# Patient Record
Sex: FEMALE | Race: WHITE | NOT HISPANIC OR LATINO | Employment: OTHER | URBAN - METROPOLITAN AREA
[De-identification: names, ages, dates, MRNs, and addresses within clinical notes are randomized per-mention and may not be internally consistent; named-entity substitution may affect disease eponyms.]

---

## 2017-01-06 ENCOUNTER — THERAPY VISIT (OUTPATIENT)
Dept: PHYSICAL THERAPY | Facility: CLINIC | Age: 72
End: 2017-01-06
Payer: MEDICARE

## 2017-01-06 DIAGNOSIS — M54.50 CHRONIC MIDLINE LOW BACK PAIN WITHOUT SCIATICA: ICD-10-CM

## 2017-01-06 DIAGNOSIS — G89.29 CHRONIC MIDLINE LOW BACK PAIN WITHOUT SCIATICA: ICD-10-CM

## 2017-01-06 PROCEDURE — 97110 THERAPEUTIC EXERCISES: CPT | Mod: GP | Performed by: PHYSICAL THERAPIST

## 2017-01-06 PROCEDURE — 97112 NEUROMUSCULAR REEDUCATION: CPT | Mod: GP | Performed by: PHYSICAL THERAPIST

## 2017-01-12 ENCOUNTER — OFFICE VISIT (OUTPATIENT)
Dept: OPHTHALMOLOGY | Facility: CLINIC | Age: 72
End: 2017-01-12
Attending: OPHTHALMOLOGY
Payer: MEDICARE

## 2017-01-12 DIAGNOSIS — H40.1131 PRIMARY OPEN ANGLE GLAUCOMA OF BOTH EYES, MILD STAGE: ICD-10-CM

## 2017-01-12 DIAGNOSIS — H35.3190 NONEXUDATIVE SENILE MACULAR DEGENERATION OF RETINA: Primary | ICD-10-CM

## 2017-01-12 DIAGNOSIS — Z96.1 PSEUDOPHAKIA OF BOTH EYES: ICD-10-CM

## 2017-01-12 PROCEDURE — 99212 OFFICE O/P EST SF 10 MIN: CPT | Mod: ZF

## 2017-01-12 PROCEDURE — 92134 CPTRZ OPH DX IMG PST SGM RTA: CPT | Mod: ZF | Performed by: OPHTHALMOLOGY

## 2017-01-12 ASSESSMENT — CUP TO DISC RATIO
OD_RATIO: 0.7
OS_RATIO: 0.5

## 2017-01-12 ASSESSMENT — TONOMETRY
IOP_METHOD: TONOPEN
OD_IOP_MMHG: 19
OS_IOP_MMHG: 13

## 2017-01-12 ASSESSMENT — REFRACTION_WEARINGRX
OS_VPRISM: 3.0
OD_HPRISM: 7.0
OS_SPHERE: -2.50
OD_SPHERE: -0.75
OS_AXIS: 047
OS_HPRISM: 7.5
OS_CYLINDER: +1.00
OD_VPRISM: 3.0
SPECS_TYPE: SVL
OD_CYLINDER: SPHERE
OD_VBASE: DOWN
OS_VBASE: DOWN

## 2017-01-12 ASSESSMENT — VISUAL ACUITY
OD_CC: 20/40
METHOD: SNELLEN - LINEAR
OS_CC: 20/40
CORRECTION_TYPE: GLASSES

## 2017-01-12 ASSESSMENT — CONF VISUAL FIELD
OD_SUPERIOR_NASAL_RESTRICTION: 3
OS_NORMAL: 1

## 2017-01-12 ASSESSMENT — SLIT LAMP EXAM - LIDS
COMMENTS: NORMAL
COMMENTS: NORMAL

## 2017-01-12 NOTE — MR AVS SNAPSHOT
After Visit Summary   1/12/2017    Manasa French    MRN: [id number]           Patient Information     Date Of Birth          1945        Visit Information        Provider Department      1/12/2017 7:30 AM Katy Page MD Eye Clinic        Today's Diagnoses     Nonexudative senile macular degeneration of retina    -  1     Pseudophakia of both eyes         Primary open angle glaucoma of both eyes, mild stage            Follow-ups after your visit        Follow-up notes from your care team     Return in about 1 year (around 1/12/2018) for Dry AMD for DFE/OCT OU, FAF OU.      Your next 10 appointments already scheduled     Jan 20, 2017  8:40 AM   [facility] Spine with Willy Marks Pt, PT   [facility] ([facility]  )    [address]  [facility] [address]   [phone]            Jun 19, 2017  8:00 AM   VISUAL FIELD with [facility] EYE VISUAL FIELD   Eye Clinic ([facility])    [address]  [address] [place] [address] [phone]            Jun 19, 2017  8:30 AM   RETURN GLAUCOMA with Radha Gallardo MD   Eye Clinic ([facility])    [address]  [address] [address]   [phone]              Future tests that were ordered for you today     Open Future Orders        Priority Expected Expires Ordered    Fundus Autofluorescence Image (FAF) OU (both eyes) Routine  7/16/2018 1/12/2017    OCT Retina Spectralis OU (both eyes) Routine  7/16/2018 1/12/2017            Who to contact     Please call your clinic at [phone] to:    Ask questions about your health    Make or cancel appointments    Discuss your medicines    Learn about your test results    Speak to your doctor   If you have compliments or concerns about an experience at your clinic, or if you wish to file a complaint, please contact [facility]  Physicians Patient Relations at [phone] or email us at [email or web address]         Additional Information About Your Visit        MyChart Information     [email or web address]¡ is an electronic gateway that provides easy, online access to your medical records. With [email or web address]¡, you can request a clinic appointment, read your test results, renew a prescription or communicate with your care team.     To sign up for [email or web address]¡ visit the website at [email or web address]   You will be asked to enter the access code listed below, as well as some personal information. Please follow the directions to create your username and password.     Your access code is: [email or web address]  Expires: 3/29/2017  8:30 AM     Your access code will  in 90 days. If you need help or a new code, please contact your [facility] or call [phone] for assistance.        Care EveryWhere ID     This is your Care EveryWhere ID. This could be used by other organizations to access your [place] medical records  [id number]         Blood Pressure from Last 3 Encounters:   16 138/62   16 144/74   16 132/66    Weight from Last 3 Encounters:   16 71.442 kg (157 lb 8 oz)   16 71.215 kg (157 lb)   16 71.668 kg (158 lb)              We Performed the Following     OCT Retina Spectralis OU (both eyes)        Primary Care Provider Office Phone # Fax #    Nicole Joy Siegler, PA-C [phone]       [facility] [address] [address]        Thank you!     Thank you for choosing EYE CLINIC  for your care. Our goal is always to provide you with excellent care. Hearing back from our patients is one way we can continue to improve our services. Please take a few minutes to complete the written survey that you may receive in the mail after your visit with us. Thank you!             Your Updated Medication List - Protect others around you: Learn how to  safely use, store and throw away your medicines at www.disposemymeds.org.          This list is accurate as of: 1/12/17  8:48 AM.  Always use your most recent med list.                   Brand Name Dispense Instructions for use    albuterol 108 (90 BASE) MCG/ACT Inhaler   Generic drug:  albuterol     1 Inhaler    Inhale 2 puffs into the lungs every 6 hours as needed for shortness of breath / dyspnea       CALCIUM-VITAMIN D PO      Take 1 tablet by mouth daily       carboxymethylcellulose 1 % ophthalmic solution    CELLUVISC/REFRESH LIQUIGEL     Place 1 drop into both eyes 3 times daily       fluticasone-salmeterol 250-50 MCG/DOSE diskus inhaler    ADVAIR     Inhale 1 puff into the lungs 2 times daily       latanoprost 0.005 % ophthalmic solution    XALATAN    3 Bottle    Place 1 drop into both eyes At Bedtime       levothyroxine 112 MCG tablet    SYNTHROID/LEVOTHROID    90 tablet    TAKE 1 TABLET EVERY DAY       losartan 100 MG tablet    COZAAR    90 tablet    Take 1 tablet (100 mg) by mouth daily       timolol 0.5 % ophthalmic solution    TIMOPTIC    3 Bottle    Place 1 drop into both eyes every morning       vitamin D 1000 UNITS capsule      Take  by mouth.

## 2017-01-12 NOTE — PROGRESS NOTES
I have confirmed the patient's and reviewed Past Medical History, Past Surgical History, Social History, Family History, Problem List, Medication List and agree with Tech note.    CC: f/u AMD    HPI: 70 yo female with h/o Dry AMD here for annual f/u.  Patient reports relatively stable vision with persistent diplopia that is more manageable on some days than others (has h/o WIL f/b orthoptics for diplopia).    POHx:  Thyroid Eye Disease  POAG  Pseudophakia    Eye Meds:  Timolol QAM OU  Xalatan QPM OU    OCT (1/12/17)  RE: (multiple single scans d/t pt eye movements) Drusen, no IRF/SRF  LE: (multiple single scans d/t pt eye movements) Drusen, no IRF/SRF    Assessment & Plan:  1.  Dry Macular Degeneration, both eyes   - continue AREDS II vitamins   - AG precautions   - annual exam    2.  PVD, both eyes   - RT/RD precautions    3. POAG, mild, both eyes   - continue management/follow-up per Dr. Gallardo    4.  Thyroid Eye Disease   - continue with orthoptics for diplopia management    5.  Pseudophakai, both eyes   - mild PCO OS   - observe    RTC: 1 year for exam, OCT and fundus autofluorescence     Venkata Harper MD  Retina Fellow    Attestation:  I have seen and examined the patient with Dr. Harper and agree with the findings in this note.     Katy Peter MD PhD.  Professor & Chair

## 2017-01-12 NOTE — NURSING NOTE
Chief Complaints and History of Present Illnesses   Patient presents with     Follow Up For     Age-related macular degeneration, dry, both eyes      HPI    Affected eye(s):  Both   Symptoms:     No blurred vision   No decreased vision   Double vision   No floaters   No flashes   Halos (Comment: at night)   No starbursts   No tearing   Dryness   Photophobia      Duration:  12 months   Frequency:  Constant       Do you have eye pain now?:  No      Comments:  States va is the same since last visit. Refresh Plus 2-3 times daily both eyes  Artur Schmidt  7:33 AM January 12, 2017

## 2017-01-20 ENCOUNTER — THERAPY VISIT (OUTPATIENT)
Dept: PHYSICAL THERAPY | Facility: CLINIC | Age: 72
End: 2017-01-20
Payer: MEDICARE

## 2017-01-20 DIAGNOSIS — M54.50 CHRONIC MIDLINE LOW BACK PAIN WITHOUT SCIATICA: ICD-10-CM

## 2017-01-20 DIAGNOSIS — G89.29 CHRONIC MIDLINE LOW BACK PAIN WITHOUT SCIATICA: ICD-10-CM

## 2017-01-20 PROCEDURE — G8979 MOBILITY GOAL STATUS: HCPCS | Mod: GP | Performed by: PHYSICAL THERAPIST

## 2017-01-20 PROCEDURE — G8980 MOBILITY D/C STATUS: HCPCS | Mod: GP | Performed by: PHYSICAL THERAPIST

## 2017-01-20 PROCEDURE — 97110 THERAPEUTIC EXERCISES: CPT | Mod: GP | Performed by: PHYSICAL THERAPIST

## 2017-01-20 PROCEDURE — 97112 NEUROMUSCULAR REEDUCATION: CPT | Mod: GP | Performed by: PHYSICAL THERAPIST

## 2017-01-20 NOTE — PROGRESS NOTES
Subjective:    HPI  Oswestry Score: 33.33 %                 Objective:    System    Physical Exam    General     ROS    Assessment/Plan:      DISCHARGE REPORT    Progress reporting period is from 11/18/16 to 1/20/17.       SUBJECTIVE   Subjective: Was able to walk around store 60' and is now able to do transfers w/o help. Goes to [facility] almost every day. She swims and walks 3-4x per week. Also does HEP daily and lifts weights at Y 3x per week with machines    Current pain level is NA  .     Previous pain level was  NA  .   Changes in function:  Yes (See Goal flowsheet attached for changes in current functional level)  Adverse reaction to treatment or activity: None    OBJECTIVE  Changes noted in objective findings:  Yes, AROM WFL  Independent HEP that has been progressed several times during our meetings. Exercises are getting easier as she strengthens  Walks with SEC, but often forgets to use it because she is comfortable without.          ASSESSMENT/PLAN  Updated problem list and treatment plan: Diagnosis 1:  LBP   Pain -  self management, education and home program  Decreased strength - therapeutic exercise and therapeutic activities  Impaired muscle performance - neuro re-education  Decreased function - therapeutic activities  STG/LTGs have been met or progress has been made towards goals:  Yes (See Goal flow sheet completed today.)  Assessment of Progress: The patient's condition is improving.  The patient's condition has potential to improve.  Self Management Plans:  Patient is independent in a home treatment program.  Patient is independent in self management of symptoms.    Manasa continues to require the following intervention to meet STG and LTG's:  PT intervention is no longer required to meet STG/LTG.    Recommendations:  This patient is ready to be discharged from therapy and continue their home treatment program.    Please refer to the daily flowsheet for treatment today, total treatment time and time  spent performing 1:1 timed codes.

## 2017-01-20 NOTE — Clinical Note
[facility] ATHLETIC [facility]  [address]  [facility] [id number] [phone]    2017  Re: Manasa French   :   1945  MRN:  [id number]   REFERRING PHYSICIAN:  Kevin Hodges    [facility]    Date of Initial Evaluation:  2016  Visits:  Rxs Used: 7  Reason for Referral:     Compression fracture  Chronic midline low back pain without sciatica    DISCHARGE REPORT    Progress reporting period is from 16 to 17.       SUBJECTIVE   Subjective: Was able to walk around store 60' and is now able to do transfers w/o help. Goes to [facility] almost every day. She swims and walks 3-4x per week. Also does HEP daily and lifts weights at Y 3x per week with machines    Current pain level is NA  .     Previous pain level was  NA  .   Changes in function:  Yes (See Goal flowsheet attached for changes in current functional level)  Adverse reaction to treatment or activity: None    OBJECTIVE  Changes noted in objective findings:  Yes, AROM WFL  Independent HEP that has been progressed several times during our meetings. Exercises are getting easier as she strengthens  Walks with SEC, but often forgets to use it because she is comfortable without.    ASSESSMENT/PLAN  Updated problem list and treatment plan: Diagnosis 1:  LBP   Pain -  self management, education and home program  Decreased strength - therapeutic exercise and therapeutic activities  Impaired muscle performance - neuro re-education  Decreased function - therapeutic activities  STG/LTGs have been met or progress has been made towards goals:  Yes (See Goal flow sheet completed today.)  Assessment of Progress: The patient's condition is improving.  The patient's condition has potential to improve.  Self Management Plans:  Patient is independent in a home treatment program.  Patient is independent in self management of symptoms.    Manasa continues to  require the following intervention to meet STG and LTG's:  PT intervention is no longer required to meet STG/LTG.    Recommendations:  This patient is ready to be discharged from therapy and continue their home treatment program.              Thank you for your referral.    INQUIRIES  Therapist: Willy Marks, PT  [facility]  [address]  [facility] [address]  Phone: [phone]  Fax: [phone]

## 2017-03-20 DIAGNOSIS — H40.9 MILD STAGE GLAUCOMA(365.71): ICD-10-CM

## 2017-03-21 RX ORDER — LATANOPROST 50 UG/ML
1 SOLUTION/ DROPS OPHTHALMIC AT BEDTIME
Qty: 3 BOTTLE | Refills: 3 | Status: SHIPPED | OUTPATIENT
Start: 2017-03-21 | End: 2018-05-04

## 2017-03-21 NOTE — TELEPHONE ENCOUNTER
latanoprost (XALATAN) 0.005 % ophthalmic solution      Last Written Prescription Date:  12-*11-15  Last Fill Quantity: 3 btl,   # refills: 4  Last Office Visit: 1-12-17  Future Office visit:   6-19-17  Last Note:    Progress Notes      I have confirmed the patient's and reviewed Past Medical History, Past Surgical History, Social History, Family History, Problem List, Medication List and agree with Tech note.     CC: f/u AMD     HPI: 72 yo female with h/o Dry AMD here for annual f/u. Patient reports relatively stable vision with persistent diplopia that is more manageable on some days than others (has h/o WIL f/b orthoptics for diplopia).     POHx:  Thyroid Eye Disease  POAG  Pseudophakia     Eye Meds:  Timolol QAM OU  Xalatan QPM OU     OCT (1/12/17)  RE: (multiple single scans d/t pt eye movements) Drusen, no IRF/SRF  LE: (multiple single scans d/t pt eye movements) Drusen, no IRF/SRF     Assessment & Plan:  1. Dry Macular Degeneration, both eyes  - continue AREDS II vitamins  - AG precautions  - annual exam     2. PVD, both eyes  - RT/RD precautions     3. POAG, mild, both eyes  - continue management/follow-up per Dr. Gallardo     4. Thyroid Eye Disease  - continue with orthoptics for diplopia management     5. Pseudophakai, both eyes  - mild PCO OS  - observe     RTC: 1 year for exam, OCT and fundus autofluorescence             Kathleen M Doege RN

## 2017-06-19 ENCOUNTER — OFFICE VISIT (OUTPATIENT)
Dept: OPHTHALMOLOGY | Facility: CLINIC | Age: 72
End: 2017-06-19
Attending: OPHTHALMOLOGY
Payer: MEDICARE

## 2017-06-19 DIAGNOSIS — H40.9 GLAUCOMA: Primary | ICD-10-CM

## 2017-06-19 DIAGNOSIS — H40.9 MILD STAGE GLAUCOMA(365.71): ICD-10-CM

## 2017-06-19 PROCEDURE — 99213 OFFICE O/P EST LOW 20 MIN: CPT | Mod: ZF

## 2017-06-19 PROCEDURE — 92083 EXTENDED VISUAL FIELD XM: CPT | Mod: ZF | Performed by: OPHTHALMOLOGY

## 2017-06-19 RX ORDER — TIMOLOL MALEATE 5 MG/ML
1 SOLUTION/ DROPS OPHTHALMIC EVERY MORNING
Qty: 3 BOTTLE | Refills: 4 | Status: SHIPPED | OUTPATIENT
Start: 2017-06-19 | End: 2018-08-10

## 2017-06-19 ASSESSMENT — REFRACTION_WEARINGRX
OS_VBASE: DOWN
OD_SPHERE: -0.75
OS_VPRISM: 3.0
OS_AXIS: 047
SPECS_TYPE: SVL
OS_SPHERE: -2.50
OD_HPRISM: 7.0
OD_CYLINDER: SPHERE
OS_CYLINDER: +1.00
OS_HPRISM: 7.5
OD_VBASE: DOWN
OD_VPRISM: 3.0

## 2017-06-19 ASSESSMENT — VISUAL ACUITY
OS_CC: 20/40
OD_CC: 20/25
METHOD: SNELLEN - LINEAR
OS_PH_CC: 20/30
OD_CC+: -1
OS_CC+: -2
CORRECTION_TYPE: GLASSES

## 2017-06-19 ASSESSMENT — TONOMETRY
OS_IOP_MMHG: 16
OD_IOP_MMHG: 20
IOP_METHOD: APPLANATION

## 2017-06-19 ASSESSMENT — SLIT LAMP EXAM - LIDS
COMMENTS: NORMAL
COMMENTS: NORMAL

## 2017-06-19 ASSESSMENT — CONF VISUAL FIELD
METHOD: COUNTING FINGERS
OD_NORMAL: 1
OS_NORMAL: 1

## 2017-06-19 ASSESSMENT — CUP TO DISC RATIO
OD_RATIO: 0.7
OS_RATIO: 0.5

## 2017-06-19 NOTE — NURSING NOTE
Chief Complaints and History of Present Illnesses   Patient presents with     Follow Up For     Primary open angle glaucoma      HPI    Affected eye(s):  Both   Symptoms:        Frequency:  Constant       Do you have eye pain now?:  No      Comments:  States va is the same since last visit  No red watery or dry  Lillie Dobbins COT 8:19 AM June 19, 2017

## 2017-06-19 NOTE — PROGRESS NOTES
History of thyroid eye disease and primary open angle glaucoma presenting for 6 month follow up.     Current Medications: did not take this am  Timolol every morning both eyes   Latanoprost HS both eyes     [place] Visual Field  Same as previous     1. Mild stage glaucoma  IOP stable on current medication regimen and visual fields stable with non-specific changes left eye (has had fluctuations in past)     2. Pseudophakia of both eyes  IOL stable in good position   Observe    3. Thyroid eye disease  Currently following with orthoptics for diplopia    4. Age related macular degeneration   Stage II, followed annually by retina     Plan:   Continue latanoprost HS both eyes   Continue timolol daily both eyes     RV 6 months with OCT retinal nerve fiber layer  both eyes      Attending Physician Attestation:  Complete documentation of historical and exam elements from today's encounter can be found in the full encounter summary report (not reduplicated in this progress note). I personally obtained the chief complaint(s) and history of present illness. I confirmed and edited asnecessary the review of systems, past medical/surgical history, family history, social history, and examination findings as documented by others; and I examined the patient myself. I personally reviewed the relevant tests, images, and reports as documented above. I formulated and edited as necessary the assessment and plan and discussed the findings and management plan with the patient and family.  - Radha Gallardo MD 9:10 AM 6/19/2017

## 2017-06-19 NOTE — MR AVS SNAPSHOT
After Visit Summary   6/19/2017    Manasa French    MRN: [id number]           Patient Information     Date Of Birth          1945        Visit Information        Provider Department      6/19/2017 8:30 AM Radha Gallardo MD Eye Clinic        Today's Diagnoses     Glaucoma    -  1    Mild stage glaucoma - Both Eyes           Follow-ups after your visit        Follow-up notes from your care team     Return in about 6 months (around 12/19/2017) for OCT rnfl.      Your next 10 appointments already scheduled     Dec 15, 2017  8:00 AM CST   RETURN GLAUCOMA with Radha Gallardo MD   Eye Clinic ([facility])    [address]  [address]  [facility] [address]   [phone]            Jan 17, 2018  7:30 AM CST   RETURN RETINA with Katy Page MD   Eye Clinic ([facility])    [address]  [address] [facility]  [address] [facility] [address]  [facility] [address]   [phone]              Who to contact     Please call your clinic at [phone] to:    Ask questions about your health    Make or cancel appointments    Discuss your medicines    Learn about your test results    Speak to your doctor   If you have compliments or concerns about an experience at your clinic, or if you wish to file a complaint, please contact [facility] Physicians Patient Relations at [phone] or email us at [email or web address]         Additional Information About Your Visit        MyChart Information     [employer] is an electronic gateway that provides easy, online access to your medical records. With qcue, you can request a clinic appointment, read your test results, renew a prescription or communicate with your care team.     To sign up for [employer] visit the website at [email or web address]   You will be asked to enter the access code listed below, as well as some personal information. Please follow the directions to  create your username and password.     Your access code is: [email or web address]  Expires: 9/3/2017  6:31 AM     Your access code will  in 90 days. If you need help or a new code, please contact your [facility] or call [phone] for assistance.        Care EveryWhere ID     This is your Care EveryWhere ID. This could be used by other organizations to access your [place] medical records  [id number]         Blood Pressure from Last 3 Encounters:   16 138/62   16 144/74   16 132/66    Weight from Last 3 Encounters:   16 71.4 kg (157 lb 8 oz)   16 71.2 kg (157 lb)   16 71.7 kg (158 lb)              We Performed the Following     OVF 24-2 Dynamic OU          Where to get your medicines      Some of these will need a paper prescription and others can be bought over the counter.  Ask your nurse if you have questions.     Bring a paper prescription for each of these medications     timolol 0.5 % ophthalmic solution          Primary Care Provider Office Phone # Fax #    Nicole Joy Siegler, PA-C [phone]       [facility] [address] [place] [address] [address]  [facility] [address]        Thank you!     Thank you for choosing EYE CLINIC  for your care. Our goal is always to provide you with excellent care. Hearing back from our patients is one way we can continue to improve our services. Please take a few minutes to complete the written survey that you may receive in the mail after your visit with us. Thank you!             Your Updated Medication List - Protect others around you: Learn how to safely use, store and throw away your medicines at www.disposemymeds.org.          This list is accurate as of: 17  9:24 AM.  Always use your most recent med list.                   Brand Name Dispense Instructions for use    albuterol 108 (90 BASE) MCG/ACT Inhaler   Generic drug:  albuterol     1 Inhaler    Inhale 2 puffs into the lungs every 6 hours as  needed for shortness of breath / dyspnea       CALCIUM-VITAMIN D PO      Take 1 tablet by mouth daily       carboxymethylcellulose 1 % ophthalmic solution    CELLUVISC/REFRESH LIQUIGEL     Place 1 drop into both eyes 3 times daily       fluticasone-salmeterol 250-50 MCG/DOSE diskus inhaler    ADVAIR     Inhale 1 puff into the lungs 2 times daily       latanoprost 0.005 % ophthalmic solution    XALATAN    3 Bottle    Place 1 drop into both eyes At Bedtime       levothyroxine 112 MCG tablet    SYNTHROID/LEVOTHROID    90 tablet    TAKE 1 TABLET EVERY DAY       losartan 100 MG tablet    COZAAR    90 tablet    Take 1 tablet (100 mg) by mouth daily       timolol 0.5 % ophthalmic solution    TIMOPTIC    3 Bottle    Place 1 drop into both eyes every morning       vitamin D 1000 UNITS capsule      Take  by mouth.

## 2017-10-09 ENCOUNTER — TELEPHONE (OUTPATIENT)
Dept: FAMILY MEDICINE | Facility: CLINIC | Age: 72
End: 2017-10-09

## 2017-10-09 ENCOUNTER — TRANSFERRED RECORDS (OUTPATIENT)
Dept: HEALTH INFORMATION MANAGEMENT | Facility: CLINIC | Age: 72
End: 2017-10-09

## 2017-10-09 DIAGNOSIS — M26.639 ARTICULAR DISC DISORDER OF TEMPOROMANDIBULAR JOINT: ICD-10-CM

## 2017-10-09 DIAGNOSIS — M26.629 ARTHRALGIA OF TEMPOROMANDIBULAR JOINT, UNSPECIFIED LATERALITY: Primary | ICD-10-CM

## 2017-10-09 NOTE — TELEPHONE ENCOUNTER
Reason for Call: Request for an order or referral:    Order or referral being requested: PT Referral     Date needed: as soon as possible    Has the patient been seen by the PCP for this problem? NO    Additional comments: Pt saw dentist about jaw pain, was then sent to head and neck pain clinic, needs a referral for PT due to lock jaw. Please call pt and let her know next course of action     Phone number Patient can be reached at:  Home number on file [phone] (home)    Best Time:  Anytime     Can we leave a detailed message on this number?  YES    Call taken on 10/9/2017 at 3:36 PM by Irina Perry

## 2017-10-10 NOTE — TELEPHONE ENCOUNTER
Can we have notes from her visits with the Head/Neck specialist? I'd like to have the diagnosis confirmed and that documentation in her chart. Does she have a PT already set up? Or is she looking for further information to find one to do that type of work? Please ask her these questions. We should be able to complete the referral but I need to know that info. Thanks! Nicole Joy Siegler, PA-C

## 2017-10-10 NOTE — TELEPHONE ENCOUNTER
Called pt and she was seen at [facility] Dr. Shane [phone].  They will fax visit notes once the provider is done.  Pt doesn't have PT set up yet, she is waiting for a referral.  I advised pt that once notes have been received the referral should be done and we will call her once it has been done.

## 2017-10-12 NOTE — TELEPHONE ENCOUNTER
Please recheck for these documents/ notes from her head/neck specialist. I known she is waiting for a referral to be written. Thanks! Nicole Joy Siegler, PA-C

## 2017-10-12 NOTE — TELEPHONE ENCOUNTER
OV notes should be coming to [facility] office as Kaila is a team 2 . This will have to be looked into at [facility].

## 2017-10-13 PROBLEM — M26.629 ARTHRALGIA OF TEMPOROMANDIBULAR JOINT, UNSPECIFIED LATERALITY: Status: ACTIVE | Noted: 2017-10-13

## 2017-10-13 NOTE — TELEPHONE ENCOUNTER
Called pt and let her know referral has been done.  Pt would like referral faxed to [facility].  Referral faxed to [facility] [phone].

## 2017-10-13 NOTE — TELEPHONE ENCOUNTER
Notes received and physical therapy order was written. I do not have a suggestion of where to go, however, it seems that [facility] has an affiliated PT group and they would likely provide appropriate PT for her. Please provide the Order for her as she needs- fax to PT of her choice vs print for her etc. Thanks! Nicole Joy Siegler, PA-C

## 2017-10-27 ENCOUNTER — THERAPY VISIT (OUTPATIENT)
Dept: PHYSICAL THERAPY | Facility: CLINIC | Age: 72
End: 2017-10-27
Payer: MEDICARE

## 2017-10-27 DIAGNOSIS — M26.629 ARTHRALGIA OF TEMPOROMANDIBULAR JOINT, UNSPECIFIED LATERALITY: Primary | ICD-10-CM

## 2017-10-27 PROCEDURE — 97140 MANUAL THERAPY 1/> REGIONS: CPT | Mod: GP | Performed by: PHYSICAL THERAPIST

## 2017-10-27 PROCEDURE — 97161 PT EVAL LOW COMPLEX 20 MIN: CPT | Mod: GP | Performed by: PHYSICAL THERAPIST

## 2017-10-27 PROCEDURE — G8991 OTHER PT/OT GOAL STATUS: HCPCS | Mod: GP | Performed by: PHYSICAL THERAPIST

## 2017-10-27 PROCEDURE — G8990 OTHER PT/OT CURRENT STATUS: HCPCS | Mod: GP | Performed by: PHYSICAL THERAPIST

## 2017-10-27 NOTE — PROGRESS NOTES
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)   - headaches   - Hx of back surgery   - Hx of left shoulder arthroscopy   - Hx of eye problems (glaucoma, macular degeneration, cataracts)  General health as reported by patient: Good    Medications: Thyroid, HTN, glaucoma,   Occupation: None  Previous Treatment (Effect): massage and hot pack (not helpful)  Imaging: None  AM/PM: no difference  Quality of Pain: ache  Pain: 1/10 at present, 0/10 at best, 2/10 at worst  Better: not found  Worse: eating, brushing teeth,   Progression of Symptoms since onset: same   Sleeping: no disturbance   Other current functional challenges: eating, brushing teeth  Current Functional Status: unable to open to eat, brush teeth   Previous Functional Status: no restrictions  Red Flags:   - Patient denies the following: Numbness/Tingling ; Vision Change     Pain with: opening, oral hygiene,       Objective:     Posture: moderate forward head    Headaches: Posterior vertex    Cervical Screen: Limitations with Cervical ROM in all directions.     AROM:   Movement  measurement (mm) Pain   Opening 26 -->31 mm    Left Lat Excursion 5 mm    Right Lat Excursion 10 mm    Opening improved within the session from 26 to 29 with mobilization, and from 29 to 31 with soft tissue work.     Opening Pattern: no deviation    Translation during opening: Minimal bilaterally    Joint Noise: None    Palpation: (R - right, L -  left, B - bilateral)  Extra-Orally Location of Pressure Referred Pain?   TMJ     Masseter     Temporalis     Cervical     Sternocleidomastoid     Suboccipitals     Upper Trapezius     Levator Scapulae       Joint Play: mild hypomobility of the R TMJ      Assessment/Plan:      Patient is a 72 year old female with right side TMJ complaints.    Patient has the following significant findings with corresponding treatment plan.                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    Comorbidity factors that impact the plan of care are:      High blood pressure and Eye Problems.     Medications impacting care: High blood pressure and Glaucoma.  2) Examination of Body Systems comprised of:   Body structures and functions that impact the plan of care:      Head.   Activity limitations that impact the plan of care are:      Eating and Karla Hygiene.  3) Clinical presentation characteristics are:   Stable/Uncomplicated.  4) Decision-Making    Low complexity using standardized patient assessment instrument and/or measureable assessment of functional outcome.  Cumulative Therapy Evaluation is: Low complexity.    Previous and current functional limitations:  (See Goal Flow Sheet for this information)    Short term and Long term goals: (See Goal Flow Sheet for this information)     Communication ability:  Patient appears to be  able to clearly communicate and understand verbal and written communication and follow directions correctly.  Treatment Explanation - The following has been discussed with the patient:   RX ordered/plan of care  Anticipated outcomes  Possible risks and side effects  This patient would benefit from PT intervention to resume normal activities.   Rehab potential is good.    Frequency:  1 X week, once daily  Duration:  for 6 weeks  Discharge Plan:  Achieve all LTG.  Independent in home treatment program.  Reach maximal therapeutic benefit.    Please refer to the daily flowsheet for treatment today, total treatment time and time spent performing 1:1 timed codes.

## 2017-10-27 NOTE — LETTER
DEPARTMENT OF HEALTH AND HUMAN SERVICES  CENTERS FOR MEDICARE & MEDICAID SERVICES    PLAN/UPDATED PLAN OF PROGRESS FOR OUTPATIENT REHABILITATION    PATIENTS NAME:  Manasa French   : 1945  PROVIDER NUMBER:    [id number]  [id number]  PROVIDER NAME: [facility]  MEDICAL RECORD NUMBER: [id number]   START OF CARE DATE:  SOC Date: 10/27/17   TYPE:  PT  PRIMARY/TREATMENT DIAGNOSIS: (Pertinent Medical Diagnosis)  Arthralgia of temporomandibular joint, unspecified laterality    VISITS FROM START OF CARE:  Rxs Used: 1   Physical Therapy Initial Evaluation:   Oct 27, 2017  Subjective:   Chief Complaint:    Pain: Right side of the face along the jawline   Numbness/Tingling: None   Other: Can't open her mouth  New/Recurrent/Chronic: New - had been clicking all summer  DOI/onset: 3 weeks ago   Referral Date: 10/13/2017  Mechanism of onset: started as clicking, then just couldn't open  PMH/surgical history/trauma:     - HTN   - Thyroid Problems   - Asthma   - Hydrocephaly (with shunt on right side of cranium)   - headaches   - Hx of back surgery   - Hx of left shoulder arthroscopy   - Hx of eye problems (glaucoma, macular degeneration, cataracts)  General health as reported by patient: Good    Medications: Thyroid, HTN, glaucoma,     PATIENTS NAME:  Manasa French   : 1945    Occupation: None  Previous Treatment (Effect): massage and hot pack (not helpful)  Imaging: None  AM/PM: no difference  Quality of Pain: ache  Pain: 1/10 at present, 0/10 at best, 2/10 at worst  Better: not found  Worse: eating, brushing teeth,   Progression of Symptoms since onset: same   Sleeping: no disturbance   Other current functional challenges: eating, brushing teeth  Current Functional Status: unable to open to eat, brush teeth   Previous Functional Status: no restrictions  Red Flags:   - Patient denies the following: Numbness/Tingling ; Vision Change     Pain with: opening, oral hygiene,     Objective:     Posture:  moderate forward head    Headaches: Posterior vertex    Cervical Screen: Limitations with Cervical ROM in all directions.     AROM:   Movement  measurement (mm) Pain   Opening 26 -->31 mm    Left Lat Excursion 5 mm    Right Lat Excursion 10 mm    Opening improved within the session from 26 to 29 with mobilization, and from 29 to 31 with soft tissue work.     Opening Pattern: no deviation    Translation during opening: Minimal bilaterally    Joint Noise: None        PATIENTS NAME:  Manasa French   : 1945    Palpation: (R - right, L - left, B - bilateral)  Extra-Orally Location of Pressure Referred Pain?   TMJ     Masseter     Temporalis     Cervical     Sternocleidomastoid     Suboccipitals     Upper Trapezius     Levator Scapulae       Joint Play: mild hypomobility of the R TMJ    Assessment/Plan:      Patient is a 72 year old female with right side TMJ complaints.    Patient has the following significant findings with corresponding treatment plan.                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    Comorbidity factors that impact the plan of care are:      High blood pressure and Eye Problems.     Medications impacting care: High blood pressure and Glaucoma.  2) Examination of Body Systems comprised of:   Body structures and functions that impact the plan of care:      Head.   Activity limitations  "that impact the plan of care are:      Eating and Karla Hygiene.  3) Clinical presentation characteristics are:   Stable/Uncomplicated.  4) Decision-Making    Low complexity using standardized patient assessment instrument and/or measureable assessment of functional outcome.  Cumulative Therapy Evaluation is: Low complexity.    Previous and current functional limitations:  (See Goal Flow Sheet for this information)    Short term and Long term goals: (See Goal Flow Sheet for this information)             PATIENTS NAME:  Manasa French   : 1945    Communication ability:  Patient appears to be able to clearly communicate and understand verbal and written communication and follow directions correctly.  Treatment Explanation - The following has been discussed with the patient:   RX ordered/plan of care  Anticipated outcomes  Possible risks and side effects  This patient would benefit from PT intervention to resume normal activities.   Rehab potential is good.    Frequency:  1 X week, once daily  Duration:  for 6 weeks  Discharge Plan:  Achieve all LTG.  Independent in home treatment program.  Reach maximal therapeutic benefit.              Caregiver Signature/Credentials _____________________________ Date ________      Treating Provider: Lele Vanessa, PT, OCS   I have reviewed and certified the need for these services and plan of treatment while under my care.        PHYSICIAN'S SIGNATURE:   ___________________________________ Date___________     Nicole Siegler PA-C    Certification period:  Beginning of Cert date period: 10/27/17 to  End of Cert period date: 18     Functional Level Progress Report: Please see attached \"Goal Flow sheet for Functional level.\"    ____X____ Continue Services or       ________ DC Services                Service dates: From  SOC Date: 10/27/17 date to present                         "

## 2017-11-03 ENCOUNTER — THERAPY VISIT (OUTPATIENT)
Dept: PHYSICAL THERAPY | Facility: CLINIC | Age: 72
End: 2017-11-03
Payer: MEDICARE

## 2017-11-03 DIAGNOSIS — M26.629 ARTHRALGIA OF TEMPOROMANDIBULAR JOINT, UNSPECIFIED LATERALITY: ICD-10-CM

## 2017-11-03 PROCEDURE — 97530 THERAPEUTIC ACTIVITIES: CPT | Mod: GP | Performed by: PHYSICAL THERAPIST

## 2017-11-03 PROCEDURE — 97140 MANUAL THERAPY 1/> REGIONS: CPT | Mod: GP | Performed by: PHYSICAL THERAPIST

## 2017-11-03 PROCEDURE — 97035 APP MDLTY 1+ULTRASOUND EA 15: CPT | Mod: GP | Performed by: PHYSICAL THERAPIST

## 2017-11-06 ENCOUNTER — TELEPHONE (OUTPATIENT)
Dept: FAMILY MEDICINE | Facility: CLINIC | Age: 72
End: 2017-11-06

## 2017-11-06 ENCOUNTER — ALLIED HEALTH/NURSE VISIT (OUTPATIENT)
Dept: NURSING | Facility: CLINIC | Age: 72
End: 2017-11-06
Payer: COMMERCIAL

## 2017-11-06 DIAGNOSIS — Z23 NEED FOR PROPHYLACTIC VACCINATION AND INOCULATION AGAINST INFLUENZA: Primary | ICD-10-CM

## 2017-11-06 PROCEDURE — 99207 ZZC NO CHARGE NURSE ONLY: CPT

## 2017-11-06 PROCEDURE — 90662 IIV NO PRSV INCREASED AG IM: CPT

## 2017-11-06 PROCEDURE — G0008 ADMIN INFLUENZA VIRUS VAC: HCPCS

## 2017-11-06 NOTE — PROGRESS NOTES

## 2017-11-06 NOTE — MR AVS SNAPSHOT
After Visit Summary   11/6/2017    Manasa French    MRN: [id number]           Patient Information     Date Of Birth          1945        Visit Information        Provider Department      11/6/2017 8:30 AM BM NURSE [facility]        Today's Diagnoses     Need for prophylactic vaccination and inoculation against influenza    -  1       Follow-ups after your visit        Your next 10 appointments already scheduled     Nov 10, 2017 10:10 AM CST   NADIA Extremity with Lele TORRES Ho   [facility] ([facility] [place]  )    [address]  [address]  [facility] [address]   [phone]            Nov 17, 2017  8:50 AM CST   NADIA Extremity with Lang M Ho   [facility] ([facility] [place]  )    [address]  [address]  [facility] [address]   [phone]            Dec 15, 2017  8:00 AM CST   RETURN GLAUCOMA with Radha Gallardo MD   Eye Clinic ([facility])    [address]  [address] [place] [id number] [phone]            Jan 17, 2018  7:30 AM CST   RETURN RETINA with Katy Page MD   Eye Clinic ([facility])    [address] [address]   [phone]              Who to contact     If you have questions or need follow up information about today's clinic visit or your schedule please contact [facility] directly at [phone].  Normal or non-critical lab and imaging results will be communicated to you by MyChart, letter or phone within 4 business days after the clinic has received the results. If you do not hear from us within 7 days, please contact the clinic through MyChart or phone. If you have a critical or abnormal lab result, we will notify you by phone as soon as possible.  Submit refill requests through [employer] or call your pharmacy and they will forward the refill request  "to us. Please allow 3 business days for your refill to be completed.          Additional Information About Your Visit        [employer] Information     [employer] lets you send messages to your doctor, view your test results, renew your prescriptions, schedule appointments and more. To sign up, go to [email or web address] . Click on \"Log in\" on the left side of the screen, which will take you to the Welcome page. Then click on \"Sign up Now\" on the right side of the page.     You will be asked to enter the access code listed below, as well as some personal information. Please follow the directions to create your username and password.     Your access code is: [email or web address]  Expires: 2018  1:26 PM     Your access code will  in 90 days. If you need help or a new code, please call your [facility] clinic or [phone].        Care EveryWhere ID     This is your Care EveryWhere ID. This could be used by other organizations to access your [facility] medical records  [id number]         Blood Pressure from Last 3 Encounters:   16 138/62   16 144/74   16 132/66    Weight from Last 3 Encounters:   16 157 lb 8 oz (71.4 kg)   16 157 lb (71.2 kg)   16 158 lb (71.7 kg)              We Performed the Following     ADMIN INFLUENZA (For MEDICARE Patients ONLY) []     FLU VACCINE, INCREASED ANTIGEN, PRESV FREE, AGE 65+ [[address]]        Primary Care Provider Office Phone # Fax #    Nicole Joy Siegler, PA-C [phone]       [facility] [address]  [facility] [address]        Equal Access to Services     CHELSY MONTERROSO : Babar Almeida, tu wiley, edwardo kaalmada jael, benji rodríguez. So [facility] [phone].    ATENCIÓN: Si habla español, tiene a martinez disposición servicios gratuitos de asistencia lingüística. Llame al [phone].    We comply with applicable federal civil rights laws and [place] laws. We do not " discriminate on the basis of race, color, national origin, age, disability, sex, sexual orientation, or gender identity.            Thank you!     Thank you for choosing [facility]  for your care. Our goal is always to provide you with excellent care. Hearing back from our patients is one way we can continue to improve our services. Please take a few minutes to complete the written survey that you may receive in the mail after your visit with us. Thank you!             Your Updated Medication List - Protect others around you: Learn how to safely use, store and throw away your medicines at www.disposemymeds.org.          This list is accurate as of: 11/6/17  1:26 PM.  Always use your most recent med list.                   Brand Name Dispense Instructions for use Diagnosis    CALCIUM-VITAMIN D PO      Take 1 tablet by mouth daily        carboxymethylcellulose 1 % ophthalmic solution    CELLUVISC/REFRESH LIQUIGEL     Place 1 drop into both eyes 3 times daily        fluticasone-salmeterol 250-50 MCG/DOSE diskus inhaler    ADVAIR     Inhale 1 puff into the lungs 2 times daily        latanoprost 0.005 % ophthalmic solution    XALATAN    3 Bottle    Place 1 drop into both eyes At Bedtime    Mild stage glaucoma(365.71)       levothyroxine 112 MCG tablet    SYNTHROID/LEVOTHROID    90 tablet    TAKE 1 TABLET EVERY DAY    Hypothyroidism, postablative       losartan 100 MG tablet    COZAAR    90 tablet    Take 1 tablet (100 mg) by mouth daily    Benign hypertension       PROAIR  (90 BASE) MCG/ACT Inhaler   Generic drug:  albuterol     1 Inhaler    Inhale 2 puffs into the lungs every 6 hours as needed for shortness of breath / dyspnea    Asthma, mild persistent, uncomplicated       timolol 0.5 % ophthalmic solution    TIMOPTIC    3 Bottle    Place 1 drop into both eyes every morning    Mild stage glaucoma(365.71)       vitamin D 1000 UNITS capsule      Take  by mouth.

## 2017-11-06 NOTE — TELEPHONE ENCOUNTER
Reason for Call:  Form, our goal is to have forms completed with 72 hours, however, some forms may require a visit or additional information.    Type of letter, form or note:  medical    Who is the form from?: Home care    Where did the form come from: form was faxed in    What clinic location was the form placed at?: [facility]    Where the form was placed: 's Box: Nicole Siegler, PA    What number is listed as a contact on the form?: [phone]   Phone [phone]       Additional comments: [facility]  plan/updated plan of progress for outpt rehab     Call taken on 11/6/2017 at 4:05 PM by Krista Bajwa

## 2017-11-10 ENCOUNTER — THERAPY VISIT (OUTPATIENT)
Dept: PHYSICAL THERAPY | Facility: CLINIC | Age: 72
End: 2017-11-10
Payer: MEDICARE

## 2017-11-10 DIAGNOSIS — M26.629 ARTHRALGIA OF TEMPOROMANDIBULAR JOINT, UNSPECIFIED LATERALITY: ICD-10-CM

## 2017-11-10 PROCEDURE — 97530 THERAPEUTIC ACTIVITIES: CPT | Mod: GP | Performed by: PHYSICAL THERAPIST

## 2017-11-10 PROCEDURE — 97140 MANUAL THERAPY 1/> REGIONS: CPT | Mod: GP | Performed by: PHYSICAL THERAPIST

## 2017-11-10 PROCEDURE — 97035 APP MDLTY 1+ULTRASOUND EA 15: CPT | Mod: GP | Performed by: PHYSICAL THERAPIST

## 2017-11-17 ENCOUNTER — THERAPY VISIT (OUTPATIENT)
Dept: PHYSICAL THERAPY | Facility: CLINIC | Age: 72
End: 2017-11-17
Payer: MEDICARE

## 2017-11-17 DIAGNOSIS — M26.629 ARTHRALGIA OF TEMPOROMANDIBULAR JOINT, UNSPECIFIED LATERALITY: ICD-10-CM

## 2017-11-17 PROCEDURE — 97140 MANUAL THERAPY 1/> REGIONS: CPT | Mod: GP | Performed by: PHYSICAL THERAPIST

## 2017-11-17 PROCEDURE — 97110 THERAPEUTIC EXERCISES: CPT | Mod: GP | Performed by: PHYSICAL THERAPIST

## 2017-11-17 PROCEDURE — 97035 APP MDLTY 1+ULTRASOUND EA 15: CPT | Mod: GP | Performed by: PHYSICAL THERAPIST

## 2017-12-01 ENCOUNTER — THERAPY VISIT (OUTPATIENT)
Dept: PHYSICAL THERAPY | Facility: CLINIC | Age: 72
End: 2017-12-01
Payer: MEDICARE

## 2017-12-01 DIAGNOSIS — I10 BENIGN HYPERTENSION: ICD-10-CM

## 2017-12-01 DIAGNOSIS — M26.629 ARTHRALGIA OF TEMPOROMANDIBULAR JOINT, UNSPECIFIED LATERALITY: ICD-10-CM

## 2017-12-01 PROCEDURE — 97140 MANUAL THERAPY 1/> REGIONS: CPT | Mod: GP | Performed by: PHYSICAL THERAPIST

## 2017-12-01 PROCEDURE — 97110 THERAPEUTIC EXERCISES: CPT | Mod: GP | Performed by: PHYSICAL THERAPIST

## 2017-12-01 RX ORDER — LOSARTAN POTASSIUM 100 MG/1
100 TABLET ORAL DAILY
Qty: 90 TABLET | Refills: 2 | Status: SHIPPED | OUTPATIENT
Start: 2017-12-01 | End: 2018-08-31

## 2017-12-01 RX ORDER — LOSARTAN POTASSIUM 100 MG/1
100 TABLET ORAL DAILY
Qty: 30 TABLET | Refills: 0 | Status: SHIPPED | OUTPATIENT
Start: 2017-12-01 | End: 2017-12-01

## 2017-12-01 NOTE — TELEPHONE ENCOUNTER
Franny from [facility] and the patient called the clinic on conference call.     They are calling because the patient is out of losartan. A new prescription needs to be sent to [facility] and to a local pharmacy.     A one time one month refill has already been sent to [facility]. The prescription for humana is pended in orders. Routing to provider for approval/denial. Patient has appointment 12/7/17

## 2017-12-05 NOTE — PROGRESS NOTES
Subjective:    HPI                    Objective:    System    Physical Exam    General     ROS    Assessment/Plan:      PROGRESS  REPORT    Progress reporting period is from 10/27/2017 to 12/1/2017.       SUBJECTIVE  Subjective changes noted by patient: feels like she is opening  wider. Was able to eat a sandwich and is having less trouble with a banana. Still gets the headaches in the morning and thinks it occurs when she is in one position too long.     Current Pain level: 0/10.     Initial Pain level: 2/10.   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  Pain -  hot/cold therapy, US, manual therapy, splint/taping/bracing/orthotics, self management, education and home program  Decreased ROM/flexibility - manual therapy, therapeutic exercise, therapeutic activity and home program  Decreased joint mobility - manual therapy, therapeutic exercise, therapeutic activity and home program  Decreased function - therapeutic activities and home program  Impaired posture - neuro re-education, therapeutic activities and home program  STG/LTGs have been met or progress has been made towards goals:  Yes (See Goal flow sheet completed today.)  Assessment of Progress: The patient's condition is improving.  Self Management Plans:  Patient has been instructed in a home treatment program.  Patient  has been instructed in self management of symptoms.  I have re-evaluated this patient and find that the nature, scope, duration and intensity of the therapy is appropriate for the medical condition of the patient.  Manasa continues to require the  following intervention to meet STG and LTG's:  PT    Recommendations:  This patient would benefit from continued therapy.     Frequency:  2 X a month, once daily  Duration:  for 2 months          Please refer to the daily flowsheet for treatment today, total treatment time and time spent performing 1:1 timed codes.

## 2017-12-07 ENCOUNTER — OFFICE VISIT (OUTPATIENT)
Dept: FAMILY MEDICINE | Facility: CLINIC | Age: 72
End: 2017-12-07
Payer: COMMERCIAL

## 2017-12-07 VITALS
TEMPERATURE: 98 F | SYSTOLIC BLOOD PRESSURE: 130 MMHG | DIASTOLIC BLOOD PRESSURE: 60 MMHG | HEART RATE: 91 BPM | RESPIRATION RATE: 16 BRPM | WEIGHT: 157 LBS | BODY MASS INDEX: 25.23 KG/M2 | OXYGEN SATURATION: 96 % | HEIGHT: 66 IN

## 2017-12-07 DIAGNOSIS — I10 BENIGN HYPERTENSION: ICD-10-CM

## 2017-12-07 DIAGNOSIS — Z12.31 ENCOUNTER FOR SCREENING MAMMOGRAM FOR BREAST CANCER: ICD-10-CM

## 2017-12-07 DIAGNOSIS — Z00.00 ROUTINE GENERAL MEDICAL EXAMINATION AT A HEALTH CARE FACILITY: Primary | ICD-10-CM

## 2017-12-07 DIAGNOSIS — E89.0 HYPOTHYROIDISM, POSTABLATIVE: ICD-10-CM

## 2017-12-07 DIAGNOSIS — Z91.81 AT RISK FOR FALLING: ICD-10-CM

## 2017-12-07 PROCEDURE — 82565 ASSAY OF CREATININE: CPT | Performed by: PHYSICIAN ASSISTANT

## 2017-12-07 PROCEDURE — 84132 ASSAY OF SERUM POTASSIUM: CPT | Performed by: PHYSICIAN ASSISTANT

## 2017-12-07 PROCEDURE — 84443 ASSAY THYROID STIM HORMONE: CPT | Performed by: PHYSICIAN ASSISTANT

## 2017-12-07 PROCEDURE — 84439 ASSAY OF FREE THYROXINE: CPT | Performed by: PHYSICIAN ASSISTANT

## 2017-12-07 PROCEDURE — G0439 PPPS, SUBSEQ VISIT: HCPCS | Performed by: PHYSICIAN ASSISTANT

## 2017-12-07 PROCEDURE — 36415 COLL VENOUS BLD VENIPUNCTURE: CPT | Performed by: PHYSICIAN ASSISTANT

## 2017-12-07 RX ORDER — LEVOTHYROXINE SODIUM 112 UG/1
TABLET ORAL
Qty: 90 TABLET | Refills: 3 | Status: SHIPPED | OUTPATIENT
Start: 2017-12-07 | End: 2018-08-31

## 2017-12-07 NOTE — PROGRESS NOTES
SUBJECTIVE:   Manasa French is a 72 year old female who presents for Preventive Visit.  Are you in the first 12 months of your Medicare Part B coverage?  No    Healthy Habits:    Do you get at least three servings of calcium containing foods daily (dairy, green leafy vegetables, etc.)? Yes and also takes calcium supplements    Amount of exercise or daily activities, outside of work: 1 hour(s) per day and lifts weights and PT exercises for back and walks    Problems taking medications regularly No    Medication side effects: No    Have you had an eye exam in the past two years? Yes every 6 months    Do you see a dentist twice per year? yes    Do you have sleep apnea, excessive snoring or daytime drowsiness?no    COGNITIVE SCREEN  1) Repeat 3 items (Banana, Sunrise, Chair)    2) Clock draw: NORMAL  3) 3 item recall: Recalls 3 objects  Results: 3 items recalled: COGNITIVE IMPAIRMENT LESS LIKELY    Mini-CogTM Copyright S Mitch. Licensed by the author for use in [facility]; reprinted with permission (lyn@[facility]). All rights reserved.      Requires refills of chronic medical condition medications; hypertension and hypothyroidism. She has been stable with those medications and denies negative side effects with their use.     Reviewed and updated as needed this visit by clinical staffTobacco  Allergies  Meds  Med Hx  Surg Hx  Fam Hx  Soc Hx        Reviewed and updated as needed this visit by Provider        Social History   Substance Use Topics     Smoking status: Never Smoker     Smokeless tobacco: Never Used     Alcohol use 0.0 oz/week     0 Standard drinks or equivalent per week      Comment: wine with supper       The patient does not drink >3 drinks per day nor >7 drinks per week.     Today's PHQ-2 Score:   PHQ-2 ( 1999 Pf[employer]) 12/7/2017 9/21/2016   Q1: Little interest or pleasure in doing things 0 0   Q2: Feeling down, depressed or hopeless 0 0   PHQ-2 Score 0 0         Do you feel safe in  your environment - Yes    Do you have a Health Care Directive?: Yes: Advance Directive has been received and scanned.    Current providers sharing in care for this patient include: Patient Care Team:  Siegler, Nicole Joy, PA-C as PCP - General (Physician Assistant)      Hearing impairment: No    Ability to successfully perform activities of daily living: Yes, no assistance needed     Fall risk:  Fallen 2 or more times in the past year?: No  Any fall with injury in the past year?: No      Home safety:  none identified      The following health maintenance items are reviewed in Epic and correct as of today:  Health Maintenance   Topic Date Due     ERI QUESTIONNAIRE 1 YEAR  08/05/1963     PHQ-9 Q1YR  08/05/1963     ASTHMA CONTROL TEST Q6 MOS  02/24/2017     FALL RISK ASSESSMENT  09/21/2017     LIPID SCREEN Q5 YR FEMALE (SYSTEM ASSIGNED)  07/22/2018     TSH Q1 YEAR  12/07/2018     ASTHMA ACTION PLAN Q1 YR  12/07/2018     MAMMO SCREEN Q2 YR (SYSTEM ASSIGNED)  12/30/2018     COLON CANCER SCREEN (SYSTEM ASSIGNED)  06/22/2020     ADVANCE DIRECTIVE PLANNING Q5 YRS  10/02/2020     TETANUS IMMUNIZATION (SYSTEM ASSIGNED)  12/19/2021     INFLUENZA VACCINE (SYSTEM ASSIGNED)  Completed     DEXA SCAN SCREENING (SYSTEM ASSIGNED)  Completed     PNEUMOCOCCAL  Completed       Pneumonia Vaccine:completed  Mammogram Screening: Patient over age 50, mutual decision to screen reflected in health maintenance.  History of abnormal Pap smear: NO - age 65 - see link Cervical Cytology Screening Guidelines    ROS:  C: NEGATIVE for fever, chills, change in weight  I: NEGATIVE for worrisome rashes, moles or lesions  E: NEGATIVE for vision changes or irritation  E/M: NEGATIVE for ear, mouth and throat problems  R: NEGATIVE for significant cough or SOB  B: NEGATIVE for masses, tenderness or discharge  CV: NEGATIVE for chest pain, palpitations or peripheral edema  GI: NEGATIVE for nausea, abdominal pain, heartburn, or change in bowel habits  :  "NEGATIVE for frequency, dysuria, or hematuria  M: NEGATIVE for significant arthralgias or myalgia  N: NEGATIVE for weakness, dizziness or paresthesias  E: NEGATIVE for temperature intolerance, skin/hair changes  H: NEGATIVE for bleeding problems  P: NEGATIVE for changes in mood or affect    OBJECTIVE:   /60  Pulse 91  Temp 98  F (36.7  C)  Resp 16  Ht 5' 6\" (1.676 m)  Wt 157 lb (71.2 kg)  SpO2 96%  BMI 25.34 kg/m2 Estimated body mass index is 25.34 kg/(m^2) as calculated from the following:    Height as of this encounter: 5' 6\" (1.676 m).    Weight as of this encounter: 157 lb (71.2 kg).  EXAM:   GENERAL: healthy, alert and no distress  EYES: Eyes grossly normal to inspection, PERRL and conjunctivae and sclerae normal  HENT: ear canals and TM's normal, nose and mouth without ulcers or lesions  NECK: no adenopathy, no asymmetry, masses, or scars and thyroid normal to palpation  RESP: lungs clear to auscultation - no rales, rhonchi or wheezes  BREAST: normal without masses, tenderness or nipple discharge and no palpable axillary masses or adenopathy  CV: regular rate and rhythm, normal S1 S2, no S3 or S4, no murmur, click or rub, no peripheral edema and peripheral pulses strong  ABDOMEN: soft, nontender, no hepatosplenomegaly, no masses and bowel sounds normal  MS: no gross musculoskeletal defects noted, no edema  SKIN: no suspicious lesions or rashes  NEURO: Normal strength and tone, mentation intact and speech normal  PSYCH: mentation appears normal, affect normal/bright    ASSESSMENT / PLAN:       ICD-10-CM    1. Routine general medical examination at a health care facility Z00.00    2. At risk for falling Z91.81    3. Hypothyroidism, postablative E89.0 TSH WITH FREE T4 REFLEX     levothyroxine (SYNTHROID/LEVOTHROID) 112 MCG tablet   4. Benign hypertension I10 Creatinine     Potassium   5. Encounter for screening mammogram for breast cancer Z12.31 *MA Screening Digital Bilateral       End of Life " "Planning:  Patient currently has an advanced directive: Yes.  Practitioner is supportive of decision.    COUNSELING:  Reviewed preventive health counseling, as reflected in patient instructions        Estimated body mass index is 25.34 kg/(m^2) as calculated from the following:    Height as of this encounter: 5' 6\" (1.676 m).    Weight as of this encounter: 157 lb (71.2 kg).     reports that she has never smoked. She has never used smokeless tobacco.        Appropriate preventive services were discussed with this patient, including applicable screening as appropriate for cardiovascular disease, diabetes, osteopenia/osteoporosis, and glaucoma.  As appropriate for age/gender, discussed screening for colorectal cancer, prostate cancer, breast cancer, and cervical cancer. Checklist reviewing preventive services available has been given to the patient.    Reviewed patients plan of care and provided an AVS. The Basic Care Plan (routine screening as documented in Health Maintenance) for Manasa meets the Care Plan requirement. This Care Plan has been established and reviewed with the Patient.      Nicole Joy Siegler, PA-C  [facility]  "

## 2017-12-07 NOTE — MR AVS SNAPSHOT
After Visit Summary   12/7/2017    Manasa French    MRN: [id number]           Patient Information     Date Of Birth          1945        Visit Information        Provider Department      12/7/2017 1:30 PM Siegler, Nicole Joy, PA-C [facility]        Today's Diagnoses     Routine general medical examination at a health care facility    -  1    At risk for falling        Hypothyroidism, postablative        Benign hypertension        Encounter for screening mammogram for breast cancer          Care Instructions      Preventive Health Recommendations    Female Ages 65 +    Yearly exam:     See your health care provider every year in order to  o Review health changes.   o Discuss preventive care.    o Review your medicines if your doctor has prescribed any.      You no longer need a yearly Pap test unless you've had an abnormal Pap test in the past 10 years. If you have vaginal symptoms, such as bleeding or discharge, be sure to talk with your provider about a Pap test.      Every 1 to 2 years, have a mammogram.  If you are over 69, talk with your health care provider about whether or not you want to continue having screening mammograms.      Every 10 years, have a colonoscopy. Or, have a yearly FIT test (stool test). These exams will check for colon cancer.       Have a cholesterol test every 5 years, or more often if your doctor advises it.       Have a diabetes test (fasting glucose) every three years. If you are at risk for diabetes, you should have this test more often.       At age 65, have a bone density scan (DEXA) to check for osteoporosis (brittle bone disease).    Shots:    Get a flu shot each year.    Get a tetanus shot every 10 years.    Talk to your doctor about your pneumonia vaccines. There are now two you should receive - Pneumovax (PPSV 23) and Prevnar (PCV 13).    Talk to your doctor about the shingles vaccine.    Talk to your doctor about the  hepatitis B vaccine.    Nutrition:     Eat at least 5 servings of fruits and vegetables each day.      Eat whole-grain bread, whole-wheat pasta and brown rice instead of white grains and rice.      Talk to your provider about Calcium and Vitamin D.     Lifestyle    Exercise at least 150 minutes a week (30 minutes a day, 5 days a week). This will help you control your weight and prevent disease.      Limit alcohol to one drink per day.      No smoking.       Wear sunscreen to prevent skin cancer.       See your dentist twice a year for an exam and cleaning.      See your eye doctor every 1 to 2 years to screen for conditions such as glaucoma, macular degeneration and cataracts.          Follow-ups after your visit        Your next 10 appointments already scheduled     Dec 08, 2017  2:40 PM CST   NADIA Extremity with Lele POSADA PT ([facility]  )    [address] [facility]  [address] [place] [address]   [phone]            Dec 15, 2017  8:00 AM CST   RETURN GLAUCOMA with Radha Gallardo MD   Eye Clinic ([facility])    [place]  [address] [address]   [phone]            Dec 15, 2017 12:10 PM CST   NADIA Extremity with Lele POSADA PT ([facility]  )    [address] [facility]  [address]  [place] [address]   [phone]            Jan 17, 2018  7:30 AM CST   RETURN RETINA with Katy Page MD   Eye Clinic ([facility])    [place]  [address] [address]   [phone]              Future tests that were ordered for you today     Open Future Orders        Priority Expected Expires Ordered    *MA Screening Digital Bilateral Routine  12/7/2018 12/7/2017            Who to contact     If you have questions or need follow up information about today's clinic visit or your schedule please contact [facility]  "directly at [phone].  Normal or non-critical lab and imaging results will be communicated to you by Share Some Stylehart, letter or phone within 4 business days after the clinic has received the results. If you do not hear from us within 7 days, please contact the clinic through Share Some Stylehart or phone. If you have a critical or abnormal lab result, we will notify you by phone as soon as possible.  Submit refill requests through AccessData or call your pharmacy and they will forward the refill request to us. Please allow 3 business days for your refill to be completed.          Additional Information About Your Visit        Share Some StyleharTigerstripe Information     AccessData lets you send messages to your doctor, view your test results, renew your prescriptions, schedule appointments and more. To sign up, go to [email or web address] . Click on \"Log in\" on the left side of the screen, which will take you to the Welcome page. Then click on \"Sign up Now\" on the right side of the page.     You will be asked to enter the access code listed below, as well as some personal information. Please follow the directions to create your username and password.     Your access code is: [email or web address]  Expires: 2018  1:26 PM     Your access code will  in 90 days. If you need help or a new code, please call your [place] clinic or [phone].        Care EveryWhere ID     This is your Care EveryWhere ID. This could be used by other organizations to access your [place] medical records  [id number]        Your Vitals Were     Pulse Temperature Respirations Height Pulse Oximetry BMI (Body Mass Index)    91 98  F (36.7  C) 16 5' 6\" (1.676 m) 96% 25.34 kg/m2       Blood Pressure from Last 3 Encounters:   17 130/60   16 138/62   16 144/74    Weight from Last 3 Encounters:   17 157 lb (71.2 kg)   16 157 lb 8 oz (71.4 kg)   16 157 lb (71.2 kg)              We Performed the Following     Asthma Action Plan (AAP)     Creatinine     " Potassium     TSH WITH FREE T4 REFLEX          Where to get your medicines      These medications were sent to [facility] - [place], [place] - [address]  [address], [facility] [address]     Phone:  [phone]     levothyroxine 112 MCG tablet          Primary Care Provider Office Phone # Fax #    Nicole Joy Siegler, PA-C [phone]       [facility] [address]  [facility] [address]        Equal Access to Services     CHELSY MONTERROSO : Hadii aad ku hadasho Soomaali, waaxda luqadaha, qaybta kaalmada adeegyada, waxay idiin hayaan adeeg kharash larosana . So [facility] [phone].    ATENCIÓN: Si habla español, tiene a martinez disposición servicios gratuitos de asistencia lingüística. [facility] [phone].    We comply with applicable federal civil rights laws and [place] laws. We do not discriminate on the basis of race, color, national origin, age, disability, sex, sexual orientation, or gender identity.            Thank you!     Thank you for choosing [facility] [place] [facility]  for your care. Our goal is always to provide you with excellent care. Hearing back from our patients is one way we can continue to improve our services. Please take a few minutes to complete the written survey that you may receive in the mail after your visit with us. Thank you!             Your Updated Medication List - Protect others around you: Learn how to safely use, store and throw away your medicines at www.disposemymeds.org.          This list is accurate as of: 12/7/17  2:06 PM.  Always use your most recent med list.                   Brand Name Dispense Instructions for use Diagnosis    CALCIUM-VITAMIN D PO      Take 1 tablet by mouth daily        carboxymethylcellulose 1 % ophthalmic solution    CELLUVISC/REFRESH LIQUIGEL     Place 1 drop into both eyes 3 times daily        fluticasone-salmeterol 250-50 MCG/DOSE diskus inhaler    ADVAIR     Inhale 1 puff  into the lungs 2 times daily        latanoprost 0.005 % ophthalmic solution    XALATAN    3 Bottle    Place 1 drop into both eyes At Bedtime    Mild stage glaucoma(365.71)       levothyroxine 112 MCG tablet    SYNTHROID/LEVOTHROID    90 tablet    TAKE 1 TABLET EVERY DAY    Hypothyroidism, postablative       losartan 100 MG tablet    COZAAR    90 tablet    Take 1 tablet (100 mg) by mouth daily    Benign hypertension       PROAIR  (90 BASE) MCG/ACT Inhaler   Generic drug:  albuterol     1 Inhaler    Inhale 2 puffs into the lungs every 6 hours as needed for shortness of breath / dyspnea    Asthma, mild persistent, uncomplicated       timolol 0.5 % ophthalmic solution    TIMOPTIC    3 Bottle    Place 1 drop into both eyes every morning    Mild stage glaucoma(365.71)       vitamin D 1000 UNITS capsule      Take  by mouth.

## 2017-12-07 NOTE — LETTER
December 8, 2017      Manasa French  [address] [facility] [address]        Dear ,    We are writing to inform you of your test results.    Your recent thyroid test was slightly high, suggesting a slightly low thyroid. This is almost identical to last years result. If you are feeling well at your current dose of medication, we can keep it the same. If you are having symptoms of hypothyroidism such as unexplained weight gain, fatigue, sluggishness, depression symptoms, I would recommend discussion for increasing your dose.     Your kidney function test was slightly reduced. This is similar to last years values as well. Please avoid use of ibuprofen, be sure you are hydrating with 6-8 glasses of water per day. this should be rechecked in 3-6 months to be sure we do not need to adjust your blood pressure medications.      Resulted Orders   TSH WITH FREE T4 REFLEX   Result Value Ref Range    TSH 5.27 (H) 0.40 - 4.00 mU/L   Creatinine   Result Value Ref Range    Creatinine 0.97 0.52 - 1.04 mg/dL    GFR Estimate 57 (L) >60 mL/min/1.7m2      Comment:      Non  GFR Calc    GFR Estimate If Black 68 >60 mL/min/1.7m2      Comment:      African American GFR Calc   Potassium   Result Value Ref Range    Potassium 4.2 3.4 - 5.3 mmol/L   T4 free   Result Value Ref Range    T4 Free 1.23 0.76 - 1.46 ng/dL       If you have any questions or concerns, please call the clinic at the number listed above.       Sincerely,        Nicole Joy Siegler, PA-C

## 2017-12-08 ENCOUNTER — THERAPY VISIT (OUTPATIENT)
Dept: PHYSICAL THERAPY | Facility: CLINIC | Age: 72
End: 2017-12-08
Payer: MEDICARE

## 2017-12-08 DIAGNOSIS — M26.629 ARTHRALGIA OF TEMPOROMANDIBULAR JOINT, UNSPECIFIED LATERALITY: ICD-10-CM

## 2017-12-08 LAB
CREAT SERPL-MCNC: 0.97 MG/DL (ref 0.52–1.04)
GFR SERPL CREATININE-BSD FRML MDRD: 57 ML/MIN/1.7M2
POTASSIUM SERPL-SCNC: 4.2 MMOL/L (ref 3.4–5.3)
T4 FREE SERPL-MCNC: 1.23 NG/DL (ref 0.76–1.46)
TSH SERPL DL<=0.005 MIU/L-ACNC: 5.27 MU/L (ref 0.4–4)

## 2017-12-08 PROCEDURE — 97110 THERAPEUTIC EXERCISES: CPT | Mod: GP | Performed by: PHYSICAL THERAPIST

## 2017-12-08 PROCEDURE — 97140 MANUAL THERAPY 1/> REGIONS: CPT | Mod: GP | Performed by: PHYSICAL THERAPIST

## 2017-12-08 NOTE — PROGRESS NOTES
Subjective:    HPI                    Objective:    System    Physical Exam    General     ROS    Assessment/Plan:      PROGRESS  REPORT    Progress reporting period is from 12/1/2017 to 12/8/2017.       SUBJECTIVE  Subjective changes noted by patient: Doesn't bother her with eating or talking any more. just gets an ache after the exercises. The headaches also feel better and she is sleeping better.  Current Pain level: 0/10.     Initial Pain level: 2/10.   Changes in function:  Yes (See Goal flowsheet attached for changes in current functional level)  Adverse reaction to treatment or activity: None    OBJECTIVE  Changes noted in objective findings:  Yes, Improved ROM    Cervical Screen: Continues to have decreased cervical ROM that is non-provocative    Headaches: None presently    AROM:   Movement  measurement (mm) Pain   Opening 35 mm    Left Lat Excursion 10 mm    Right Lat Excursion 10 mm      Opening Pattern: No deviation    Translation during opening: reduced bilaterally, right more than left.     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  Self Management Plans:  Patient is independent in a home treatment program.  Patient is independent in self management of symptoms.  Manasa continues to  require the following intervention to meet STG and LTG's:  PT intervention not presently required.     Recommendations:  Patient will follow-up by phone in three weeks. If she is still doing well and has no limitations at that time, we will discharge from therapy.     Please refer to the daily flowsheet for treatment today, total treatment time and time spent performing 1:1 timed codes.

## 2017-12-14 DIAGNOSIS — H40.9 GLAUCOMA: Primary | ICD-10-CM

## 2017-12-15 ENCOUNTER — OFFICE VISIT (OUTPATIENT)
Dept: OPHTHALMOLOGY | Facility: CLINIC | Age: 72
End: 2017-12-15
Attending: OPHTHALMOLOGY
Payer: MEDICARE

## 2017-12-15 DIAGNOSIS — H40.1131 PRIMARY OPEN ANGLE GLAUCOMA OF BOTH EYES, MILD STAGE: Primary | ICD-10-CM

## 2017-12-15 DIAGNOSIS — H40.9 GLAUCOMA: ICD-10-CM

## 2017-12-15 PROCEDURE — 92133 CPTRZD OPH DX IMG PST SGM ON: CPT | Mod: ZF | Performed by: OPHTHALMOLOGY

## 2017-12-15 PROCEDURE — 99213 OFFICE O/P EST LOW 20 MIN: CPT | Mod: ZF

## 2017-12-15 ASSESSMENT — TONOMETRY
IOP_METHOD: APPLANATION
OD_IOP_MMHG: 22
OS_IOP_MMHG: 16

## 2017-12-15 ASSESSMENT — VISUAL ACUITY
CORRECTION_TYPE: GLASSES
OS_PH_CC: 20/30
OS_CC: 20/40
OD_CC: 20/40
OD_PH_CC: 20/30
OS_CC+: -2
METHOD: SNELLEN - LINEAR

## 2017-12-15 ASSESSMENT — CONF VISUAL FIELD
METHOD: COUNTING FINGERS
OD_NORMAL: 1
OS_NORMAL: 1

## 2017-12-15 ASSESSMENT — CUP TO DISC RATIO
OD_RATIO: 0.8
OS_RATIO: 0.5

## 2017-12-15 ASSESSMENT — SLIT LAMP EXAM - LIDS
COMMENTS: NORMAL
COMMENTS: NORMAL

## 2017-12-15 NOTE — PROGRESS NOTES
History of thyroid eye disease and primary open angle glaucoma presenting for 6 month follow up. C/o increased diplopia. To see orthoptist.     Current Medications:   Timolol every morning both eyes   Latanoprost HS both eyes     Intolerant to cosopt and alphagan    Octopus Visual Field  Same as previous     OCT retinal nerve fiber layer   OD increased thinning throughout  OS stable    1. Mild stage glaucoma  OCT showing slight downward slope right eye in conjunction with elevated IOP    2. Pseudophakia of both eyes  IOL stable in good position   Observe    3. Thyroid eye disease  To see orthoptist  Will check new MRx    4. Age related macular degeneration   Stage II, followed annually by retina     Plan:   Continue latanoprost HS both eyes   Continue timolol daily both eyes   Given increase IOP and OCT changes, Intolerant to cosopt and alphagan  Consider Selected laser trabeculoplasty (SLT) right eye (mild pigment at best). Would like to wait until January  Patient requests Dr Gallardo to do laser, risks discussed    Angel Roman MD  PGY3, Dept of Ophthalmology  Pager [phone]    Attending Physician Attestation:  Complete documentation of historical and exam elements from today's encounter can be found in the full encounter summary report (not reduplicated in this progress note). I personally obtained the chief complaint(s) and history of present illness. I confirmed and edited asnecessary the review of systems, past medical/surgical history, family history, social history, and examination findings as documented by others; and I examined the patient myself. I personally reviewed the relevant tests, images, and reports as documented above. I formulated and edited as necessary the assessment and plan and discussed the findings and management plan with the patient and family.  - Radha Gallardo MD 9:48 AM 12/15/2017

## 2017-12-15 NOTE — NURSING NOTE
Chief Complaints and History of Present Illnesses   Patient presents with     Follow Up For     Glaucoma      HPI    Affected eye(s):  Both   Symptoms:        Frequency:  Constant       Do you have eye pain now?:  No      Comments:  States that the diplopia has increased.    No red watery or dry  Lillie Dobbins COT 8:18 AM December 15, 2017

## 2017-12-15 NOTE — MR AVS SNAPSHOT
After Visit Summary   12/15/2017    Manasa French    MRN: [id number]           Patient Information     Date Of Birth          1945        Visit Information        Provider Department      12/15/2017 8:00 AM Radha Gallardo MD [facility] Clinic        Today's Diagnoses     Primary open angle glaucoma of both eyes, mild stage    -  1    Glaucoma           Follow-ups after your visit        Your next 10 appointments already scheduled     2018  7:30 AM CST   RETURN RETINA with Katy Page MD   Eye Clinic ([facility])    [address]  [address] [facility]  9th [address] Clin 9a  [facility] [address]   [phone]              Who to contact     Please call your clinic at [phone] to:    Ask questions about your health    Make or cancel appointments    Discuss your medicines    Learn about your test results    Speak to your doctor   If you have compliments or concerns about an experience at your clinic, or if you wish to file a complaint, please contact [facility] Patient Relations at [phone] or email us at [email or web address]         Additional Information About Your Visit        MyChart Information     ID.me is an electronic gateway that provides easy, online access to your medical records. With ID.me, you can request a clinic appointment, read your test results, renew a prescription or communicate with your care team.     To sign up for [email or web address] visit the website at [email or web address]   You will be asked to enter the access code listed below, as well as some personal information. Please follow the directions to create your username and password.     Your access code is: [email or web address]  Expires: 2018  1:26 PM     Your access code will  in 90 days. If you need help or a new code, please contact your [facility] or call [phone] for assistance.        Care  EveryWhere ID     This is your Care EveryWhere ID. This could be used by other organizations to access your [place] medical records  [id number]         Blood Pressure from Last 3 Encounters:   12/07/17 130/60   09/21/16 138/62   09/01/16 144/74    Weight from Last 3 Encounters:   12/07/17 71.2 kg (157 lb)   09/21/16 71.4 kg (157 lb 8 oz)   09/01/16 71.2 kg (157 lb)              We Performed the Following     OCT Optic Nerve RNFL Spectralis OU (both eyes)        Primary Care Provider Office Phone # Fax #    Nicole Joy Siegler, PA-C [phone]       [facility] [address]  [facility] [address]        Equal Access to Services     CHELSY MONTERROSO : Hadii gil chávez hadasho Soomaali, waaxda luqadaha, qaybta kaalmada adeegyada, benji titus . So [facility] [phone].    ATENCIÓN: Si habla español, tiene a martinez disposición servicios gratuitos de asistencia lingüística. LlKindred Hospital Lima [phone].    We comply with applicable federal civil rights laws and [place] laws. We do not discriminate on the basis of race, color, national origin, age, disability, sex, sexual orientation, or gender identity.            Thank you!     Thank you for choosing EYE CLINIC  for your care. Our goal is always to provide you with excellent care. Hearing back from our patients is one way we can continue to improve our services. Please take a few minutes to complete the written survey that you may receive in the mail after your visit with us. Thank you!             Your Updated Medication List - Protect others around you: Learn how to safely use, store and throw away your medicines at www.disposemymeds.org.          This list is accurate as of: 12/15/17  9:49 AM.  Always use your most recent med list.                   Brand Name Dispense Instructions for use Diagnosis    CALCIUM-VITAMIN D PO      Take 1 tablet by mouth daily        carboxymethylcellulose 1 % ophthalmic solution     CELLUVISC/REFRESH LIQUIGEL     Place 1 drop into both eyes 3 times daily        fluticasone-salmeterol 250-50 MCG/DOSE diskus inhaler    ADVAIR     Inhale 1 puff into the lungs 2 times daily        ICAPS AREDS 2 PO           latanoprost 0.005 % ophthalmic solution    XALATAN    3 Bottle    Place 1 drop into both eyes At Bedtime    Mild stage glaucoma(365.71)       levothyroxine 112 MCG tablet    SYNTHROID/LEVOTHROID    90 tablet    TAKE 1 TABLET EVERY DAY    Hypothyroidism, postablative       losartan 100 MG tablet    COZAAR    90 tablet    Take 1 tablet (100 mg) by mouth daily    Benign hypertension       timolol 0.5 % ophthalmic solution    TIMOPTIC    3 Bottle    Place 1 drop into both eyes every morning    Mild stage glaucoma(365.71)       vitamin D 1000 UNITS capsule      Take  by mouth.

## 2017-12-29 PROBLEM — M26.629 ARTHRALGIA OF TEMPOROMANDIBULAR JOINT, UNSPECIFIED LATERALITY: Status: RESOLVED | Noted: 2017-10-13 | Resolved: 2017-12-29

## 2017-12-29 NOTE — PROGRESS NOTES
Update as of December 29, 2017: Patient called to report that she is doing well and no further therapy is needed. This progress note shall serve as a discharge note.

## 2018-01-03 ENCOUNTER — TRANSFERRED RECORDS (OUTPATIENT)
Dept: HEALTH INFORMATION MANAGEMENT | Facility: CLINIC | Age: 73
End: 2018-01-03

## 2018-01-17 ENCOUNTER — OFFICE VISIT (OUTPATIENT)
Dept: OPHTHALMOLOGY | Facility: CLINIC | Age: 73
End: 2018-01-17
Attending: OPHTHALMOLOGY
Payer: MEDICARE

## 2018-01-17 DIAGNOSIS — H35.3190 NONEXUDATIVE SENILE MACULAR DEGENERATION OF RETINA: ICD-10-CM

## 2018-01-17 PROCEDURE — G0463 HOSPITAL OUTPT CLINIC VISIT: HCPCS | Mod: ZF

## 2018-01-17 PROCEDURE — 92250 FUNDUS PHOTOGRAPHY W/I&R: CPT | Mod: ZF | Performed by: OPHTHALMOLOGY

## 2018-01-17 PROCEDURE — 92134 CPTRZ OPH DX IMG PST SGM RTA: CPT | Mod: ZF | Performed by: OPHTHALMOLOGY

## 2018-01-17 ASSESSMENT — VISUAL ACUITY
OD_PH_CC: 20/30
OS_PH_CC: 20/30
OD_CC+: -1
OD_CC: 20/40
OS_CC: 20/40
METHOD: SNELLEN - LINEAR
CORRECTION_TYPE: GLASSES

## 2018-01-17 ASSESSMENT — CONF VISUAL FIELD
OD_NORMAL: 1
OS_NORMAL: 1
METHOD: COUNTING FINGERS

## 2018-01-17 ASSESSMENT — REFRACTION_WEARINGRX
OS_HPRISM: 7.5
OD_VPRISM: 3.0
OD_CYLINDER: SPHERE
OD_SPHERE: -0.75
OS_SPHERE: -2.50
OD_HPRISM: 7.0
OS_VPRISM: 3.0
OS_VBASE: DOWN
SPECS_TYPE: SVL
OS_AXIS: 047
OS_CYLINDER: +1.00
OD_VBASE: DOWN

## 2018-01-17 ASSESSMENT — SLIT LAMP EXAM - LIDS
COMMENTS: NORMAL
COMMENTS: NORMAL

## 2018-01-17 ASSESSMENT — TONOMETRY
OS_IOP_MMHG: 19
IOP_METHOD: TONOPEN
OD_IOP_MMHG: 26X2

## 2018-01-17 ASSESSMENT — CUP TO DISC RATIO
OS_RATIO: 0.5
OD_RATIO: 0.7

## 2018-01-17 NOTE — NURSING NOTE
Chief Complaints and History of Present Illnesses   Patient presents with     Yearly Exam     AMD     HPI    Affected eye(s):  Both   Symptoms:        Frequency:  Constant       Do you have eye pain now?:  No      Comments:  States va is the same since last visit  No F&F  Lillie KHAN 7:23 AM January 17, 2018

## 2018-01-17 NOTE — PROGRESS NOTES
I have confirmed the patient's and reviewed Past Medical History, Past Surgical History, Social History, Family History, Problem List, Medication List and agree with Tech note.    CC: f/u AMD    HPI: 73 yo female with h/o Dry AMD here for annual f/u.  Patient reports relatively stable vision with persistent diplopia that is more manageable on some days than others (has h/o WIL f/b orthoptics for diplopia).    POHx:  Thyroid Eye Disease  POAG  Pseudophakia    Eye Meds:  Timolol QAM OU  Xalatan QPM OU    OCT (1/12/17)  RE: (multiple single scans d/t pt eye movements) Drusen, no IRF/SRF  LE: (multiple single scans d/t pt eye movements) Drusen, no IRF/SRF    Assessment & Plan:  1.  Dry Macular Degeneration, both eyes   - continue AREDS II vitamins   - AG precautions   - annual exam    2.  PVD, both eyes   - RT/RD precautions    3. POAG, mild, both eyes   - continue management/follow-up per Dr. Gallardo    4.  Thyroid Eye Disease   - continue with orthoptics for diplopia management    5.  Pseudophakai, both eyes   - mild PCO OS   - observe    RTC: 1 year for exam, OCT.  (NO FAF needed)      Katy Peter MD PhD.  Professor & Chair

## 2018-01-17 NOTE — MR AVS SNAPSHOT
After Visit Summary   2018    Manasa French    MRN: [id number]           Patient Information     Date Of Birth          1945        Visit Information        Provider Department      2018 7:30 AM Katy Page MD Eye Clinic        Today's Diagnoses     Nonexudative senile macular degeneration of retina           Follow-ups after your visit        Follow-up notes from your care team     Return in about 1 year (around 2019) for AMD, Exam & OCT OU.      Future tests that were ordered for you today     Open Future Orders        Priority Expected Expires Ordered    OCT Retina Spectralis OU (both eyes) Routine  2019            Who to contact     Please call your clinic at [phone] to:    Ask questions about your health    Make or cancel appointments    Discuss your medicines    Learn about your test results    Speak to your doctor   If you have compliments or concerns about an experience at your clinic, or if you wish to file a complaint, please contact [facility] Patient Relations at [phone] or email us at [email or web address]         Additional Information About Your Visit        MyChart Information     Anzhi.com is an electronic gateway that provides easy, online access to your medical records. With [email or web address].com, you can request a clinic appointment, read your test results, renew a prescription or communicate with your care team.     To sign up for [email or web address] visit the website at [email or web address]   You will be asked to enter the access code listed below, as well as some personal information. Please follow the directions to create your username and password.     Your access code is: [email or web address]  Expires: 2018  1:26 PM     Your access code will  in 90 days. If you need help or a new code, please contact your [facility] or call [phone] for assistance.         Care EveryWhere ID     This is your Care EveryWhere ID. This could be used by other organizations to access your [place] medical records  [id number]         Blood Pressure from Last 3 Encounters:   12/07/17 130/60   09/21/16 138/62   09/01/16 144/74    Weight from Last 3 Encounters:   12/07/17 71.2 kg (157 lb)   09/21/16 71.4 kg (157 lb 8 oz)   09/01/16 71.2 kg (157 lb)              We Performed the Following     Fundus Autofluorescence Image (FAF) OU (both eyes)     OCT Retina Spectralis OU (both eyes)        Primary Care Provider Office Phone # Fax #    Nicole Joy Siegler, PA-C [phone]       [facility] [address]  [facility] [address]        Equal Access to Services     CHELSY MONTERROSO : Hadii aad ku hadasho Soomaali, waaxda luqadaha, qaybta kaalmada adeegyada, benji krishnamurthy haypedro pablo titus . So [facility] [phone].    ATENCIÓN: Si habla español, tiene a martinez disposición servicios gratuitos de asistencia lingüística. Juan C al [phone].    We comply with applicable federal civil rights laws and [place] laws. We do not discriminate on the basis of race, color, national origin, age, disability, sex, sexual orientation, or gender identity.            Thank you!     Thank you for choosing EYE CLINIC  for your care. Our goal is always to provide you with excellent care. Hearing back from our patients is one way we can continue to improve our services. Please take a few minutes to complete the written survey that you may receive in the mail after your visit with us. Thank you!             Your Updated Medication List - Protect others around you: Learn how to safely use, store and throw away your medicines at www.disposemymeds.org.          This list is accurate as of: 1/17/18  8:53 AM.  Always use your most recent med list.                   Brand Name Dispense Instructions for use Diagnosis    CALCIUM-VITAMIN D PO      Take 1 tablet by mouth daily         carboxymethylcellulose 1 % ophthalmic solution    CELLUVISC/REFRESH LIQUIGEL     Place 1 drop into both eyes 3 times daily        fluticasone-salmeterol 250-50 MCG/DOSE diskus inhaler    ADVAIR     Inhale 1 puff into the lungs 2 times daily        ICAPS AREDS 2 PO           latanoprost 0.005 % ophthalmic solution    XALATAN    3 Bottle    Place 1 drop into both eyes At Bedtime    Mild stage glaucoma(365.71)       levothyroxine 112 MCG tablet    SYNTHROID/LEVOTHROID    90 tablet    TAKE 1 TABLET EVERY DAY    Hypothyroidism, postablative       losartan 100 MG tablet    COZAAR    90 tablet    Take 1 tablet (100 mg) by mouth daily    Benign hypertension       timolol 0.5 % ophthalmic solution    TIMOPTIC    3 Bottle    Place 1 drop into both eyes every morning    Mild stage glaucoma(365.71)       vitamin D 1000 UNITS capsule      Take  by mouth.

## 2018-02-05 ENCOUNTER — OFFICE VISIT (OUTPATIENT)
Dept: OPHTHALMOLOGY | Facility: CLINIC | Age: 73
End: 2018-02-05
Attending: OPHTHALMOLOGY
Payer: MEDICARE

## 2018-02-05 DIAGNOSIS — H40.1134 PRIMARY OPEN ANGLE GLAUCOMA OF BOTH EYES, INDETERMINATE STAGE: Primary | ICD-10-CM

## 2018-02-05 PROCEDURE — G0463 HOSPITAL OUTPT CLINIC VISIT: HCPCS | Mod: ZF

## 2018-02-05 PROCEDURE — 40000809 ZZH STATISTIC NO DOCUMENTATION TO SUPPORT CHARGE

## 2018-02-05 PROCEDURE — 65855 TRABECULOPLASTY LASER SURG: CPT | Mod: ZF | Performed by: OPHTHALMOLOGY

## 2018-02-05 RX ORDER — FLUOROMETHOLONE 0.1 %
1 SUSPENSION, DROPS(FINAL DOSAGE FORM)(ML) OPHTHALMIC (EYE) 4 TIMES DAILY
Qty: 1 BOTTLE | Refills: 0 | Status: SHIPPED | OUTPATIENT
Start: 2018-02-05 | End: 2018-05-23

## 2018-02-05 ASSESSMENT — REFRACTION_WEARINGRX
OS_CYLINDER: +1.00
OD_SPHERE: -0.75
OS_SPHERE: -2.50
OD_VBASE: DOWN
OS_HPRISM: 7.5
OS_VBASE: DOWN
OD_VPRISM: 3.0
OS_AXIS: 047
OD_HPRISM: 7.0
OS_VPRISM: 3.0
SPECS_TYPE: SVL
OD_CYLINDER: SPHERE

## 2018-02-05 ASSESSMENT — VISUAL ACUITY
OD_PH_CC: 20/30-2
CORRECTION_TYPE: GLASSES
OD_CC: 20/50
OS_CC+: -2
OS_CC: 20/40
METHOD: SNELLEN - LINEAR

## 2018-02-05 ASSESSMENT — TONOMETRY
IOP_METHOD: APPLANATION
OS_IOP_MMHG: 20
IOP_METHOD: APPLANATION
OD_IOP_MMHG: 17
OS_IOP_MMHG: 16
OD_IOP_MMHG: 24

## 2018-02-05 NOTE — PROGRESS NOTES
Here for Selective laser trabeculoplasty (SLT) right eye     Use FML right eye four times a day for 4 days    Return to clinic 6-8 weeks    Attending Physician Attestation:  Complete documentation of historical and exam elements from today's encounter can be found in the full encounter summary report (not reduplicated in this progress note). I personally obtained the chief complaint(s) and history of present illness. I confirmed and edited asnecessary the review of systems, past medical/surgical history, family history, social history, and examination findings as documented by others; and I examined the patient myself. I personally reviewed the relevant tests, images, and reports as documented above. I formulated and edited as necessary the assessment and plan and discussed the findings and management plan with the patient and family.  - Radha Gallardo MD 4:50 PM 2/5/2018

## 2018-02-05 NOTE — MR AVS SNAPSHOT
After Visit Summary   2/5/2018    Manasa French    MRN: [id number]           Patient Information     Date Of Birth          1945        Visit Information        Provider Department      2/5/2018 2:00 PM Radha Gallardo MD [facility] Clinic        Today's Diagnoses     Primary open angle glaucoma of both eyes, indeterminate stage    -  1       Follow-ups after your visit        Your next 10 appointments already scheduled     Apr 11, 2018 12:30 PM CDT   RETURN GLAUCOMA with Radha Gallardo MD   Eye Clinic ([facility])    [address]  [address] [place] [address]   [phone]            Jan 23, 2019  7:30 AM CST   RETURN RETINA with Katy Page MD   Eye Clinic ([facility])    [address]  [address] [facility]  [address] Clin [address] [place] [address]   [phone]              Who to contact     Please call your clinic at [phone] to:    Ask questions about your health    Make or cancel appointments    Discuss your medicines    Learn about your test results    Speak to your doctor   If you have compliments or concerns about an experience at your clinic, or if you wish to file a complaint, please contact [facility] Patient Relations at [phone] or email us at [email or web address]         Additional Information About Your Visit        MyChart Information     [employer] is an electronic gateway that provides easy, online access to your medical records. With Datalink, you can request a clinic appointment, read your test results, renew a prescription or communicate with your care team.     To sign up for [employer] visit the website at [email or web address]   You will be asked to enter the access code listed below, as well as some personal information. Please follow the directions to create your username and password.     Your access code is: [email or web address]  Expires: 5/6/2018   4:41 PM     Your access code will  in 90 days. If you need help or a new code, please contact your [facility] or call [phone] for assistance.        Care EveryWhere ID     This is your Care EveryWhere ID. This could be used by other organizations to access your [place] medical records  [id number]         Blood Pressure from Last 3 Encounters:   17 130/60   16 138/62   16 144/74    Weight from Last 3 Encounters:   17 71.2 kg (157 lb)   16 71.4 kg (157 lb 8 oz)   16 71.2 kg (157 lb)              We Performed the Following     Selective Laser Trabeculoplasty (SLT) OD (right eye)          Today's Medication Changes          These changes are accurate as of 18  4:41 PM.  If you have any questions, ask your nurse or doctor.               Start taking these medicines.        Dose/Directions    fluorometholone 0.1 % ophthalmic susp   Commonly known as:  FML LIQUIFILM   Used for:  Primary open angle glaucoma of both eyes, indeterminate stage   Started by:  Radha Gallardo MD        Dose:  1 drop   Place 1 drop into the right eye 4 times daily   Quantity:  1 Bottle   Refills:  0            Where to get your medicines      These medications were sent to [id number] [address], [place] [address]     Phone:  [phone]     fluorometholone 0.1 % ophthalmic susp                Primary Care Provider Office Phone # Fax #    Nicole Joy Siegler, PA-C [phone]       [facility] [address] [facility] [address]  [facility] [address]        Equal Access to Services     CHELSY MONTERROSO : Hadii gil Almeida, waaxda luqjason, qaybta kaalmabenji jj. So [facility] [phone].    ATENCIÓN: Si habla español, tiene a martinez disposición servicios gratuitos de asistencia lingüística. Llame al [phone].    We comply with  applicable federal civil rights laws and [place] laws. We do not discriminate on the basis of race, color, national origin, age, disability, sex, sexual orientation, or gender identity.            Thank you!     Thank you for choosing EYE CLINIC  for your care. Our goal is always to provide you with excellent care. Hearing back from our patients is one way we can continue to improve our services. Please take a few minutes to complete the written survey that you may receive in the mail after your visit with us. Thank you!             Your Updated Medication List - Protect others around you: Learn how to safely use, store and throw away your medicines at www.disposemymeds.org.          This list is accurate as of 2/5/18  4:41 PM.  Always use your most recent med list.                   Brand Name Dispense Instructions for use Diagnosis    CALCIUM-VITAMIN D PO      Take 1 tablet by mouth daily        carboxymethylcellulose 1 % ophthalmic solution    CELLUVISC/REFRESH LIQUIGEL     Place 1 drop into both eyes 3 times daily        fluorometholone 0.1 % ophthalmic susp    FML LIQUIFILM    1 Bottle    Place 1 drop into the right eye 4 times daily    Primary open angle glaucoma of both eyes, indeterminate stage       fluticasone-salmeterol 250-50 MCG/DOSE diskus inhaler    ADVAIR     Inhale 1 puff into the lungs 2 times daily        ICAPS AREDS 2 PO           latanoprost 0.005 % ophthalmic solution    XALATAN    3 Bottle    Place 1 drop into both eyes At Bedtime    Mild stage glaucoma(365.71)       levothyroxine 112 MCG tablet    SYNTHROID/LEVOTHROID    90 tablet    TAKE 1 TABLET EVERY DAY    Hypothyroidism, postablative       losartan 100 MG tablet    COZAAR    90 tablet    Take 1 tablet (100 mg) by mouth daily    Benign hypertension       timolol 0.5 % ophthalmic solution    TIMOPTIC    3 Bottle    Place 1 drop into both eyes every morning    Mild stage glaucoma(365.71)       vitamin D 1000 UNITS capsule      Take  by  mouth.

## 2018-02-05 NOTE — NURSING NOTE
Chief Complaints and History of Present Illnesses   Patient presents with     Glaucoma Follow Up     SLT, right eye     HPI    Affected eye(s):  Right   Symptoms:     No decreased vision   Double vision (Comment: pt aware she needs to see a double vision specialist - has prism in glasses that isn't adequate enough)   No floaters   No flashes      Duration:  2 months   Frequency:  Constant       Do you have eye pain now?:  No      Comments:  Pt here for glaucoma f/u - SLT for the right eye today  Pt states vision stable since last visit    Ocular meds:  Timolol QAM, both eyes  Latanoprost QHS, both eyes    Juliana Corona COA 2:57 PM February 5, 2018

## 2018-02-07 NOTE — NURSING NOTE
"Chief Complaint   Patient presents with     Wellness Visit       Initial /60  Pulse 91  Temp 98  F (36.7  C)  Resp 16  Ht 5' 6\" (1.676 m)  Wt 157 lb (71.2 kg)  SpO2 96%  BMI 25.34 kg/m2 Estimated body mass index is 25.34 kg/(m^2) as calculated from the following:    Height as of this encounter: 5' 6\" (1.676 m).    Weight as of this encounter: 157 lb (71.2 kg).  Medication Reconciliation: david Quiroz CMA      " Refill requested by: Patient  Primary Provider:  Dr. Meredith Jesus  Covering Provider: Rajiv Reagan name:  Sanjayvaughn Mckeon / Avi Bruce at Section of [address] phone:  [phone]       [address] [place] [address] with approval.

## 2018-02-19 ENCOUNTER — TELEPHONE (OUTPATIENT)
Dept: FAMILY MEDICINE | Facility: CLINIC | Age: 73
End: 2018-02-19

## 2018-02-19 NOTE — TELEPHONE ENCOUNTER
Reason for Call:  Other call back    Detailed comments: pt states say NAC in December and she told her maybe can increase dose of levothyroxine (SYNTHROID/LEVOTHROID) 112 MCG tablet if symptoms says if sluggish and has fatique    Phone Number Patient can be reached at: Home number on file [phone] (home)    Best Time:     Can we leave a detailed message on this number? YES    Call taken on 2/19/2018 at 8:51 AM by RUFINA MARTINEZ

## 2018-02-19 NOTE — TELEPHONE ENCOUNTER
"Talked with Manasa and she states that she is \" tired of being tired\".. Per Lab Letter Sophia stated that she would increase her levothyroxine if she was feeling tired. She would like to try the increase and have it sent for a 3 month supply to [facility]  "

## 2018-02-21 ENCOUNTER — TELEPHONE (OUTPATIENT)
Dept: OPHTHALMOLOGY | Facility: CLINIC | Age: 73
End: 2018-02-21

## 2018-02-28 ENCOUNTER — TRANSFERRED RECORDS (OUTPATIENT)
Dept: HEALTH INFORMATION MANAGEMENT | Facility: CLINIC | Age: 73
End: 2018-02-28

## 2018-03-02 ENCOUNTER — ALLIED HEALTH/NURSE VISIT (OUTPATIENT)
Dept: OPHTHALMOLOGY | Facility: CLINIC | Age: 73
End: 2018-03-02
Attending: OPHTHALMOLOGY
Payer: MEDICARE

## 2018-03-02 DIAGNOSIS — H50.05 ALTERNATING ESOTROPIA: Primary | ICD-10-CM

## 2018-03-02 DIAGNOSIS — H50.22 HYPERTROPIA OF LEFT EYE: ICD-10-CM

## 2018-03-02 DIAGNOSIS — H57.89 THYROID EYE DISEASE: ICD-10-CM

## 2018-03-02 DIAGNOSIS — E07.9 THYROID EYE DISEASE: ICD-10-CM

## 2018-03-02 PROCEDURE — 92015 DETERMINE REFRACTIVE STATE: CPT | Mod: ZF | Performed by: TECHNICIAN/TECHNOLOGIST

## 2018-03-02 PROCEDURE — 92060 SENSORIMOTOR EXAMINATION: CPT | Mod: ZF

## 2018-03-02 PROCEDURE — 40000269 ZZH STATISTIC NO CHARGE FACILITY FEE: Mod: ZF | Performed by: TECHNICIAN/TECHNOLOGIST

## 2018-03-02 RX ORDER — AZITHROMYCIN 250 MG/1
TABLET, FILM COATED ORAL
COMMUNITY
Start: 2018-01-03 | End: 2018-08-10

## 2018-03-02 ASSESSMENT — REFRACTION_WEARINGRX
OS_VPRISM: 3 BD
OD_CYLINDER: SPHERE
OD_VPRISM: 3 BU
OD_HPRISM: 7 BO
OD_SPHERE: -0.75
OS_SPHERE: -2.50
OS_CYLINDER: +1.00
SPECS_TYPE: SVL
OS_AXIS: 050
OS_HPRISM: 7 BO

## 2018-03-02 ASSESSMENT — CONF VISUAL FIELD
METHOD: COUNTING FINGERS
OS_NORMAL: 1
OD_NORMAL: 1

## 2018-03-02 ASSESSMENT — REFRACTION_MANIFEST
OD_CYLINDER: +1.25
OS_SPHERE: -1.50
OD_SPHERE: -1.00
OS_AXIS: 005
OS_CYLINDER: +1.00
OD_AXIS: 015

## 2018-03-02 ASSESSMENT — VISUAL ACUITY
OD_CC: 20/40
OS_CC: 20/40
CORRECTION_TYPE: GLASSES
OD_PH_CC: 20/25-2
OS_PH_CC: 20/30
METHOD: SNELLEN - LINEAR

## 2018-03-02 ASSESSMENT — REFRACTION_FINALRX
OS_VPRISM: 2 BD
OD_HPRISM: 9 BO
OD_VPRISM: 2 BU
OS_HPRISM: 9 BO

## 2018-03-02 ASSESSMENT — TONOMETRY
OS_IOP_MMHG: 19
OD_IOP_MMHG: 19
IOP_METHOD: ICARE

## 2018-03-02 NOTE — NURSING NOTE
Chief Complaints and History of Present Illnesses   Patient presents with     Follow Up For     double vision, WIL and prism glasses     HPI    Symptoms:              Comments:  Manasa French (Katie) is a 72 year old F, following up for double vision (WIL) and prism glasses.    Current prism glasses are from 2014.   Rhonda can still bring double images back to one if she tries, worse when she is tired. Intermittent double vision per patient.   Rhonda states it is getting harder to focus and bring images back to single as she ages.  Vertical and horizontal double worse and more prominent at near than distance.  No previous eye muscle surgery. Patient states she has not been told about strabismus surgery.   Previously seen by Dr. Carrizales and Dr. Godoy per patient.     H/o cataracts s/p cataract extraction with IOL bilaterally in 2014.   H/o WIL, Graves disease and nystagmus.   ROSETTE Story 3/2/2018 10:29 AM

## 2018-03-02 NOTE — MR AVS SNAPSHOT
After Visit Summary   3/2/2018    Manasa French    MRN: [id number]           Patient Information     Date Of Birth          1945        Visit Information        Provider Department      3/2/2018 10:30 AM [facility] EYE TECH Eye Clinic        Today's Diagnoses     Alternating esotropia    -  1    Hypertropia of left eye        Thyroid eye disease           Follow-ups after your visit        Follow-up notes from your care team     Return for Follow Up with Dr. Gallardo and Dr. Page.      Your next 10 appointments already scheduled     2018 12:30 PM CDT   RETURN GLAUCOMA with Radha Gallardo MD   Eye Clinic ([facility])    [address]  [facility] [address]   [phone]            2019  7:30 AM CST   RETURN RETINA with Katy Page MD   Eye Clinic ([facility])    [address]  [facility] [address]   [phone]              Who to contact     Please call your clinic at [phone] to:    Ask questions about your health    Make or cancel appointments    Discuss your medicines    Learn about your test results    Speak to your doctor            Additional Information About Your Visit        MyChart Information     Rossolinit is an electronic gateway that provides easy, online access to your medical records. With Clinithink, you can request a clinic appointment, read your test results, renew a prescription or communicate with your care team.     To sign up for Rossolinit visit the website at [email or web address]   You will be asked to enter the access code listed below, as well as some personal information. Please follow the directions to create your username and password.     Your access code is: [email or web address]  Expires: 2018  4:41 PM     Your access code will  in 90 days. If you need help or a new code, please contact your [facility] or call [phone] for assistance.        Care EveryWhere ID     This is your Care EveryWhere ID. This could be used by other organizations to access your [place] medical records  [id number]         Blood Pressure from Last 3 Encounters:   12/07/17 130/60   09/21/16 138/62   09/01/16 144/74    Weight from Last 3 Encounters:   12/07/17 71.2 kg (157 lb)   09/21/16 71.4 kg (157 lb 8 oz)   09/01/16 71.2 kg (157 lb)              We Performed the Following     Sensorimotor        Primary Care Provider Office Phone # Fax #    [place]y [facility] [phone]       [address], [address]  [place] [id number]        Equal Access to Services     CHELSY MONTERROSO : Babar tenorioo Soduyen, waaxda luqadaha, qaybta kaalmada adeegyada, benji rodríguez. So [facility] [phone].    ATENCIÓN: Si habla español, tiene a martinez disposición servicios gratuitos de asistencia lingüística. Llame al [phone].    We comply with applicable federal civil rights laws and [place] laws. We do not discriminate on the basis of race, color, national origin, age, disability, sex, sexual orientation, or gender identity.            Thank you!     Thank you for choosing EYE CLINIC  for your care. Our goal is always to provide you with excellent care. Hearing back from our patients is one way we can continue to improve our services. Please take a few minutes to complete the written survey that you may receive in the mail after your visit with us. Thank you!             Your Updated Medication List - Protect others around you: Learn how to safely use, store and throw away your medicines at www.disposemymeds.org.          This list is accurate as of 3/2/18 11:29 AM.  Always use your most recent med list.                   Brand Name Dispense Instructions for use Diagnosis    azithromycin 250 MG tablet    ZITHROMAX          CALCIUM-VITAMIN D PO       Take 1 tablet by mouth daily        carboxymethylcellulose 1 % ophthalmic solution    CELLUVISC/REFRESH LIQUIGEL     Place 1 drop into both eyes 3 times daily        fluorometholone 0.1 % ophthalmic susp    FML LIQUIFILM    1 Bottle    Place 1 drop into the right eye 4 times daily    Primary open angle glaucoma of both eyes, indeterminate stage       fluticasone-salmeterol 250-50 MCG/DOSE diskus inhaler    ADVAIR     Inhale 1 puff into the lungs 2 times daily        ICAPS AREDS 2 PO           latanoprost 0.005 % ophthalmic solution    XALATAN    3 Bottle    Place 1 drop into both eyes At Bedtime    Mild stage glaucoma(365.71)       levothyroxine 112 MCG tablet    SYNTHROID/LEVOTHROID    90 tablet    TAKE 1 TABLET EVERY DAY    Hypothyroidism, postablative       losartan 100 MG tablet    COZAAR    90 tablet    Take 1 tablet (100 mg) by mouth daily    Benign hypertension       timolol 0.5 % ophthalmic solution    TIMOPTIC    3 Bottle    Place 1 drop into both eyes every morning    Mild stage glaucoma(365.71)       vitamin D 1000 UNITS capsule      Take  by mouth.

## 2018-03-02 NOTE — PROGRESS NOTES
New distance prism glasses prescription dispensed today.   Patient declined prism glasses for near; patient prefers to use OTC readers and close an eye to alleviate double vision.     Discussed briefly about strabismus surgery with patient. Patient to consider strabismus surgery in the future.   Follow up as needed in orthoptic clinic if double vision persists or symptoms fail to improve.

## 2018-04-11 ENCOUNTER — OFFICE VISIT (OUTPATIENT)
Dept: OPHTHALMOLOGY | Facility: CLINIC | Age: 73
End: 2018-04-11
Attending: OPHTHALMOLOGY
Payer: MEDICARE

## 2018-04-11 DIAGNOSIS — H50.05 ALTERNATING ESOTROPIA: ICD-10-CM

## 2018-04-11 DIAGNOSIS — H40.1134 PRIMARY OPEN ANGLE GLAUCOMA OF BOTH EYES, INDETERMINATE STAGE: ICD-10-CM

## 2018-04-11 DIAGNOSIS — Z48.810 SURGICAL AFTERCARE, SENSE ORGANS: Primary | ICD-10-CM

## 2018-04-11 DIAGNOSIS — H35.3190 NONEXUDATIVE SENILE MACULAR DEGENERATION OF RETINA: ICD-10-CM

## 2018-04-11 PROCEDURE — G0463 HOSPITAL OUTPT CLINIC VISIT: HCPCS | Mod: ZF

## 2018-04-11 ASSESSMENT — TONOMETRY
OD_IOP_MMHG: 18
OS_IOP_MMHG: 16
OS_IOP_MMHG: 14
OD_IOP_MMHG: 20
IOP_METHOD: APPLANATION
IOP_METHOD: APPLANATION

## 2018-04-11 ASSESSMENT — CONF VISUAL FIELD
OD_NORMAL: 1
OS_NORMAL: 1

## 2018-04-11 ASSESSMENT — SLIT LAMP EXAM - LIDS
COMMENTS: NORMAL
COMMENTS: NORMAL

## 2018-04-11 ASSESSMENT — VISUAL ACUITY
CORRECTION_TYPE: GLASSES
OD_CC+: -2
OS_CC+: +2
METHOD: SNELLEN - LINEAR
OD_CC: 20/25
OS_CC: 20/30

## 2018-04-11 NOTE — PROGRESS NOTES
History of thyroid eye disease and primary open angle glaucoma.  Post-operative month 2 Selected laser trabeculoplasty (SLT) OD   Has some glare, photophobia, floaters.    Current Medications:   Timolol every morning both eyes   Latanoprost HS both eyes     Ocular history:  Selected laser trabeculoplasty (SLT) 2/2018 ( intraocular pressure 26 before selective laser trabeculoplasty)       Intolerant to cosopt and alphagan    Testing: ~    1. Mild stage glaucoma  OCT showing slight downward slope right eye in conjunction with elevated IOP    2. Pseudophakia of both eyes  IOL stable in good position     3. Thyroid eye disease  Diplopia much better with new glasses.    4. Age related macular degeneration   Stage II, followed annually by retina     Plan:   Continue latanoprost HS both eyes   Continue timolol daily both eyes   Mild improvement with Selected laser trabeculoplasty (SLT) to high teens.  Intolerant to cosopt and alphagan    Return to clinic 3-4 months visual field 24-2    David Keene MD  PGY3     Attending Physician Attestation:  Complete documentation of historical and exam elements from today's encounter can be found in the full encounter summary report (not reduplicated in this progress note). I personally obtained the chief complaint(s) and history of present illness. I confirmed and edited asnecessary the review of systems, past medical/surgical history, family history, social history, and examination findings as documented by others; and I examined the patient myself. I personally reviewed the relevant tests, images, and reports as documented above. I formulated and edited as necessary the assessment and plan and discussed the findings and management plan with the patient and family.  - Radha Gallardo MD 2:03 PM 4/11/2018

## 2018-04-11 NOTE — NURSING NOTE
Chief Complaints and History of Present Illnesses   Patient presents with     Follow Up For     2 month follow up Primary open angle glaucoma of both eyes     HPI    Affected eye(s):  Both   Symptoms:     Floaters (Comment: at times)   No flashes   Glare   Photophobia      Frequency:  Constant       Do you have eye pain now?:  No      Comments:  2 month follow up POAG BE  Things are good w new glasses  Latanoprost qd BE  Timolol qd BE  Refresh tid BE  Refresh oint at pm BE  Jessie HARRIS 12:26 PM April 11, 2018

## 2018-04-11 NOTE — MR AVS SNAPSHOT
After Visit Summary   4/11/2018    Manasa French    MRN: [id number]           Patient Information     Date Of Birth          1945        Visit Information        Provider Department      4/11/2018 12:30 PM Radha Gallardo MD Eye Clinic        Today's Diagnoses     Surgical aftercare, sense organs    -  1    Primary open angle glaucoma of both eyes, indeterminate stage        Nonexudative senile macular degeneration of retina        Alternating esotropia           Follow-ups after your visit        Follow-up notes from your care team     Return in about 4 months (around 8/11/2018) for Dynamic VF 24-2, visual field 24-2.      Your next 10 appointments already scheduled     Aug 10, 2018  8:45 AM CDT   RETURN GLAUCOMA with Radha Gallardo MD   Eye Clinic ([facility])    [address] [address]   [phone]            Jan 23, 2019  7:30 AM CST   RETURN RETINA with Katy Page MD   Eye Clinic ([facility])    [address] [address]   [phone]              Who to contact     Please call your clinic at [phone] to:    Ask questions about your health    Make or cancel appointments    Discuss your medicines    Learn about your test results    Speak to your doctor            Additional Information About Your Visit        MyChart Information     mygola is an electronic gateway that provides easy, online access to your medical records. With mygola, you can request a clinic appointment, read your test results, renew a prescription or communicate with your care team.     To sign up for Haul [email or web address].t visit the website at [email or web address]   You will be asked to enter the access code listed below, as well as some personal information. Please follow the directions to create your username and password.     Your access code is:  [email or web address]  Expires: 2018  5:41 PM     Your access code will  in 90 days. If you need help or a new code, please contact your [facility] or call [phone] for assistance.        Care EveryWhere ID     This is your Care EveryWhere ID. This could be used by other organizations to access your [place] medical records  [id number]         Blood Pressure from Last 3 Encounters:   17 130/60   16 138/62   16 144/74    Weight from Last 3 Encounters:   17 71.2 kg (157 lb)   16 71.4 kg (157 lb 8 oz)   16 71.2 kg (157 lb)              Today, you had the following     No orders found for display       Primary Care Provider Office Phone # Fax #    Sissy [facility] [phone]       [facility] [facility], [address]  [facility] [address]        Equal Access to Services     CHELSY MONTERROSO : Hadii aad ku hadasho Soomaali, waaxda luqadaha, qaybta kaalmada adeegyada, waxay idiin hayaan vanessa titus . So [facility] [phone].    ATENCIÓN: Si habla español, tiene a martinez disposición servicios gratuitos de asistencia lingüística. Llame al [phone].    We comply with applicable federal civil rights laws and [place] laws. We do not discriminate on the basis of race, color, national origin, age, disability, sex, sexual orientation, or gender identity.            Thank you!     Thank you for choosing EYE CLINIC  for your care. Our goal is always to provide you with excellent care. Hearing back from our patients is one way we can continue to improve our services. Please take a few minutes to complete the written survey that you may receive in the mail after your visit with us. Thank you!             Your Updated Medication List - Protect others around you: Learn how to safely use, store and throw away your medicines at www.disposemymeds.org.          This list is accurate as of 18  2:08 PM.  Always use your  most recent med list.                   Brand Name Dispense Instructions for use Diagnosis    azithromycin 250 MG tablet    ZITHROMAX          CALCIUM-VITAMIN D PO      Take 1 tablet by mouth daily        carboxymethylcellulose 1 % ophthalmic solution    CELLUVISC/REFRESH LIQUIGEL     Place 1 drop into both eyes 3 times daily        fluorometholone 0.1 % ophthalmic susp    FML LIQUIFILM    1 Bottle    Place 1 drop into the right eye 4 times daily    Primary open angle glaucoma of both eyes, indeterminate stage       fluticasone-salmeterol 250-50 MCG/DOSE diskus inhaler    ADVAIR     Inhale 1 puff into the lungs 2 times daily        ICAPS AREDS 2 PO           latanoprost 0.005 % ophthalmic solution    XALATAN    3 Bottle    Place 1 drop into both eyes At Bedtime    Mild stage glaucoma(365.71)       levothyroxine 112 MCG tablet    SYNTHROID/LEVOTHROID    90 tablet    TAKE 1 TABLET EVERY DAY    Hypothyroidism, postablative       losartan 100 MG tablet    COZAAR    90 tablet    Take 1 tablet (100 mg) by mouth daily    Benign hypertension       timolol 0.5 % ophthalmic solution    TIMOPTIC    3 Bottle    Place 1 drop into both eyes every morning    Mild stage glaucoma(365.71)       vitamin D 1000 units capsule      Take  by mouth.

## 2018-05-04 DIAGNOSIS — H40.9 MILD STAGE GLAUCOMA(365.71): ICD-10-CM

## 2018-05-06 RX ORDER — LATANOPROST 50 UG/ML
1 SOLUTION/ DROPS OPHTHALMIC AT BEDTIME
Qty: 7.5 ML | Refills: 1 | Status: SHIPPED | OUTPATIENT
Start: 2018-05-06 | End: 2018-08-10

## 2018-05-06 NOTE — TELEPHONE ENCOUNTER
Medication:  latanoprost (XALATAN) 0.005 % ophthalmic solution  Last Written Prescription Date:  3/21/17  Last Fill Quantity: 3 bottles,   # refills: 3    Last Office Visit: 4/11/18  Future Office visit: 8/10/18    Attending Provider:Sami    Continue latanoprost HS both eyes

## 2018-05-16 ENCOUNTER — TRANSFERRED RECORDS (OUTPATIENT)
Dept: HEALTH INFORMATION MANAGEMENT | Facility: CLINIC | Age: 73
End: 2018-05-16

## 2018-05-23 ENCOUNTER — OFFICE VISIT (OUTPATIENT)
Dept: FAMILY MEDICINE | Facility: CLINIC | Age: 73
End: 2018-05-23
Payer: COMMERCIAL

## 2018-05-23 VITALS
DIASTOLIC BLOOD PRESSURE: 72 MMHG | TEMPERATURE: 97.6 F | HEART RATE: 81 BPM | SYSTOLIC BLOOD PRESSURE: 130 MMHG | WEIGHT: 154.5 LBS | BODY MASS INDEX: 24.94 KG/M2 | OXYGEN SATURATION: 98 %

## 2018-05-23 DIAGNOSIS — H40.053 BORDERLINE GLAUCOMA WITH OCULAR HYPERTENSION, BILATERAL: ICD-10-CM

## 2018-05-23 DIAGNOSIS — H35.3190 NONEXUDATIVE SENILE MACULAR DEGENERATION OF RETINA: ICD-10-CM

## 2018-05-23 DIAGNOSIS — R29.6 FALLS FREQUENTLY: ICD-10-CM

## 2018-05-23 DIAGNOSIS — J45.30 MILD PERSISTENT ASTHMA WITHOUT COMPLICATION: ICD-10-CM

## 2018-05-23 DIAGNOSIS — E05.00 GRAVES DISEASE: ICD-10-CM

## 2018-05-23 DIAGNOSIS — Z98.2 S/P VENTRICULAR SHUNT PLACEMENT: ICD-10-CM

## 2018-05-23 DIAGNOSIS — M80.80XS OTHER OSTEOPOROSIS WITH CURRENT PATHOLOGICAL FRACTURE, SEQUELA: Primary | ICD-10-CM

## 2018-05-23 DIAGNOSIS — Z86.69 HISTORY OF HYDROCEPHALUS: ICD-10-CM

## 2018-05-23 DIAGNOSIS — H54.3 LOW VISION, BOTH EYES: ICD-10-CM

## 2018-05-23 DIAGNOSIS — E89.0 HYPOTHYROIDISM, POSTABLATIVE: ICD-10-CM

## 2018-05-23 DIAGNOSIS — S32.000A CLOSED COMPRESSION FRACTURE OF LUMBOSACRAL SPINE, INITIAL ENCOUNTER (H): ICD-10-CM

## 2018-05-23 DIAGNOSIS — S32.020A CLOSED COMPRESSION FRACTURE OF SECOND LUMBAR VERTEBRA, INITIAL ENCOUNTER: ICD-10-CM

## 2018-05-23 DIAGNOSIS — M48.061 SPINAL STENOSIS, LUMBAR REGION, WITHOUT NEUROGENIC CLAUDICATION: ICD-10-CM

## 2018-05-23 DIAGNOSIS — I10 HTN, GOAL BELOW 140/90: ICD-10-CM

## 2018-05-23 PROCEDURE — 99215 OFFICE O/P EST HI 40 MIN: CPT | Performed by: FAMILY MEDICINE

## 2018-05-23 RX ORDER — METHYLPREDNISOLONE 4 MG
TABLET, DOSE PACK ORAL
COMMUNITY
Start: 2018-02-28 | End: 2018-07-02

## 2018-05-23 NOTE — PATIENT INSTRUCTIONS
You need a mammogram!  Please call to schedule this.  [facility] Mammogram every Friday morning at our clinic [phone]  or  [facility] [place]   Appointment line [phone]  or  [facility] Appointment line [phone]

## 2018-05-23 NOTE — PROGRESS NOTES
"  SUBJECTIVE:   Manasa French is a 72 year old female who presents to clinic today for the following health issues:    Pt here for a referral for endocronologist.    72 year old new to me in clinic today with past medical history of hydrocephalus and s/p  shunt (1987) as well as history of Grave's disease and thyroid ablation, on postablative thyroid replacement for years, as well as quite low vision from multiple factors including shunt and hydrocephalus as well as macular degeneration, cataracts, double vision and glaucoma, hypertension well controlled on losartan, and persistent asthma well controlled on advair who is here today for several reasons:    1. Establish care.  Last several docs have left the clinic; needs a new primary.    2. [facility] and sustained 2 compression fractures of Lumbar and sacral vertebrae.  Has been in TLCO brace; just got out of it.  Doing okay but pain persists.  Spine doctor is having her do physical therapy starting next week AND would like her to see endocrinology to start treatment for osteoporosis as it is visible on MRI.  She has had 2 other fractures since 2014 - fell and broke leg and fell and had another verterbral compression fracture last year.    3. Needs mammo - overdue.  Doesn't want to climb stairs here to do it - can we refer?    4. Asthma.  Had been seen by asthma doc but he told her she could have her medications filled here.  Can we do that?  On advair daily and azithro occasionally for \"sinus infections\".        Problem list and histories reviewed & adjusted, as indicated.  Additional history: as documented    BP Readings from Last 3 Encounters:   05/23/18 130/72   12/07/17 130/60   09/21/16 138/62    Wt Readings from Last 3 Encounters:   05/23/18 154 lb 8 oz (70.1 kg)   12/07/17 157 lb (71.2 kg)   09/21/16 157 lb 8 oz (71.4 kg)            Reviewed and updated as needed this visit by clinical staff  Tobacco  " Allergies  Meds  Problems  Med Hx  Surg Hx  Fam Hx  Soc Hx        Reviewed and updated as needed this visit by Provider  Problems         ROS:  Constitutional, HEENT, cardiovascular, pulmonary, gi and gu systems are negative, except as otherwise noted.    OBJECTIVE:     /72 (Cuff Size: Adult Regular)  Pulse 81  Temp 97.6  F (36.4  C) (Tympanic)  Wt 154 lb 8 oz (70.1 kg)  SpO2 98%  BMI 24.94 kg/m2  Body mass index is 24.94 kg/(m^2).  GENERAL: healthy, alert and no distress  EYES: thick glasses, unable to see computer screen or print out AVS  NECK: no adenopathy, no asymmetry, masses, or scars and thyroid normal to palpation  RESP: lungs clear to auscultation - no rales, rhonchi or wheezes  ABDOMEN: soft, nontender, no hepatosplenomegaly, no masses and bowel sounds normal  MS: leans to left, some curving of spine, some imbalance in exam room as getting in and out of chair  SKIN: no suspicious lesions or rashes  NEURO: Normal strength and tone, sensory exam grossly normal and mentation intact  PSYCH: mentation appears normal, affect normal/bright    ASSESSMENT/PLAN:       Manasa was seen today for referral.    Diagnoses and all orders for this visit:    Other osteoporosis with current pathological fracture, sequela  Comments:  Dexa Osteopenia; but per [facility] has osteoporosis on MRI AND has had 4 fractures in last 4 years.  Referred to endo - pt has reservations about txment.  Discussed that I WOULD recommend this for her; referred to endo.    Closed compression fracture of second lumbar vertebra  (H)  Closed compression fracture of lumbosacral spine (H)  Comments:  Follows with [facility].  Consider topical lidocaine/capsaicin for help with pain management.  Consider scheduled tylenol and naproxen.    Low vision, both eyes  Nonexudative senile macular degeneration of retina  Borderline glaucoma with ocular hypertension, bilateral  Comments:  double vision, macular degeneration,  cataracts and glaucoma.  sees optho regularly.  I am sure this is contributing to balance issues; referred to balance center.      Mild persistent asthma without complication  Comments:  Followed by asthma doc; now wants to have meds transferred here (advair).  Am happy to refill advair.  I don't give prescriptions for ongoing antibiotics however; will discuss with patient at next visit.    Hypothyroidism, postablative  Graves disease  -     ENDOCRINOLOGY ADULT REFERRAL    Falls frequently  Comments:  Could use balance PT - low vision and now fear of falling with frequent falls.  Referred.  Orders:  -     PHYSICAL THERAPY REFERRAL    History of hydrocephalus  S/P ventricular shunt placement 1987      Spinal stenosis, lumbar region, without neurogenic claudication  Comments:  Followed by [facility]        Patient Instructions   You need a mammogram!  Please call to schedule this.  [facility] Mammogram every Friday morning at our clinic [phone]  or  [facility]   Appointment line [phone]  or  [facility] Appointment line [phone]      Greater than 40 minutes total were spent face to face with the patient including history, exam, counseling and coordination of care.      Evelia Castro MD  [facility]

## 2018-05-23 NOTE — MR AVS SNAPSHOT
After Visit Summary   5/23/2018    Manasa French    MRN: [id number]           Patient Information     Date Of Birth          1945        Visit Information        Provider Department      5/23/2018 11:40 AM Evelia Castro MD [facility]        Today's Diagnoses     Nonexudative senile macular degeneration of retina        Borderline glaucoma with ocular hypertension, bilateral        Mild persistent asthma without complication        Hypothyroidism, postablative        Diverticulitis of colon           Follow-ups after your visit        Additional Services     ENDOCRINOLOGY ADULT REFERRAL       Your provider has referred you to:   FMG: [facility] [phone]   [email or web address]  FMG:  [facility]  [phone]  [email or web address]  [facility]: Endocrinology and Diabetes Clinic [facility] [phone]   [email or web address]  [facility]: Endocrinology Clinic [facility] [phone]   [email or web address]      Please be aware that coverage of these services is subject to the terms and limitations of your health insurance plan.  Call member services at your health plan with any benefit or coverage questions.      Please bring the following to your appointment:    >>   Any x-rays, CTs or MRIs which have been performed.  Contact the facility where they were done to arrange for  prior to your scheduled appointment.    >>   List of current medications   >>   This referral request   >>   Any documents/labs given to you for this referral                  Your next 10 appointments already scheduled     Aug 10, 2018  8:45 AM CDT   RETURN GLAUCOMA with Radha Gallardo MD   Eye Clinic ([facility])    [address]  [facility] [address] [phone]              Who to contact     If you have questions or need follow up information about today's clinic visit or your schedule please contact [facility] directly at [phone].  Normal or non-critical lab and imaging results will be communicated to you by MyChart, letter or phone within 4 business days after the clinic has received the results. If you do not hear from us within 7 days, please contact the clinic through MyChart or phone. If you have a critical or abnormal lab result, we will notify you by phone as soon as possible.  Submit refill requests through [employer] or call your pharmacy and they will forward the refill request to us. Please allow 3 business days for your refill to be completed.          Additional Information About Your Visit        Care EveryWhere ID     This is your Care EveryWhere ID. This could be used by other organizations to access your [facility] medical records  [id number]        Your Vitals Were     Pulse Temperature Pulse Oximetry BMI (Body Mass Index)          81 97.6  F (36.4  C) (Tympanic) 98% 24.94 kg/m2         Blood Pressure from Last 3 Encounters:   05/23/18 130/72   12/07/17 130/60   09/21/16 138/62    Weight from Last 3 Encounters:   05/23/18 154 lb 8 oz (70.1 kg)   12/07/17 157 lb (71.2 kg)   09/21/16 157 lb 8 oz (71.4 kg)              We Performed the Following     ENDOCRINOLOGY ADULT REFERRAL          Today's Medication Changes          These changes are accurate as of 5/23/18 12:18 PM.  If you have any questions, ask your nurse or doctor.               Stop taking these medicines if you haven't already. Please contact your care team if you have questions.     fluorometholone 0.1 % ophthalmic susp   Commonly known as:  FML LIQUIFILM   Stopped by:  Evelia Castro MD                    Primary Care Provider Office Phone # Fax #    [facility] [phone]  [phone]       [facility], [address]  [facility] [address]        Equal Access to Services     CHELSY MONTERROSO : Hadii aad ku hadcorietracey Almeida, wasallieda inez, qaybta kaalmada jael, waxay idiin haymavisnora de diosdarlinjames rodríguez. So [facility] [phone].    ATENCIÓN: Si habla español, tiene a martinez disposición servicios gratuitos de asistencia lingüística. Llame al [phone].    We comply with applicable federal civil rights laws and [place] laws. We do not discriminate on the basis of race, color, national origin, age, disability, sex, sexual orientation, or gender identity.            Thank you!     Thank you for choosing [facility]  for your care. Our goal is always to provide you with excellent care. Hearing back from our patients is one way we can continue to improve our services. Please take a few minutes to complete the written survey that you may receive in the mail after your visit with us. Thank you!             Your Updated Medication List - Protect others around you: Learn how to safely use, store and throw away your medicines at www.disposemymeds.org.          This list is accurate as of 5/23/18 12:18 PM.  Always use your most recent med list.                   Brand Name Dispense Instructions for use Diagnosis    azithromycin 250 MG tablet    ZITHROMAX          CALCIUM-VITAMIN D PO      Take 1 tablet by mouth daily        carboxymethylcellulose 1 % ophthalmic solution    CELLUVISC/REFRESH LIQUIGEL     Place 1 drop into both eyes 3 times daily        fluticasone-salmeterol 250-50 MCG/DOSE diskus inhaler    ADVAIR     Inhale 1 puff into the lungs 2 times daily        ICAPS AREDS 2 PO           latanoprost 0.005 % ophthalmic solution    XALATAN    7.5 mL    Place 1 drop into both eyes At Bedtime    Mild stage glaucoma(365.71)       levothyroxine 112 MCG tablet    SYNTHROID/LEVOTHROID    90 tablet    TAKE 1 TABLET EVERY DAY    Hypothyroidism, postablative        losartan 100 MG tablet    COZAAR    90 tablet    Take 1 tablet (100 mg) by mouth daily    Benign hypertension       methylPREDNISolone 4 MG tablet    MEDROL DOSEPAK          timolol 0.5 % ophthalmic solution    TIMOPTIC    3 Bottle    Place 1 drop into both eyes every morning    Mild stage glaucoma(365.71)       vitamin D 1000 units capsule      Take  by mouth.

## 2018-05-24 ASSESSMENT — ASTHMA QUESTIONNAIRES: ACT_TOTALSCORE: 24

## 2018-05-25 ENCOUNTER — HOSPITAL ENCOUNTER (OUTPATIENT)
Dept: MAMMOGRAPHY | Facility: CLINIC | Age: 73
Discharge: HOME | End: 2018-05-25
Attending: FAMILY MEDICINE | Admitting: FAMILY MEDICINE
Payer: MEDICARE

## 2018-05-25 DIAGNOSIS — Z12.31 VISIT FOR SCREENING MAMMOGRAM: ICD-10-CM

## 2018-05-25 PROCEDURE — 77067 SCR MAMMO BI INCL CAD: CPT

## 2018-06-06 ENCOUNTER — HOSPITAL ENCOUNTER (OUTPATIENT)
Dept: PHYSICAL THERAPY | Facility: CLINIC | Age: 73
Setting detail: THERAPIES SERIES
End: 2018-06-06
Attending: FAMILY MEDICINE
Payer: MEDICARE

## 2018-06-06 PROCEDURE — 97162 PT EVAL MOD COMPLEX 30 MIN: CPT | Mod: GP | Performed by: PHYSICAL THERAPIST

## 2018-06-06 PROCEDURE — 97116 GAIT TRAINING THERAPY: CPT | Mod: GP | Performed by: PHYSICAL THERAPIST

## 2018-06-06 PROCEDURE — 97112 NEUROMUSCULAR REEDUCATION: CPT | Mod: GP | Performed by: PHYSICAL THERAPIST

## 2018-06-06 PROCEDURE — G8978 MOBILITY CURRENT STATUS: HCPCS | Mod: GP,CK | Performed by: PHYSICAL THERAPIST

## 2018-06-06 PROCEDURE — 40000719 ZZHC STATISTIC PT DEPARTMENT NEURO VISIT: Performed by: PHYSICAL THERAPIST

## 2018-06-06 PROCEDURE — G8979 MOBILITY GOAL STATUS: HCPCS | Mod: GP,CJ | Performed by: PHYSICAL THERAPIST

## 2018-06-06 NOTE — PROGRESS NOTES
06/06/18 1600   Quick Adds   Quick Adds Certification   Type of Visit Initial OP PT Evaluation   General Information   Start of Care Date 06/06/18   Referring Physician Evelia Castro MD   Orders Evaluate and Treat as Indicated   Order Date 05/23/18   Medical Diagnosis Falls frequently   Onset of illness/injury or Date of Surgery 05/23/18   Surgical/Medical history reviewed Yes   Pertinent history of current problem (include personal factors and/or comorbidities that impact the POC) The pt presents with frequent falls and instability throughout most of her life. PMH significant for hydrocephalus s/p  shunt which has been a major source of instability throughout her life, graves disease, macular degeneration, double vision, glaucoma. The pt was also recently diagnosed with osteoporosis. The pt has a history of falls with several fractures, the most recent being in February when she fell off of the TM at the [facility] resulting in lumbar and sacral compression fractures. Recently stopped wearing TLSO. The pt reports that she has never had therapy for balance. The pt currently is exercising at least 6 days/week, including walking x 10-15 min (then pain occurs), using stationary bike, doing PT exercises for back, and swimming.   Pertinent Visual History  poor vision, see above   Prior level of function comment used to cross country ski   Patient role/Employment history Retired   [facility]   Home/Community Accessibility Comments 4 stairs to enter with rail, 28 stairs within with rail   Current Assistive Devices Walking Poles;Standard Cane   Patient/Family Goals Statement stop falling   Fall Risk Screen   Fall screen completed by PT   Have you fallen 2 or more times in the past year? Yes   Have you fallen and had an injury in the past year? Yes   Timed Up and Go score (seconds) 12.12   Is patient a fall risk? Yes   Fall screen comments per DGI   Pain   Pain comments reports ache in low back, pt is  being seen by ortho PT   Cognitive Status Examination   Orientation orientation to person, place and time   Integumentary   Integumentary No deficits were identified   Posture   Posture Forward head position   Posture Comments anterior pelvic tilt   Range of Motion (ROM)   ROM Comment grossly WFLs, appears to have increased tightness in hip flexors, pt reports this is being addressed by ortho PT   Strength   Strength Comments LEs 4+/5 in hip flexion in seated position, otherwise 5/5 in knee extensors and dorsiflexion   Transfer Skills   Transfer Comments Able without use of UEs however has difficulty committing to forward weight shift   Gait   Gait Comments Ambulates with wide base of support, decreased hip extension and stride length, forward flexed   Gait Special Tests   Gait Special Tests DYNAMIC GAIT INDEX   Gait Special Tests Dynamic Gait Index   Score out of 24 16   Comments without AD   Balance Special Tests   Balance Special Tests Sit to stand reps;Modified CTSIB Conditions   Balance Special Tests Modified CTSIB Conditions   Condition 1, seconds 3 Seconds   Condition 2, seconds 30 Seconds   Condition 4, seconds 30 Seconds   Condition 5, seconds 2 Seconds   Modified CTSIB Comments Great difficulty with NBOS   Balance Special Tests Sit to Stand Reps in 30 Seconds   Reps in 30 seconds 8   Height 16   Sensory Examination   Sensory Perception Comments reports no N/T in feet   Coordination   Coordination Comments slightly impaired, clumsy with heel to shin   Planned Therapy Interventions   Planned Therapy Interventions balance training;gait training;neuromuscular re-education;transfer training   Clinical Impression   Criteria for Skilled Therapeutic Interventions Met yes, treatment indicated   PT Diagnosis Impaired balance and gait   Influenced by the following impairments instability, mild weakness, incoordination, fear of falling, pain   Functional limitations due to impairments difficulty with household and  community mobility, frequent falls, difficulty with ADLs   Clinical Presentation Evolving/Changing   Clinical Presentation Rationale fluctuating pain, instability, frequency of falls   Clinical Decision Making (Complexity) Moderate complexity   Therapy Frequency 1 time/week   Predicted Duration of Therapy Intervention (days/wks) 60 days   Risk & Benefits of therapy have been explained Yes   Patient, Family & other staff in agreement with plan of care Yes   Clinical Impression Comments PT will focus on balance and gait training as pt is receiving therapy at another facility for LBP   Education Assessment   Preferred Learning Style Demonstration;Listening   Barriers to Learning Reading   GOALS   PT Eval Goals 1;2;3   Goal 1   Goal Identifier mCTSIB   Goal Description Due to improvements in static balance the pt will be able to maintain stability in condition 1 for 15 seconds and condition 5 for 5 seconds, thereby improving safety with household and other balance and reaching tasks   Target Date 08/05/18   Goal 2   Goal Identifier DGI   Goal Description The pt will improve score on DGI to >19/24, indicating a reduction in fall risk   Target Date 08/05/18   Goal 3   Goal Identifier HEP   Goal Description The pt will be independent with HEP focusing on balance drills in order to safely progress balance outside of therapy   Target Date 08/05/18   Total Evaluation Time   Total Evaluation Time (Minutes) 30   Therapy Certification   Certification date from 06/06/18   Certification date to 08/05/18   Medical Diagnosis falls frequently   Certification I certify the need for these services furnished under this plan of treatment and while under my care.  (Physician co-signature of this document indicates review and certification of the therapy plan).

## 2018-06-06 NOTE — PROGRESS NOTES
[facility]        OUTPATIENT PHYSICAL THERAPY FUNCTIONAL EVALUATION  PLAN OF TREATMENT FOR OUTPATIENT REHABILITATION  (COMPLETE FOR INITIAL CLAIMS ONLY)  Patient's Last Name, First Name, M.I.  YOB: 1945  FrenchManasa     Provider's Name   [facility]   Medical Record No.  [id number]     Start of Care Date:  06/06/18   Onset Date:  05/23/18   Type:     _X__PT   ____OT  ____SLP Medical Diagnosis:  falls frequently     PT Diagnosis:  Impaired balance and gait Visits from SOC:  1                              __________________________________________________________________________________  Plan of Treatment/Functional Goals:  balance training, gait training, neuromuscular re-education, transfer training           GOALS  mCTSIB  Due to improvements in static balance the pt will be able to maintain stability in condition 1 for 15 seconds and condition 5 for 5 seconds, thereby improving safety with household and other balance and reaching tasks  08/05/18    DGI  The pt will improve score on DGI to >19/24, indicating a reduction in fall risk  08/05/18    HEP  The pt will be independent with HEP focusing on balance drills in order to safely progress balance outside of therapy  08/05/18             Therapy Frequency:  1 time/week   Predicted Duration of Therapy Intervention:  60 days    Michelle Hernandez, PT                                    I CERTIFY THE NEED FOR THESE SERVICES FURNISHED UNDER        THIS PLAN OF TREATMENT AND WHILE UNDER MY CARE     (Physician co-signature of this document indicates review and certification of the therapy plan).                Certification Date From:  06/06/18   Certification Date To:  08/05/18    Referring Provider:  Evelia Castro MD    Initial Assessment  See Epic Evaluation- Start of Care Date: 06/06/18

## 2018-06-19 ENCOUNTER — HOSPITAL ENCOUNTER (OUTPATIENT)
Dept: PHYSICAL THERAPY | Facility: CLINIC | Age: 73
Setting detail: THERAPIES SERIES
End: 2018-06-19
Attending: FAMILY MEDICINE
Payer: MEDICARE

## 2018-06-19 PROCEDURE — 40000719 ZZHC STATISTIC PT DEPARTMENT NEURO VISIT: Performed by: PHYSICAL THERAPIST

## 2018-06-19 PROCEDURE — 97112 NEUROMUSCULAR REEDUCATION: CPT | Mod: GP | Performed by: PHYSICAL THERAPIST

## 2018-06-26 ENCOUNTER — HOSPITAL ENCOUNTER (OUTPATIENT)
Dept: PHYSICAL THERAPY | Facility: CLINIC | Age: 73
Setting detail: THERAPIES SERIES
End: 2018-06-26
Attending: FAMILY MEDICINE
Payer: MEDICARE

## 2018-06-26 PROCEDURE — 40000719 ZZHC STATISTIC PT DEPARTMENT NEURO VISIT: Performed by: PHYSICAL THERAPIST

## 2018-06-26 PROCEDURE — 97112 NEUROMUSCULAR REEDUCATION: CPT | Mod: GP | Performed by: PHYSICAL THERAPIST

## 2018-07-02 ENCOUNTER — OFFICE VISIT (OUTPATIENT)
Dept: ENDOCRINOLOGY | Facility: CLINIC | Age: 73
End: 2018-07-02
Payer: COMMERCIAL

## 2018-07-02 VITALS
RESPIRATION RATE: 17 BRPM | HEART RATE: 87 BPM | OXYGEN SATURATION: 98 % | SYSTOLIC BLOOD PRESSURE: 130 MMHG | DIASTOLIC BLOOD PRESSURE: 60 MMHG | HEIGHT: 66 IN | BODY MASS INDEX: 25.22 KG/M2 | WEIGHT: 156.9 LBS | TEMPERATURE: 98.2 F

## 2018-07-02 DIAGNOSIS — M80.80XS OTHER OSTEOPOROSIS WITH CURRENT PATHOLOGICAL FRACTURE, SEQUELA: Primary | ICD-10-CM

## 2018-07-02 DIAGNOSIS — E89.0 HYPOTHYROIDISM, POSTABLATIVE: ICD-10-CM

## 2018-07-02 PROCEDURE — 99000 SPECIMEN HANDLING OFFICE-LAB: CPT | Performed by: INTERNAL MEDICINE

## 2018-07-02 PROCEDURE — 84439 ASSAY OF FREE THYROXINE: CPT | Performed by: INTERNAL MEDICINE

## 2018-07-02 PROCEDURE — 82523 COLLAGEN CROSSLINKS: CPT | Mod: 90 | Performed by: INTERNAL MEDICINE

## 2018-07-02 PROCEDURE — 83937 ASSAY OF OSTEOCALCIN: CPT | Mod: 90 | Performed by: INTERNAL MEDICINE

## 2018-07-02 PROCEDURE — 84443 ASSAY THYROID STIM HORMONE: CPT | Performed by: INTERNAL MEDICINE

## 2018-07-02 PROCEDURE — 82565 ASSAY OF CREATININE: CPT | Performed by: INTERNAL MEDICINE

## 2018-07-02 PROCEDURE — 99204 OFFICE O/P NEW MOD 45 MIN: CPT | Performed by: INTERNAL MEDICINE

## 2018-07-02 PROCEDURE — 82306 VITAMIN D 25 HYDROXY: CPT | Performed by: INTERNAL MEDICINE

## 2018-07-02 PROCEDURE — 83970 ASSAY OF PARATHORMONE: CPT | Performed by: INTERNAL MEDICINE

## 2018-07-02 PROCEDURE — 82310 ASSAY OF CALCIUM: CPT | Performed by: INTERNAL MEDICINE

## 2018-07-02 PROCEDURE — 36415 COLL VENOUS BLD VENIPUNCTURE: CPT | Performed by: INTERNAL MEDICINE

## 2018-07-02 NOTE — MR AVS SNAPSHOT
After Visit Summary   2018    Manasa French    MRN: [id number]           Patient Information     Date Of Birth          1945        Visit Information        Provider Department      2018 3:30 PM Yani Box MD [facility]        Today's Diagnoses     Other osteoporosis with current pathological fracture, sequela    -  1    Hypothyroidism, postablative          Care Instructions    [facility] [place] [facility]   Dr Box, Endocrinology Department      [facility]   [address]  [place] [address]  Appointment [phone]  Fax: [phone]  [place]: Monday and Tuesday         [place] [address]  [place] [address]  Appointment [phone]  Fax: [phone]  [place]: Wednesday and Thursday            Labs today  DEXA scan- at [facility]  Follow up after above  Can see Shana Sharp at [place] ([phone]) if she has earlier openings.  Check with insurance cost of - FORTEO/ TYMLOS    The pt was advised to    Maintain an adequate calcium and vitamin D intake and to supplement vitamin D if needed to maintain serum levels of 25 hydroxy D (25 OH D) between 30-60 ng/ml.    Limit alcohol intake to no more than 2 servings per day.    Limit caffeine intake.    Maintain an active lifestyle including weight-bearing exercises for at least 30 mins daily.    Take measures to reduce the risk of falling.            Follow-ups after your visit        Your next 10 appointments already scheduled     2018  2:30 PM CDT   Neuro Treatment with Micehlle Hernandez, PT   [facility] ([facility])    [address] [place] [address]   [phone]            2018  2:15 PM CDT   Neuro Treatment with Michelle Hernandez, PT   [facility]  ([facility])    [address]  [address]  [place] [address]   [phone]            Jul 18, 2018  1:45 PM CDT   Neuro Treatment with Michelle Swansonkarson Hernandez, PT   [facility] ([facility])    [address]  [address]  [place] [address]   [phone]            Jul 24, 2018  2:30 PM CDT   Neuro Treatment with Michelle Swansonkarson Hernandez, PT   [facility] ([facility])    [address]  [address]  [place] [address]   [phone]            Jul 31, 2018  2:30 PM CDT   Neuro Treatment with Michelle Danyelledelgadokarson Hernandez, PT   [facility] ([facility])    [address]  [address]  [place] [address]   [phone]            Aug 10, 2018  8:45 AM CDT   RETURN GLAUCOMA with Radha Gallardo MD   Eye Clinic ([facility])    [address]  [facility] [address]   [phone]            Jan 28, 2019  2:00 PM CST   RETURN RETINA with Katy Page MD   Eye Clinic ([facility])    [address]  [facility] [address]   [phone]              Future tests that were ordered for you today     Open Future Orders        Priority Expected Expires Ordered    DX Hip/Pelvis/Spine w Lat Fraction Jena Routine  7/2/2019 7/2/2018            Who to contact     If you have questions or need follow up information about today's clinic visit or your schedule please contact [facility] [place] directly at [phone].  Normal or non-critical lab and imaging results will be communicated to you by MyChart, letter or phone within 4 business days after the clinic has received the results. If you do not hear from us within 7 days, please contact the clinic through MyChart or phone. If you have a critical or abnormal lab result, we will notify you by phone as  "soon as possible.  Submit refill requests through [employer] or call your pharmacy and they will forward the refill request to us. Please allow 3 business days for your refill to be completed.          Additional Information About Your Visit        Care EveryWhere ID     This is your Care EveryWhere ID. This could be used by other organizations to access your [facility] medical records  [id number]        Your Vitals Were     Pulse Temperature Respirations Height Pulse Oximetry BMI (Body Mass Index)    87 98.2  F (36.8  C) (Oral) 17 1.676 m (5' 6\") 98% 25.32 kg/m2       Blood Pressure from Last 3 Encounters:   07/02/18 130/60   05/23/18 130/72   12/07/17 130/60    Weight from Last 3 Encounters:   07/02/18 71.2 kg (156 lb 14.4 oz)   05/23/18 70.1 kg (154 lb 8 oz)   12/07/17 71.2 kg (157 lb)              We Performed the Following     Calcium     Creatinine     Parathyroid Hormone Intact     TSH with free T4 reflex     Vitamin D Deficiency        Primary Care Provider Office Phone # Fax #    [facility] [phone]       [address], [address]  [facility] [address]        Equal Access to Services     CHELSY MONTERROSO : Babar tenorioo Soduyen, waaxda luqadaha, qaybta kaalmada ademarciyada, benji rodríguez. So [facility] [phone].    ATENCIÓN: Si habla español, tiene a martinez disposición servicios gratuitos de asistencia lingüística. Juan C al [phone].    We comply with applicable federal civil rights laws and [place] laws. We do not discriminate on the basis of race, color, national origin, age, disability, sex, sexual orientation, or gender identity.            Thank you!     Thank you for choosing [facility] [place]  for your care. Our goal is always to provide you with excellent care. Hearing back from our patients is one way we can continue to improve our services. Please take a few minutes to complete the written survey that you may " receive in the mail after your visit with us. Thank you!             Your Updated Medication List - Protect others around you: Learn how to safely use, store and throw away your medicines at www.disposemymeds.org.          This list is accurate as of 7/2/18  4:05 PM.  Always use your most recent med list.                   Brand Name Dispense Instructions for use Diagnosis    azithromycin 250 MG tablet    ZITHROMAX          carboxymethylcellulose 1 % ophthalmic solution    CELLUVISC/REFRESH LIQUIGEL     Place 1 drop into both eyes 3 times daily        fluticasone-salmeterol 250-50 MCG/DOSE diskus inhaler    ADVAIR     Inhale 1 puff into the lungs 2 times daily        ICAPS AREDS 2 PO           latanoprost 0.005 % ophthalmic solution    XALATAN    7.5 mL    Place 1 drop into both eyes At Bedtime    Mild stage glaucoma(365.71)       levothyroxine 112 MCG tablet    SYNTHROID/LEVOTHROID    90 tablet    TAKE 1 TABLET EVERY DAY    Hypothyroidism, postablative       losartan 100 MG tablet    COZAAR    90 tablet    Take 1 tablet (100 mg) by mouth daily    Benign hypertension       timolol 0.5 % ophthalmic solution    TIMOPTIC    3 Bottle    Place 1 drop into both eyes every morning    Mild stage glaucoma(365.71)       vitamin D 1000 units capsule      Take  by mouth.

## 2018-07-02 NOTE — PATIENT INSTRUCTIONS
[facility]   Dr Box, Endocrinology Department      [facility]   [address]  [place] [address]  Appointment [phone]  Fax: [phone]  [place]: Monday and Tuesday         [facility]   [address]  [place] [address]  Appointment [phone]  Fax: [phone]  [place]: Wednesday and Thursday            Labs today  DEXA scan- at [facility] clinic  Follow up after above  Can see Shana Aron at [place] ([phone]) if she has earlier openings.  Check with insurance cost of - FORTEO/ TYMLOS    The pt was advised to    Maintain an adequate calcium and vitamin D intake and to supplement vitamin D if needed to maintain serum levels of 25 hydroxy D (25 OH D) between 30-60 ng/ml.    Limit alcohol intake to no more than 2 servings per day.    Limit caffeine intake.    Maintain an active lifestyle including weight-bearing exercises for at least 30 mins daily.    Take measures to reduce the risk of falling.

## 2018-07-02 NOTE — PROGRESS NOTES
Name: Manasa French  Date: 7/2/2018  Seen in consultation with , [facility] for osteoporosis.     HPI:  Manasa rFench is a 72 year old female who presents for the evaluation of osteoperosis .  H/o vertebral fracture in 2016 (while pulling a hose tripped over on cement sidewalk), 2018 ( walking on treadmill and tripped)  Last DEXA 2014- osteopenia. Was not started on medication at that time.    H/o graves diease s/p BARAHONA in 2003- hypothyroidism following that. Now taking levothyroxine 112 mcg/day.    Smoke:No  Family History:No  Menstrual history/Birthing: s/p menopause in 50s. Was on HRT for many years. About 10 years.  Fractures:as above. H/o leg fracture in 2012.  Kidney stones: No  GI Surgery:No  Duration of therapy:  Never been on medication  Exercise:Yes: walking and biking.  Diet:   Milk: 1 serving/day   Cheese: everyday   Yogurt/Cottage Cheese: few times a week  Ca/Vitamin D: takes calcium 1200 mg/day and vit D 2000 ID/day  Alcohol:  No  Eating Disorder: No  Steroid Use:  Yes: was on prednisone X 10 days 2018  PMH/PSH:  Past Medical History:   Diagnosis Date     6th nerve palsy     OU     Cataract      Graves disease      Hypertension      Nystagmus      Primary open angle glaucoma      Pseudophakia, right eye 4/29/2014     Thyroid eye disease      Unspecified hypothyroidism      Past Surgical History:   Procedure Laterality Date     BACK SURGERY  2005    Dr Welsh     CATARACT IOL, RT/LT Right 04/15/2014     CATARACT IOL, RT/LT Left 04/29/2014     EYE SURGERY  1987    Shunt- A/C malformation     HERNIA REPAIR  1994    umbilical     LUMPECTOMY BREAST  1974    Left benign     SHOULDER SURGERY  1996    Left      WRIST SURGERY  1976    Ganglion Right Wrist     Family Hx:  Family History   Problem Relation Age of Onset     Colon Cancer Mother      Glaucoma Mother      Hypertension Mother      Thyroid Disease Mother      Breast Cancer Mother      Cancer Father       "leukemia     Diabetes Father      Glaucoma Father      Glasses (<7 y/o) Brother      Hypertension Sister      Thyroid Disease Sister      Glasses (<7 y/o) Sister      Hypertension Sister      Thyroid Disease Sister      Glasses (<7 y/o) Sister      Glasses (<7 y/o) Sister      Glasses (<7 y/o) Sister      Glasses (<7 y/o) Brother      Diabetes Son      virus attacked pancreas       Social Hx:  Social History     Social History     Marital status:      Spouse name: N/A     Number of children: N/A     Years of education: N/A     Occupational History     Not on file.     Social History Main Topics     Smoking status: Never Smoker     Smokeless tobacco: Never Used     Alcohol use 0.0 oz/week     0 Standard drinks or equivalent per week      Comment: wine with supper     Drug use: No     Sexual activity: Yes     Partners: Male     Other Topics Concern     Parent/Sibling W/ Cabg, Mi Or Angioplasty Before 65f 55m? No     Social History Narrative          MEDICATIONS:  has a current medication list which includes the following prescription(s): azithromycin, carboxymethylcellulose, vitamin d, fluticasone-salmeterol, latanoprost, levothyroxine, losartan, multiple vitamins-minerals, and timolol, and the following Facility-Administered Medications: apraclonidine.    ROS     ROS: 10 point ROS neg other than the symptoms noted above in the HPI.    Physical Exam   VS: /60 (BP Location: Right arm, Patient Position: Chair, Cuff Size: Adult Large)  Pulse 87  Temp 98.2  F (36.8  C) (Oral)  Resp 17  Ht 1.676 m (5' 6\")  Wt 71.2 kg (156 lb 14.4 oz)  SpO2 98%  BMI 25.32 kg/m2  GENERAL: AXOX3, NAD, well dressed, answering questions appropriately, appears stated age.  HEENT: OP clear, no LAD, no TM, non-tender, no exopthalmous, no proptosis, EOMI, no lig lag, no retraction  NECK: Thyroid normal in size, non tender, no nodules were palpated.  CV: RRR, no rubs, gallops, no murmurs  LUNGS: CTAB, no wheezes, rales, or " rk  ABDOMEN: +BS  EXTREMITIES: no edema, +pulses, no rashes, no lesions  NEUROLOGY: CN grossly intact, + DTR upper and lower extremity, no tremors  MSK: grossly intact, Spine midline, no TTP.  SKIN: no rashes, no lesions    LABS:  BMP:  Last Basic Metabolic Panel:  Lab Results   Component Value Date     2013      Lab Results   Component Value Date    POTASSIUM 4.2 2017     Lab Results   Component Value Date    CHLORIDE 106 2013     Lab Results   Component Value Date    TETE 8.6 2013     Lab Results   Component Value Date    CO2 29 2013     Lab Results   Component Value Date    BUN 19 2013     Lab Results   Component Value Date    CR 0.97 2017     Lab Results   Component Value Date     2013       TFTs:  Lab Results   Component Value Date    TSH 5.27 2017       LFTs:  Liver Function Studies - No results for input(s): PROTTOTAL, ALBUMIN, BILITOTAL, ALKPHOS, AST, ALT, BILIDIRECT in the last 50678 hours.    PTH: NA    Vitamin D:  Vitamin D Deficiency Screening Results:  Lab Results   Component Value Date    VITDT 57 2014       BoneTurnOverMarkers: NA    DEXA 2014:  BONE DENSITOMETRY  [facility]  [address] [place] [address]  2014     PATIENT: Manasa French  CHART: [id number]   : 1945  AGE: 69 year old  SEX: female   REFERRING PHYSICIAN: Dr Kyleigh Amato      PROCEDURE: Bone density scanning was performed using DXA technology of the lumbar spine and hip. Scanning was performed on a [employer] scanner. Reporting is completed in the form of a T-score. The T-score represents the standard deviation from peak bone mass based on a young healthy adult.    REFERENCE T-SCORES:   Normal -1.0 and greater   Osteopenia Between -1.0 and -2.5   Osteoporosis -2.5 and less     RISK FACTORS: Post-menopausal  CURRENT TREATMENT: Calcium, Vitamin D    FINDINGS:  Lumbar Spine (L1-L3) T-score: 1.4  Left  Femoral Neck T-score: -1.1  Right Femoral Neck T-score: -1.5    Lumbar (L1-L4) BMD: 1.591 Previous: none   Total Hip Lt/ Rt BMD: Lt 0.893 Rt 0.730 Previous: Lt 0.843 Rt .0870    Comparison is made to another DXA performed on the same Hologic machine on 05/17/2012.   IMPRESSION  Osteopenia (low bone mass)  Recommendations include ensuring adequate daily Calcium and Vitamin D intake     Compared to previous bone densitometry performed on this patient, there is the suggestion of no significant change.      Current NOF guidelines recommend treatment for patients with the following:  - Prior hip or vertebral fracture  - T-score -2.5 or below  - A 10 year risk of any major osteoporotic fracture >20% or 10 year risk of hip fracture >3%, as calculated using the FRAX calculator (www.shef.ac.uk/FRAX).       This patient's risks with the use of FRAX (based on available information) are 11 % for major osteoporotic fracture and 2.3 % for hip fracture.   Based on these guidelines, treatment (in addition to calcium and vitamin D) is not recommended for this patient.  While this is meant as an aid to clinical decision-making, clinical judgment must still be used.     MRI 2018:  Mild compression fracture insults along the superior endplates of T12 and L2, new from 8/2016  The previous compression fracture along the superior endplate of L3 has mature  High-grade central canal stenosis persists at both L2-3 and L3-4  Previous combined interbody and instrumented bilateral posterolateral fusion from L4/S1.  Solid bony bridging across the operated levels, appeared unchanged.    All pertinent notes, labs, and images personally reviewed by me.   Records from outside clinic reviewed.    A/P  Ms.Katheryn DIEGO French is a 72 year old here for the evaluation of:    #1 Osteoporosis:    Risk factors for low bone density include personal history of fracture or family history, , advanced age, female, Estrogen deficiency.  MRI showing recent  vertebral fracture.  A prior history of vertebral fracture greatly increases the risk of subsequent fractures. A history of other medical diseases impacting on bone may also affect bone health.    Last bone density scan was in 2014--consistent with osteopenia  She has never been on medication for osteoporosis/osteopenia  Plan to screen for secondary causes as noted below  Repeat DEXA scan  Given history of vertebral fracture--osteoporosis consistent with severe osteoporosis and can be a candidate for Forteo or tymlos.  She will check cost with insurance.  Further workup and follow-up after above.    Plan: DX Hip/Pelvis/Spine w Lat Fraction Jena,         Parathyroid Hormone Intact, TSH with free T4         reflex, Vitamin D Deficiency, Creatinine,         Calcium, N-Telopeptide Cross-Linked Serum,         Osteocalcin         (E89.0) Hypothyroidism, postablative  Comment: Currently taking levothyroxine 112 mcg per day  Last labs in December 2017 showing slightly high TSH and normal free T4  Endocrine looks euthyroid  Plan: Repeat thyroid function test      More than 50% of the time spent with Ms. French on counseling / coordinating her care.     Follow-up:  After above    Yani Box MD  Endocrinology  [facility]  CC: Clinic, [facility]    Addendum to above note and clinic visit:    Labs reviewed.    See result note/telephone encounter.

## 2018-07-02 NOTE — LETTER
July 12, 2018    Manasa French  [address] [address]    Dear Manasa,  Labs/ Imaging studies done with endocrinology showed Labs in acceptable range including calcium, kidney function, vitamin D, parathyroid hormone.   One of the labs for thyroid (TSH) is slightly high.  Currently you are taking levothyroxine 112 mcg per day.  Please let your provider who is managing thyroid function test know about it.  Dose change might be indicated.  We did not talk about this last time.   TSH is 6.88 with Free T4 in normal range.   Please follow-up with DEXA scan as discussed   Follow-up in clinic as scheduled.     TSH and FT4 are the labs to check thyroid function.     Please call endocrinology clinic (nurse line: [phone]) if questions.     Thank you.     Yani Box                                         Results for orders placed or performed in visit on 07/02/18   Parathyroid Hormone Intact   Result Value Ref Range    Parathyroid Hormone Intact 45 18 - 80 pg/mL   TSH with free T4 reflex   Result Value Ref Range    TSH 6.88 (H) 0.40 - 4.00 mU/L   Vitamin D Deficiency   Result Value Ref Range    Vitamin D Deficiency screening 43 20 - 75 ug/L   Creatinine   Result Value Ref Range    Creatinine 0.86 0.52 - 1.04 mg/dL    GFR Estimate 65 >60 mL/min/1.7m2    GFR Estimate If Black 78 >60 mL/min/1.7m2   Calcium   Result Value Ref Range    Calcium 9.5 8.5 - 10.1 mg/dL   N-Telopeptide Cross-Linked Serum   Result Value Ref Range    N-Telopeptide X-Link 16.1 nM BCE   Osteocalcin   Result Value Ref Range    Osteocalcin 19 11 - 50 ng/mL   T4 free   Result Value Ref Range    T4 Free 1.16 0.76 - 1.46 ng/dL

## 2018-07-02 NOTE — LETTER
7/2/2018         RE: Manasa French  [address] [address]        Dear Colleague,    Thank you for referring your patient, Manasa French, to the [facility]. Please see a copy of my visit note below.    Name: Manasa French  Date: 7/2/2018  Seen in consultation with , [facility] for osteoporosis.     HPI:  Manasa French is a 72 year old female who presents for the evaluation of osteoperosis .  H/o vertebral fracture in 2016 (while pulling a hose tripped over on cement sidewalk), 2018 ( walking on treadmill and tripped)  Last DEXA 2014- osteopenia. Was not started on medication at that time.    H/o graves diease s/p BARAHONA in 2003- hypothyroidism following that. Now taking levothyroxine 112 mcg/day.    Smoke:No  Family History:No  Menstrual history/Birthing: s/p menopause in 50s. Was on HRT for many years. About 10 years.  Fractures:as above. H/o leg fracture in 2012.  Kidney stones: No  GI Surgery:No  Duration of therapy:  Never been on medication  Exercise:Yes: walking and biking.  Diet:   Milk: 1 serving/day   Cheese: everyday   Yogurt/Cottage Cheese: few times a week  Ca/Vitamin D: takes calcium 1200 mg/day and vit D 2000 ID/day  Alcohol:  No  Eating Disorder: No  Steroid Use:  Yes: was on prednisone X 10 days 2018  PMH/PSH:  Past Medical History:   Diagnosis Date     6th nerve palsy     OU     Cataract      Graves disease      Hypertension      Nystagmus      Primary open angle glaucoma      Pseudophakia, right eye 4/29/2014     Thyroid eye disease      Unspecified hypothyroidism      Past Surgical History:   Procedure Laterality Date     BACK SURGERY  2005    Dr Welsh     CATARACT IOL, RT/LT Right 04/15/2014     CATARACT IOL, RT/LT Left 04/29/2014     EYE SURGERY  1987    Shunt- A/C malformation     HERNIA REPAIR  1994    umbilical     LUMPECTOMY BREAST  1974    Left benign     SHOULDER SURGERY  1996    Left      WRIST  "SURGERY  1976    Ganglion Right Wrist     Family Hx:  Family History   Problem Relation Age of Onset     Colon Cancer Mother      Glaucoma Mother      Hypertension Mother      Thyroid Disease Mother      Breast Cancer Mother      Cancer Father      leukemia     Diabetes Father      Glaucoma Father      Glasses (<9 y/o) Brother      Hypertension Sister      Thyroid Disease Sister      Glasses (<9 y/o) Sister      Hypertension Sister      Thyroid Disease Sister      Glasses (<9 y/o) Sister      Glasses (<9 y/o) Sister      Glasses (<9 y/o) Sister      Glasses (<9 y/o) Brother      Diabetes Son      virus attacked pancreas       Social Hx:  Social History     Social History     Marital status:      Spouse name: N/A     Number of children: N/A     Years of education: N/A     Occupational History     Not on file.     Social History Main Topics     Smoking status: Never Smoker     Smokeless tobacco: Never Used     Alcohol use 0.0 oz/week     0 Standard drinks or equivalent per week      Comment: wine with supper     Drug use: No     Sexual activity: Yes     Partners: Male     Other Topics Concern     Parent/Sibling W/ Cabg, Mi Or Angioplasty Before 65f 55m? No     Social History Narrative          MEDICATIONS:  has a current medication list which includes the following prescription(s): azithromycin, carboxymethylcellulose, vitamin d, fluticasone-salmeterol, latanoprost, levothyroxine, losartan, multiple vitamins-minerals, and timolol, and the following Facility-Administered Medications: apraclonidine.    ROS     ROS: 10 point ROS neg other than the symptoms noted above in the HPI.    Physical Exam   VS: /60 (BP Location: Right arm, Patient Position: Chair, Cuff Size: Adult Large)  Pulse 87  Temp 98.2  F (36.8  C) (Oral)  Resp 17  Ht 1.676 m (5' 6\")  Wt 71.2 kg (156 lb 14.4 oz)  SpO2 98%  BMI 25.32 kg/m2  GENERAL: AXOX3, NAD, well dressed, answering questions appropriately, appears stated age.  HEENT: " OP clear, no LAD, no TM, non-tender, no exopthalmous, no proptosis, EOMI, no lig lag, no retraction  NECK: Thyroid normal in size, non tender, no nodules were palpated.  CV: RRR, no rubs, gallops, no murmurs  LUNGS: CTAB, no wheezes, rales, or ronchi  ABDOMEN: +BS  EXTREMITIES: no edema, +pulses, no rashes, no lesions  NEUROLOGY: CN grossly intact, + DTR upper and lower extremity, no tremors  MSK: grossly intact, Spine midline, no TTP.  SKIN: no rashes, no lesions    LABS:  BMP:  Last Basic Metabolic Panel:  Lab Results   Component Value Date     2013      Lab Results   Component Value Date    POTASSIUM 4.2 2017     Lab Results   Component Value Date    CHLORIDE 106 2013     Lab Results   Component Value Date    TETE 8.6 2013     Lab Results   Component Value Date    CO2 29 2013     Lab Results   Component Value Date    BUN 19 2013     Lab Results   Component Value Date    CR 0.97 2017     Lab Results   Component Value Date     2013       TFTs:  Lab Results   Component Value Date    TSH 5.27 2017       LFTs:  Liver Function Studies - No results for input(s): PROTTOTAL, ALBUMIN, BILITOTAL, ALKPHOS, AST, ALT, BILIDIRECT in the last 04057 hours.    PTH: NA    Vitamin D:  Vitamin D Deficiency Screening Results:  Lab Results   Component Value Date    VITDT 57 2014       BoneTurnOverMarkers: NA    DEXA 2014:  BONE DENSITOMETRY  [facility]  [address] [place] [address]  2014     PATIENT: Manasa French  CHART: [id number]   : 1945  AGE: 69 year old  SEX: female   REFERRING PHYSICIAN: Dr Kyleigh Amato      PROCEDURE: Bone density scanning was performed using DXA technology of the lumbar spine and hip. Scanning was performed on a [employer] scanner. Reporting is completed in the form of a T-score. The T-score represents the standard deviation from peak bone mass based on a young healthy  adult.    REFERENCE T-SCORES:   Normal -1.0 and greater   Osteopenia Between -1.0 and -2.5   Osteoporosis -2.5 and less     RISK FACTORS: Post-menopausal  CURRENT TREATMENT: Calcium, Vitamin D    FINDINGS:  Lumbar Spine (L1-L3) T-score: 1.4  Left Femoral Neck T-score: -1.1  Right Femoral Neck T-score: -1.5    Lumbar (L1-L4) BMD: 1.591 Previous: none   Total Hip Lt/ Rt BMD: Lt 0.893 Rt 0.730 Previous: Lt 0.843 Rt .0870    Comparison is made to another DXA performed on the same Hologic machine on 05/17/2012.   IMPRESSION  Osteopenia (low bone mass)  Recommendations include ensuring adequate daily Calcium and Vitamin D intake     Compared to previous bone densitometry performed on this patient, there is the suggestion of no significant change.      Current NOF guidelines recommend treatment for patients with the following:  - Prior hip or vertebral fracture  - T-score -2.5 or below  - A 10 year risk of any major osteoporotic fracture >20% or 10 year risk of hip fracture >3%, as calculated using the FRAX calculator (www.shef.ac.uk/FRAX).       This patient's risks with the use of FRAX (based on available information) are 11 % for major osteoporotic fracture and 2.3 % for hip fracture.   Based on these guidelines, treatment (in addition to calcium and vitamin D) is not recommended for this patient.  While this is meant as an aid to clinical decision-making, clinical judgment must still be used.     MRI 2018:  Mild compression fracture insults along the superior endplates of T12 and L2, new from 8/2016  The previous compression fracture along the superior endplate of L3 has mature  High-grade central canal stenosis persists at both L2-3 and L3-4  Previous combined interbody and instrumented bilateral posterolateral fusion from L4/S1.  Solid bony bridging across the operated levels, appeared unchanged.    All pertinent notes, labs, and images personally reviewed by me.   Records from outside clinic  reviewed.    A/P  Ms.Katheryn DIEGO French is a 72 year old here for the evaluation of:    #1 Osteoporosis:    Risk factors for low bone density include personal history of fracture or family history, , advanced age, female, Estrogen deficiency.  MRI showing recent vertebral fracture.  A prior history of vertebral fracture greatly increases the risk of subsequent fractures. A history of other medical diseases impacting on bone may also affect bone health.    Last bone density scan was in 2014--consistent with osteopenia  She has never been on medication for osteoporosis/osteopenia  Plan to screen for secondary causes as noted below  Repeat DEXA scan  Given history of vertebral fracture--osteoporosis consistent with severe osteoporosis and can be a candidate for Forteo or tymlos.  She will check cost with insurance.  Further workup and follow-up after above.    Plan: DX Hip/Pelvis/Spine w Lat Fraction Jena,         Parathyroid Hormone Intact, TSH with free T4         reflex, Vitamin D Deficiency, Creatinine,         Calcium, N-Telopeptide Cross-Linked Serum,         Osteocalcin         (E89.0) Hypothyroidism, postablative  Comment: Currently taking levothyroxine 112 mcg per day  Last labs in December 2017 showing slightly high TSH and normal free T4  Endocrine looks euthyroid  Plan: Repeat thyroid function test      More than 50% of the time spent with Ms. French on counseling / coordinating her care.     Follow-up:  After above    Yani Box MD  Endocrinology  [facility]  CC: Clinic, [facility]    Addendum to above note and clinic visit:    Labs reviewed.    See result note/telephone encounter.          Again, thank you for allowing me to participate in the care of your patient.        Sincerely,        Yani Box MD

## 2018-07-03 LAB
CALCIUM SERPL-MCNC: 9.5 MG/DL (ref 8.5–10.1)
CREAT SERPL-MCNC: 0.86 MG/DL (ref 0.52–1.04)
DEPRECATED CALCIDIOL+CALCIFEROL SERPL-MC: 43 UG/L (ref 20–75)
GFR SERPL CREATININE-BSD FRML MDRD: 65 ML/MIN/1.7M2
PTH-INTACT SERPL-MCNC: 45 PG/ML (ref 18–80)
T4 FREE SERPL-MCNC: 1.16 NG/DL (ref 0.76–1.46)
TSH SERPL DL<=0.005 MIU/L-ACNC: 6.88 MU/L (ref 0.4–4)

## 2018-07-05 ENCOUNTER — HOSPITAL ENCOUNTER (OUTPATIENT)
Dept: PHYSICAL THERAPY | Facility: CLINIC | Age: 73
Setting detail: THERAPIES SERIES
End: 2018-07-05
Attending: FAMILY MEDICINE
Payer: MEDICARE

## 2018-07-05 LAB — OSTEOCALCIN SERPL-MCNC: 19 NG/ML (ref 11–50)

## 2018-07-05 PROCEDURE — 40000719 ZZHC STATISTIC PT DEPARTMENT NEURO VISIT: Performed by: PHYSICAL THERAPIST

## 2018-07-05 PROCEDURE — 97112 NEUROMUSCULAR REEDUCATION: CPT | Mod: GP | Performed by: PHYSICAL THERAPIST

## 2018-07-05 NOTE — PROGRESS NOTES
Please call  Dr. Isauro Ellis is out of the office this week.  I am covering for her while she is away.  Your parathyroid hormone level and vitamin D level are normal.  One of your thyroid tests was a little abnormal - your TSH was a little elevated.  Dr. Box will review your test results when she returns and let you know if she wants to change your levothyroxine dose.  Shana Sharp NP  Endocrinology

## 2018-07-06 LAB — COLLAGEN NTX SER-SCNC: 16.1 NM BCE

## 2018-07-09 ENCOUNTER — RADIANT APPOINTMENT (OUTPATIENT)
Dept: BONE DENSITY | Facility: CLINIC | Age: 73
End: 2018-07-09
Attending: INTERNAL MEDICINE
Payer: COMMERCIAL

## 2018-07-09 DIAGNOSIS — M80.80XS OTHER OSTEOPOROSIS WITH CURRENT PATHOLOGICAL FRACTURE, SEQUELA: ICD-10-CM

## 2018-07-09 PROCEDURE — 77080 DXA BONE DENSITY AXIAL: CPT | Performed by: INTERNAL MEDICINE

## 2018-07-11 ENCOUNTER — HOSPITAL ENCOUNTER (OUTPATIENT)
Dept: PHYSICAL THERAPY | Facility: CLINIC | Age: 73
Setting detail: THERAPIES SERIES
End: 2018-07-11
Attending: FAMILY MEDICINE
Payer: MEDICARE

## 2018-07-11 PROCEDURE — 40000719 ZZHC STATISTIC PT DEPARTMENT NEURO VISIT: Performed by: PHYSICAL THERAPIST

## 2018-07-11 PROCEDURE — 97112 NEUROMUSCULAR REEDUCATION: CPT | Mod: GP | Performed by: PHYSICAL THERAPIST

## 2018-07-16 ENCOUNTER — TRANSFERRED RECORDS (OUTPATIENT)
Dept: HEALTH INFORMATION MANAGEMENT | Facility: CLINIC | Age: 73
End: 2018-07-16

## 2018-07-18 ENCOUNTER — HOSPITAL ENCOUNTER (OUTPATIENT)
Dept: PHYSICAL THERAPY | Facility: CLINIC | Age: 73
Setting detail: THERAPIES SERIES
End: 2018-07-18
Attending: FAMILY MEDICINE
Payer: MEDICARE

## 2018-07-18 PROCEDURE — 97112 NEUROMUSCULAR REEDUCATION: CPT | Mod: GP | Performed by: PHYSICAL THERAPIST

## 2018-07-18 PROCEDURE — 40000719 ZZHC STATISTIC PT DEPARTMENT NEURO VISIT: Performed by: PHYSICAL THERAPIST

## 2018-07-24 ENCOUNTER — HOSPITAL ENCOUNTER (OUTPATIENT)
Dept: PHYSICAL THERAPY | Facility: CLINIC | Age: 73
Setting detail: THERAPIES SERIES
End: 2018-07-24
Attending: FAMILY MEDICINE
Payer: MEDICARE

## 2018-07-24 PROCEDURE — 97112 NEUROMUSCULAR REEDUCATION: CPT | Mod: GP | Performed by: PHYSICAL THERAPIST

## 2018-07-24 PROCEDURE — 40000719 ZZHC STATISTIC PT DEPARTMENT NEURO VISIT: Performed by: PHYSICAL THERAPIST

## 2018-07-31 ENCOUNTER — HOSPITAL ENCOUNTER (OUTPATIENT)
Dept: PHYSICAL THERAPY | Facility: CLINIC | Age: 73
Setting detail: THERAPIES SERIES
End: 2018-07-31
Attending: FAMILY MEDICINE
Payer: MEDICARE

## 2018-07-31 PROCEDURE — 97112 NEUROMUSCULAR REEDUCATION: CPT | Mod: GP | Performed by: PHYSICAL THERAPIST

## 2018-07-31 PROCEDURE — G8979 MOBILITY GOAL STATUS: HCPCS | Mod: GP,CJ | Performed by: PHYSICAL THERAPIST

## 2018-07-31 PROCEDURE — 40000719 ZZHC STATISTIC PT DEPARTMENT NEURO VISIT: Performed by: PHYSICAL THERAPIST

## 2018-07-31 PROCEDURE — G8980 MOBILITY D/C STATUS: HCPCS | Mod: GP,CJ | Performed by: PHYSICAL THERAPIST

## 2018-07-31 NOTE — PROGRESS NOTES
Outpatient Physical Therapy Discharge Note     Patient: Manasa French  : 1945    Beginning/End Dates of Reporting Period:  2018 to 2018    Referring Provider: Evelia Castro MD    Therapy Diagnosis: Impaired balance and gait     Client Self Report: The pt reports that she was busy helping sister today, was walking up and down a lot of stairs. Is going to have back surgery in September. Feels ready to have her last session toda.y    Objective Measurements (from previous session):  Objective Measure: mCTSIB  Details: 30;30;30;15    Objective Measure: DGI  Details:             Goals:  Goal Identifier mCTSIB   Goal Description Due to improvements in static balance the pt will be able to maintain stability in condition 1 for 15 seconds and condition 5 for 5 seconds, thereby improving safety with household and other balance and reaching tasks   Target Date 18   Date Met   18   Progress:     Goal Identifier DGI   Goal Description The pt will improve score on DGI to >19/24, indicating a reduction in fall risk   Target Date 18   Date Met   not met- improved to 19   Progress:     Goal Identifier HEP   Goal Description The pt will be independent with HEP focusing on balance drills in order to safely progress balance outside of therapy   Target Date 18   Date Met   18   Progress:       Progress Toward Goals:   Progress this reporting period: The pt has made excellent progress with balance and gait overall. Each objective measure tested significantly improved (see above). The pt feels comfortable continuing with independent HEP at this time.    Plan:  Discharge from therapy.    Discharge:    Reason for Discharge: Independent with HEP      Discharge Plan: Patient to continue home program.

## 2018-08-10 ENCOUNTER — OFFICE VISIT (OUTPATIENT)
Dept: OPHTHALMOLOGY | Facility: CLINIC | Age: 73
End: 2018-08-10
Attending: OPHTHALMOLOGY
Payer: MEDICARE

## 2018-08-10 DIAGNOSIS — H40.9 MILD STAGE GLAUCOMA(365.71): ICD-10-CM

## 2018-08-10 DIAGNOSIS — H40.059 BORDERLINE GLAUCOMA WITH OCULAR HYPERTENSION: Primary | ICD-10-CM

## 2018-08-10 PROCEDURE — G0463 HOSPITAL OUTPT CLINIC VISIT: HCPCS | Mod: ZF

## 2018-08-10 PROCEDURE — 92083 EXTENDED VISUAL FIELD XM: CPT | Mod: ZF | Performed by: OPHTHALMOLOGY

## 2018-08-10 RX ORDER — TIMOLOL MALEATE 5 MG/ML
1 SOLUTION/ DROPS OPHTHALMIC EVERY MORNING
Qty: 3 BOTTLE | Refills: 4 | Status: SHIPPED | OUTPATIENT
Start: 2018-08-10 | End: 2019-08-02

## 2018-08-10 RX ORDER — LATANOPROST 50 UG/ML
1 SOLUTION/ DROPS OPHTHALMIC AT BEDTIME
Qty: 7.5 ML | Refills: 3 | Status: SHIPPED | OUTPATIENT
Start: 2018-08-10 | End: 2019-08-02

## 2018-08-10 ASSESSMENT — REFRACTION_WEARINGRX
OS_CYLINDER: +1.00
OD_HPRISM: 7 BO
OS_SPHERE: -2.50
SPECS_TYPE: SVL
OD_VPRISM: 3 BU
OS_VPRISM: 3 BD
OS_AXIS: 050
OD_CYLINDER: SPHERE
OS_HPRISM: 7 BO
OD_SPHERE: -0.75

## 2018-08-10 ASSESSMENT — CUP TO DISC RATIO
OD_RATIO: 0.8
OS_RATIO: 0.5

## 2018-08-10 ASSESSMENT — VISUAL ACUITY
CORRECTION_TYPE: GLASSES
OS_PH_CC: 20/30-2
OD_CC: 20/30
METHOD: SNELLEN - LINEAR
OD_CC+: +3
OS_CC: 20/40

## 2018-08-10 ASSESSMENT — TONOMETRY
OD_IOP_MMHG: 21
IOP_METHOD: APPLANATION
OS_IOP_MMHG: 16

## 2018-08-10 ASSESSMENT — CONF VISUAL FIELD
METHOD: COUNTING FINGERS
OD_NORMAL: 1
OS_NORMAL: 1

## 2018-08-10 ASSESSMENT — SLIT LAMP EXAM - LIDS
COMMENTS: NORMAL
COMMENTS: NORMAL

## 2018-08-10 NOTE — MR AVS SNAPSHOT
After Visit Summary   8/10/2018    Manasa French    MRN: [id number]           Patient Information     Date Of Birth          1945        Visit Information        Provider Department      8/10/2018 8:45 AM Radha Gallardo MD [facility]        Today's Diagnoses     Borderline glaucoma with ocular hypertension    -  1    Mild stage glaucoma - Both Eyes        Mild stage glaucoma(365.71)           Follow-ups after your visit        Follow-up notes from your care team     Return for OCT rnfl.      Your next 10 appointments already scheduled     Sep 10, 2018  9:00 AM CDT   [facility]    [address]  [address]  [facility] [address]   [phone]            Jan 28, 2019  2:00 PM CST   RETURN RETINA with Katy Page MD   [facility] ([facility])    [address]  [facility] [address]   [phone]            Feb 11, 2019  1:00 PM CST   RETURN GLAUCOMA with Radha Gallardo MD   [facility] ([facility])    [address]  [facility] [address]   [phone]              Who to contact     Please call your clinic at [phone] to:    Ask questions about your health    Make or cancel appointments    Discuss your medicines    Learn about your test results    Speak to your doctor            Additional Information About Your Visit        Care EveryWhere ID     This is your Care EveryWhere ID. This could be used by other organizations to access your [place] medical records  [id number]         Blood Pressure from Last 3 Encounters:   07/02/18 130/60   05/23/18 130/72   12/07/17 130/60    Weight from Last 3 Encounters:   07/02/18 71.2 kg (156 lb 14.4 oz)   05/23/18 70.1 kg (154 lb 8 oz)   12/07/17 71.2 kg (157 lb)              We Performed the Following     OVF 24-2  Dynamic OU          Where to get your medicines      These medications were sent to [facility] - [place], [place] - [address]  [address], [facility] [address]     Phone:  [phone]     latanoprost 0.005 % ophthalmic solution    timolol 0.5 % ophthalmic solution          Primary Care Provider Office Phone # Fax # [facility] [phone]       [address], [address]  [facility] [address]        Equal Access to Services     CHELSY MONTERROSO : Hadii aad ku hadasho Soomaali, waaxda luqadaha, qaybta kaalmada adeegyada, waxay idiin hayaan adeeg alivia rodríguez. So [facility] [phone].    ATENCIÓN: Si habla español, tiene a martinez disposición servicios gratuitos de asistencia lingüística. [facility] [phone].    We comply with applicable federal civil rights laws and [place] laws. We do not discriminate on the basis of race, color, national origin, age, disability, sex, sexual orientation, or gender identity.            Thank you!     Thank you for choosing [facility] CL[facility]  for your care. Our goal is always to provide you with excellent care. Hearing back from our patients is one way we can continue to improve our services. Please take a few minutes to complete the written survey that you may receive in the mail after your visit with us. Thank you!             Your Updated Medication List - Protect others around you: Learn how to safely use, store and throw away your medicines at www.disposemymeds.org.          This list is accurate as of 8/10/18  9:54 AM.  Always use your most recent med list.                   Brand Name Dispense Instructions for use Diagnosis    CALCIUM CARBONATE-VITAMIN D3 PO           carboxymethylcellulose 1 % ophthalmic solution    CELLUVISC/REFRESH LIQUIGEL     Place 1 drop into both eyes 3 times daily        fluticasone-salmeterol 250-50 MCG/DOSE diskus inhaler    ADVAIR     Inhale 1 puff into the lungs 2 times  daily        ICAPS AREDS 2 PO           latanoprost 0.005 % ophthalmic solution    XALATAN    7.5 mL    Place 1 drop into both eyes At Bedtime    Mild stage glaucoma(365.71)       levothyroxine 112 MCG tablet    SYNTHROID/LEVOTHROID    90 tablet    TAKE 1 TABLET EVERY DAY    Hypothyroidism, postablative       losartan 100 MG tablet    COZAAR    90 tablet    Take 1 tablet (100 mg) by mouth daily    Benign hypertension       timolol 0.5 % ophthalmic solution    TIMOPTIC    3 Bottle    Place 1 drop into both eyes every morning    Mild stage glaucoma(365.71)       vitamin D 1000 units capsule      Take  by mouth.

## 2018-08-10 NOTE — PROGRESS NOTES
History of thyroid eye disease and primary open angle glaucoma.  Post-operative month 2 Selected laser trabeculoplasty (SLT) OD   Has some glare, photophobia, floaters.    Current Medications:   Timolol every morning both eyes   Latanoprost HS both eyes     Ocular history:  Selected laser trabeculoplasty (SLT) 2/2018 ( intraocular pressure 26 before selective laser trabeculoplasty)       Intolerant to cosopt and alphagan    Testing: Octopus visual field with scatter both eyes, stable    1. Mild stage glaucoma  OCT showing slight downward slope right eye in conjunction with elevated intraocular pressure  Intraocular pressure improved since Selective laser trabeculoplasty (SLT)     2. Pseudophakia of both eyes  IOL stable in good position     3. Thyroid eye disease  Diplopia much better with new glasses.    4. Age related macular degeneration   Stage II, followed annually by retina     Plan:   Continue latanoprost HS both eyes   Continue timolol daily both eyes   Mild improvement with Selected laser trabeculoplasty (SLT) .  Intolerant to cosopt and alphagan    Return to clinic 6 months with OCT retinal nerve fiber layer     Attending Physician Attestation:  Complete documentation of historical and exam elements from today's encounter can be found in the full encounter summary report (not reduplicated in this progress note). I personally obtained the chief complaint(s) and history of present illness. I confirmed and edited asnecessary the review of systems, past medical/surgical history, family history, social history, and examination findings as documented by others; and I examined the patient myself. I personally reviewed the relevant tests, images, and reports as documented above. I formulated and edited as necessary the assessment and plan and discussed the findings and management plan with the patient and family.  - Radha Gallardo MD 9:35 AM 8/10/2018

## 2018-08-31 ENCOUNTER — OFFICE VISIT (OUTPATIENT)
Dept: FAMILY MEDICINE | Facility: CLINIC | Age: 73
End: 2018-08-31
Payer: COMMERCIAL

## 2018-08-31 ENCOUNTER — TRANSFERRED RECORDS (OUTPATIENT)
Dept: HEALTH INFORMATION MANAGEMENT | Facility: CLINIC | Age: 73
End: 2018-08-31

## 2018-08-31 ENCOUNTER — TELEPHONE (OUTPATIENT)
Dept: FAMILY MEDICINE | Facility: CLINIC | Age: 73
End: 2018-08-31

## 2018-08-31 VITALS
DIASTOLIC BLOOD PRESSURE: 78 MMHG | OXYGEN SATURATION: 99 % | HEART RATE: 81 BPM | WEIGHT: 158.5 LBS | TEMPERATURE: 97.2 F | SYSTOLIC BLOOD PRESSURE: 138 MMHG | BODY MASS INDEX: 25.58 KG/M2

## 2018-08-31 DIAGNOSIS — R73.09 ELEVATED GLUCOSE: ICD-10-CM

## 2018-08-31 DIAGNOSIS — M48.062 SPINAL STENOSIS OF LUMBAR REGION WITH NEUROGENIC CLAUDICATION: ICD-10-CM

## 2018-08-31 DIAGNOSIS — Z01.818 PREOP GENERAL PHYSICAL EXAM: Primary | ICD-10-CM

## 2018-08-31 DIAGNOSIS — E89.0 HYPOTHYROIDISM, POSTABLATIVE: ICD-10-CM

## 2018-08-31 DIAGNOSIS — M48.062 SPINAL STENOSIS, LUMBAR REGION WITH NEUROGENIC CLAUDICATION: ICD-10-CM

## 2018-08-31 DIAGNOSIS — Z86.69 HISTORY OF HYDROCEPHALUS: ICD-10-CM

## 2018-08-31 DIAGNOSIS — M43.16 SPONDYLOLISTHESIS OF LUMBAR REGION: ICD-10-CM

## 2018-08-31 DIAGNOSIS — R94.31 ABNORMAL ELECTROCARDIOGRAM: ICD-10-CM

## 2018-08-31 DIAGNOSIS — M80.80XS OTHER OSTEOPOROSIS WITH CURRENT PATHOLOGICAL FRACTURE, SEQUELA: ICD-10-CM

## 2018-08-31 DIAGNOSIS — I10 BENIGN HYPERTENSION: ICD-10-CM

## 2018-08-31 DIAGNOSIS — R94.31 ABNORMAL ELECTROCARDIOGRAM: Primary | ICD-10-CM

## 2018-08-31 DIAGNOSIS — J45.30 MILD PERSISTENT ASTHMA WITHOUT COMPLICATION: ICD-10-CM

## 2018-08-31 DIAGNOSIS — K57.32 DIVERTICULITIS OF COLON: ICD-10-CM

## 2018-08-31 DIAGNOSIS — H54.3 LOW VISION, BOTH EYES: ICD-10-CM

## 2018-08-31 LAB
APTT PPP: 30 SEC (ref 22–37)
HGB BLD-MCNC: 11.7 G/DL (ref 11.7–15.7)
INR PPP: 0.98 (ref 0.86–1.14)

## 2018-08-31 PROCEDURE — 93000 ELECTROCARDIOGRAM COMPLETE: CPT | Performed by: FAMILY MEDICINE

## 2018-08-31 PROCEDURE — 99215 OFFICE O/P EST HI 40 MIN: CPT | Mod: 25 | Performed by: FAMILY MEDICINE

## 2018-08-31 PROCEDURE — 36415 COLL VENOUS BLD VENIPUNCTURE: CPT | Performed by: FAMILY MEDICINE

## 2018-08-31 PROCEDURE — 85730 THROMBOPLASTIN TIME PARTIAL: CPT | Performed by: FAMILY MEDICINE

## 2018-08-31 PROCEDURE — 85018 HEMOGLOBIN: CPT | Performed by: FAMILY MEDICINE

## 2018-08-31 PROCEDURE — 86800 THYROGLOBULIN ANTIBODY: CPT | Performed by: FAMILY MEDICINE

## 2018-08-31 PROCEDURE — 80061 LIPID PANEL: CPT | Performed by: FAMILY MEDICINE

## 2018-08-31 PROCEDURE — 80048 BASIC METABOLIC PNL TOTAL CA: CPT | Performed by: FAMILY MEDICINE

## 2018-08-31 PROCEDURE — 86376 MICROSOMAL ANTIBODY EACH: CPT | Performed by: FAMILY MEDICINE

## 2018-08-31 PROCEDURE — 85610 PROTHROMBIN TIME: CPT | Performed by: FAMILY MEDICINE

## 2018-08-31 RX ORDER — LOSARTAN POTASSIUM 100 MG/1
100 TABLET ORAL DAILY
Qty: 90 TABLET | Refills: 2 | Status: SHIPPED | OUTPATIENT
Start: 2018-08-31 | End: 2019-05-16

## 2018-08-31 RX ORDER — LEVOTHYROXINE SODIUM 125 UG/1
TABLET ORAL
Qty: 90 TABLET | Refills: 1 | Status: SHIPPED | OUTPATIENT
Start: 2018-08-31 | End: 2019-02-08

## 2018-08-31 NOTE — TELEPHONE ENCOUNTER
Let's just have her see cardiology for visit for cards clearance for surgery and have them order appropriate stress test - thanks,  Dr. Evelia Castro MD/[facility]

## 2018-08-31 NOTE — MR AVS SNAPSHOT
After Visit Summary   8/31/2018    Manasa French    MRN: [id number]           Patient Information     Date Of Birth          1945        Visit Information        Provider Department      8/31/2018 9:00 AM Evelia Castro MD [facility]        Today's Diagnoses     Preop general physical exam    -  1    Spinal stenosis of lumbar region with neurogenic claudication        Spondylolisthesis of lumbar region        Hypothyroidism, postablative        Benign hypertension        Mild persistent asthma without complication        Diverticulitis of colon        Other osteoporosis with current pathological fracture, sequela        Spinal stenosis, lumbar region with neurogenic claudication        History of hydrocephalus        Low vision, both eyes          Care Instructions    For your thyroid:   At end of this prescription come into clinic for a lab only TSH check.  Before Your Surgery      Call your surgeon if there is any change in your health. This includes signs of a cold or flu (such as a sore throat, runny nose, cough, rash or fever).    Do not smoke, drink alcohol or take over the counter medicine (unless your surgeon or primary care doctor tells you to) for the 24 hours before and after surgery.    If you take prescribed drugs: Follow your doctor s orders about which medicines to take and which to stop until after surgery.    Eating and drinking prior to surgery: follow the instructions from your surgeon    Take a shower or bath the night before surgery. Use the soap your surgeon gave you to gently clean your skin. If you do not have soap from your surgeon, use your regular soap. Do not shave or scrub the surgery site.  Wear clean pajamas and have clean sheets on your bed.           Follow-ups after your visit        Follow-up notes from your care team     Return in about 2 months (around 10/31/2018) for Lab Work, TSH check.      Your next 10 appointments  already scheduled     Sep 10, 2018  9:00 AM CDT   Return Visit with Yani Box MD   [facility] [place] ([facility])    [address]  [address]  [place] [address] [phone]            Oct 31, 2018  8:00 AM CDT   LAB with BM LAB   [facility] ([facility])    [address]  [address]  [facility] [address] [phone]           Please do not eat 10-12 hours before your appointment if you are coming in fasting for labs on lipids, cholesterol, or glucose (sugar). This does not apply to pregnant women. Water, hot tea and black coffee (with nothing added) are okay. Do not drink other fluids, diet soda or chew gum.            Jan 28, 2019  2:00 PM CST   RETURN RETINA with Katy Page MD   [facility] ([facility])    [address]  [facility] [address]   [phone]            Feb 11, 2019  1:00 PM CST   RETURN GLAUCOMA with Radha Gallardo MD   [facility] ([facility])    [address]  [facility] [address]   [phone]              Future tests that were ordered for you today     Open Future Orders        Priority Expected Expires Ordered    **TSH with free T4 reflex FUTURE 2mo Routine 10/30/2018 12/29/2018 8/31/2018            Who to contact     If you have questions or need follow up information about today's clinic visit or your schedule please contact [facility] directly at [phone].  Normal or non-critical lab and imaging results will be communicated to you by MyChart, letter or phone within 4 business days after the clinic has received the results. If you do not hear from us within 7 days, please contact the clinic through MyChart or phone. If you have a critical or abnormal lab result, we will notify you  "by phone as soon as possible.  Submit refill requests through [employer] or call your pharmacy and they will forward the refill request to us. Please allow 3 business days for your refill to be completed.          Additional Information About Your Visit        [employer] Information     [employer] lets you send messages to your doctor, view your test results, renew your prescriptions, schedule appointments and more. To sign up, go to [email or web address] . Click on \"Log in\" on the left side of the screen, which will take you to the Welcome page. Then click on \"Sign up Now\" on the right side of the page.     You will be asked to enter the access code listed below, as well as some personal information. Please follow the directions to create your username and password.     Your access code is: [email or web address]  Expires: 2018  9:48 AM     Your access code will  in 90 days. If you need help or a new code, please call your [place] clinic or [phone].        Care EveryWhere ID     This is your Care EveryWhere ID. This could be used by other organizations to access your [place] medical records  [id number]        Your Vitals Were     Pulse Temperature Pulse Oximetry BMI (Body Mass Index)          81 97.2  F (36.2  C) (Tympanic) 99% 25.58 kg/m2         Blood Pressure from Last 3 Encounters:   18 138/78   18 130/60   18 130/72    Weight from Last 3 Encounters:   18 158 lb 8 oz (71.9 kg)   18 156 lb 14.4 oz (71.2 kg)   18 154 lb 8 oz (70.1 kg)              We Performed the Following     ANTI THYROGLOBULIN ANTIBODY     BASIC METABOLIC PANEL     EKG 12-lead complete w/read - Clinics     Hemoglobin     INR     Lipid panel reflex to direct LDL Fasting     Partial thromboplastin time     THYROID PEROXIDASE ANTIBODY          Today's Medication Changes          These changes are accurate as of 18  9:48 AM.  If you have any questions, ask your nurse or doctor.               These " medicines have changed or have updated prescriptions.        Dose/Directions    levothyroxine 125 MCG tablet   Commonly known as:  SYNTHROID/LEVOTHROID   This may have changed:    - medication strength  - additional instructions   Used for:  Hypothyroidism, postablative   Changed by:  Evelia Casrto MD        TAKE 1 TABLET EVERY DAY .  At end of this prescription come into clinic for a lab only TSH check.   Quantity:  90 tablet   Refills:  1            Where to get your medicines      These medications were sent to [facility] - [facility] [address]  [address] [place] [address]     Phone:  [phone]     fluticasone-salmeterol 250-50 MCG/DOSE diskus inhaler    levothyroxine 125 MCG tablet    losartan 100 MG tablet                Primary Care Provider Office Phone # Fax #    [facility] [phone]       [address]  [place] [address]        Equal Access to Services     CHELSY MONTERROSO : Hadii aad ku hadasho Soduyen, waaxda luqadaha, qaybta kaalmada adeegyada, waxay brendonin roberto titus . So [facility] [phone].    ATENCIÓN: Si habla español, tiene a martinez disposición servicios gratuitos de asistencia lingüística. [facility] [phone].    We comply with applicable federal civil rights laws and [place] laws. We do not discriminate on the basis of race, color, national origin, age, disability, sex, sexual orientation, or gender identity.            Thank you!     Thank you for choosing [facility]  for your care. Our goal is always to provide you with excellent care. Hearing back from our patients is one way we can continue to improve our services. Please take a few minutes to complete the written survey that you may receive in the mail after your visit with us. Thank you!             Your Updated Medication List - Protect others around you: Learn how to  safely use, store and throw away your medicines at www.disposemymeds.org.          This list is accurate as of 8/31/18  9:48 AM.  Always use your most recent med list.                   Brand Name Dispense Instructions for use Diagnosis    CALCIUM CARBONATE-VITAMIN D3 PO           carboxymethylcellulose 1 % ophthalmic solution    CELLUVISC/REFRESH LIQUIGEL     Place 1 drop into both eyes 3 times daily        fluticasone-salmeterol 250-50 MCG/DOSE diskus inhaler    ADVAIR    60 Inhaler    Inhale 1 puff into the lungs 2 times daily    Mild persistent asthma without complication       ICAPS AREDS 2 PO           latanoprost 0.005 % ophthalmic solution    XALATAN    7.5 mL    Place 1 drop into both eyes At Bedtime    Mild stage glaucoma(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  At end of this prescription come into clinic for a lab only TSH check.    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

## 2018-08-31 NOTE — TELEPHONE ENCOUNTER
Pt called requesting we contact cardiology and clarify stress test order for her to schedule an appointment. Cardiology was called. They need order of specific type of stress test. Routing to provider for review. Pt is expecting a call back when order is placed.

## 2018-08-31 NOTE — TELEPHONE ENCOUNTER
----- Message from Sharon Ferrell sent at 8/31/2018 12:53 PM CDT -----  Please place correct order for this patient. Not sure if you want stress echocardiogram, treadmill stress and need to know if pt needs to stop bb if applicable. Also, please place order for cardiology consult if needed after. Thank you. If you have questions please call [phone].    Shanti-

## 2018-08-31 NOTE — TELEPHONE ENCOUNTER
Spoke with  at cardiology who said that they are extremely busy and it would be better if provider just put in order for stress test with specific one she would like ordered and will need to know if patient is on beta blocker as will need to be off. Said it would be much better to have test done first and follow up afterwards.

## 2018-08-31 NOTE — PATIENT INSTRUCTIONS
For your thyroid:   At end of this prescription come into clinic for a lab only TSH check.  Before Your Surgery      Call your surgeon if there is any change in your health. This includes signs of a cold or flu (such as a sore throat, runny nose, cough, rash or fever).    Do not smoke, drink alcohol or take over the counter medicine (unless your surgeon or primary care doctor tells you to) for the 24 hours before and after surgery.    If you take prescribed drugs: Follow your doctor s orders about which medicines to take and which to stop until after surgery.    Eating and drinking prior to surgery: follow the instructions from your surgeon    Take a shower or bath the night before surgery. Use the soap your surgeon gave you to gently clean your skin. If you do not have soap from your surgeon, use your regular soap. Do not shave or scrub the surgery site.  Wear clean pajamas and have clean sheets on your bed.

## 2018-08-31 NOTE — TELEPHONE ENCOUNTER
Reason for Call:  Other call back    Detailed comments: states referral for stress test needs more info-call pt after the info has been sent so she can make appointmrnt    Phone Number Patient can be reached at: Home number on file [phone] (home)    Best Time:     Can we leave a detailed message on this number? YES    Call taken on 8/31/2018 at 11:00 AM by RUFINA MARTINEZ

## 2018-08-31 NOTE — PROGRESS NOTES
[facility]  [address]  [address]  [facility] [address]  [phone]  Dept: [phone]    PRE-OP EVALUATION:  Today's date: 2018    Manasa French (: 1945) presents for pre-operative evaluation assessment as requested by Dr. torrie onofre.  She requires evaluation and anesthesia risk assessment prior to undergoing surgery/procedure for treatment of back .    Proposed Surgery/ Procedure: back fusion  Date of Surgery/ Procedure: 18  Time of Surgery/ Procedure: [address] [place] [address]/Surgical Facility: [facility]  Fax number for surgical facility: [phone]  Primary Physician: [facility]  Type of Anesthesia Anticipated: to be determined    Patient has a Health Care Directive or Living Will:  YES     1. NO - Do you have a history of heart attack, stroke, stent, bypass or surgery on an artery in the head, neck, heart or legs?  2. NO - Do you ever have any pain or discomfort in your chest?  3. NO - Do you have a history of  Heart Failure?  4. NO - Are you troubled by shortness of breath when: walking on the level, up a slight hill or at night?  5. NO - Do you currently have a cold, bronchitis or other respiratory infection?  6. NO - Do you have a cough, shortness of breath or wheezing?  7. NO - Do you sometimes get pains in the calves of your legs when you walk?  8. NO - Do you or anyone in your family have previous history of blood clots?  9. NO - Do you or does anyone in your family have a serious bleeding problem such as prolonged bleeding following surgeries or cuts?  10. NO - Have you ever had problems with anemia or been told to take iron pills?  11. NO - Have you had any abnormal blood loss such as black, tarry or bloody stools, or abnormal vaginal bleeding?  12. NO - Have you ever had a blood transfusion?  13. NO - Have you or any of your relatives ever had problems with anesthesia?  14. yes - Do you have sleep  apnea, excessive snoring or daytime drowsiness?  Fading in afternoons.  But wakes at 5 am.  Was tested for sleep apnea; borderline positive.  No CPAP.  15. NO - Do you have any prosthetic heart valves?  16. NO - Do you have prosthetic joints?  17. NO - Is there any chance that you may be pregnant?      HPI:     HPI related to upcoming procedure:  73 year old with history of lumbar stenosis and back surgery about 10 years ago, now with recurrence of low back pain and has not responded to conservative measures.  MRI confirms lumbar stenosis with neurogenic claudication and spondylolisthesis in addition has had compression fractions.  Dr. Christensen plans a decompression procedure from L1-L4 as well as posterior sine fusion and transforaminal lumbar interbody fusion levels L2-L4 and osteotomy.    Patient is looking forward to surgery; she had significant relief from similar surgery 10 years ago.        See problem list for active medical problems.  Problems all longstanding and stable, except as noted/documented.  See ROS for pertinent symptoms related to these conditions.                                                                                                                                                          .  ASTHMA - Patient has a longstanding history of moderate-severe Asthma . Patient has been doing well overall noting NO SYMPTOMS and continues on medication regimen consisting of advair without adverse reactions or side effects.                                                                                                                                               .  HYPERTENSION - Patient has longstanding history of HTN , currently denies any symptoms referable to elevated blood pressure. Specifically denies chest pain, palpitations, dyspnea, orthopnea, PND or peripheral edema. Blood pressure readings have been in normal range. Current medication regimen is as listed below. Patient denies any side  effects of medication.                                                                                                                                                                                          .  HYPOTHYROIDISM - Patient has a longstanding history of chronic Hypothyroidism. Patient has been doing well, noting no tremor, insomnia, hair loss or changes in skin texture. Continues to take medications as directed, without adverse reactions or side effects. Last TSH - too high, we'll adjust dose of levothyroxine from 112 to 125 today.  Lab Results   Component Value Date    TSH 6.88 (H) 07/02/2018   .                                                                                                                                                                                                                        .  SLEEP PROBLEM - Patient has a longstanding history of excessive daytime somnolence. Had testing for sleep apnea - was borderline.  Not on CPAP.        MEDICAL HISTORY:     Patient Active Problem List    Diagnosis Date Noted     S/P ventricular shunt placement 1987 05/23/2018     Priority: Medium     Graves disease 05/23/2018     Priority: Medium     Treated with ablation 2000s       Low vision, both eyes 05/23/2018     Priority: Medium     double vision, macular degeneration, cataracts and glaucoma.  sees optho regularly.       Mild persistent asthma without complication 05/23/2018     Priority: Medium     Followed by asthma doc; now wants to have meds transferred here (advair).       Spinal stenosis, lumbar region with neurogenic claudication 05/23/2018     Priority: Medium     Followed by [facility] [facility].  Had decompression in ~2010.  Repeat 2018.       Closed compression fracture of second lumbar vertebra  (H) 05/23/2018     Priority: Medium     Follows with [facility]       Closed compression fracture of lumbosacral spine (H) 05/23/2018     Priority: Medium     Falls frequently  05/23/2018     Priority: Medium     Could use balance PT - low vision and now fear of falling with frequent falls.  Referred.       Injury of lower back, initial encounter 09/02/2016     Priority: Medium     x-ray neg; see [facility] spine report; MRI showed L4 compression fx       ACP (advance care planning) 10/02/2015     Priority: Medium     Advance Care Planning 10/2/2015: ACP Review and Resources Provided:  Reviewed chart for advance care plan.  Manasa French has no plan or code status on file however states presence of ACP document. Copy requested. Confirmed code status reflects current choices pending receipt of document/advance care plan review. . Added by hSey Ang             Nonexudative senile macular degeneration of retina 01/12/2015     Priority: Medium     Taking AREDS.  Optho at the .       Other osteoporosis with current pathological fracture 09/19/2014     Priority: Medium     Followed by endocrinology: at [facility].  Dr. Box       History of hydrocephalus 07/22/2013     Priority: Medium     S/p shunt placement as a child, ongoing visual difficulties including frequent double vision       Diverticulitis of colon 10/22/2012     Priority: Medium     Years ago, one episode.       Hypothyroidism, postablative 10/22/2012     Priority: Medium     Had Graves and ablation; now on thyroid supplementation         HTN, goal below 140/90 10/22/2012     Priority: Medium     controlled       Glaucoma with ocular hypertension, bilateral 10/22/2012     Priority: Medium     Follows with Dr. Radha Gallardo        Past Medical History:   Diagnosis Date     6th nerve palsy     OU     Cataract      Graves disease      Hypertension      Nystagmus      Primary open angle glaucoma      Pseudophakia, right eye 4/29/2014     Thyroid eye disease      Unspecified hypothyroidism      Past Surgical History:   Procedure Laterality Date     BACK SURGERY  2005    Dr Welsh     CATARACT IOL, RT/LT Right 04/15/2014     CATARACT  IOL, RT/LT Left 04/29/2014     EYE SURGERY  1987    Shunt- A/C malformation     HERNIA REPAIR  1994    umbilical     LUMPECTOMY BREAST  1974    Left benign     SHOULDER SURGERY  1996    Left      WRIST SURGERY  1976    Ganglion Right Wrist     Current Outpatient Prescriptions   Medication Sig Dispense Refill     Calcium Carb-Cholecalciferol (CALCIUM CARBONATE-VITAMIN D3 PO)        carboxymethylcellulose (CELLUVISC/REFRESH LIQUIGEL) 1 % ophthalmic solution Place 1 drop into both eyes 3 times daily       Cholecalciferol (VITAMIN D) 1000 UNITS capsule Take  by mouth.       fluticasone-salmeterol (ADVAIR) 250-50 MCG/DOSE diskus inhaler Inhale 1 puff into the lungs 2 times daily 60 Inhaler 3     latanoprost (XALATAN) 0.005 % ophthalmic solution Place 1 drop into both eyes At Bedtime 7.5 mL 3     levothyroxine (SYNTHROID/LEVOTHROID) 125 MCG tablet TAKE 1 TABLET EVERY DAY .  At end of this prescription come into clinic for a lab only TSH check. 90 tablet 1     losartan (COZAAR) 100 MG tablet Take 1 tablet (100 mg) by mouth daily 90 tablet 2     Multiple Vitamins-Minerals (ICAPS AREDS 2 PO)        timolol (TIMOPTIC) 0.5 % ophthalmic solution Place 1 drop into both eyes every morning 3 Bottle 4     [DISCONTINUED] fluticasone-salmeterol (ADVAIR) 250-50 MCG/DOSE diskus inhaler Inhale 1 puff into the lungs 2 times daily       [DISCONTINUED] levothyroxine (SYNTHROID/LEVOTHROID) 112 MCG tablet TAKE 1 TABLET EVERY DAY 90 tablet 3     [DISCONTINUED] losartan (COZAAR) 100 MG tablet Take 1 tablet (100 mg) by mouth daily 90 tablet 2     OTC products: None, except as noted above, no recent use of OTC ASA, NSAIDS or Steroids and no use of herbal medications or other supplements    Allergies   Allergen Reactions     Alphagan [Brimonidine] Unknown     Animal Dander      cats     Cosopt [Dorzolamide-Timolol] Unknown     No Clinical Screening - See Comments Other (See Comments)     Animal dander (cats)     Seasonal Allergies       Latex  Allergy: NO    Social History   Substance Use Topics     Smoking status: Never Smoker     Smokeless tobacco: Never Used     Alcohol use 0.0 oz/week     0 Standard drinks or equivalent per week      Comment: wine with supper     History   Drug Use No       REVIEW OF SYSTEMS:   CONSTITUTIONAL: NEGATIVE for fever, chills, change in weight  INTEGUMENTARY/SKIN: NEGATIVE for worrisome rashes, moles or lesions  EYES: NEGATIVE for vision changes or irritation  ENT/MOUTH: NEGATIVE for ear, mouth and throat problems  RESP: NEGATIVE for significant cough or SOB  BREAST: NEGATIVE for masses, tenderness or discharge  CV: NEGATIVE for chest pain, palpitations or peripheral edema  GI: NEGATIVE for nausea, abdominal pain, heartburn, or change in bowel habits  : NEGATIVE for frequency, dysuria, or hematuria  MUSCULOSKELETAL: NEGATIVE for significant arthralgias or myalgia  NEURO: NEGATIVE for weakness, dizziness or paresthesias  ENDOCRINE: NEGATIVE for temperature intolerance, skin/hair changes  HEME: NEGATIVE for bleeding problems  PSYCHIATRIC: NEGATIVE for changes in mood or affect    EXAM:   /78  Pulse 81  Temp 97.2  F (36.2  C) (Tympanic)  Wt 158 lb 8 oz (71.9 kg)  SpO2 99%  BMI 25.58 kg/m2    GENERAL APPEARANCE: healthy, alert and no distress     EYES: EOMI, PERRL     HENT: ear canals and TM's normal and nose and mouth without ulcers or lesions     NECK: no adenopathy, no asymmetry, masses, or scars and thyroid normal to palpation     RESP: lungs clear to auscultation - no rales, rhonchi or wheezes     CV: regular rates and rhythm, normal S1 S2, no S3 or S4 and no murmur, click or rub     ABDOMEN:  soft, nontender, no HSM or masses and bowel sounds normal     MS: extremities normal- no gross deformities noted, no evidence of inflammation in joints, FROM in all extremities.     SKIN: no suspicious lesions or rashes     NEURO: Normal strength and tone, sensory exam grossly normal, mentation intact and speech normal      PSYCH: mentation appears normal. and affect normal/bright     LYMPHATICS: No cervical adenopathy    DIAGNOSTICS:     EKG: Normal Sinus Rhythm, negative precordial t-waves, possible left atrial enlargement, possible old anterior infarct? There are no prior tracings available  Hemoglobin (indicated for history of anemia or procedure with significant blood loss such as tonsillectomy, major intraperitoneal surgery, vascular surgery, major spine surgery, total joint replacement)  Serum Potassium  Serum Creatinine  Coagulation Studies (e.g. INR, PTT, platelet count)  Labs Drawn and in Process:   Unresulted Labs Ordered in the Past 30 Days of this Admission     Date and Time Order Name Status Description    8/31/2018 0925 THYROID PEROXIDASE ANTIBODY In process     8/31/2018 0925 ANTI THYROGLOBULIN ANTIBODY In process     8/31/2018 0925 HEMOGLOBIN In process     8/31/2018 0925 PARTIAL THROMBOPLASTIN TIME In process     8/31/2018 0925 INR In process     8/31/2018 0925 LIPID REFLEX TO DIRECT LDL PANEL In process     8/31/2018 0925 BASIC METABOLIC PANEL In process           Recent Labs   Lab Test  07/02/18   1610  12/07/17   1406  09/21/16   0937   07/22/13   0757  12/19/11   1512   HGB   --    --    --    --    --   11.9*   NA   --    --    --    --   141   --    POTASSIUM   --   4.2  4.5   < >  4.8   --    CR  0.86  0.97  0.91   < >  1.00   --     < > = values in this interval not displayed.      ACT Total Scores 5/23/2018   ACT TOTAL SCORE -   ASTHMA ER VISITS -   ASTHMA HOSPITALIZATIONS -   ACT TOTAL SCORE (Goal Greater than or Equal to 20) 24   In the past 12 months, how many times did you visit the emergency room for your asthma without being admitted to the hospital? 0   In the past 12 months, how many times were you hospitalized overnight because of your asthma? 0       IMPRESSION:   Reason for surgery/procedure: Back pain; spinal stenosis with claudication and spondylolisthesis  Diagnosis/reason for consult: Pre-op  for lumbar decompression and fusion procedure above    The proposed surgical procedure is considered HIGH risk.    REVISED CARDIAC RISK INDEX  The patient has abnormal EKG today; I'd like her to have further cardiac testing before surgery.  Referred to cardiology.    The patient has the following additional risks for perioperative complications:    1.Hypothyroidism; recently with sub-par control (TSH 6).  Just adjusted dose as of 8/31/18.  2. Borderline Sleep Apnea, mild.  Not on CPAP, monitor closely for desaturations.  3. Low vision.  4. Hypertension.        ICD-10-CM    1. Preop general physical exam Z01.818 BASIC METABOLIC PANEL     Lipid panel reflex to direct LDL Fasting     EKG 12-lead complete w/read - Clinics     INR     Partial thromboplastin time     Hemoglobin     ANTI THYROGLOBULIN ANTIBODY     THYROID PEROXIDASE ANTIBODY   2. Spinal stenosis of lumbar region with neurogenic claudication M48.062 BASIC METABOLIC PANEL     Lipid panel reflex to direct LDL Fasting     EKG 12-lead complete w/read - Clinics     INR     Partial thromboplastin time     Hemoglobin     ANTI THYROGLOBULIN ANTIBODY     THYROID PEROXIDASE ANTIBODY   3. Spondylolisthesis of lumbar region M43.16 BASIC METABOLIC PANEL     Lipid panel reflex to direct LDL Fasting     EKG 12-lead complete w/read - Clinics     INR     Partial thromboplastin time     Hemoglobin     ANTI THYROGLOBULIN ANTIBODY     THYROID PEROXIDASE ANTIBODY   4. Hypothyroidism, postablative E89.0 **TSH with free T4 reflex FUTURE 2mo     levothyroxine (SYNTHROID/LEVOTHROID) 125 MCG tablet   5. Benign hypertension I10 losartan (COZAAR) 100 MG tablet   6. Mild persistent asthma without complication J45.30 fluticasone-salmeterol (ADVAIR) 250-50 MCG/DOSE diskus inhaler   7. Diverticulitis of colon K57.32    8. Other osteoporosis with current pathological fracture, sequela M80.80XS    9. Spinal stenosis, lumbar region with neurogenic claudication M48.062    10. History of  hydrocephalus Z86.69    11. Low vision, both eyes H54.3        RECOMMENDATIONS:     --Consult hospital rounder / IM to assist post-op medical management    Cardiovascular Risk  Performs 4 METs exercise without symptoms (Climb a flight of stairs) .   Abnormal EKG = referred for cards/stress testing prior to surgery.      Pulmonary Risk  Incentive spirometry post op  Respiratory Therapy (Respiratory Care IP Consult)  post op  NG tube decompression if abdominal distension or significant vomiting       Obstructive Sleep Apnea (or suspected sleep apnea)  Hospital staff are advised to monitor for sleep related oxygen desaturations due to suspicion of YANG    1.Hypothyroidism; recently with sub-par control (TSH 6).  Just adjusted dose as of 8/31/18.  2. Borderline Sleep Apnea, mild.  Not on CPAP, monitor closely for desaturations.  3. Low vision.  4. Hypertension.      --Patient is to take all scheduled medications on the day of surgery EXCEPT for modifications listed below.    Awaiting further cardiology testing before approval given to proceed with proposed procedure.  If patient's stress test/cardiology evaluation is normal then approval given.     Signed Electronically by: Evelia Castro MD    Copy of this evaluation report is provided to requesting physician.    [facility] Preop Guidelines    Revised Cardiac Risk Index

## 2018-09-01 LAB
ANION GAP SERPL CALCULATED.3IONS-SCNC: 10 MMOL/L (ref 3–14)
BUN SERPL-MCNC: 14 MG/DL (ref 7–30)
CALCIUM SERPL-MCNC: 8.7 MG/DL (ref 8.5–10.1)
CHLORIDE SERPL-SCNC: 103 MMOL/L (ref 94–109)
CHOLEST SERPL-MCNC: 203 MG/DL
CO2 SERPL-SCNC: 26 MMOL/L (ref 20–32)
CREAT SERPL-MCNC: 0.88 MG/DL (ref 0.52–1.04)
GFR SERPL CREATININE-BSD FRML MDRD: 63 ML/MIN/1.7M2
GLUCOSE SERPL-MCNC: 144 MG/DL (ref 70–99)
HDLC SERPL-MCNC: 83 MG/DL
LDLC SERPL CALC-MCNC: 93 MG/DL
NONHDLC SERPL-MCNC: 120 MG/DL
POTASSIUM SERPL-SCNC: 3.6 MMOL/L (ref 3.4–5.3)
SODIUM SERPL-SCNC: 139 MMOL/L (ref 133–144)
TRIGL SERPL-MCNC: 134 MG/DL

## 2018-09-04 LAB
THYROGLOB AB SERPL IA-ACNC: <20 IU/ML (ref 0–40)
THYROPEROXIDASE AB SERPL-ACNC: 20 IU/ML

## 2018-09-05 NOTE — PROGRESS NOTES
Please ADD ON to labs drawn - if unable to add on call patient and ask her to come in.  Thanks!  Dr. Evelia Castro MD/[facility]    Last Lab Result: No results found for: A1C

## 2018-09-06 ENCOUNTER — TELEPHONE (OUTPATIENT)
Dept: FAMILY MEDICINE | Facility: CLINIC | Age: 73
End: 2018-09-06

## 2018-09-06 NOTE — TELEPHONE ENCOUNTER
Reason for Call:  Other call back    Detailed comments: Patient is having back surgery next week and wants a disability parking sticker. Please call to discuss.     Phone Number Patient can be reached at: Home number on file [phone] (home)    Best Time: any    Can we leave a detailed message on this number? YES    Call taken on 9/6/2018 at 7:42 AM by WILTON FERRO

## 2018-09-06 NOTE — TELEPHONE ENCOUNTER
Please print out [employer] disability parking form, fill out patient's name on top, and leave it on my desk and I'll fill out rest on Friday.  Can you let patient know that we'll do this, and then she'll need to  form to fill out her part and to bring to [facility]?  Thanks,  Dr. Evelia Castro MD/[facility]

## 2018-09-06 NOTE — TELEPHONE ENCOUNTER
Patient was called and infomed message will be sent to PCP for review tomorrow. She verbalized agreement with plan. Please advice if a phone visit is needed.

## 2018-09-07 ENCOUNTER — PRE VISIT (OUTPATIENT)
Dept: CARDIOLOGY | Facility: CLINIC | Age: 73
End: 2018-09-07

## 2018-09-07 ENCOUNTER — HOSPITAL ENCOUNTER (OUTPATIENT)
Dept: CARDIOLOGY | Facility: CLINIC | Age: 73
Discharge: HOME | End: 2018-09-07
Attending: FAMILY MEDICINE | Admitting: FAMILY MEDICINE
Payer: MEDICARE

## 2018-09-07 DIAGNOSIS — R94.31 ABNORMAL ELECTROCARDIOGRAM: ICD-10-CM

## 2018-09-07 PROCEDURE — 93350 STRESS TTE ONLY: CPT | Mod: 26 | Performed by: INTERNAL MEDICINE

## 2018-09-07 PROCEDURE — 93325 DOPPLER ECHO COLOR FLOW MAPG: CPT | Mod: 26 | Performed by: INTERNAL MEDICINE

## 2018-09-07 PROCEDURE — 93016 CV STRESS TEST SUPVJ ONLY: CPT | Performed by: INTERNAL MEDICINE

## 2018-09-07 PROCEDURE — 93321 DOPPLER ECHO F-UP/LMTD STD: CPT | Mod: 26 | Performed by: INTERNAL MEDICINE

## 2018-09-07 PROCEDURE — 93018 CV STRESS TEST I&R ONLY: CPT | Performed by: INTERNAL MEDICINE

## 2018-09-07 PROCEDURE — 93017 CV STRESS TEST TRACING ONLY: CPT

## 2018-09-08 NOTE — PROGRESS NOTES
Seeing cardiology Monday for cardiac clearance for surgery - will defer result review to them.  Dr. Evelia Castro MD/[facility]

## 2018-09-10 ENCOUNTER — OFFICE VISIT (OUTPATIENT)
Dept: ENDOCRINOLOGY | Facility: CLINIC | Age: 73
End: 2018-09-10
Payer: COMMERCIAL

## 2018-09-10 ENCOUNTER — OFFICE VISIT (OUTPATIENT)
Dept: CARDIOLOGY | Facility: CLINIC | Age: 73
End: 2018-09-10
Attending: FAMILY MEDICINE
Payer: COMMERCIAL

## 2018-09-10 VITALS
HEIGHT: 66 IN | BODY MASS INDEX: 24.98 KG/M2 | DIASTOLIC BLOOD PRESSURE: 84 MMHG | HEART RATE: 80 BPM | SYSTOLIC BLOOD PRESSURE: 140 MMHG | OXYGEN SATURATION: 98 % | WEIGHT: 155.4 LBS

## 2018-09-10 VITALS
WEIGHT: 156 LBS | TEMPERATURE: 97.6 F | OXYGEN SATURATION: 99 % | DIASTOLIC BLOOD PRESSURE: 74 MMHG | HEIGHT: 66 IN | HEART RATE: 84 BPM | SYSTOLIC BLOOD PRESSURE: 138 MMHG | BODY MASS INDEX: 25.07 KG/M2

## 2018-09-10 DIAGNOSIS — M80.80XS OTHER OSTEOPOROSIS WITH CURRENT PATHOLOGICAL FRACTURE, SEQUELA: Primary | ICD-10-CM

## 2018-09-10 DIAGNOSIS — R94.31 ABNORMAL ELECTROCARDIOGRAM: ICD-10-CM

## 2018-09-10 DIAGNOSIS — I10 HTN, GOAL BELOW 140/90: Primary | ICD-10-CM

## 2018-09-10 DIAGNOSIS — E89.0 HYPOTHYROIDISM, POSTABLATIVE: ICD-10-CM

## 2018-09-10 PROCEDURE — 99203 OFFICE O/P NEW LOW 30 MIN: CPT | Performed by: INTERNAL MEDICINE

## 2018-09-10 PROCEDURE — 99214 OFFICE O/P EST MOD 30 MIN: CPT | Performed by: INTERNAL MEDICINE

## 2018-09-10 RX ORDER — ALENDRONATE SODIUM 70 MG/1
70 TABLET ORAL
Qty: 12 TABLET | Refills: 3 | Status: SHIPPED | OUTPATIENT
Start: 2018-09-10 | End: 2019-09-18

## 2018-09-10 RX ORDER — ALENDRONATE SODIUM 70 MG/1
70 TABLET ORAL
Qty: 4 TABLET | Refills: 11 | Status: SHIPPED | OUTPATIENT
Start: 2018-09-10 | End: 2018-09-10

## 2018-09-10 NOTE — PROGRESS NOTES
Name: Manasa French  Date: 7/2/2018  Seen in consultation with , [facility] for osteoporosis.     HPI:  Manasa French is a 72 year old female who presents for the evaluation of osteoperosis .  H/o vertebral fracture in 2016 (while pulling a hose tripped over on cement sidewalk), 2018 ( walking on treadmill and tripped)  Last DEXA 2014- osteopenia. Was not started on medication at that time.    H/o graves diease s/p BARAHONA in 2003- hypothyroidism following that. Now taking levothyroxine 112 mcg/day.    Smoke:No  Family History:No  Menstrual history/Birthing: s/p menopause in 50s. Was on HRT for many years. About 10 years.  Fractures:as above. H/o leg fracture in 2012.  Kidney stones: No  GI Surgery:No  Duration of therapy:  Never been on medication  Exercise:Yes: walking and biking.  Diet:   Milk: 1 serving/day   Cheese: everyday   Yogurt/Cottage Cheese: few times a week  Ca/Vitamin D: takes calcium 1200 mg/day and vit D 2000 ID/day  Alcohol:  No  Eating Disorder: No  Steroid Use:  Yes: was on prednisone X 10 days 2018  PMH/PSH:  Past Medical History:   Diagnosis Date     6th nerve palsy     OU     Cataract      Graves disease      Hypertension      Nystagmus      Primary open angle glaucoma      Pseudophakia, right eye 4/29/2014     Thyroid eye disease      Unspecified hypothyroidism      Past Surgical History:   Procedure Laterality Date     BACK SURGERY  2005    Dr Welsh     CATARACT IOL, RT/LT Right 04/15/2014     CATARACT IOL, RT/LT Left 04/29/2014     EYE SURGERY  1987    Shunt- A/C malformation     HERNIA REPAIR  1994    umbilical     LUMPECTOMY BREAST  1974    Left benign     SHOULDER SURGERY  1996    Left      WRIST SURGERY  1976    Ganglion Right Wrist     Family Hx:  Family History   Problem Relation Age of Onset     Colon Cancer Mother      Glaucoma Mother      Hypertension Mother      Thyroid Disease Mother      Breast Cancer Mother      Cancer Father       "leukemia     Diabetes Father      Glaucoma Father      Glasses (<7 y/o) Brother      Hypertension Sister      Thyroid Disease Sister      Glasses (<7 y/o) Sister      Hypertension Sister      Thyroid Disease Sister      Glasses (<7 y/o) Sister      Glasses (<7 y/o) Sister      Glasses (<7 y/o) Sister      Glasses (<7 y/o) Brother      Diabetes Son      virus attacked pancreas       Social Hx:  Social History     Social History     Marital status:      Spouse name: N/A     Number of children: N/A     Years of education: N/A     Occupational History     Not on file.     Social History Main Topics     Smoking status: Never Smoker     Smokeless tobacco: Never Used     Alcohol use 0.0 oz/week     0 Standard drinks or equivalent per week      Comment: wine with supper     Drug use: No     Sexual activity: Yes     Partners: Male     Other Topics Concern     Parent/Sibling W/ Cabg, Mi Or Angioplasty Before 65f 55m? No     Social History Narrative          MEDICATIONS:  has a current medication list which includes the following prescription(s): alendronate, calcium carb-cholecalciferol, carboxymethylcellulose, vitamin d, fluticasone-salmeterol, latanoprost, levothyroxine, losartan, multiple vitamins-minerals, and timolol, and the following Facility-Administered Medications: apraclonidine.    ROS     ROS: 10 point ROS neg other than the symptoms noted above in the HPI.    Physical Exam   VS: /74 (BP Location: Right arm, Patient Position: Chair, Cuff Size: Adult Regular)  Pulse 84  Temp 97.6  F (36.4  C) (Oral)  Ht 1.676 m (5' 6\")  Wt 70.8 kg (156 lb)  SpO2 99%  Breastfeeding? No  BMI 25.18 kg/m2  GENERAL: AXOX3, NAD, well dressed, answering questions appropriately, appears stated age.  HEENT: OP clear, no LAD, no TM, non-tender, no exopthalmous, no proptosis, EOMI, no lig lag, no retraction  NECK: Thyroid normal in size, non tender, no nodules were palpated.  CV: RRR, no rubs, gallops, no murmurs  LUNGS: " CTAB, no wheezes, rales, or ronchi  ABDOMEN: +BS  EXTREMITIES: no edema, +pulses, no rashes, no lesions  NEUROLOGY: CN grossly intact, + DTR upper and lower extremity, no tremors  MSK: grossly intact, Spine midline, no TTP.  SKIN: no rashes, no lesions    LABS:  BMP:  Last Basic Metabolic Panel:  Lab Results   Component Value Date     2013      Lab Results   Component Value Date    POTASSIUM 4.2 2017     Lab Results   Component Value Date    CHLORIDE 106 2013     Lab Results   Component Value Date    TETE 8.6 2013     Lab Results   Component Value Date    CO2 29 2013     Lab Results   Component Value Date    BUN 19 2013     Lab Results   Component Value Date    CR 0.97 2017     Lab Results   Component Value Date     2013       TFTs:  Lab Results   Component Value Date    TSH 5.27 2017       LFTs:  Liver Function Studies - No results for input(s): PROTTOTAL, ALBUMIN, BILITOTAL, ALKPHOS, AST, ALT, BILIDIRECT in the last 81294 hours.    PTH:  ENDO CALCIUM LABS-UMP Latest Ref Rng & Units 2018   PARATHYROID HORMONE INTACT 18 - 80 pg/mL 45       Vitamin D:  Vitamin D Deficiency Screening Results:  Lab Results   Component Value Date    VITDT 43 2018    VITDT 57 2014       BoneTurnOverMarkers:    Component      Latest Ref Rng & Units 2018   N-Telopeptide X-Link      nM BCE 16.1   Osteocalcin      11 - 50 ng/mL 19       DEXA 2014:  BONE DENSITOMETRY  [facility]  [address] [place] [address]  2014     PATIENT: Manasa French  CHART: [id number]   : 1945  AGE: 69 year old  SEX: female   REFERRING PHYSICIAN: Dr Kyleigh Amato      PROCEDURE: Bone density scanning was performed using DXA technology of the lumbar spine and hip. Scanning was performed on a [employer] scanner. Reporting is completed in the form of a T-score. The T-score represents the standard deviation from peak bone mass  based on a young healthy adult.    REFERENCE T-SCORES:   Normal -1.0 and greater   Osteopenia Between -1.0 and -2.5   Osteoporosis -2.5 and less     RISK FACTORS: Post-menopausal  CURRENT TREATMENT: Calcium, Vitamin D    FINDINGS:  Lumbar Spine (L1-L3) T-score: 1.4  Left Femoral Neck T-score: -1.1  Right Femoral Neck T-score: -1.5    Lumbar (L1-L4) BMD: 1.591 Previous: none   Total Hip Lt/ Rt BMD: Lt 0.893 Rt 0.730 Previous: Lt 0.843 Rt .0870    Comparison is made to another DXA performed on the same Hologic machine on 05/17/2012.   IMPRESSION  Osteopenia (low bone mass)  Recommendations include ensuring adequate daily Calcium and Vitamin D intake     Compared to previous bone densitometry performed on this patient, there is the suggestion of no significant change.      Current NOF guidelines recommend treatment for patients with the following:  - Prior hip or vertebral fracture  - T-score -2.5 or below  - A 10 year risk of any major osteoporotic fracture >20% or 10 year risk of hip fracture >3%, as calculated using the FRAX calculator (www.shef.ac.uk/FRAX).       This patient's risks with the use of FRAX (based on available information) are 11 % for major osteoporotic fracture and 2.3 % for hip fracture.   Based on these guidelines, treatment (in addition to calcium and vitamin D) is not recommended for this patient.  While this is meant as an aid to clinical decision-making, clinical judgment must still be used.     MRI 2018:  Mild compression fracture insults along the superior endplates of T12 and L2, new from 8/2016  The previous compression fracture along the superior endplate of L3 has mature  High-grade central canal stenosis persists at both L2-3 and L3-4  Previous combined interbody and instrumented bilateral posterolateral fusion from L4/S1.  Solid bony bridging across the operated levels, appeared unchanged.    DEXA 2018:  FINDINGS:                   Left Femoral Neck            T-score:   -1.8               Right Femoral Neck          T-score:  -1.6               Forearm (radius 33%)       T-score:  -0.4                             Total Hip Mean BMD: 0.874            Previous: 0.876      Comparison cannot be made to another DXA performed on a Hologic machine on 9/14.       IMPRESSION  Osteopenia (low bone mass)  See FRAX comments below regarding treatment  Recommendations include ensuring adequate daily Calcium and Vitamin D intake     Current NOF guidelines recommend treatment for patients with the following:  - Prior hip or vertebral fracture  - T-score -2.5 or below  - A 10 year risk of any major osteoporotic fracture >20% or 10 year risk of hip fracture >3%, as calculated using the FRAX calculator (www.shef.ac.uk/FRAX).       This patient's risks with the use of FRAX (based on available information) are 15 % for major osteoporotic fracture and 4.1 % for hip fracture.   Based on these guidelines, treatment (in addition to calcium and vitamin D) is recommended for this patient.  While this is meant as an aid to clinical decision-making, clinical judgment must still be used.      All pertinent notes, labs, and images personally reviewed by me.   Records from outside clinic reviewed.    A/P  Ms.Katheryn DIEGO French is a 73 year old here for the evaluation of:    #1 Osteoporosis:  Risk factors for low bone density include personal history of fracture or family history, , advanced age, female, Estrogen deficiency.  MRI showing recent vertebral fracture.  A prior history of vertebral fracture greatly increases the risk of subsequent fractures. A history of other medical diseases impacting on bone may also affect bone health.    DEXA 2018--consistent with osteopenia ( + h/o vertebral fractures) with a 10 year risk of any major osteoporotic fracture >20% or 10 year risk of hip fracture >3%, as calculated using the FRAX calculator  She has never been on medication for  osteoporosis/osteopenia  Normal PTH, vit D and creatinine.  Has upcoming spinal fusion surgery.  Plan:  Discussed diagnosis, treatment options with pt.  Recommend to start Fosamax 70 mg once a week.  Discussed Prolia, Forteo in general. Given h/o vertebral fracture can be a candidate for forteo in future.  Discussed indications, risks and benefits of all medications prescribed, and answered questions to patient's satisfaction.  The patient indicates understanding of these issues and agrees with the plan.  The pt was advised to    Maintain an adequate calcium and vitamin D intake and to supplement vitamin D if needed to maintain serum levels of 25 hydroxy D (25 OH D) between 30-60 ng/ml.    Limit alcohol intake to no more than 2 servings per day.    Limit caffeine intake.    Maintain an active lifestyle including weight-bearing exercises for at least 30 mins daily.    Take measures to reduce the risk of falling.      #2. Hypothyroidism, postablative:  Comment: Currently taking levothyroxine 125 mcg per day. Dose was increase recently based on labs by PCP.  Managed by PCP.    Instructions on Fosamax use and side effects - particularly esophageal adverse events - are carefully reviewed with her. This drug must be taken upon arising for the day on an empty stomach, with a large 6-8 ounce glass of water; she must remain NPO in the upright position for at least 30 minutes afterwards and until after the first food of the day. If esophageal irritation is noted, she will stop the drug and call my office.  Treatment with bisphosphonate therapy will decrease fracture risk 50-70%.   There is risk of osteonecrosis of the jaw in patients using bisphosphonates is approximately 1/1700-1/100,000, with development most likely related to invasive dental procedures. If an invasive dental procedure was necessary, preferably stop the bisphosphonate approximately 3 months prior to reduce the risk. Let your dentist know that you are on  this medication.  The data available at this time suggests that there is probably a small increase risk of atypical (nontraumatic) subtrochanteric fractures of the femur in patients on bisphosphonate therapy compared to those not on it. One large study suggested that for every 100 fractures prevented with bisphosphonate therapy, less than one femur fracture will occur. Other studies suggest one episode per 2,500 patient years. Patient should call with leg pain.        More than 50% of the time spent with Ms. French on counseling / coordinating her care.     Follow-up:  1 year    Yani Box MD  Endocrinology  [facility]  CC: Clinic, [facility]    Addendum to above note and clinic visit:    Labs reviewed.    See result note/telephone encounter.

## 2018-09-10 NOTE — LETTER
9/10/2018    [facility]  [address], [address]  [place] [address]    RE: Manasa French       Dear Colleague,    I had the pleasure of seeing Manasa French in the [facility].    HPI and Plan:   See dictation    No orders of the defined types were placed in this encounter.      No orders of the defined types were placed in this encounter.      There are no discontinued medications.      Encounter Diagnoses   Name Primary?     Abnormal electrocardiogram      HTN, goal below 140/90 Yes       CURRENT MEDICATIONS:  Current Outpatient Prescriptions   Medication Sig Dispense Refill     Calcium Carb-Cholecalciferol (CALCIUM CARBONATE-VITAMIN D3 PO)        carboxymethylcellulose (CELLUVISC/REFRESH LIQUIGEL) 1 % ophthalmic solution Place 1 drop into both eyes 3 times daily       Cholecalciferol (VITAMIN D) 1000 UNITS capsule Take  by mouth.       fluticasone-salmeterol (ADVAIR) 250-50 MCG/DOSE diskus inhaler Inhale 1 puff into the lungs 2 times daily 60 Inhaler 3     latanoprost (XALATAN) 0.005 % ophthalmic solution Place 1 drop into both eyes At Bedtime 7.5 mL 3     levothyroxine (SYNTHROID/LEVOTHROID) 125 MCG tablet TAKE 1 TABLET EVERY DAY .  At end of this prescription come into clinic for a lab only TSH check. 90 tablet 1     losartan (COZAAR) 100 MG tablet Take 1 tablet (100 mg) by mouth daily 90 tablet 2     Multiple Vitamins-Minerals (ICAPS AREDS 2 PO)        timolol (TIMOPTIC) 0.5 % ophthalmic solution Place 1 drop into both eyes every morning 3 Bottle 4     alendronate (FOSAMAX) 70 MG tablet Take 1 tablet (70 mg) by mouth every 7 days (Patient not taking: Reported on 9/10/2018) 12 tablet 3     [DISCONTINUED] alendronate (FOSAMAX) 70 MG tablet Take 1 tablet (70 mg) by mouth every 7 days 4 tablet 11       ALLERGIES     Allergies   Allergen Reactions     Alphagan [Brimonidine] Unknown     Animal Dander      cats     Cosopt  [Dorzolamide-Timolol] Unknown     No Clinical Screening - See Comments Other (See Comments)     Animal dander (cats)     Seasonal Allergies        PAST MEDICAL HISTORY:  Past Medical History:   Diagnosis Date     6th nerve palsy     OU     Cataract      Graves disease      Hypertension      Nystagmus      Primary open angle glaucoma      Pseudophakia, right eye 4/29/2014     Thyroid eye disease      Unspecified hypothyroidism        PAST SURGICAL HISTORY:  Past Surgical History:   Procedure Laterality Date     BACK SURGERY  2005    Dr Welsh     CATARACT IOL, RT/LT Right 04/15/2014     CATARACT IOL, RT/LT Left 04/29/2014     EYE SURGERY  1987    Shunt- A/C malformation     HERNIA REPAIR  1994    umbilical     LUMPECTOMY BREAST  1974    Left benign     SHOULDER SURGERY  1996    Left      WRIST SURGERY  1976    Ganglion Right Wrist       FAMILY HISTORY:  Family History   Problem Relation Age of Onset     Colon Cancer Mother      Glaucoma Mother      Hypertension Mother      Thyroid Disease Mother      Breast Cancer Mother      Cancer Father      leukemia     Diabetes Father      Glaucoma Father      Glasses (<9 y/o) Brother      Hypertension Sister      Thyroid Disease Sister      Glasses (<9 y/o) Sister      Hypertension Sister      Thyroid Disease Sister      Glasses (<9 y/o) Sister      Glasses (<9 y/o) Sister      Glasses (<9 y/o) Sister      Glasses (<9 y/o) Brother      Diabetes Son      virus attacked pancreas       SOCIAL HISTORY:  Social History     Social History     Marital status:      Spouse name: N/A     Number of children: N/A     Years of education: N/A     Social History Main Topics     Smoking status: Never Smoker     Smokeless tobacco: Never Used     Alcohol use 0.0 oz/week     0 Standard drinks or equivalent per week      Comment: wine with supper     Drug use: No     Sexual activity: Yes     Partners: Male     Other Topics Concern     Parent/Sibling W/ Cabg, Mi Or Angioplasty Before 65f 55m?  "No     Social History Narrative       Review of Systems:  Skin:  Positive for   dry spots on arms    Eyes:  Positive for glasses;glaucoma macular degeneration   ENT:  Positive for hearing loss loosing hearing in left ear   Respiratory:  Positive for dyspnea on exertion     Cardiovascular:    lightheadedness;Positive for missed heart beat only recorded via at home BP machine Pt cannot feel it.   Gastroenterology: Negative      Genitourinary:  Positive for nocturia 2 times per night   Musculoskeletal:  Positive for back pain    Neurologic:  Positive for headaches;numbness or tingling of hands numbness in right hand when homding walking pole.   Psychiatric:  Negative      Heme/Lymph/Imm:  Positive for easy bruising    Endocrine:  Positive for thyroid disorder      Physical Exam:  Vitals: /84 (BP Location: Right arm, Patient Position: Sitting, Cuff Size: Adult Regular)  Pulse 80  Ht 1.676 m (5' 6\")  Wt 70.5 kg (155 lb 6.4 oz)  SpO2 98%  BMI 25.08 kg/m2    Constitutional:  cooperative, alert and oriented, well developed, well nourished, in no acute distress        Skin:  warm and dry to the touch, no apparent skin lesions or masses noted          Head:  normocephalic, no masses or lesions        Eyes:  pupils equal and round, conjunctivae and lids unremarkable, sclera white, no xanthalasma, EOMS intact, no nystagmus        Lymph:      ENT:  no pallor or cyanosis, dentition good        Neck:           Respiratory:  normal breath sounds, clear to auscultation, normal A-P diameter, normal symmetry, normal respiratory excursion, no use of accessory muscles         Cardiac: regular rhythm;normal S1 and S2;no murmurs, gallops or rubs detected                pulses full and equal                                        GI:           Extremities and Muscular Skeletal:  no edema;no spinal abnormalities noted;normal muscle strength and tone              Neurological:  no gross motor deficits        Psych:  affect " appropriate, oriented to time, person and place        CC  Evelia Castro MD  [address] [place] [id number]                Thank you for allowing me to participate in the care of your patient.      Sincerely,     Willy Aguillon MD     [facility]    cc:   Evelia Castro MD  [address] [place] [id number]

## 2018-09-10 NOTE — MR AVS SNAPSHOT
After Visit Summary   9/10/2018    Manasa French    MRN: [id number]           Patient Information     Date Of Birth          1945        Visit Information        Provider Department      9/10/2018 9:00 AM Yani Box MD [facility]        Today's Diagnoses     Other osteoporosis with current pathological fracture, sequela    -  1    Hypothyroidism, postablative          Care Instructions    [facility]   Dr Box, Endocrinology Department      [facility]   [address]  [place] [address]  Appointment [phone]  Fax: [phone]  [place]: Monday and Tuesday         [place] [address]  [place] [address]  Appointment [phone]  Fax: [phone]  [place]: Wednesday and Thursday            Start fosamax 70 mg ONCE a week.    You should get 1995-6649 mg/day calcium in divided doses of no more than 500 mg/dose.  This INCLUDES what is in your food as well as what is in any supplements you may be taking.    Vit D about 1000 IU/day ( unless you have vit D deficiency- in that case higher dose)  Dietary sources of calcium:: These also contain vitamin D  Milk                            8 oz            300 mg calcium  Yogurt                          1 cup           400 mg calcium   Hard cheese                     1.5 oz          300 mg  Cottage cheese                  2 cup           300 mg  Orange juice with Calcium       8 oz            300 mg  Low fat dairy sources are recommended      You should get 30 minutes of moderate weight bearing exercise on most days of the week .  Weight bearing exercise includes such things as walking, jogging, hiking, dancing.  You should also get Strength training 2 or more times/week in addition to other weight -being exercise. Strength training uses weight or resistance beyond that seen in  everyday activities -(pilates, weight training with free weights, weight machines or resistance bands)    The pt was advised to    Maintain an adequate calcium and vitamin D intake and to supplement vitamin D if needed to maintain serum levels of 25 hydroxy D (25 OH D) between 30-60 ng/ml.    Limit alcohol intake to no more than 2 servings per day.    Limit caffeine intake.    Maintain an active lifestyle including weight-bearing exercises for at least 30 mins daily.    Take measures to reduce the risk of falling.    Instructions on Fosamax use and side effects - particularly esophageal adverse events - are carefully reviewed with her. This drug must be taken upon arising for the day on an empty stomach, with a large 6-8 ounce glass of water; she must remain NPO in the upright position for at least 30 minutes afterwards and until after the first food of the day. If esophageal irritation is noted, she will stop the drug and call my office.              Follow-ups after your visit        Your next 10 appointments already scheduled     Sep 10, 2018 10:30 AM CDT   New Visit with Willy Aguillon MD   [facility] ([facility])    [address]  [facility] [id number] [phone]            Oct 31, 2018  8:00 AM CDT   LAB with BM LAB   [facility] ([facility])    [address]  [place] [address] [phone]           Please do not eat 10-12 hours before your appointment if you are coming in fasting for labs on lipids, cholesterol, or glucose (sugar). This does not apply to pregnant women. Water, hot tea and black coffee (with nothing added) are okay. Do not drink other fluids, diet soda or chew gum.            Jan 28, 2019  2:00 PM CST   RETURN RETINA with Katy Page MD   [facility] ([facility])    [address]  [address]  [facility] [place] [facility] [address]   [phone]            2019  1:00 PM CST   RETURN GLAUCOMA with Radha Gallardo MD   [facility] ([facility])    [facility]  [address]  [address]  [facility] [place] [facility] [address]   [phone]              Who to contact     If you have questions or need follow up information about today's clinic visit or your schedule please contact [facility] directly at [phone].  Normal or non-critical lab and imaging results will be communicated to you by [employer], letter or phone within 4 business days after the clinic has received the results. If you do not hear from us within 7 days, please contact the clinic through [employer] or phone. If you have a critical or abnormal lab result, we will notify you by phone as soon as possible.  Submit refill requests through [employer] or call your pharmacy and they will forward the refill request to us. Please allow 3 business days for your refill to be completed.          Additional Information About Your Visit        [employer] Information     [employer] lets you send messages to your doctor, view your test results, renew your prescriptions, schedule appointments and more. To sign up, go to [email or web address] . Click on \"Log in\" on the left side of the screen, which will take you to the Welcome page. Then click on \"Sign up Now\" on the right side of the page.     You will be asked to enter the access code listed below, as well as some personal information. Please follow the directions to create your username and password.     Your access code is: [email or web address]  Expires: 2018  9:48 AM     Your access code will  in 90 days. If you need help or a new code, please call your [facility] or [phone].        Care EveryWhere ID     This is your Care EveryWhere ID. This could be used by other organizations to access your [place] medical records  [id number]      " "  Your Vitals Were     Pulse Temperature Height Pulse Oximetry Breastfeeding? BMI (Body Mass Index)    84 97.6  F (36.4  C) (Oral) 1.676 m (5' 6\") 99% No 25.18 kg/m2       Blood Pressure from Last 3 Encounters:   09/10/18 138/74   08/31/18 138/78   07/02/18 130/60    Weight from Last 3 Encounters:   09/10/18 70.8 kg (156 lb)   08/31/18 71.9 kg (158 lb 8 oz)   07/02/18 71.2 kg (156 lb 14.4 oz)              Today, you had the following     No orders found for display         Today's Medication Changes          These changes are accurate as of 9/10/18  9:35 AM.  If you have any questions, ask your nurse or doctor.               Start taking these medicines.        Dose/Directions    alendronate 70 MG tablet   Commonly known as:  FOSAMAX   Used for:  Hypothyroidism, postablative   Started by:  Yani Box MD        Dose:  70 mg   Take 1 tablet (70 mg) by mouth every 7 days   Quantity:  12 tablet   Refills:  3            Where to get your medicines      These medications were sent to [facility] - [facility] [address]  [address] [facility] [address]     Phone:  [phone]     alendronate 70 MG tablet                Primary Care Provider Office Phone # Fax #    [facility] [phone]       [address]  [place] [id number]        Equal Access to Services     CHELSY MONTERROSO AH: Babar tenorioo Soduyen, waaxda luqadaha, qaybta kaalmada adeegyadann, waxay yessy rodríguez. So [facility] [phone].    ATENCIÓN: Si habla español, tiene a martinez disposición servicios gratuitos de asistencia lingüística. Juan C al [phone].    We comply with applicable federal civil rights laws and [place] laws. We do not discriminate on the basis of race, color, national origin, age, disability, sex, sexual orientation, or gender identity.            Thank you!     Thank you for choosing [facility] " [facility]  for your care. Our goal is always to provide you with excellent care. Hearing back from our patients is one way we can continue to improve our services. Please take a few minutes to complete the written survey that you may receive in the mail after your visit with us. Thank you!             Your Updated Medication List - Protect others around you: Learn how to safely use, store and throw away your medicines at www.disposemymeds.org.          This list is accurate as of 9/10/18  9:35 AM.  Always use your most recent med list.                   Brand Name Dispense Instructions for use Diagnosis    alendronate 70 MG tablet    FOSAMAX    12 tablet    Take 1 tablet (70 mg) by mouth every 7 days    Hypothyroidism, postablative       CALCIUM CARBONATE-VITAMIN D3 PO           carboxymethylcellulose 1 % ophthalmic solution    CELLUVISC/REFRESH LIQUIGEL     Place 1 drop into both eyes 3 times daily        fluticasone-salmeterol 250-50 MCG/DOSE diskus inhaler    ADVAIR    60 Inhaler    Inhale 1 puff into the lungs 2 times daily    Mild persistent asthma without complication       ICAPS AREDS 2 PO           latanoprost 0.005 % ophthalmic solution    XALATAN    7.5 mL    Place 1 drop into both eyes At Bedtime    Mild stage glaucoma(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  At end of this prescription come into clinic for a lab only TSH check.    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

## 2018-09-10 NOTE — NURSING NOTE
"ENDOCRINOLOGY INTAKE FORM    Patient Name:  Manasa French  :  1945    Is patient Diabetic?   No  Does patient have non-diabetic or other endocrine issues?  Yes: graves, osteoporosis, hypothyroidism    Vitals: /74 (BP Location: Right arm, Patient Position: Chair, Cuff Size: Adult Regular)  Pulse 84  Temp 97.6  F (36.4  C) (Oral)  Ht 1.676 m (5' 6\")  Wt 70.8 kg (156 lb)  SpO2 99%  Breastfeeding? No  BMI 25.18 kg/m2  BMI= Body mass index is 25.18 kg/(m^2).    Flu vaccine:  No  Pneumonia vaccine:  Yes: prevnar    Smoking and Alcohol use:  Social History   Substance Use Topics     Smoking status: Never Smoker     Smokeless tobacco: Never Used     Alcohol use 0.0 oz/week     0 Standard drinks or equivalent per week      Comment: wine with marcellus Mixon CMA    "

## 2018-09-10 NOTE — PROGRESS NOTES
Service Date: 09/10/2018      HISTORY OF PRESENT ILLNESS:  Ms. French is a very nice 73-year-old woman referred for preoperative clearance because of an abnormal EKG.      Manasa states she has no family history of coronary artery disease.  She has no exertional chest, arm, neck, jaw or shoulder discomfort.  No dyspnea on exertion, orthopnea, PND.  No palpitations, lightheadedness, dizziness, syncope, near-syncope.      She does not have diabetes mellitus.  She states she smoked for about 6 months when she was 17 and has not smoked since then.  She does have hypertension on medications.  She denies having hypercholesterolemia and review of the chart finds a cholesterol from 08/2018 of 203 with an HDL of 83 and LDL of 93 and triglycerides of 134.        A 12-lead EKG demonstrates a normal sinus rhythm with poor R-wave progression across the anterior precordium, raising the question of old anterior wall myocardial infarction.      The patient underwent a stress echocardiogram earlier this month for which she was able to exercise 6-1/2 minutes.  She had no symptoms.  She did develop a rate-related left bundle branch block.  Baseline echocardiogram appeared to be normal with normal left ventricular function post-exercise.  The patient was thought to have a normal stress echocardiogram.      ASSESSMENT AND PLAN:  Manasa appears to be doing quite well from a cardiac standpoint without clinical evidence of ischemia, heart failure or significant arrhythmia.      I think her EKG does not represent an old myocardial infarction.  Stress echo is quite reassuring.  I think she is low risk and can proceed with her back surgery as planned.      We talked about her rate-related left bundle branch block.  At this point, this is not an issue.  Down the road, it may predict that she may need a pacemaker, but at this time she needs no further evaluation or treatment as she is completely asymptomatic.      Blood pressure control is  fair today at 140/84 with a pulse of 80.  I am not going to make any changes.  I suspect some of this is due to white coat hypertension, coming to see the cardiologist.      Review of chart also shows her TSH to be elevated at 6.88.  I will leave this to her primary care physician.      At this time, I will not plan any further followup, but we would be glad to see her back at any time.         HEBERT LOVE MD, [facility]             D: 09/10/2018   T: 09/10/2018   MT: DOMITILA      Name:     NIC SALOMON   MRN:      [id number]        Account:      [id number]   :      1945           Service Date: 09/10/2018      Document: [id number]

## 2018-09-10 NOTE — PATIENT INSTRUCTIONS
[facility]   Dr Box, Endocrinology Department      [facility]   [address]  [place] [address]  Appointment [phone]  Fax: [phone]  [place]: Monday and Tuesday         [facility]   [address]  [place] [address]  Appointment [phone]  Fax: [phone]  [place]: Wednesday and Thursday            Start fosamax 70 mg ONCE a week.    You should get 5799-5342 mg/day calcium in divided doses of no more than 500 mg/dose.  This INCLUDES what is in your food as well as what is in any supplements you may be taking.    Vit D about 1000 IU/day ( unless you have vit D deficiency- in that case higher dose)  Dietary sources of calcium:: These also contain vitamin D  Milk                            8 oz            300 mg calcium  Yogurt                          1 cup           400 mg calcium   Hard cheese                     1.5 oz          300 mg  Cottage cheese                  2 cup           300 mg  Orange juice with Calcium       8 oz            300 mg  Low fat dairy sources are recommended      You should get 30 minutes of moderate weight bearing exercise on most days of the week .  Weight bearing exercise includes such things as walking, jogging, hiking, dancing.  You should also get Strength training 2 or more times/week in addition to other weight -being exercise. Strength training uses weight or resistance beyond that seen in everyday activities -(pilates, weight training with free weights, weight machines or resistance bands)    The pt was advised to    Maintain an adequate calcium and vitamin D intake and to supplement vitamin D if needed to maintain serum levels of 25 hydroxy D (25 OH D) between 30-60 ng/ml.    Limit alcohol intake to no more than 2 servings per day.    Limit caffeine intake.    Maintain an active lifestyle including weight-bearing exercises  for at least 30 mins daily.    Take measures to reduce the risk of falling.    Instructions on Fosamax use and side effects - particularly esophageal adverse events - are carefully reviewed with her. This drug must be taken upon arising for the day on an empty stomach, with a large 6-8 ounce glass of water; she must remain NPO in the upright position for at least 30 minutes afterwards and until after the first food of the day. If esophageal irritation is noted, she will stop the drug and call my office.

## 2018-09-10 NOTE — LETTER
9/10/2018      [facility]  [address]  [place] [address]      RE: Manasa CORTEZ Manolo       Dear Colleague,    I had the pleasure of seeing Manasa French in the [facility].    Service Date: 09/10/2018      HISTORY OF PRESENT ILLNESS:  Ms. French is a very nice 73-year-old woman referred for preoperative clearance because of an abnormal EKG.      Manasa states she has no family history of coronary artery disease.  She has no exertional chest, arm, neck, jaw or shoulder discomfort.  No dyspnea on exertion, orthopnea, PND.  No palpitations, lightheadedness, dizziness, syncope, near-syncope.      She does not have diabetes mellitus.  She states she smoked for about 6 months when she was 17 and has not smoked since then.  She does have hypertension on medications.  She denies having hypercholesterolemia and review of the chart finds a cholesterol from 08/2018 of 203 with an HDL of 83 and LDL of 93 and triglycerides of 134.        A 12-lead EKG demonstrates a normal sinus rhythm with poor R-wave progression across the anterior precordium, raising the question of old anterior wall myocardial infarction.      The patient underwent a stress echocardiogram earlier this month for which she was able to exercise 6-1/2 minutes.  She had no symptoms.  She did develop a rate-related left bundle branch block.  Baseline echocardiogram appeared to be normal with normal left ventricular function post-exercise.  The patient was thought to have a normal stress echocardiogram.      ASSESSMENT AND PLAN:  Manasa appears to be doing quite well from a cardiac standpoint without clinical evidence of ischemia, heart failure or significant arrhythmia.      I think her EKG does not represent an old myocardial infarction.  Stress echo is quite reassuring.  I think she is low risk and can proceed with her back surgery as planned.      We talked about her  rate-related left bundle branch block.  At this point, this is not an issue.  Down the road, it may predict that she may need a pacemaker, but at this time she needs no further evaluation or treatment as she is completely asymptomatic.      Blood pressure control is fair today at 140/84 with a pulse of 80.  I am not going to make any changes.  I suspect some of this is due to white coat hypertension, coming to see the cardiologist.      Review of chart also shows her TSH to be elevated at 6.88.  I will leave this to her primary care physician.      At this time, I will not plan any further followup, but we would be glad to see her back at any time.         HEBERT LOVE MD, [facility]             D: 09/10/2018   T: 09/10/2018   MT: DOMITILA      Name:     NIC SALOMON   MRN:      [id number]        Account:      [id number]   :      1945           Service Date: 09/10/2018      Document: [id number]         Outpatient Encounter Prescriptions as of 9/10/2018   Medication Sig Dispense Refill     Calcium Carb-Cholecalciferol (CALCIUM CARBONATE-VITAMIN D3 PO)        carboxymethylcellulose (CELLUVISC/REFRESH LIQUIGEL) 1 % ophthalmic solution Place 1 drop into both eyes 3 times daily       Cholecalciferol (VITAMIN D) 1000 UNITS capsule Take  by mouth.       fluticasone-salmeterol (ADVAIR) 250-50 MCG/DOSE diskus inhaler Inhale 1 puff into the lungs 2 times daily 60 Inhaler 3     latanoprost (XALATAN) 0.005 % ophthalmic solution Place 1 drop into both eyes At Bedtime 7.5 mL 3     levothyroxine (SYNTHROID/LEVOTHROID) 125 MCG tablet TAKE 1 TABLET EVERY DAY .  At end of this prescription come into clinic for a lab only TSH check. 90 tablet 1     losartan (COZAAR) 100 MG tablet Take 1 tablet (100 mg) by mouth daily 90 tablet 2     Multiple Vitamins-Minerals (ICAPS AREDS 2 PO)        timolol (TIMOPTIC) 0.5 % ophthalmic solution Place 1 drop into both eyes every morning 3 Bottle 4     alendronate (FOSAMAX) 70 MG tablet Take  1 tablet (70 mg) by mouth every 7 days (Patient not taking: Reported on 9/10/2018) 12 tablet 3     [DISCONTINUED] alendronate (FOSAMAX) 70 MG tablet Take 1 tablet (70 mg) by mouth every 7 days 4 tablet 11     Facility-Administered Encounter Medications as of 9/10/2018   Medication Dose Route Frequency Provider Last Rate Last Dose     apraclonidine (IOPIDINE) 1 % ophthalmic solution 1 drop  1 drop Right Eye Q12H Radha Colin MD           Again, thank you for allowing me to participate in the care of your patient.      Sincerely,    Willy Aguillon MD     [facility]    cc:   Evelia Castro MD  [address] [place] [id number]

## 2018-09-10 NOTE — MR AVS SNAPSHOT
After Visit Summary   9/10/2018    Manasa French    MRN: [id number]           Patient Information     Date Of Birth          1945        Visit Information        Provider Department      9/10/2018 10:30 AM Willy Aguillon MD [facility]         Follow-ups after your visit        Your next 10 appointments already scheduled     Oct 31, 2018  8:00 AM CDT   LAB with BM LAB   [facility] ([facility])    [address] [place] [address]  [address]  [facility] [address] [phone]           Please do not eat 10-12 hours before your appointment if you are coming in fasting for labs on lipids, cholesterol, or glucose (sugar). This does not apply to pregnant women. Water, hot tea and black coffee (with nothing added) are okay. Do not drink other fluids, diet soda or chew gum.            Jan 28, 2019  2:00 PM CST   RETURN RETINA with Katy Page MD   [facility] ([facility])    [address] [address]   [phone]            Feb 11, 2019  1:00 PM CST   RETURN GLAUCOMA with Radha Gallardo MD   [facility] ([facility])    [address] [address]   [phone]              Who to contact     If you have questions or need follow up information about today's clinic visit or your schedule please contact [facility] directly at [phone].  Normal or non-critical lab and imaging results will be communicated to you by MyChart, letter or phone within 4 business days after the clinic has received the results. If you do not hear from us within 7 days, please contact the clinic through MyChart or phone. If you have a critical or abnormal lab result, we will notify you by phone as soon as  "possible.  Submit refill requests through [employer] or call your pharmacy and they will forward the refill request to us. Please allow 3 business days for your refill to be completed.          Additional Information About Your Visit        [employer] Information     TicketBox lets you send messages to your doctor, view your test results, renew your prescriptions, schedule appointments and more. To sign up, go to [email or web address] . Click on \"Log in\" on the left side of the screen, which will take you to the Welcome page. Then click on \"Sign up Now\" on the right side of the page.     You will be asked to enter the access code listed below, as well as some personal information. Please follow the directions to create your username and password.     Your access code is: [email or web address]  Expires: 2018  9:48 AM     Your access code will  in 90 days. If you need help or a new code, please call your [facility] clinic or [phone].        Care EveryWhere ID     This is your Care EveryWhere ID. This could be used by other organizations to access your [facility] medical records  [id number]        Your Vitals Were     Pulse Height Pulse Oximetry BMI (Body Mass Index)          80 1.676 m (5' 6\") 98% 25.08 kg/m2         Blood Pressure from Last 3 Encounters:   09/10/18 140/84   09/10/18 138/74   18 138/78    Weight from Last 3 Encounters:   09/10/18 70.5 kg (155 lb 6.4 oz)   09/10/18 70.8 kg (156 lb)   18 71.9 kg (158 lb 8 oz)              Today, you had the following     No orders found for display         Today's Medication Changes          These changes are accurate as of 9/10/18 11:18 AM.  If you have any questions, ask your nurse or doctor.               Start taking these medicines.        Dose/Directions    alendronate 70 MG tablet   Commonly known as:  FOSAMAX   Used for:  Hypothyroidism, postablative   Started by:  Yani Box MD        Dose:  70 mg   Take 1 tablet (70 mg) by mouth every 7 " days   Quantity:  12 tablet   Refills:  3            Where to get your medicines      These medications were sent to [facility] - [place], [place] - [address]  [address], [facility] [address]     Phone:  [phone]     alendronate 70 MG tablet                Primary Care Provider Office Phone # Fax #    [facility] [phone]       [address], [address]  [place] [id number]        Equal Access to Services     CHELSY MONTERROSO : Hadii aad ku hadasho Soomaali, waaxda luqadaha, qaybta kaalmada adeegyada, waxay idiin hayaan adeeg kharash la'pedro pablo rodríguez. So [facility] [phone].    ATENCIÓN: Si habla español, tiene a martinez disposición servicios gratuitos de asistencia lingüística. [facility] [phone].    We comply with applicable federal civil rights laws and [place] laws. We do not discriminate on the basis of race, color, national origin, age, disability, sex, sexual orientation, or gender identity.            Thank you!     Thank you for choosing [facility]  for your care. Our goal is always to provide you with excellent care. Hearing back from our patients is one way we can continue to improve our services. Please take a few minutes to complete the written survey that you may receive in the mail after your visit with us. Thank you!             Your Updated Medication List - Protect others around you: Learn how to safely use, store and throw away your medicines at www.disposemymeds.org.          This list is accurate as of 9/10/18 11:18 AM.  Always use your most recent med list.                   Brand Name Dispense Instructions for use Diagnosis    alendronate 70 MG tablet    FOSAMAX    12 tablet    Take 1 tablet (70 mg) by mouth every 7 days    Hypothyroidism, postablative       CALCIUM CARBONATE-VITAMIN D3 PO           carboxymethylcellulose 1 % ophthalmic solution    CELLUVISC/REFRESH  LIQUIGEL     Place 1 drop into both eyes 3 times daily        fluticasone-salmeterol 250-50 MCG/DOSE diskus inhaler    ADVAIR    60 Inhaler    Inhale 1 puff into the lungs 2 times daily    Mild persistent asthma without complication       ICAPS AREDS 2 PO           latanoprost 0.005 % ophthalmic solution    XALATAN    7.5 mL    Place 1 drop into both eyes At Bedtime    Mild stage glaucoma(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  At end of this prescription come into clinic for a lab only TSH check.    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

## 2018-09-10 NOTE — LETTER
9/10/2018         RE: Manasa French  [address] [address]        Dear Colleague,    Thank you for referring your patient, Manasa French, to the [facility]. Please see a copy of my visit note below.    Name: Manasa French  Date: 7/2/2018  Seen in consultation with , [facility] for osteoporosis.     HPI:  Manasa French is a 72 year old female who presents for the evaluation of osteoperosis .  H/o vertebral fracture in 2016 (while pulling a hose tripped over on cement sidewalk), 2018 ( walking on treadmill and tripped)  Last DEXA 2014- osteopenia. Was not started on medication at that time.    H/o graves diease s/p BARAHONA in 2003- hypothyroidism following that. Now taking levothyroxine 112 mcg/day.    Smoke:No  Family History:No  Menstrual history/Birthing: s/p menopause in 50s. Was on HRT for many years. About 10 years.  Fractures:as above. H/o leg fracture in 2012.  Kidney stones: No  GI Surgery:No  Duration of therapy:  Never been on medication  Exercise:Yes: walking and biking.  Diet:   Milk: 1 serving/day   Cheese: everyday   Yogurt/Cottage Cheese: few times a week  Ca/Vitamin D: takes calcium 1200 mg/day and vit D 2000 ID/day  Alcohol:  No  Eating Disorder: No  Steroid Use:  Yes: was on prednisone X 10 days 2018  PMH/PSH:  Past Medical History:   Diagnosis Date     6th nerve palsy     OU     Cataract      Graves disease      Hypertension      Nystagmus      Primary open angle glaucoma      Pseudophakia, right eye 4/29/2014     Thyroid eye disease      Unspecified hypothyroidism      Past Surgical History:   Procedure Laterality Date     BACK SURGERY  2005    Dr Welsh     CATARACT IOL, RT/LT Right 04/15/2014     CATARACT IOL, RT/LT Left 04/29/2014     EYE SURGERY  1987    Shunt- A/C malformation     HERNIA REPAIR  1994    umbilical     LUMPECTOMY BREAST  1974    Left benign     SHOULDER SURGERY  1996    Left      WRIST  "SURGERY  1976    Ganglion Right Wrist     Family Hx:  Family History   Problem Relation Age of Onset     Colon Cancer Mother      Glaucoma Mother      Hypertension Mother      Thyroid Disease Mother      Breast Cancer Mother      Cancer Father      leukemia     Diabetes Father      Glaucoma Father      Glasses (<7 y/o) Brother      Hypertension Sister      Thyroid Disease Sister      Glasses (<7 y/o) Sister      Hypertension Sister      Thyroid Disease Sister      Glasses (<7 y/o) Sister      Glasses (<7 y/o) Sister      Glasses (<7 y/o) Sister      Glasses (<7 y/o) Brother      Diabetes Son      virus attacked pancreas       Social Hx:  Social History     Social History     Marital status:      Spouse name: N/A     Number of children: N/A     Years of education: N/A     Occupational History     Not on file.     Social History Main Topics     Smoking status: Never Smoker     Smokeless tobacco: Never Used     Alcohol use 0.0 oz/week     0 Standard drinks or equivalent per week      Comment: wine with supper     Drug use: No     Sexual activity: Yes     Partners: Male     Other Topics Concern     Parent/Sibling W/ Cabg, Mi Or Angioplasty Before 65f 55m? No     Social History Narrative          MEDICATIONS:  has a current medication list which includes the following prescription(s): alendronate, calcium carb-cholecalciferol, carboxymethylcellulose, vitamin d, fluticasone-salmeterol, latanoprost, levothyroxine, losartan, multiple vitamins-minerals, and timolol, and the following Facility-Administered Medications: apraclonidine.    ROS     ROS: 10 point ROS neg other than the symptoms noted above in the HPI.    Physical Exam   VS: /74 (BP Location: Right arm, Patient Position: Chair, Cuff Size: Adult Regular)  Pulse 84  Temp 97.6  F (36.4  C) (Oral)  Ht 1.676 m (5' 6\")  Wt 70.8 kg (156 lb)  SpO2 99%  Breastfeeding? No  BMI 25.18 kg/m2  GENERAL: AXOX3, NAD, well dressed, answering questions " appropriately, appears stated age.  HEENT: OP clear, no LAD, no TM, non-tender, no exopthalmous, no proptosis, EOMI, no lig lag, no retraction  NECK: Thyroid normal in size, non tender, no nodules were palpated.  CV: RRR, no rubs, gallops, no murmurs  LUNGS: CTAB, no wheezes, rales, or ronchi  ABDOMEN: +BS  EXTREMITIES: no edema, +pulses, no rashes, no lesions  NEUROLOGY: CN grossly intact, + DTR upper and lower extremity, no tremors  MSK: grossly intact, Spine midline, no TTP.  SKIN: no rashes, no lesions    LABS:  BMP:  Last Basic Metabolic Panel:  Lab Results   Component Value Date     2013      Lab Results   Component Value Date    POTASSIUM 4.2 2017     Lab Results   Component Value Date    CHLORIDE 106 2013     Lab Results   Component Value Date    TETE 8.6 2013     Lab Results   Component Value Date    CO2 29 2013     Lab Results   Component Value Date    BUN 19 2013     Lab Results   Component Value Date    CR 0.97 2017     Lab Results   Component Value Date     2013       TFTs:  Lab Results   Component Value Date    TSH 5.27 2017       LFTs:  Liver Function Studies - No results for input(s): PROTTOTAL, ALBUMIN, BILITOTAL, ALKPHOS, AST, ALT, BILIDIRECT in the last 39318 hours.    PTH:  ENDO CALCIUM LABS-UMP Latest Ref Rng & Units 2018   PARATHYROID HORMONE INTACT 18 - 80 pg/mL 45       Vitamin D:  Vitamin D Deficiency Screening Results:  Lab Results   Component Value Date    VITDT 43 2018    VITDT 57 2014       BoneTurnOverMarkers:    Component      Latest Ref Rng & Units 2018   N-Telopeptide X-Link      nM BCE 16.1   Osteocalcin      11 - 50 ng/mL 19       DEXA :  BONE DENSITOMETRY  [facility]  [address] [place] [address]  2014     PATIENT: Manasa French  CHART: [id number]   : 1945  AGE: 69 year old  SEX: female   REFERRING PHYSICIAN: Dr Kyleigh TORRES  [place]      PROCEDURE: Bone density scanning was performed using DXA technology of the lumbar spine and hip. Scanning was performed on a HoloEcochlor scanner. Reporting is completed in the form of a T-score. The T-score represents the standard deviation from peak bone mass based on a young healthy adult.    REFERENCE T-SCORES:   Normal -1.0 and greater   Osteopenia Between -1.0 and -2.5   Osteoporosis -2.5 and less     RISK FACTORS: Post-menopausal  CURRENT TREATMENT: Calcium, Vitamin D    FINDINGS:  Lumbar Spine (L1-L3) T-score: 1.4  Left Femoral Neck T-score: -1.1  Right Femoral Neck T-score: -1.5    Lumbar (L1-L4) BMD: 1.591 Previous: none   Total Hip Lt/ Rt BMD: Lt 0.893 Rt 0.730 Previous: Lt 0.843 Rt .0870    Comparison is made to another DXA performed on the same Hologic machine on 05/17/2012.   IMPRESSION  Osteopenia (low bone mass)  Recommendations include ensuring adequate daily Calcium and Vitamin D intake     Compared to previous bone densitometry performed on this patient, there is the suggestion of no significant change.      Current NOF guidelines recommend treatment for patients with the following:  - Prior hip or vertebral fracture  - T-score -2.5 or below  - A 10 year risk of any major osteoporotic fracture >20% or 10 year risk of hip fracture >3%, as calculated using the FRAX calculator (www.shef.ac.uk/FRAX).       This patient's risks with the use of FRAX (based on available information) are 11 % for major osteoporotic fracture and 2.3 % for hip fracture.   Based on these guidelines, treatment (in addition to calcium and vitamin D) is not recommended for this patient.  While this is meant as an aid to clinical decision-making, clinical judgment must still be used.     MRI 2018:  Mild compression fracture insults along the superior endplates of T12 and L2, new from 8/2016  The previous compression fracture along the superior endplate of L3 has mature  High-grade central canal stenosis persists at both  L2-3 and L3-4  Previous combined interbody and instrumented bilateral posterolateral fusion from L4/S1.  Solid bony bridging across the operated levels, appeared unchanged.    DEXA 2018:  FINDINGS:                   Left Femoral Neck            T-score:  -1.8               Right Femoral Neck          T-score:  -1.6               Forearm (radius 33%)       T-score:  -0.4                             Total Hip Mean BMD: 0.874            Previous: 0.876      Comparison cannot be made to another DXA performed on a Hologic machine on 9/14.       IMPRESSION  Osteopenia (low bone mass)  See FRAX comments below regarding treatment  Recommendations include ensuring adequate daily Calcium and Vitamin D intake     Current NOF guidelines recommend treatment for patients with the following:  - Prior hip or vertebral fracture  - T-score -2.5 or below  - A 10 year risk of any major osteoporotic fracture >20% or 10 year risk of hip fracture >3%, as calculated using the FRAX calculator (www.shef.ac.uk/FRAX).       This patient's risks with the use of FRAX (based on available information) are 15 % for major osteoporotic fracture and 4.1 % for hip fracture.   Based on these guidelines, treatment (in addition to calcium and vitamin D) is recommended for this patient.  While this is meant as an aid to clinical decision-making, clinical judgment must still be used.      All pertinent notes, labs, and images personally reviewed by me.   Records from outside clinic reviewed.    A/P  Ms.Katheryn DIEGO French is a 73 year old here for the evaluation of:    #1 Osteoporosis:  Risk factors for low bone density include personal history of fracture or family history, , advanced age, female, Estrogen deficiency.  MRI showing recent vertebral fracture.  A prior history of vertebral fracture greatly increases the risk of subsequent fractures. A history of other medical diseases impacting on bone may also affect bone health.    DEXA  2018--consistent with osteopenia ( + h/o vertebral fractures) with a 10 year risk of any major osteoporotic fracture >20% or 10 year risk of hip fracture >3%, as calculated using the FRAX calculator  She has never been on medication for osteoporosis/osteopenia  Normal PTH, vit D and creatinine.  Has upcoming spinal fusion surgery.  Plan:  Discussed diagnosis, treatment options with pt.  Recommend to start Fosamax 70 mg once a week.  Discussed Prolia, Forteo in general. Given h/o vertebral fracture can be a candidate for forteo in future.  Discussed indications, risks and benefits of all medications prescribed, and answered questions to patient's satisfaction.  The patient indicates understanding of these issues and agrees with the plan.  The pt was advised to    Maintain an adequate calcium and vitamin D intake and to supplement vitamin D if needed to maintain serum levels of 25 hydroxy D (25 OH D) between 30-60 ng/ml.    Limit alcohol intake to no more than 2 servings per day.    Limit caffeine intake.    Maintain an active lifestyle including weight-bearing exercises for at least 30 mins daily.    Take measures to reduce the risk of falling.      #2. Hypothyroidism, postablative:  Comment: Currently taking levothyroxine 125 mcg per day. Dose was increase recently based on labs by PCP.  Managed by PCP.    Instructions on Fosamax use and side effects - particularly esophageal adverse events - are carefully reviewed with her. This drug must be taken upon arising for the day on an empty stomach, with a large 6-8 ounce glass of water; she must remain NPO in the upright position for at least 30 minutes afterwards and until after the first food of the day. If esophageal irritation is noted, she will stop the drug and call my office.  Treatment with bisphosphonate therapy will decrease fracture risk 50-70%.   There is risk of osteonecrosis of the jaw in patients using bisphosphonates is approximately 1/1700-1/100,000,  with development most likely related to invasive dental procedures. If an invasive dental procedure was necessary, preferably stop the bisphosphonate approximately 3 months prior to reduce the risk. Let your dentist know that you are on this medication.  The data available at this time suggests that there is probably a small increase risk of atypical (nontraumatic) subtrochanteric fractures of the femur in patients on bisphosphonate therapy compared to those not on it. One large study suggested that for every 100 fractures prevented with bisphosphonate therapy, less than one femur fracture will occur. Other studies suggest one episode per 2,500 patient years. Patient should call with leg pain.        More than 50% of the time spent with Ms. French on counseling / coordinating her care.     Follow-up:  1 year    Yani Box MD  Endocrinology  [facility]  CC: Clinic, [facility]    Addendum to above note and clinic visit:    Labs reviewed.    See result note/telephone encounter.          Again, thank you for allowing me to participate in the care of your patient.        Sincerely,        Yani Box MD

## 2018-09-10 NOTE — PROGRESS NOTES
HPI and Plan:   See dictation    No orders of the defined types were placed in this encounter.      No orders of the defined types were placed in this encounter.      There are no discontinued medications.      Encounter Diagnoses   Name Primary?     Abnormal electrocardiogram      HTN, goal below 140/90 Yes       CURRENT MEDICATIONS:  Current Outpatient Prescriptions   Medication Sig Dispense Refill     Calcium Carb-Cholecalciferol (CALCIUM CARBONATE-VITAMIN D3 PO)        carboxymethylcellulose (CELLUVISC/REFRESH LIQUIGEL) 1 % ophthalmic solution Place 1 drop into both eyes 3 times daily       Cholecalciferol (VITAMIN D) 1000 UNITS capsule Take  by mouth.       fluticasone-salmeterol (ADVAIR) 250-50 MCG/DOSE diskus inhaler Inhale 1 puff into the lungs 2 times daily 60 Inhaler 3     latanoprost (XALATAN) 0.005 % ophthalmic solution Place 1 drop into both eyes At Bedtime 7.5 mL 3     levothyroxine (SYNTHROID/LEVOTHROID) 125 MCG tablet TAKE 1 TABLET EVERY DAY .  At end of this prescription come into clinic for a lab only TSH check. 90 tablet 1     losartan (COZAAR) 100 MG tablet Take 1 tablet (100 mg) by mouth daily 90 tablet 2     Multiple Vitamins-Minerals (ICAPS AREDS 2 PO)        timolol (TIMOPTIC) 0.5 % ophthalmic solution Place 1 drop into both eyes every morning 3 Bottle 4     alendronate (FOSAMAX) 70 MG tablet Take 1 tablet (70 mg) by mouth every 7 days (Patient not taking: Reported on 9/10/2018) 12 tablet 3     [DISCONTINUED] alendronate (FOSAMAX) 70 MG tablet Take 1 tablet (70 mg) by mouth every 7 days 4 tablet 11       ALLERGIES     Allergies   Allergen Reactions     Alphagan [Brimonidine] Unknown     Animal Dander      cats     Cosopt [Dorzolamide-Timolol] Unknown     No Clinical Screening - See Comments Other (See Comments)     Animal dander (cats)     Seasonal Allergies        PAST MEDICAL HISTORY:  Past Medical History:   Diagnosis Date     6th nerve palsy     OU     Cataract      Graves disease       Hypertension      Nystagmus      Primary open angle glaucoma      Pseudophakia, right eye 4/29/2014     Thyroid eye disease      Unspecified hypothyroidism        PAST SURGICAL HISTORY:  Past Surgical History:   Procedure Laterality Date     BACK SURGERY  2005    Dr Welsh     CATARACT IOL, RT/LT Right 04/15/2014     CATARACT IOL, RT/LT Left 04/29/2014     EYE SURGERY  1987    Shunt- A/C malformation     HERNIA REPAIR  1994    umbilical     LUMPECTOMY BREAST  1974    Left benign     SHOULDER SURGERY  1996    Left      WRIST SURGERY  1976    Ganglion Right Wrist       FAMILY HISTORY:  Family History   Problem Relation Age of Onset     Colon Cancer Mother      Glaucoma Mother      Hypertension Mother      Thyroid Disease Mother      Breast Cancer Mother      Cancer Father      leukemia     Diabetes Father      Glaucoma Father      Glasses (<9 y/o) Brother      Hypertension Sister      Thyroid Disease Sister      Glasses (<9 y/o) Sister      Hypertension Sister      Thyroid Disease Sister      Glasses (<9 y/o) Sister      Glasses (<9 y/o) Sister      Glasses (<9 y/o) Sister      Glasses (<9 y/o) Brother      Diabetes Son      virus attacked pancreas       SOCIAL HISTORY:  Social History     Social History     Marital status:      Spouse name: N/A     Number of children: N/A     Years of education: N/A     Social History Main Topics     Smoking status: Never Smoker     Smokeless tobacco: Never Used     Alcohol use 0.0 oz/week     0 Standard drinks or equivalent per week      Comment: wine with supper     Drug use: No     Sexual activity: Yes     Partners: Male     Other Topics Concern     Parent/Sibling W/ Cabg, Mi Or Angioplasty Before 65f 55m? No     Social History Narrative       Review of Systems:  Skin:  Positive for   dry spots on arms    Eyes:  Positive for glasses;glaucoma macular degeneration   ENT:  Positive for hearing loss loosing hearing in left ear   Respiratory:  Positive for dyspnea on exertion    "  Cardiovascular:    lightheadedness;Positive for missed heart beat only recorded via at home BP machine Pt cannot feel it.   Gastroenterology: Negative      Genitourinary:  Positive for nocturia 2 times per night   Musculoskeletal:  Positive for back pain    Neurologic:  Positive for headaches;numbness or tingling of hands numbness in right hand when homding walking pole.   Psychiatric:  Negative      Heme/Lymph/Imm:  Positive for easy bruising    Endocrine:  Positive for thyroid disorder      Physical Exam:  Vitals: /84 (BP Location: Right arm, Patient Position: Sitting, Cuff Size: Adult Regular)  Pulse 80  Ht 1.676 m (5' 6\")  Wt 70.5 kg (155 lb 6.4 oz)  SpO2 98%  BMI 25.08 kg/m2    Constitutional:  cooperative, alert and oriented, well developed, well nourished, in no acute distress        Skin:  warm and dry to the touch, no apparent skin lesions or masses noted          Head:  normocephalic, no masses or lesions        Eyes:  pupils equal and round, conjunctivae and lids unremarkable, sclera white, no xanthalasma, EOMS intact, no nystagmus        Lymph:      ENT:  no pallor or cyanosis, dentition good        Neck:           Respiratory:  normal breath sounds, clear to auscultation, normal A-P diameter, normal symmetry, normal respiratory excursion, no use of accessory muscles         Cardiac: regular rhythm;normal S1 and S2;no murmurs, gallops or rubs detected                pulses full and equal                                        GI:           Extremities and Muscular Skeletal:  no edema;no spinal abnormalities noted;normal muscle strength and tone              Neurological:  no gross motor deficits        Psych:  affect appropriate, oriented to time, person and place        CC  Evelia Castro MD  [facility] [place] [address]              "

## 2018-09-11 ENCOUNTER — TELEPHONE (OUTPATIENT)
Dept: CARDIOLOGY | Facility: CLINIC | Age: 73
End: 2018-09-11

## 2018-09-11 NOTE — TELEPHONE ENCOUNTER
Copy of Dr. Ordonez last OV note to be faxed.  Called Patient, Nate Fax to [facility] for surgery scheduled 9-12-18.  Note faxed.

## 2018-09-11 NOTE — TELEPHONE ENCOUNTER
----- Message from Willy Aguillon MD sent at 9/10/2018 11:26 AM CDT -----  Please fax a copy of my consult to . I will proof read it this afternoon and sign it. Sent it tomorrow. thanks

## 2018-09-19 ENCOUNTER — OFFICE VISIT (OUTPATIENT)
Dept: FAMILY MEDICINE | Facility: CLINIC | Age: 73
End: 2018-09-19
Payer: COMMERCIAL

## 2018-09-19 VITALS
DIASTOLIC BLOOD PRESSURE: 80 MMHG | TEMPERATURE: 98.2 F | OXYGEN SATURATION: 96 % | SYSTOLIC BLOOD PRESSURE: 140 MMHG | HEART RATE: 85 BPM

## 2018-09-19 DIAGNOSIS — R29.898 DECREASED GRIP STRENGTH OF RIGHT HAND: ICD-10-CM

## 2018-09-19 DIAGNOSIS — R35.0 URINARY FREQUENCY: ICD-10-CM

## 2018-09-19 DIAGNOSIS — R26.2 DISABILITY OF WALKING: ICD-10-CM

## 2018-09-19 DIAGNOSIS — Z98.1 S/P LUMBAR FUSION: Primary | ICD-10-CM

## 2018-09-19 LAB
ALBUMIN UR-MCNC: NEGATIVE MG/DL
APPEARANCE UR: CLEAR
BILIRUB UR QL STRIP: NEGATIVE
COLOR UR AUTO: YELLOW
GLUCOSE UR STRIP-MCNC: NEGATIVE MG/DL
HGB UR QL STRIP: NEGATIVE
KETONES UR STRIP-MCNC: NEGATIVE MG/DL
LEUKOCYTE ESTERASE UR QL STRIP: NEGATIVE
NITRATE UR QL: NEGATIVE
PH UR STRIP: 5.5 PH (ref 5–7)
SOURCE: NORMAL
SP GR UR STRIP: 1.02 (ref 1–1.03)
UROBILINOGEN UR STRIP-ACNC: 0.2 EU/DL (ref 0.2–1)

## 2018-09-19 PROCEDURE — 81003 URINALYSIS AUTO W/O SCOPE: CPT | Performed by: FAMILY MEDICINE

## 2018-09-19 PROCEDURE — 99214 OFFICE O/P EST MOD 30 MIN: CPT | Performed by: FAMILY MEDICINE

## 2018-09-19 NOTE — LETTER
September 21, 2018      Manasa R Manolo  [address] [address]        Dear ,    We are writing to inform you of your test results.    Your test results fall within the expected range(s) or remain unchanged from previous results.  Please continue with current treatment plan.    Resulted Orders   UA reflex to Microscopic   Result Value Ref Range    Color Urine Yellow     Appearance Urine Clear     Glucose Urine Negative NEG^Negative mg/dL    Bilirubin Urine Negative NEG^Negative    Ketones Urine Negative NEG^Negative mg/dL    Specific Gravity Urine 1.020 1.003 - 1.035    Blood Urine Negative NEG^Negative    pH Urine 5.5 5.0 - 7.0 pH    Protein Albumin Urine Negative NEG^Negative mg/dL    Urobilinogen Urine 0.2 0.2 - 1.0 EU/dL    Nitrite Urine Negative NEG^Negative    Leukocyte Esterase Urine Negative NEG^Negative    Source Midstream Urine        If you have any questions or concerns, please call the clinic at the number listed above.       Sincerely,        Evelia Castro MD

## 2018-09-19 NOTE — PROGRESS NOTES
SUBJECTIVE:   Manasa French is a 73 year old female who presents to clinic today for the following health issues:      Pt here for a post op from surgery 9-12-18  Right hand feeling numb since 9-18-18 7:30pm  Sleep is off only about 1 hour at a time  Night time urine frequency seems to be high.      73-year-old female who is 7 days out from lumbar fusion at [facility].  She is here for several concerns today:    1.  She has noted urinary frequency increased since her surgery.  She did have a Cole placed for 2 days after surgery.  She actually was given antibiotics at the hospital and finished those yesterday, however symptoms persist.    2.  Patient is still having pain but it is improving.  She has decreased her oxycodone to every 5 hours and wonders if she could decrease more.  She is wondering if she needs to continue taking a stool softener every day as her stools are quite loose.    3.  Patient is wondering if we sent her 's disability parking form.  We did send this last week.  She is wondering how long it will take to get to her.    4.  First name and her  took a long 30 minute walk last night.  When she came home she noted some numbness and difficulty gripping on her right hand.  She is wondering if she should call her surgeon's office about this.  No numbness or other weakness.        Problem list and histories reviewed & adjusted, as indicated.  Additional history: as documented    Reviewed and updated as needed this visit by clinical staff  Meds       Reviewed and updated as needed this visit by Provider         ROS:  Constitutional, HEENT, cardiovascular, pulmonary, gi and gu systems are negative, except as otherwise noted.    OBJECTIVE:     /80 (Cuff Size: Adult Regular)  Pulse 85  Temp 98.2  F (36.8  C) (Tympanic)  SpO2 96%  There is no height or weight on file to calculate BMI.  GENERAL: alert, no distress, fatigued and in back brace, uncomfortable appearing  NECK:  no adenopathy, no asymmetry, masses, or scars and thyroid normal to palpation  RESP: lungs clear to auscultation - no rales, rhonchi or wheezes  CV: regular rate and rhythm, normal S1 S2, no S3 or S4, no murmur, click or rub, no peripheral edema and peripheral pulses strong  ABDOMEN: soft, nontender, no hepatosplenomegaly, no masses and bowel sounds normal  MS: no gross musculoskeletal defects noted, no edema  CRANIAL NERVES: Discs flat. Pupils equal, round and reactive to light. Extraocular movements full. Visual fields full. Face moves symmetrically. Tongue midline.   NEUROLOGIC: upper extremity  strenght 5/5, finger extension 3/5 on right hand.  Reflexes less on right side then left but still present.  Sensation intact.  Toe signs are down-going. Good finger-nose-finger, fine finger movement, and heel-shin maneuvers. Gait normal-based.    ASSESSMENT/PLAN:           ICD-10-CM    1. S/P lumbar fusion Z98.1    2. Urinary frequency R35.0 UA reflex to Microscopic   3. Decreased  strength of right hand R29.898    4. Disability of walking R26.2      1.  I advised her to call her surgeon's office about decreased  strength of right hand.  She and her  will do this ASAP upon leaving.  Has no other right-sided neurologic symptoms.    2.  Will check UA for potential UTI given recent hospitalization with Cole catheter placement.  I will call with results of positive.    3.  For disability parking paperwork I completed another form for her today.  Gave to patient so that her  can drop it off at [facility] in person.    4.  Discussed stretching out pain medication and slow taper off.  Discussed that as long as she is on narcotic pain medication she should continue her stool softener.    Discussed with patient, all questions answered, in agreement with this plan, will return or seek further care if not improving or worsening.       Evelia Castro MD  [facility]

## 2018-09-19 NOTE — MR AVS SNAPSHOT
After Visit Summary   9/19/2018    Manasa French    MRN: [id number]           Patient Information     Date Of Birth          1945        Visit Information        Provider Department      9/19/2018 11:00 AM Evelia Castro MD [facility]        Today's Diagnoses     S/P lumbar fusion    -  1    Urinary frequency        Decreased  strength of right hand        Disability of walking           Follow-ups after your visit        Your next 10 appointments already scheduled     Oct 31, 2018  8:00 AM CDT   LAB with BM LAB   [facility] ([facility])    [address] [place] [address]   [phone]           Please do not eat 10-12 hours before your appointment if you are coming in fasting for labs on lipids, cholesterol, or glucose (sugar). This does not apply to pregnant women. Water, hot tea and black coffee (with nothing added) are okay. Do not drink other fluids, diet soda or chew gum.            Jan 28, 2019  2:00 PM CST   RETURN RETINA with Katy Page MD   [facility] Clinic ([facility])    [address] [address]   [phone]            Feb 11, 2019  1:00 PM CST   RETURN GLAUCOMA with Radha Gallardo MD   Eye Clinic ([facility])    [address] [address]   [phone]              Who to contact     If you have questions or need follow up information about today's clinic visit or your schedule please contact [facility] directly at [phone].  Normal or non-critical lab and imaging results will be communicated to you by MyChart, letter or phone within 4 business days after the clinic has received the results. If you do not hear from us within 7 days, please  "contact the clinic through [employer] or phone. If you have a critical or abnormal lab result, we will notify you by phone as soon as possible.  Submit refill requests through [employer] or call your pharmacy and they will forward the refill request to us. Please allow 3 business days for your refill to be completed.          Additional Information About Your Visit        [facility] Information     [employer] lets you send messages to your doctor, view your test results, renew your prescriptions, schedule appointments and more. To sign up, go to [email or web address] . Click on \"Log in\" on the left side of the screen, which will take you to the Welcome page. Then click on \"Sign up Now\" on the right side of the page.     You will be asked to enter the access code listed below, as well as some personal information. Please follow the directions to create your username and password.     Your access code is: [email or web address]  Expires: 2018  9:48 AM     Your access code will  in 90 days. If you need help or a new code, please call your [facility] clinic or [phone].        Care EveryWhere ID     This is your Care EveryWhere ID. This could be used by other organizations to access your [facility] medical records  [id number]        Your Vitals Were     Pulse Temperature Pulse Oximetry             85 98.2  F (36.8  C) (Tympanic) 96%          Blood Pressure from Last 3 Encounters:   18 140/80   09/10/18 140/84   09/10/18 138/74    Weight from Last 3 Encounters:   09/10/18 155 lb 6.4 oz (70.5 kg)   09/10/18 156 lb (70.8 kg)   18 158 lb 8 oz (71.9 kg)              We Performed the Following     UA reflex to Microscopic        Primary Care Provider Office Phone # Fax #    [facility] [phone] [address], [address]  [facility] [place] [facility] [address]        Equal Access to Services     CHELSY MONTERROSO AH: Babar Almeida, tu wiley, edwardo osullivan " benji eldermarci steinbergaan ah. So [facility] [phone].    ATENCIÓN: Si suleman jacobsen, tiene a martinez disposición servicios gratuitos de asistencia lingüística. Juan C al [phone].    We comply with applicable federal civil rights laws and [place] laws. We do not discriminate on the basis of race, color, national origin, age, disability, sex, sexual orientation, or gender identity.            Thank you!     Thank you for choosing [facility]  for your care. Our goal is always to provide you with excellent care. Hearing back from our patients is one way we can continue to improve our services. Please take a few minutes to complete the written survey that you may receive in the mail after your visit with us. Thank you!             Your Updated Medication List - Protect others around you: Learn how to safely use, store and throw away your medicines at www.disposemymeds.org.          This list is accurate as of 9/19/18  2:17 PM.  Always use your most recent med list.                   Brand Name Dispense Instructions for use Diagnosis    alendronate 70 MG tablet    FOSAMAX    12 tablet    Take 1 tablet (70 mg) by mouth every 7 days    Hypothyroidism, postablative       CALCIUM CARBONATE-VITAMIN D3 PO           carboxymethylcellulose 1 % ophthalmic solution    CELLUVISC/REFRESH LIQUIGEL     Place 1 drop into both eyes 3 times daily        fluticasone-salmeterol 250-50 MCG/DOSE diskus inhaler    ADVAIR    60 Inhaler    Inhale 1 puff into the lungs 2 times daily    Mild persistent asthma without complication       ICAPS AREDS 2 PO           latanoprost 0.005 % ophthalmic solution    XALATAN    7.5 mL    Place 1 drop into both eyes At Bedtime    Mild stage glaucoma(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  At end of this prescription come into clinic for a lab only TSH check.    Hypothyroidism,  postablative       losartan 100 MG tablet    COZAAR    90 tablet    Take 1 tablet (100 mg) by mouth daily    Benign hypertension       timolol 0.5 % ophthalmic solution    TIMOPTIC    3 Bottle    Place 1 drop into both eyes every morning    Mild stage glaucoma(365.71)       vitamin D 1000 units capsule      Take  by mouth.

## 2018-09-21 NOTE — PROGRESS NOTES
Dear Rhonda,  These results are in an acceptable range.  I recommend no change to your plan of care.  Please contact the clinic with any questions.  Sincerely,  Dr. Evelia Castro MD  [facility]  [phone]

## 2018-10-16 ENCOUNTER — TELEPHONE (OUTPATIENT)
Dept: NURSING | Facility: CLINIC | Age: 73
End: 2018-10-16

## 2018-10-16 DIAGNOSIS — R35.1 FREQUENT URINATION AT NIGHT: Primary | ICD-10-CM

## 2018-10-16 DIAGNOSIS — N30.00 ACUTE CYSTITIS WITHOUT HEMATURIA: Primary | ICD-10-CM

## 2018-10-16 DIAGNOSIS — R35.1 FREQUENT URINATION AT NIGHT: ICD-10-CM

## 2018-10-16 LAB
ALBUMIN UR-MCNC: NEGATIVE MG/DL
APPEARANCE UR: CLEAR
BILIRUB UR QL STRIP: NEGATIVE
COLOR UR AUTO: YELLOW
GLUCOSE UR STRIP-MCNC: NEGATIVE MG/DL
HGB UR QL STRIP: ABNORMAL
KETONES UR STRIP-MCNC: NEGATIVE MG/DL
LEUKOCYTE ESTERASE UR QL STRIP: ABNORMAL
NITRATE UR QL: NEGATIVE
NON-SQ EPI CELLS #/AREA URNS LPF: ABNORMAL /LPF
PH UR STRIP: 6 PH (ref 5–7)
RBC #/AREA URNS AUTO: ABNORMAL /HPF
SOURCE: ABNORMAL
SP GR UR STRIP: 1.02 (ref 1–1.03)
UROBILINOGEN UR STRIP-ACNC: 0.2 EU/DL (ref 0.2–1)
WBC #/AREA URNS AUTO: ABNORMAL /HPF

## 2018-10-16 PROCEDURE — 87086 URINE CULTURE/COLONY COUNT: CPT | Performed by: FAMILY MEDICINE

## 2018-10-16 PROCEDURE — 81001 URINALYSIS AUTO W/SCOPE: CPT | Performed by: FAMILY MEDICINE

## 2018-10-16 RX ORDER — CIPROFLOXACIN 250 MG/1
250 TABLET, FILM COATED ORAL 2 TIMES DAILY
Qty: 6 TABLET | Refills: 0 | Status: SHIPPED | OUTPATIENT
Start: 2018-10-16 | End: 2018-10-26

## 2018-10-16 NOTE — TELEPHONE ENCOUNTER
Patient calling to say that she had back surgery on 9/12/18 and since not taking pain medication anymore she is not able to sleep at night as she is up during the night to urinate at least 6 times. During the day she is going every 2-3 hours she states that her urine is clear and no odor.C/O urgency and frequency.Has stopped fluid intake around 6:30 pm. Denies pain just pressure.Sleeps with her knees elevated. Will forward concerns to her provider.

## 2018-10-16 NOTE — TELEPHONE ENCOUNTER
Yes, patient can leave urine sample now and we'll schedule telephone visit if positive.  If negative, I'd like her to let her surgeon's office now about her symptoms if they are new since surgery.  If they are not helpful at her surgeon's office, she may need to schedule OV for further discussion.  I'll order.  Dr. Evelia Castro MD/[facility]

## 2018-10-17 LAB
BACTERIA SPEC CULT: NO GROWTH
SPECIMEN SOURCE: NORMAL

## 2018-10-25 ENCOUNTER — TRANSFERRED RECORDS (OUTPATIENT)
Dept: HEALTH INFORMATION MANAGEMENT | Facility: CLINIC | Age: 73
End: 2018-10-25

## 2018-10-26 ENCOUNTER — OFFICE VISIT (OUTPATIENT)
Dept: FAMILY MEDICINE | Facility: CLINIC | Age: 73
End: 2018-10-26
Payer: COMMERCIAL

## 2018-10-26 VITALS
OXYGEN SATURATION: 98 % | SYSTOLIC BLOOD PRESSURE: 136 MMHG | TEMPERATURE: 98.2 F | DIASTOLIC BLOOD PRESSURE: 82 MMHG | HEART RATE: 81 BPM

## 2018-10-26 DIAGNOSIS — R82.90 NONSPECIFIC FINDING ON EXAMINATION OF URINE: ICD-10-CM

## 2018-10-26 DIAGNOSIS — S60.459A FOREIGN BODY IN SKIN OF FINGER, INITIAL ENCOUNTER: ICD-10-CM

## 2018-10-26 DIAGNOSIS — R35.0 URINARY FREQUENCY: Primary | ICD-10-CM

## 2018-10-26 LAB
ALBUMIN UR-MCNC: NEGATIVE MG/DL
APPEARANCE UR: CLEAR
BILIRUB UR QL STRIP: NEGATIVE
COLOR UR AUTO: YELLOW
GLUCOSE UR STRIP-MCNC: NEGATIVE MG/DL
HGB UR QL STRIP: NEGATIVE
KETONES UR STRIP-MCNC: NEGATIVE MG/DL
LEUKOCYTE ESTERASE UR QL STRIP: ABNORMAL
NITRATE UR QL: NEGATIVE
NON-SQ EPI CELLS #/AREA URNS LPF: ABNORMAL /LPF
PH UR STRIP: 7 PH (ref 5–7)
RBC #/AREA URNS AUTO: ABNORMAL /HPF
SOURCE: ABNORMAL
SP GR UR STRIP: 1.01 (ref 1–1.03)
UROBILINOGEN UR STRIP-ACNC: 0.2 EU/DL (ref 0.2–1)
WBC #/AREA URNS AUTO: ABNORMAL /HPF

## 2018-10-26 PROCEDURE — 81001 URINALYSIS AUTO W/SCOPE: CPT | Performed by: FAMILY MEDICINE

## 2018-10-26 PROCEDURE — 99214 OFFICE O/P EST MOD 30 MIN: CPT | Performed by: FAMILY MEDICINE

## 2018-10-26 PROCEDURE — 87086 URINE CULTURE/COLONY COUNT: CPT | Performed by: FAMILY MEDICINE

## 2018-10-26 NOTE — PROGRESS NOTES
Labs were reviewed with patient in office; see my office visit note for details.   Evelia Castro MD

## 2018-10-26 NOTE — PROGRESS NOTES
SUBJECTIVE:   Manasa French is a 73 year old female who presents to clinic today for the following health issues:      URINARY TRACT SYMPTOMS      Duration: 5 weeks    Description  frequency and pressure    Intensity:  moderate, severe    Accompanying signs and symptoms:  Fever/chills: YES- chills  Flank pain no   Nausea and vomiting: no   Vaginal symptoms: none  Abdominal/Pelvic Pain: no     History  History of frequent UTI's: no   History of kidney stones: no   Sexually Active: no   Possibility of pregnancy: No    Precipitating or alleviating factors: None    Therapies tried and outcome: course of antibiotics - cipro   Outcome: not helpful    73 year old still recovering from major spinal surgery here today for recurrent urinary frequency since surgery.  Has been treated for Urinary Tract Infection 3 times since surgery; no improvement.  Worst at night, wakes her every 1-3 hours.  Has to go a fair amount every time.    Also, pt was cleaning up a broken glass and is worried there is a piece in her left pointer finger.  Felt sharp and painful earlier this week, now better.    Problem list and histories reviewed & adjusted, as indicated.  Additional history: as documented    Reviewed and updated as needed this visit by clinical staff  Tobacco  Allergies  Meds  Problems  Med Hx  Surg Hx  Fam Hx  Soc Hx        Reviewed and updated as needed this visit by Provider  Tobacco  Allergies  Meds  Problems         ROS:  Constitutional, HEENT, cardiovascular, pulmonary, gi and gu systems are negative, except as otherwise noted.    OBJECTIVE:     /82  Pulse 81  Temp 98.2  F (36.8  C) (Tympanic)  SpO2 98%  There is no height or weight on file to calculate BMI.  GENERAL: healthy, alert, no distress and in spinal brace  MS: no gross musculoskeletal defects noted, no edema  SKIN: no suspicious lesions or rashes, small papule left index finger, nontender.  NEURO: Normal strength and tone, mentation intact  and speech normal    ASSESSMENT/PLAN:     1. Urinary frequency    Hasn't responded to Urinary Tract Infection treatment.    Spinal says surgeon says it's unrelated to surgery; she just discussed with him yesterday.    Plan to check/screen for DM.    If no answers there, I think she should see urology as I'm out of ideas.  Referred.  - UA reflex to Microscopic and Culture  - Urine Microscopic  - UROLOGY ADULT REFERRAL  - **Glucose FUTURE anytime; Future  - **A1C FUTURE anytime; Future    2. Foreign body in skin of finger, initial encounter    Improving.  Offered xray but as symptoms improving will plan on watchful waiting, warm soaks.  Return to clinic if worsens or doesn't improve.      Evelia Castro MD  [facility]

## 2018-10-26 NOTE — MR AVS SNAPSHOT
After Visit Summary   10/26/2018    Manasa French    MRN: [id number]           Patient Information     Date Of Birth          1945        Visit Information        Provider Department      10/26/2018 10:40 AM Evelia Castro MD [facility]        Today's Diagnoses     Urinary frequency    -  1       Follow-ups after your visit        Additional Services     UROLOGY ADULT REFERRAL       Your provider has referred you to: [facility]: [facility] [place] [phone]   [email or web address]    Please be aware that coverage of these services is subject to the terms and limitations of your health insurance plan.  Call member services at your health plan with any benefit or coverage questions.      Please bring the following with you to your appointment:    (1) Any X-Rays, CTs or MRIs which have been performed.  Contact the facility where they were done to arrange for  prior to your scheduled appointment.    (2) List of current medications  (3) This referral request   (4) Any documents/labs given to you for this referral                  Your next 10 appointments already scheduled     Oct 31, 2018  8:00 AM CDT   LAB with BM LAB   [facility] ([facility])    [facility] [address] [address] [phone]           Please do not eat 10-12 hours before your appointment if you are coming in fasting for labs on lipids, cholesterol, or glucose (sugar). This does not apply to pregnant women. Water, hot tea and black coffee (with nothing added) are okay. Do not drink other fluids, diet soda or chew gum.            Jan 28, 2019  2:00 PM CST   RETURN RETINA with Katy Page MD   Eye Clinic ([facility])    [address]  [facility] [id number] [phone]             Feb 11, 2019  1:00 PM CST   RETURN GLAUCOMA with Radha Gallardo MD   Eye Clinic ([facility])    [address]  [address] Clin [address]  [facility] [id number] [phone]              Who to contact     If you have questions or need follow up information about today's clinic visit or your schedule please contact [facility] directly at [phone].  Normal or non-critical lab and imaging results will be communicated to you by MyChart, letter or phone within 4 business days after the clinic has received the results. If you do not hear from us within 7 days, please contact the clinic through MyChart or phone. If you have a critical or abnormal lab result, we will notify you by phone as soon as possible.  Submit refill requests through [employer] or call your pharmacy and they will forward the refill request to us. Please allow 3 business days for your refill to be completed.          Additional Information About Your Visit        Care EveryWhere ID     This is your Care EveryWhere ID. This could be used by other organizations to access your [place] medical records  [id number]        Your Vitals Were     Pulse Temperature Pulse Oximetry             81 98.2  F (36.8  C) (Tympanic) 98%          Blood Pressure from Last 3 Encounters:   10/26/18 142/80   09/19/18 140/80   09/10/18 140/84    Weight from Last 3 Encounters:   09/10/18 155 lb 6.4 oz (70.5 kg)   09/10/18 156 lb (70.8 kg)   08/31/18 158 lb 8 oz (71.9 kg)              We Performed the Following     UA reflex to Microscopic and Culture     Urine Microscopic     UROLOGY ADULT REFERRAL          Today's Medication Changes          These changes are accurate as of 10/26/18 11:16 AM.  If you have any questions, ask your nurse or doctor.               Stop taking these medicines if you haven't already. Please contact your care team if you have questions.     ciprofloxacin 250 MG tablet    Commonly known as:  CIPRO   Stopped by:  Evelia Castro MD                    Primary Care Provider Office Phone # Fax #    [facility] [phone]       [address] [facility] [address]  [facility] [address]        Equal Access to Services     CHELSY MONTERROSO AH: Hadii gil chávez hadcorieo Sochiloali, waaxda luqadaha, qaybta kaalmada adeegyada, waxay brendonin haymavisn vanessa natividadjames rodríguez. So [facility] [phone].    ATENCIÓN: Si habla español, tiene a martinez disposición servicios gratuitos de asistencia lingüística. Llame al [phone].    We comply with applicable federal civil rights laws and [place] laws. We do not discriminate on the basis of race, color, national origin, age, disability, sex, sexual orientation, or gender identity.            Thank you!     Thank you for choosing [facility]  for your care. Our goal is always to provide you with excellent care. Hearing back from our patients is one way we can continue to improve our services. Please take a few minutes to complete the written survey that you may receive in the mail after your visit with us. Thank you!             Your Updated Medication List - Protect others around you: Learn how to safely use, store and throw away your medicines at www.disposemymeds.org.          This list is accurate as of 10/26/18 11:16 AM.  Always use your most recent med list.                   Brand Name Dispense Instructions for use Diagnosis    alendronate 70 MG tablet    FOSAMAX    12 tablet    Take 1 tablet (70 mg) by mouth every 7 days    Hypothyroidism, postablative       CALCIUM CARBONATE-VITAMIN D3 PO           carboxymethylcellulose 1 % ophthalmic solution    CELLUVISC/REFRESH LIQUIGEL     Place 1 drop into both eyes 3 times daily        fluticasone-salmeterol 250-50 MCG/DOSE diskus inhaler    ADVAIR    60 Inhaler    Inhale 1 puff into the lungs 2 times daily    Mild persistent asthma without  complication       ICAPS AREDS 2 PO           latanoprost 0.005 % ophthalmic solution    XALATAN    7.5 mL    Place 1 drop into both eyes At Bedtime    Mild stage glaucoma(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  At end of this prescription come into clinic for a lab only TSH check.    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

## 2018-10-29 LAB
BACTERIA SPEC CULT: NO GROWTH
SPECIMEN SOURCE: NORMAL

## 2018-10-31 DIAGNOSIS — R35.0 URINARY FREQUENCY: ICD-10-CM

## 2018-10-31 DIAGNOSIS — E89.0 HYPOTHYROIDISM, POSTABLATIVE: ICD-10-CM

## 2018-10-31 LAB
GLUCOSE SERPL-MCNC: 105 MG/DL (ref 70–99)
HBA1C MFR BLD: 6 % (ref 0–5.6)
T4 FREE SERPL-MCNC: 1.57 NG/DL (ref 0.76–1.46)
TSH SERPL DL<=0.005 MIU/L-ACNC: 6.74 MU/L (ref 0.4–4)

## 2018-10-31 PROCEDURE — 36415 COLL VENOUS BLD VENIPUNCTURE: CPT | Performed by: FAMILY MEDICINE

## 2018-10-31 PROCEDURE — 84443 ASSAY THYROID STIM HORMONE: CPT | Performed by: FAMILY MEDICINE

## 2018-10-31 PROCEDURE — 83036 HEMOGLOBIN GLYCOSYLATED A1C: CPT | Performed by: FAMILY MEDICINE

## 2018-10-31 PROCEDURE — 84439 ASSAY OF FREE THYROXINE: CPT | Performed by: FAMILY MEDICINE

## 2018-10-31 PROCEDURE — 82947 ASSAY GLUCOSE BLOOD QUANT: CPT | Performed by: FAMILY MEDICINE

## 2018-11-01 ENCOUNTER — TELEPHONE (OUTPATIENT)
Dept: FAMILY MEDICINE | Facility: CLINIC | Age: 73
End: 2018-11-01

## 2018-11-01 NOTE — TELEPHONE ENCOUNTER
Reason for Call:  Request for results:    Name of test or procedure: labs results    Date of test of procedure: 10/31/18    Location of the test or procedure[place]    OK to leave the result message on voice mail or with a family member? YES    Phone number Patient can be reached at:  Home number on file [phone] (home)    Additional comments: Patient had some labs done yesterday and would like to know the results. Please call.    Call taken on 11/1/2018 at 1:51 PM by WILTON FERRO

## 2018-11-02 NOTE — TELEPHONE ENCOUNTER
Lab results show pre-diabetes but no overt diabetes.  I don't think this is the cause of her frequent urination.  I'd still like her to see urology if no better.  Can you let her know?  Thanks,  Dr. Evelia Castro MD/[facility]

## 2018-11-06 DIAGNOSIS — R35.0 URINARY FREQUENCY: Primary | ICD-10-CM

## 2018-11-07 NOTE — PROGRESS NOTES
[place]:  Your results here are confusing - your TSH is high, but so is your T4 (usually when your TSH is high your T4 is low).  Let's recheck in 3 months.  The rest of your tests are fine - your glucose and A1C put you in the pre-diabetic range but not fully diabetic.  I don't think this explains your frequent urination.  Let me know if you have any questions about this.  Dr. Evelia Castro MD  [facility]  [phone]

## 2018-11-12 ENCOUNTER — OFFICE VISIT (OUTPATIENT)
Dept: UROLOGY | Facility: CLINIC | Age: 73
End: 2018-11-12
Payer: COMMERCIAL

## 2018-11-12 VITALS
HEIGHT: 65 IN | OXYGEN SATURATION: 98 % | WEIGHT: 152 LBS | HEART RATE: 83 BPM | SYSTOLIC BLOOD PRESSURE: 138 MMHG | DIASTOLIC BLOOD PRESSURE: 70 MMHG | BODY MASS INDEX: 25.33 KG/M2

## 2018-11-12 DIAGNOSIS — R35.0 URINARY FREQUENCY: ICD-10-CM

## 2018-11-12 LAB
ALBUMIN UR-MCNC: NEGATIVE MG/DL
APPEARANCE UR: CLEAR
BILIRUB UR QL STRIP: NEGATIVE
COLOR UR AUTO: YELLOW
GLUCOSE UR STRIP-MCNC: NEGATIVE MG/DL
HGB UR QL STRIP: NEGATIVE
KETONES UR STRIP-MCNC: NEGATIVE MG/DL
LEUKOCYTE ESTERASE UR QL STRIP: ABNORMAL
NITRATE UR QL: NEGATIVE
PH UR STRIP: 6 PH (ref 5–7)
RESIDUAL VOLUME (RV) (EXTERNAL): 1
SOURCE: ABNORMAL
SP GR UR STRIP: 1.02 (ref 1–1.03)
UROBILINOGEN UR STRIP-ACNC: 0.2 EU/DL (ref 0.2–1)

## 2018-11-12 PROCEDURE — 99203 OFFICE O/P NEW LOW 30 MIN: CPT | Mod: 25 | Performed by: PHYSICIAN ASSISTANT

## 2018-11-12 PROCEDURE — 51798 US URINE CAPACITY MEASURE: CPT | Performed by: PHYSICIAN ASSISTANT

## 2018-11-12 PROCEDURE — 81003 URINALYSIS AUTO W/O SCOPE: CPT | Performed by: PHYSICIAN ASSISTANT

## 2018-11-12 RX ORDER — TOLTERODINE 2 MG/1
2 CAPSULE, EXTENDED RELEASE ORAL DAILY
Qty: 90 CAPSULE | Refills: 3 | Status: SHIPPED | OUTPATIENT
Start: 2018-11-12 | End: 2019-02-11

## 2018-11-12 RX ORDER — ESTRADIOL 0.1 MG/G
CREAM VAGINAL
Qty: 42.5 G | Refills: 3 | Status: SHIPPED | OUTPATIENT
Start: 2018-11-12 | End: 2019-02-11

## 2018-11-12 ASSESSMENT — PAIN SCALES - GENERAL: PAINLEVEL: MILD PAIN (2)

## 2018-11-12 NOTE — NURSING NOTE
Chief Complaint   Patient presents with     Clinic Care Coordination - Follow-up     Pt here for frequency and urgency   PVR is 1mL  Kassie Bojorquez CMA

## 2018-11-12 NOTE — MR AVS SNAPSHOT
After Visit Summary   11/12/2018    Manasa French    MRN: [id number]           Patient Information     Date Of Birth          1945        Visit Information        Provider Department      11/12/2018 2:30 PM Terri Siegel PA-C [facility] [place]        Today's Diagnoses     Urinary frequency          Care Instructions    Below is a list of things that can irritate the bladder and should be avoided:      Caffeinated soft drinks.    Coffee.    Tea.    Chocolate.    Tomato-based foods.    Acidic juices and fruits. (includes cranberry juice)    Alcohol.    Carbonated drinks.    Aspartame/Nutrasweet.    Estrace cream--apply a pea-sized amount to the opening of the vagina and urethra 3x a week at bedtime.     Detrol 2mg LA daily. Can increase to two caps daily if room for improvement at 4 weeks.           Follow-ups after your visit        Follow-up notes from your care team     Return in about 3 months (around 2/12/2019).      Your next 10 appointments already scheduled     Jan 28, 2019  2:00 PM CST   RETURN RETINA with Katy Page MD   Eye Clinic ([facility])    [address] [address]   [phone]            Feb 11, 2019  1:00 PM CST   RETURN GLAUCOMA with Radha Gallardo MD   Eye Clinic ([facility])    [address] [address]   [phone]              Who to contact     If you have questions or need follow up information about today's clinic visit or your schedule please contact [facility] [place] directly at [phone].  Normal or non-critical lab and imaging results will be communicated to you by MyChart, letter or phone within 4 business days after the clinic has received the results. If you do not hear from us within 7 days, please contact the clinic  "through [employer] or phone. If you have a critical or abnormal lab result, we will notify you by phone as soon as possible.  Submit refill requests through [employer] or call your pharmacy and they will forward the refill request to us. Please allow 3 business days for your refill to be completed.          Additional Information About Your Visit        Care EveryWhere ID     This is your Care EveryWhere ID. This could be used by other organizations to access your [facility] medical records  [id number]        Your Vitals Were     Pulse Height Pulse Oximetry BMI (Body Mass Index)          83 1.638 m (5' 4.5\") 98% 25.69 kg/m2         Blood Pressure from Last 3 Encounters:   11/12/18 138/70   10/26/18 136/82   09/19/18 140/80    Weight from Last 3 Encounters:   11/12/18 68.9 kg (152 lb)   09/10/18 70.5 kg (155 lb 6.4 oz)   09/10/18 70.8 kg (156 lb)              We Performed the Following     MEASURE POST-VOID RESIDUAL URINE/BLADDER CAPACITY, US NON-IMAGING ([address]19)     UA without Microscopic          Today's Medication Changes          These changes are accurate as of 11/12/18 11:59 PM.  If you have any questions, ask your nurse or doctor.               Start taking these medicines.        Dose/Directions    estradiol 0.1 MG/GM cream   Commonly known as:  ESTRACE VAGINAL   Used for:  Urinary frequency   Started by:  Terri Siegel PA-C        Apply a pea-sized amount to the vaginal opening and urethra M, W, F q. h.s.   Quantity:  42.5 g   Refills:  3       tolterodine 2 MG 24 hr capsule   Commonly known as:  DETROL LA   Used for:  Urinary frequency   Started by:  Terri Siegel PA-C        Dose:  2 mg   Take 1 capsule (2 mg) by mouth daily   Quantity:  90 capsule   Refills:  3            Where to get your medicines      These medications [id number] [place] [place] [facility] [address] [facility] [facility]  [address] [address]     Phone:  [phone]     estradiol 0.1 MG/GM cream    " tolterodine 2 MG 24 hr capsule                Primary Care Provider Office Phone # Fax #    [facility] [phone]       [address] [facility]  [facility] [address]        Equal Access to Services     CHELSY MONTERROSO : Hadii aad ku hadlulu Soduyen, waaxda luqadaha, qaybta kaalmada adeegerikda, bneji kimballnora haynesmarci bunch tacho rodríguez. So [facility] [phone].    ATENCIÓN: Si habla español, tiene a martinez disposición servicios gratuitos de asistencia lingüística. Llame al [phone].    We comply with applicable federal civil rights laws and [place] laws. We do not discriminate on the basis of race, color, national origin, age, disability, sex, sexual orientation, or gender identity.            Thank you!     Thank you for choosing [facility] [place]  for your care. Our goal is always to provide you with excellent care. Hearing back from our patients is one way we can continue to improve our services. Please take a few minutes to complete the written survey that you may receive in the mail after your visit with us. Thank you!             Your Updated Medication List - Protect others around you: Learn how to safely use, store and throw away your medicines at www.disposemymeds.org.          This list is accurate as of 11/12/18 11:59 PM.  Always use your most recent med list.                   Brand Name Dispense Instructions for use Diagnosis    alendronate 70 MG tablet    FOSAMAX    12 tablet    Take 1 tablet (70 mg) by mouth every 7 days    Hypothyroidism, postablative       CALCIUM CARBONATE-VITAMIN D3 PO           carboxymethylcellulose 1 % ophthalmic solution    CELLUVISC/REFRESH LIQUIGEL     Place 1 drop into both eyes 3 times daily        estradiol 0.1 MG/GM cream    ESTRACE VAGINAL    42.5 g    Apply a pea-sized amount to the vaginal opening and urethra M, W, F q. h.s.    Urinary frequency       * fluticasone-salmeterol 250-50  MCG/DOSE diskus inhaler    ADVAIR    60 Inhaler    Inhale 1 puff into the lungs 2 times daily    Mild persistent asthma without complication       * fluticasone-salmeterol 100-50 MCG/DOSE diskus inhaler    ADVAIR     Inhale 1 puff into the lungs        ICAPS AREDS 2 PO           latanoprost 0.005 % ophthalmic solution    XALATAN    7.5 mL    Place 1 drop into both eyes At Bedtime    Mild stage glaucoma(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  At end of this prescription come into clinic for a lab only TSH check.    Hypothyroidism, postablative       losartan 100 MG tablet    COZAAR    90 tablet    Take 1 tablet (100 mg) by mouth daily    Benign hypertension       timolol 0.5 % ophthalmic solution    TIMOPTIC    3 Bottle    Place 1 drop into both eyes every morning    Mild stage glaucoma(365.71)       tolterodine 2 MG 24 hr capsule    DETROL LA    90 capsule    Take 1 capsule (2 mg) by mouth daily    Urinary frequency       vitamin D 1000 units capsule      Take  by mouth.        * Notice:  This list has 2 medication(s) that are the same as other medications prescribed for you. Read the directions carefully, and ask your doctor or other care provider to review them with you.

## 2018-11-12 NOTE — LETTER
Date:November 13, 2018      Patient was self referred, no letter generated. Do not send.        [facility] Information

## 2018-11-12 NOTE — PATIENT INSTRUCTIONS
Below is a list of things that can irritate the bladder and should be avoided:      Caffeinated soft drinks.    Coffee.    Tea.    Chocolate.    Tomato-based foods.    Acidic juices and fruits. (includes cranberry juice)    Alcohol.    Carbonated drinks.    Aspartame/Nutrasweet.    Estrace cream--apply a pea-sized amount to the opening of the vagina and urethra 3x a week at bedtime.     Detrol 2mg LA daily. Can increase to two caps daily if room for improvement at 4 weeks.

## 2018-11-12 NOTE — LETTER
11/12/2018       RE: Manasa French  [address] [facility] [address]     Dear Colleague,    Thank you for referring your patient, Manasa French, to the [facility] [place] at [facility]. Please see a copy of my visit note below.    CC: Urinary urgency and frequency; microhematuria.    HPI: It is a pleasure to see Manasa French, a 73 year old female asked to be seen in consultation by Dr. Castro for the above. This problem has been going on for 5-6 weeks. Had spinal surgery past fall and not thought to be related to this.  Micro hematuria noted 10/16/18 with negative culture, hematuria has resolved.     The patient voids q2-3 hours during the day, nocturia x 4-5. There is urgency associated with symptoms and not much for urge incontinence. The patient does not wear protective pads. No leakage with coughing, sneezing, bending at the waist.      Denies any dysuria, gross hematuria, pyuria, sensation of incomplete bladder emptying, needing to strain or Crede to void, history of recent urinary tract infections or kidney stones. The patient does not  have any neurologic issues. Denies constipation. Fluid consumption includes mainly water daily (likes spicy foods, tomato based foods, cran and orange juice).    Past Medical History:   Diagnosis Date     6th nerve palsy     OU     Cataract      Graves disease      Hypertension      Nystagmus      Primary open angle glaucoma      Pseudophakia, right eye 4/29/2014     Thyroid eye disease      Unspecified hypothyroidism      Past Surgical History:   Procedure Laterality Date     BACK SURGERY  2005    Dr Welsh     CATARACT IOL, RT/LT Right 04/15/2014     CATARACT IOL, RT/LT Left 04/29/2014     EYE SURGERY  1987    Shunt- A/C malformation     HERNIA REPAIR  1994    umbilical     LUMPECTOMY BREAST  1974    Left benign     SHOULDER SURGERY  1996    Left      WRIST SURGERY  1976     Ganglion Right Wrist     Current Outpatient Prescriptions   Medication Sig Dispense Refill     alendronate (FOSAMAX) 70 MG tablet Take 1 tablet (70 mg) by mouth every 7 days 12 tablet 3     Calcium Carb-Cholecalciferol (CALCIUM CARBONATE-VITAMIN D3 PO)        carboxymethylcellulose (CELLUVISC/REFRESH LIQUIGEL) 1 % ophthalmic solution Place 1 drop into both eyes 3 times daily       Cholecalciferol (VITAMIN D) 1000 UNITS capsule Take  by mouth.       fluticasone-salmeterol (ADVAIR) 250-50 MCG/DOSE diskus inhaler Inhale 1 puff into the lungs 2 times daily 60 Inhaler 3     latanoprost (XALATAN) 0.005 % ophthalmic solution Place 1 drop into both eyes At Bedtime 7.5 mL 3     levothyroxine (SYNTHROID/LEVOTHROID) 125 MCG tablet TAKE 1 TABLET EVERY DAY .  At end of this prescription come into clinic for a lab only TSH check. 90 tablet 1     losartan (COZAAR) 100 MG tablet Take 1 tablet (100 mg) by mouth daily 90 tablet 2     Multiple Vitamins-Minerals (ICAPS AREDS 2 PO)        timolol (TIMOPTIC) 0.5 % ophthalmic solution Place 1 drop into both eyes every morning 3 Bottle 4     Allergies   Allergen Reactions     Alphagan [Brimonidine] Unknown     Animal Dander      cats     Cosopt [Dorzolamide-Timolol] Unknown     No Clinical Screening - See Comments Other (See Comments)     Animal dander (cats)     Seasonal Allergies      Family History: There is no h/o  malignancy.  There is no h/o urolithiasis.    Social History     Social History     Marital status:      Spouse name: N/A     Number of children: N/A     Years of education: N/A     Occupational History     Not on file.     Social History Main Topics     Smoking status: Never Smoker     Smokeless tobacco: Never Used     Alcohol use 0.0 oz/week     0 Standard drinks or equivalent per week      Comment: wine with supper     Drug use: No     Sexual activity: Yes     Partners: Male     Other Topics Concern     Parent/Sibling W/ Cabg, Mi Or Angioplasty Before 65f 55m? No  "    Social History Narrative          ROS: A comprehensive 14 point ROS was obtained and otherwise negative except for that outlined above in the HPI.    PHYSICAL EXAM:   /70 (BP Location: Left arm, Patient Position: Sitting, Cuff Size: Adult Regular)  Pulse 83  Ht 1.638 m (5' 4.5\")  Wt 68.9 kg (152 lb)  SpO2 98%  BMI 25.69 kg/m2  GEN: NAD  EYES: EOMI  MOUTH: MMM  NECK: Supple  RESP: Unlabored breathing  CARDIAC: No LE edema  SKIN: Warm  ABD: soft  NEURO: AAO    PVR: 0 mL.  U/A: neg    IMAGING: None    ASSESSMENT/PLAN:  Ms. Manasa French is a 73 year old female with urinary frequency and micro hematuria.     - Detrol LA 2mg, increase to 4 mg at 1 month if room for improvement. Rx sent to patient's pharmacy. Side effects discussed.  - Reviewed behavioral therapies, such as timed voiding, pelvic floor relaxation while voiding and not voiding in a hurry.  Consider possible pelvic floor physical therapy and biofeedback in the future.  -Estrace, this will treat atrophic vaginitis and urethral changes which may impact her pelvic discomfort and improve her urinary urgency  -Eliminate bladder irritants    Follow up in 3 months for reassessment.      Consider urodynamics and intravesical examination with cystoscopy in the future if the patient fails medication therapy.      I have enjoyed participating in the medical care of this patient.  Please do not hesitate to contact me with any questions or concerns.     Terri Siegel PA-C  [facility]    30 min spent face to face with the patient, >50% of that time spent on counseling and coordination of care of urinary frequency/urgency.       Again, thank you for allowing me to participate in the care of your patient.      Sincerely,    Terri Siegel PA-C, [facility]IR[facility]      "

## 2018-11-12 NOTE — PROGRESS NOTES
CC: Urinary urgency and frequency; microhematuria.    HPI: It is a pleasure to see Manasa French, a 73 year old female asked to be seen in consultation by Dr. Castro for the above. This problem has been going on for 5-6 weeks. Had spinal surgery past fall and not thought to be related to this.  Micro hematuria noted 10/16/18 with negative culture, hematuria has resolved.     The patient voids q2-3 hours during the day, nocturia x 4-5. There is urgency associated with symptoms and not much for urge incontinence. The patient does not wear protective pads. No leakage with coughing, sneezing, bending at the waist.      Denies any dysuria, gross hematuria, pyuria, sensation of incomplete bladder emptying, needing to strain or Crede to void, history of recent urinary tract infections or kidney stones. The patient does not  have any neurologic issues. Denies constipation. Fluid consumption includes mainly water daily (likes spicy foods, tomato based foods, cran and orange juice).    Past Medical History:   Diagnosis Date     6th nerve palsy     OU     Cataract      Graves disease      Hypertension      Nystagmus      Primary open angle glaucoma      Pseudophakia, right eye 4/29/2014     Thyroid eye disease      Unspecified hypothyroidism      Past Surgical History:   Procedure Laterality Date     BACK SURGERY  2005    Dr Welsh     CATARACT IOL, RT/LT Right 04/15/2014     CATARACT IOL, RT/LT Left 04/29/2014     EYE SURGERY  1987    Shunt- A/C malformation     HERNIA REPAIR  1994    umbilical     LUMPECTOMY BREAST  1974    Left benign     SHOULDER SURGERY  1996    Left      WRIST SURGERY  1976    Ganglion Right Wrist     Current Outpatient Prescriptions   Medication Sig Dispense Refill     alendronate (FOSAMAX) 70 MG tablet Take 1 tablet (70 mg) by mouth every 7 days 12 tablet 3     Calcium Carb-Cholecalciferol (CALCIUM CARBONATE-VITAMIN D3 PO)        carboxymethylcellulose (CELLUVISC/REFRESH LIQUIGEL) 1 % ophthalmic  "solution Place 1 drop into both eyes 3 times daily       Cholecalciferol (VITAMIN D) 1000 UNITS capsule Take  by mouth.       fluticasone-salmeterol (ADVAIR) 250-50 MCG/DOSE diskus inhaler Inhale 1 puff into the lungs 2 times daily 60 Inhaler 3     latanoprost (XALATAN) 0.005 % ophthalmic solution Place 1 drop into both eyes At Bedtime 7.5 mL 3     levothyroxine (SYNTHROID/LEVOTHROID) 125 MCG tablet TAKE 1 TABLET EVERY DAY .  At end of this prescription come into clinic for a lab only TSH check. 90 tablet 1     losartan (COZAAR) 100 MG tablet Take 1 tablet (100 mg) by mouth daily 90 tablet 2     Multiple Vitamins-Minerals (ICAPS AREDS 2 PO)        timolol (TIMOPTIC) 0.5 % ophthalmic solution Place 1 drop into both eyes every morning 3 Bottle 4     Allergies   Allergen Reactions     Alphagan [Brimonidine] Unknown     Animal Dander      cats     Cosopt [Dorzolamide-Timolol] Unknown     No Clinical Screening - See Comments Other (See Comments)     Animal dander (cats)     Seasonal Allergies      Family History: There is no h/o  malignancy.  There is no h/o urolithiasis.    Social History     Social History     Marital status:      Spouse name: N/A     Number of children: N/A     Years of education: N/A     Occupational History     Not on file.     Social History Main Topics     Smoking status: Never Smoker     Smokeless tobacco: Never Used     Alcohol use 0.0 oz/week     0 Standard drinks or equivalent per week      Comment: wine with supper     Drug use: No     Sexual activity: Yes     Partners: Male     Other Topics Concern     Parent/Sibling W/ Cabg, Mi Or Angioplasty Before 65f 55m? No     Social History Narrative          ROS: A comprehensive 14 point ROS was obtained and otherwise negative except for that outlined above in the HPI.    PHYSICAL EXAM:   /70 (BP Location: Left arm, Patient Position: Sitting, Cuff Size: Adult Regular)  Pulse 83  Ht 1.638 m (5' 4.5\")  Wt 68.9 kg (152 lb)  SpO2 98%  " BMI 25.69 kg/m2  GEN: NAD  EYES: EOMI  MOUTH: MMM  NECK: Supple  RESP: Unlabored breathing  CARDIAC: No LE edema  SKIN: Warm  ABD: soft  NEURO: AAO    PVR: 0 mL.  U/A: neg    IMAGING: None    ASSESSMENT/PLAN:  Ms. Manasa French is a 73 year old female with urinary frequency and micro hematuria.     - Detrol LA 2mg, increase to 4 mg at 1 month if room for improvement. Rx sent to patient's pharmacy. Side effects discussed.  - Reviewed behavioral therapies, such as timed voiding, pelvic floor relaxation while voiding and not voiding in a hurry.  Consider possible pelvic floor physical therapy and biofeedback in the future.  -Estrace, this will treat atrophic vaginitis and urethral changes which may impact her pelvic discomfort and improve her urinary urgency  -Eliminate bladder irritants    Follow up in 3 months for reassessment.      Consider urodynamics and intravesical examination with cystoscopy in the future if the patient fails medication therapy.      I have enjoyed participating in the medical care of this patient.  Please do not hesitate to contact me with any questions or concerns.     Terri Siegel PA-C  [facility]    30 min spent face to face with the patient, >50% of that time spent on counseling and coordination of care of urinary frequency/urgency.

## 2018-11-13 ENCOUNTER — TELEPHONE (OUTPATIENT)
Dept: UROLOGY | Facility: CLINIC | Age: 73
End: 2018-11-13

## 2018-11-13 NOTE — TELEPHONE ENCOUNTER
Patient called nurse line and LM. Returned phone call and LM. Will wait for a return phone call. Triny Castro LPN

## 2018-12-10 ENCOUNTER — TRANSFERRED RECORDS (OUTPATIENT)
Dept: HEALTH INFORMATION MANAGEMENT | Facility: CLINIC | Age: 73
End: 2018-12-10

## 2019-01-28 ENCOUNTER — OFFICE VISIT (OUTPATIENT)
Dept: OPHTHALMOLOGY | Facility: CLINIC | Age: 74
End: 2019-01-28
Attending: OPHTHALMOLOGY
Payer: MEDICARE

## 2019-01-28 DIAGNOSIS — H35.3132 INTERMEDIATE STAGE NONEXUDATIVE AGE-RELATED MACULAR DEGENERATION OF BOTH EYES: ICD-10-CM

## 2019-01-28 PROCEDURE — G0463 HOSPITAL OUTPT CLINIC VISIT: HCPCS | Mod: ZF

## 2019-01-28 PROCEDURE — 92134 CPTRZ OPH DX IMG PST SGM RTA: CPT | Mod: ZF | Performed by: OPHTHALMOLOGY

## 2019-01-28 ASSESSMENT — REFRACTION_WEARINGRX
OS_SPHERE: -2.50
OD_VPRISM: 3 BU
OS_AXIS: 050
OD_HPRISM: 7 BO
OS_VPRISM: 3 BD
OS_CYLINDER: +1.00
OS_HPRISM: 7 BO
OD_SPHERE: -0.75
OD_CYLINDER: SPHERE
SPECS_TYPE: SVL

## 2019-01-28 ASSESSMENT — TONOMETRY
IOP_METHOD: TONOPEN
OD_IOP_MMHG: 23
OS_IOP_MMHG: 17

## 2019-01-28 ASSESSMENT — CONF VISUAL FIELD
OS_NORMAL: 1
OD_NORMAL: 1

## 2019-01-28 ASSESSMENT — SLIT LAMP EXAM - LIDS
COMMENTS: NORMAL
COMMENTS: NORMAL

## 2019-01-28 ASSESSMENT — CUP TO DISC RATIO
OD_RATIO: 0.7
OS_RATIO: 0.5

## 2019-01-28 ASSESSMENT — VISUAL ACUITY
OD_CC+: -2
CORRECTION_TYPE: GLASSES
METHOD: SNELLEN - LINEAR
OS_CC: 20/30
OD_CC: 20/25

## 2019-01-28 NOTE — PROGRESS NOTES
I have confirmed the patient's and reviewed Past Medical History, Past Surgical History, Social History, Family History, Problem List, Medication List and agree with Tech note.    CC: f/u AMD    HPI: 74 yo female with h/o Dry AMD here for annual f/u.  Patient reports relatively stable vision with persistent diplopia that is more manageable on some days than others (has h/o WIL f/b orthoptics for diplopia).    POHx:  Thyroid Eye Disease  POAG  Pseudophakia    Eye Meds:  Timolol QAM OU  Xalatan QPM OU    OCT (1/12/17)  RE: (multiple single scans d/t pt eye movements) Drusen, no IRF/SRF  LE: (multiple single scans d/t pt eye movements) Drusen, no IRF/SRF    Assessment & Plan:  1.  Dry Macular Degeneration, both eyes   - continue AREDS II vitamins   - AG precautions   - annual exam    2.  PVD, both eyes   - RT/RD precautions    3. POAG, mild, both eyes   - continue management/follow-up per Dr. Gallardo    4.  Thyroid Eye Disease   - continue with orthoptics for diplopia management    5.  Pseudophakia, both eyes   - mild PCO OS   - observe    RTC: 1 year for exam, OCT.  (NO FAF needed)      Katy Peter MD PhD.  Professor & Chair

## 2019-01-28 NOTE — NURSING NOTE
Chief Complaints and History of Present Illnesses   Patient presents with     Follow Up     Chief Complaint(s) and History of Present Illness(es)     Follow Up     Laterality: both eyes    Onset: 1 year ago              Comments     Patient here for 1-year follow up visit due to Age related macular degeneration of both eyes. Patient states her vision has been stable. Denies new floaters, no light flashes each eye. Denies eye pain each eye. She has dryness each eye but using artificial tears helps with eye comfort.  Lamar Castro COA 2:11 PM January 28, 2019

## 2019-02-08 DIAGNOSIS — E89.0 HYPOTHYROIDISM, POSTABLATIVE: ICD-10-CM

## 2019-02-08 NOTE — TELEPHONE ENCOUNTER
Needs repeat TSH.  Can you have her come in for lab only?  Dr. Evelia Castro MD/[facility]

## 2019-02-08 NOTE — TELEPHONE ENCOUNTER
Routing refill request to provider for review/approval because:  Labs out of range:  TSH 6.74

## 2019-02-08 NOTE — TELEPHONE ENCOUNTER
"Requested Prescriptions   Pending Prescriptions Disp Refills     levothyroxine (SYNTHROID/LEVOTHROID) 125 MCG tablet [Pharmacy Med Name: LEVOTHYROXINE SODIUM 125 MCG Tablet]  Last Written Prescription Date:  8/31/2018  Last Fill Quantity: 90 tablet,  # refills: 1   Last office visit: 10/26/2018 with prescribing provider:  KELLIE Castro   Future Office Visit:     90 tablet 1     Sig: TAKE 1 TABLET EVERY DAY (AT END OF THIS PRESCRIPTION COME INTO CLINIC FOR A LAB ONLY TSH CHECK)    Thyroid Protocol Failed - 2/8/2019  9:01 AM       Failed - Normal TSH on file in past 12 months    Recent Labs   Lab Test 10/31/18  0755   TSH 6.74*             Passed - Patient is 12 years or older       Passed - Recent (12 mo) or future (30 days) visit within the authorizing provider's specialty    Patient had office visit in the last 12 months or has a visit in the next 30 days with authorizing provider or within the authorizing provider's specialty.  See \"Patient Info\" tab in inbasket, or \"Choose Columns\" in Meds & Orders section of the refill encounter.             Passed - Medication is active on med list       Passed - No active pregnancy on record    If patient is pregnant or has had a positive pregnancy test, please check TSH.         Passed - No positive pregnancy test in past 12 months    If patient is pregnant or has had a positive pregnancy test, please check TSH.             "

## 2019-02-11 ENCOUNTER — OFFICE VISIT (OUTPATIENT)
Dept: OPHTHALMOLOGY | Facility: CLINIC | Age: 74
End: 2019-02-11
Attending: OPHTHALMOLOGY
Payer: MEDICARE

## 2019-02-11 DIAGNOSIS — H40.053 BORDERLINE GLAUCOMA WITH OCULAR HYPERTENSION, BILATERAL: Primary | ICD-10-CM

## 2019-02-11 DIAGNOSIS — H40.053 BORDERLINE GLAUCOMA WITH OCULAR HYPERTENSION, BILATERAL: ICD-10-CM

## 2019-02-11 PROCEDURE — G0463 HOSPITAL OUTPT CLINIC VISIT: HCPCS | Mod: ZF

## 2019-02-11 PROCEDURE — 92133 CPTRZD OPH DX IMG PST SGM ON: CPT | Mod: ZF | Performed by: OPHTHALMOLOGY

## 2019-02-11 ASSESSMENT — REFRACTION_WEARINGRX
OS_CYLINDER: +1.00
OS_HPRISM: 7 BO
OD_VPRISM: 3 BU
OD_HPRISM: 7 BO
OD_SPHERE: -0.75
OS_AXIS: 050
OS_VPRISM: 3 BD
OD_CYLINDER: SPHERE
SPECS_TYPE: SVL
OS_SPHERE: -2.50

## 2019-02-11 ASSESSMENT — CONF VISUAL FIELD
OD_INFERIOR_NASAL_RESTRICTION: 3
OS_SUPERIOR_NASAL_RESTRICTION: 3
METHOD: COUNTING FINGERS

## 2019-02-11 ASSESSMENT — SLIT LAMP EXAM - LIDS
COMMENTS: NORMAL
COMMENTS: NORMAL

## 2019-02-11 ASSESSMENT — TONOMETRY
OD_IOP_MMHG: 17
IOP_METHOD: APPLANATION
OS_IOP_MMHG: 16

## 2019-02-11 ASSESSMENT — CUP TO DISC RATIO
OD_RATIO: 0.7
OS_RATIO: 0.5

## 2019-02-11 ASSESSMENT — VISUAL ACUITY
OD_CC+: -3
OS_CC: 20/30
METHOD: SNELLEN - LINEAR
OD_CC: 20/25
OS_CC+: -2
CORRECTION_TYPE: GLASSES

## 2019-02-11 NOTE — NURSING NOTE
Chief Complaint(s) and History of Present Illness(es)     Glaucoma Follow-Up     In both eyes.  Associated symptoms include Negative for dryness, eye pain, redness and tearing.              Comments     Pt here for a 6 month f/u for Borderline glaucoma with ocular hypertension, BE. Pt states vision depends on how she is feeling, as of today vision seems good. Pt notes she doing all drops as directed, and artificial tears prn, BE.    Becki Berry, COMT 12:59 PM February 11, 2019

## 2019-02-11 NOTE — PROGRESS NOTES
HPI  History of thyroid eye disease and primary open angle glaucoma.  Post-operative month 2 Selected laser trabeculoplasty (SLT) OD   Has some glare, photophobia, floaters.    Interval Update:  No vision changes, no eye pain, continues on drops, visit with Dr. Page two weeks ago    Current Medications:   Timolol every morning both eyes   Latanoprost HS both eyes     Intolerant to cosopt and alphagan    Ocular history:  Selected laser trabeculoplasty (SLT) right eye 2/2018 ( intraocular pressure 26 before selective laser trabeculoplasty)       Testing:   Octopus visual field 8/10/18: with scatter both eyes, stable    OCT retinal nerve fiber layer 2/11/19:    temporal thinning and interval worsening of superior thinning right eye but had (SLT) right eye 2/2018 after previous OCT   supranasal thinning left eye which is stable     Assessment   1. Moderate stage Primary open angle glaucoma (POAG) both eyes   OCT showed slight downward slope right eye in conjunction with elevated intraocular pressure  Intraocular pressure with improvement since Selective laser trabeculoplasty (SLT) right eye   Continued stability of left eye     Today, there has been interval thinning of right nerve despite IOP high teens   She has been intolerant to timolol and latonoprost in the past     2. Pseudophakia of both eyes  IOL stable in good position     3. Thyroid eye disease  Diplopia much better with new glasses.    4. Age related macular degeneration   Stage II, followed annually by retina     Plan:   Continue latanoprost HS both eyes   Continue timolol daily both eyes     Return to clinic 7 months with 24-2 Octopus visual field     Ernie Stephens MD  Ophthalmology Resident, PGY-3  [facility]      Attending Physician Attestation:  Complete documentation of historical and exam elements from today's encounter can be found in the full encounter summary report (not reduplicated in this progress note). I personally  obtained the chief complaint(s) and history of present illness. I confirmed and edited asnecessary the review of systems, past medical/surgical history, family history, social history, and examination findings as documented by others; and I examined the patient myself. I personally reviewed the relevant tests, images, and reports as documented above. I formulated and edited as necessary the assessment and plan and discussed the findings and management plan with the patient and family.  - Radha Gallardo MD 1:45 PM 2/11/2019

## 2019-02-12 NOTE — TELEPHONE ENCOUNTER
ED / Discharge Outreach Protocol    Patient Contact    Attempt # 2    Was call answered?  No.  Unable to leave message. Busy teresita.

## 2019-02-13 DIAGNOSIS — E89.0 HYPOTHYROIDISM, POSTABLATIVE: ICD-10-CM

## 2019-02-13 LAB
T4 FREE SERPL-MCNC: 1.43 NG/DL (ref 0.76–1.46)
TSH SERPL DL<=0.005 MIU/L-ACNC: 8.29 MU/L (ref 0.4–4)

## 2019-02-13 PROCEDURE — 36415 COLL VENOUS BLD VENIPUNCTURE: CPT | Performed by: FAMILY MEDICINE

## 2019-02-13 PROCEDURE — 84439 ASSAY OF FREE THYROXINE: CPT | Performed by: FAMILY MEDICINE

## 2019-02-13 PROCEDURE — 84443 ASSAY THYROID STIM HORMONE: CPT | Performed by: FAMILY MEDICINE

## 2019-02-14 RX ORDER — LEVOTHYROXINE SODIUM 150 UG/1
150 TABLET ORAL DAILY
Qty: 60 TABLET | Refills: 0 | Status: SHIPPED | OUTPATIENT
Start: 2019-02-14 | End: 2019-04-17

## 2019-02-14 NOTE — TELEPHONE ENCOUNTER
TSH   Date Value Ref Range Status   02/13/2019 8.29 (H) 0.40 - 4.00 mU/L Final   10/31/2018 6.74 (H) 0.40 - 4.00 mU/L Final   07/02/2018 6.88 (H) 0.40 - 4.00 mU/L Final   12/07/2017 5.27 (H) 0.40 - 4.00 mU/L Final   09/21/2016 5.67 (H) 0.40 - 5.00 mU/L Final     Adjusted dose, should come back for TSH recheck 6 weeks.  Can you let her know?  Dr. Evelia Castro MD/[facility]

## 2019-02-15 NOTE — TELEPHONE ENCOUNTER
Call made back to the patient, let her know and she agrees to come back in 6 weeks to have it rechecked.

## 2019-02-16 NOTE — RESULT ENCOUNTER NOTE
See telephone encounter - increased levothyroxine, and recheck TSH in 6 weeks.  Dr. Evelia Castro MD/[facility]

## 2019-02-26 ENCOUNTER — TRANSFERRED RECORDS (OUTPATIENT)
Dept: HEALTH INFORMATION MANAGEMENT | Facility: CLINIC | Age: 74
End: 2019-02-26

## 2019-02-26 ENCOUNTER — TELEPHONE (OUTPATIENT)
Dept: FAMILY MEDICINE | Facility: CLINIC | Age: 74
End: 2019-02-26

## 2019-02-26 ENCOUNTER — OFFICE VISIT (OUTPATIENT)
Dept: FAMILY MEDICINE | Facility: CLINIC | Age: 74
End: 2019-02-26
Payer: MEDICARE

## 2019-02-26 ENCOUNTER — ANCILLARY PROCEDURE (OUTPATIENT)
Dept: GENERAL RADIOLOGY | Facility: CLINIC | Age: 74
End: 2019-02-26
Attending: FAMILY MEDICINE
Payer: MEDICARE

## 2019-02-26 VITALS
WEIGHT: 155 LBS | HEIGHT: 65 IN | DIASTOLIC BLOOD PRESSURE: 68 MMHG | RESPIRATION RATE: 16 BRPM | BODY MASS INDEX: 25.83 KG/M2 | OXYGEN SATURATION: 97 % | HEART RATE: 90 BPM | SYSTOLIC BLOOD PRESSURE: 128 MMHG | TEMPERATURE: 97.8 F

## 2019-02-26 DIAGNOSIS — M79.601 PAIN AND SWELLING OF UPPER EXTREMITY, RIGHT: Primary | ICD-10-CM

## 2019-02-26 DIAGNOSIS — I82.A11 ACUTE DEEP VEIN THROMBOSIS (DVT) OF AXILLARY VEIN OF RIGHT UPPER EXTREMITY (H): ICD-10-CM

## 2019-02-26 DIAGNOSIS — M79.601 PAIN AND SWELLING OF UPPER EXTREMITY, RIGHT: ICD-10-CM

## 2019-02-26 DIAGNOSIS — M79.89 PAIN AND SWELLING OF UPPER EXTREMITY, RIGHT: ICD-10-CM

## 2019-02-26 DIAGNOSIS — M79.89 PAIN AND SWELLING OF UPPER EXTREMITY, RIGHT: Primary | ICD-10-CM

## 2019-02-26 LAB
D DIMER PPP FEU-MCNC: 3.1 UG/ML FEU (ref 0–0.5)
ERYTHROCYTE [DISTWIDTH] IN BLOOD BY AUTOMATED COUNT: 15.3 % (ref 10–15)
ERYTHROCYTE [SEDIMENTATION RATE] IN BLOOD BY WESTERGREN METHOD: 30 MM/H (ref 0–30)
HCT VFR BLD AUTO: 36.5 % (ref 35–47)
HGB BLD-MCNC: 11.5 G/DL (ref 11.7–15.7)
MCH RBC QN AUTO: 30.2 PG (ref 26.5–33)
MCHC RBC AUTO-ENTMCNC: 31.5 G/DL (ref 31.5–36.5)
MCV RBC AUTO: 96 FL (ref 78–100)
PLATELET # BLD AUTO: 243 10E9/L (ref 150–450)
RBC # BLD AUTO: 3.81 10E12/L (ref 3.8–5.2)
WBC # BLD AUTO: 5.8 10E9/L (ref 4–11)

## 2019-02-26 PROCEDURE — 36415 COLL VENOUS BLD VENIPUNCTURE: CPT | Performed by: FAMILY MEDICINE

## 2019-02-26 PROCEDURE — 85027 COMPLETE CBC AUTOMATED: CPT | Performed by: FAMILY MEDICINE

## 2019-02-26 PROCEDURE — 85652 RBC SED RATE AUTOMATED: CPT | Performed by: FAMILY MEDICINE

## 2019-02-26 PROCEDURE — 71046 X-RAY EXAM CHEST 2 VIEWS: CPT | Mod: FY

## 2019-02-26 PROCEDURE — 85379 FIBRIN DEGRADATION QUANT: CPT | Performed by: FAMILY MEDICINE

## 2019-02-26 PROCEDURE — 99214 OFFICE O/P EST MOD 30 MIN: CPT | Performed by: FAMILY MEDICINE

## 2019-02-26 ASSESSMENT — MIFFLIN-ST. JEOR: SCORE: 1203.96

## 2019-02-26 NOTE — LETTER
February 27, 2019      Manasa CORTEZ French  [address] [address]        Dear ,    We are writing to inform you of your test results.    Sedimentation rate     Low hemogobin   Normal   Sedimentation rate     D dimer positive 3.1   Hemogobin 11.5     Resulted Orders   D dimer, quantitative   Result Value Ref Range    D Dimer 3.1 (H) 0.0 - 0.50 ug/ml FEU      Comment:      This D-dimer assay is intended for use in conjunction with a clinical pretest   probability assessment model to exclude pulmonary embolism (PE) and deep   venous thrombosis (DVT) in outpatients suspected of PE or DVT. The cut-off   value is 0.5 ug/mL FEU.     CBC with platelets   Result Value Ref Range    WBC 5.8 4.0 - 11.0 10e9/L    RBC Count 3.81 3.8 - 5.2 10e12/L    Hemoglobin 11.5 (L) 11.7 - 15.7 g/dL    Hematocrit 36.5 35.0 - 47.0 %    MCV 96 78 - 100 fl    MCH 30.2 26.5 - 33.0 pg    MCHC 31.5 31.5 - 36.5 g/dL    RDW 15.3 (H) 10.0 - 15.0 %    Platelet Count 243 150 - 450 10e9/L   Erythrocyte sedimentation rate auto   Result Value Ref Range    Sed Rate 30 0 - 30 mm/h       If you have any questions or concerns, please call the clinic at the number listed above.       Sincerely,        FADI ARRIOLA MD

## 2019-02-26 NOTE — TELEPHONE ENCOUNTER
Manasa French is a 73 year old female who calls with arm swelling.    NURSING ASSESSMENT:  Description:  Swelling from right shoulder to hand  Onset/duration:  Sunday between shoulder and elbow felt tight. Yesterday noticed hand was puffy. Right arm more pink than left. Puffy all the way up to shoulder. Capillary refill <3 seconds.  Precip. factors: Patient has been knitting and did comment that sometimes her shoulder gets stiff. She thought that's what this was on Sunday when this initially started.   Improves/worsens symptoms:  Has not tried anything  Pain scale (0-10)   0/10, but range of motion has decreased due to swelling  Last exam/Treatment:  10/26/2018  Allergies:   Allergies   Allergen Reactions     Alphagan [Brimonidine] Unknown     Animal Dander      cats     Cosopt [Dorzolamide-Timolol] Unknown     No Clinical Screening - See Comments Other (See Comments)     Animal dander (cats)     Seasonal Allergies      NURSING PLAN: Routed to provider Yes   FYI patient has appointment today.     RECOMMENDED DISPOSITION:   If further questions/concerns or if symptoms do not improve, worsen or new symptoms develop, call your PCP or [facility] as soon as possible.      Guideline used:  Telephone Triage Protocols for Nurses, Fifth Edition, Aarti Tinajero RN

## 2019-02-26 NOTE — LETTER
February 27, 2019      Manasa R Manolo  [address] [facility] [address] [address]        Dear ,    We are writing to inform you of your test results.    Normal chest xray     Resulted Orders   XR Chest 2 Views    Narrative    CHEST TWO VIEWS   2/26/2019 2:15 PM     HISTORY: Pain and swelling of upper extremity, right.    COMPARISON: None.      Impression    IMPRESSION:  shunt tubing partially visualized at the right neck  base and upper chest. No acute airspace disease. Areas of linear  atelectasis or scar at the left lung base. Normal cardiac silhouette.    CAITLIN SAUCEDA MD       If you have any questions or concerns, please call the clinic at the number listed above.       Sincerely,        King Lieberman MD

## 2019-02-26 NOTE — PROGRESS NOTES
SUBJECTIVE:   Manasa French is a 73 year old female who presents to clinic today for the following health issues:      Swollen hand, arm  Right arm entire right arm  Right hand      Duration: 3 days    Description (location/character/radiation): right arm, hand. Swelling, discoloration    Intensity:  moderate    Accompanying signs and symptoms: swelling, range of motion is worse,     History (similar episodes/previous evaluation): None    Precipitating or alleviating factors: None    Therapies tried and outcome: None       Problem #1    3-day history of swelling in the right arm some noticeable coloration pinkness noted    Some new veins in the right upper arm    No history of blood clots    Complicated past history    Objective findings    The patient has small veins in the right upper arm which she says were not there before spiderlike    The arm is visibly swollen more than the other and also slightly more reverse    Assessment swelling right upper arm and hand    With color changes    Differential diagnosis includes potential blood clot in the venous drainage system    Also Pancoast tumor NORMAL CHEST XRAY     But x-ray is negative so not likely    Plan ultrasound POSITIVE     Right upper arm    Ensure that this just inflammatory not a blood clot    [facility]    With the report    Normal CHEST XRAY         THIS PATIENT IS POSITIVE     FOR THROMBOSIS AXILLARY AND JUGULAR VEIN    ASSESSMENT RIGHT ARM THROMBOSIS    PLAN  APIXABAN 10MG PO TWICE DAILY ONE WEEK     THEN 5MG PO TWICE DAILY     SIDE EFFECTS BENEFITS AND RISKS DISCUSSED      MEDICATION RISKS SIDE EFFECTS BENEFITS AND RISKS DISCUSSED     SIDE EFFECTS BENEFITS AND RISKS DISCUSSED     WATCH FOR SHORTNESS OF BREATH AND COUGH AND HEMOPTYSIS          Problem #2 history of hypothyroidism    Post ablative    Is on thyroid replacement    Had to have that adjusted not too long ago        Problem #3 chronic low back pain spinal  stenosis    Has had a fusion operation    Personal history of compression fracture at L2 \        Problem #4    Personal history of brain shunt for low pressure however hydrocephalus it sounds like    According patient she had a ventricular shunt    Since the late 80s    Assessment ventricular shunt    Plan stable problem    Treatment for low-grade blood pressure hydrocephalus    Improved memory after this was done        Problem #5 history of abnormal electric cardiogram    Problem #6 history of poor vision and glaucoma        From #7 asthma control test was done today and it was 24    Patient with mild persistent asthma but under good control    Problem list and histories reviewed & adjusted, as indicated.  Additional history: as documented      Patient Active Problem List   Diagnosis     Diverticulitis of colon     Hypothyroidism, postablative     HTN, goal below 140/90     Glaucoma with ocular hypertension, bilateral     History of hydrocephalus     Other osteoporosis with current pathological fracture     Nonexudative senile macular degeneration of retina     ACP (advance care planning)     Injury of lower back, initial encounter     S/P ventricular shunt placement 1987     Graves disease     Low vision, both eyes     Mild persistent asthma without complication     Spinal stenosis, lumbar region with neurogenic claudication     Closed compression fracture of second lumbar vertebra  (H)     Closed compression fracture of lumbosacral spine (H)     Falls frequently     Abnormal electrocardiogram     Past Surgical History:   Procedure Laterality Date     BACK SURGERY  2005    Dr Welsh     BACK SURGERY  09/11/2018     CATARACT IOL, RT/LT Right 04/15/2014     CATARACT IOL, RT/LT Left 04/29/2014     EYE SURGERY  1987    Shunt- A/C malformation     HERNIA REPAIR  1994    umbilical     LUMPECTOMY BREAST  1974    Left benign     SHOULDER SURGERY  1996    Left      WRIST SURGERY  1976    Ganglion Right Wrist       Social  History     Tobacco Use     Smoking status: Never Smoker     Smokeless tobacco: Never Used   Substance Use Topics     Alcohol use: Yes     Alcohol/week: 0.0 oz     Comment: wine with supper     Family History   Problem Relation Age of Onset     Colon Cancer Mother      Glaucoma Mother      Hypertension Mother      Thyroid Disease Mother      Breast Cancer Mother      Cancer Father         leukemia     Diabetes Father      Glaucoma Father      Glasses (<9 y/o) Brother      Hypertension Sister      Thyroid Disease Sister      Glasses (<9 y/o) Sister      Hypertension Sister      Thyroid Disease Sister      Glasses (<9 y/o) Sister      Glasses (<9 y/o) Sister      Glasses (<9 y/o) Sister      Glasses (<9 y/o) Brother      Diabetes Son         virus attacked pancreas         Current Outpatient Medications   Medication Sig Dispense Refill     alendronate (FOSAMAX) 70 MG tablet Take 1 tablet (70 mg) by mouth every 7 days 12 tablet 3     apixaban ANTICOAGULANT (ELIQUIS) 5 MG tablet Take 1 tablet (5 mg) by mouth 2 times daily 70 tablet 11     Calcium Carb-Cholecalciferol (CALCIUM CARBONATE-VITAMIN D3 PO)        carboxymethylcellulose (CELLUVISC/REFRESH LIQUIGEL) 1 % ophthalmic solution Place 1 drop into both eyes 3 times daily       Cholecalciferol (VITAMIN D) 1000 UNITS capsule Take  by mouth.       fluticasone-salmeterol (ADVAIR) 250-50 MCG/DOSE diskus inhaler Inhale 1 puff into the lungs 2 times daily 60 Inhaler 3     latanoprost (XALATAN) 0.005 % ophthalmic solution Place 1 drop into both eyes At Bedtime 7.5 mL 3     levothyroxine (SYNTHROID/LEVOTHROID) 150 MCG tablet Take 1 tablet (150 mcg) by mouth daily Come in for lab only TSH recheck ~4/1/2018. 60 tablet 0     losartan (COZAAR) 100 MG tablet Take 1 tablet (100 mg) by mouth daily 90 tablet 2     Multiple Vitamins-Minerals (ICAPS AREDS 2 PO)        timolol (TIMOPTIC) 0.5 % ophthalmic solution Place 1 drop into both eyes every morning 3 Bottle 4     Allergies    Allergen Reactions     Alphagan [Brimonidine] Unknown     Animal Dander      cats     Cosopt [Dorzolamide-Timolol] Unknown     No Clinical Screening - See Comments Other (See Comments)     Animal dander (cats)     Seasonal Allergies      Recent Labs   Lab Test 02/13/19  0752 10/31/18  0755 08/31/18  0950 07/02/18  1610 12/07/17  1406  07/22/13  0757   A1C  --  6.0*  --   --   --   --   --    LDL  --   --  93  --   --   --  79   HDL  --   --  83  --   --   --  72   TRIG  --   --  134  --   --   --  105   CR  --   --  0.88 0.86 0.97   < > 1.00   GFRESTIMATED  --   --  63 65 57*   < > 55*   GFRESTBLACK  --   --  76 78 68   < > 67   POTASSIUM  --   --  3.6  --  4.2   < > 4.8   TSH 8.29* 6.74*  --  6.88* 5.27*   < > 3.13    < > = values in this interval not displayed.      BP Readings from Last 3 Encounters:   02/26/19 128/68   11/12/18 138/70   10/26/18 136/82    Wt Readings from Last 3 Encounters:   02/26/19 70.3 kg (155 lb)   11/12/18 68.9 kg (152 lb)   09/10/18 70.5 kg (155 lb 6.4 oz)                  Labs reviewed in EPIC    Reviewed and updated as needed this visit by clinical staff  Tobacco  Allergies  Meds  Med Hx  Surg Hx  Fam Hx  Soc Hx      Reviewed and updated as needed this visit by Provider  Allergies         ROS: has Diverticulitis of colon; Hypothyroidism, postablative; HTN, goal below 140/90; Glaucoma with ocular hypertension, bilateral; History of hydrocephalus; Other osteoporosis with current pathological fracture; Nonexudative senile macular degeneration of retina; ACP (advance care planning); Injury of lower back, initial encounter; S/P ventricular shunt placement 1987; Graves disease; Low vision, both eyes; Mild persistent asthma without complication; Spinal stenosis, lumbar region with neurogenic claudication; Closed compression fracture of second lumbar vertebra  (H); Closed compression fracture of lumbosacral spine (H); Falls frequently; and Abnormal electrocardiogram on their problem  "list.    Constitutional, HEENT, cardiovascular, pulmonary, GI, , musculoskeletal, neuro, skin, endocrine and psych systems are negative, except as otherwise noted.    OBJECTIVE:     /68   Pulse 90   Temp 97.8  F (36.6  C) (Tympanic)   Resp 16   Ht 1.651 m (5' 5\")   Wt 70.3 kg (155 lb)   SpO2 97%   BMI 25.79 kg/m    Body mass index is 25.79 kg/m .  GENERAL: healthy, alert and no distress  EYES: Eyes grossly normal to inspection, PERRL and conjunctivae and sclerae normal  HENT: ear canals and TM's normal, nose and mouth without ulcers or lesions  NECK: no adenopathy, no asymmetry, masses, or scars and thyroid normal to palpation  RESP: lungs clear to auscultation - no rales, rhonchi or wheezes  BREAST: normal without masses, tenderness or nipple discharge and no palpable axillary masses or adenopathy  CV: regular rate and rhythm, normal S1 S2, no S3 or S4, no murmur, click or rub, no peripheral edema and peripheral pulses strong  ABDOMEN: soft, nontender, no hepatosplenomegaly, no masses and bowel sounds normal  MS: Swollen right upper arm not very tender  Some venous changes in the upper arm though  Some minimal pain with internal and external rotation  No tendency to sublux  Full range of motion right elbow  SKIN: no suspicious lesions or rashes  NEURO: Normal strength and tone, mentation intact and speech normal  PSYCH: mentation appears normal, affect normal/bright    Diagnostic Test Results:  Results for orders placed or performed in visit on 02/26/19   D dimer, quantitative   Result Value Ref Range    D Dimer 3.1 (H) 0.0 - 0.50 ug/ml FEU   CBC with platelets   Result Value Ref Range    WBC 5.8 4.0 - 11.0 10e9/L    RBC Count 3.81 3.8 - 5.2 10e12/L    Hemoglobin 11.5 (L) 11.7 - 15.7 g/dL    Hematocrit 36.5 35.0 - 47.0 %    MCV 96 78 - 100 fl    MCH 30.2 26.5 - 33.0 pg    MCHC 31.5 31.5 - 36.5 g/dL    RDW 15.3 (H) 10.0 - 15.0 %    Platelet Count 243 150 - 450 10e9/L   Erythrocyte sedimentation rate " auto   Result Value Ref Range    Sed Rate 30 0 - 30 mm/h       ASSESSMENT/PLAN:           ICD-10-CM    1. Pain and swelling of upper extremity, right M79.601 D dimer, quantitative    M79.89 RADIOLOGY REFERRAL     XR Chest 2 Views     CBC with platelets     Erythrocyte sedimentation rate auto     DISCONTINUED: Rivaroxaban (XARELTO STARTER PACK) 15 & 20 MG TBPK     DISCONTINUED: Rivaroxaban (XARELTO STARTER PACK) 15 & 20 MG TBPK     DISCONTINUED: rivaroxaban ANTICOAGULANT (XARELTO) 20 MG TABS tablet   2. Acute deep vein thrombosis (DVT) of axillary vein of right upper extremity (H) I82.A11 apixaban ANTICOAGULANT (ELIQUIS) 5 MG tablet     DISCONTINUED: Rivaroxaban (XARELTO STARTER PACK) 15 & 20 MG TBPK     DISCONTINUED: Rivaroxaban (XARELTO STARTER PACK) 15 & 20 MG TBPK     DISCONTINUED: rivaroxaban ANTICOAGULANT (XARELTO) 20 MG TABS tablet       Patient Instructions     (Kika)    [facility], [address]  [address] [place] [address]    (M79.601,  M79.89) Pain and swelling of upper extremity, right  (primary encounter diagnosis)    Comment: Patient with swelling and pain in his right upper extremity    Has some spider veins at the proximal portion of the upper arm    She had noticed these before    Plan: D dimer, quantitative, RADIOLOGY REFERRAL, XR           Chest 2 Views, CBC with platelets, Erythrocyte           sedimentation rate auto               FADI ARRIOLA JR., MD     If the ultrasound is positive for blood clot consider Xarelto 50 mg p.o. twice daily for 3 weeks and then 20 mg daily    If negative possible referral to [facility] for further evaluation and treatment    THIS PATIENT IS POSITIVE     FOR THROMBOSIS AXILLARY AND JUGULAR VEIN    ASSESSMENT RIGHT ARM THROMBOSIS    THIS PATIENT IS POSITIVE     FOR THROMBOSIS AXILLARY AND JUGULAR VEIN    ASSESSMENT RIGHT ARM THROMBOSIS    PLAN  APIXABAN 10MG PO TWICE DAILY ONE WEEK     THEN 5MG PO TWICE DAILY     SIDE EFFECTS  BENEFITS AND RISKS DISCUSSED      MEDICATION RISKS SIDE EFFECTS BENEFITS AND RISKS DISCUSSED     SIDE EFFECTS BENEFITS AND RISKS DISCUSSED    PATIENT $ DEDUCTIBLE         WILL  A CUB LATER TODAY IF POSSIBLE          .    FADI ARRIOLA MD  [facility] 02/28/2019  This patient has not had a great deal of change yet  The arm is still swollen on the right she is taking  To go to the pool but did not do any swimming  Assessment thrombosis right axillary brachial and internal jugular veins  Plan continue apixaban 10 mg p.o. twice daily and taper to 5 mg p.o. twice daily after 1 week from onset of treatment

## 2019-02-26 NOTE — PATIENT INSTRUCTIONS
(Kika)    [facility], [address]  [address] [place] [address]    (M45.601,  M67.80) Pain and swelling of upper extremity, right  (primary encounter diagnosis)    Comment: Patient with swelling and pain in his right upper extremity    Has some spider veins at the proximal portion of the upper arm    She had noticed these before    Plan: D dimer, quantitative, RADIOLOGY REFERRAL, XR           Chest 2 Views, CBC with platelets, Erythrocyte           sedimentation rate auto               FADI ARRIOLA JR., MD     If the ultrasound is positive for blood clot consider Xarelto 50 mg p.o. twice daily for 3 weeks and then 20 mg daily    If negative possible referral to [facility] for further evaluation and treatment    THIS PATIENT IS POSITIVE     FOR THROMBOSIS AXILLARY AND JUGULAR VEIN    ASSESSMENT RIGHT ARM THROMBOSIS    THIS PATIENT IS POSITIVE     FOR THROMBOSIS AXILLARY AND JUGULAR VEIN    ASSESSMENT RIGHT ARM THROMBOSIS    PLAN  APIXABAN 10MG PO TWICE DAILY ONE WEEK     THEN 5MG PO TWICE DAILY     SIDE EFFECTS BENEFITS AND RISKS DISCUSSED      MEDICATION RISKS SIDE EFFECTS BENEFITS AND RISKS DISCUSSED     SIDE EFFECTS BENEFITS AND RISKS DISCUSSED    PATIENT $ DEDUCTIBLE         WILL  A CUB LATER TODAY IF POSSIBLE

## 2019-02-28 ASSESSMENT — ASTHMA QUESTIONNAIRES: ACT_TOTALSCORE: 24

## 2019-03-08 ENCOUNTER — OFFICE VISIT (OUTPATIENT)
Dept: FAMILY MEDICINE | Facility: CLINIC | Age: 74
End: 2019-03-08
Payer: MEDICARE

## 2019-03-08 VITALS
SYSTOLIC BLOOD PRESSURE: 130 MMHG | TEMPERATURE: 97.8 F | HEART RATE: 89 BPM | OXYGEN SATURATION: 100 % | DIASTOLIC BLOOD PRESSURE: 70 MMHG

## 2019-03-08 DIAGNOSIS — I82.A11 ACUTE DEEP VEIN THROMBOSIS (DVT) OF AXILLARY VEIN OF RIGHT UPPER EXTREMITY (H): Primary | ICD-10-CM

## 2019-03-08 DIAGNOSIS — E89.0 HYPOTHYROIDISM, POSTABLATIVE: ICD-10-CM

## 2019-03-08 DIAGNOSIS — Z23 NEED FOR SHINGLES VACCINE: ICD-10-CM

## 2019-03-08 PROBLEM — I82.409 DVT (DEEP VENOUS THROMBOSIS) (H): Status: ACTIVE | Noted: 2019-03-08

## 2019-03-08 PROBLEM — I82.A19: Status: ACTIVE | Noted: 2019-03-08

## 2019-03-08 PROCEDURE — 99214 OFFICE O/P EST MOD 30 MIN: CPT | Performed by: FAMILY MEDICINE

## 2019-03-08 NOTE — ASSESSMENT & PLAN NOTE
Continue Eliquis 5 mg BID x 3 months at least, potentially longer.    I'd like her to see [facility] Rodriguez [facility] as unprovoked DVT in unusual location in active swimmer.  Referred.    Discussed with patient who agrees with plan.

## 2019-03-08 NOTE — LETTER
My Asthma Action Plan  Name: Manasa French   YOB: 1945  Date: 3/8/2019   My doctor: Evelia Castro MD   My clinic: [facility]        My Control Medicine: { :699284}  My Rescue Medicine: { :738434}  {AAP include Oral Steroid:975763} My Asthma Severity: { :546685}  Avoid your asthma triggers: { :677060}        {Is patient a child or adult?:932330}       GREEN ZONE   Good Control    I feel good    No cough or wheeze    Can work, sleep and play without asthma symptoms       Take your asthma control medicine every day.     1. If exercise triggers your asthma, take your rescue medication    15 minutes before exercise or sports, and    During exercise if you have asthma symptoms  2. Spacer to use with inhaler: If you have a spacer, make sure to use it with your inhaler             YELLOW ZONE Getting Worse  I have ANY of these:    I do not feel good    Cough or wheeze    Chest feels tight    Wake up at night   1. Keep taking your Green Zone medications  2. Start taking your rescue medicine:    every 20 minutes for up to 1 hour. Then every 4 hours for 24-48 hours.  3. If you stay in the Yellow Zone for more than 12-24 hours, contact your doctor.  4. If you do not return to the Green Zone in 12-24 hours or you get worse, start taking your oral steroid medicine if prescribed by your provider.           RED ZONE Medical Alert - Get Help  I have ANY of these:    I feel awful    Medicine is not helping    Breathing getting harder    Trouble walking or talking    Nose opens wide to breathe       1. Take your rescue medicine NOW  2. If your provider has prescribed an oral steroid medicine, start taking it NOW  3. Call your doctor NOW  4. If you are still in the Red Zone after 20 minutes and you have not reached your doctor:    Take your rescue medicine again and    Call 911 or go to the emergency room right away    See your regular doctor within 2 weeks of an Emergency Room  or Urgent Care visit for follow-up treatment.          Annual Reminders:  Meet with Asthma Educator,  Flu Shot in the Fall, consider Pneumonia Vaccination for patients with asthma (aged 19 and older).    Pharmacy:    [id number] [place], [place] - [address] [facility] - [place], [place] - [address]                      Asthma Triggers  How To Control Things That Make Your Asthma Worse    Triggers are things that make your asthma worse.  Look at the list below to help you find your triggers and what you can do about them.  You can help prevent asthma flare-ups by staying away from your triggers.      Trigger                                                          What you can do   Cigarette Smoke  Tobacco smoke can make asthma worse. Do not allow smoking in your home, car or around you.  Be sure no one smokes at a child s day care or school.  If you smoke, ask your health care provider for ways to help you quit.  Ask family members to quit too.  Ask your health care provider for a referral to Quit Plan to help you quit smoking, or call 1-588-424-PLAN.     Colds, Flu, Bronchitis  These are common triggers of asthma. Wash your hands often.  Don t touch your eyes, nose or mouth.  Get a flu shot every year.     Dust Mites  These are tiny bugs that live in cloth or carpet. They are too small to see. Wash sheets and blankets in hot water every week.   Encase pillows and mattress in dust mite proof covers.  Avoid having carpet if you can. If you have carpet, vacuum weekly.   Use a dust mask and HEPA vacuum.   Pollen and Outdoor Mold  Some people are allergic to trees, grass, or weed pollen, or molds. Try to keep your windows closed.  Limit time out doors when pollen count is high.   Ask you health care provider about taking medicine during allergy season.     Animal Dander  Some people are allergic to skin flakes, urine or saliva from pets with fur or feathers. Keep pets with  fur or feathers out of your home.    If you can t keep the pet outdoors, then keep the pet out of your bedroom.  Keep the bedroom door closed.  Keep pets off cloth furniture and away from stuffed toys.     Mice, Rats, and Cockroaches  Some people are allergic to the waste from these pests.   Cover food and garbage.  Clean up spills and food crumbs.  Store grease in the refrigerator.   Keep food out of the bedroom.   Indoor Mold  This can be a trigger if your home has high moisture. Fix leaking faucets, pipes, or other sources of water.   Clean moldy surfaces.  Dehumidify basement if it is damp and smelly.   Smoke, Strong Odors, and Sprays  These can reduce air quality. Stay away from strong odors and sprays, such as perfume, powder, hair spray, paints, smoke incense, paint, cleaning products, candles and new carpet.   Exercise or Sports  Some people with asthma have this trigger. Be active!  Ask your doctor about taking medicine before sports or exercise to prevent symptoms.    Warm up for 5-10 minutes before and after sports or exercise.     Other Triggers of Asthma  Cold air:  Cover your nose and mouth with a scarf.  Sometimes laughing or crying can be a trigger.  Some medicines and food can trigger asthma.

## 2019-03-08 NOTE — ASSESSMENT & PLAN NOTE
Adjusted dose started ~3/1/2019.    Continue x 6 weeks.    Recheck TSH then and adjust levothyroxine as necessary.

## 2019-03-08 NOTE — PROGRESS NOTES
SUBJECTIVE:   Manasa French is a 73 year old female who presents to clinic today for the following health issues:    Pt here for follow up from 2-26-19 visit, hand swelling pt had blood clots.    Pleasant 73-year-old here today in follow-up of acute DVT diagnosed February 26.  This was unprovoked as patient is quite active.  She swims 3 times a week.  Walks daily.  She did have a spinal fusion 6 months ago but has recovered well from this.  She has never had a blood clot previously.  She was seen by and diagnosed by 1 of my partners who started her on Eliquis 10 mg twice daily for 1 week and is now down to 5 mg twice daily daily.  She is having no side effects or issues with this.  She notes decreased swelling in her right upper extremity but still has some.  She still has some discoloration.  She still notes a spider veins and wonders if these will go away.    Additionally we have been adjusting her thyroid medication.  She is taking the new dose of 150 mcg daily.  She is due for recheck of her thyroid labs on April 1.    [place]  They will be gone for about 3 weeks.    Problem list and histories reviewed & adjusted, as indicated.  Additional history: as documented    Reviewed and updated as needed this visit by clinical staff  Tobacco  Allergies  Meds  Problems  Med Hx  Surg Hx  Fam Hx  Soc Hx        Reviewed and updated as needed this visit by Provider  Problems         ROS:  Constitutional, HEENT, cardiovascular, pulmonary, gi and gu systems are negative, except as otherwise noted.    OBJECTIVE:     /70 (Cuff Size: Adult Regular)   Pulse 89   Temp 97.8  F (36.6  C) (Tympanic)   SpO2 100%   There is no height or weight on file to calculate BMI.  GENERAL: healthy, alert and no distress  MS: right upper extremity with continued mild swelling right hand.  Spider veins still present.    LUNGS:  CTA B/L, no wheezing or crackles.    ASSESSMENT/PLAN:      Acute deep vein thrombosis (DVT) of axillary vein and internal jugular (H)    Continue Eliquis 5 mg BID x 3 months at least, potentially longer.    I'd like her to see Dr. [facility] as unprovoked DVT in unusual location in active swimmer.  Referred.    Discussed with patient who agrees with plan.    Hypothyroidism, postablative    Adjusted dose started ~3/1/2019.    Continue x 6 weeks.    Recheck TSH then and adjust levothyroxine as necessary.    (Z23) Need for shingles vaccine  Comment: Will do Zoster Vaccine - will check with insurance and likely do at pharmacy.      Discussed with patient, all questions answered, in agreement with this plan, will return or seek further care if not improving or worsening.    Return in about 6 weeks (around 4/19/2019) for Follow up DVT.      Evelia Castro MD  [facility]

## 2019-04-12 ENCOUNTER — TELEPHONE (OUTPATIENT)
Dept: OPHTHALMOLOGY | Facility: CLINIC | Age: 74
End: 2019-04-12

## 2019-04-12 NOTE — TELEPHONE ENCOUNTER
Spoke to pt at 1259  Right eye scratching and itching   H/o previous reactions to glaucoma drops  Itching and scratching on and off since weds    No redness around eyes/on lids and no swelling around eyes/on lids    Pt was traveling in car for 4 days on trip when started having symptoms    More scratchiness than itching noticed and pt blinking a lot-- feels like grit in eye  No vision changes    Using frequent preservative free tears and lubricating eye ointment    Reviewed may continue with PF tears frequently-- pt may use the ointment during day if would like, but should use after medicated eye drop  Reviewed may use warm compresses    Reviewed to call Monday if not improving and to call over weekend for any new eye pain/vision changes  Pt verbally demonstrated understanding and seemed comfortable with plan    Note to on call eye provider for review  Jaiden Galarza RN 1:08 PM 04/12/19          [facility]    Phone Message    May a detailed message be left on voicemail: yes    Reason for Call: Other: Pt is wanting to let DR. Radha Gallardo know that she is experincing itching, scathcy feeling in the right eye, has this once before, Pt is wanting to know what she can do for the itching, please call the Pt back ASAP, thank you     Action Taken: Message routed to:  [facility] ([facility] Eye

## 2019-04-15 DIAGNOSIS — E89.0 HYPOTHYROIDISM, POSTABLATIVE: ICD-10-CM

## 2019-04-15 LAB — TSH SERPL DL<=0.005 MIU/L-ACNC: 3.31 MU/L (ref 0.4–4)

## 2019-04-15 PROCEDURE — 84443 ASSAY THYROID STIM HORMONE: CPT | Performed by: FAMILY MEDICINE

## 2019-04-15 PROCEDURE — 36415 COLL VENOUS BLD VENIPUNCTURE: CPT | Performed by: FAMILY MEDICINE

## 2019-04-15 NOTE — LETTER
April 16, 2019      Manasa French  [address] [address]        Dear MsJames,    We are writing to inform you of your test results.    Good news - your TSH thyroid test is now in the normal range.    Resulted Orders   **TSH with free T4 reflex FUTURE 2mo   Result Value Ref Range    TSH 3.31 0.40 - 4.00 mU/L       If you have any questions or concerns, please call the clinic at the number listed above.       Sincerely,        Dr. Evelia Castro MD/[facility]

## 2019-04-16 NOTE — RESULT ENCOUNTER NOTE
Letter sent with the following information:  Good news - your TSH thyroid test is now in the normal range.

## 2019-04-17 ENCOUNTER — OFFICE VISIT (OUTPATIENT)
Dept: FAMILY MEDICINE | Facility: CLINIC | Age: 74
End: 2019-04-17
Payer: MEDICARE

## 2019-04-17 VITALS
DIASTOLIC BLOOD PRESSURE: 70 MMHG | SYSTOLIC BLOOD PRESSURE: 130 MMHG | OXYGEN SATURATION: 99 % | WEIGHT: 155 LBS | HEART RATE: 86 BPM | BODY MASS INDEX: 25.79 KG/M2 | TEMPERATURE: 97.4 F

## 2019-04-17 DIAGNOSIS — E89.0 HYPOTHYROIDISM, POSTABLATIVE: ICD-10-CM

## 2019-04-17 PROCEDURE — 99213 OFFICE O/P EST LOW 20 MIN: CPT | Performed by: FAMILY MEDICINE

## 2019-04-17 RX ORDER — LEVOTHYROXINE SODIUM 150 UG/1
150 TABLET ORAL DAILY
Qty: 90 TABLET | Refills: 1 | Status: SHIPPED | OUTPATIENT
Start: 2019-04-17 | End: 2019-10-08

## 2019-04-17 NOTE — PROGRESS NOTES
SUBJECTIVE:   Manasa French is a 73 year old female who presents to clinic today for the following   health issues:        Medication Followup of levothyroxine    Taking Medication as prescribed: yes    Side Effects:  None    Medication Helping Symptoms:  Yes, pt had labs done for med dosage     Feeling better.  This winter (when TSH was up) was having cold spells, gaining weight.  Now weight has evened out and not as cold.  No anxiety or heart palpitations.  Taking the 150 mcg daily.    Additional history: as documented    Reviewed  and updated as needed this visit by clinical staff  Tobacco  Allergies  Meds  Problems  Med Hx  Surg Hx  Fam Hx  Soc Hx          Reviewed and updated as needed this visit by Provider  Tobacco  Allergies  Meds  Problems  Med Hx  Surg Hx  Fam Hx       ROS:  Constitutional, HEENT, cardiovascular, pulmonary, gi and gu systems are negative, except as otherwise noted.    OBJECTIVE:     /70 (Cuff Size: Adult Regular)   Pulse 86   Temp 97.4  F (36.3  C) (Tympanic)   Wt 70.3 kg (155 lb)   SpO2 99%   BMI 25.79 kg/m    Body mass index is 25.79 kg/m .  GENERAL: healthy, alert and no distress  SKIN: no suspicious lesions or rashes  NEURO: Normal strength and tone, mentation intact and speech normal  PSYCH: mentation appears normal, affect normal/bright    Diagnostic Test Results:  Results for orders placed or performed in visit on 04/15/19   **TSH with free T4 reflex FUTURE 2mo   Result Value Ref Range    TSH 3.31 0.40 - 4.00 mU/L       ASSESSMENT/PLAN:       ICD-10-CM    1. Hypothyroidism, postablative E89.0 levothyroxine (SYNTHROID/LEVOTHROID) 150 MCG tablet     **TSH with free T4 reflex FUTURE anytime     Continue levothyroxoine at new 150 mcg dose.  Recheck TSH in 3 months, lab only.  Follow up with us for Medicare Wellness.      Evelia Castro MD  [facility]

## 2019-05-09 ENCOUNTER — OFFICE VISIT (OUTPATIENT)
Dept: HEMATOLOGY | Facility: CLINIC | Age: 74
End: 2019-05-09
Attending: INTERNAL MEDICINE
Payer: MEDICARE

## 2019-05-09 VITALS
OXYGEN SATURATION: 98 % | SYSTOLIC BLOOD PRESSURE: 175 MMHG | WEIGHT: 154 LBS | TEMPERATURE: 97.6 F | DIASTOLIC BLOOD PRESSURE: 75 MMHG | HEART RATE: 86 BPM | BODY MASS INDEX: 25.66 KG/M2 | HEIGHT: 65 IN | RESPIRATION RATE: 12 BRPM

## 2019-05-09 DIAGNOSIS — I82.A11 ACUTE DEEP VEIN THROMBOSIS (DVT) OF AXILLARY VEIN OF RIGHT UPPER EXTREMITY (H): Primary | ICD-10-CM

## 2019-05-09 PROCEDURE — G0463 HOSPITAL OUTPT CLINIC VISIT: HCPCS

## 2019-05-09 PROCEDURE — 99204 OFFICE O/P NEW MOD 45 MIN: CPT | Performed by: INTERNAL MEDICINE

## 2019-05-09 ASSESSMENT — MIFFLIN-ST. JEOR: SCORE: 1199.42

## 2019-05-09 ASSESSMENT — PAIN SCALES - GENERAL: PAINLEVEL: NO PAIN (0)

## 2019-05-09 NOTE — PROGRESS NOTES
[facility]  [address] [place] [id number]  Phone: [phone], Fax: [phone]    Outpatient Visit Note:    Patient: Manasa French  MRN: [id number]  : 1945  JENNIFER: May 9, 2019    Reason for Consultation:  Manasa French is a referred by Dr. Castro for evaluation and treatment of right upper extremity DVT.    History of Present Illness:  Manasa French is a 73 year old female with a history of HTN, Graves' disease w/ postablative hypothyroidism, lumbar stenosis s/p L1-4 decompression and fusion on 18 who developed significant swelling and redness of her right arm in late 2019. Subsequent ultrasound of the extremity demonstrated DVT involving the right internal jugular vein, subclavian vein, axillary vein, and the central most portion of the brachial vein as well as superficial thrombus of the basilic vein. Chest x-ray performed at the time was negative for mass. She was subsequently started on Apixaban 10 mg PO BID for one week with transition to 5 mg PO BID. The patient denies a personal or family history of blood clot prior to this. No trauma preceded her arm swelling. She does not use hormone replacement therapy. She had recovered well from her spine surgery in September, and she is quite active, noting that she swims and goes on long walks several times per week.     The patient states her right arm swelling and discoloration has resolved since starting Apixaban, and she has not missed any doses of the medication. She states she has full strength and full range of motion throughout the extremity. Her only complaint is that she has felt somewhat fatigued since starting Apixaban. Her current copay for the medication is more than $100 per month, which is a financial burden for her, and she wonders if there are other options for anticoagulation if she is to be on these medications long term. She otherwise denies headache, chest pain,  shortness of breath, weakness/numbness of the right arm. She denies epistaxis, hematuria, bloody or dark stools, bruising.     Past Medical History:  Past Medical History:   Diagnosis Date     6th nerve palsy     OU     Cataract      Graves disease      Hypertension      Nystagmus      Primary open angle glaucoma      Pseudophakia, right eye 4/29/2014     Spider veins      Thyroid eye disease      Unspecified hypothyroidism        Past Surgical History:  Past Surgical History:   Procedure Laterality Date     BACK SURGERY  2005    Dr Welsh     BACK SURGERY  09/11/2018     CATARACT IOL, RT/LT Right 04/15/2014     CATARACT IOL, RT/LT Left 04/29/2014     EYE SURGERY  1987    Shunt- A/C malformation     HERNIA REPAIR  1994    umbilical     LUMPECTOMY BREAST  1974    Left benign     SHOULDER SURGERY  1996    Left      WRIST SURGERY  1976    Ganglion Right Wrist       Medications:  Current Outpatient Medications   Medication Sig     alendronate (FOSAMAX) 70 MG tablet Take 1 tablet (70 mg) by mouth every 7 days     apixaban ANTICOAGULANT (ELIQUIS) 5 MG tablet Take 1 tablet (5 mg) by mouth 2 times daily     Calcium Carb-Cholecalciferol (CALCIUM CARBONATE-VITAMIN D3 PO)      carboxymethylcellulose (CELLUVISC/REFRESH LIQUIGEL) 1 % ophthalmic solution Place 1 drop into both eyes 3 times daily     Cholecalciferol (VITAMIN D) 1000 UNITS capsule Take  by mouth.     fluticasone-salmeterol (ADVAIR) 250-50 MCG/DOSE diskus inhaler Inhale 1 puff into the lungs 2 times daily     latanoprost (XALATAN) 0.005 % ophthalmic solution Place 1 drop into both eyes At Bedtime     levothyroxine (SYNTHROID/LEVOTHROID) 150 MCG tablet Take 1 tablet (150 mcg) by mouth daily Come in for lab only TSH recheck ~ 7/1/2019.     losartan (COZAAR) 100 MG tablet Take 1 tablet (100 mg) by mouth daily     Multiple Vitamins-Minerals (ICAPS AREDS 2 PO)      timolol (TIMOPTIC) 0.5 % ophthalmic solution Place 1 drop into both eyes every morning     No current  facility-administered medications for this visit.        Allergies:  Allergies   Allergen Reactions     Alphagan [Brimonidine] Unknown     Animal Dander      cats     Cosopt [Dorzolamide-Timolol] Unknown     No Clinical Screening - See Comments Other (See Comments)     Animal dander (cats)     Seasonal Allergies        ROS:  Denies any bleeding issues. No gum bleeding, No nose bleed. Denies any hematuria or blood in stools. Denies any ecchymosis. Denies any lower extremities swelling or pain. Denies fevers, weight loss. No chest pain. Denies any shortness of breath.     A 10 point ROS was performed and was negative other than above.     Social History:  Denies any tobacco use. The patient typically has one glass of red wine with dinner. Denies any illicit drug use. She is currently sexually active with her . Patient is retired.    Family History:  No family history of blood clots.    Mother: Colon cancer, breast cancer, glaucoma  Father: Leukemia,      Objectives:  Pleasant 73 year old female in no acute distress.  Vitals: B/P: 175/75, T: 97.6, P: 86, R: 12, Wt: 154 lbs 0 oz Body mass index is 25.63 kg/m .   Exam:   Gen: Appears well, no distress  HEENT: No scleral icterus or hemorrahge, no wet purpura, no lymphadenopathy  CV: regular, no murmurs  Pulm: clear  Abd: soft, nontender, no splenomegaly  Ext: no edema of the upper or lower extremities  MSK: Limited ROM of neck with leftward rotation compared to right. No tenderness to palpation of posterior neck.   Neuro: 5/5 strength throughout upper extremities bilaterally. No sensory deficit of upper extremities.     Labs:    From 2/26/19  CBC: WBC 5.8, HGB 11.5 (MCV 96),   ESR: 30    Imaging:    US Right Upper Extremity, Venous (2/26/19)  Findings: There is nonocclusive echogenic material in the right jugular vein superiorly that becomes occlusive inferiorly. There is also abnormal filling defect in the right subclavian vein without color bahman. The right  axillary vein is partially compressible with peripheral color flow. The right brachial vein has some filling defects in it superiorly but becomes fully patent closer to the elbow. There is echogenic material in the right basilic vein that is partially compressible. The right cephalic vein is patent. The forearm veins are patent.     Impression: DVT involving the right internal jugular vein along with the subclavian vein, axillary vein, and central most portion of the brachial vein. There is also superficial thrombus in the basilic vein of the right upper arm.     Assessment:  In summary, Manasa French is a 73 year old woman with unprovoked right upper extremity DVT. This is the patient's first blood clot, and the location of her clot in an upper extremity is unusual. She has no family history of DVT to suggest an underlying thrombophilia, though regardless she is at increased risk of DVT by virtue of her advanced age. She did undergo lumbar fusion in September 2018, but she developed this clot more than 5 months after that operation, so it is unlikely that surgery is a major contributing factor. Given the location of the DVT in the right upper extremity and that the patient is an active swimmer, Paget-Schroetter Syndrome in which repetitive motion of the upper extremity can compress the subclavian vein against a narrow thoracic outlet resulting in clot is a possibility. The patient does not have other features of thoracic outlet syndrome such as upper extremity weakness or numbness, but Paget-Schroetter is known to solely affect the vasculature. Also on the differential is underlying malignancy as this can create a hypercoagulable state. The patient has not had unexplained weight loss or other constitutional symptoms to suggest this.     Her upper extremity swelling has resolved while treated with Apixaban. Further testing for thoracic outlet compression with arterial flow ultrasound can help guide further  management and possible surgical intervention. However, for the time being, this should be considered an unprovoked clot that will require chronic anticoagulation. The patient does note that her copay for Apixaban is burdensome, and she is interested in other medications. Discussed other options with her, and she is interested in Warfarin at this time.    Plan:    1. Majority of today's visit was spent counseling the patient regarding DVT and anticoagulation.   2. Will assess for thoracic outlet compression with US. If this is negative, consider MR Venogram for further evaluation.   3. Patient will complete her current prescription of Apixaban and then begin Warfarin once it has run out. Referral to a Warfarin clinic was given, and she will follow up with them for further management. Discussed with the patient that this will initially require frequent monitoring to ensure therapeutic INR, and also discussed that she does not need to drastically change her diet if she is consuming a consistent amount of vitamin K.   4. RTC 3 months    The patient is given our center's contact information and is instructed to call if she should have any further questions or concerns.  Otherwise, we will plan on seeing her back in 3 months.      Jaimie Vega, nurse clinician is present throughout the entire clinic visit with the patient today.  Patient understands and agrees with the above plan and recommendation.    Barrett Gallardo, MS4    Physician Attestation   I saw this patient with the student who acted as my scribe for the visit.  I have edited the note to reflect my history, exam and medical decision making.    Christopher Smith MD   of Medicine  [facility] [employer]

## 2019-05-09 NOTE — PATIENT INSTRUCTIONS
1.  Ultrasound to rule out thoracic outlet syndrome (Mon/Fri best).  Jaimie to call after scheduled [phone].  2. See Dr. Smith back in 3 months.  3.  Referral to [facility]. ([address])  4.  Take Eliquis until finished, then start Warfarin 5 mg (with goal INR between 2.0 and 3.0)                              [facility] [phone]    MEDICATION: WARFARIN   Warfarin (brand: Coumadin or Jantoven) is commonly known as a  blood thinner.  It affects the ability of the blood to form clots. It is used to prevent or treat blood clots in the veins, usually in the legs, and lungs.  DIRECTIONS FOR USE: Warfarin is a very effective drug, but it can be dangerous if not taken properly. If you take too much it can cause excess internal or external bleeding. You will need to have regular blood tests (INR) to measure the effect of the warfarin. Keep your scheduled test appointment and be sure to make contact with your doctor or Coumadin clinic following the appointment to find out the result of your test. Adjustments in your dosage may be needed to keep your INR between 2.0 and 3.0. Follow your provider s advice exactly regarding how to take this medicine. Do not stop the medicine without talking to your doctor. Typically the INR is checked monthly, but more often when you are first starting the medication or when changes in your dose have been made.  POSSIBLE SIDE EFFECTS: Signs of excess bleeding: increased bruising, prolonged bleeding from cuts, bleeding from the nose or gums, blood in urine, vomit, or stools (red or black color), coughing up blood, or unusually heavy menstrual bleeding, severe headache, confusion, drowsiness or dizziness, sudden change in color of toes (turning dark or purplish) or any other area of body (Contact your doctor or return to this facility at once).  ALLERGIC REACTIONS: Rash, itching, swelling, trouble swallowing or breathing (Contact your doctor or return to  this facility promptly).  DRUG INTERACTIONS: Many medicines interfere with the effect of Coumadin. Before starting this medicine, be sure your doctor or Coumadin clinic know about any prescription or over-the-counter/herbal drugs you are taking.  FOOD INTERACTIONS: This medicine works best when you eat the same amount of foods containing vitamin K each day. These foods contain high amounts of Vitamin K: green leafy vegetables, broccoli, lettuce (bib & red leaf), watercress, swiss chard, spinach, cabbage, kale, brussels sprouts, soybean and canola oils, mayonaise, and liver. Your Coumadin dose will be adjusted on your current diet. Do not increase or decrease your intake of these foods without notifying your Coumadin clinic.  Other foods with moderate amounts of vitamin K include: Asparagus, avacado, red cabbage, green peas, pickle, lettuce, margarine, olive oil.  Low amounts of vitamin K can be found in green beans, carrots, cauliflower, celery, corn, cucumber, eggplant, mushrooms, onions, green pepper, potato, pumpkin, sauerkraut, tomato, apple, bananna, blueberries, grapes, orange, beef, chicken, pork, tuna, turkey, oils (corn, peanut, safflower, sesame, sunflower), butter, cheese, eggs, sour cream, yogurt, coffee, cola, fruit juices, milk, and black tea.  WARNINGS: Consult your doctor if you become pregnant while taking this drug.  Before having surgery or a dental procedure, notify your doctor that you are taking this drug. You may need to stop taking it for 5 days before the procedure.  Avoid activities with a high risk of injury. If you have a serious blow to the head (fall or car accident with or without loss of consciousness), contact your doctor to be checked for bleeding around the brain.  If you will be on long-term Coumadin therapy, carry an identification card or wear a bracelet indicating this. Ask your provider where to obtain this.   Let your Coumadin clinic know if you have any medication changes or  have severe illness (i.e. vomiting, diarrhea, not eating)    Adapted from: 2632-5336 [employer], [address], [place] [address]. All rights reserved.

## 2019-05-10 ENCOUNTER — TELEPHONE (OUTPATIENT)
Dept: ONCOLOGY | Facility: CLINIC | Age: 74
End: 2019-05-10

## 2019-05-10 RX ORDER — WARFARIN SODIUM 5 MG/1
5 TABLET ORAL DAILY
Qty: 90 TABLET | Refills: 3 | Status: SHIPPED | OUTPATIENT
Start: 2019-05-10 | End: 2019-07-15

## 2019-05-10 NOTE — NURSING NOTE
" Manasa French \"Rhonda\"  MRN: [id number]  73 year old, 1945      Saw Rhonda French with Dr. Smith today.  Also present was her  Josias.  Rhonda has a history of unprovoked DVT in right upper extremity.  She did not have any lines prior to clot.  She has history of hydrocephalus/shunt, and had back surgery in September.   Symptoms appeared a few days prior to US 2/26/19.  She is currently on Eliquis every 12 hours, but cost is prohibitive and she would like to transition to Warfarin.  Called [facility] and sent INR referral.  Will also get US at [facility] to rule out thoracic outlet syndrome.   Will see her back in 3 months.  Cristina Vega, RN, MSN -Nurse Clinician, [facility] [phone]  "

## 2019-05-10 NOTE — TELEPHONE ENCOUNTER
Writer spoke with Jaimie Vega at the [facility]. Patient is being transitioned to Warfarin from Apixaban d/t financial reasons. Per Dr. Smith, patient DOES NOT need any overlapping of AC agent. Patient should simply finish out Apixaban rx and then start taking Warfarin the next evening. Writer will call patient to advise checking INR level within 2-3 days of starting. INR referral placed. Patient prefers  clinic. Hematology will sign for 5 mg Warfarin tablets as Dr. Smith will continue to be managing provider. Brandi Arriola, BENJAMINN, RN

## 2019-05-13 ENCOUNTER — HOSPITAL ENCOUNTER (OUTPATIENT)
Dept: ULTRASOUND IMAGING | Facility: CLINIC | Age: 74
Discharge: HOME | End: 2019-05-13
Attending: INTERNAL MEDICINE | Admitting: INTERNAL MEDICINE
Payer: MEDICARE

## 2019-05-13 DIAGNOSIS — I82.A11 ACUTE DEEP VEIN THROMBOSIS (DVT) OF AXILLARY VEIN OF RIGHT UPPER EXTREMITY (H): ICD-10-CM

## 2019-05-13 PROCEDURE — 93971 EXTREMITY STUDY: CPT | Mod: RT

## 2019-05-16 ENCOUNTER — TELEPHONE (OUTPATIENT)
Dept: FAMILY MEDICINE | Facility: CLINIC | Age: 74
End: 2019-05-16

## 2019-05-16 DIAGNOSIS — I10 BENIGN HYPERTENSION: ICD-10-CM

## 2019-05-16 NOTE — TELEPHONE ENCOUNTER
"LOSARTAN POTASSIUM 100 MG Tablet  Last Written Prescription Date:  08/31/2018  Last Fill Quantity: 90,  # refills: 2   Last office visit: 4/17/2019 with prescribing provider:  04/17/2019   Future Office Visit:    Requested Prescriptions   Pending Prescriptions Disp Refills     losartan (COZAAR) 100 MG tablet [Pharmacy Med Name: LOSARTAN POTASSIUM 100 MG Tablet] 90 tablet 2     Sig: TAKE 1 TABLET EVERY DAY       Angiotensin-II Receptors Failed - 5/16/2019  2:34 PM        Failed - Blood pressure under 140/90 in past 12 months     BP Readings from Last 3 Encounters:   05/09/19 175/75   04/17/19 130/70   03/08/19 130/70                 Passed - Recent (12 mo) or future (30 days) visit within the authorizing provider's specialty     Patient had office visit in the last 12 months or has a visit in the next 30 days with authorizing provider or within the authorizing provider's specialty.  See \"Patient Info\" tab in inbasket, or \"Choose Columns\" in Meds & Orders section of the refill encounter.              Passed - Medication is active on med list        Passed - Patient is age 18 or older        Passed - No active pregnancy on record        Passed - Normal serum creatinine on file in past 12 months     Recent Labs   Lab Test 08/31/18  0950   CR 0.88             Passed - Normal serum potassium on file in past 12 months     Recent Labs   Lab Test 08/31/18  0950   POTASSIUM 3.6                    Passed - No positive pregnancy test in past 12 months          "

## 2019-05-17 ENCOUNTER — ANCILLARY PROCEDURE (OUTPATIENT)
Dept: MAMMOGRAPHY | Facility: CLINIC | Age: 74
End: 2019-05-17
Payer: MEDICARE

## 2019-05-17 DIAGNOSIS — Z12.31 VISIT FOR SCREENING MAMMOGRAM: ICD-10-CM

## 2019-05-17 PROCEDURE — 77067 SCR MAMMO BI INCL CAD: CPT | Mod: TC

## 2019-05-17 RX ORDER — LOSARTAN POTASSIUM 100 MG/1
TABLET ORAL
Qty: 90 TABLET | Refills: 2 | Status: SHIPPED | OUTPATIENT
Start: 2019-05-17 | End: 2020-02-03

## 2019-05-17 NOTE — TELEPHONE ENCOUNTER
Looks like had really high blood pressure at specialists office but previously normal.  Can you call patient and find out what BPs have been at home?  If she hasn't taken at home, she could come in here for nurse only to recheck.  Will refill in meantime, but we want to be sure her blood pressure is well controlled.  Thanks,  Dr. Evelia Castro MD/[facility]

## 2019-05-20 NOTE — TELEPHONE ENCOUNTER
Called patient to get recent BPs. She states she hasn't been taking it regularly or documenting it when she does. Patient agrees to take BP twice a day and record readings. Scheduled nurse only BP check next week and patient will bring BP log with her. Routing to provider as ABELINO

## 2019-05-29 ENCOUNTER — ALLIED HEALTH/NURSE VISIT (OUTPATIENT)
Dept: NURSING | Facility: CLINIC | Age: 74
End: 2019-05-29
Payer: MEDICARE

## 2019-05-29 VITALS — SYSTOLIC BLOOD PRESSURE: 134 MMHG | DIASTOLIC BLOOD PRESSURE: 76 MMHG

## 2019-05-29 DIAGNOSIS — I10 HTN, GOAL BELOW 140/90: Primary | ICD-10-CM

## 2019-05-29 PROCEDURE — 99207 ZZC NO CHARGE NURSE ONLY: CPT

## 2019-05-29 NOTE — PROGRESS NOTES
Please let her know these are too high overall.  I recommend she makes an appointment for her annual Medicare Wellness Exam - we can discuss altering her blood pressure medicine then.  Thanks,  Dr. Evelia Castro MD/[facility]

## 2019-05-29 NOTE — PROGRESS NOTES
Pt here for a nurse only bp check,  at home readings from last week are  5-20-19   139/75, 154/78  5-21-19   145/68, 148/64  5-23-19   127/66, 103/53  5-24-19    145/70, 149/70  5-25-19    146/79  5-26-19    147/80

## 2019-05-29 NOTE — TELEPHONE ENCOUNTER
Writer spoke with patient regarding AC care plan. Patient currently has 4 days left of Eliquis. Patient will start Warfarin on 6/3 and have INR checked on 6/6. Routing to Jaimie as an FYI. BENJAMIN BayN, RN

## 2019-06-06 ENCOUNTER — ANTICOAGULATION THERAPY VISIT (OUTPATIENT)
Dept: NURSING | Facility: CLINIC | Age: 74
End: 2019-06-06
Payer: MEDICARE

## 2019-06-06 DIAGNOSIS — I82.A11 ACUTE DEEP VEIN THROMBOSIS (DVT) OF AXILLARY VEIN OF RIGHT UPPER EXTREMITY (H): ICD-10-CM

## 2019-06-06 LAB — INR POINT OF CARE: 1.2 (ref 0.86–1.14)

## 2019-06-06 PROCEDURE — 85610 PROTHROMBIN TIME: CPT | Mod: QW

## 2019-06-06 PROCEDURE — 36416 COLLJ CAPILLARY BLOOD SPEC: CPT

## 2019-06-06 PROCEDURE — 99211 OFF/OP EST MAY X REQ PHY/QHP: CPT

## 2019-06-10 ENCOUNTER — ANTICOAGULATION THERAPY VISIT (OUTPATIENT)
Dept: NURSING | Facility: CLINIC | Age: 74
End: 2019-06-10
Payer: MEDICARE

## 2019-06-10 DIAGNOSIS — I82.A11 ACUTE DEEP VEIN THROMBOSIS (DVT) OF AXILLARY VEIN OF RIGHT UPPER EXTREMITY (H): ICD-10-CM

## 2019-06-10 LAB — INR POINT OF CARE: 2.5 (ref 0.86–1.14)

## 2019-06-10 PROCEDURE — 36416 COLLJ CAPILLARY BLOOD SPEC: CPT

## 2019-06-10 PROCEDURE — 85610 PROTHROMBIN TIME: CPT | Mod: QW

## 2019-06-10 PROCEDURE — 99207 ZZC NO CHARGE NURSE ONLY: CPT

## 2019-06-10 NOTE — PROGRESS NOTES
ANTICOAGULATION FOLLOW-UP CLINIC VISIT    Patient Name:  Manasa French  Date:  6/10/2019  Contact Type:  Face to Face    SUBJECTIVE:  Patient Findings     Positives:   Change in diet/appetite (Pt did not have any high vitamin k containing foods. )    Comments:   Initiation phase. INR responding quickly to additional doses over the weekend. Patient denies any new concerns or s/s of bleeding/clotting.           OBJECTIVE    INR Protime   Date Value Ref Range Status   06/10/2019 2.5 (A) 0.86 - 1.14 Final       ASSESSMENT / PLAN  INR assessment THER    Recheck INR In: 3 DAYS    INR Location Clinic      Anticoagulation Summary  As of 6/10/2019    INR goal:   2.0-3.0   TTR:   --   INR used for dosin.5 (6/10/2019)   Warfarin maintenance plan:   No maintenance plan   Full warfarin instructions:   6/10: 5 mg; : 5 mg; : 5 mg; Otherwise No maintenance plan   Next INR check:   2019   Target end date:       Indications    Acute deep vein thrombosis (DVT) of axillary vein and internal jugular (H) [I82.A19]             Anticoagulation Episode Summary     INR check location:       Preferred lab:       Send INR reminders to:   [facility]    Comments:   40-50 min walk/lifting. 3 swimming. Greens 2-4/week.      Anticoagulation Care Providers     Provider Role Specialty Phone number    Christopher Smith MD [facility] Internal Medicine [phone]            See the Encounter Report to view Anticoagulation Flowsheet and Dosing Calendar (Go to Encounters tab in chart review, and find the Anticoagulation Therapy Visit)    Writer advised patient to continue current weekly dose of 45 mg and recheck in three days. If INR continues to climb, weekly dose will be reduced.     Patient made aware if signs of clotting (pain, tenderness, swelling, or color change in any extremity) AND/OR bleeding occur (nosebleeds, bleeding gums, bruising, or blood in stool or urine) to notify provider & seek medical attention. If  severe symptoms develop, such as major bleeding, chest pain, shortness of breath, fall, trauma or s/s of stroke, patient to call 911 immediately.     Dosage adjustment made based on physician directed care plan.    Brandi Arriola RN

## 2019-06-10 NOTE — PROGRESS NOTES
ANTICOAGULATION INITIAL CLINIC VISIT    Patient Name:  Manasa French  Date:  2019  Referred by: Dr. Smith  Contact Type:  Face to Face with  Josias    SUBJECTIVE:  Coumadin education was completed today.  Topics covered include:  -Introduction to coumadin  -Proper Administration  -INR Testing  -Sign/Symptoms of Bleeding  -Signs/Symptoms of Clot Formation or Stroke  -Dietary Intake of Vitamin K  -Drug Interactions  -Anticoagulation Identification (bracelet, necklace or wallet card)  -Future Surgery  -Effects of Alcohol, Tobacco, and Exercise on Coumadin    Coumadin Education Booklet and Coumadin Identification Wallet Card were given to the patient.    Patient Findings     Comments:   Patient finished supply of Apixaban and started Warfarin on  as directed. Patient has had four doses of 5 mg daily, weekly total is 20 mg. Patient is very consistent with lifestyles and continues to workout daily at the  and eat a balanced diet. Long discussion regarding vitamin k containing foods and how to keep consistent. Patient denies any concerns for s/e and no s/s of bleeding/clotting.          OBJECTIVE    : INR level 1.2.      ASSESSMENT / PLAN  INR assessment THER    Recheck INR In: 4 DAYS    INR Location Clinic      Anticoagulation Summary  As of 2019    INR goal:   2.0-3.0   TTR:   --   INR used for dosin.2! (2019)   Warfarin maintenance plan:   No maintenance plan   Full warfarin instructions:   : 7.5 mg; : 7.5 mg; : 7.5 mg; : 7.5 mg; Otherwise No maintenance plan   Next INR check:   6/10/2019   Target end date:       Indications    Acute deep vein thrombosis (DVT) of axillary vein and internal jugular (H) [I82.A19]             Anticoagulation Episode Summary     INR check location:       Preferred lab:       Send INR reminders to:   [facility]    Comments:   40-50 min walk/lifting. 3 swimming. Greens 2-4/week.      Anticoagulation Care Providers     Provider Role  Specialty Phone number    SarahChristopher MD [facility] Internal Medicine [phone]            See the Encounter Report to view Anticoagulation Flowsheet and Dosing Calendar (Go to Encounters tab in chart review, and find the Anticoagulation Therapy Visit)    Per protocol, patient advised to increase daily dose to 7.5 mg and recheck Monday.     Patient made aware if signs of clotting (pain, tenderness, swelling, or color change in any extremity) AND/OR bleeding occur (nosebleeds, bleeding gums, bruising, or blood in stool or urine) to notify provider & seek medical attention. If severe symptoms develop, such as major bleeding, chest pain, shortness of breath, fall, trauma or s/s of stroke, patient to call 911 immediately.     Dosage adjustment made based on physician directed care plan.    Brandi Arriola RN

## 2019-06-13 ENCOUNTER — ANTICOAGULATION THERAPY VISIT (OUTPATIENT)
Dept: NURSING | Facility: CLINIC | Age: 74
End: 2019-06-13
Payer: MEDICARE

## 2019-06-13 DIAGNOSIS — I82.A11 ACUTE DEEP VEIN THROMBOSIS (DVT) OF AXILLARY VEIN OF RIGHT UPPER EXTREMITY (H): ICD-10-CM

## 2019-06-13 LAB — INR PPP: 2.6 (ref 0.86–1.14)

## 2019-06-13 PROCEDURE — 85610 PROTHROMBIN TIME: CPT | Performed by: FAMILY MEDICINE

## 2019-06-13 PROCEDURE — 99207 ZZC NO CHARGE NURSE ONLY: CPT | Performed by: INTERNAL MEDICINE

## 2019-06-13 PROCEDURE — 36416 COLLJ CAPILLARY BLOOD SPEC: CPT | Performed by: FAMILY MEDICINE

## 2019-06-13 NOTE — PROGRESS NOTES
ANTICOAGULATION FOLLOW-UP CLINIC VISIT    Patient Name:  Manasa French  Date:  2019  Contact Type:  Face to Face    SUBJECTIVE:  Patient Findings     Comments:   INR stable. Writer attempted to reach patient to discuss INR level and new dosing plan. LVM with dosing instructions and recheck plan.           OBJECTIVE    INR   Date Value Ref Range Status   2019 2.60 (H) 0.86 - 1.14 Final     Comment:     This test is intended for monitoring Coumadin therapy.  Results are not   accurate in patients with prolonged INR due to factor deficiency.         ASSESSMENT / PLAN  INR assessment THER    Recheck INR In: 4 DAYS    INR Location Clinic      Anticoagulation Summary  As of 2019    INR goal:   2.0-3.0   TTR:   --   INR used for dosin.60 (2019)   Warfarin maintenance plan:   5 mg (5 mg x 1) every Mon, Wed, Fri; 7.5 mg (5 mg x 1.5) all other days   Full warfarin instructions:   5 mg every Mon, Wed, Fri; 7.5 mg all other days   Weekly warfarin total:   45 mg   Plan last modified:   Brandi Arriola RN (2019)   Next INR check:   2019   Target end date:       Indications    Acute deep vein thrombosis (DVT) of axillary vein and internal jugular (H) [I82.A19]             Anticoagulation Episode Summary     INR check location:       Preferred lab:       Send INR reminders to:   [facility]    Comments:   40-50 min walk/lifting. 3 swimming. Greens 2-4/week.      Anticoagulation Care Providers     Provider Role Specialty Phone number    Christopher Smith MD [facility] Internal Medicine [phone]            See the Encounter Report to view Anticoagulation Flowsheet and Dosing Calendar (Go to Encounters tab in chart review, and find the Anticoagulation Therapy Visit)    Continue 45 mg weekly dose pattern and recheck Monday with other lab appt.    Patient made aware if signs of clotting (pain, tenderness, swelling, or color change in any extremity) AND/OR bleeding occur  (nosebleeds, bleeding gums, bruising, or blood in stool or urine) to notify provider & seek medical attention. If severe symptoms develop, such as major bleeding, chest pain, shortness of breath, fall, trauma or s/s of stroke, patient to call 911 immediately.     Dosage adjustment made based on physician directed care plan.    Brandi Arriola RN

## 2019-06-15 ENCOUNTER — OFFICE VISIT (OUTPATIENT)
Dept: OPHTHALMOLOGY | Facility: CLINIC | Age: 74
End: 2019-06-15

## 2019-06-15 DIAGNOSIS — H16.143 PUNCTATE KERATITIS OF BOTH EYES: ICD-10-CM

## 2019-06-15 DIAGNOSIS — H04.123 DRY EYES, BILATERAL: ICD-10-CM

## 2019-06-15 DIAGNOSIS — H40.053 BORDERLINE GLAUCOMA WITH OCULAR HYPERTENSION, BILATERAL: Primary | ICD-10-CM

## 2019-06-15 DIAGNOSIS — T15.12XA FOREIGN BODY OF LEFT CONJUNCTIVAL SAC, INITIAL ENCOUNTER: ICD-10-CM

## 2019-06-15 DIAGNOSIS — H35.3132 INTERMEDIATE STAGE NONEXUDATIVE AGE-RELATED MACULAR DEGENERATION OF BOTH EYES: ICD-10-CM

## 2019-06-15 PROBLEM — H16.149 PUNCTATE KERATITIS: Status: ACTIVE | Noted: 2019-06-15

## 2019-06-15 ASSESSMENT — VISUAL ACUITY
OS_CC: 20/50
OD_CC+: +2
OS_PH_CC: 20/30
OD_PH_CC: 20/40
OD_PH_CC+: +2
OD_CC: 20/50
OS_CC+: -2
METHOD: SNELLEN - LINEAR
CORRECTION_TYPE: GLASSES

## 2019-06-15 ASSESSMENT — TONOMETRY
IOP_METHOD: ICARE
OD_IOP_MMHG: 19
OS_IOP_MMHG: 12

## 2019-06-15 ASSESSMENT — CUP TO DISC RATIO
OS_RATIO: 0.5
OD_RATIO: 0.7

## 2019-06-15 ASSESSMENT — SLIT LAMP EXAM - LIDS
COMMENTS: NORMAL
COMMENTS: NORMAL

## 2019-06-15 ASSESSMENT — CONF VISUAL FIELD
OS_NORMAL: 1
OD_NORMAL: 1
METHOD: COUNTING FINGERS

## 2019-06-15 NOTE — PROGRESS NOTES
Assessment & Plan       Manasa French is a 73 year old female with the following diagnoses:   1. Glaucoma with ocular hypertension, bilateral - Both Eyes    2. Foreign body of left conjunctival sac, initial encounter - Left Eye    3. Intermediate stage nonexudative age-related macular degeneration of both eyes - Both Eyes    4. Dry eyes, bilateral - Both Eyes    5. Punctate keratitis of both eyes - Both Eyes       POAG follow Dr. Gallardo  FB conj removed with aid of slit lamp and cotton swab.  Rinsed well   Macular degen follow   dry eye > Refresh   Keratitis follow    Cont Refresh Plus , Celluvisc, and gel  Recheck if needed          Patient disposition:   Return for Follow Up.          Complete documentation of historical and exam elements from today's encounter can be found in the full encounter summary report (not reduplicated in this progress note). I personally obtained the chief complaint(s) and history of present illness.  I confirmed and edited as necessary the review of systems, past medical/surgical history, family history, social history, and examination findings as documented by others; and I examined the patient myself. I personally reviewed the relevant tests, images, and reports as documented above. I formulated and edited as necessary the assessment and plan and discussed the findings and management plan with the patient and family.  Dr. Abel Dorman

## 2019-06-15 NOTE — NURSING NOTE
Chief Complaints and History of Present Illnesses   Patient presents with     Foreign Body Sensation     Chief Complaint(s) and History of Present Illness(es)     Foreign Body Sensation     Laterality: left eye    Associated symptoms: photophobia.  Negative for eye pain, blurred vision and vision loss    Pain scale: 0/10    Course: stable    Treatments tried: eye drops    Response to treatment: mild improvement              Comments     Scratchy and itchy since yesterday on waking. It got better at end of day but feeling it again today. Not painful but some poking sensation. A little tearing but not discharge. Using ATs    Both eyes feeling sensitive in the last month, lights are bothering her.    Kimberly Iraheta COT 8:09 AM Steffany 15, 2019

## 2019-06-17 ENCOUNTER — ANTICOAGULATION THERAPY VISIT (OUTPATIENT)
Dept: NURSING | Facility: CLINIC | Age: 74
End: 2019-06-17
Payer: MEDICARE

## 2019-06-17 DIAGNOSIS — E89.0 HYPOTHYROIDISM, POSTABLATIVE: ICD-10-CM

## 2019-06-17 DIAGNOSIS — I82.A11 ACUTE DEEP VEIN THROMBOSIS (DVT) OF AXILLARY VEIN OF RIGHT UPPER EXTREMITY (H): ICD-10-CM

## 2019-06-17 LAB — INR PPP: 2.61 (ref 0.86–1.14)

## 2019-06-17 PROCEDURE — 84443 ASSAY THYROID STIM HORMONE: CPT | Performed by: FAMILY MEDICINE

## 2019-06-17 PROCEDURE — 85610 PROTHROMBIN TIME: CPT | Performed by: FAMILY MEDICINE

## 2019-06-17 PROCEDURE — 99207 ZZC NO CHARGE NURSE ONLY: CPT

## 2019-06-17 PROCEDURE — 36415 COLL VENOUS BLD VENIPUNCTURE: CPT | Performed by: FAMILY MEDICINE

## 2019-06-17 NOTE — LETTER
June 19, 2019      Manasa French  [address] [address]        Dear MsJames,    We are writing to inform you of your test results.    Your thyroid stimulating hormone is in the normal range.    Resulted Orders   INR   Result Value Ref Range    INR 2.61 (H) 0.86 - 1.14   **TSH with free T4 reflex FUTURE anytime   Result Value Ref Range    TSH 0.45 0.40 - 4.00 mU/L       If you have any questions or concerns, please call the clinic at the number listed above.       Sincerely,        Dr. Evelia Castro MD/[facility]

## 2019-06-18 LAB — TSH SERPL DL<=0.005 MIU/L-ACNC: 0.45 MU/L (ref 0.4–4)

## 2019-06-18 NOTE — PROGRESS NOTES
ANTICOAGULATION FOLLOW-UP CLINIC VISIT    Patient Name:  Manasa French  Date:  2019  Contact Type:  Telephone    SUBJECTIVE:  Patient Findings     Comments:   The patient was assessed for diet, medication, and activity level changes, missed or extra doses, bruising or bleeding, with no problem findings.           OBJECTIVE    INR   Date Value Ref Range Status   2019 2.61 (H) 0.86 - 1.14 Final       ASSESSMENT / PLAN  INR assessment THER    Recheck INR In: 1 WEEK    INR Location Clinic      Anticoagulation Summary  As of 2019    INR goal:   2.0-3.0   TTR:   100.0 % (1 d)   INR used for dosin.61 (2019)   Warfarin maintenance plan:   5 mg (5 mg x 1) every Mon, Wed, Fri; 7.5 mg (5 mg x 1.5) all other days   Full warfarin instructions:   5 mg every Mon, Wed, Fri; 7.5 mg all other days   Weekly warfarin total:   45 mg   No change documented:   Brandi Arriola RN   Plan last modified:   Brandi Arriola RN (2019)   Next INR check:   2019   Target end date:       Indications    Acute deep vein thrombosis (DVT) of axillary vein and internal jugular (H) [I82.A19]             Anticoagulation Episode Summary     INR check location:       Preferred lab:       Send INR reminders to:   [facility]    Comments:   40-50 min walk/lifting. 3 swimming. Greens 2-4/week.      Anticoagulation Care Providers     Provider Role Specialty Phone number    Christopher Smith MD [facility] Internal Medicine [phone]            See the Encounter Report to view Anticoagulation Flowsheet and Dosing Calendar (Go to Encounters tab in chart review, and find the Anticoagulation Therapy Visit)    Patient informed of new dosing pattern and scheduled for next recheck.     Patient made aware if signs of clotting (pain, tenderness, swelling, or color change in any extremity) AND/OR bleeding occur (nosebleeds, bleeding gums, bruising, or blood in stool or urine) to notify provider & seek medical  attention. If severe symptoms develop, such as major bleeding, chest pain, shortness of breath, fall, trauma or s/s of stroke, patient to call 911 immediately.     Dosage adjustment made based on physician directed care plan.    Brandi Arriola RN

## 2019-06-19 ENCOUNTER — OFFICE VISIT (OUTPATIENT)
Dept: FAMILY MEDICINE | Facility: CLINIC | Age: 74
End: 2019-06-19
Payer: MEDICARE

## 2019-06-19 VITALS
WEIGHT: 152.5 LBS | TEMPERATURE: 98 F | HEART RATE: 85 BPM | OXYGEN SATURATION: 96 % | DIASTOLIC BLOOD PRESSURE: 82 MMHG | BODY MASS INDEX: 25.38 KG/M2 | SYSTOLIC BLOOD PRESSURE: 140 MMHG

## 2019-06-19 DIAGNOSIS — I10 HTN, GOAL BELOW 140/90: ICD-10-CM

## 2019-06-19 DIAGNOSIS — Z00.00 ENCOUNTER FOR MEDICARE ANNUAL WELLNESS EXAM: Primary | ICD-10-CM

## 2019-06-19 PROCEDURE — 99213 OFFICE O/P EST LOW 20 MIN: CPT | Mod: 25 | Performed by: FAMILY MEDICINE

## 2019-06-19 PROCEDURE — G0439 PPPS, SUBSEQ VISIT: HCPCS | Performed by: FAMILY MEDICINE

## 2019-06-19 RX ORDER — AMLODIPINE BESYLATE 5 MG/1
5 TABLET ORAL DAILY
Qty: 30 TABLET | Refills: 0 | Status: SHIPPED | OUTPATIENT
Start: 2019-06-19 | End: 2019-07-26

## 2019-06-19 ASSESSMENT — ACTIVITIES OF DAILY LIVING (ADL): CURRENT_FUNCTION: TRANSPORTATION REQUIRES ASSISTANCE

## 2019-06-19 NOTE — PROGRESS NOTES
The patient reports that she has difficulty with activities of daily living. I have asked that the patient make a follow up appointment in 4 weeks where this issue will be further evaluated and addressed.  The patient was provided with written information regarding signs of hearing loss.  She is at risk for falling and has been provided with information to reduce the risk of falling at home.

## 2019-06-19 NOTE — PATIENT INSTRUCTIONS
1. Add amlodipine 5 mg daily.  2. Keep checking your blood pressure at home.  3. Come back for recheck to be sure this is working 1 month.      At your visit today, we discussed your risk for falls and preventive options.    Fall Prevention  Falls often occur due to slipping, tripping or losing your balance. Millions of people fall every year and injure themselves. Here are ways to reduce your risk of falling again.    Think about your fall, was there anything that caused your fall that can be fixed, removed, or replaced?    Make your home safe by keeping walkways clear of objects you may trip over, such as electric cords.    Use non-slip pads under rugs. Don't use area rugs or small throw rugs.    Use non-slip mats in bathtubs and showers.    Install handrails and lights on staircases. The handrails should be on both sides of the stairs.    Don't walk in poorly lit areas.    Don't stand on chairs or wobbly ladders.    Use caution when reaching overhead or looking upward. This position can cause a loss of balance.    Be sure your shoes fit properly, have non-slip bottoms and are in good condition.     Wear shoes both inside and out. Don't go barefoot or wear slippers.    Be cautious when going up and down stairs, curbs, and when walking on uneven sidewalks.    If your balance is poor, consider using a cane or walker.    If your fall was related to alcohol use, stop or limit alcohol intake.     If your fall was related to use of sleeping medicines, talk to your healthcare provider about this. You may need to reduce your dosage at bedtime if you awaken during the night to go to the bathroom.      To reduce the need for nighttime bathroom trips:  ? Don't drink fluids for several hours before going to bed  ? Empty your bladder before going to bed  ? Men can keep a urinal at the bedside    Stay as active as you can. Balance, flexibility, strength, and endurance all come from exercise. They all play a role in preventing  falls. Ask your healthcare provider which types of activity are right for you.    Get your vision checked on a regular basis.    If you have pets, know where they are before you stand up or walk so you don't trip over them.    Use night lights.    Go over all your medicines with a pharmacist or other healthcare provider to see if any of them could make you more likely to fall.  Date Last Reviewed: 4/1/2018 2000-2018 [employer]. [address] [place] [address], [place] [address]. All rights reserved. This information is not intended as a substitute for professional medical care. Always follow your healthcare professional's instructions.        Patient Education   Personalized Prevention Plan  You are due for the preventive services outlined below.  Your care team is available to assist you in scheduling these services.  If you have already completed any of these items, please share that information with your care team to update in your medical record.  Health Maintenance Due   Topic Date Due     Zoster (Shingles) Vaccine (1 of 2) 08/05/1994     Annual Wellness Visit  12/07/2018     PHQ-2  01/01/2019     Activities of Daily Living    Your Health Risk Assessment indicates you have difficulties with activities of daily living such as housework, bathing, preparing meals, taking medication, etc. Please make a follow up appointment for us to address this issue in more detail.    Signs of Hearing Loss     Hearing much better with one ear can be a sign of hearing loss.     Hearing loss is a problem shared by many people. In fact, it is one of the most common health conditions, particularly as people age. Most people over age 65 have some hearing loss, and by age 80, almost everyone does. Because hearing loss usually occurs slowly over the years, you may not realize your hearing ability has gotten worse.  Have your hearing checked  Contact your healthcare provider if you:    Have to strain to hear normal  conversation    Have to watch other people s faces very carefully to follow what they re saying    Need to ask people to repeat what they ve said    Often misunderstand what people are saying    Turn the volume of the television or radio up so high that others complain    Feel that people are mumbling when they re talking to you    Find that the effort to hear leaves you feeling tired and irritated    Notice, when using the phone, that you hear better with one ear than the other  Date Last Reviewed: 12/1/2016 2000-2018 [employer]. [address] [place] [address]. All rights reserved. This information is not intended as a substitute for professional medical care. Always follow your healthcare professional's instructions.          Preventing Falls in the Home  An adult or child can fall for many reasons. If you are an older adult, you may fall because your reaction time slows down. Your muscles and joints may get stiff, weak, or less flexible because of illness, medicines, or a physical condition. These things can also make a child more likely to fall or be injured in a fall.  Other health problems that make falls more likely include:    Arthritis    Dizziness or lightheadedness when you get out of bed (orthostatic hypotension)    History of a stroke    Dizziness    Anemia    Certain medicines taken for mental illness    Problems with balance or gait    History of falls with or without an injury    Changes in vision (vision impairment)    Changes in thinking skills and memory (cognitive impairment)  Injuries from a fall can include broken bones, dislocated joints, and cuts. When these injuries are serious enough, they can make it impossible for you or a child who is injured in a fall to live on his or her own.  Prevention tips  To help prevent falls and fall-related injuries, follow the tips below.   Floors  Make floors safer by doing the following:     Put nonskid pads under area  rugs.    Remove throw rugs.    Replace worn floor coverings.    Tack carpets firmly to each step on carpeted stairs. Put nonskid strips on the edges of uncarpeted stairs.    Keep floors and stairs free of clutter and cords.    Arrange furniture so there are clear pathways.    Clean up any spills right away.    Wear shoes that fit.  Bathrooms    Make bathrooms safer by doing the following:     Install grab bars in the tub or shower.    Apply nonskid strips or put a nonskid rubber mat in the tub or shower.    Sit on a bath chair to bathe.    Use bathmats with nonskid backing.  Lighting and the environment  Improve lighting in your home by doing the following:     Keep a flashlight in each room. Or put a lamp next to the bed within easy reach.    Put nightlights in the bedrooms, hallways, kitchen, and bathrooms.    Make sure all stairways have good lighting.    Take your time when going up and down stairs.    Put handrails on both sides of stairs and in walkways for more support. To prevent injury to your wrist or arm, don t use handrails to pull yourself up.    Install grab bars to pull yourself up.    Move or rearrange items that you use often. This will make them easier to find or reach.    Look at your home to find any safety hazards. Especially look at doorways, walkways, and the driveway. Remove or repair any safety problems that you find.  Date Last Reviewed: 8/1/2016 2000-2018 [employer]. [address] [facility] [address], [place] [address]. All rights reserved. This information is not intended as a substitute for professional medical care. Always follow your healthcare professional's instructions.

## 2019-06-19 NOTE — RESULT ENCOUNTER NOTE
Letter sent with normal result note below:  Dear Rhonda,  These results are in an acceptable range.  I recommend no change to your plan of care.  Please contact the clinic with any questions.  Sincerely,  Dr. Evelia Castro MD  [facility]  [phone]

## 2019-06-19 NOTE — PROGRESS NOTES
"SUBJECTIVE:   Manasa French is a 73 year old female who presents for Preventive Visit.      Are you in the first 12 months of your Medicare coverage?  No    Healthy Habits:     In general, how would you rate your overall health?  Good    Frequency of exercise:  6-7 days/week    Duration of exercise:  45-60 minutes    Do you usually eat at least 4 servings of fruit and vegetables a day, include whole grains    & fiber and avoid regularly eating high fat or \"junk\" foods?  Yes    Taking medications regularly:  Yes    Medication side effects:  None    Ability to successfully perform activities of daily living:  Transportation requires assistance    Home Safety:  Lack of grab bars in the bathroom    Hearing Impairment:  Difficulty following a conversation in a noisy restaurant or crowded room, difficulty following dialogue in the theater, need to ask people to speak up or repeat themselves and difficulty understanding speech on the telephone    In the past 6 months, have you been bothered by leaking of urine?  No    In general, how would you rate your overall mental or emotional health?  Good      PHQ-2 Total Score: 0    Additional concerns today:  Yes    Do you feel safe in your environment? Yes    Do you have a Health Care Directive? No: Advance care planning was reviewed with patient; patient declined at this time.      Fall risk  Fallen 2 or more times in the past year?: Yes  Any fall with injury in the past year?: Yes    Cognitive Screening   1) Repeat 3 items (Leader, Season, Table)    2) Clock draw: NORMAL  3) 3 item recall: Recalls 1 object   Results: NORMAL clock, 1-2 items recalled: COGNITIVE IMPAIRMENT LESS LIKELY    Mini-CogTM Copyright KELLIE Meyer. Licensed by the author for use in [facility]; reprinted with permission (lyn@.[facility]). All rights reserved.      Do you have sleep apnea, excessive snoring or daytime drowsiness?: no    Reviewed and updated as needed this visit by clinical " staff  Tobacco  Allergies  Meds  Med Hx  Surg Hx  Fam Hx  Soc Hx        Reviewed and updated as needed this visit by Provider  Meds        Social History     Tobacco Use     Smoking status: Never Smoker     Smokeless tobacco: Never Used   Substance Use Topics     Alcohol use: Yes     Alcohol/week: 0.0 oz     Comment: wine with supper         Alcohol Use 6/19/2019   Prescreen: >3 drinks/day or >7 drinks/week? No   Prescreen: >3 drinks/day or >7 drinks/week? -           PROBLEMS TO ADD ON...  1. HYPERTENSION.  Not well controlled. Been on losartan 100 mg a day, but blood pressure still running 130-160 over 90s.  Next steps?  Plan: Add amlodipine daily.  Continue losartan 100 mg.  Follow up 1 month.  Check blood pressure at home.  Discussed risks/benefits/side effects.  Discussed with patient, all questions answered, in agreement with this plan, will return or seek further care if not improving or worsening.        Current providers sharing in care for this patient include:   Patient Care Team:  No Ref-Primary, Physician as PCP - Evelia Vargas MD as Assigned PCP    The following health maintenance items are reviewed in Epic and correct as of today:  Health Maintenance   Topic Date Due     ZOSTER IMMUNIZATION (1 of 2) 08/05/1994     MEDICARE ANNUAL WELLNESS VISIT  12/07/2018     PHQ-2  01/01/2019     ASTHMA CONTROL TEST  08/26/2019     BMP  08/31/2019     INFLUENZA VACCINE (Season Ended) 09/01/2019     TSH W/FREE T4 REFLEX  09/17/2019     ASTHMA ACTION PLAN  03/08/2020     MAMMO SCREENING  05/17/2020     COLONOSCOPY  06/22/2020     ADVANCE CARE PLANNING  10/02/2020     DTAP/TDAP/TD IMMUNIZATION (3 - Td) 12/19/2021     LIPID  08/31/2023     DEXA  Completed     PNEUMOCOCCAL IMMUNIZATION 65+ LOW/MEDIUM RISK  Completed     IPV IMMUNIZATION  Aged Out     MENINGITIS IMMUNIZATION  Aged Out     Lab work is in process      Review of Systems  Constitutional, HEENT, cardiovascular, pulmonary, gi and gu  "systems are negative, except as otherwise noted.    OBJECTIVE:   /82 (Cuff Size: Adult Regular)   Pulse 85   Temp 98  F (36.7  C) (Tympanic)   Wt 69.2 kg (152 lb 8 oz)   SpO2 96%   BMI 25.38 kg/m   Estimated body mass index is 25.38 kg/m  as calculated from the following:    Height as of 5/9/19: 1.651 m (5' 5\").    Weight as of this encounter: 69.2 kg (152 lb 8 oz).  Physical Exam  GENERAL: healthy, alert and no distress    Diagnostic Test Results:  Labs reviewed in Epic    ASSESSMENT / PLAN:       ICD-10-CM    1. HTN, goal below 140/90 I10 amLODIPine (NORVASC) 5 MG tablet   2. Encounter for Medicare annual wellness exam Z00.00      PROBLEMS TO ADD ON...  1. HYPERTENSION.  Not well controlled. Been on losartan 100 mg a day, but blood pressure still running 130-160 over 90s.  Next steps?  Plan: Add amlodipine daily.  Continue losartan 100 mg.  Follow up 1 month.  Check blood pressure at home.  Discussed risks/benefits/side effects.  Discussed with patient, all questions answered, in agreement with this plan, will return or seek further care if not improving or worsening.    End of Life Planning:  Patient currently has an advanced directive: Yes.  Practitioner is supportive of decision.    COUNSELING:  Reviewed preventive health counseling, as reflected in patient instructions       Regular exercise       Healthy diet/nutrition       Hearing screening       Fall risk prevention    Estimated body mass index is 25.38 kg/m  as calculated from the following:    Height as of 5/9/19: 1.651 m (5' 5\").    Weight as of this encounter: 69.2 kg (152 lb 8 oz).         reports that she has never smoked. She has never used smokeless tobacco.      Appropriate preventive services were discussed with this patient, including applicable screening as appropriate for cardiovascular disease, diabetes, osteopenia/osteoporosis, and glaucoma.  As appropriate for age/gender, discussed screening for colorectal cancer, prostate " cancer, breast cancer, and cervical cancer. Checklist reviewing preventive services available has been given to the patient.    Reviewed patients plan of care and provided an AVS. The Basic Care Plan (routine screening as documented in Health Maintenance) and Intermediate Care Plan ( asthma action plan, low back pain action plan, and migraine action plan) for Manasa meets the Care Plan requirement. This Care Plan has been established and reviewed with the Patient.    Counseling Resources:  ATP IV Guidelines  Pooled Cohorts Equation Calculator  Breast Cancer Risk Calculator  FRAX Risk Assessment  ICSI Preventive Guidelines  Dietary Guidelines for Americans, 2010  USDA's MyPlate  ASA Prophylaxis  Lung CA Screening    Evelia Castro MD  [facility]    Identified Health Risks:

## 2019-06-24 ENCOUNTER — ANTICOAGULATION THERAPY VISIT (OUTPATIENT)
Dept: NURSING | Facility: CLINIC | Age: 74
End: 2019-06-24
Payer: MEDICARE

## 2019-06-24 DIAGNOSIS — I82.A11 ACUTE DEEP VEIN THROMBOSIS (DVT) OF AXILLARY VEIN OF RIGHT UPPER EXTREMITY (H): ICD-10-CM

## 2019-06-24 LAB — INR POINT OF CARE: 4.9 (ref 0.86–1.14)

## 2019-06-24 PROCEDURE — 85610 PROTHROMBIN TIME: CPT | Mod: QW

## 2019-06-24 PROCEDURE — 99207 ZZC NO CHARGE NURSE ONLY: CPT

## 2019-06-24 PROCEDURE — 36416 COLLJ CAPILLARY BLOOD SPEC: CPT

## 2019-06-24 NOTE — PROGRESS NOTES
ANTICOAGULATION FOLLOW-UP CLINIC VISIT    Patient Name:  Manasa French  Date:  2019  Contact Type:  Face to Face    SUBJECTIVE:  Patient Findings     Positives:   Change in health (Continued HTN. ), Change in medications (Norvasc prescribed by PCP last week. Pt awaiting delivery from pharmacy to start. No direct interations w/ Warfarin.), Bruising (Minor bruise on right arm from blood draw last week.), Other complaints (Pt noted frequent bowels yesterday. Typically pt's pattern is twice/morning, but yesterday she had small bowl movements all day. Denies constipation or diarrhea. )    Comments:   Unclear why INR level is so high today, patient denies any active illness, pain/inflammation or any s/s of bleeding.           OBJECTIVE    INR Protime   Date Value Ref Range Status   2019 4.9 (A) 0.86 - 1.14 Final       ASSESSMENT / PLAN  INR assessment SUPRA    Recheck INR In: 2 DAYS    INR Location Clinic      Anticoagulation Summary  As of 2019    INR goal:   2.0-3.0   TTR:   33.0 % (1.1 wk)   INR used for dosin.9! (2019)   Warfarin maintenance plan:   5 mg (5 mg x 1) every Mon, Wed, Fri; 7.5 mg (5 mg x 1.5) all other days   Full warfarin instructions:   : Hold; : 5 mg; Otherwise 5 mg every Mon, Wed, Fri; 7.5 mg all other days   Weekly warfarin total:   45 mg   Plan last modified:   Brandi Arriola RN (2019)   Next INR check:   2019   Target end date:       Indications    Acute deep vein thrombosis (DVT) of axillary vein and internal jugular (H) [I82.A19]             Anticoagulation Episode Summary     INR check location:       Preferred lab:       Send INR reminders to:   [facility]    Comments:   40-50 min walk/lifting. 3 swimming. Greens 2-4/week.      Anticoagulation Care Providers     Provider Role Specialty Phone number    Christopher Smith MD [facility] [phone]            See the Encounter Report to view Anticoagulation Flowsheet  and Dosing Calendar (Go to Encounters tab in chart review, and find the Anticoagulation Therapy Visit)    Per protocol, patient advised to hold today's dose and decrease tomorrow's warfarin dose to 5 mg to account for supra-therapeutic INR level. Patient instructed to increase green intake today and tomorrow as well. Recheck in 2 days to ensure INR stability. Patient advised to use extra caution with activities to prevent injuries and falls.     Patient made aware if signs of clotting (pain, tenderness, swelling, or color change in any extremity) AND/OR bleeding occur (nosebleeds, bleeding gums, bruising, or blood in stool or urine) to notify provider & seek medical attention. If severe symptoms develop, such as major bleeding, chest pain, shortness of breath, fall, trauma or s/s of stroke, patient to call 911 immediately.     Dosage adjustment made based on physician directed care plan.    Brandi Arriola RN

## 2019-06-26 ENCOUNTER — HOSPITAL ENCOUNTER (OUTPATIENT)
Dept: LAB | Facility: CLINIC | Age: 74
Discharge: HOME | End: 2019-06-26
Attending: FAMILY MEDICINE | Admitting: FAMILY MEDICINE
Payer: MEDICARE

## 2019-06-26 ENCOUNTER — ANTICOAGULATION THERAPY VISIT (OUTPATIENT)
Dept: NURSING | Facility: CLINIC | Age: 74
End: 2019-06-26
Payer: MEDICARE

## 2019-06-26 DIAGNOSIS — I82.A11 ACUTE DEEP VEIN THROMBOSIS (DVT) OF AXILLARY VEIN OF RIGHT UPPER EXTREMITY (H): ICD-10-CM

## 2019-06-26 LAB — INR PPP: 2.32 (ref 0.86–1.14)

## 2019-06-26 PROCEDURE — 36415 COLL VENOUS BLD VENIPUNCTURE: CPT | Performed by: FAMILY MEDICINE

## 2019-06-26 PROCEDURE — 99207 ZZC NO CHARGE NURSE ONLY: CPT

## 2019-06-26 PROCEDURE — 85610 PROTHROMBIN TIME: CPT | Performed by: FAMILY MEDICINE

## 2019-06-26 NOTE — PROGRESS NOTES
ANTICOAGULATION FOLLOW-UP CLINIC VISIT    Patient Name:  Manasa French  Date:  2019  Contact Type:  Face to Face    SUBJECTIVE:  Patient Findings     Positives:   Missed doses (Intentional hold as directed by ACC team.), Change in diet/appetite (Pt ate quite a bit of greens in the last two days, brussel sprouts, broccoli, and two salads.)           OBJECTIVE    INR   Date Value Ref Range Status   2019 2.32 (H) 0.86 - 1.14 Final       ASSESSMENT / PLAN  INR assessment THER    Recheck INR In: 1 WEEK    INR Location Clinic      Anticoagulation Summary  As of 2019    INR goal:   2.0-3.0   TTR:   31.5 % (1.4 wk)   INR used for dosin.32 (2019)   Warfarin maintenance plan:   5 mg (5 mg x 1) every Mon, Wed, Fri; 7.5 mg (5 mg x 1.5) all other days   Full warfarin instructions:   5 mg every Mon, Wed, Fri; 7.5 mg all other days   Weekly warfarin total:   45 mg   Plan last modified:   Brandi Arriola RN (2019)   Next INR check:   7/3/2019   Target end date:       Indications    Acute deep vein thrombosis (DVT) of axillary vein and internal jugular (H) [I82.A19]             Anticoagulation Episode Summary     INR check location:       Preferred lab:       Send INR reminders to:   K[facility]    Comments:   40-50 min walk/lifting. 3 swimming. Greens 2-4/week.      Anticoagulation Care Providers     Provider Role Specialty Phone number    Christopher Smith MD [facility] [phone]            See the Encounter Report to view Anticoagulation Flowsheet and Dosing Calendar (Go to Encounters tab in chart review, and find the Anticoagulation Therapy Visit)    Given the uncertainty of last INR. Writer advised patient to resume maintenance dose and recheck in one week to ensure stability.     Patient made aware if signs of clotting (pain, tenderness, swelling, or color change in any extremity) AND/OR bleeding occur (nosebleeds, bleeding gums, bruising, or blood in stool  or urine) to notify provider & seek medical attention. If severe symptoms develop, such as major bleeding, chest pain, shortness of breath, fall, trauma or s/s of stroke, patient to call 911 immediately.     Dosage adjustment made based on physician directed care plan.    Brandi Arriola RN

## 2019-07-03 ENCOUNTER — ANTICOAGULATION THERAPY VISIT (OUTPATIENT)
Dept: NURSING | Facility: CLINIC | Age: 74
End: 2019-07-03
Payer: MEDICARE

## 2019-07-03 DIAGNOSIS — I82.A11 ACUTE DEEP VEIN THROMBOSIS (DVT) OF AXILLARY VEIN OF RIGHT UPPER EXTREMITY (H): ICD-10-CM

## 2019-07-03 LAB — INR POINT OF CARE: 2.7 (ref 0.86–1.14)

## 2019-07-03 PROCEDURE — 99207 ZZC NO CHARGE NURSE ONLY: CPT

## 2019-07-03 PROCEDURE — 36416 COLLJ CAPILLARY BLOOD SPEC: CPT

## 2019-07-03 PROCEDURE — 85610 PROTHROMBIN TIME: CPT | Mod: QW

## 2019-07-03 NOTE — PROGRESS NOTES
ANTICOAGULATION FOLLOW-UP CLINIC VISIT    Patient Name:  Manasa French  Date:  7/3/2019  Contact Type:  Face to Face    SUBJECTIVE:  Patient Findings     Comments:   The patient was assessed for diet, medication, and activity level changes, missed or extra doses, bruising or bleeding, with no problem findings.          OBJECTIVE    INR Protime   Date Value Ref Range Status   2019 2.7 (A) 0.86 - 1.14 Final       ASSESSMENT / PLAN  INR assessment THER    Recheck INR In: 10 DAYS    INR Location Clinic      Anticoagulation Summary  As of 7/3/2019    INR goal:   2.0-3.0   TTR:   58.4 % (2.4 wk)   INR used for dosin.7 (7/3/2019)   Warfarin maintenance plan:   5 mg (5 mg x 1) every Mon, Wed, Fri; 7.5 mg (5 mg x 1.5) all other days   Full warfarin instructions:   5 mg every Mon, Wed, Fri; 7.5 mg all other days   Weekly warfarin total:   45 mg   Plan last modified:   Brandi Arriola RN (2019)   Next INR check:      Target end date:       Indications    Acute deep vein thrombosis (DVT) of axillary vein and internal jugular (H) [I82.A19]             Anticoagulation Episode Summary     INR check location:       Preferred lab:       Send INR reminders to:   K[facility]    Comments:   40-50 min walk/lifting. 3 swimming. Greens 2-4/week.      Anticoagulation Care Providers     Provider Role Specialty Phone number    Christopher Smith MD [facility] Medicine [phone]            See the Encounter Report to view Anticoagulation Flowsheet and Dosing Calendar (Go to Encounters tab in chart review, and find the Anticoagulation Therapy Visit)    Patient made aware if signs of clotting (pain, tenderness, swelling, or color change in any extremity) AND/OR bleeding occur (nosebleeds, bleeding gums, bruising, or blood in stool or urine) to notify provider & seek medical attention. If severe symptoms develop, such as major bleeding, chest pain, shortness of breath, fall, trauma or s/s of stroke,  patient to call 911 immediately.       Dosage adjustment made based on physician directed care plan.    Brandi Arriola RN

## 2019-07-15 ENCOUNTER — ANTICOAGULATION THERAPY VISIT (OUTPATIENT)
Dept: NURSING | Facility: CLINIC | Age: 74
End: 2019-07-15
Payer: MEDICARE

## 2019-07-15 DIAGNOSIS — I82.A11 ACUTE DEEP VEIN THROMBOSIS (DVT) OF AXILLARY VEIN OF RIGHT UPPER EXTREMITY (H): ICD-10-CM

## 2019-07-15 LAB — INR POINT OF CARE: 3.2 (ref 0.86–1.14)

## 2019-07-15 PROCEDURE — 85610 PROTHROMBIN TIME: CPT | Mod: QW

## 2019-07-15 PROCEDURE — 99207 ZZC NO CHARGE NURSE ONLY: CPT

## 2019-07-15 PROCEDURE — 36416 COLLJ CAPILLARY BLOOD SPEC: CPT

## 2019-07-15 RX ORDER — WARFARIN SODIUM 5 MG/1
TABLET ORAL
Qty: 110 TABLET | Refills: 3 | Status: SHIPPED | OUTPATIENT
Start: 2019-07-15 | End: 2020-07-13

## 2019-07-15 NOTE — PROGRESS NOTES
ANTICOAGULATION FOLLOW-UP CLINIC VISIT    Patient Name:  Manasa French  Date:  7/15/2019  Contact Type:  Face to Face    SUBJECTIVE:  Patient Findings     Positives:   Change in diet/appetite (Eating a green serving about 5 days/week. Plans to increase to daily. )    Comments:   The patient was assessed for medication and activity level changes, missed or extra doses, bruising or bleeding, with no problem findings.          OBJECTIVE    INR Protime   Date Value Ref Range Status   07/15/2019 3.2 (A) 0.86 - 1.14 Final       ASSESSMENT / PLAN  INR assessment SUPRA    Recheck INR In: 1 WEEK    INR Location Clinic      Anticoagulation Summary  As of 7/15/2019    INR goal:   2.0-3.0   TTR:   59.1 % (4.1 wk)   INR used for dosing:   3.2! (7/15/2019)   Warfarin maintenance plan:   5 mg (5 mg x 1) every Mon, Wed, Fri; 7.5 mg (5 mg x 1.5) all other days   Full warfarin instructions:   5 mg every Mon, Wed, Fri; 7.5 mg all other days   Weekly warfarin total:   45 mg   No change documented:   Brandi Arriola RN   Plan last modified:   Brandi Arriola RN (6/26/2019)   Next INR check:   7/22/2019   Target end date:       Indications    Acute deep vein thrombosis (DVT) of axillary vein and internal jugular (H) [I82.A19]             Anticoagulation Episode Summary     INR check location:       Preferred lab:       Send INR reminders to:   [facility]    Comments:   40-50 min walk/lifting. 3 swimming. Greens 2-4/week.      Anticoagulation Care Providers     Provider Role Specialty Phone number    Christopher Smith MD [facility] Medicine [phone]            See the Encounter Report to view Anticoagulation Flowsheet and Dosing Calendar (Go to Encounters tab in chart review, and find the Anticoagulation Therapy Visit)    Writer encouraged patient to increase greens a bit per week and see if that helps stabilize level. Patient agreed to plan.    Patient made aware if signs of clotting (pain, tenderness,  swelling, or color change in any extremity) AND/OR bleeding occur (nosebleeds, bleeding gums, bruising, or blood in stool or urine) to notify provider & seek medical attention. If severe symptoms develop, such as major bleeding, chest pain, shortness of breath, fall, trauma or s/s of stroke, patient to call 911 immediately.     Dosage adjustment made based on physician directed care plan.    Brandi Arriola RN

## 2019-07-22 ENCOUNTER — ANTICOAGULATION THERAPY VISIT (OUTPATIENT)
Dept: NURSING | Facility: CLINIC | Age: 74
End: 2019-07-22
Payer: MEDICARE

## 2019-07-22 DIAGNOSIS — I82.A11 ACUTE DEEP VEIN THROMBOSIS (DVT) OF AXILLARY VEIN OF RIGHT UPPER EXTREMITY (H): ICD-10-CM

## 2019-07-22 LAB — INR PPP: 3.35 (ref 0.86–1.14)

## 2019-07-22 PROCEDURE — 99207 ZZC NO CHARGE NURSE ONLY: CPT

## 2019-07-22 PROCEDURE — 85610 PROTHROMBIN TIME: CPT | Performed by: FAMILY MEDICINE

## 2019-07-22 PROCEDURE — 36415 COLL VENOUS BLD VENIPUNCTURE: CPT | Performed by: FAMILY MEDICINE

## 2019-07-22 NOTE — PROGRESS NOTES
ANTICOAGULATION FOLLOW-UP CLINIC VISIT    Patient Name:  Manasa French  Date:  7/22/2019  Contact Type: Telephone/Pt    SUBJECTIVE:  Patient Findings     Comments:   The patient was assessed for diet, medication, and activity level changes, missed or extra doses, bruising or bleeding, with no problem findings.           OBJECTIVE    INR   Date Value Ref Range Status   07/22/2019 3.35 (H) 0.86 - 1.14 Final       ASSESSMENT / PLAN  INR assessment SUPRA    Recheck INR In: 1 WEEK    INR Location Outside lab      Anticoagulation Summary  As of 7/22/2019    INR goal:   2.0-3.0   TTR:   47.1 % (1.2 mo)   INR used for dosing:   3.35! (7/22/2019)   Warfarin maintenance plan:   7.5 mg (5 mg x 1.5) every Sun, Tue, Thu; 5 mg (5 mg x 1) all other days   Full warfarin instructions:   7.5 mg every Sun, Tue, Thu; 5 mg all other days   Weekly warfarin total:   42.5 mg   Plan last modified:   Brandi Arriola RN (7/22/2019)   Next INR check:   7/29/2019   Target end date:       Indications    Acute deep vein thrombosis (DVT) of axillary vein and internal jugular (H) [I82.A19]             Anticoagulation Episode Summary     INR check location:       Preferred lab:       Send INR reminders to:   [facility]    Comments:   40-50 min walk/lifting. 3 swimming. Greens 2-4/week.      Anticoagulation Care Providers     Provider Role Specialty Phone number    Christopher Smith MD [facility] [phone]            See the Encounter Report to view Anticoagulation Flowsheet and Dosing Calendar (Go to Encounters tab in chart review, and find the Anticoagulation Therapy Visit)    Per protocol, patient advised to decrease weekly warfarin dose by 2.5 mg (5.6% reduction) to account for continued supra-therapeutic INR level. Patient instructed to increase green intake today and tomorrow as well. Recheck within 1 week to ensure INR stability.     Patient made aware if signs of clotting (pain, tenderness, swelling, or  color change in any extremity) AND/OR bleeding occur (nosebleeds, bleeding gums, bruising, or blood in stool or urine) to notify provider & seek medical attention. If severe symptoms develop, such as major bleeding, chest pain, shortness of breath, fall, trauma or s/s of stroke, patient to call 911 immediately.     Dosage adjustment made based on physician directed care plan.    Brandi Arriola RN

## 2019-07-26 ENCOUNTER — OFFICE VISIT (OUTPATIENT)
Dept: FAMILY MEDICINE | Facility: CLINIC | Age: 74
End: 2019-07-26
Payer: MEDICARE

## 2019-07-26 VITALS
DIASTOLIC BLOOD PRESSURE: 68 MMHG | OXYGEN SATURATION: 96 % | HEART RATE: 79 BPM | BODY MASS INDEX: 25.46 KG/M2 | TEMPERATURE: 98.2 F | SYSTOLIC BLOOD PRESSURE: 142 MMHG | WEIGHT: 153 LBS

## 2019-07-26 DIAGNOSIS — L82.1 SEBORRHEIC KERATOSIS: ICD-10-CM

## 2019-07-26 DIAGNOSIS — I10 HTN, GOAL BELOW 140/90: Primary | ICD-10-CM

## 2019-07-26 PROBLEM — M48.062 SPINAL STENOSIS, LUMBAR REGION WITH NEUROGENIC CLAUDICATION: Status: ACTIVE | Noted: 2018-05-23

## 2019-07-26 PROBLEM — S32.000A: Status: RESOLVED | Noted: 2018-05-23 | Resolved: 2019-07-26

## 2019-07-26 LAB
ANION GAP SERPL CALCULATED.3IONS-SCNC: 8 MMOL/L (ref 3–14)
BUN SERPL-MCNC: 15 MG/DL (ref 7–30)
CALCIUM SERPL-MCNC: 8.4 MG/DL (ref 8.5–10.1)
CHLORIDE SERPL-SCNC: 104 MMOL/L (ref 94–109)
CO2 SERPL-SCNC: 25 MMOL/L (ref 20–32)
CREAT SERPL-MCNC: 0.72 MG/DL (ref 0.52–1.04)
GFR SERPL CREATININE-BSD FRML MDRD: 81 ML/MIN/{1.73_M2}
GLUCOSE SERPL-MCNC: 92 MG/DL (ref 70–99)
POTASSIUM SERPL-SCNC: 4.4 MMOL/L (ref 3.4–5.3)
SODIUM SERPL-SCNC: 137 MMOL/L (ref 133–144)

## 2019-07-26 PROCEDURE — 99213 OFFICE O/P EST LOW 20 MIN: CPT | Mod: 25 | Performed by: FAMILY MEDICINE

## 2019-07-26 PROCEDURE — 36415 COLL VENOUS BLD VENIPUNCTURE: CPT | Performed by: FAMILY MEDICINE

## 2019-07-26 PROCEDURE — 17110 DESTRUCTION B9 LES UP TO 14: CPT | Performed by: FAMILY MEDICINE

## 2019-07-26 PROCEDURE — 80048 BASIC METABOLIC PNL TOTAL CA: CPT | Performed by: FAMILY MEDICINE

## 2019-07-26 RX ORDER — AMLODIPINE BESYLATE 10 MG/1
10 TABLET ORAL DAILY
Qty: 30 TABLET | Refills: 1 | Status: SHIPPED | OUTPATIENT
Start: 2019-07-26 | End: 2019-08-27

## 2019-07-26 RX ORDER — AMLODIPINE BESYLATE 10 MG/1
10 TABLET ORAL DAILY
Qty: 30 TABLET | Refills: 1 | Status: SHIPPED | OUTPATIENT
Start: 2019-07-26 | End: 2019-07-26

## 2019-07-26 NOTE — LETTER
July 31, 2019      Manasa CORTEZ Manolo  [address] [address]        Dear ,    We are writing to inform you of your test results.    Your test results fall within the expected range(s) or remain unchanged from previous results.  Please continue with current treatment plan.    Resulted Orders   Basic metabolic panel   Result Value Ref Range    Sodium 137 133 - 144 mmol/L    Potassium 4.4 3.4 - 5.3 mmol/L    Chloride 104 94 - 109 mmol/L    Carbon Dioxide 25 20 - 32 mmol/L    Anion Gap 8 3 - 14 mmol/L    Glucose 92 70 - 99 mg/dL    Urea Nitrogen 15 7 - 30 mg/dL    Creatinine 0.72 0.52 - 1.04 mg/dL    GFR Estimate 81 >60 mL/min/[1.73_m2]      Comment:      Non  GFR Calc  Starting 12/18/2018, serum creatinine based estimated GFR (eGFR) will be   calculated using the Chronic Kidney Disease Epidemiology Collaboration   (CKD-EPI) equation.      GFR Estimate If Black >90 >60 mL/min/[1.73_m2]      Comment:       GFR Calc  Starting 12/18/2018, serum creatinine based estimated GFR (eGFR) will be   calculated using the Chronic Kidney Disease Epidemiology Collaboration   (CKD-EPI) equation.      Calcium 8.4 (L) 8.5 - 10.1 mg/dL       If you have any questions or concerns, please call the clinic at the number listed above.       Sincerely,        Evelia Castro MD

## 2019-07-26 NOTE — PATIENT INSTRUCTIONS
1. Your blood pressure is better but not yet at goal.  2. Increase your amlodipine to 10 mg daily.  3. We'll check your kidney tests today.  4. Follow up 1 month for blood pressure recheck.      For your skin:  1. I think this is a seborrheic keratosis and I froze with LN today.  2. If returns, you need to see a dermatologist to have this evaluated.

## 2019-07-26 NOTE — PROGRESS NOTES
Subjective     Manasa French is a 73 year old female who presents to clinic today for the following health issues:    HPI   Hypertension Follow-up      Do you check your blood pressure regularly outside of the clinic? Yes     Are you following a low salt diet? Yes    Are your blood pressures ever more than 140 on the top number (systolic) OR more   than 90 on the bottom number (diastolic), for example 140/90? Yes    Amount of exercise or physical activity: 6-7 days/week for an average of greater than 60 minutes    Problems taking medications regularly: No    Medication side effects: none    Diet: regular (no restrictions)    From last month: 1. HYPERTENSION.  Not well controlled. Still moving, lots of stress.Been on losartan 100 mg a day, but blood pressure still running 130-160 over 90s.  Next steps?  Plan: Add amlodipine 5 mg daily.  Continue losartan 100 mg.  Follow up 1 month.  Check blood pressure at home.      blood pressure at home has been high over 50% of the time.  No side effects from amlodipine.    Also - spot on thigh. 2 months.  Dry, irritated. No bleeding.      Reviewed and updated as needed this visit by Provider         Review of Systems   ROS COMP: Constitutional, HEENT, cardiovascular, pulmonary, gi and gu systems are negative, except as otherwise noted.      Objective    /68 (Cuff Size: Adult Regular)   Pulse 79   Temp 98.2  F (36.8  C) (Tympanic)   Wt 69.4 kg (153 lb)   SpO2 96%   BMI 25.46 kg/m    Body mass index is 25.46 kg/m .  Physical Exam   GENERAL: healthy, alert and no distress  CV: regular rate and rhythm, normal S1 S2, no S3 or S4, no murmur, click or rub, no peripheral edema and peripheral pulses strong  SKIN: no suspicious lesions or rashes and keratoses - seborrheic # 1 - frozen    Diagnostic Test Results:  Labs reviewed in Epic        Assessment & Plan       ICD-10-CM    1. HTN, goal below 140/90 I10 Basic metabolic panel     amLODIPine (NORVASC) 10 MG tablet      "DISCONTINUED: amLODIPine (NORVASC) 10 MG tablet   2. Seborrheic keratosis L82.1         Patient Instructions   1. Your blood pressure is better but not yet at goal.  2. Increase your amlodipine to 10 mg daily.  3. We'll check your kidney tests today.  4. Follow up 1 month for blood pressure recheck.      For your skin:  1. I think this is a seborrheic keratosis and I froze with LN today.  2. If returns, you need to see a dermatologist to have this evaluated.      BMI:   Estimated body mass index is 25.46 kg/m  as calculated from the following:    Height as of 5/9/19: 1.651 m (5' 5\").    Weight as of this encounter: 69.4 kg (153 lb).       Return in about 4 weeks (around 8/23/2019) for Blood Pressure Check.    Evelia Castro MD  [facility]        "

## 2019-07-29 ENCOUNTER — ANTICOAGULATION THERAPY VISIT (OUTPATIENT)
Dept: NURSING | Facility: CLINIC | Age: 74
End: 2019-07-29
Payer: MEDICARE

## 2019-07-29 DIAGNOSIS — I82.A11 ACUTE DEEP VEIN THROMBOSIS (DVT) OF AXILLARY VEIN OF RIGHT UPPER EXTREMITY (H): ICD-10-CM

## 2019-07-29 LAB — INR POINT OF CARE: 2.6 (ref 0.86–1.14)

## 2019-07-29 PROCEDURE — 85610 PROTHROMBIN TIME: CPT | Mod: QW

## 2019-07-29 PROCEDURE — 99207 ZZC NO CHARGE NURSE ONLY: CPT

## 2019-07-29 PROCEDURE — 36416 COLLJ CAPILLARY BLOOD SPEC: CPT

## 2019-07-29 NOTE — PROGRESS NOTES
ANTICOAGULATION FOLLOW-UP CLINIC VISIT    Patient Name:  Manasa French  Date:  2019  Contact Type:  Face to Face     SUBJECTIVE:  Patient Findings     Comments:   The patient was assessed for diet, medication, and activity level changes, missed or extra doses, bruising or bleeding, with no problem findings.           OBJECTIVE    INR Protime   Date Value Ref Range Status   2019 2.6 (A) 0.86 - 1.14 Final       ASSESSMENT / PLAN  INR assessment THER    Recheck INR In: 10 DAYS    INR Location Clinic      Anticoagulation Summary  As of 2019    INR goal:   2.0-3.0   TTR:   48.1 % (1.4 mo)   INR used for dosin.6 (2019)   Warfarin maintenance plan:   7.5 mg (5 mg x 1.5) every Sun, Tue, Thu; 5 mg (5 mg x 1) all other days   Full warfarin instructions:   7.5 mg every Sun, Tue, Thu; 5 mg all other days   Weekly warfarin total:   42.5 mg   No change documented:   Brandi Arriola RN   Plan last modified:   Brandi Arriola RN (2019)   Next INR check:   2019   Target end date:       Indications    Acute deep vein thrombosis (DVT) of axillary vein and internal jugular (H) [I82.A19]             Anticoagulation Episode Summary     INR check location:       Preferred lab:       Send INR reminders to:   [facility]    Comments:   40-50 min walk/lifting. 3 swimming. Greens 2-4/week.      Anticoagulation Care Providers     Provider Role Specialty Phone number    Christopher Smith MD [facility] Internal Medicine [phone]            See the Encounter Report to view Anticoagulation Flowsheet and Dosing Calendar (Go to Encounters tab in chart review, and find the Anticoagulation Therapy Visit)    Patient made aware if signs of clotting (pain, tenderness, swelling, or color change in any extremity) AND/OR bleeding occur (nosebleeds, bleeding gums, bruising, or blood in stool or urine) to notify provider & seek medical attention. If severe symptoms develop, such as major bleeding,  chest pain, shortness of breath, fall, trauma or s/s of stroke, patient to call 911 immediately.     Dosage adjustment made based on physician directed care plan.    Brandi Arriola RN

## 2019-08-02 ENCOUNTER — OFFICE VISIT (OUTPATIENT)
Dept: OPHTHALMOLOGY | Facility: CLINIC | Age: 74
End: 2019-08-02
Payer: MEDICARE

## 2019-08-02 DIAGNOSIS — H40.1132 PRIMARY OPEN ANGLE GLAUCOMA OF BOTH EYES, MODERATE STAGE: ICD-10-CM

## 2019-08-02 DIAGNOSIS — H40.053 BORDERLINE GLAUCOMA WITH OCULAR HYPERTENSION, BILATERAL: Primary | ICD-10-CM

## 2019-08-02 DIAGNOSIS — H40.9 MILD STAGE GLAUCOMA: ICD-10-CM

## 2019-08-02 DIAGNOSIS — H40.053 BORDERLINE GLAUCOMA WITH OCULAR HYPERTENSION, BILATERAL: ICD-10-CM

## 2019-08-02 PROCEDURE — G0463 HOSPITAL OUTPT CLINIC VISIT: HCPCS | Mod: ZF

## 2019-08-02 PROCEDURE — 92083 EXTENDED VISUAL FIELD XM: CPT | Mod: ZF | Performed by: OPHTHALMOLOGY

## 2019-08-02 RX ORDER — TIMOLOL MALEATE 5 MG/ML
1 SOLUTION/ DROPS OPHTHALMIC EVERY MORNING
Qty: 3 BOTTLE | Refills: 4 | Status: SHIPPED | OUTPATIENT
Start: 2019-08-02 | End: 2020-08-04

## 2019-08-02 RX ORDER — LATANOPROST 50 UG/ML
1 SOLUTION/ DROPS OPHTHALMIC AT BEDTIME
Qty: 7.5 ML | Refills: 3 | Status: SHIPPED | OUTPATIENT
Start: 2019-08-02 | End: 2020-11-04

## 2019-08-02 ASSESSMENT — CONF VISUAL FIELD
OD_NORMAL: 1
METHOD: COUNTING FINGERS
OS_SUPERIOR_NASAL_RESTRICTION: 3

## 2019-08-02 ASSESSMENT — VISUAL ACUITY
OS_PH_CC+: -2
OS_PH_CC: 20/40
OS_CC+: +2
OD_PH_CC: 20/15
OD_CC: 20/40
METHOD: SNELLEN - LINEAR
OS_CC: 20/50
OD_CC+: +2

## 2019-08-02 ASSESSMENT — TONOMETRY
IOP_METHOD: APPLANATION
OD_IOP_MMHG: 18
OS_IOP_MMHG: 14

## 2019-08-02 ASSESSMENT — REFRACTION_WEARINGRX
OS_CYLINDER: +1.00
OS_HPRISM: 7 BO
SPECS_TYPE: SVL
OD_HPRISM: 7 BO
OS_VPRISM: 3 BD
OS_SPHERE: -2.50
OS_AXIS: 050
OD_SPHERE: -0.75
OD_CYLINDER: SPHERE
OD_VPRISM: 3 BU

## 2019-08-02 ASSESSMENT — CUP TO DISC RATIO
OD_RATIO: 0.7
OS_RATIO: 0.5

## 2019-08-02 ASSESSMENT — SLIT LAMP EXAM - LIDS
COMMENTS: NORMAL
COMMENTS: NORMAL

## 2019-08-02 NOTE — PROGRESS NOTES
HPI  History of thyroid eye disease and primary open angle glaucoma.  Post-operative month 2 Selected laser trabeculoplasty (SLT) OD   Has some glare, photophobia, floaters.    Interval Update:  Recent Foreign body, removed, feeling better    Current Medications:   Timolol every morning both eyes   Latanoprost HS both eyes     Intolerant to cosopt and alphagan    Ocular history:  Selected laser trabeculoplasty (SLT) right eye 2/2018 ( intraocular pressure 26 before selective laser trabeculoplasty)       Testing:   Octopus visual field 8/2/19: with scatter both eyes, stable    OCT retinal nerve fiber layer 2/11/19:    temporal thinning and interval worsening of superior thinning right eye but had (SLT) right eye 2/2018 after previous OCT   supranasal thinning left eye which is stable     Assessment   1. Moderate stage Primary open angle glaucoma (POAG) both eyes   OCT showed slight downward slope right eye in conjunction with elevated intraocular pressure  Intraocular pressure with improvement since Selective laser trabeculoplasty (SLT) right eye   Continued stability of left eye     Today, there has been interval thinning of right nerve despite IOP high teens   She has been intolerant to timolol and latonoprost in the past     2. Pseudophakia of both eyes  IOL stable in good position     3. Thyroid eye disease  Diplopia much better with new glasses.    4. Age related macular degeneration   Stage II, followed annually by retina     Plan:   Continue latanoprost HS both eyes   Continue timolol daily both eyes     Return to clinic 6 months with OCT retinal nerve fiber layer     Attending Physician Attestation:  Complete documentation of historical and exam elements from today's encounter can be found in the full encounter summary report (not reduplicated in this progress note). I personally obtained the chief complaint(s) and history of present illness. I confirmed and edited asnecessary the review of systems, past  medical/surgical history, family history, social history, and examination findings as documented by others; and I examined the patient myself. I personally reviewed the relevant tests, images, and reports as documented above. I formulated and edited as necessary the assessment and plan and discussed the findings and management plan with the patient and family.  - Radha Gallardo MD 9:43 AM 8/2/2019

## 2019-08-09 ENCOUNTER — ALLIED HEALTH/NURSE VISIT (OUTPATIENT)
Dept: NURSING | Facility: CLINIC | Age: 74
End: 2019-08-09
Payer: MEDICARE

## 2019-08-09 DIAGNOSIS — I82.A11 ACUTE DEEP VEIN THROMBOSIS (DVT) OF AXILLARY VEIN OF RIGHT UPPER EXTREMITY (H): ICD-10-CM

## 2019-08-09 DIAGNOSIS — L98.9 DERMATOLOGIC PROBLEM: Primary | ICD-10-CM

## 2019-08-09 LAB — INR PPP: 3.7 (ref 0.86–1.14)

## 2019-08-09 PROCEDURE — 36416 COLLJ CAPILLARY BLOOD SPEC: CPT | Performed by: FAMILY MEDICINE

## 2019-08-09 PROCEDURE — 99207 ZZC NO CHARGE NURSE ONLY: CPT

## 2019-08-09 PROCEDURE — 85610 PROTHROMBIN TIME: CPT | Performed by: FAMILY MEDICINE

## 2019-08-09 NOTE — PROGRESS NOTES
Manasa French is a 74 year old female who walks into clinic with a rash. Patient states the rash started approximately one week ago on her R shin, the rash has slowly spread to her L shin. Per patient, she is under a lot of stress, her and her  are moving out of their house of 50+ years. Rash is flat and bright red in color. Patient declines: itching; pain; warmth to touch; swelling or drainage. Patient states she has no symptoms associated with rash. She has not been using any different lotions or soaps. She has not come into contact with any new substances/plants/etx.     NURSING ASSESSMENT:   The rash began  10 day ago.   Patient's rash is located on bilateral shins.  Rash is described as flat, with a color of red with No drainage.  Rash is not itchy, painful, warm to touch or swollen.  Associated symptoms: None  Patient denies exposure to new substances. Patient does say she has been under stress recently, they are moving out of their house of 50 years  Patient is not on any new medications.  Patient has not tried new soaps, detergents, perfumes or lotions.  Allergies:   Allergies   Allergen Reactions     Alphagan [Brimonidine] Unknown     Animal Dander      cats     Cosopt [Dorzolamide-Timolol] Unknown     No Clinical Screening - See Comments Other (See Comments)     Animal dander (cats)     Seasonal Allergies        MEDICATIONS:  Taking medication(s) as prescribed? Yes  Taking over the counter medication(s)? No   Patient has tried none. - patient states symptoms have not been present, so she has not felt the need to use any OTC medications.   Patient informed of the following home remedies  Warm soaks; hydrocortisone creams; cool compresses    NURSING PLAN: Nursing advice to patient continue to monitor. If any of the following syptoms develop, call the clinic for further advice. Swelling; warmth to touch; pain; drainage; fever; or rash continues to spread. Patient verbalized understanding and is in  agreement with plan.     Lisset Deras RN

## 2019-08-27 ENCOUNTER — OFFICE VISIT (OUTPATIENT)
Dept: FAMILY MEDICINE | Facility: CLINIC | Age: 74
End: 2019-08-27
Payer: MEDICARE

## 2019-08-27 VITALS
OXYGEN SATURATION: 97 % | RESPIRATION RATE: 14 BRPM | SYSTOLIC BLOOD PRESSURE: 124 MMHG | BODY MASS INDEX: 25.13 KG/M2 | TEMPERATURE: 98 F | WEIGHT: 151 LBS | DIASTOLIC BLOOD PRESSURE: 70 MMHG | HEART RATE: 82 BPM

## 2019-08-27 DIAGNOSIS — I10 HTN, GOAL BELOW 140/90: ICD-10-CM

## 2019-08-27 DIAGNOSIS — L82.1 SEBORRHEIC KERATOSIS: ICD-10-CM

## 2019-08-27 DIAGNOSIS — E89.0 HYPOTHYROIDISM, POSTABLATIVE: Primary | ICD-10-CM

## 2019-08-27 PROCEDURE — 99213 OFFICE O/P EST LOW 20 MIN: CPT | Mod: 25 | Performed by: FAMILY MEDICINE

## 2019-08-27 PROCEDURE — 17110 DESTRUCTION B9 LES UP TO 14: CPT | Performed by: FAMILY MEDICINE

## 2019-08-27 RX ORDER — AMLODIPINE BESYLATE 10 MG/1
10 TABLET ORAL DAILY
Qty: 90 TABLET | Refills: 3 | Status: SHIPPED | OUTPATIENT
Start: 2019-08-27 | End: 2020-03-19

## 2019-08-27 NOTE — PROGRESS NOTES
Subjective     Manasa French is a 74 year old female who presents to clinic today for the following health issues:    HPI   Hypertension Follow-up      Do you check your blood pressure regularly outside of the clinic? Yes has not checked in 2 weeks    Are you following a low salt diet? yes    Are your blood pressures ever more than 140 on the top number (systolic) OR more   than 90 on the bottom number (diastolic), for example 140/90? No      How many servings of fruits and vegetables do you eat daily?  4 or more    On average, how many sweetened beverages do you drink each day (soda, juice, sweet tea, etc)?   1, orange juice    How many days per week do you miss taking your medication? 0    Subjective     Manasa French is a 73 year old female who presents to clinic today for the following health issues:    HPI   Hypertension Follow-up      Do you check your blood pressure regularly outside of the clinic? Yes     Are you following a low salt diet? Yes    Are your blood pressures ever more than 140 on the top number (systolic) OR more   than 90 on the bottom number (diastolic), for example 140/90? Yes    Amount of exercise or physical activity: 6-7 days/week for an average of greater than 60 minutes    Problems taking medications regularly: No    Medication side effects: none    Diet: regular (no restrictions)    From last month: 1. HYPERTENSION.  Not well controlled. Still moving, lots of stress.Been on losartan 100 mg a day, but blood pressure still running 130-160 over 90s.  Next steps?  Plan: Add amlodipine 5 mg daily.  Continue losartan 100 mg.  Follow up 1 month.  Check blood pressure at home.      blood pressure at home has been high over 50% of the time.  No side effects from amlodipine.    Also - spot on thigh. 2 months.  Dry, irritated. No bleeding.      Reviewed and updated as needed this visit by Provider         Review of Systems   ROS COMP: Constitutional, HEENT, cardiovascular, pulmonary, gi  "and gu systems are negative, except as otherwise noted.      Objective    /70   Pulse 82   Temp 98  F (36.7  C) (Tympanic)   Resp 14   Wt 68.5 kg (151 lb)   SpO2 97%   BMI 25.13 kg/m    Body mass index is 25.13 kg/m .  Physical Exam   GENERAL: healthy, alert and no distress  CV: regular rate and rhythm, normal S1 S2, no S3 or S4, no murmur, click or rub, no peripheral edema and peripheral pulses strong  SKIN: no suspicious lesions or rashes and keratoses - seborrheic # 1 - frozen    Diagnostic Test Results:  Labs reviewed in Epic        Assessment & Plan       ICD-10-CM    1. Hypothyroidism, postablative E89.0 TSH WITH FREE T4 REFLEX   2. HTN, goal below 140/90 I10 amLODIPine (NORVASC) 10 MG tablet   3. Seborrheic keratosis L82.1        TSH recheck in September.    HYPERTENSION: well controlled now on amlodipine 10 mg and losartan 100 mg.  Stable.  Recheck 6 months.    SK: LN applied x 3.  If lesion does not resolve, see derm.    BMI:   Estimated body mass index is 25.46 kg/m  as calculated from the following:    Height as of 5/9/19: 1.651 m (5' 5\").    Weight as of this encounter: 69.4 kg (153 lb).       No follow-ups on file.    Evelia Castro MD  [facility]            "

## 2019-08-28 ENCOUNTER — ANTICOAGULATION THERAPY VISIT (OUTPATIENT)
Dept: NURSING | Facility: CLINIC | Age: 74
End: 2019-08-28
Payer: MEDICARE

## 2019-08-28 DIAGNOSIS — I82.A11 ACUTE DEEP VEIN THROMBOSIS (DVT) OF AXILLARY VEIN OF RIGHT UPPER EXTREMITY (H): ICD-10-CM

## 2019-08-28 LAB — INR POINT OF CARE: 1.8 (ref 0.86–1.14)

## 2019-08-28 PROCEDURE — 36416 COLLJ CAPILLARY BLOOD SPEC: CPT

## 2019-08-28 PROCEDURE — 85610 PROTHROMBIN TIME: CPT | Mod: QW

## 2019-08-28 NOTE — PROGRESS NOTES
ANTICOAGULATION FOLLOW-UP CLINIC VISIT    Patient Name:  Manasa French  Date:  2019  Contact Type:  Face to Face    SUBJECTIVE:  Patient Findings     Positives:   Missed doses (After previous INR level of 3.7, pt lowered daily dose to 5 mg (35 mg weekly) to account for high level and lack of green during vacation. ), Change in diet/appetite (Less greens while on vacation.), Other complaints (Rash noted on  has resolved. Thought to be stress related. )    Comments:   The patient was assessed for activity level changes, missed or extra doses, bruising or bleeding, with no problem findings.           OBJECTIVE    INR Protime   Date Value Ref Range Status   2019 1.8 (A) 0.86 - 1.14 Final       ASSESSMENT / PLAN  INR assessment SUB    Recheck INR In: 8 DAYS    INR Location Clinic      Anticoagulation Summary  As of 2019    INR goal:   2.0-3.0   TTR:   47.4 % (2.4 mo)   INR used for dosin.8! (2019)   Warfarin maintenance plan:   7.5 mg (5 mg x 1.5) every Sun, Wed; 5 mg (5 mg x 1) all other days   Full warfarin instructions:   7.5 mg every Sun, Wed; 5 mg all other days   Weekly warfarin total:   40 mg   Plan last modified:   Brandi Arriola RN (2019)   Next INR check:   2019   Target end date:       Indications    Acute deep vein thrombosis (DVT) of axillary vein and internal jugular (H) [I82.A19]             Anticoagulation Episode Summary     INR check location:       Preferred lab:       Send INR reminders to:   [facility]    Comments:   40-50 min walk/lifting. 3 swimming. Greens 2-4/week.      Anticoagulation Care Providers     Provider Role Specialty Phone number    Christopher Smith MD [facility] Internal Medicine [phone]            See the Encounter Report to view Anticoagulation Flowsheet and Dosing Calendar (Go to Encounters tab in chart review, and find the Anticoagulation Therapy Visit)    Writer advised patient to increase weekly dose by 5 mg (dose  btw pre and post vacation doses). Recheck within 10 days.      Patient made aware if signs of clotting (pain, tenderness, swelling, or color change in any extremity) AND/OR bleeding occur (nosebleeds, bleeding gums, bruising, or blood in stool or urine) to notify provider & seek medical attention. If severe symptoms develop, such as major bleeding, chest pain, shortness of breath, fall, trauma or s/s of stroke, patient to call 911 immediately.     Dosage adjustment made based on physician directed care plan.    Brandi Arriola RN

## 2019-09-05 ENCOUNTER — ANTICOAGULATION THERAPY VISIT (OUTPATIENT)
Dept: NURSING | Facility: CLINIC | Age: 74
End: 2019-09-05
Payer: MEDICARE

## 2019-09-05 ENCOUNTER — OFFICE VISIT (OUTPATIENT)
Dept: HEMATOLOGY | Facility: CLINIC | Age: 74
End: 2019-09-05
Attending: INTERNAL MEDICINE
Payer: MEDICARE

## 2019-09-05 VITALS
OXYGEN SATURATION: 97 % | WEIGHT: 150 LBS | DIASTOLIC BLOOD PRESSURE: 68 MMHG | SYSTOLIC BLOOD PRESSURE: 144 MMHG | HEART RATE: 82 BPM | BODY MASS INDEX: 24.11 KG/M2 | HEIGHT: 66 IN | RESPIRATION RATE: 12 BRPM | TEMPERATURE: 97.6 F

## 2019-09-05 DIAGNOSIS — Z79.01 LONG TERM CURRENT USE OF ANTICOAGULANT THERAPY: Primary | ICD-10-CM

## 2019-09-05 DIAGNOSIS — I82.A11 ACUTE DEEP VEIN THROMBOSIS (DVT) OF AXILLARY VEIN OF RIGHT UPPER EXTREMITY (H): ICD-10-CM

## 2019-09-05 LAB — INR POINT OF CARE: 2.6 (ref 0.86–1.14)

## 2019-09-05 PROCEDURE — 36416 COLLJ CAPILLARY BLOOD SPEC: CPT

## 2019-09-05 PROCEDURE — 99215 OFFICE O/P EST HI 40 MIN: CPT | Performed by: INTERNAL MEDICINE

## 2019-09-05 PROCEDURE — G0463 HOSPITAL OUTPT CLINIC VISIT: HCPCS

## 2019-09-05 PROCEDURE — 85610 PROTHROMBIN TIME: CPT | Mod: QW

## 2019-09-05 ASSESSMENT — MIFFLIN-ST. JEOR: SCORE: 1184.21

## 2019-09-05 ASSESSMENT — PAIN SCALES - GENERAL: PAINLEVEL: NO PAIN (0)

## 2019-09-05 NOTE — PROGRESS NOTES
ANTICOAGULATION FOLLOW-UP CLINIC VISIT    Patient Name:  Manasa French  Date:  2019  Contact Type:  Face to Face    SUBJECTIVE:         OBJECTIVE    INR Protime   Date Value Ref Range Status   2019 2.6 (A) 0.86 - 1.14 Final       ASSESSMENT / PLAN  INR assessment THER    Recheck INR In: 2 WEEKS    INR Location Clinic      Anticoagulation Summary  As of 2019    INR goal:   2.0-3.0   TTR:   50.2 % (2.7 mo)   INR used for dosin.6 (2019)   Warfarin maintenance plan:   7.5 mg (5 mg x 1.5) every Sun, Wed; 5 mg (5 mg x 1) all other days   Full warfarin instructions:   7.5 mg every Sun, Wed; 5 mg all other days   Weekly warfarin total:   40 mg   Plan last modified:   Brandi Arriola RN (2019)   Next INR check:   2019   Target end date:       Indications    Acute deep vein thrombosis (DVT) of axillary vein and internal jugular (H) [I82.A19]             Anticoagulation Episode Summary     INR check location:       Preferred lab:       Send INR reminders to:   [facility]    Comments:   40-50 min walk/lifting. 3 swimming. Greens 2-4/week.      Anticoagulation Care Providers     Provider Role Specialty Phone number    Christopher Smith MD [facility] Internal Medicine [phone]            See the Encounter Report to view Anticoagulation Flowsheet and Dosing Calendar (Go to Encounters tab in chart review, and find the Anticoagulation Therapy Visit)    Patient aware if signs of clotting (pain, tenderness, swelling, color change in leg or arm, SOB) and bleeding occur (blood in stool, urine, large bruising, bleeding gums, nosebleeds) to have INR check sooner. If sx severe report to ER or concerned for stroke call 911. If general questions or concerns arise, call clinic.         Kaila Haque RN

## 2019-09-05 NOTE — PROGRESS NOTES
Manasa French is here for a return visit with Dr. Smith. Rhonda is being seen for unprovoked RUE clot and anticoagulation guidance.     RN welcomed and reintroduced Rhonda to our center.    Medications, and allergies were then reviewed, updated and reconciled with outside records.    I asked Rhonda if she had any specific concerns that she wanted to address and communicated these with the provider. I then reviewed which provider she was going to be seeing today and the expected timing of that provider.    Together we reviewed the plan that she would continue taking her warfarin and return to see us annually. The AVS instructions were then updated and given to the patient.    I then thanked Rhonda for coming and encouraged her to call should she have any questions or concerns.    Jacinda West RN - Nurse Clinician - [facility] - [phone]

## 2019-09-05 NOTE — PATIENT INSTRUCTIONS
1. Please start taking 100 mcg of vitamin K. Take this every day it will help with your INR monitoring.  2. Continue taking your warfarin as directed by the medication monitoring clinic.  3. Please see us annually to help to continue to evaluate the need for anticoagulation.  4. If you have a planned surgery or procedure please call our Center to help guide you with a plan for your warfarin.   5. If you fall/ get into an accident while on blood thinners, please proceed to ER for evaluation. If you do not think the accident or fall to be serious please call us for help determining need for emergency care.   6. Our contact information is:    Main: [phone]   Jaimie RN: [phone]   Jacinda RN: [phone]    Thanks for stopping in today!    Jacinda West RN - Nurse Clinician - [facility] - [phone]

## 2019-09-13 ENCOUNTER — TELEPHONE (OUTPATIENT)
Dept: NURSING | Facility: CLINIC | Age: 74
End: 2019-09-13

## 2019-09-13 NOTE — TELEPHONE ENCOUNTER
"Patient called to clarify recent changes to AC care following appt w/ Dr. Smith on 9/5. Vitamin K rx was denied by [employer] and patient is having a hard time finding OTC. Jacinda, Hematology RN recommended [facility], [facility] or [facility] for supplement. Patient also inquired if a new Warfarin rx was signed? Writer clarified with patient that Dr. Smith mentioned that her Warfarin dose will likely need to increase based on supplement, however, ACC team will address that. Writer recommended patient start vitamin K and 7.5 mg Warfarin daily on Monday 9/16. Next INR appt scheduled for Thursay 9/18. Patient verbalized understanding. Brandi Arriola, BENJAMINN, RN    Per ov notes:   \"1. Please start taking 100 mcg of vitamin K. Take this every day it will help with your INR monitoring.  2. Continue taking your warfarin as directed by the medication monitoring clinic.  3. Please see us annually to help to continue to evaluate the need for anticoagulation.  4. If you have a planned surgery or procedure please call our Center to help guide you with a plan for your warfarin.   5. If you fall/ get into an accident while on blood thinners, please proceed to ER for evaluation. If you do not think the accident or fall to be serious please call us for help determining need for emergency care.\"      "

## 2019-09-15 NOTE — PROGRESS NOTES
[facility]  [address] [place] [id number]  Phone: [phone], Fax: [phone]      Outpatient Visit Note:    Patient: Manasa French  MRN: [id number]  : 1945  JENNIFER: Sep 5, 2019    Reason for Visit:  Annual follow up for indefinite anticoagluation    Forward History:  2019 right arm swelling, found to have RIJ, Subclav, Axillary and brachial vein, unprovoked, started on apixaban but was expensive and changed to warfarin  May 2019 venous ultrasound performed for thoracic outlet syndrome and there was no obstructive physiology appreciated.    Interval History: Manasa French is a 74 year old female  with a history of right upper extremity DVT who returns for routine follow up.  She is been doing well since I saw her last in May 2019.  She notes no easy bruising or bleeding.  She has no melena or hematochezia or other bleeding symptoms.  The upper extremity swelling of the right has completely resolved she has no skin changes or discomfort in the arm.    She is been tolerating warfarin well.  She notes concerns about dietary indiscretion leading to labile INRs.  She notes that it somewhat cumbersome but not intolerable, but is simply frustrated by the dietary limitations.    Past Medical History:  Past Medical History:   Diagnosis Date     6th nerve palsy     OU     Cataract      Graves disease      Hypertension      Nystagmus      Primary open angle glaucoma      Pseudophakia, right eye 2014     Spider veins      Thyroid eye disease      Unspecified hypothyroidism        Medications:  Current Outpatient Medications   Medication Sig     alendronate (FOSAMAX) 70 MG tablet Take 1 tablet (70 mg) by mouth every 7 days     amLODIPine (NORVASC) 10 MG tablet Take 1 tablet (10 mg) by mouth daily     Calcium Carb-Cholecalciferol (CALCIUM CARBONATE-VITAMIN D3 PO)      carboxymethylcellulose (CELLUVISC/REFRESH LIQUIGEL) 1 % ophthalmic solution Place 1 drop  into both eyes 3 times daily     Cholecalciferol (VITAMIN D) 1000 UNITS capsule Take  by mouth.     fluticasone-salmeterol (ADVAIR) 250-50 MCG/DOSE diskus inhaler Inhale 1 puff into the lungs 2 times daily     latanoprost (XALATAN) 0.005 % ophthalmic solution Place 1 drop into both eyes At Bedtime     levothyroxine (SYNTHROID/LEVOTHROID) 150 MCG tablet Take 1 tablet (150 mcg) by mouth daily Come in for lab only TSH recheck ~ 7/1/2019.     losartan (COZAAR) 100 MG tablet TAKE 1 TABLET EVERY DAY     Multiple Vitamins-Minerals (ICAPS AREDS 2 PO)      timolol maleate (TIMOPTIC) 0.5 % ophthalmic solution Place 1 drop into both eyes every morning     Vitamin K 100 MCG TABS Take 1 tablet by mouth daily     warfarin (COUMADIN) 5 MG tablet Current dose (7/15/19): 5 mg Mon, Wed, Fri + 7.5 mg all other days or as directed by Coumadin clinic.     No current facility-administered medications for this visit.        Allergies:  Allergies   Allergen Reactions     Alphagan [Brimonidine] Unknown     Animal Dander      cats     Cosopt [Dorzolamide-Timolol] Unknown     No Clinical Screening - See Comments Other (See Comments)     Animal dander (cats)     Seasonal Allergies        ROS:  A 14 point ROS is negative except as stated in the HPI    Objective:  Vitals: B/P: 144/68, T: 97.6, P: 82, R: 12, Wt: 150 lbs 0 oz  Exam:   Gen: Appears well, no distress  HEENT: no scleral icterus or hemorrhage, no wet purpura, no lymphadenopathy  Ext: no edema of the right upper extremity    Labs:  Results for BRIGIDO SALOMON (MRN [id number]) as of 9/14/2019 22:29   Ref. Range 7/29/2019 00:00 8/9/2019 07:54 8/28/2019 00:00 9/5/2019 00:00   INR Latest Ref Range: 0.86 - 1.14  2.6 (A) 3.70 (H) 1.8 (A) 2.6 (A)       Imaging:  Reviewed her may thoracic outlet syndrome study which is negative.    Assessment:  In summary, Manasa Salomon is a 74 year old female with history of an unprovoked right upper extremity deep vein thrombosis.  We have excluded the  presence of thoracic outlet syndrome and was clearly this thrombosis appears to be unprovoked.    Overall weighing her risks and benefits for continued and indefinite anticoagulation, I think that her risk of recurrent thrombosis is likely high based on what we know about unprovoked deep vein thrombosis.  She is concerned about her risk of bleeding given her age and occasionally being unsteady on her feet.  We also discussed that labile INR is her another risk for bleeding while on anticoagulation with warfarin.  However, I think we can improve this with vitamin K supplementation.  However she would like to discuss this further with her spouse and get back to us about whether at some point she may decide to stop therapy.    Plan:  1. Majority of today's visit was spent counseling the patient regarding the natural history of unprovoked deep vein thrombosis, and results of our study for thoracic outlet syndrome.  2.  At this point we will continue warfarin indefinitely given that we think her risk for thrombosis is likely high given the unprovoked nature of the thrombus.  However she raised concerns about her risk of bleeding which she tended to mitigate with adding supplemental vitamin K to her regimen.  3. Started Vit K 100 mcg daily for labile INR  4. She will call with questions or concerns and plan to follow up in 1 year.    The patient is given our center's contact information and is instructed to call if she should have any further questions or concerns.  Otherwise, we will plan on seeing her back in 1 year.      Total Time Spent:  I spent a total of 40 minutes face-to-face with Manasa French during today's office visit.  Over 50% of this time was spent counseling the patient and/or coordinating care regarding the natural history of upper extremity and unprovoked DVT.      Christopher Smith MD   of Medicine  [employer] [employer]

## 2019-09-16 ENCOUNTER — TRANSFERRED RECORDS (OUTPATIENT)
Dept: HEALTH INFORMATION MANAGEMENT | Facility: CLINIC | Age: 74
End: 2019-09-16

## 2019-09-17 DIAGNOSIS — E89.0 HYPOTHYROIDISM, POSTABLATIVE: ICD-10-CM

## 2019-09-17 RX ORDER — ALENDRONATE SODIUM 70 MG/1
70 TABLET ORAL
Qty: 12 TABLET | Refills: 3 | OUTPATIENT
Start: 2019-09-17

## 2019-09-17 NOTE — TELEPHONE ENCOUNTER
Medication prescribed by endocrinology.   Routing to Dr. Hernandez as patient has upcoming appointment.  BENJAMIN FigueroaN, RN  [employer] [facility]

## 2019-09-17 NOTE — TELEPHONE ENCOUNTER
Reason for Call:  Medication or medication refill:    Do you use a [facility]?  Name of the pharmacy and phone number for the current request:  [facility]    Name of the medication requested:alendronate (FOSAMAX) 70 MG tablet     Other request:     Can we leave a detailed message on this number? YES    Phone number patient can be reached at: Home number on file [phone] (home)    Best Time:     Call taken on 9/17/2019 at 8:33 AM by RUFINA MARTINEZ

## 2019-09-17 NOTE — TELEPHONE ENCOUNTER
"Requested Prescriptions   Pending Prescriptions Disp Refills     alendronate (FOSAMAX) 70 MG tablet  Last Written Prescription Date:  9/10/2018  Last Fill Quantity: 12 tablet,  # refills: 3   Last office visit: 8/27/2019 with prescribing provider:  Matthew   Future Office Visit:   Next 5 appointments (look out 90 days)    Oct 30, 2019 11:00 AM CDT  [facility] Visit with Kevin Hernandez MD  [facility] [address] [facility] [id number] [phone]          12 tablet 3     Sig: Take 1 tablet (70 mg) by mouth every 7 days       Bisphosphonates Failed - 9/17/2019  8:51 AM        Failed - Recent (12 mo) or future (30 days) visit within the authorizing provider's specialty     Patient had office visit in the last 12 months or has a visit in the next 30 days with authorizing provider or within the authorizing provider's specialty.  See \"Patient Info\" tab in inbasket, or \"Choose Columns\" in Meds & Orders section of the refill encounter.              Passed - Dexa on file within past 2 years     Please review last Dexa result.           Passed - Medication is active on med list        Passed - Patient is age 18 or older        Passed - Normal serum creatinine on file within past 12 months     Recent Labs   Lab Test 07/26/19  1348   CR 0.72                "

## 2019-09-18 DIAGNOSIS — E89.0 HYPOTHYROIDISM, POSTABLATIVE: ICD-10-CM

## 2019-09-18 LAB — TSH SERPL DL<=0.005 MIU/L-ACNC: 0.64 MU/L (ref 0.4–4)

## 2019-09-18 PROCEDURE — 84443 ASSAY THYROID STIM HORMONE: CPT | Performed by: FAMILY MEDICINE

## 2019-09-18 PROCEDURE — 36415 COLL VENOUS BLD VENIPUNCTURE: CPT | Performed by: FAMILY MEDICINE

## 2019-09-18 RX ORDER — ALENDRONATE SODIUM 70 MG/1
70 TABLET ORAL
Qty: 12 TABLET | Refills: 1 | Status: SHIPPED | OUTPATIENT
Start: 2019-09-18 | End: 2020-01-27

## 2019-09-18 NOTE — LETTER
September 19, 2019      Manasa French  [address]    [facility] [address]      Dear Ms.French,    We are writing to inform you of your test results.    Your TSH indicates that your thyroid function is currently in balance.  No additional testing is necessary for a year or unless you develop symptoms of over or underactive thyroid.      Resulted Orders   TSH WITH FREE T4 REFLEX   Result Value Ref Range    TSH 0.64 0.40 - 4.00 mU/L       If you have any questions or concerns, please call the clinic at the number listed above.       Sincerely,        Dr. Evelia Castro MD/[facility]

## 2019-09-19 ENCOUNTER — ANTICOAGULATION THERAPY VISIT (OUTPATIENT)
Dept: NURSING | Facility: CLINIC | Age: 74
End: 2019-09-19
Payer: MEDICARE

## 2019-09-19 DIAGNOSIS — I82.409 DVT (DEEP VENOUS THROMBOSIS) (H): Primary | ICD-10-CM

## 2019-09-19 DIAGNOSIS — I82.A11 ACUTE DEEP VEIN THROMBOSIS (DVT) OF AXILLARY VEIN OF RIGHT UPPER EXTREMITY (H): ICD-10-CM

## 2019-09-19 LAB — INR POINT OF CARE: 3.1 (ref 0.86–1.14)

## 2019-09-19 PROCEDURE — 36416 COLLJ CAPILLARY BLOOD SPEC: CPT

## 2019-09-19 PROCEDURE — 85610 PROTHROMBIN TIME: CPT | Mod: QW

## 2019-09-19 NOTE — PROGRESS NOTES
ANTICOAGULATION FOLLOW-UP CLINIC VISIT    Patient Name:  Manasa French  Date:  9/19/2019  Contact Type:  Face to Face    SUBJECTIVE:  Patient Findings     Comments:   Patient started on Vit K1 daily.         Clinical Outcomes     Comments:   Patient started on Vit K1 daily.            OBJECTIVE    INR Protime   Date Value Ref Range Status   09/19/2019 3.1 (A) 0.86 - 1.14 Final       ASSESSMENT / PLAN  INR assessment SUPRA    Recheck INR In: 8 DAYS    INR Location Clinic      Anticoagulation Summary  As of 9/19/2019    INR goal:   2.0-3.0   TTR:   54.6 % (3.2 mo)   INR used for dosing:   3.1! (9/19/2019)   Warfarin maintenance plan:   7.5 mg (5 mg x 1.5) every Mon, Wed, Fri; 5 mg (5 mg x 1) all other days   Full warfarin instructions:   7.5 mg every Mon, Wed, Fri; 5 mg all other days   Weekly warfarin total:   42.5 mg   Plan last modified:   Kaila Haque RN (9/19/2019)   Next INR check:   9/30/2019   Target end date:       Indications    Acute deep vein thrombosis (DVT) of axillary vein and internal jugular (H) [I82.A19]             Anticoagulation Episode Summary     INR check location:       Preferred lab:       Send INR reminders to:   KAN [facility]ILL    Comments:   40-50 min walk/lifting. 3 swimming. Greens 2-4/week.      Anticoagulation Care Providers     Provider Role Specialty Phone number    SarahChristopher MD [facility] Internal Medicine [phone]            See the Encounter Report to view Anticoagulation Flowsheet and Dosing Calendar (Go to Encounters tab in chart review, and find the Anticoagulation Therapy Visit)    Patient previous 7 day total 45 mg and INR 3.1. Will trial 7.5 mg MWF and 5 mg all other days (42.5 mg) to see if this will reduce INR in to the 2's range. Patient to recheck INR at  lab on Friday, 9/27 to ensure stability.     Patient aware if signs of clotting (pain, tenderness, swelling, color change in leg or arm, SOB) and bleeding occur (blood in stool, urine, large  bruising, bleeding gums, nosebleeds) to have INR check sooner. If sx severe report to ER or concerned for stroke call 911. If general questions or concerns arise, call clinic.         Kaila Haque RN

## 2019-09-27 ENCOUNTER — ANTICOAGULATION THERAPY VISIT (OUTPATIENT)
Dept: NURSING | Facility: CLINIC | Age: 74
End: 2019-09-27
Payer: MEDICARE

## 2019-09-27 DIAGNOSIS — I82.A11 ACUTE DEEP VEIN THROMBOSIS (DVT) OF AXILLARY VEIN OF RIGHT UPPER EXTREMITY (H): ICD-10-CM

## 2019-09-27 LAB
CAPILLARY BLOOD COLLECTION: NORMAL
INR PPP: 2.7 (ref 0.86–1.14)

## 2019-09-27 PROCEDURE — 36416 COLLJ CAPILLARY BLOOD SPEC: CPT | Performed by: INTERNAL MEDICINE

## 2019-09-27 PROCEDURE — 85610 PROTHROMBIN TIME: CPT | Performed by: INTERNAL MEDICINE

## 2019-09-27 NOTE — PROGRESS NOTES
ANTICOAGULATION FOLLOW-UP CLINIC VISIT    Patient Name:  Manasa French  Date:  2019  Contact Type:  Telephone/Pt    SUBJECTIVE:  Patient Findings     Comments:   The patient was assessed for diet, medication, and activity level changes, missed or extra doses, bruising or bleeding, with no problem findings.           OBJECTIVE    INR   Date Value Ref Range Status   2019 2.70 (H) 0.86 - 1.14 Final     Comment:     This test is intended for monitoring Coumadin therapy.  Results are not   accurate in patients with prolonged INR due to factor deficiency.         ASSESSMENT / PLAN  INR assessment THER    Recheck INR In: 2 WEEKS    INR Location Clinic      Anticoagulation Summary  As of 2019    INR goal:   2.0-3.0   TTR:   56.2 % (3.4 mo)   INR used for dosin.70 (2019)   Warfarin maintenance plan:   7.5 mg (5 mg x 1.5) every Mon, Wed, Fri; 5 mg (5 mg x 1) all other days   Full warfarin instructions:   7.5 mg every Mon, Wed, Fri; 5 mg all other days   Weekly warfarin total:   42.5 mg   No change documented:   Brandi Arriola RN   Plan last modified:   Kaila Haque RN (2019)   Next INR check:   10/9/2019   Target end date:       Indications    Acute deep vein thrombosis (DVT) of axillary vein and internal jugular (H) [I82.A19]             Anticoagulation Episode Summary     INR check location:       Preferred lab:       Send INR reminders to:   KAN [facility]ILL    Comments:   40-50 min walk/lifting. 3 swimming. Greens 2-4/week.      Anticoagulation Care Providers     Provider Role Specialty Phone number    Christopher Smith MD [facility] Internal Medicine [phone]            See the Encounter Report to view Anticoagulation Flowsheet and Dosing Calendar (Go to Encounters tab in chart review, and find the Anticoagulation Therapy Visit)    Writer spoke with patient and advised to continue current maintenance dose and recheck within 2 weeks.     Patient made aware if signs of  clotting (pain, tenderness, swelling, or color change in any extremity) AND/OR bleeding occur (nosebleeds, bleeding gums, bruising, or blood in stool or urine) to notify provider & seek medical attention. If severe symptoms develop, such as major bleeding, chest pain, shortness of breath, fall, trauma or s/s of stroke, patient to call 911 immediately.     Dosage adjustment made based on physician directed care plan.    Brandi Arriola RN

## 2019-10-08 DIAGNOSIS — E89.0 HYPOTHYROIDISM, POSTABLATIVE: ICD-10-CM

## 2019-10-08 NOTE — TELEPHONE ENCOUNTER
"Requested Prescriptions   Pending Prescriptions Disp Refills     levothyroxine (SYNTHROID/LEVOTHROID) 150 MCG tablet [Pharmacy Med Name: LEVOTHYROXINE SODIUM 150 MCG Tablet]  Last Written Prescription Date:  4/17/2019  Last Fill Quantity: 90 tablet,  # refills: 1   Last office visit: 8/27/2019 with prescribing provider:  Matthew   Future Office Visit:   Next 5 appointments (look out 90 days)    Oct 30, 2019 11:00 AM CDT  Return Visit with Kevin Hernandez MD  [facility] [address] [facility] [address]  [phone]          90 tablet 1     Sig: TAKE 1 TABLET EVERY DAY (COME IN FOR LAB ONLY TSH RECHECK AROUND 7/1/2019)       Thyroid Protocol Passed - 10/8/2019  8:36 AM        Passed - Patient is 12 years or older        Passed - Recent (12 mo) or future (30 days) visit within the authorizing provider's specialty     Patient has had an office visit with the authorizing provider or a provider within the authorizing providers department within the previous 12 mos or has a future within next 30 days. See \"Patient Info\" tab in inbasket, or \"Choose Columns\" in Meds & Orders section of the refill encounter.              Passed - Medication is active on med list        Passed - Normal TSH on file in past 12 months     Recent Labs   Lab Test 09/18/19  0808   TSH 0.64              Passed - No active pregnancy on record     If patient is pregnant or has had a positive pregnancy test, please check TSH.          Passed - No positive pregnancy test in past 12 months     If patient is pregnant or has had a positive pregnancy test, please check TSH.             "

## 2019-10-09 ENCOUNTER — ANTICOAGULATION THERAPY VISIT (OUTPATIENT)
Dept: NURSING | Facility: CLINIC | Age: 74
End: 2019-10-09
Payer: MEDICARE

## 2019-10-09 DIAGNOSIS — I82.A11 ACUTE DEEP VEIN THROMBOSIS (DVT) OF AXILLARY VEIN OF RIGHT UPPER EXTREMITY (H): ICD-10-CM

## 2019-10-09 LAB — INR POINT OF CARE: 2.8 (ref 0.86–1.14)

## 2019-10-09 PROCEDURE — 36416 COLLJ CAPILLARY BLOOD SPEC: CPT

## 2019-10-09 PROCEDURE — 85610 PROTHROMBIN TIME: CPT | Mod: QW

## 2019-10-09 NOTE — PROGRESS NOTES
ANTICOAGULATION FOLLOW-UP CLINIC VISIT    Patient Name:  Manasa French  Date:  10/9/2019  Contact Type:  Face to Face    SUBJECTIVE:  Patient Findings     Positives:   Bruising (Intermittent bruising from bumping into the counter corner. )    Comments:   The patient was assessed for diet, medication, and activity level changes, missed or extra doses, active bleeding, with no problem findings.           OBJECTIVE    INR Protime   Date Value Ref Range Status   10/09/2019 2.8 (A) 0.86 - 1.14 Final       ASSESSMENT / PLAN  INR assessment THER    Recheck INR In: 2 WEEKS    INR Location Clinic      Anticoagulation Summary  As of 10/9/2019    INR goal:   2.0-3.0   TTR:   60.6 % (3.8 mo)   INR used for dosin.8 (10/9/2019)   Warfarin maintenance plan:   7.5 mg (5 mg x 1.5) every Mon, Wed, Fri; 5 mg (5 mg x 1) all other days   Full warfarin instructions:   7.5 mg every Mon, Wed, Fri; 5 mg all other days   Weekly warfarin total:   42.5 mg   No change documented:   Brandi Arriola RN   Plan last modified:   Kaila Haque RN (2019)   Next INR check:   10/23/2019   Target end date:       Indications    Acute deep vein thrombosis (DVT) of axillary vein and internal jugular (H) [I82.A19]             Anticoagulation Episode Summary     INR check location:       Preferred lab:       Send INR reminders to:   KAN [facility]    Comments:   40-50 min walk/lifting. 3 swimming. Greens 2-4/week.      Anticoagulation Care Providers     Provider Role Specialty Phone number    Christopher Smith MD [facility] Internal Medicine [phone]            See the Encounter Report to view Anticoagulation Flowsheet and Dosing Calendar (Go to Encounters tab in chart review, and find the Anticoagulation Therapy Visit)    Patient made aware if signs of clotting (pain, tenderness, swelling, or color change in any extremity) AND/OR bleeding occur (nosebleeds, bleeding gums, bruising, or blood in stool or urine) to notify  provider & seek medical attention. If severe symptoms develop, such as major bleeding, chest pain, shortness of breath, fall, trauma or s/s of stroke, patient to call 911 immediately.     Dosage adjustment made based on physician directed care plan.    Brandi Arriola RN

## 2019-10-10 RX ORDER — LEVOTHYROXINE SODIUM 150 UG/1
TABLET ORAL
Qty: 90 TABLET | Refills: 2 | Status: SHIPPED | OUTPATIENT
Start: 2019-10-10 | End: 2020-02-10 | Stop reason: DRUGHIGH

## 2019-10-10 NOTE — TELEPHONE ENCOUNTER
Prescription approved per [facility] Refill Protocol for 12 months of refills since last appointment, which was 8/27/2019.

## 2019-10-21 ENCOUNTER — TELEPHONE (OUTPATIENT)
Dept: FAMILY MEDICINE | Facility: CLINIC | Age: 74
End: 2019-10-21

## 2019-10-21 NOTE — TELEPHONE ENCOUNTER
Reason for Call:  Other     Detailed comments: given a parking permit last year, expires end of October, would like it extended thru the winter months     Phone Number Patient can be reached at: Home number on file [phone] (home)    Best Time: today    Can we leave a detailed message on this number? YES    Call taken on 10/21/2019 at 9:04 AM by CK SANTOS

## 2019-10-21 NOTE — TELEPHONE ENCOUNTER
Do you want to see patient for OV to extend this from the end of this month through winter?      Parking Permit: This was mentioned in OV note from 9/19/2019. Unable to find in media or letters.

## 2019-10-23 ENCOUNTER — ANTICOAGULATION THERAPY VISIT (OUTPATIENT)
Dept: NURSING | Facility: CLINIC | Age: 74
End: 2019-10-23
Payer: MEDICARE

## 2019-10-23 DIAGNOSIS — I82.A11 ACUTE DEEP VEIN THROMBOSIS (DVT) OF AXILLARY VEIN OF RIGHT UPPER EXTREMITY (H): ICD-10-CM

## 2019-10-23 LAB — INR POINT OF CARE: 3.4 (ref 0.86–1.14)

## 2019-10-23 PROCEDURE — 36416 COLLJ CAPILLARY BLOOD SPEC: CPT

## 2019-10-23 PROCEDURE — 85610 PROTHROMBIN TIME: CPT | Mod: QW

## 2019-10-23 NOTE — TELEPHONE ENCOUNTER
If patient completes top half of form and gets to the [facility] Office I can fill out and extend without any OV.  I'm not there until next Thursday however.  Can you let patient know?    Dr. Evelia Castro MD/[facility]

## 2019-10-23 NOTE — PROGRESS NOTES
ANTICOAGULATION FOLLOW-UP CLINIC VISIT    Patient Name:  Manasa French  Date:  10/23/2019  Contact Type:  Face to Face    SUBJECTIVE:  Patient Findings     Positives:   Change in health (L shoulder pain. Xray, MRI and a cortisone injection completed on Monday. PT starts next week. )    Comments:   The patient was assessed for diet, medication, and activity level changes, missed or extra doses, bruising or bleeding, with no problem findings.           OBJECTIVE    INR Protime   Date Value Ref Range Status   10/23/2019 3.4 (A) 0.86 - 1.14 Final       ASSESSMENT / PLAN  INR assessment SUPRA    Recheck INR In: 2 WEEKS    INR Location Clinic      Anticoagulation Summary  As of 10/23/2019    INR goal:   2.0-3.0   TTR:   57.7 % (4.3 mo)   INR used for dosing:   3.4! (10/23/2019)   Warfarin maintenance plan:   7.5 mg (5 mg x 1.5) every Mon, Wed, Fri; 5 mg (5 mg x 1) all other days   Full warfarin instructions:   10/23: 5 mg; Otherwise 7.5 mg every Mon, Wed, Fri; 5 mg all other days   Weekly warfarin total:   42.5 mg   Plan last modified:   Kaila Haque, RN (9/19/2019)   Next INR check:   11/7/2019   Target end date:       Indications    Acute deep vein thrombosis (DVT) of axillary vein and internal jugular (H) [I82.A19]             Anticoagulation Episode Summary     INR check location:       Preferred lab:       Send INR reminders to:   KAN [facility]ILL    Comments:   40-50 min walk/lifting. 3 swimming. Greens 2-4/week. 9/16 started 100 mcg of vit k.      Anticoagulation Care Providers     Provider Role Specialty Phone number    Christopher Smith MD [facility] Internal Medicine [phone]            See the Encounter Report to view Anticoagulation Flowsheet and Dosing Calendar (Go to Encounters tab in chart review, and find the Anticoagulation Therapy Visit)    Per protocol, patient advised to decrease today's warfarin dose by 2.5 mg to account for supra-therapeutic INR level. Patient instructed to increase  green intake today and tomorrow as well. Recheck within 2 weeks to ensure INR stability.     Patient and  made aware if signs of clotting (pain, tenderness, swelling, or color change in any extremity) AND/OR bleeding occur (nosebleeds, bleeding gums, bruising, or blood in stool or urine) to notify provider & seek medical attention. If severe symptoms develop, such as major bleeding, chest pain, shortness of breath, fall, trauma or s/s of stroke, patient to call 911 immediately.     Dosage adjustment made based on physician directed care plan.    Brandi Arriola RN

## 2019-10-23 NOTE — TELEPHONE ENCOUNTER
Detailed message left with information noted below from provider and number to return call with any questions.  BENJAMIN FigueroaN, RN  [employer] [facility]

## 2019-10-25 ENCOUNTER — TELEPHONE (OUTPATIENT)
Dept: FAMILY MEDICINE | Facility: CLINIC | Age: 74
End: 2019-10-25

## 2019-10-25 NOTE — TELEPHONE ENCOUNTER
Reason for call:  Form   Our goal is to have forms completed within 72 hours, however some forms may require a visit or additional information.     Who is the form from? Patient  Where did the form come from? Patient or family brought in     What clinic location was the form placed at?   Where was the form placed? BOX Box/Folder  What number is listed as a contact on the form?     Phone call message - patient request for a letter, form or note:     Date needed: as soon as possible  Please mail to Patient included self addressed stamped envelope  Has the patient signed a consent form for release of information? Not Applicable    Additional comments:     Type of letter, form or note: medical    Phone number to reach patient:  Home number on file [phone] (home)    Best Time:      Can we leave a detailed message on this number?

## 2019-10-30 ENCOUNTER — OFFICE VISIT (OUTPATIENT)
Dept: ENDOCRINOLOGY | Facility: CLINIC | Age: 74
End: 2019-10-30
Payer: MEDICARE

## 2019-10-30 VITALS
SYSTOLIC BLOOD PRESSURE: 138 MMHG | WEIGHT: 148.4 LBS | BODY MASS INDEX: 23.29 KG/M2 | HEIGHT: 67 IN | HEART RATE: 89 BPM | DIASTOLIC BLOOD PRESSURE: 66 MMHG

## 2019-10-30 DIAGNOSIS — M80.00XD AGE-RELATED OSTEOPOROSIS WITH CURRENT PATHOLOGICAL FRACTURE WITH ROUTINE HEALING, SUBSEQUENT ENCOUNTER: Primary | ICD-10-CM

## 2019-10-30 DIAGNOSIS — S32.029D CLOSED FRACTURE OF SECOND LUMBAR VERTEBRA WITH ROUTINE HEALING, UNSPECIFIED FRACTURE MORPHOLOGY, SUBSEQUENT ENCOUNTER: ICD-10-CM

## 2019-10-30 DIAGNOSIS — S22.080D COMPRESSION FRACTURE OF T12 VERTEBRA WITH ROUTINE HEALING, SUBSEQUENT ENCOUNTER: ICD-10-CM

## 2019-10-30 DIAGNOSIS — S32.039D CLOSED FRACTURE OF THIRD LUMBAR VERTEBRA WITH ROUTINE HEALING, UNSPECIFIED FRACTURE MORPHOLOGY, SUBSEQUENT ENCOUNTER: ICD-10-CM

## 2019-10-30 PROCEDURE — 99215 OFFICE O/P EST HI 40 MIN: CPT | Performed by: INTERNAL MEDICINE

## 2019-10-30 PROCEDURE — 99358 PROLONG SERVICE W/O CONTACT: CPT | Performed by: INTERNAL MEDICINE

## 2019-10-30 ASSESSMENT — MIFFLIN-ST. JEOR: SCORE: 1192.83

## 2019-10-30 NOTE — TELEPHONE ENCOUNTER
Writer mailed letter to pt.   Pt informed via VM that it has been sent.       Tomasa NG     [facility]

## 2019-10-30 NOTE — PROGRESS NOTES
Name: Manasa French is a 74 year old woman, self referred for evaluation of     Chief Complaint   Patient presents with     Osteoporosis     HPI:  Recent issues:  Here for evaluation of osteoporosis  Reviewed medical history from patient and Epic chart record        History of chronic back pain  Medical evaluation, then surgical evaluation  2006. Spine surgery with 2 vertebral disc replacements at [facility] with Dr. Welsh    2016. Back pain while pulling hose backwards, fell and had vertebral fracture lumbar spine, back brace treatment  Medical evaluation  MRI lumbar spine:     Mild compression fracture superior endplate L4 vertebrae    ~2/2018. Another fall 2018 falling from treadmill, back pain and vertebrae fracture  Medical evaluation  2/28/18 MRI lumbar spine:   T12 and L2 superior endplate compression fractures, new since 8/2016   Previous compression fracture along the superior endplate of L3 has mature    High-grade central canal stenosis persists at both L2-3 and L3-4   Previous combined interbody and instrumented bilateral posterolateral fusion from L4/S1.      Solid bony bridging across the operated levels, appeared unchanged  Patient reports she wore back brace x 3 mo    7/2/18 Medical evaluation with Dr. MOON Box/[facility]  Review of health history, osteoporotic vertebral compression fractures, lab testing    Previous DEXA scans include 5/17/12, 9/23/14,  7/9/18 DEXA:    Left Femoral Neck            T-score:  -1.8               Right Femoral Neck          T-score:  -1.6               Forearm (radius 33%)       T-score:  -0.4    Subsequent surgical evaluation with Dr. Christensen  9/11/18, Back surgery with ORIF and pin placement L1 to S1  9/2018. Plan to start alendronate, began alendronate 70 mg weekly dose (Sundays)     Previous [facility] labs include:     Lab Test 07/26/19  1348  07/02/18  1610   TETE 8.4*   < > 9.5   PTHI  --   --  45   VITDT  --   --  43    < > = values in this  interval not displayed.     Health history includes:  Bone fractures:  Left leg spiral fracture '14      Vertebral fracture s/p fall from horse '16, fall injury '18     Vitamin D deficiency:  None    Kidney stones:  None  Thyroid disease:  Graves' disease hyperthyroidism      Radioiodine ablation 2003, then postablative hypothyroidism      Treatment with levothyroxine    Fam Hx Osteoporosis: No  Osteoporosis med use: Alendronate  Recent [facility] labs include:  Lab Results   Component Value Date     07/26/2019    POTASSIUM 4.4 07/26/2019    CHLORIDE 104 07/26/2019    CO2 25 07/26/2019    ANIONGAP 8 07/26/2019    GLC 92 07/26/2019    BUN 15 07/26/2019    CR 0.72 07/26/2019    GFRESTIMATED 81 07/26/2019    GFRESTBLACK >90 07/26/2019    TETE 8.4 (L) 07/26/2019    TSH 0.64 09/18/2019    VITDT 43 07/02/2018    PTHI 45 07/02/2018     Recent symptoms:   Occasional lower back aches   Low back stiffness   Denies GI symptoms   Weight stable      Lives in [place]  Sees Dr. Evelia Castro for general medicine evaluations.    PMH/PSH:  Past Medical History:   Diagnosis Date     6th nerve palsy     OU     Cataract      Graves disease      Hypertension      Nystagmus      Osteopenia      Postablative hypothyroidism      Primary open angle glaucoma      Pseudophakia, right eye 4/29/2014     Spider veins      Thyroid eye disease      Vertebral fractures osteoporotic (H)      Past Surgical History:   Procedure Laterality Date     BACK SURGERY  2005    Dr Welsh     BACK SURGERY  09/11/2018     CATARACT IOL, RT/LT Right 04/15/2014     CATARACT IOL, RT/LT Left 04/29/2014     EYE SURGERY  1987    Shunt- A/C malformation     HERNIA REPAIR  1994    umbilical     LUMPECTOMY BREAST  1974    Left benign     SHOULDER SURGERY  1996    Left      WRIST SURGERY  1976    Ganglion Right Wrist       Family Hx:  Family History   Problem Relation Age of Onset     Colon Cancer Mother      Glaucoma Mother      Hypertension Mother      Thyroid Disease  Mother      Breast Cancer Mother      Cancer Father         leukemia     Diabetes Father      Glaucoma Father      Glasses (<7 y/o) Brother      Hypertension Sister      Thyroid Disease Sister      Glasses (<7 y/o) Sister      Hypertension Sister      Thyroid Disease Sister      Glasses (<7 y/o) Sister      Glasses (<7 y/o) Sister      Glasses (<7 y/o) Sister      Glasses (<7 y/o) Brother      Diabetes Son         virus attacked pancreas         Social Hx:  Social History     Socioeconomic History     Marital status:      Spouse name: Not on file     Number of children: Not on file     Years of education: Not on file     Highest education level: Not on file   Occupational History     Not on file   Social Needs     Financial resource strain: Not on file     Food insecurity:     Worry: Not on file     Inability: Not on file     Transportation needs:     Medical: Not on file     Non-medical: Not on file   Tobacco Use     Smoking status: Never Smoker     Smokeless tobacco: Never Used   Substance and Sexual Activity     Alcohol use: Yes     Alcohol/week: 0.0 standard drinks     Comment: wine with supper     Drug use: No     Sexual activity: Yes     Partners: Male   Lifestyle     Physical activity:     Days per week: Not on file     Minutes per session: Not on file     Stress: Not on file   Relationships     Social connections:     Talks on phone: Not on file     Gets together: Not on file     Attends Christian service: Not on file     Active member of club or organization: Not on file     Attends meetings of clubs or organizations: Not on file     Relationship status: Not on file     Intimate partner violence:     Fear of current or ex partner: Not on file     Emotionally abused: Not on file     Physically abused: Not on file     Forced sexual activity: Not on file   Other Topics Concern     Parent/sibling w/ CABG, MI or angioplasty before 65F 55M? No   Social History Narrative     Not on file         "  MEDICATIONS:  has a current medication list which includes the following prescription(s): alendronate, amlodipine, calcium carb-cholecalciferol, carboxymethylcellulose, vitamin d, latanoprost, levothyroxine, losartan, multiple vitamins-minerals, timolol maleate, vitamin k, warfarin anticoagulant, and fluticasone-salmeterol.    ROS:     ROS: 10 point ROS neg other than the symptoms noted above in the HPI.    GENERAL: some fatigue, wt stable; denies fevers, chills, night sweats.    HEENT: no dysphagia, odonophagia, diplopia, neck pain  THYROID:  no apparent hyper or hypothyroid symptoms  CV: no chest pain, pressure, palpitations  LUNGS: no SOB, ALLEN, cough, wheezing   ABDOMEN: no diarrhea, constipation, abdominal pain  EXTREMITIES: no rashes, ulcers, edema  NEUROLOGY: no headaches, denies changes in vision, tingling, extremitiy numbness   MSK: low back stiffness without significant pain; denies muscle weakness  SKIN: no rashes or lesions  : no menses, denies hot flashes  PSYCH:  stable mood, no significant anxiety or depression  ENDOCRINE: no heat or cold intolerance    Physical Exam   VS: /66   Pulse 89   Ht 1.689 m (5' 6.5\")   Wt 67.3 kg (148 lb 6.4 oz)   BMI 23.59 kg/m    GENERAL: AXOX3, NAD, eyeglasses, well dressed, answering questions appropriately, appears stated age.  THYROID:  normal gland, no apparent nodules or goiter  HEENT: neck non-tender, no exopthalmous, no proptosis, EOMI  BACK:  Non-tender to gentle palpation  CV: RRR, no rubs, gallops, no murmurs  LUNGS: CTAB, no wheezes, rales, or ronchi  ABDOMEN: soft, nontender, nondistended  EXTREMITIES: no edema, +pedal pulses, no lesions  NEUROLOGY: CN grossly intact, no tremors  MSK: grossly intact  SKIN: no rashes, no lesions    LABS:    All pertinent notes, labs, and images personally reviewed by me.     A/P:  Encounter Diagnoses   Name Primary?     Age-related osteoporosis with current pathological fracture with routine healing, subsequent " encounter Yes     Compression fracture of T12 vertebra with routine healing, subsequent encounter      Closed fracture of second lumbar vertebra with routine healing, unspecified fracture morphology, subsequent encounter      Closed fracture of third lumbar vertebra with routine healing, unspecified fracture morphology, subsequent encounter        Comments:  Reviewed health history and osteoporosis issues  While osteopenia-range BMD at hips, vertebral compression fractures define osteoporosis.    Plan:  Discussed general issues with the osteoporosis diagnosis and management  Reviewed concept of using the DEXA scan imaging to assess bone mineral density (BMD)  We discussed lab tests to assess for secondary causes of bone thinning  Reviewed treatment options with oral bisphosphonate, SERM, IV Boniva vs Reclast, SQ Prolia vs Forteo    Recommend:  I agree with the excellent osteoporosis workup of Dr. MOON Box  After reviewing medication treatment options, we decided to continue the current alendronate 70 mg weekly dose  No lab tests ordered today  Continue calcium and vitamin D supplement use  Avoid heavy lifting and fall injuries  Repeat spine Xray and/or MRI if new acute back pain  Repeat DEXA 7/2020  Continue levothyroxine daily dose plan    Rhonda to follow up with Primary Care provider regarding elevated blood pressure.  Addressed patient questions today    Labs ordered today: No orders of the defined types were placed in this encounter.    Radiology/Consults ordered today: None    More than 50% of the time spent with Ms. French on counseling / coordinating her care.  Total appointment time was 40 minutes.  Additional 35 minutes spent reviewing medical records and imaging reports    Follow-up:  1 yr or mahamed Hernandez MD, MS  Endocrinology  [facility]    CC: KELLIE Castro and DAVIE Box

## 2019-11-07 ENCOUNTER — ANTICOAGULATION THERAPY VISIT (OUTPATIENT)
Dept: NURSING | Facility: CLINIC | Age: 74
End: 2019-11-07
Payer: MEDICARE

## 2019-11-07 DIAGNOSIS — I82.A11 ACUTE DEEP VEIN THROMBOSIS (DVT) OF AXILLARY VEIN OF RIGHT UPPER EXTREMITY (H): ICD-10-CM

## 2019-11-07 LAB — INR POINT OF CARE: 2.4 (ref 0.86–1.14)

## 2019-11-07 PROCEDURE — 85610 PROTHROMBIN TIME: CPT | Mod: QW

## 2019-11-07 PROCEDURE — 36416 COLLJ CAPILLARY BLOOD SPEC: CPT

## 2019-11-07 PROCEDURE — 99207 ZZC NO CHARGE NURSE ONLY: CPT

## 2019-11-07 NOTE — PROGRESS NOTES
"ANTICOAGULATION FOLLOW-UP CLINIC VISIT    Patient Name:  Manasa French  Date:  2019  Contact Type:  Face to Face    SUBJECTIVE:  Patient Findings     Comments:   Shoulder has improved. Patient states \"better.\"         Clinical Outcomes     Comments:   Shoulder has improved. Patient states \"better.\"            OBJECTIVE    INR Protime   Date Value Ref Range Status   2019 2.4 (A) 0.86 - 1.14 Final       ASSESSMENT / PLAN  INR assessment THER    Recheck INR In: 4 WEEKS    INR Location Clinic      Anticoagulation Summary  As of 2019    INR goal:   2.0-3.0   TTR:   57.9 % (4.8 mo)   INR used for dosin.4 (2019)   Warfarin maintenance plan:   7.5 mg (5 mg x 1.5) every Mon, Wed, Fri; 5 mg (5 mg x 1) all other days   Full warfarin instructions:   7.5 mg every Mon, Wed, Fri; 5 mg all other days   Weekly warfarin total:   42.5 mg   No change documented:   Kaila Haque RN   Plan last modified:   Kaila Haque RN (2019)   Next INR check:   2019   Target end date:       Indications    Acute deep vein thrombosis (DVT) of axillary vein and internal jugular (H) [I82.A19]             Anticoagulation Episode Summary     INR check location:       Preferred lab:       Send INR reminders to:   KAN HILL    Comments:   40-50 min walk/lifting. 3 swimming. Greens 2-4/week.  started 100 mcg of vit k.      Anticoagulation Care Providers     Provider Role Specialty Phone number    Christopher Smith MD [facility] Internal Medicine [phone]            See the Encounter Report to view Anticoagulation Flowsheet and Dosing Calendar (Go to Encounters tab in chart review, and find the Anticoagulation Therapy Visit)    Patient aware if signs of clotting (pain, tenderness, swelling, color change in leg or arm, SOB) and bleeding occur (blood in stool, urine, large bruising, bleeding gums, nosebleeds) to have INR check sooner. If sx severe report to ER or concerned for stroke call 911. " If general questions or concerns arise, call clinic.         Kaila Haque RN

## 2019-12-05 ENCOUNTER — ANTICOAGULATION THERAPY VISIT (OUTPATIENT)
Dept: NURSING | Facility: CLINIC | Age: 74
End: 2019-12-05
Payer: MEDICARE

## 2019-12-05 DIAGNOSIS — I82.A11 ACUTE DEEP VEIN THROMBOSIS (DVT) OF AXILLARY VEIN OF RIGHT UPPER EXTREMITY (H): ICD-10-CM

## 2019-12-05 LAB — INR POINT OF CARE: 3.5 (ref 0.86–1.14)

## 2019-12-05 PROCEDURE — 85610 PROTHROMBIN TIME: CPT | Mod: QW

## 2019-12-05 PROCEDURE — 36416 COLLJ CAPILLARY BLOOD SPEC: CPT

## 2019-12-05 PROCEDURE — 99207 ZZC NO CHARGE NURSE ONLY: CPT

## 2019-12-05 NOTE — PROGRESS NOTES
ANTICOAGULATION FOLLOW-UP CLINIC VISIT    Patient Name:  Manasa French  Date:  12/5/2019  Contact Type:  Face to Face    SUBJECTIVE:  Patient Findings     Positives:   Other complaints (Pt having shoulder pain/inflammation bilaterally-in physical therapy)             OBJECTIVE    INR Protime   Date Value Ref Range Status   12/05/2019 3.5 (A) 0.86 - 1.14 Final       ASSESSMENT / PLAN  INR assessment SUPRA    Recheck INR In: 2 WEEKS    INR Location Clinic      Anticoagulation Summary  As of 12/5/2019    INR goal:   2.0-3.0   TTR:   57.4 % (5.7 mo)   INR used for dosing:   3.5! (12/5/2019)   Warfarin maintenance plan:   7.5 mg (5 mg x 1.5) every Mon, Fri; 5 mg (5 mg x 1) all other days   Full warfarin instructions:   12/5: 2.5 mg; Otherwise 7.5 mg every Mon, Fri; 5 mg all other days   Weekly warfarin total:   40 mg   Plan last modified:   Kaila Haque RN (12/5/2019)   Next INR check:   12/19/2019   Target end date:       Indications    Acute deep vein thrombosis (DVT) of axillary vein and internal jugular (H) [I82.A19]             Anticoagulation Episode Summary     INR check location:       Preferred lab:       Send INR reminders to:   KAN HILL    Comments:   40-50 min walk/lifting. 3 swimming. Greens 2-4/week. 9/16 started 100 mcg of vit k.      Anticoagulation Care Providers     Provider Role Specialty Phone number    Christopher Smith MD [facility] Internal Medicine [phone]            See the Encounter Report to view Anticoagulation Flowsheet and Dosing Calendar (Go to Encounters tab in chart review, and find the Anticoagulation Therapy Visit)    Patient to reduce today's dose to 2.5 mg in response to the supra-therapeutic INR. Patient to then trial new maintenance of 7.5 mg on M/F and 5 mg all other days (5.9% reduction) due to patient INRs being more on the high end and having room to be brought down a bit. Will recheck in two weeks to monitor effect.    Patient aware if signs of  clotting (pain, tenderness, swelling, color change in leg or arm, SOB) and bleeding occur (blood in stool, urine, large bruising, bleeding gums, nosebleeds) to have INR check sooner. If sx severe report to ER or concerned for stroke call 911. If general questions or concerns arise, call clinic.         Kaila Haque RN

## 2019-12-19 ENCOUNTER — ANTICOAGULATION THERAPY VISIT (OUTPATIENT)
Dept: NURSING | Facility: CLINIC | Age: 74
End: 2019-12-19
Payer: MEDICARE

## 2019-12-19 DIAGNOSIS — I82.A19: ICD-10-CM

## 2019-12-19 LAB — INR POINT OF CARE: 1.8 (ref 0.86–1.14)

## 2019-12-19 PROCEDURE — 85610 PROTHROMBIN TIME: CPT | Mod: QW

## 2019-12-19 PROCEDURE — 36416 COLLJ CAPILLARY BLOOD SPEC: CPT

## 2019-12-19 PROCEDURE — 99207 ZZC NO CHARGE NURSE ONLY: CPT

## 2019-12-19 NOTE — PROGRESS NOTES
ANTICOAGULATION FOLLOW-UP CLINIC VISIT    Patient Name:  Manasa French  Date:  2019  Contact Type:  Face to Face    SUBJECTIVE:  Patient Findings     Comments:   Pt was assessed for s/s of clotting with no concerning symptoms. Pt states that she ate more greens than she usually does yesterday.        Clinical Outcomes     Comments:   Pt was assessed for s/s of clotting with no concerning symptoms. Pt states that she ate more greens than she usually does yesterday.           OBJECTIVE    INR Protime   Date Value Ref Range Status   2019 1.8 (A) 0.86 - 1.14 Final       ASSESSMENT / PLAN  INR assessment SUB    Recheck INR In: 3 WEEKS    INR Location Clinic      Anticoagulation Summary  As of 2019    INR goal:   2.0-3.0   TTR:   57.5 % (6.2 mo)   INR used for dosin.8! (2019)   Warfarin maintenance plan:   7.5 mg (5 mg x 1.5) every Mon, Wed, Fri; 5 mg (5 mg x 1) all other days   Full warfarin instructions:   : 7.5 mg; Otherwise 7.5 mg every Mon, Wed, Fri; 5 mg all other days   Weekly warfarin total:   42.5 mg   Plan last modified:   Noemy Dailey RN (2019)   Next INR check:   2020   Target end date:       Indications    Acute deep vein thrombosis (DVT) of axillary vein and internal jugular (H) [I82.A19]             Anticoagulation Episode Summary     INR check location:       Preferred lab:       Send INR reminders to:   KAN HILL    Comments:   40-50 min walk/lifting. 3 swimming. Greens 2-4/week.  started 100 mcg of vit k.      Anticoagulation Care Providers     Provider Role Specialty Phone number    Christopher Smith MD [facility] Internal Medicine [phone]            See the Encounter Report to view Anticoagulation Flowsheet and Dosing Calendar (Go to Encounters tab in chart review, and find the Anticoagulation Therapy Visit)    Pt to take 7.5 mg today (increased from 5 mg dose) to account for sub therapeutic INR, then return to maintenance. Next  re-check 01/09/20.    Noemy Dailey RN

## 2019-12-30 ENCOUNTER — NURSE TRIAGE (OUTPATIENT)
Dept: FAMILY MEDICINE | Facility: CLINIC | Age: 74
End: 2019-12-30

## 2019-12-30 ENCOUNTER — OFFICE VISIT (OUTPATIENT)
Dept: FAMILY MEDICINE | Facility: CLINIC | Age: 74
End: 2019-12-30
Payer: MEDICARE

## 2019-12-30 VITALS
HEART RATE: 95 BPM | RESPIRATION RATE: 14 BRPM | DIASTOLIC BLOOD PRESSURE: 68 MMHG | BODY MASS INDEX: 24.17 KG/M2 | TEMPERATURE: 97.7 F | SYSTOLIC BLOOD PRESSURE: 140 MMHG | OXYGEN SATURATION: 97 % | WEIGHT: 152 LBS

## 2019-12-30 DIAGNOSIS — R30.0 DYSURIA: Primary | ICD-10-CM

## 2019-12-30 LAB
ALBUMIN UR-MCNC: ABNORMAL MG/DL
APPEARANCE UR: CLEAR
BACTERIA #/AREA URNS HPF: ABNORMAL /HPF
BILIRUB UR QL STRIP: NEGATIVE
COLOR UR AUTO: YELLOW
GLUCOSE UR STRIP-MCNC: NEGATIVE MG/DL
HGB UR QL STRIP: NEGATIVE
KETONES UR STRIP-MCNC: ABNORMAL MG/DL
LEUKOCYTE ESTERASE UR QL STRIP: ABNORMAL
NITRATE UR QL: NEGATIVE
NON-SQ EPI CELLS #/AREA URNS LPF: ABNORMAL /LPF
PH UR STRIP: 5.5 PH (ref 5–7)
RBC #/AREA URNS AUTO: ABNORMAL /HPF
SOURCE: ABNORMAL
SP GR UR STRIP: 1.02 (ref 1–1.03)
UROBILINOGEN UR STRIP-ACNC: 0.2 EU/DL (ref 0.2–1)
WBC #/AREA URNS AUTO: ABNORMAL /HPF

## 2019-12-30 PROCEDURE — 81001 URINALYSIS AUTO W/SCOPE: CPT | Performed by: FAMILY MEDICINE

## 2019-12-30 PROCEDURE — 99213 OFFICE O/P EST LOW 20 MIN: CPT | Performed by: FAMILY MEDICINE

## 2019-12-30 PROCEDURE — 87086 URINE CULTURE/COLONY COUNT: CPT | Performed by: FAMILY MEDICINE

## 2019-12-30 RX ORDER — CIPROFLOXACIN 500 MG/1
500 TABLET, FILM COATED ORAL 2 TIMES DAILY
Qty: 10 TABLET | Refills: 0 | Status: SHIPPED | OUTPATIENT
Start: 2019-12-30 | End: 2020-01-29

## 2019-12-30 NOTE — TELEPHONE ENCOUNTER
"OV scheduled today with provider.   Double book UTI symptoms.   Did not want to complete an evisit.       Additional Information    Negative: Shock suspected (e.g., cold/pale/clammy skin, too weak to stand, low BP, rapid pulse)    Negative: Sounds like a life-threatening emergency to the triager    Negative: Unable to urinate (or only a few drops) and bladder feels very full    Negative: Vomiting    Negative: Patient sounds very sick or weak to the triager    Negative: Severe pain with urination    Negative: Fever > 100.5 F (38.1 C)    Negative: Side (flank) or lower back pain present    Negative: Taking antibiotic > 24 hours for UTI and fever persists    Negative: Taking antibiotic > 3 days for UTI and painful urination not improved    Negative: Unusual vaginal discharge    Negative: > 2 UTIs in last year    Negative: Patient is worried about sexually transmitted disease (STD)    Age > 50 years    Answer Assessment - Initial Assessment Questions  1. SEVERITY: \"How bad is the pain?\"  (e.g., Scale 1-10; mild, moderate, or severe)    - MILD (1-3): complains slightly about urination hurting    - MODERATE (4-7): interferes with normal activities      - SEVERE (8-10): excruciating, unwilling or unable to urinate because of the pain      mild  2. FREQUENCY: \"How many times have you had painful urination today?\"       Evening, and overnight, early in the AM.   3. PATTERN: \"Is pain present every time you urinate or just sometimes?\"       Overnight, 5 times throughout the night, pain with urination  4. ONSET: \"When did the painful urination start?\"       1 week   5. FEVER: \"Do you have a fever?\" If so, ask: \"What is your temperature, how was it measured, and when did it start?\"      none  6. PAST UTI: \"Have you had a urine infection before?\" If so, ask: \"When was the last time?\" and \"What happened that time?\"       Previous UTI's   7. CAUSE: \"What do you think is causing the painful urination?\"  (e.g., UTI, scratch, Herpes " "sore)      UTI  8. OTHER SYMPTOMS: \"Do you have any other symptoms?\" (e.g., flank pain, vaginal discharge, genital sores, urgency, blood in urine)      Pain with urination, frequency, urgency.    Protocols used: URINATION PAIN - FEMALE-A-OH      "

## 2019-12-30 NOTE — PROGRESS NOTES
Subjective     Manasa French is a 74 year old female who presents to clinic today for the following health issues:    HPI   URINARY TRACT SYMPTOMS  Onset: 10 days    Description:   Painful urination (Dysuria): YES           Frequency: YES- especially at night  Blood in urine (Hematuria): no   Delay in urine (Hesitency): no     Intensity: moderate    Progression of Symptoms:  worsening and same    Accompanying Signs & Symptoms:  Fever/chills: no   Flank pain no   Nausea and vomiting: no   Any vaginal symptoms: none  Abdominal/Pelvic Pain: no     History:   History of frequent UTI's: no   History of kidney stones: no   Sexually Active: no   Possibility of pregnancy: No    Precipitating factors:     Symptoms would worsen at night, but improve in the morning and thru the daytime  Last night she had to get up 5 times to urinate      Therapies Tried and outcome: nothing      BP Readings from Last 3 Encounters:   12/30/19 (!) 140/68   10/30/19 138/66   09/05/19 (!) 144/68    Wt Readings from Last 3 Encounters:   12/30/19 68.9 kg (152 lb)   10/30/19 67.3 kg (148 lb 6.4 oz)   09/05/19 68 kg (150 lb)                      Reviewed and updated as needed this visit by Provider         Review of Systems   ROS COMP: CONSTITUTIONAL: NEGATIVE for fever, chills, change in weight  ENT/MOUTH: NEGATIVE for ear, mouth and throat problems  RESP: NEGATIVE for significant cough or SOB  CV: NEGATIVE for chest pain, palpitations or peripheral edema  : dysuria, frequency 1-2 hrs and nocturia x 4      Objective    BP (!) 140/68   Pulse 95   Temp 97.7  F (36.5  C) (Tympanic)   Resp 14   Wt 68.9 kg (152 lb)   SpO2 97%   BMI 24.17 kg/m    Body mass index is 24.17 kg/m .  Physical Exam   GENERAL: healthy, alert and no distress  NECK: no adenopathy, no asymmetry, masses, or scars and thyroid normal to palpation  RESP: lungs clear to auscultation - no rales, rhonchi or wheezes  CV: regular rate and rhythm, normal S1 S2, no S3 or S4, no  murmur, click or rub, no peripheral edema and peripheral pulses strong  ABDOMEN: soft, nontender, no hepatosplenomegaly, no masses and bowel sounds normal   (female): deferred  MS: no CVA tenderness    Diagnostic Test Results:  Labs reviewed in Epic  Urinalysis - moderate pyuria        Assessment & Plan     Manasa was seen today for uti.    Diagnoses and all orders for this visit:    Dysuria  -     UA with Microscopic reflex to Culture  -     ciprofloxacin (CIPRO) 500 MG tablet; Take 1 tablet (500 mg) by mouth 2 times daily  -     Urine Culture Aerobic Bacterial           FUTURE APPOINTMENTS:       - Follow-up visit in 2 weeks if not resolved  Patient Instructions   Push fluids especially cranberry juice      Return in about 2 weeks (around 1/13/2020), or if symptoms worsen or fail to improve, for urinary infection.    Bimal Nichols MD  [facility]

## 2020-01-02 LAB
BACTERIA SPEC CULT: NORMAL
SPECIMEN SOURCE: NORMAL

## 2020-01-03 ENCOUNTER — TELEPHONE (OUTPATIENT)
Dept: FAMILY MEDICINE | Facility: CLINIC | Age: 75
End: 2020-01-03

## 2020-01-03 NOTE — TELEPHONE ENCOUNTER
Call from patient, requesting lab results.     Provider note was shared with patient.     States she will continue with her ABX at this time.     Symptoms have improved, but still present.     Sometimes she feels like she needs to go to the bathroom, but very little comes out.    Will continue to monitor over the weekend and if symptoms still present next week will update the clinic at that time.

## 2020-01-09 ENCOUNTER — ANTICOAGULATION THERAPY VISIT (OUTPATIENT)
Dept: NURSING | Facility: CLINIC | Age: 75
End: 2020-01-09
Payer: MEDICARE

## 2020-01-09 DIAGNOSIS — I82.A19: ICD-10-CM

## 2020-01-09 LAB — INR POINT OF CARE: 2.1 (ref 0.86–1.14)

## 2020-01-09 PROCEDURE — 85610 PROTHROMBIN TIME: CPT | Mod: QW

## 2020-01-09 PROCEDURE — 99207 ZZC NO CHARGE NURSE ONLY: CPT

## 2020-01-09 PROCEDURE — 36416 COLLJ CAPILLARY BLOOD SPEC: CPT

## 2020-01-09 NOTE — PROGRESS NOTES
ANTICOAGULATION FOLLOW-UP CLINIC VISIT    Patient Name:  Manasa French  Date:  2020  Contact Type:  Face to Face    SUBJECTIVE:  Patient Findings     Comments:   The patient was assessed for diet, medication, and activity level changes, missed or extra doses, bruising or bleeding, with no problem findings. Pt plans to keep dark green intake to 3 x week (minimal change for patient).            OBJECTIVE    INR Protime   Date Value Ref Range Status   2020 2.1 (A) 0.86 - 1.14 Final       ASSESSMENT / PLAN  INR assessment THER    Recheck INR In: 3 WEEKS    INR Location Clinic      Anticoagulation Summary  As of 2020    INR goal:   2.0-3.0   TTR:   55.0 % (6.9 mo)   INR used for dosin.1 (2020)   Warfarin maintenance plan:   7.5 mg (5 mg x 1.5) every Mon, Wed, Fri; 5 mg (5 mg x 1) all other days   Full warfarin instructions:   7.5 mg every Mon, Wed, Fri; 5 mg all other days   Weekly warfarin total:   42.5 mg   No change documented:   Brandi Arriola RN   Plan last modified:   Noemy Dailey RN (2019)   Next INR check:   2020   Target end date:       Indications    Acute deep vein thrombosis (DVT) of axillary vein and internal jugular (H) [I82.A19]             Anticoagulation Episode Summary     INR check location:       Preferred lab:       Send INR reminders to:   KAN HILL    Comments:   40-50 min walk/lifting. 3 swimming. Greens 2-4/week.  started 100 mcg of vit k.      Anticoagulation Care Providers     Provider Role Specialty Phone number    Christopher Smith MD [facility] Internal Medicine [phone]            See the Encounter Report to view Anticoagulation Flowsheet and Dosing Calendar (Go to Encounters tab in chart review, and find the Anticoagulation Therapy Visit)    Patient made aware if signs of clotting (pain, tenderness, swelling, or color change in any extremity) AND/OR bleeding occur (nosebleeds, bleeding gums, bruising, or blood in stool or  urine) to notify provider & seek medical attention. If severe symptoms develop, such as major bleeding, chest pain, shortness of breath, fall, trauma or s/s of stroke, patient to call 911 immediately.     Dosage adjustment made based on physician directed care plan.    Brandi Arriola RN

## 2020-01-29 ENCOUNTER — OFFICE VISIT (OUTPATIENT)
Dept: FAMILY MEDICINE | Facility: CLINIC | Age: 75
End: 2020-01-29
Payer: MEDICARE

## 2020-01-29 VITALS
WEIGHT: 154 LBS | BODY MASS INDEX: 24.48 KG/M2 | TEMPERATURE: 97.9 F | OXYGEN SATURATION: 98 % | DIASTOLIC BLOOD PRESSURE: 68 MMHG | SYSTOLIC BLOOD PRESSURE: 136 MMHG | HEART RATE: 87 BPM

## 2020-01-29 DIAGNOSIS — E89.0 HYPOTHYROIDISM, POSTABLATIVE: Primary | ICD-10-CM

## 2020-01-29 DIAGNOSIS — M80.00XD AGE-RELATED OSTEOPOROSIS WITH CURRENT PATHOLOGICAL FRACTURE WITH ROUTINE HEALING: ICD-10-CM

## 2020-01-29 DIAGNOSIS — Z98.2 S/P VENTRICULAR SHUNT PLACEMENT: ICD-10-CM

## 2020-01-29 DIAGNOSIS — J45.30 MILD PERSISTENT ASTHMA WITHOUT COMPLICATION: ICD-10-CM

## 2020-01-29 DIAGNOSIS — I10 HTN, GOAL BELOW 140/90: ICD-10-CM

## 2020-01-29 DIAGNOSIS — I82.A19 ACUTE DEEP VEIN THROMBOSIS (DVT) OF AXILLARY VEIN, UNSPECIFIED LATERALITY (H): ICD-10-CM

## 2020-01-29 PROCEDURE — 99214 OFFICE O/P EST MOD 30 MIN: CPT | Performed by: FAMILY MEDICINE

## 2020-01-29 NOTE — PROGRESS NOTES
Subjective     Manasa French is a 74 year old female who presents to clinic today for the following health issues:    HPI   Hypothyroidism Follow-up      Since last visit, patient describes the following symptoms: Weight stable, no hair loss, no skin changes, no constipation, no loose stools      How many servings of fruits and vegetables do you eat daily?  2-3    On average, how many sweetened beverages do you drink each day (Examples: soda, juice, sweet tea, etc.  Do NOT count diet or artificially sweetened beverages)?   0    How many days per week do you exercise enough to make your heart beat faster? 7    How many minutes a day do you exercise enough to make your heart beat faster? 30 - 60    How many days per week do you miss taking your medication? 0      Would like to transfer care to new PCP.  Previous PCP will be leaving the clinic.  Has Osteopenia and low thyroid.  On Fosamax and sees Endocrinology for the Osteopenia.  PCP managing her thyroid.  Had adjusted her thyroid supplement in the past year, so has been monitoring thyroid labs a bit more often.  It has been stable though, so she thinks she might not need to be testing it so often anymore.  Gets INR checked and managed at INR clinic.  On warfarin for hx DVT.      Reviewed and updated as needed this visit by Provider  Tobacco  Allergies  Meds  Problems  Med Hx  Surg Hx  Fam Hx         Review of Systems   ROS COMP: CONSTITUTIONAL: NEGATIVE for fever, chills, change in weight  INTEGUMENTARY/SKIN: NEGATIVE for worrisome rashes, moles or lesions  EYES: NEGATIVE for vision changes or irritation  ENT/MOUTH: NEGATIVE for ear, mouth and throat problems  RESP: NEGATIVE for significant cough or SOB  CV: NEGATIVE for chest pain, palpitations or peripheral edema  GI: NEGATIVE for nausea, abdominal pain, heartburn, or change in bowel habits  : NEGATIVE for frequency, dysuria, or hematuria  MUSCULOSKELETAL: NEGATIVE for significant arthralgias or  myalgia  NEURO: NEGATIVE for weakness, dizziness or paresthesias  HEME: NEGATIVE for bleeding problems  PSYCHIATRIC: NEGATIVE for changes in mood or affect      Objective    /68 (Cuff Size: Adult Regular)   Pulse 87   Temp 97.9  F (36.6  C) (Tympanic)   Wt 69.9 kg (154 lb)   SpO2 98%   BMI 24.48 kg/m    Body mass index is 24.48 kg/m .  Physical Exam   GENERAL: healthy, alert and no distress  EYES: Eyes grossly normal to inspection, PERRL and conjunctivae and sclerae normal  HENT: ear canals and TM's normal, nose and mouth without ulcers or lesions  NECK: no adenopathy, no asymmetry, masses, or scars and thyroid normal to palpation  RESP: lungs clear to auscultation - no rales, rhonchi or wheezes  CV: regular rate and rhythm, normal S1 S2, no S3 or S4, no murmur, click or rub, no peripheral edema and peripheral pulses strong  ABDOMEN: soft, nontender, no hepatosplenomegaly, no masses and bowel sounds normal  MS: no gross musculoskeletal defects noted, no edema  SKIN: no suspicious lesions or rashes  NEURO: Normal strength and tone, mentation intact and speech normal  PSYCH: mentation appears normal, affect normal/bright    Diagnostic Test Results:  Labs reviewed in Epic        Assessment & Plan   Problem List Items Addressed This Visit     Hypothyroidism, postablative - Primary    Relevant Orders    TSH (Completed)    T4 free (Completed)    HTN, goal below 140/90    Age-related osteoporosis with current pathological fracture with routine healing    S/P ventricular shunt placement 1987    Mild persistent asthma without complication    Acute deep vein thrombosis (DVT) of axillary vein and internal jugular (H)         Hypothyroidism:  Re-check TSH/T4 today  If normal, will have her re-check lab in 6 months, then yearly if it continues to be stable.    BP: well controlled  Continue Losartan and Amlodipine    Osteopenia: stable  Continue Fosamax, Endocrinology managing this currently.  Next Dexa due July  2020    If easier, may get INR checked at our clinic.  Will discuss this with her current INR clinic tomorrow.    See Patient Instructions  Return in about 6 months (around 7/29/2020) for re-check / follow-up.    Rema Braxton, DO  [facility]

## 2020-01-30 ENCOUNTER — ANTICOAGULATION THERAPY VISIT (OUTPATIENT)
Dept: NURSING | Facility: CLINIC | Age: 75
End: 2020-01-30
Payer: MEDICARE

## 2020-01-30 DIAGNOSIS — I82.A19 ACUTE DEEP VEIN THROMBOSIS (DVT) OF AXILLARY VEIN, UNSPECIFIED LATERALITY (H): ICD-10-CM

## 2020-01-30 LAB — INR POINT OF CARE: 2.2 (ref 0.86–1.14)

## 2020-01-30 PROCEDURE — 85610 PROTHROMBIN TIME: CPT | Mod: QW

## 2020-01-30 PROCEDURE — 99207 ZZC NO CHARGE NURSE ONLY: CPT

## 2020-01-30 PROCEDURE — 36416 COLLJ CAPILLARY BLOOD SPEC: CPT

## 2020-01-30 NOTE — PROGRESS NOTES
ANTICOAGULATION FOLLOW-UP CLINIC VISIT    Patient Name:  Manasa French  Date:  2020  Contact Type:  Face to Face    SUBJECTIVE:  Patient Findings     Comments:   The patient was assessed for diet, medication, and activity level changes, missed or extra doses, bruising or bleeding, with no problem findings. Thyroid level checked yesterday, patient understands to call ACC team if Synthroid dose changes. Discussed transferring ACC to PCP ov, patient declined at this time and will remain with [facility] team.           OBJECTIVE    INR Protime   Date Value Ref Range Status   2020 2.2 (A) 0.86 - 1.14 Final       ASSESSMENT / PLAN  INR assessment THER    Recheck INR In: 3 WEEKS    INR Location Clinic      Anticoagulation Summary  As of 2020    INR goal:   2.0-3.0   TTR:   59.2 % (7.6 mo)   INR used for dosin.2 (2020)   Warfarin maintenance plan:   7.5 mg (5 mg x 1.5) every Mon, Wed, Fri; 5 mg (5 mg x 1) all other days   Full warfarin instructions:   7.5 mg every Mon, Wed, Fri; 5 mg all other days   Weekly warfarin total:   42.5 mg   No change documented:   Brandi Arriola RN   Plan last modified:   Noemy Dailey RN (2019)   Next INR check:   2020   Target end date:       Indications    Acute deep vein thrombosis (DVT) of axillary vein and internal jugular (H) [I82.A19]             Anticoagulation Episode Summary     INR check location:       Preferred lab:       Send INR reminders to:   [facility]    Comments:   40-50 min walk/lifting. 3 swimming. Greens 2-4/week.  started 100 mcg of vit k.      Anticoagulation Care Providers     Provider Role Specialty Phone number    Christopher Smith MD [facility] Internal Medicine [phone]            See the Encounter Report to view Anticoagulation Flowsheet and Dosing Calendar (Go to Encounters tab in chart review, and find the Anticoagulation Therapy Visit)    Pt made aware if signs of clotting (pain, tenderness, swelling,  or color change in any extremity) AND/OR bleeding occur (nosebleeds, bleeding gums, bruising, or blood in stool or urine) to notify provider & seek medical attention. If severe symptoms develop, such as major bleeding, chest pain, shortness of breath, fall, trauma or s/s of stroke, patient to call 911 immediately.     Dosage adjustment made based on physician directed care plan.    Brandi Arriola RN

## 2020-02-03 DIAGNOSIS — I10 BENIGN HYPERTENSION: ICD-10-CM

## 2020-02-03 RX ORDER — LOSARTAN POTASSIUM 100 MG/1
TABLET ORAL
Qty: 90 TABLET | Refills: 3 | Status: SHIPPED | OUTPATIENT
Start: 2020-02-03 | End: 2020-11-19

## 2020-02-03 NOTE — TELEPHONE ENCOUNTER
"Requested Prescriptions   Pending Prescriptions Disp Refills     losartan (COZAAR) 100 MG tablet [Pharmacy Med Name: LOSARTAN POTASSIUM 100 MG Tablet]  Last Written Prescription Date:  517/2019  Last Fill Quantity: 90 tablet,  # refills: 2   Last office visit: 1/29/2020 with prescribing provider:  Fox   Future Office Visit:     90 tablet 2     Sig: TAKE 1 TABLET EVERY DAY       Angiotensin-II Receptors Passed - 2/3/2020  1:23 PM        Passed - Last blood pressure under 140/90 in past 12 months     BP Readings from Last 3 Encounters:   01/29/20 136/68   12/30/19 (!) 140/68   10/30/19 138/66                 Passed - Recent (12 mo) or future (30 days) visit within the authorizing provider's specialty     Patient has had an office visit with the authorizing provider or a provider within the authorizing providers department within the previous 12 mos or has a future within next 30 days. See \"Patient Info\" tab in inbasket, or \"Choose Columns\" in Meds & Orders section of the refill encounter.              Passed - Medication is active on med list        Passed - Patient is age 18 or older        Passed - No active pregnancy on record        Passed - Normal serum creatinine on file in past 12 months     Recent Labs   Lab Test 07/26/19  1348   CR 0.72             Passed - Normal serum potassium on file in past 12 months     Recent Labs   Lab Test 07/26/19  1348   POTASSIUM 4.4                    Passed - No positive pregnancy test in past 12 months           "

## 2020-02-03 NOTE — TELEPHONE ENCOUNTER
"losartan (COZAAR) 100 MG tablet  Last Written Prescription Date: 5/17/19  Last Fill Quantity: 90,  # refills: 2   Last office visit: 1/29/2020 with prescribing provider: Dr. Braxton   Future Office Visit:      Requested Prescriptions   Pending Prescriptions Disp Refills     losartan (COZAAR) 100 MG tablet [Pharmacy Med Name: LOSARTAN POTASSIUM 100 MG Tablet] 90 tablet 2     Sig: TAKE 1 TABLET EVERY DAY       Angiotensin-II Receptors Passed - 2/3/2020  1:34 PM        Passed - Last blood pressure under 140/90 in past 12 months     BP Readings from Last 3 Encounters:   01/29/20 136/68   12/30/19 (!) 140/68   10/30/19 138/66                 Passed - Recent (12 mo) or future (30 days) visit within the authorizing provider's specialty     Patient has had an office visit with the authorizing provider or a provider within the authorizing providers department within the previous 12 mos or has a future within next 30 days. See \"Patient Info\" tab in inbasket, or \"Choose Columns\" in Meds & Orders section of the refill encounter.              Passed - Medication is active on med list        Passed - Patient is age 18 or older        Passed - No active pregnancy on record        Passed - Normal serum creatinine on file in past 12 months     Recent Labs   Lab Test 07/26/19  1348   CR 0.72             Passed - Normal serum potassium on file in past 12 months     Recent Labs   Lab Test 07/26/19  1348   POTASSIUM 4.4                    Passed - No positive pregnancy test in past 12 months        Prescription approved per [facility] Refill Protocol.      "

## 2020-02-07 DIAGNOSIS — E89.0 HYPOTHYROIDISM, POSTABLATIVE: ICD-10-CM

## 2020-02-07 PROCEDURE — 36415 COLL VENOUS BLD VENIPUNCTURE: CPT | Performed by: FAMILY MEDICINE

## 2020-02-07 PROCEDURE — 84443 ASSAY THYROID STIM HORMONE: CPT | Performed by: FAMILY MEDICINE

## 2020-02-07 PROCEDURE — 84439 ASSAY OF FREE THYROXINE: CPT | Performed by: FAMILY MEDICINE

## 2020-02-08 DIAGNOSIS — E89.0 HYPOTHYROIDISM, POSTABLATIVE: ICD-10-CM

## 2020-02-08 LAB
T4 FREE SERPL-MCNC: 1.65 NG/DL (ref 0.76–1.46)
TSH SERPL DL<=0.005 MIU/L-ACNC: 0.12 MU/L (ref 0.4–4)

## 2020-02-09 NOTE — TELEPHONE ENCOUNTER
Please help inform pt about recent lab result(s) (letter sent as well):    Your thyroid labs show that you have too much thyroid hormone.  We will need to decrease your Levothyroxine from 150 to 137 mcg, then re-check your thyroid hormones in 6-8 weeks.      I've pended the new dose. pls send once pt informed.  Thanks!  --Dr. Braxton

## 2020-02-10 RX ORDER — LEVOTHYROXINE SODIUM 137 UG/1
137 TABLET ORAL DAILY
Qty: 90 TABLET | Refills: 0 | Status: SHIPPED | OUTPATIENT
Start: 2020-02-10 | End: 2020-04-30

## 2020-02-14 ENCOUNTER — OFFICE VISIT (OUTPATIENT)
Dept: OPHTHALMOLOGY | Facility: CLINIC | Age: 75
End: 2020-02-14
Attending: OPHTHALMOLOGY
Payer: MEDICARE

## 2020-02-14 DIAGNOSIS — H40.1132 PRIMARY OPEN ANGLE GLAUCOMA OF BOTH EYES, MODERATE STAGE: Primary | ICD-10-CM

## 2020-02-14 PROCEDURE — 92133 CPTRZD OPH DX IMG PST SGM ON: CPT | Mod: ZF | Performed by: OPHTHALMOLOGY

## 2020-02-14 PROCEDURE — G0463 HOSPITAL OUTPT CLINIC VISIT: HCPCS | Mod: ZF

## 2020-02-14 ASSESSMENT — SLIT LAMP EXAM - LIDS
COMMENTS: NORMAL
COMMENTS: NORMAL

## 2020-02-14 ASSESSMENT — VISUAL ACUITY
METHOD: SNELLEN - LINEAR
OS_CC: 20/40
OD_CC: 20/30
OS_PH_CC: 20/30
OD_CC+: -1
OS_CC+: +2

## 2020-02-14 ASSESSMENT — CONF VISUAL FIELD
METHOD: COUNTING FINGERS
OS_SUPERIOR_NASAL_RESTRICTION: 3

## 2020-02-14 ASSESSMENT — TONOMETRY
OS_IOP_MMHG: 15
IOP_METHOD: APPLANATION
OD_IOP_MMHG: 18

## 2020-02-14 ASSESSMENT — CUP TO DISC RATIO
OS_RATIO: 0.5
OD_RATIO: 0.7

## 2020-02-14 NOTE — NURSING NOTE
Chief Complaints and History of Present Illnesses   Patient presents with     Glaucoma Follow-Up     Chief Complaint(s) and History of Present Illness(es)     Glaucoma Follow-Up     Laterality: both eyes    Associated symptoms: dryness.  Negative for floaters, flashes and eye pain    Treatment side effects: none    Compliance with Treatment: always    Pain scale: 0/10              Comments     Glaucoma with ocular hypertension, bilateral - Both Eyes.  She feels vision is about the same as last visit.     Eleazar Johnson COT 8:37 AM February 14, 2020

## 2020-02-14 NOTE — PROGRESS NOTES
HPI  History of thyroid eye disease and primary open angle glaucoma.  Selected laser trabeculoplasty (SLT) OD 2/5/18    Interval Update:  Few floaters    Current Medications:   Timolol every morning both eyes   Latanoprost HS both eyes     Intolerant to cosopt and alphagan    Ocular history:  Selected laser trabeculoplasty (SLT) right eye 2/2018 ( intraocular pressure 26 before selective laser trabeculoplasty)       Testing:   Octopus visual field 8/2/19: with scatter both eyes, stable    OCT retinal nerve fiber layer 2/11/19:    temporal thinning and interval worsening of superior thinning right eye but had (SLT) right eye 2/2018 after previous OCT   supranasal thinning left eye which is stable     Assessment   1. Moderate stage Primary open angle glaucoma (POAG) both eyes   OCT showed slight downward slope right eye in conjunction with elevated intraocular pressure  Intraocular pressure with improvement since Selective laser trabeculoplasty (SLT) right eye   Continued stability of left eye   She has been intolerant to timolol and latonoprost in the past     2. Pseudophakia of both eyes  IOL stable in good position     3. Thyroid eye disease  Diplopia much better with new glasses.    4. Age related macular degeneration   Stage II, followed annually by retina     Plan:   Continue latanoprost HS both eyes   Continue timolol daily both eyes   (Vyzulta not on formulary, doubt she will tolerate Rhopressa, Intolerant to cosopt and alphagan)    Return to clinic 6 months with Octopus visual field 24-2    Attending Physician Attestation:  Complete documentation of historical and exam elements from today's encounter can be found in the full encounter summary report (not reduplicated in this progress note). I personally obtained the chief complaint(s) and history of present illness. I confirmed and edited asnecessary the review of systems, past medical/surgical history, family history, social history, and examination findings  as documented by others; and I examined the patient myself. I personally reviewed the relevant tests, images, and reports as documented above. I formulated and edited as necessary the assessment and plan and discussed the findings and management plan with the patient and family.  - Radha Gallardo MD 9:35 AM 2/14/2020

## 2020-02-20 ENCOUNTER — ANTICOAGULATION THERAPY VISIT (OUTPATIENT)
Dept: NURSING | Facility: CLINIC | Age: 75
End: 2020-02-20
Payer: MEDICARE

## 2020-02-20 DIAGNOSIS — I82.A19 ACUTE DEEP VEIN THROMBOSIS (DVT) OF AXILLARY VEIN, UNSPECIFIED LATERALITY (H): ICD-10-CM

## 2020-02-20 LAB — INR POINT OF CARE: 2.6 (ref 0.86–1.14)

## 2020-02-20 PROCEDURE — 36416 COLLJ CAPILLARY BLOOD SPEC: CPT

## 2020-02-20 PROCEDURE — 85610 PROTHROMBIN TIME: CPT | Mod: QW

## 2020-02-20 PROCEDURE — 99207 ZZC NO CHARGE NURSE ONLY: CPT

## 2020-02-20 NOTE — PROGRESS NOTES
ANTICOAGULATION FOLLOW-UP CLINIC VISIT    Patient Name:  Manasa French  Date:  2020  Contact Type:  Face to Face    SUBJECTIVE:  Patient Findings     Positives:   Other complaints (Edema at bilateral LE. Minimal salt intake. Writer advised pt to make an appt w/ PCP. )    Comments:   The patient was assessed for diet, medication, and activity level changes, missed or extra doses, bruising or bleeding, with no problem findings.             OBJECTIVE    INR Protime   Date Value Ref Range Status   2020 2.6 (A) 0.86 - 1.14 Final       ASSESSMENT / PLAN  INR assessment THER    Recheck INR In: 3 WEEKS    INR Location Clinic      Anticoagulation Summary  As of 2020    INR goal:   2.0-3.0   TTR:   62.6 % (8.3 mo)   INR used for dosin.6 (2020)   Warfarin maintenance plan:   7.5 mg (5 mg x 1.5) every Mon, Wed, Fri; 5 mg (5 mg x 1) all other days   Full warfarin instructions:   7.5 mg every Mon, Wed, Fri; 5 mg all other days   Weekly warfarin total:   42.5 mg   No change documented:   Brandi Arriola RN   Plan last modified:   Noemy Dailey RN (2019)   Next INR check:   3/12/2020   Target end date:       Indications    Acute deep vein thrombosis (DVT) of axillary vein and internal jugular (H) [I82.A19]             Anticoagulation Episode Summary     INR check location:       Preferred lab:       Send INR reminders to:   [facility]    Comments:   40-50 min walk/lifting. 3 swimming. Greens 2-4/week.  started 100 mcg of vit k.      Anticoagulation Care Providers     Provider Role Specialty Phone number    Christopher Smith MD [facility] Internal Medicine [phone]            See the Encounter Report to view Anticoagulation Flowsheet and Dosing Calendar (Go to Encounters tab in chart review, and find the Anticoagulation Therapy Visit)    Patient made aware if signs of clotting (pain, tenderness, swelling, or color change in any extremity) AND/OR bleeding occur  (nosebleeds, bleeding gums, bruising, or blood in stool or urine) to notify provider & seek medical attention. If severe symptoms develop, such as major bleeding, chest pain, shortness of breath, fall, trauma or s/s of stroke, patient to call 911 immediately.     Dosage adjustment made based on physician directed care plan.    Brandi Arriola RN

## 2020-02-21 ENCOUNTER — OFFICE VISIT (OUTPATIENT)
Dept: FAMILY MEDICINE | Facility: CLINIC | Age: 75
End: 2020-02-21
Payer: MEDICARE

## 2020-02-21 VITALS
BODY MASS INDEX: 24.43 KG/M2 | SYSTOLIC BLOOD PRESSURE: 128 MMHG | HEIGHT: 66 IN | DIASTOLIC BLOOD PRESSURE: 60 MMHG | RESPIRATION RATE: 14 BRPM | TEMPERATURE: 98 F | HEART RATE: 86 BPM | WEIGHT: 152 LBS

## 2020-02-21 DIAGNOSIS — R60.9 DEPENDENT EDEMA: ICD-10-CM

## 2020-02-21 DIAGNOSIS — I10 HTN, GOAL BELOW 140/90: Primary | ICD-10-CM

## 2020-02-21 PROCEDURE — 99213 OFFICE O/P EST LOW 20 MIN: CPT | Performed by: FAMILY MEDICINE

## 2020-02-21 RX ORDER — FUROSEMIDE 40 MG
40 TABLET ORAL DAILY
Qty: 30 TABLET | Refills: 1 | Status: SHIPPED | OUTPATIENT
Start: 2020-02-21 | End: 2020-03-19

## 2020-02-21 ASSESSMENT — MIFFLIN-ST. JEOR: SCORE: 1201.22

## 2020-02-21 NOTE — PROGRESS NOTES
"Subjective     Manasa French is a 74 year old female who presents to clinic today for the following health issues:    HPI   Leg Swelling      Duration: over a month    Description (location/character/radiation): bilateral feet and ankles    Intensity:  moderate    Accompanying signs and symptoms: swelling     History (similar episodes/previous evaluation): None    Precipitating or alleviating factors: None    Therapies tried and outcome: elevating legs   Pt was started on Amlodipine 5 mg in August 2019  Dose was increased to 10 mg daily about 2 mo ago    Hypertension Follow-up      Do you check your blood pressure regularly outside of the clinic? Yes     Are you following a low salt diet? Yes    Are your blood pressures ever more than 140 on the top number (systolic) OR more   than 90 on the bottom number (diastolic), for example 140/90? No      BP Readings from Last 3 Encounters:   02/21/20 128/60   01/29/20 136/68   12/30/19 (!) 140/68    Wt Readings from Last 3 Encounters:   02/21/20 68.9 kg (152 lb)   01/29/20 69.9 kg (154 lb)   12/30/19 68.9 kg (152 lb)                    Reviewed and updated as needed this visit by Provider         Review of Systems   ROS COMP: CONSTITUTIONAL: NEGATIVE for fever, chills, change in weight  ENT/MOUTH: NEGATIVE for ear, mouth and throat problems  RESP: NEGATIVE for significant cough or SOB  CV: POSITIVE for lower extremity edema and NEGATIVE for chest pain/chest pressure and dyspnea on exertion      Objective    /60 (Cuff Size: Adult Regular)   Pulse 86   Temp 98  F (36.7  C) (Tympanic)   Resp 14   Ht 1.676 m (5' 6\")   Wt 68.9 kg (152 lb)   BMI 24.53 kg/m    Body mass index is 24.53 kg/m .  Physical Exam   GENERAL: healthy, alert and no distress  NECK: no adenopathy, no asymmetry, masses, or scars and thyroid normal to palpation  RESP: lungs clear to auscultation - no rales, rhonchi or wheezes  CV: regular rates and rhythm, normal S1 S2, no S3 or S4, no murmur, " click or rub, peripheral pulses strong and 2+ bilateral lower extremity pitting edema to mid tibia      Diagnostic Test Results:  Labs reviewed in Epic  none         Assessment & Plan     Manasa was seen today for leg swelling.    Diagnoses and all orders for this visit:    HTN, goal below 140/90    Dependent edema  -     furosemide 40 MG PO tablet; Take 1 tablet (40 mg) by mouth daily           FUTURE APPOINTMENTS:       - Follow-up visit in 1 mo   Patient Instructions   Decrease dose of Amlodipine to 5 mg daily, cut tablets in half of the 10 mg tablets      Return in about 1 month (around 3/21/2020) for Hypertension, dependent edema.    Bimal Nichols MD  [facility]

## 2020-03-09 ENCOUNTER — OFFICE VISIT (OUTPATIENT)
Dept: OPHTHALMOLOGY | Facility: CLINIC | Age: 75
End: 2020-03-09
Attending: OPHTHALMOLOGY
Payer: MEDICARE

## 2020-03-09 ENCOUNTER — ANTICOAGULATION THERAPY VISIT (OUTPATIENT)
Dept: NURSING | Facility: CLINIC | Age: 75
End: 2020-03-09
Payer: MEDICARE

## 2020-03-09 DIAGNOSIS — H35.3132 INTERMEDIATE STAGE NONEXUDATIVE AGE-RELATED MACULAR DEGENERATION OF BOTH EYES: ICD-10-CM

## 2020-03-09 DIAGNOSIS — I82.A19 ACUTE DEEP VEIN THROMBOSIS (DVT) OF AXILLARY VEIN, UNSPECIFIED LATERALITY (H): ICD-10-CM

## 2020-03-09 LAB — INR POINT OF CARE: 2.9 (ref 0.86–1.14)

## 2020-03-09 PROCEDURE — 92134 CPTRZ OPH DX IMG PST SGM RTA: CPT | Mod: ZF | Performed by: OPHTHALMOLOGY

## 2020-03-09 PROCEDURE — 36416 COLLJ CAPILLARY BLOOD SPEC: CPT

## 2020-03-09 PROCEDURE — G0463 HOSPITAL OUTPT CLINIC VISIT: HCPCS | Mod: ZF

## 2020-03-09 PROCEDURE — 99207 ZZC NO CHARGE NURSE ONLY: CPT

## 2020-03-09 PROCEDURE — 85610 PROTHROMBIN TIME: CPT | Mod: QW

## 2020-03-09 ASSESSMENT — CUP TO DISC RATIO
OD_RATIO: 0.7
OS_RATIO: 0.5

## 2020-03-09 ASSESSMENT — REFRACTION_WEARINGRX
OS_HPRISM: 7 BO
OS_VPRISM: 3 BD
OS_AXIS: 050
OD_CYLINDER: SPHERE
OD_VPRISM: 3 BU
SPECS_TYPE: SVL
OS_CYLINDER: +1.00
OD_SPHERE: -0.75
OD_HPRISM: 7 BO
OS_SPHERE: -2.50

## 2020-03-09 ASSESSMENT — VISUAL ACUITY
OD_CC+: +1
OD_CC: 20/30
OS_PH_CC+: +2
OS_CC: 20/40
CORRECTION_TYPE: GLASSES
OS_PH_CC: 20/40
METHOD: SNELLEN - LINEAR

## 2020-03-09 ASSESSMENT — SLIT LAMP EXAM - LIDS
COMMENTS: NORMAL
COMMENTS: NORMAL

## 2020-03-09 ASSESSMENT — TONOMETRY
IOP_METHOD: TONOPEN
OD_IOP_MMHG: 14
OS_IOP_MMHG: 13

## 2020-03-09 ASSESSMENT — CONF VISUAL FIELD
OD_NORMAL: 1
METHOD: COUNTING FINGERS
OS_SUPERIOR_NASAL_RESTRICTION: 3

## 2020-03-09 NOTE — NURSING NOTE
Chief Complaints and History of Present Illnesses   Patient presents with     Macular Degeneration Follow Up     1 year follow up both eyes     Chief Complaint(s) and History of Present Illness(es)     Macular Degeneration Follow Up     Comments: 1 year follow up both eyes              Comments     Pt states vision fluctuates daily in both eyes LE>RE.  No eye pain today. No flashes or floaters.  No redness or dryness.    HERBERT Trujillo March 9, 2020 1:29 PM

## 2020-03-09 NOTE — PROGRESS NOTES
ANTICOAGULATION FOLLOW-UP CLINIC VISIT    Patient Name:  Manasa French  Date:  3/9/2020  Contact Type:  Face to Face    SUBJECTIVE:  Patient Findings     Positives:   Change in medications (Pt has been taking vitamin K 100 mcg daily unless eating dark greens. Reasonable plan. This is a change from Dr. Smith's recommendations of daily. Routing to team as an FYI only.), Change in diet/appetite (Pt typically eats 2-3 green servings per week.)    Comments:   The patient was assessed for activity level changes, missed or extra doses, bruising or bleeding, with no problem findings.            OBJECTIVE    INR Protime   Date Value Ref Range Status   2020 2.9 (A) 0.86 - 1.14 Final       ASSESSMENT / PLAN  INR assessment THER    Recheck INR In: 3 WEEKS    INR Location Clinic      Anticoagulation Summary  As of 3/9/2020    INR goal:   2.0-3.0   TTR:   65.1 % (8.9 mo)   INR used for dosin.9 (3/9/2020)   Warfarin maintenance plan:   7.5 mg (5 mg x 1.5) every Mon, Wed, Fri; 5 mg (5 mg x 1) all other days   Full warfarin instructions:   7.5 mg every Mon, Wed, Fri; 5 mg all other days   Weekly warfarin total:   42.5 mg   No change documented:   Brandi Arriola RN   Plan last modified:   Noemy Dailey RN (2019)   Next INR check:   3/30/2020   Target end date:       Indications    Acute deep vein thrombosis (DVT) of axillary vein and internal jugular (H) [I82.A19]             Anticoagulation Episode Summary     INR check location:       Preferred lab:       Send INR reminders to:   [facility]    Comments:   40-50 min walk/lifting. 3 swimming. Greens 2-4/week.  started 100 mcg of vit k.      Anticoagulation Care Providers     Provider Role Specialty Phone number    Christopher Smith MD [facility] Internal Medicine [phone]            See the Encounter Report to view Anticoagulation Flowsheet and Dosing Calendar (Go to Encounters tab in chart review, and find the Anticoagulation Therapy  Visit)    Patient made aware if signs of clotting (pain, tenderness, swelling, or color change in any extremity) AND/OR bleeding occur (nosebleeds, bleeding gums, bruising, or blood in stool or urine) to notify provider & seek medical attention. If severe symptoms develop, such as major bleeding, chest pain, shortness of breath, fall, trauma or s/s of stroke, patient to call 911 immediately.     Dosage adjustment made based on physician directed care plan.    Brandi Arriola RN

## 2020-03-09 NOTE — PROGRESS NOTES
I have confirmed the patient's and reviewed Past Medical History, Past Surgical History, Social History, Family History, Problem List, Medication List and agree with Tech note.    CC: f/u AMD    HPI: 73 yo female with h/o Dry AMD here for annual f/u.  Patient reports relatively stable vision with persistent diplopia that is more manageable on some days than others (has h/o WIL f/b orthoptics for diplopia). States vision fluctuates.     POHx:  Thyroid Eye Disease  POAG  Pseudophakia    Eye Meds:  Timolol QAM OU  Xalatan QPM OU    OCT (03/09/20)  RE: (multiple single scans d/t pt eye movements) Drusen, no IRF/SRF  LE: (multiple single scans d/t pt eye movements) Drusen, no IRF/SRF    Assessment & Plan:  1.  Dry Macular Degeneration, both eyes   - continue AREDS II vitamins   - AG precautions   - annual exam    2.  PVD, both eyes   - RT/RD precautions    3. POAG, mild, both eyes   - continue management/follow-up per Dr. Gallardo    4.  Thyroid Eye Disease   - continue with orthoptics for diplopia management    5.  Pseudophakia, both eyes   - mild PCO OS   - observe    RTC: 1 year for exam, OCT.  (NO FAF needed)    Shahbaz Faulkner MD  PGY-4 Ophthalmology Resident  [phone]    Attestation:  I have seen and examined the patient with Dr. Faulkner and agree with the findings in this note, as well as the interpretations of the diagnostic tests.       Katy Peter MD PhD.  Professor & Chair

## 2020-03-19 ENCOUNTER — OFFICE VISIT (OUTPATIENT)
Dept: FAMILY MEDICINE | Facility: CLINIC | Age: 75
End: 2020-03-19
Payer: MEDICARE

## 2020-03-19 VITALS
HEIGHT: 66 IN | RESPIRATION RATE: 14 BRPM | DIASTOLIC BLOOD PRESSURE: 72 MMHG | BODY MASS INDEX: 24.19 KG/M2 | HEART RATE: 82 BPM | TEMPERATURE: 97.8 F | SYSTOLIC BLOOD PRESSURE: 138 MMHG | WEIGHT: 150.5 LBS | OXYGEN SATURATION: 99 %

## 2020-03-19 DIAGNOSIS — E89.0 HYPOTHYROIDISM, POSTABLATIVE: ICD-10-CM

## 2020-03-19 DIAGNOSIS — I10 HTN, GOAL BELOW 140/90: Primary | ICD-10-CM

## 2020-03-19 DIAGNOSIS — I89.0 LYMPHEDEMA: ICD-10-CM

## 2020-03-19 PROCEDURE — 99214 OFFICE O/P EST MOD 30 MIN: CPT | Performed by: FAMILY MEDICINE

## 2020-03-19 RX ORDER — HYDROCHLOROTHIAZIDE 12.5 MG/1
12.5 TABLET ORAL DAILY
Qty: 90 TABLET | Refills: 1 | Status: SHIPPED | OUTPATIENT
Start: 2020-03-19 | End: 2020-09-11

## 2020-03-19 RX ORDER — AMLODIPINE BESYLATE 5 MG/1
5 TABLET ORAL DAILY
Qty: 90 TABLET | Refills: 1 | COMMUNITY
Start: 2020-03-19 | End: 2020-11-19

## 2020-03-19 ASSESSMENT — MIFFLIN-ST. JEOR: SCORE: 1194.41

## 2020-03-19 NOTE — PROGRESS NOTES
"Subjective     Manasa French is a 74 year old female who presents to clinic today for the following health issues:    HPI   Recheck on edema of ankles  B/P Recheck      Duration: 02/21/2020    Description (location/character/radiation): Mild swelling of both ankles    Intensity:  moderate    Accompanying signs and symptoms: none    History (similar episodes/previous evaluation): Yes    Precipitating or alleviating factors: None    Therapies tried and outcome: Medication    RECHECK BP     Recently seen by another provider for ankles swelling. Had Lasix added.  And Amlodipine was cut in half.  Swelling has improved since these med changes while BP remains stable.       Reviewed and updated as needed this visit by Provider  Tobacco  Allergies  Meds  Problems  Med Hx  Surg Hx  Fam Hx         Review of Systems   ROS COMP: CONSTITUTIONAL: NEGATIVE for fever, chills, change in weight  INTEGUMENTARY/SKIN: NEGATIVE for worrisome rashes, moles or lesions  EYES: NEGATIVE for vision changes or irritation  ENT/MOUTH: NEGATIVE for ear, mouth and throat problems  RESP: NEGATIVE for significant cough or SOB  CV: NEGATIVE for chest pain, or palpitations   GI: NEGATIVE for nausea, abdominal pain, heartburn, or change in bowel habits  : NEGATIVE for frequency, dysuria, or hematuria  MUSCULOSKELETAL: NEGATIVE for significant arthralgias or myalgia  NEURO: NEGATIVE for weakness, dizziness or paresthesias  HEME: NEGATIVE for bleeding problems  PSYCHIATRIC: NEGATIVE for changes in mood or affect      Objective    /72   Pulse 82   Temp 97.8  F (36.6  C) (Tympanic)   Resp 14   Ht 1.676 m (5' 6\")   Wt 68.3 kg (150 lb 8 oz)   SpO2 99%   Breastfeeding No   BMI 24.29 kg/m    Body mass index is 24.29 kg/m .  Physical Exam   GENERAL: healthy, alert and no distress  EYES: Eyes grossly normal to inspection, PERRL and conjunctivae and sclerae normal  HENT: ear canals and TM's normal, nose and mouth without ulcers or " lesions  NECK: no adenopathy, no asymmetry, masses, or scars and thyroid normal to palpation  RESP: lungs clear to auscultation - no rales, rhonchi or wheezes  CV: regular rate and rhythm, normal S1 S2, no S3 or S4, no murmur, click or rub, and peripheral pulses strong.  Mild/non-pitting edema in ankles  ABDOMEN: bowel sounds normal  MS: no gross musculoskeletal defects noted  SKIN: no suspicious lesions or rashes  NEURO: Normal strength and tone, mentation intact and speech normal  PSYCH: mentation appears normal, affect normal/bright    Diagnostic Test Results:  Labs reviewed in Epic        Assessment & Plan   Problem List Items Addressed This Visit     Hypothyroidism, postablative    HTN, goal below 140/90 - Primary    Relevant Medications    hydrochlorothiazide (HYDRODIURIL) 12.5 MG tablet    amLODIPine (NORVASC) 5 MG tablet    Other Relevant Orders    Basic metabolic panel  (Ca, Cl, CO2, Creat, Gluc, K, Na, BUN)    TSH    T4 free      Other Visit Diagnoses     Lymphedema             HTN: stable  Will stop the Lasix and switch her to Hydrochlorothiazide 12.5 mg daily  Continue Losartan at 100 mg and Amlodipine and 5 mg daily.  May switch her to Hyzaar eventually (combo of Losartan and hydrochlorothiazide) if BP remains stable on the hydrochlorothiazide addition.   Will check BMP in 1 month, along with BP and pulse during nurse only OV    Lymphedema: improved  Stop Lasix, switch to hydrochlorothiazide.    Hypothyroid: abnormal  Will re-check TSH/T4 in 4 weeks too.    Continue taking lower dose of Levothyroxine at 137 mcg daily.      See Patient Instructions  Return in about 4 weeks (around 4/16/2020) for BP and Pulse (may do nurse only) with labs.    Rema Braxton, DO  [facility]

## 2020-03-25 DIAGNOSIS — I82.409 DVT (DEEP VENOUS THROMBOSIS) (H): Primary | ICD-10-CM

## 2020-04-09 ENCOUNTER — ANTICOAGULATION THERAPY VISIT (OUTPATIENT)
Dept: FAMILY MEDICINE | Facility: CLINIC | Age: 75
End: 2020-04-09

## 2020-04-09 DIAGNOSIS — I82.409 DVT (DEEP VENOUS THROMBOSIS) (H): ICD-10-CM

## 2020-04-09 DIAGNOSIS — I82.A19 ACUTE DEEP VEIN THROMBOSIS (DVT) OF AXILLARY VEIN, UNSPECIFIED LATERALITY (H): ICD-10-CM

## 2020-04-09 LAB
CAPILLARY BLOOD COLLECTION: NORMAL
INR PPP: 3.3 (ref 0.86–1.14)

## 2020-04-09 PROCEDURE — 85610 PROTHROMBIN TIME: CPT | Performed by: FAMILY MEDICINE

## 2020-04-09 PROCEDURE — 36416 COLLJ CAPILLARY BLOOD SPEC: CPT | Performed by: FAMILY MEDICINE

## 2020-04-09 PROCEDURE — 99207 ZZC NO CHARGE NURSE ONLY: CPT | Performed by: FAMILY MEDICINE

## 2020-04-09 NOTE — PROGRESS NOTES
"ANTICOAGULATION FOLLOW-UP CLINIC VISIT    Patient Name:  Manasa French  Date:  4/9/2020  Contact Type:  Telephone/ Pt    SUBJECTIVE:  Patient Findings     Positives:   Change in activity (Exercise routine is down a bit.), Change in diet/appetite (Pt is eating a little less during \"stay at home order.\")    Comments:   The patient was assessed for medication changes, missed or extra doses, bruising or bleeding, with no problem findings.             OBJECTIVE    INR   Date Value Ref Range Status   04/09/2020 3.30 (H) 0.86 - 1.14 Final     Comment:     This test is intended for monitoring Coumadin therapy.  Results are not   accurate in patients with prolonged INR due to factor deficiency.         ASSESSMENT / PLAN  INR assessment SUPRA    Recheck INR In: 3 WEEKS    INR Location Clinic      Anticoagulation Summary  As of 4/9/2020    INR goal:   2.0-3.0   TTR:   60.9 % (9.9 mo)   INR used for dosing:   3.30! (4/9/2020)   Warfarin maintenance plan:   7.5 mg (5 mg x 1.5) every Mon, Wed, Fri; 5 mg (5 mg x 1) all other days   Full warfarin instructions:   4/9: 2.5 mg; Otherwise 7.5 mg every Mon, Wed, Fri; 5 mg all other days   Weekly warfarin total:   42.5 mg   Plan last modified:   Noemy Dailey RN (12/19/2019)   Next INR check:   5/4/2020   Target end date:       Indications    Acute deep vein thrombosis (DVT) of axillary vein and internal jugular (H) [I82.A19]             Anticoagulation Episode Summary     INR check location:       Preferred lab:       Send INR reminders to:   [facility]    Comments:   40-50 min walk/lifting. 3 swimming. Greens 2-4/week. 9/16 started 100 mcg of vit k.      Anticoagulation Care Providers     Provider Role Specialty Phone number    Christopher Smith MD [facility] Internal Medicine [phone]            See the Encounter Report to view Anticoagulation Flowsheet and Dosing Calendar (Go to Encounters tab in chart review, and find the Anticoagulation Therapy Visit)    Per " protocol, patient advised to decrease today's warfarin dose by 2.5 mg to account for supra-therapeutic INR level. Patient instructed to increase green intake today and tomorrow as well. Recheck within 3-4 weeks to ensure INR stability.     Patient made aware if signs of clotting (pain, tenderness, swelling, or color change in any extremity) AND/OR bleeding occur (nosebleeds, bleeding gums, bruising, or blood in stool or urine) to notify provider & seek medical attention. If severe symptoms develop, such as major bleeding, chest pain, shortness of breath, fall, trauma or s/s of stroke, patient to call 911 immediately.     Dosage adjustment made based on physician directed care plan.    Brandi Arriola RN

## 2020-04-23 ENCOUNTER — ALLIED HEALTH/NURSE VISIT (OUTPATIENT)
Dept: NURSING | Facility: CLINIC | Age: 75
End: 2020-04-23
Payer: MEDICARE

## 2020-04-23 ENCOUNTER — TELEPHONE (OUTPATIENT)
Dept: FAMILY MEDICINE | Facility: CLINIC | Age: 75
End: 2020-04-23

## 2020-04-23 ENCOUNTER — ANTICOAGULATION THERAPY VISIT (OUTPATIENT)
Dept: FAMILY MEDICINE | Facility: CLINIC | Age: 75
End: 2020-04-23

## 2020-04-23 DIAGNOSIS — I82.409 DVT (DEEP VENOUS THROMBOSIS) (H): ICD-10-CM

## 2020-04-23 DIAGNOSIS — I82.A19 ACUTE DEEP VEIN THROMBOSIS (DVT) OF AXILLARY VEIN, UNSPECIFIED LATERALITY (H): ICD-10-CM

## 2020-04-23 DIAGNOSIS — Z53.9 ERRONEOUS ENCOUNTER--DISREGARD: Primary | ICD-10-CM

## 2020-04-23 DIAGNOSIS — I10 HTN, GOAL BELOW 140/90: ICD-10-CM

## 2020-04-23 LAB — INR PPP: 2.2 (ref 0.86–1.14)

## 2020-04-23 PROCEDURE — 80048 BASIC METABOLIC PNL TOTAL CA: CPT | Performed by: FAMILY MEDICINE

## 2020-04-23 PROCEDURE — 84443 ASSAY THYROID STIM HORMONE: CPT | Performed by: FAMILY MEDICINE

## 2020-04-23 PROCEDURE — 85610 PROTHROMBIN TIME: CPT | Performed by: FAMILY MEDICINE

## 2020-04-23 PROCEDURE — 84439 ASSAY OF FREE THYROXINE: CPT | Performed by: FAMILY MEDICINE

## 2020-04-23 PROCEDURE — 36416 COLLJ CAPILLARY BLOOD SPEC: CPT | Performed by: FAMILY MEDICINE

## 2020-04-23 PROCEDURE — 99207 ZZC NO CHARGE NURSE ONLY: CPT | Performed by: FAMILY MEDICINE

## 2020-04-23 NOTE — PROGRESS NOTES
ANTICOAGULATION FOLLOW-UP CLINIC VISIT    Patient Name:  Manasa French  Date:  2020  Contact Type:  Telephone/ Patient    SUBJECTIVE:  Patient Findings     Comments:   The patient was assessed for diet, medication, and activity level changes, missed or extra doses, bruising or bleeding, with no problem findings.          OBJECTIVE    INR   Date Value Ref Range Status   2020 2.20 (H) 0.86 - 1.14 Final       ASSESSMENT / PLAN  INR assessment THER    Recheck INR In: 4 WEEKS    INR Location Clinic      Anticoagulation Summary  As of 2020    INR goal:   2.0-3.0   TTR:   61.4 % (10.4 mo)   INR used for dosin.20 (2020)   Warfarin maintenance plan:   7.5 mg (5 mg x 1.5) every Mon, Wed, Fri; 5 mg (5 mg x 1) all other days   Full warfarin instructions:   7.5 mg every Mon, Wed, Fri; 5 mg all other days   Weekly warfarin total:   42.5 mg   No change documented:   Brandi Arriola RN   Plan last modified:   Noemy Dailey RN (2019)   Next INR check:   2020   Target end date:       Indications    Acute deep vein thrombosis (DVT) of axillary vein and internal jugular (H) [I82.A19]             Anticoagulation Episode Summary     INR check location:       Preferred lab:       Send INR reminders to:   [facility]    Comments:   40-50 min walk/lifting. 3 swimming. Greens 2-4/week.  started 100 mcg of vit k.      Anticoagulation Care Providers     Provider Role Specialty Phone number    Christopher Smith MD [facility] Internal Medicine [phone]            See the Encounter Report to view Anticoagulation Flowsheet and Dosing Calendar (Go to Encounters tab in chart review, and find the Anticoagulation Therapy Visit)    Patient made aware if signs of clotting (pain, tenderness, swelling, or color change in any extremity) AND/OR bleeding occur (nosebleeds, bleeding gums, bruising, or blood in stool or urine) to notify provider & seek medical attention. If severe symptoms  develop, such as major bleeding, chest pain, shortness of breath, fall, trauma or s/s of stroke, patient to call 911 immediately.     Dosage adjustment made based on physician directed care plan.    Brandi Arriola RN

## 2020-04-23 NOTE — TELEPHONE ENCOUNTER
Yes, we need BMP and TSH/T4 checked, along with nurse-only BP, pulse, and weight check  As long as she is feeling healthy (no fever, cough, sore throat, etc) and wears a mask to get this all done, then I am OK with this lab and nurse only visit.

## 2020-04-23 NOTE — TELEPHONE ENCOUNTER
Reason for Call:  Other call back    Detailed comments: Rhonda called asking for a nurse BP check appt. And a lab appt.    Explained to Rhonda that Dr. Braxton will need to approve nurse and lab appointments to come into the clinic.  Once she decides if this in office is approved, our office will need to return a call to Rhonda.    Phone Number Patient can be reached at: Home number on file [phone] (home)    Best Time: any    Can we leave a detailed message on this number? YES    Call taken on 4/23/2020 at 9:39 AM by Selena Araujo

## 2020-04-24 LAB
ANION GAP SERPL CALCULATED.3IONS-SCNC: 4 MMOL/L (ref 3–14)
BUN SERPL-MCNC: 21 MG/DL (ref 7–30)
CALCIUM SERPL-MCNC: 8.7 MG/DL (ref 8.5–10.1)
CHLORIDE SERPL-SCNC: 105 MMOL/L (ref 94–109)
CO2 SERPL-SCNC: 29 MMOL/L (ref 20–32)
CREAT SERPL-MCNC: 0.89 MG/DL (ref 0.52–1.04)
GFR SERPL CREATININE-BSD FRML MDRD: 63 ML/MIN/{1.73_M2}
GLUCOSE SERPL-MCNC: 168 MG/DL (ref 70–99)
POTASSIUM SERPL-SCNC: 4.2 MMOL/L (ref 3.4–5.3)
SODIUM SERPL-SCNC: 138 MMOL/L (ref 133–144)
T4 FREE SERPL-MCNC: 1.49 NG/DL (ref 0.76–1.46)
TSH SERPL DL<=0.005 MIU/L-ACNC: 0.78 MU/L (ref 0.4–4)

## 2020-04-29 ENCOUNTER — TELEPHONE (OUTPATIENT)
Dept: FAMILY MEDICINE | Facility: CLINIC | Age: 75
End: 2020-04-29

## 2020-04-29 DIAGNOSIS — E89.0 HYPOTHYROIDISM, POSTABLATIVE: ICD-10-CM

## 2020-04-29 NOTE — TELEPHONE ENCOUNTER
Reason for Call:  Other call back    Detailed comments: The patient called and stated that she had labs done to check her thyroid levels on 4/23. She is wondering if Dr. Braxton would want to change the dosage of her levothyroxine (SYNTHROID/LEVOTHROID after looking at the results from this lab work. She would like a call back to discuss this.     Phone Number Patient can be reached at: Cell number on file:    Telephone Information:   Mobile [phone]       Best Time: Any    Can we leave a detailed message on this number? YES    Call taken on 4/29/2020 at 10:21 AM by Adina Amaya

## 2020-04-30 RX ORDER — LEVOTHYROXINE SODIUM 137 UG/1
137 TABLET ORAL DAILY
Qty: 90 TABLET | Refills: 0 | Status: SHIPPED | OUTPATIENT
Start: 2020-04-30 | End: 2020-11-04

## 2020-04-30 NOTE — TELEPHONE ENCOUNTER
Spoke with patient today.     Provider message shared with patient.     No further follow up needed

## 2020-04-30 NOTE — TELEPHONE ENCOUNTER
Please inform pt about the following (letter will be sent out today):      Thyroid:  Your thyroid hormone levels have improved significantly.  I am recommending that we continue the same dose of Levothyroxine.  Let's plan on re-checking your thyroid hormone labs in 2-3 months during an office visit.     Kidney function, electrolytes and blood sugar:  Your kidney function and electrolyte levels are normal.  Your blood sugar is high but could be high due to these non-fasting labs.  I am recommending that we do another blood test (that we could do along with your thyroid hormone labs in 2-3 months) called a HgbA1c to further monitor your blood sugar control.    Thanks!  --Dr. Braxton

## 2020-05-21 ENCOUNTER — ANTICOAGULATION THERAPY VISIT (OUTPATIENT)
Dept: FAMILY MEDICINE | Facility: CLINIC | Age: 75
End: 2020-05-21

## 2020-05-21 DIAGNOSIS — I82.A19 ACUTE DEEP VEIN THROMBOSIS (DVT) OF AXILLARY VEIN, UNSPECIFIED LATERALITY (H): ICD-10-CM

## 2020-05-21 DIAGNOSIS — I82.409 DVT (DEEP VENOUS THROMBOSIS) (H): ICD-10-CM

## 2020-05-21 LAB
CAPILLARY BLOOD COLLECTION: NORMAL
INR PPP: 3 (ref 0.86–1.14)

## 2020-05-21 PROCEDURE — 85610 PROTHROMBIN TIME: CPT | Performed by: FAMILY MEDICINE

## 2020-05-21 PROCEDURE — 99207 ZZC NO CHARGE NURSE ONLY: CPT | Performed by: FAMILY MEDICINE

## 2020-05-21 PROCEDURE — 36416 COLLJ CAPILLARY BLOOD SPEC: CPT | Performed by: FAMILY MEDICINE

## 2020-05-21 NOTE — PROGRESS NOTES
ANTICOAGULATION FOLLOW-UP CLINIC VISIT    Patient Name:  Manasa French  Date:  5/21/2020  Contact Type:  Telephone/ patient    SUBJECTIVE:  Patient Findings     Positives:   Bruising (Large bruise on knee from bumping into something.)    Comments:   The patient was assessed for diet, medication, and activity level changes, missed or extra doses, bleeding, with no problem findings.             OBJECTIVE    Recent labs: (last 7 days)     05/21/20  1255   INR 3.00*       ASSESSMENT / PLAN  INR assessment THER    Recheck INR In: 4 WEEKS    INR Location Clinic      Anticoagulation Summary  As of 5/21/2020    INR goal:   2.0-3.0   TTR:   64.6 % (11.3 mo)   INR used for dosing:   3.00 (5/21/2020)   Warfarin maintenance plan:   7.5 mg (5 mg x 1.5) every Mon, Wed, Fri; 5 mg (5 mg x 1) all other days   Full warfarin instructions:   7.5 mg every Mon, Wed, Fri; 5 mg all other days   Weekly warfarin total:   42.5 mg   No change documented:   Brandi Arriola RN   Plan last modified:   Noemy Dailey RN (12/19/2019)   Next INR check:   6/18/2020   Target end date:       Indications    Acute deep vein thrombosis (DVT) of axillary vein and internal jugular (H) [I82.A19]             Anticoagulation Episode Summary     INR check location:       Preferred lab:       Send INR reminders to:   [facility]    Comments:   40-50 min walk/lifting. 3 swimming. Greens 2-4/week. 9/16 started 100 mcg of vit k.      Anticoagulation Care Providers     Provider Role Specialty Phone number    Christopher Smith MD [facility] Internal Medicine [phone]            See the Encounter Report to view Anticoagulation Flowsheet and Dosing Calendar (Go to Encounters tab in chart review, and find the Anticoagulation Therapy Visit)    Patient made aware if signs of clotting (pain, tenderness, swelling, or color change in any extremity) AND/OR bleeding occur (nosebleeds, bleeding gums, bruising, or blood in stool or urine) to notify  provider & seek medical attention. If severe symptoms develop, such as major bleeding, chest pain, shortness of breath, fall, trauma or s/s of stroke, patient to call 911 immediately.     Dosage adjustment made based on physician directed care plan.    Brandi Arriola RN

## 2020-06-11 ENCOUNTER — ANCILLARY PROCEDURE (OUTPATIENT)
Dept: MAMMOGRAPHY | Facility: CLINIC | Age: 75
End: 2020-06-11
Attending: FAMILY MEDICINE
Payer: MEDICARE

## 2020-06-11 DIAGNOSIS — Z12.31 VISIT FOR SCREENING MAMMOGRAM: ICD-10-CM

## 2020-06-11 PROCEDURE — 77067 SCR MAMMO BI INCL CAD: CPT | Mod: TC

## 2020-06-22 ENCOUNTER — ANTICOAGULATION THERAPY VISIT (OUTPATIENT)
Dept: FAMILY MEDICINE | Facility: CLINIC | Age: 75
End: 2020-06-22

## 2020-06-22 DIAGNOSIS — I82.A19 ACUTE DEEP VEIN THROMBOSIS (DVT) OF AXILLARY VEIN, UNSPECIFIED LATERALITY (H): ICD-10-CM

## 2020-06-22 DIAGNOSIS — I82.409 DVT (DEEP VENOUS THROMBOSIS) (H): ICD-10-CM

## 2020-06-22 LAB
CAPILLARY BLOOD COLLECTION: NORMAL
INR PPP: 2.6 (ref 0.86–1.14)

## 2020-06-22 PROCEDURE — 85610 PROTHROMBIN TIME: CPT | Performed by: FAMILY MEDICINE

## 2020-06-22 PROCEDURE — 36416 COLLJ CAPILLARY BLOOD SPEC: CPT | Performed by: FAMILY MEDICINE

## 2020-06-22 PROCEDURE — 99207 ZZC NO CHARGE NURSE ONLY: CPT | Performed by: FAMILY MEDICINE

## 2020-06-22 NOTE — PROGRESS NOTES
ANTICOAGULATION FOLLOW-UP CLINIC VISIT    Patient Name:  Manasa French  Date:  2020  Contact Type:  Telephone/ Patient    SUBJECTIVE:  Patient Findings     Comments:   The patient was assessed for diet, medication, and activity level changes, missed or extra doses, bruising or bleeding, with no problem findings.           OBJECTIVE    Recent labs: (last 7 days)     20  1443   INR 2.60*       ASSESSMENT / PLAN  INR assessment THER    Recheck INR In: 4 WEEKS    INR Location Clinic      Anticoagulation Summary  As of 2020    INR goal:   2.0-3.0   TTR:   68.2 % (1 y)   INR used for dosin.60 (2020)   Warfarin maintenance plan:   7.5 mg (5 mg x 1.5) every Mon, Wed, Fri; 5 mg (5 mg x 1) all other days   Full warfarin instructions:   7.5 mg every Mon, Wed, Fri; 5 mg all other days   Weekly warfarin total:   42.5 mg   No change documented:   Brandi Arriola RN   Plan last modified:   Noemy Dailey RN (2019)   Next INR check:   2020   Target end date:       Indications    Acute deep vein thrombosis (DVT) of axillary vein and internal jugular (H) [I82.A19]             Anticoagulation Episode Summary     INR check location:       Preferred lab:       Send INR reminders to:   [facility]    Comments:   40-50 min walk/lifting. 3 swimming. Greens 2-4/week.  started 100 mcg of vit k.      Anticoagulation Care Providers     Provider Role Specialty Phone number    Christopher Smith MD [facility] Internal Medicine [phone]            See the Encounter Report to view Anticoagulation Flowsheet and Dosing Calendar (Go to Encounters tab in chart review, and find the Anticoagulation Therapy Visit)    Patient made aware if signs of clotting (pain, tenderness, swelling, or color change in any extremity) AND/OR bleeding occur (nosebleeds, bleeding gums, bruising, or blood in stool or urine) to notify provider & seek medical attention. If severe symptoms develop, such as major  bleeding, chest pain, shortness of breath, fall, trauma or s/s of stroke, patient to call 911 immediately.     Dosage adjustment made based on physician directed care plan.    Brandi Arriola RN

## 2020-07-09 ENCOUNTER — VIRTUAL VISIT (OUTPATIENT)
Dept: FAMILY MEDICINE | Facility: CLINIC | Age: 75
End: 2020-07-09
Payer: MEDICARE

## 2020-07-09 DIAGNOSIS — T14.8XXA BRUISE: Primary | ICD-10-CM

## 2020-07-09 DIAGNOSIS — E03.8 OTHER SPECIFIED HYPOTHYROIDISM: ICD-10-CM

## 2020-07-09 DIAGNOSIS — R73.09 ELEVATED GLUCOSE: ICD-10-CM

## 2020-07-09 DIAGNOSIS — Z79.01 ANTICOAGULATED: ICD-10-CM

## 2020-07-09 PROCEDURE — 99214 OFFICE O/P EST MOD 30 MIN: CPT | Mod: 95 | Performed by: FAMILY MEDICINE

## 2020-07-09 NOTE — PROGRESS NOTES
"Manasa French is a 74 year old female who is being evaluated via a billable video visit.      The patient has been notified of following:     \"This video visit will be conducted via a call between you and your physician/provider. We have found that certain health care needs can be provided without the need for an in-person physical exam.  This service lets us provide the care you need with a video conversation.  If a prescription is necessary we can send it directly to your pharmacy.  If lab work is needed we can place an order for that and you can then stop by our lab to have the test done at a later time.    Video visits are billed at different rates depending on your insurance coverage.  Please reach out to your insurance provider with any questions.    If during the course of the call the physician/provider feels a video visit is not appropriate, you will not be charged for this service.\"    Patient has given verbal consent for Video visit? Yes  How would you like to obtain your AVS? Mail a copy  Patient would like the video invitation sent by: Send to e-mail at: [email or web address]  Will anyone else be joining your video visit? No      Subjective     Manasa French is a 74 year old female who presents today via video visit for the following health issues:    HPI  Abnormal Brusing      Duration: 2 days    Description (location/character/radiation): notified dark colored bruise on lt wrist goes down into thumb    Intensity:  mild    Accompanying signs and symptoms: swelling, pt does not remember bumping wrist    History (similar episodes/previous evaluation): None    Precipitating or alleviating factors: pt on warfarin    Therapies tried and outcome: None     Had thyroid checked last April and thyroid supplement dose was changed.  On warfarin and vitamin K; noticed the bruise a couple of days ago.  First noticed the bruise after she got done taking a shower at [facility].  Area of bruise does not hurt.  Has full " function of her wrist, hand, and arm.  No numbness or tingling.  Feels well overall.      Video Start Time: 2:27 PM    Reviewed and updated as needed this visit by Provider  Tobacco  Allergies  Meds  Problems  Med Hx  Surg Hx  Fam Hx         Review of Systems   CONSTITUTIONAL: NEGATIVE for fever, chills, change in weight  EYES: NEGATIVE for vision changes or irritation  ENT/MOUTH: NEGATIVE for ear, mouth and throat problems  RESP: NEGATIVE for significant cough or SOB  CV: NEGATIVE for chest pain, palpitations or peripheral edema  GI: NEGATIVE for nausea, abdominal pain, heartburn, or change in bowel habits  : NEGATIVE for frequency, dysuria, or hematuria  PSYCHIATRIC: NEGATIVE for changes in mood or affect      Objective       Physical Exam     GENERAL: Healthy, alert and no distress  EYES: Eyes grossly normal to inspection.  No discharge or erythema, or obvious scleral/conjunctival abnormalities.  RESP: No audible wheeze, cough, or visible cyanosis.  No visible retractions or increased work of breathing.    SKIN: left wrist with large bruise  NEURO: Cranial nerves grossly intact.  Mentation and speech appropriate for age.  PSYCH: Mentation appears normal, affect normal/bright, judgement and insight intact, normal speech and appearance well-groomed.      Diagnostic Test Results:  Labs reviewed in Epic        Assessment & Plan   Problem List Items Addressed This Visit     None      Visit Diagnoses     Bruise    -  Primary    Anticoagulated        Relevant Orders    INR (Completed)    Other specified hypothyroidism        Relevant Orders    TSH (Completed)    T4 free (Completed)    Elevated glucose        Relevant Orders    Hemoglobin A1c (Completed)         Anticoagulated (warfarin) with one new large bruise on left wrist x 2 days:  --Has full ROM of left wrist/hand/arm and no pain  --Reassurance provided that bruising should resolve, though expect it to be slower since she is anticoagulated.  Will check  INR to make sure it is within the normal range  --monitor the bruise closely and seek immediate medical attention if red flag signs occur (development of fever, pain, etc)    Hypothyroid and hx impaired fasting glucose:  Will check labs  Continue Levothyroxine; may adjust dose pending lab result.    Hx elevated glucose:  Will also check HgbA1c    See Patient Instructions  Return in about 1 week (around 7/16/2020) for If Not Getting Any Better, re-check / follow-up.    Rema Braxton DO  [facility]      Video-Visit Details    Type of service:  Video Visit    Video End Time: 2:45 PM    Originating Location (pt. Location): Home    Distant Location (provider location):  [facility]     Platform used for Video Visit: AmWell    Return in about 1 week (around 7/16/2020) for If Not Getting Any Better, re-check / follow-up.       Rema Braxton DO

## 2020-07-10 ENCOUNTER — ANTICOAGULATION THERAPY VISIT (OUTPATIENT)
Dept: FAMILY MEDICINE | Facility: CLINIC | Age: 75
End: 2020-07-10

## 2020-07-10 DIAGNOSIS — R73.09 ELEVATED GLUCOSE: ICD-10-CM

## 2020-07-10 DIAGNOSIS — I82.A19 ACUTE DEEP VEIN THROMBOSIS (DVT) OF AXILLARY VEIN, UNSPECIFIED LATERALITY (H): ICD-10-CM

## 2020-07-10 DIAGNOSIS — E03.8 OTHER SPECIFIED HYPOTHYROIDISM: ICD-10-CM

## 2020-07-10 DIAGNOSIS — Z79.01 ANTICOAGULATED: ICD-10-CM

## 2020-07-10 LAB
HBA1C MFR BLD: 5.9 % (ref 0–5.6)
INR PPP: 3.3 (ref 0.86–1.14)
T4 FREE SERPL-MCNC: 1.83 NG/DL (ref 0.76–1.46)
TSH SERPL DL<=0.005 MIU/L-ACNC: 0.29 MU/L (ref 0.4–4)

## 2020-07-10 PROCEDURE — 83036 HEMOGLOBIN GLYCOSYLATED A1C: CPT | Performed by: FAMILY MEDICINE

## 2020-07-10 PROCEDURE — 36415 COLL VENOUS BLD VENIPUNCTURE: CPT | Performed by: FAMILY MEDICINE

## 2020-07-10 PROCEDURE — 84439 ASSAY OF FREE THYROXINE: CPT | Performed by: FAMILY MEDICINE

## 2020-07-10 PROCEDURE — 99207 ZZC NO CHARGE NURSE ONLY: CPT | Performed by: FAMILY MEDICINE

## 2020-07-10 PROCEDURE — 85610 PROTHROMBIN TIME: CPT | Performed by: FAMILY MEDICINE

## 2020-07-10 PROCEDURE — 84443 ASSAY THYROID STIM HORMONE: CPT | Performed by: FAMILY MEDICINE

## 2020-07-13 DIAGNOSIS — I82.A11 ACUTE DEEP VEIN THROMBOSIS (DVT) OF AXILLARY VEIN OF RIGHT UPPER EXTREMITY (H): ICD-10-CM

## 2020-07-13 RX ORDER — WARFARIN SODIUM 5 MG/1
TABLET ORAL
Qty: 110 TABLET | Refills: 3 | Status: SHIPPED | OUTPATIENT
Start: 2020-07-13 | End: 2021-07-22

## 2020-07-15 DIAGNOSIS — E03.9 ACQUIRED HYPOTHYROIDISM: Primary | ICD-10-CM

## 2020-07-15 NOTE — TELEPHONE ENCOUNTER
Please help inform pt about recent lab results:    1.  Thyroid level is too high.  We will need to decrease her Levothyroxine from 137 mcg to 125 mcg.  We should plan on re-checking thyroid levels in about 2-3 months.    2.  HgbA1c is 5.9% which is in the pre-Diabetes range.  However, it's better than it was a year ago when it was 6%.  So great job!    Please let me know if Rhonda has any questions or concerns.  I will send in the new script of Levothyroxine if that's OK with her.  Thanks!  --Dr. Braxton

## 2020-07-16 RX ORDER — LEVOTHYROXINE SODIUM 125 UG/1
125 TABLET ORAL DAILY
Qty: 90 TABLET | Refills: 0 | Status: SHIPPED | OUTPATIENT
Start: 2020-07-16 | End: 2020-09-29

## 2020-07-16 NOTE — TELEPHONE ENCOUNTER
Pt was called with lab results. She verbalized agreement with plan. Rx pended with preferred pharmacy. Encounter can be closed once rx is approved.

## 2020-07-20 ENCOUNTER — ANTICOAGULATION THERAPY VISIT (OUTPATIENT)
Dept: FAMILY MEDICINE | Facility: CLINIC | Age: 75
End: 2020-07-20

## 2020-07-20 DIAGNOSIS — I82.409 DVT (DEEP VENOUS THROMBOSIS) (H): ICD-10-CM

## 2020-07-20 DIAGNOSIS — I82.A19 ACUTE DEEP VEIN THROMBOSIS (DVT) OF AXILLARY VEIN, UNSPECIFIED LATERALITY (H): ICD-10-CM

## 2020-07-20 LAB — INR PPP: 2.2 (ref 0.86–1.14)

## 2020-07-20 PROCEDURE — 36415 COLL VENOUS BLD VENIPUNCTURE: CPT | Performed by: FAMILY MEDICINE

## 2020-07-20 PROCEDURE — 85610 PROTHROMBIN TIME: CPT | Performed by: FAMILY MEDICINE

## 2020-07-20 PROCEDURE — 99207 ZZC NO CHARGE NURSE ONLY: CPT | Performed by: FAMILY MEDICINE

## 2020-07-20 NOTE — PROGRESS NOTES
ANTICOAGULATION FOLLOW-UP CLINIC VISIT    Patient Name:  Manasa French  Date:  2020  Contact Type:  Telephone/ Patient    SUBJECTIVE:  Patient Findings     Comments:   The patient was assessed for diet, medication, and activity level changes, missed or extra doses, bruising or bleeding, with no problem findings.           OBJECTIVE    Recent labs: (last 7 days)     20  0845   INR 2.20*       ASSESSMENT / PLAN  INR assessment THER    Recheck INR In: 5 WEEKS    INR Location Clinic      Anticoagulation Summary  As of 2020    INR goal:   2.0-3.0   TTR:   69.1 % (1 y)   INR used for dosin.20 (2020)   Warfarin maintenance plan:   7.5 mg (5 mg x 1.5) every Mon, Wed, Fri; 5 mg (5 mg x 1) all other days   Full warfarin instructions:   7.5 mg every Mon, Wed, Fri; 5 mg all other days   Weekly warfarin total:   42.5 mg   No change documented:   Brandi Arriola RN   Plan last modified:   Noemy Dailey RN (2019)   Next INR check:   2020   Target end date:       Indications    Acute deep vein thrombosis (DVT) of axillary vein and internal jugular (H) [I82.A19]             Anticoagulation Episode Summary     INR check location:       Preferred lab:       Send INR reminders to:   [facility]    Comments:   40-50 min walk/lifting. 3 swimming. Greens 2-4/week.  started 100 mcg of vit k.      Anticoagulation Care Providers     Provider Role Specialty Phone number    Christopher Smith MD [facility] Internal Medicine [phone]            See the Encounter Report to view Anticoagulation Flowsheet and Dosing Calendar (Go to Encounters tab in chart review, and find the Anticoagulation Therapy Visit)    Patient made aware if signs of clotting (pain, tenderness, swelling, or color change in any extremity) AND/OR bleeding occur (nosebleeds, bleeding gums, bruising, or blood in stool or urine) to notify provider & seek medical attention. If severe symptoms develop, such as major  bleeding, chest pain, shortness of breath, fall, trauma or s/s of stroke, patient to call 911 immediately.     Dosage adjustment made based on physician directed care plan.    Brandi Arriola RN

## 2020-08-24 ENCOUNTER — TELEPHONE (OUTPATIENT)
Dept: FAMILY MEDICINE | Facility: CLINIC | Age: 75
End: 2020-08-24

## 2020-08-24 ENCOUNTER — ANTICOAGULATION THERAPY VISIT (OUTPATIENT)
Dept: FAMILY MEDICINE | Facility: CLINIC | Age: 75
End: 2020-08-24

## 2020-08-24 DIAGNOSIS — I82.A19 ACUTE DEEP VEIN THROMBOSIS (DVT) OF AXILLARY VEIN, UNSPECIFIED LATERALITY (H): ICD-10-CM

## 2020-08-24 DIAGNOSIS — I82.409 DVT (DEEP VENOUS THROMBOSIS) (H): ICD-10-CM

## 2020-08-24 DIAGNOSIS — R29.6 FALLS FREQUENTLY: Primary | ICD-10-CM

## 2020-08-24 LAB
CAPILLARY BLOOD COLLECTION: NORMAL
INR PPP: 2.7 (ref 0.86–1.14)

## 2020-08-24 PROCEDURE — 85610 PROTHROMBIN TIME: CPT | Performed by: FAMILY MEDICINE

## 2020-08-24 PROCEDURE — 99207 ZZC NO CHARGE NURSE ONLY: CPT | Performed by: FAMILY MEDICINE

## 2020-08-24 PROCEDURE — 36416 COLLJ CAPILLARY BLOOD SPEC: CPT | Performed by: FAMILY MEDICINE

## 2020-08-24 NOTE — TELEPHONE ENCOUNTER
Reason for Call:  Other Referrel     Detailed comments: The patient called and stated that she would like to start PT for balance and she had been seeing a Physical Therapist with [facility] and they informed her that she would need a referral from Dr Braxton to address her balance concerns. She would like a call once the referral has been placed so she can call and schedule an appointment.     Phone Number Patient can be reached at: Home number on file [phone] (home)    Best Time: Any    Can we leave a detailed message on this number? YES    Call taken on 8/24/2020 at 12:54 PM by Adina Reece

## 2020-08-24 NOTE — PROGRESS NOTES
ANTICOAGULATION FOLLOW-UP CLINIC VISIT    Patient Name:  Manasa French  Date:  2020  Contact Type:  Telephone/ Patient    SUBJECTIVE:  Patient Findings     Comments:   The patient was assessed for diet, medication, and activity level changes, missed or extra doses, bruising or bleeding, with no problem findings.           OBJECTIVE    Recent labs: (last 7 days)     20  0827   INR 2.70*       ASSESSMENT / PLAN  INR assessment THER    Recheck INR In: 4 WEEKS    INR Location Clinic      Anticoagulation Summary  As of 2020    INR goal:   2.0-3.0   TTR:   74.1 % (1 y)   INR used for dosin.70 (2020)   Warfarin maintenance plan:   7.5 mg (5 mg x 1.5) every Mon, Wed, Fri; 5 mg (5 mg x 1) all other days   Full warfarin instructions:   7.5 mg every Mon, Wed, Fri; 5 mg all other days   Weekly warfarin total:   42.5 mg   No change documented:   Brandi Arriola RN   Plan last modified:   Noemy Dailey RN (2019)   Next INR check:   2020   Target end date:       Indications    Acute deep vein thrombosis (DVT) of axillary vein and internal jugular (H) [I82.A19]             Anticoagulation Episode Summary     INR check location:       Preferred lab:       Send INR reminders to:   KAN HILL    Comments:   40-50 min walk/lifting. 3 swimming. Greens 2-4/week.  started 100 mcg of vit k.      Anticoagulation Care Providers     Provider Role Specialty Phone number    Christopher Smith MD [facility] Internal Medicine [phone]            See the Encounter Report to view Anticoagulation Flowsheet and Dosing Calendar (Go to Encounters tab in chart review, and find the Anticoagulation Therapy Visit)    Patient made aware if signs of clotting (pain, tenderness, swelling, or color change in any extremity) AND/OR bleeding occur (nosebleeds, bleeding gums, bruising, or blood in stool or urine) to notify provider & seek medical attention. If severe symptoms develop, such as major  bleeding, chest pain, shortness of breath, fall, trauma or s/s of stroke, patient to call 911 immediately.     Dosage adjustment made based on physician directed care plan.    Brandi Arriola RN

## 2020-08-24 NOTE — TELEPHONE ENCOUNTER
Please advise if this referral is appropriate.    Pt is going to [facility] in [place].  Fax: [phone]    Please call pt back with answer.

## 2020-08-25 ENCOUNTER — TELEPHONE (OUTPATIENT)
Dept: HEMATOLOGY | Facility: CLINIC | Age: 75
End: 2020-08-25

## 2020-08-27 ENCOUNTER — TELEPHONE (OUTPATIENT)
Dept: FAMILY MEDICINE | Facility: CLINIC | Age: 75
End: 2020-08-27

## 2020-08-27 ENCOUNTER — TELEPHONE (OUTPATIENT)
Dept: HEMATOLOGY | Facility: CLINIC | Age: 75
End: 2020-08-27

## 2020-08-27 NOTE — TELEPHONE ENCOUNTER
Reason for Call:  Form, our goal is to have forms completed with 72 hours, however, some forms may require a visit or additional information.    Type of letter, form or note:  medical    Who is the form from?: [facility] (if other please explain)    Where did the form come from: form was faxed in    What clinic location was the form placed at?: [facility]    Where the form was placed: [facility] Box/Folder    What number is listed as a contact on the form?: [phone]       Additional comments: [facility]: POC     Call taken on 8/27/2020 at 3:01 PM by Adina Reece

## 2020-08-28 ENCOUNTER — TRANSFERRED RECORDS (OUTPATIENT)
Dept: HEALTH INFORMATION MANAGEMENT | Facility: CLINIC | Age: 75
End: 2020-08-28

## 2020-09-02 ENCOUNTER — OFFICE VISIT (OUTPATIENT)
Dept: OPHTHALMOLOGY | Facility: CLINIC | Age: 75
End: 2020-09-02
Attending: OPHTHALMOLOGY
Payer: MEDICARE

## 2020-09-02 DIAGNOSIS — E07.9 THYROID EYE DISEASE: ICD-10-CM

## 2020-09-02 DIAGNOSIS — H40.1132 PRIMARY OPEN ANGLE GLAUCOMA (POAG) OF BOTH EYES, MODERATE STAGE: Primary | ICD-10-CM

## 2020-09-02 DIAGNOSIS — H57.89 THYROID EYE DISEASE: ICD-10-CM

## 2020-09-02 DIAGNOSIS — Z96.1 PSEUDOPHAKIA OF BOTH EYES: ICD-10-CM

## 2020-09-02 DIAGNOSIS — H35.3132 INTERMEDIATE STAGE NONEXUDATIVE AGE-RELATED MACULAR DEGENERATION OF BOTH EYES: ICD-10-CM

## 2020-09-02 PROCEDURE — G0463 HOSPITAL OUTPT CLINIC VISIT: HCPCS | Mod: ZF

## 2020-09-02 PROCEDURE — 92083 EXTENDED VISUAL FIELD XM: CPT | Mod: ZF | Performed by: OPHTHALMOLOGY

## 2020-09-02 ASSESSMENT — CUP TO DISC RATIO
OS_RATIO: 0.5
OD_RATIO: 0.7

## 2020-09-02 ASSESSMENT — CONF VISUAL FIELD
OD_NORMAL: 1
METHOD: COUNTING FINGERS
OS_NORMAL: 1

## 2020-09-02 ASSESSMENT — VISUAL ACUITY
OS_CC: 20/40+2
OD_CC: 20/30
METHOD: SNELLEN - LINEAR

## 2020-09-02 ASSESSMENT — REFRACTION_WEARINGRX
OD_HPRISM: 7 BO
OS_HPRISM: 7 BO
OS_VPRISM: 3 BD
SPECS_TYPE: SVL
OS_SPHERE: -2.50
OD_CYLINDER: SPHERE
OS_AXIS: 050
OD_VPRISM: 3 BU
OD_SPHERE: -0.75
OS_CYLINDER: +1.00

## 2020-09-02 ASSESSMENT — TONOMETRY
OD_IOP_MMHG: 20
OD_IOP_MMHG: 20
OS_IOP_MMHG: 15
IOP_METHOD: APPLANATION
OS_IOP_MMHG: 18
IOP_METHOD: APPLANATION

## 2020-09-02 ASSESSMENT — SLIT LAMP EXAM - LIDS
COMMENTS: NORMAL
COMMENTS: NORMAL

## 2020-09-02 NOTE — PROGRESS NOTES
Chief Complaint(s) and History of Present Illness(es)     Glaucoma Follow-Up     Laterality: both eyes    Associated symptoms: Negative for floaters and flashes    Treatment side effects: none    Compliance with Treatment: always    Pain scale: 0/10        Comments     Continue latanoprost HS both eyes - LD @ 10:00pm  Continue timolol daily both eyes - LD @ 5:30am   Manju RIGGS Dorothea, COT 12:34 PM 09/02/2020    Review of systems for the eyes was negative other than the pertinent positives/negatives listed in the HPI.      Current Medications:   Timolol every morning both eyes   Latanoprost HS both eyes     Intolerant to cosopt and alphagan    Ocular history:  Selected laser trabeculoplasty (SLT) right eye 2/2018 ( intraocular pressure 26 before selective laser trabeculoplasty)      Assessment & Plan      Manasa French is a 75 year old female with the following diagnoses:   1. Primary open angle glaucoma (POAG) of both eyes, moderate stage    2. Pseudophakia of both eyes    3. Thyroid eye disease    4. Intermediate stage nonexudative age-related macular degeneration of both eyes       POAG right eye moderate, left eye mild  IOP today 20/18  Pachy needs  Tmax 26/20 (records review)  [facility] eye sup arcuate and inf nasal step slightly worse?; left eye nasal step stable?  OCT rNFL (not done today): right eye T thinning and S thinning progression until (SLT) 2/2018; left eye stable SN thinning  Progression today, recommend lower intraocular pressure goal of upper teens right eye    - Continue latanoprost qHS both eyes    - Continue timolol daily both eyes (Vyzulta not on formulary, doubt she will tolerate Rhopressa, Intolerant to cosopt and alphagan)   - Recommend repeat Selected laser trabeculoplasty (SLT) right eye     Pseudophakia of both eyes  IOL stable in good position     Thyroid eye disease  Diplopia much better with new glasses.    Age related macular degeneration   Followed annually  by retina     Patient disposition:   Return for next available for SLT right eye, VT only.          Wolf Gonzalez, PGY3  Ophthalmology Resident    Attending Physician Attestation:  Complete documentation of historical and exam elements from today's encounter can be found in the full encounter summary report (not reduplicated in this progress note).  I personally obtained the chief complaint(s) and history of present illness.  I confirmed and edited as necessary the review of systems, past medical/surgical history, family history, social history, and examination findings as documented by others; and I examined the patient myself.  I personally reviewed the relevant tests, images, and reports as documented above.  I formulated and edited as necessary the assessment and plan and discussed the findings and management plan with the patient and family. .Attending Physician Image/Tesing Attestation: I personally reviewed the ophthalmic test(s) associated with this encounter, agree with the interpretation(s) as documented by the resident/fellow, and have edited the corresponding report(s) as necessary.   - Jarred Leon MD

## 2020-09-08 ENCOUNTER — TRANSFERRED RECORDS (OUTPATIENT)
Dept: HEALTH INFORMATION MANAGEMENT | Facility: CLINIC | Age: 75
End: 2020-09-08

## 2020-09-09 ENCOUNTER — VIRTUAL VISIT (OUTPATIENT)
Dept: HEMATOLOGY | Facility: CLINIC | Age: 75
End: 2020-09-09
Attending: INTERNAL MEDICINE
Payer: MEDICARE

## 2020-09-09 ENCOUNTER — TELEPHONE (OUTPATIENT)
Dept: FAMILY MEDICINE | Facility: CLINIC | Age: 75
End: 2020-09-09

## 2020-09-09 VITALS — WEIGHT: 150 LBS | BODY MASS INDEX: 24.21 KG/M2

## 2020-09-09 DIAGNOSIS — I82.A11 ACUTE DEEP VEIN THROMBOSIS (DVT) OF AXILLARY VEIN OF RIGHT UPPER EXTREMITY (H): Primary | ICD-10-CM

## 2020-09-09 PROCEDURE — 99441 ZZC PHYSICIAN TELEPHONE EVALUATION 5-10 MIN: CPT | Mod: 95 | Performed by: INTERNAL MEDICINE

## 2020-09-09 PROCEDURE — 40001009 ZZH VIDEO/TELEPHONE VISIT; NO CHARGE

## 2020-09-09 NOTE — PROGRESS NOTES
Patient was contacted to complete the pre-visit call prior to their telephone visit with the provider.  The following statement was read:       This visit will be billed to your insurance the same as an in-person visit. Because of Coronavirus we are instituting telephone visits when possible to keep everyone safe. The telephone visit will be a call between you and the provider.  This service lets us provide the care you need with a telephone conversation.  If a prescription is necessary, we can send it directly to your pharmacy.If lab work or other testing is needed, we can help arrange a place/time for that to be done at a later date.If during the course of the call the provider feels a telephone visit is not appropriate, then your insurance company will not be billed.       Allergies and medications were reviewed and travel screening complete.     I thanked them for their time to cover this information.     King Hernandes [facility]        [facility]  [address] [place] [address]  Phone: [phone], Fax: [phone]      Outpatient Visit Note:    Patient: Manasa French  MRN: [id number]  : 1945  JENNIFER: Sep 9, 2020    Reason for Visit:  Annual follow up for indefinite anticoagluation    Forward History:  2019 right arm swelling, found to have RIJ, Subclav, Axillary and brachial vein, unprovoked, started on apixaban but was expensive and changed to warfarin  May 2019 venous ultrasound performed for thoracic outlet syndrome and there was no obstructive physiology appreciated.    Interval History: Manasa French is a 75 year old female with a history of right upper extremity DVT who returns for routine follow up.  She is been doing well since I saw her last year.  She notes no easy bruising but did have 1 large hematoma in the last year.  She has no melena or hematochezia or other bleeding symptoms.  The upper extremity swelling of the right has completely  resolved.  She is been tolerating warfarin well and much better since we started the vit K supplement last year.    Past Medical History:  Past Medical History:   Diagnosis Date     6th nerve palsy     OU     Cataract      Graves disease      Hypertension      Nystagmus      Osteopenia      Postablative hypothyroidism      Primary open angle glaucoma      Pseudophakia, right eye 4/29/2014     Spider veins      Thyroid eye disease      Vertebral fractures osteoporotic (H)        Medications:  Current Outpatient Medications   Medication Sig     alendronate (FOSAMAX) 70 MG tablet TAKE 1 TABLET ONE TIME WEEKLY     amLODIPine (NORVASC) 5 MG tablet Take 1 tablet (5 mg) by mouth daily     Calcium Carb-Cholecalciferol (CALCIUM CARBONATE-VITAMIN D3 PO)      carboxymethylcellulose (CELLUVISC/REFRESH LIQUIGEL) 1 % ophthalmic solution Place 1 drop into both eyes 3 times daily     Cholecalciferol (VITAMIN D) 1000 UNITS capsule Take  by mouth.     hydrochlorothiazide (HYDRODIURIL) 12.5 MG tablet Take 1 tablet (12.5 mg) by mouth daily     latanoprost (XALATAN) 0.005 % ophthalmic solution Place 1 drop into both eyes At Bedtime     levothyroxine (SYNTHROID/LEVOTHROID) 125 MCG tablet Take 1 tablet (125 mcg) by mouth daily     losartan (COZAAR) 100 MG tablet TAKE 1 TABLET EVERY DAY     Multiple Vitamins-Minerals (ICAPS AREDS 2 PO)      timolol maleate (TIMOPTIC) 0.5 % ophthalmic solution Place 1 drop into both eyes every morning     Vitamin K 100 MCG TABS Take 1 tablet by mouth daily     warfarin ANTICOAGULANT (COUMADIN) 5 MG tablet Current dose (7/15/19): 5 mg Mon, Wed, Fri + 7.5 mg all other days or as directed by Coumadin clinic.     levothyroxine (SYNTHROID/LEVOTHROID) 137 MCG tablet Take 1 tablet (137 mcg) by mouth daily (Patient not taking: Reported on 9/2/2020)     No current facility-administered medications for this visit.        Allergies:  Allergies   Allergen Reactions     Alphagan [Brimonidine] Unknown     Animal Dander       cats     Cosopt [Dorzolamide-Timolol] Unknown     No Clinical Screening - See Comments Other (See Comments)     Animal dander (cats)     Seasonal Allergies        ROS:  A 14 point ROS is negative except as stated in the HPI    Objective:  Vitals: B/P: 144/68, T: 97.6, P: 82, R: 12, Wt: 150 lbs 0 oz  Exam:   Gen: Appears well, no distress  HEENT: no scleral icterus or hemorrhage, no wet purpura, no lymphadenopathy  Ext: no edema of the right upper extremity    Labs:  Results for BRIGIDO SALOMON (MRN [id number]) as of 9/14/2019 22:29   Ref. Range 7/29/2019 00:00 8/9/2019 07:54 8/28/2019 00:00 9/5/2019 00:00   INR Latest Ref Range: 0.86 - 1.14  2.6 (A) 3.70 (H) 1.8 (A) 2.6 (A)       Imaging:  Reviewed her may thoracic outlet syndrome study which is negative.    Assessment:  In summary, Manasa Salomon is a 75 year old female with history of an unprovoked right upper extremity deep vein thrombosis.  We have excluded the presence of thoracic outlet syndrome and was clearly this thrombosis appears to be unprovoked.    Overall weighing her risks and benefits for continued and indefinite anticoagulation, I think that her risk of recurrent thrombosis is likely high based on what we know about unprovoked deep vein thrombosis.  She has done better with INRs and had no falls or other bleeding concerns in the last year that make her uncomfortable to continue with anticoagulation so we agree to continue another year    Plan:  1. Majority of today's visit was spent counseling the patient regarding the natural history of unprovoked deep vein thrombosis, and results of our study for thoracic outlet syndrome.  2.  At this point we will continue warfarin indefinitely given that we think her risk for thrombosis is likely high given the unprovoked nature of the thrombus.   3. Continue warfarin, goal INR 2-3  4. Continue vitamin K supplementation for INR stability  5. She will call with questions or concerns and plan to follow up  in 1 year.    The patient is given our center's contact information and is instructed to call if she should have any further questions or concerns.  Otherwise, we will plan on seeing her back in 1 year.      Christopher Smith MD   of Medicine  [facility]     Total telephone time: 7 minutes

## 2020-09-09 NOTE — TELEPHONE ENCOUNTER
Reason for Call:  Form, our goal is to have forms completed with 72 hours, however, some forms may require a visit or additional information.    Type of letter, form or note:  medical    Who is the form from?: Home care    Where did the form come from: form was faxed in    What clinic location was the form placed at?: [facility]    Where the form was placed: [facility] Box/Folder    What number is listed as a contact on the form?: [phone] (P) [phone]       Additional comments: [facility]: PT Assessment    Call taken on 9/9/2020 at 12:58 PM by Adina Reece

## 2020-09-11 DIAGNOSIS — I10 HTN, GOAL BELOW 140/90: ICD-10-CM

## 2020-09-11 RX ORDER — HYDROCHLOROTHIAZIDE 12.5 MG/1
12.5 TABLET ORAL DAILY
Qty: 90 TABLET | Refills: 0 | Status: SHIPPED | OUTPATIENT
Start: 2020-09-11 | End: 2020-11-19

## 2020-09-16 ENCOUNTER — OFFICE VISIT (OUTPATIENT)
Dept: OPHTHALMOLOGY | Facility: CLINIC | Age: 75
End: 2020-09-16
Attending: OPHTHALMOLOGY
Payer: MEDICARE

## 2020-09-16 DIAGNOSIS — Z96.1 PSEUDOPHAKIA OF BOTH EYES: ICD-10-CM

## 2020-09-16 DIAGNOSIS — H40.1132 PRIMARY OPEN ANGLE GLAUCOMA (POAG) OF BOTH EYES, MODERATE STAGE: Primary | ICD-10-CM

## 2020-09-16 PROCEDURE — 25000125 ZZHC RX 250: Mod: ZF | Performed by: OPHTHALMOLOGY

## 2020-09-16 PROCEDURE — G0463 HOSPITAL OUTPT CLINIC VISIT: HCPCS | Mod: ZF

## 2020-09-16 PROCEDURE — 65855 TRABECULOPLASTY LASER SURG: CPT | Mod: RT,ZF | Performed by: OPHTHALMOLOGY

## 2020-09-16 RX ADMIN — APRACLONIDINE HYDROCHLORIDE 1 DROP: 10 SOLUTION/ DROPS OPHTHALMIC at 12:59

## 2020-09-16 ASSESSMENT — TONOMETRY
IOP_METHOD: APPLANATION
OS_IOP_MMHG: 18
OD_IOP_MMHG: 23
IOP_METHOD: APPLANATION
OD_IOP_MMHG: 24

## 2020-09-16 ASSESSMENT — SLIT LAMP EXAM - LIDS
COMMENTS: NORMAL
COMMENTS: NORMAL

## 2020-09-16 ASSESSMENT — REFRACTION_WEARINGRX
OD_HPRISM: 7 BO
OS_HPRISM: 7 BO
OS_AXIS: 050
OS_VPRISM: 3 BD
OS_SPHERE: -2.50
OD_VPRISM: 3 BU
OS_CYLINDER: +1.00
OD_SPHERE: -0.75
OD_CYLINDER: SPHERE
SPECS_TYPE: SVL

## 2020-09-16 ASSESSMENT — CONF VISUAL FIELD
OD_NORMAL: 1
METHOD: COUNTING FINGERS
OS_NORMAL: 1

## 2020-09-16 ASSESSMENT — VISUAL ACUITY
OD_CC: 20/25
OS_CC+: -1
OD_CC+: -2
OS_CC: 20/40
METHOD: SNELLEN - LINEAR
CORRECTION_TYPE: GLASSES

## 2020-09-16 NOTE — NURSING NOTE
Chief Complaints and History of Present Illnesses   Patient presents with     Follow Up     Primary open angle glaucoma (POAG) of both eyes, moderate stage      Chief Complaint(s) and History of Present Illness(es)     Follow Up     Laterality: both eyes    Onset: gradual    Onset: years ago    Course: stable    Associated symptoms: dryness and double vision.  Negative for eye pain, tearing, flashes, floaters, haloes and glare    Treatments tried: eye drops, artificial tears and ointment    Pain scale: 0/10    Comments: Primary open angle glaucoma (POAG) of both eyes, moderate stage               Comments     Pt states vision is stable since last visit.  Pt compliant with drops.  Pt uses art tears and ointment for MANJIT that helps.  Timolol every morning both eyes   Latanoprost HS both eyes     BILL Davis September 16, 2020 12:43 PM

## 2020-09-16 NOTE — PROGRESS NOTES
Chief Complaint(s) and History of Present Illness(es)     Follow Up     Laterality: both eyes    Onset: gradual    Onset: years ago    Course: stable    Associated symptoms: dryness and double vision.  Negative for eye pain,   tearing, flashes, floaters, haloes and glare    Treatments tried: eye drops, artificial tears and ointment    Pain scale: 0/10    Comments: Primary open angle glaucoma (POAG) of both eyes, moderate stage                 Comments     Pt states vision is stable since last visit.  Pt compliant with drops.    Pt uses art tears and ointment for MANJIT that helps.  Timolol every morning both eyes   Latanoprost HS both eyes     BILL Davis September 16, 2020 12:43 PM              Review of systems for the eyes was negative other than the pertinent positives/negatives listed in the HPI.      Assessment & Plan      Manasa French is a 75 year old female with the following diagnoses:   1. Primary open angle glaucoma (POAG) of both eyes, moderate stage    2. Pseudophakia of both eyes         Here for selected laser trabeculoplasty (SLT) right eye  RBA reviewed, consent obtained  Tolerated well, Return precautions reviewed         Patient disposition:   Return in about 6 weeks (around 10/28/2020) for VT only.           Attending Physician Attestation:  Complete documentation of historical and exam elements from today's encounter can be found in the full encounter summary report (not reduplicated in this progress note).  I personally obtained the chief complaint(s) and history of present illness.  I confirmed and edited as necessary the review of systems, past medical/surgical history, family history, social history, and examination findings as documented by others; and I examined the patient myself.  I personally reviewed the relevant tests, images, and reports as documented above.  I formulated and edited as necessary the assessment and plan and discussed the findings and management plan with the  patient and family. . Attending Physician Procedure Attestation: I was present for the entire procedure     - Jarred Leon MD

## 2020-09-21 ENCOUNTER — ANTICOAGULATION THERAPY VISIT (OUTPATIENT)
Dept: FAMILY MEDICINE | Facility: CLINIC | Age: 75
End: 2020-09-21

## 2020-09-21 DIAGNOSIS — I82.A11 ACUTE DEEP VEIN THROMBOSIS (DVT) OF AXILLARY VEIN OF RIGHT UPPER EXTREMITY (H): ICD-10-CM

## 2020-09-21 DIAGNOSIS — I82.409 DVT (DEEP VENOUS THROMBOSIS) (H): ICD-10-CM

## 2020-09-21 LAB
CAPILLARY BLOOD COLLECTION: NORMAL
INR PPP: 2.7 (ref 0.86–1.14)

## 2020-09-21 PROCEDURE — 85610 PROTHROMBIN TIME: CPT | Performed by: FAMILY MEDICINE

## 2020-09-21 PROCEDURE — 99207 ZZC NO CHARGE NURSE ONLY: CPT | Performed by: FAMILY MEDICINE

## 2020-09-21 PROCEDURE — 36416 COLLJ CAPILLARY BLOOD SPEC: CPT | Performed by: FAMILY MEDICINE

## 2020-09-21 NOTE — PROGRESS NOTES
ANTICOAGULATION FOLLOW-UP CLINIC VISIT    Patient Name:  Manasa French  Date:  2020  Contact Type:  Telephone/ Patient    SUBJECTIVE:  Patient Findings     Comments:   The patient was assessed for diet, medication, and activity level changes, missed or extra doses, bruising or bleeding, with no problem findings.            OBJECTIVE    Recent labs: (last 7 days)     20  0755   INR 2.70*       ASSESSMENT / PLAN  INR assessment THER    Recheck INR In: 4 WEEKS    INR Location Clinic      Anticoagulation Summary  As of 2020    INR goal:   2.0-3.0   TTR:   76.6 % (1 y)   INR used for dosin.70 (2020)   Warfarin maintenance plan:   7.5 mg (5 mg x 1.5) every Mon, Wed, Fri; 5 mg (5 mg x 1) all other days   Full warfarin instructions:   7.5 mg every Mon, Wed, Fri; 5 mg all other days   Weekly warfarin total:   42.5 mg   No change documented:   Brandi Arriola RN   Plan last modified:   Noemy Dailey RN (2019)   Next INR check:   10/19/2020   Target end date:       Indications    Acute deep vein thrombosis (DVT) of axillary vein and internal jugular (H) [I82.A19]             Anticoagulation Episode Summary     INR check location:       Preferred lab:       Send INR reminders to:   KAN HILL    Comments:   40-50 min walk/lifting. 3 swimming. Greens 2-4/week.  started 100 mcg of vit k.      Anticoagulation Care Providers     Provider Role Specialty Phone number    Christopher Smith MD [facility] Internal Medicine [phone]            See the Encounter Report to view Anticoagulation Flowsheet and Dosing Calendar (Go to Encounters tab in chart review, and find the Anticoagulation Therapy Visit)    Patient made aware if signs of clotting (pain, tenderness, swelling, or color change in any extremity) AND/OR bleeding occur (nosebleeds, bleeding gums, bruising, or blood in stool or urine) to notify provider & seek medical attention. If severe symptoms develop, such as major  bleeding, chest pain, shortness of breath, fall, trauma or s/s of stroke, patient to call 911 immediately.     Dosage adjustment made based on physician directed care plan.    Brandi Arriola RN

## 2020-09-29 DIAGNOSIS — E03.9 ACQUIRED HYPOTHYROIDISM: ICD-10-CM

## 2020-09-29 RX ORDER — LEVOTHYROXINE SODIUM 125 UG/1
125 TABLET ORAL DAILY
Qty: 90 TABLET | Refills: 0 | Status: SHIPPED | OUTPATIENT
Start: 2020-09-29 | End: 2020-11-19

## 2020-10-09 ENCOUNTER — TELEPHONE (OUTPATIENT)
Dept: FAMILY MEDICINE | Facility: CLINIC | Age: 75
End: 2020-10-09

## 2020-10-09 NOTE — TELEPHONE ENCOUNTER
Reason for Call:  Form, our goal is to have forms completed with 72 hours, however, some forms may require a visit or additional information.    Type of letter, form or note:  Form for handicap parking permit    Who is the form from?: [facility] (if other please explain)    Where did the form come from: Patient or family brought in       What clinic location was the form placed at?: [facility]    Where the form was placed: Placed in back phone room    What number is listed as a contact on the form?: [phone]       Additional comments: The patient brought in a form from the DM for a handicap parking permit that she needs Dr. Braxton to sign. Once it has been completed she would like it sent to her home address in the enclosed envelope.     Call taken on 10/9/2020 at 10:56 AM by Adina Amaya

## 2020-10-26 ENCOUNTER — ANTICOAGULATION THERAPY VISIT (OUTPATIENT)
Dept: FAMILY MEDICINE | Facility: CLINIC | Age: 75
End: 2020-10-26

## 2020-10-26 DIAGNOSIS — I82.409 DVT (DEEP VENOUS THROMBOSIS) (H): ICD-10-CM

## 2020-10-26 DIAGNOSIS — I82.A11 ACUTE DEEP VEIN THROMBOSIS (DVT) OF AXILLARY VEIN OF RIGHT UPPER EXTREMITY (H): ICD-10-CM

## 2020-10-26 LAB
CAPILLARY BLOOD COLLECTION: NORMAL
INR PPP: 3.5 (ref 0.86–1.14)

## 2020-10-26 PROCEDURE — 99207 PR NO CHARGE NURSE ONLY: CPT | Performed by: FAMILY MEDICINE

## 2020-10-26 PROCEDURE — 85610 PROTHROMBIN TIME: CPT | Performed by: FAMILY MEDICINE

## 2020-10-26 PROCEDURE — 36416 COLLJ CAPILLARY BLOOD SPEC: CPT | Performed by: FAMILY MEDICINE

## 2020-10-26 NOTE — PROGRESS NOTES
ANTICOAGULATION FOLLOW-UP CLINIC VISIT    Patient Name:  Manasa French  Date:  10/26/2020  Contact Type:  Telephone/ Patient    SUBJECTIVE:  Patient Findings     Positives:  Change in health (Diarrhea sx on Saturday. Sx have since resolved. ), Change in diet/appetite (Low intake Sat and Sun.)    Comments:  The patient was assessed for medication and activity level changes, missed or extra doses, bruising or bleeding, with no problem findings. Patient's PCP's clinic is closing, writer assisted in making patient an annual prior to Dec 1st.              OBJECTIVE    Recent labs: (last 7 days)     10/26/20  0749   INR 3.50*       ASSESSMENT / PLAN  INR assessment SUPRA    Recheck INR In: 2 WEEKS    INR Location Clinic      Anticoagulation Summary  As of 10/26/2020    INR goal:  2.0-3.0   TTR:  74.2 % (1 y)   INR used for dosing:  3.50 (10/26/2020)   Warfarin maintenance plan:  7.5 mg (5 mg x 1.5) every Mon, Wed, Fri; 5 mg (5 mg x 1) all other days   Full warfarin instructions:  10/26: 5 mg; Otherwise 7.5 mg every Mon, Wed, Fri; 5 mg all other days   Weekly warfarin total:  42.5 mg   Plan last modified:  Noemy Dailey RN (12/19/2019)   Next INR check:  11/10/2020   Target end date:      Indications    Acute deep vein thrombosis (DVT) of axillary vein and internal jugular (H) [I82.A19]             Anticoagulation Episode Summary     INR check location:      Preferred lab:      Send INR reminders to:  KAN IHLL    Comments:  40-50 min walk/lifting. 3 swimming. Greens 2-4/week. 9/16 started 100 mcg of vit k.      Anticoagulation Care Providers     Provider Role Specialty Phone number    Christopher Smith MD [facility] Internal Medicine [phone]            See the Encounter Report to view Anticoagulation Flowsheet and Dosing Calendar (Go to Encounters tab in chart review, and find the Anticoagulation Therapy Visit)    Per protocol, patient advised to decrease today's warfarin dose by 2.5 mg to account  for supra-therapeutic INR level. Patient instructed to increase green intake today and tomorrow as well. Recheck within 2 weeks to ensure INR stability.     Patient made aware if signs of clotting (pain, tenderness, swelling, or color change in any extremity) AND/OR bleeding occur (nosebleeds, bleeding gums, bruising, or blood in stool or urine) to notify provider & seek medical attention. If severe symptoms develop, such as major bleeding, chest pain, shortness of breath, fall, trauma or s/s of stroke, patient to call 911 immediately.     Dosage adjustment made based on physician directed care plan.    Brandi Arriola RN

## 2020-11-04 ENCOUNTER — OFFICE VISIT (OUTPATIENT)
Dept: OPHTHALMOLOGY | Facility: CLINIC | Age: 75
End: 2020-11-04
Attending: OPHTHALMOLOGY
Payer: MEDICARE

## 2020-11-04 DIAGNOSIS — Z96.1 PSEUDOPHAKIA OF BOTH EYES: ICD-10-CM

## 2020-11-04 DIAGNOSIS — H40.1132 PRIMARY OPEN ANGLE GLAUCOMA (POAG) OF BOTH EYES, MODERATE STAGE: Primary | ICD-10-CM

## 2020-11-04 DIAGNOSIS — H35.3132 INTERMEDIATE STAGE NONEXUDATIVE AGE-RELATED MACULAR DEGENERATION OF BOTH EYES: ICD-10-CM

## 2020-11-04 DIAGNOSIS — H40.1132 PRIMARY OPEN ANGLE GLAUCOMA OF BOTH EYES, MODERATE STAGE: ICD-10-CM

## 2020-11-04 PROCEDURE — G0463 HOSPITAL OUTPT CLINIC VISIT: HCPCS

## 2020-11-04 PROCEDURE — 99213 OFFICE O/P EST LOW 20 MIN: CPT | Mod: GC | Performed by: OPHTHALMOLOGY

## 2020-11-04 RX ORDER — LATANOPROST 50 UG/ML
1 SOLUTION/ DROPS OPHTHALMIC AT BEDTIME
Qty: 7.5 ML | Refills: 3 | Status: SHIPPED | OUTPATIENT
Start: 2020-11-04 | End: 2021-05-05

## 2020-11-04 RX ORDER — TIMOLOL MALEATE 5 MG/ML
1 SOLUTION/ DROPS OPHTHALMIC EVERY MORNING
Qty: 5 ML | Refills: 3 | Status: SHIPPED | OUTPATIENT
Start: 2020-11-04 | End: 2021-05-05

## 2020-11-04 ASSESSMENT — VISUAL ACUITY
OS_PH_CC: 20/40
OD_PH_CC+: +
OS_CC: 20/40
OS_CC+: -1
CORRECTION_TYPE: GLASSES
METHOD: SNELLEN - LINEAR
OD_CC+: -2
OD_PH_CC: 20/30
OD_CC: 20/30
OS_PH_CC+: +2

## 2020-11-04 ASSESSMENT — TONOMETRY
IOP_METHOD: TONOPEN
IOP_METHOD: APPLANATION
OS_IOP_MMHG: 16
OS_IOP_MMHG: 16
OD_IOP_MMHG: 16
OD_IOP_MMHG: 16
OS_IOP_MMHG: 14
OD_IOP_MMHG: 15
IOP_METHOD: APPLANATION

## 2020-11-04 ASSESSMENT — SLIT LAMP EXAM - LIDS
COMMENTS: NORMAL
COMMENTS: NORMAL

## 2020-11-04 ASSESSMENT — CONF VISUAL FIELD
OS_NORMAL: 1
OD_NORMAL: 1
METHOD: COUNTING FINGERS

## 2020-11-04 NOTE — NURSING NOTE
Chief Complaints and History of Present Illnesses   Patient presents with     Primary Open Angle Glaucoma Follow Up     Chief Complaint(s) and History of Present Illness(es)     Primary Open Angle Glaucoma Follow Up     Laterality: both eyes    Associated symptoms: Negative for eye pain, itching, discharge, flashes, floaters and tearing    Treatment side effects: none    Compliance with Treatment: always    Pain scale: 0/10              Comments     Timolol in the morning in BE / LD @ 8 am today  Latanoprost at bedtime to BE / LD @ 9:30 pm yesterday  Pt states she's doing well; eyes comfortable; VA stable.    Minoo Reyna, COT COT 12:43 PM 11/04/2020

## 2020-11-04 NOTE — PROGRESS NOTES
Chief Complaint(s) and History of Present Illness(es)     Primary Open Angle Glaucoma Follow Up     Laterality: both eyes    Associated symptoms: Negative for eye pain, itching, discharge, flashes,   floaters and tearing    Treatment side effects: none    Compliance with Treatment: always    Pain scale: 0/10              Comments     Timolol in the morning in BE / LD @ 8 am today  Latanoprost at bedtime to BE / LD @ 9:30 pm yesterday  Pt states she's doing well; eyes comfortable; VA stable.    Minoo Reyna, COT COT 12:43 PM 11/04/2020              Review of systems for the eyes was negative other than the pertinent positives/negatives listed in the HPI.      Assessment & Plan      Manasa French is a 75 year old female with the following diagnoses:   1. Primary open angle glaucoma (POAG) of both eyes, moderate stage    2. Pseudophakia of both eyes    3. Intermediate stage nonexudative age-related macular degeneration of both eyes      - Follow up POW8 s/p SLT right eye   - Good intraocular pressure response: 24->16  - Continue current drops          Patient disposition:   Return in about 6 months (around 5/4/2021) for VT only, OCT NFL.           Maryam Mcclendon MD  Ophthalmology PGY-3  [facility]    Attending Physician Attestation:  Complete documentation of historical and exam elements from today's encounter can be found in the full encounter summary report (not reduplicated in this progress note).  I personally obtained the chief complaint(s) and history of present illness.  I confirmed and edited as necessary the review of systems, past medical/surgical history, family history, social history, and examination findings as documented by others; and I examined the patient myself.  I personally reviewed the relevant tests, images, and reports as documented above.  I formulated and edited as necessary the assessment and plan and discussed the findings and management plan with the patient and family.  . - Jarred Leon MD

## 2020-11-09 NOTE — PROGRESS NOTES
"SUBJECTIVE:   Manasa French is a 75 year old female who presents for Preventive Visit.      Patient has been advised of split billing requirements and indicates understanding: Yes   Are you in the first 12 months of your Medicare coverage?  No    Healthy Habits:    In general, how would you rate your overall health?  Good    Frequency of exercise:  6-7 days/week    Duration of exercise:  Greater than 60 minutes    Do you usually eat at least 4 servings of fruit and vegetables a day, include whole grains    & fiber and avoid regularly eating high fat or \"junk\" foods?  Yes    Taking medications regularly:  No    Barriers to taking medications:  None    Medication side effects:  None    Ability to successfully perform activities of daily living:  Transportation requires assistance    Home Safety:  No safety concerns identified    Hearing Impairment:  No hearing concerns    In the past 6 months, have you been bothered by leaking of urine?  No (bowel incontinence)    In general, how would you rate your overall mental or emotional health?  Very good      PHQ-2 Total Score:    Additional concerns today:  Yes (med refills, INR, pain in left buttocks-ongoing issue, what pain meds can she take? dull aching pain in hip/buttocks)    Do you feel safe in your environment? Yes    Have you ever done Advance Care Planning? (For example, a Health Directive, POLST, or a discussion with a medical provider or your loved ones about your wishes): Yes, patient states has an Advance Care Planning document and will bring a copy to the clinic.      Fall risk  Fallen 2 or more times in the past year?: Yes  Any fall with injury in the past year?: No  Timed Up and Go Test (>13.5 is fall risk; contact physician) : 7    Cognitive Screening   1) Repeat 3 items (Leader, Season, Table)    2) Clock draw: NORMAL  3) 3 item recall: Recalls 3 objects  Results: 3 items recalled: COGNITIVE IMPAIRMENT LESS LIKELY    Mini-CogTM Copyright S Mitch. " Licensed by the author for use in [facility]; reprinted with permission (lyn@[facility]). All rights reserved.      Do you have sleep apnea, excessive snoring or daytime drowsiness?: no    Reviewed and updated as needed this visit by clinical staff  Tobacco  Allergies  Meds  Problems  Med Hx  Surg Hx  Fam Hx  Soc Hx          Reviewed and updated as needed this visit by Provider  Tobacco  Allergies  Meds  Problems  Med Hx  Surg Hx  Fam Hx         Social History     Tobacco Use     Smoking status: Never Smoker     Smokeless tobacco: Never Used   Substance Use Topics     Alcohol use: Yes     Alcohol/week: 0.0 standard drinks     Comment: wine with supper     If you drink alcohol do you typically have >3 drinks per day or >7 drinks per week? No    No flowsheet data found.      Current providers sharing in care for this patient include:   Patient Care Team:  Rema Braxton DO as PCP - General (Family Practice)  Rema Braxton DO as Assigned PCP  Kevin Hernandez MD as Assigned Endocrinology Provider  Jarred Leon MD as Assigned Surgical Provider    The following health maintenance items are reviewed in Epic and correct as of today:  Health Maintenance   Topic Date Due     HEPATITIS C SCREENING  08/05/1962     ZOSTER IMMUNIZATION (1 of 2) 08/05/1994     ASTHMA ACTION PLAN  03/08/2020     COLORECTAL CANCER SCREENING  06/22/2020     ASTHMA CONTROL TEST  07/26/2020     TSH W/FREE T4 REFLEX  02/10/2021     BMP  04/23/2021     MAMMO SCREENING  06/11/2021     FALL RISK ASSESSMENT  09/16/2021     MEDICARE ANNUAL WELLNESS VISIT  11/10/2021     DTAP/TDAP/TD IMMUNIZATION (3 - Td) 12/19/2021     LIPID  08/31/2023     ADVANCE CARE PLANNING  11/10/2025     DEXA  Completed     PHQ-2  Completed     INFLUENZA VACCINE  Completed     Pneumococcal Vaccine: 65+ Years  Completed     Pneumococcal Vaccine: Pediatrics (0 to 5 Years) and At-Risk Patients (6 to 64 Years)  Aged Out     IPV  "IMMUNIZATION  Aged Out     MENINGITIS IMMUNIZATION  Aged Out     HEPATITIS B IMMUNIZATION  Aged Out     Lab work is in process  Labs reviewed in EPIC      Review of Systems  CONSTITUTIONAL: NEGATIVE for fever, chills, change in weight  INTEGUMENTARY/SKIN: NEGATIVE for worrisome rashes, moles or lesions  EYES: NEGATIVE for vision changes or irritation  ENT/MOUTH: NEGATIVE for ear, mouth and throat problems  RESP: NEGATIVE for significant cough or SOB  BREAST: NEGATIVE for masses, tenderness or discharge  CV: NEGATIVE for chest pain, palpitations or peripheral edema  GI: NEGATIVE for nausea, abdominal pain, heartburn, or change in bowel habits  : NEGATIVE for frequency, dysuria, or hematuria  HEME: NEGATIVE for bleeding problems  PSYCHIATRIC: NEGATIVE for changes in mood or affect    OBJECTIVE:   /60   Pulse 82   Temp 98.1  F (36.7  C) (Tympanic)   Resp 14   Ht 1.651 m (5' 5\")   Wt 69.4 kg (153 lb)   SpO2 97%   BMI 25.46 kg/m   Estimated body mass index is 25.46 kg/m  as calculated from the following:    Height as of this encounter: 1.651 m (5' 5\").    Weight as of this encounter: 69.4 kg (153 lb).  Physical Exam  GENERAL APPEARANCE: healthy, alert and no distress  EYES: Eyes grossly normal to inspection, PERRL and conjunctivae and sclerae normal  HENT: wearing face mask covering mouth and nose  NECK: no adenopathy, no asymmetry, masses, or scars and thyroid normal to palpation  RESP: lungs clear to auscultation - no rales, rhonchi or wheezes  BREAST: deferred  CV: regular rate and rhythm, normal S1 S2, no S3 or S4, no murmur, click or rub, no peripheral edema and peripheral pulses strong  ABDOMEN: soft, nontender, no hepatosplenomegaly, no masses and bowel sounds normal   (female): deferred  MS: no musculoskeletal defects are noted and gait is age appropriate without ataxia  SKIN: no suspicious lesions or rashes  NEURO: Normal strength and tone, sensory exam grossly normal, mentation intact and " "speech normal  PSYCH: mentation appears normal and affect normal/bright    Diagnostic Test Results:  Labs reviewed in Epic    ASSESSMENT / PLAN:   Manasa was seen today for physical.    Diagnoses and all orders for this visit:    Encounter for Medicare annual wellness exam    Other specified hypothyroidism  -     TSH with free T4 reflex    HTN, goal below 140/90    Acute deep vein thrombosis (DVT) of axillary vein of right upper extremity (H)  -     INR      Will check thyroid hormone labs and INR per pt request      Patient has been advised of split billing requirements and indicates understanding: Yes  COUNSELING:  Reviewed preventive health counseling, as reflected in patient instructions  Special attention given to:       Regular exercise       Healthy diet/nutrition       Vision screening       Hearing screening       Dental care       Bladder control       Fall risk prevention       Immunizations       Osteoporosis Prevention/Bone Health       Colon cancer screening       The 10-year ASCVD risk score (Renetta JONES Jr., et al., 2013) is: 20.6%    Values used to calculate the score:      Age: 75 years      Sex: Female      Is Non- : No      Diabetic: No      Tobacco smoker: No      Systolic Blood Pressure: 118 mmHg      Is BP treated: Yes      HDL Cholesterol: 83 mg/dL      Total Cholesterol: 203 mg/dL       Advanced Planning     Estimated body mass index is 25.46 kg/m  as calculated from the following:    Height as of this encounter: 1.651 m (5' 5\").    Weight as of this encounter: 69.4 kg (153 lb).        She reports that she has never smoked. She has never used smokeless tobacco.      Appropriate preventive services were discussed with this patient, including applicable screening as appropriate for cardiovascular disease, diabetes, osteopenia/osteoporosis, and glaucoma.  As appropriate for age/gender, discussed screening for colorectal cancer, prostate cancer, breast cancer, and cervical " cancer. Checklist reviewing preventive services available has been given to the patient.    Reviewed patients plan of care and provided an AVS. The Basic Care Plan (routine screening as documented in Health Maintenance) for Manasa meets the Care Plan requirement. This Care Plan has been established and reviewed with the Patient.    Counseling Resources:  ATP IV Guidelines  Pooled Cohorts Equation Calculator  Breast Cancer Risk Calculator  Breast Cancer: Medication to Reduce Risk  FRAX Risk Assessment  ICSI Preventive Guidelines  Dietary Guidelines for Americans, 2010  [employer]'s MyPlate  ASA Prophylaxis  Lung CA Screening    Rema Braxton, [facility]    Identified Health Risks:  She is at risk for falling and has been provided with information to reduce the risk of falling at home.

## 2020-11-10 ENCOUNTER — ANTICOAGULATION THERAPY VISIT (OUTPATIENT)
Dept: FAMILY MEDICINE | Facility: CLINIC | Age: 75
End: 2020-11-10

## 2020-11-10 ENCOUNTER — OFFICE VISIT (OUTPATIENT)
Dept: FAMILY MEDICINE | Facility: CLINIC | Age: 75
End: 2020-11-10
Payer: MEDICARE

## 2020-11-10 VITALS
TEMPERATURE: 98.1 F | DIASTOLIC BLOOD PRESSURE: 60 MMHG | HEIGHT: 65 IN | WEIGHT: 153 LBS | OXYGEN SATURATION: 97 % | BODY MASS INDEX: 25.49 KG/M2 | RESPIRATION RATE: 14 BRPM | HEART RATE: 82 BPM | SYSTOLIC BLOOD PRESSURE: 118 MMHG

## 2020-11-10 DIAGNOSIS — I82.A11 ACUTE DEEP VEIN THROMBOSIS (DVT) OF AXILLARY VEIN OF RIGHT UPPER EXTREMITY (H): ICD-10-CM

## 2020-11-10 DIAGNOSIS — E03.8 OTHER SPECIFIED HYPOTHYROIDISM: ICD-10-CM

## 2020-11-10 DIAGNOSIS — Z00.00 ENCOUNTER FOR MEDICARE ANNUAL WELLNESS EXAM: Primary | ICD-10-CM

## 2020-11-10 DIAGNOSIS — I10 HTN, GOAL BELOW 140/90: ICD-10-CM

## 2020-11-10 LAB — INR PPP: 2.6 (ref 0.86–1.14)

## 2020-11-10 PROCEDURE — 84443 ASSAY THYROID STIM HORMONE: CPT | Performed by: FAMILY MEDICINE

## 2020-11-10 PROCEDURE — 36415 COLL VENOUS BLD VENIPUNCTURE: CPT | Performed by: FAMILY MEDICINE

## 2020-11-10 PROCEDURE — 85610 PROTHROMBIN TIME: CPT | Performed by: FAMILY MEDICINE

## 2020-11-10 PROCEDURE — 99207 PR NO CHARGE NURSE ONLY: CPT | Performed by: FAMILY MEDICINE

## 2020-11-10 PROCEDURE — G0439 PPPS, SUBSEQ VISIT: HCPCS | Performed by: FAMILY MEDICINE

## 2020-11-10 ASSESSMENT — ASTHMA QUESTIONNAIRES
ACT_TOTALSCORE: 25
QUESTION_1 LAST FOUR WEEKS HOW MUCH OF THE TIME DID YOUR ASTHMA KEEP YOU FROM GETTING AS MUCH DONE AT WORK, SCHOOL OR AT HOME: NONE OF THE TIME
QUESTION_5 LAST FOUR WEEKS HOW WOULD YOU RATE YOUR ASTHMA CONTROL: COMPLETELY CONTROLLED
QUESTION_2 LAST FOUR WEEKS HOW OFTEN HAVE YOU HAD SHORTNESS OF BREATH: NOT AT ALL
QUESTION_3 LAST FOUR WEEKS HOW OFTEN DID YOUR ASTHMA SYMPTOMS (WHEEZING, COUGHING, SHORTNESS OF BREATH, CHEST TIGHTNESS OR PAIN) WAKE YOU UP AT NIGHT OR EARLIER THAN USUAL IN THE MORNING: NOT AT ALL
QUESTION_4 LAST FOUR WEEKS HOW OFTEN HAVE YOU USED YOUR RESCUE INHALER OR NEBULIZER MEDICATION (SUCH AS ALBUTEROL): NOT AT ALL

## 2020-11-10 ASSESSMENT — ACTIVITIES OF DAILY LIVING (ADL): CURRENT_FUNCTION: TRANSPORTATION REQUIRES ASSISTANCE

## 2020-11-10 ASSESSMENT — MIFFLIN-ST. JEOR: SCORE: 1184.88

## 2020-11-10 NOTE — LETTER
November 11, 2020      Manasa French  [address]    [facility] [facility] [place] [address]        Dear ,    We are writing to inform you of your test results.    Your thyroid hormone is NORMAL.    Resulted Orders   TSH with free T4 reflex   Result Value Ref Range    TSH 0.59 0.40 - 4.00 mU/L       If you have any questions or concerns, please call the clinic at the number listed above.       Sincerely,        Rema Braxton, DO

## 2020-11-10 NOTE — PROGRESS NOTES
ANTICOAGULATION FOLLOW-UP CLINIC VISIT    Patient Name:  Manasa French  Date:  11/10/2020  Contact Type:  Telephone/ Patient    SUBJECTIVE:  Patient Findings     Comments:  The patient was assessed for diet, medication, and activity level changes, missed or extra doses, bruising or bleeding, with no problem findings. Discussed with patient the possibility of transitioning to [facility], but for now patient will remain with  [facility] until after the new year. Once clinic closures and centralized [facility] is more finalized, we will reassess this.            OBJECTIVE    Recent labs: (last 7 days)     11/10/20  1137   INR 2.60*       ASSESSMENT / PLAN  INR assessment THER    Recheck INR In: 4 WEEKS    INR Location Clinic      Anticoagulation Summary  As of 11/10/2020    INR goal:  2.0-3.0   TTR:  72.5 % (1 y)   INR used for dosin.60 (11/10/2020)   Warfarin maintenance plan:  7.5 mg (5 mg x 1.5) every Mon, Wed, Fri; 5 mg (5 mg x 1) all other days   Full warfarin instructions:  7.5 mg every Mon, Wed, Fri; 5 mg all other days   Weekly warfarin total:  42.5 mg   No change documented:  Brandi Arriola RN   Plan last modified:  Noemy Dailey RN (2019)   Next INR check:  2020   Target end date:      Indications    Acute deep vein thrombosis (DVT) of axillary vein and internal jugular (H) [I82.A19]             Anticoagulation Episode Summary     INR check location:      Preferred lab:      Send INR reminders to:  [facility]    Comments:  40-50 min walk/lifting. 3 swimming. Greens 2-4/week.  started 100 mcg of vit k.      Anticoagulation Care Providers     Provider Role Specialty Phone number    Christopher Smith MD Responsible Hematology [phone]            See the Encounter Report to view Anticoagulation Flowsheet and Dosing Calendar (Go to Encounters tab in chart review, and find the Anticoagulation Therapy Visit)    Patient made aware if signs of clotting (pain, tenderness, swelling, or color  change in any extremity) AND/OR bleeding occur (nosebleeds, bleeding gums, bruising, or blood in stool or urine) to notify provider & seek medical attention. If severe symptoms develop, such as major bleeding, chest pain, shortness of breath, fall, trauma or s/s of stroke, patient to call 911 immediately.       Dosage adjustment made based on physician directed care plan.    Brandi Arriola RN

## 2020-11-10 NOTE — PATIENT INSTRUCTIONS
Patient Education   Personalized Prevention Plan  You are due for the preventive services outlined below.  Your care team is available to assist you in scheduling these services.  If you have already completed any of these items, please share that information with your care team to update in your medical record.  Health Maintenance Due   Topic Date Due     Hepatitis C Screening  08/05/1962     Zoster (Shingles) Vaccine (1 of 2) 08/05/1994     Asthma Action Plan - yearly  03/08/2020     Colorectal Cancer Screening  06/22/2020     Asthma Control Test  07/26/2020     Thyroid Function Lab  10/10/2020     Activities of Daily Living    Your Health Risk Assessment indicates you have difficulties with activities of daily living such as housework, bathing, preparing meals, taking medication, etc. Please make a follow up appointment for us to address this issue in more detail.    Preventing Falls in the Home  An adult or child can fall for many reasons. If you are an older adult, you may fall because your reaction time slows down. Your muscles and joints may get stiff, weak, or less flexible because of illness, medicines, or a physical condition. These things can also make a child more likely to fall or be injured in a fall.  Other health problems that make falls more likely include:    Arthritis    Dizziness or lightheadedness when you get out of bed (orthostatic hypotension)    History of a stroke    Dizziness    Anemia    Certain medicines taken for mental illness or to control blood pressure.    Problems with balance or gait    Bladder or urinary problems    History of falls with or without an injury    Changes in vision (vision impairment)    Changes in thinking skills and memory (cognitive impairment)  Injuries from a fall can include broken bones, dislocated joints, internal bleeding and cuts. When these injuries are serious enough, they can make it impossible for you or a child who is injured in a fall to live on  his or her ownhome.  Prevention tips  To help prevent falls and fall-related injuries, follow the tips below.   Floors  Make floors safer by doing the following:     Put nonskid pads under area rugs.    Remove throw rugs.    Replace worn floor coverings.    Tack carpets firmly to each step on carpeted stairs. Put nonskid strips on the edges of uncarpeted stairs.    Keep floors and stairs free of clutter and cords.    Arrange furniture so there are clear pathways.    Clean up any spills right away.    Wear shoes that fit.  Bathrooms    Make bathrooms safer by doing the following:     Install grab bars in the tub or shower.    Apply nonskid strips or put a nonskid rubber mat in the tub or shower.    Sit on a bath chair to bathe.    Use bathmats with nonskid backing.  Lighting and the environment  Improve lighting in your home by doing the following:     Keep a flashlight in each room. Or put a lamp next to the bed within easy reach.    Put nightlights in the bedrooms, hallways, kitchen, and bathrooms.    Make sure all stairways have good lighting.    Take your time when going up and down stairs.    Put handrails on both sides of stairs and in walkways for more support. To prevent injury to your wrist or arm, don t use handrails to pull yourself up.    Install grab bars to pull yourself up.    Move or rearrange items that you use often. This will make them easier to find or reach.    Look at your home to find any safety hazards. Especially look at doorways, walkways, and the driveway. Remove or repair any safety problems that you find.  [employer] last reviewed this educational content on 8/1/2016 2000-2020 [employer]. [address] [facility], [place] [address]. All rights reserved. This information is not intended as a substitute for professional medical care. Always follow your healthcare professional's instructions.

## 2020-11-11 LAB — TSH SERPL DL<=0.005 MIU/L-ACNC: 0.59 MU/L (ref 0.4–4)

## 2020-11-11 ASSESSMENT — ASTHMA QUESTIONNAIRES: ACT_TOTALSCORE: 25

## 2020-11-18 ENCOUNTER — NURSE TRIAGE (OUTPATIENT)
Dept: FAMILY MEDICINE | Facility: CLINIC | Age: 75
End: 2020-11-18

## 2020-11-18 NOTE — TELEPHONE ENCOUNTER
RN called back to pt.    Pt states yesterday she started noticing a dry cough yesterday. Today, her cough has turned into a phlegmy cough and back to a dry cough.    Has not had any known contact with COVID positive.       Additional Information    Negative: Bluish (or gray) lips or face    Negative: Severe difficulty breathing (e.g., struggling for each breath, speaks in single words)    Negative: Rapid onset of cough and has hives    Negative: Coughing started suddenly after medicine, an allergic food or bee sting    Negative: Difficulty breathing after exposure to flames, smoke, or fumes    Negative: Sounds like a life-threatening emergency to the triager    Negative: Previous asthma attacks and this feels like asthma attack    Negative: Chest pain present when not coughing    Negative: Difficulty breathing    Negative: Passed out (i.e., fainted, collapsed and was not responding)    Negative: Patient sounds very sick or weak to the triager    Negative: Coughed up > 1 tablespoon (15 ml) blood (Exception: blood-tinged sputum)    Negative: Fever > 103 F (39.4 C)    Negative: Fever > 101 F (38.3 C) and over 60 years of age    Negative: Fever > 100.0 F (37.8 C) and has diabetes mellitus or a weak immune system (e.g., HIV positive, cancer chemotherapy, organ transplant, splenectomy, chronic steroids)    Negative: Fever > 100.0 F (37.8 C) and bedridden (e.g., nursing home patient, stroke, chronic illness, recovering from surgery)    Negative: Increasing ankle swelling    Negative: Wheezing is present    Negative: SEVERE coughing spells (e.g., whooping sound after coughing, vomiting after coughing)    Negative: Coughing up cain-colored (reddish-brown) or blood-tinged sputum    Negative: Fever present > 3 days (72 hours)    Negative: Fever returns after gone for over 24 hours and symptoms worse or not improved    Negative: Using nasal washes and pain medicine > 24 hours and sinus pain persists    Negative: Known COPD or  "other severe lung disease (i.e., bronchiectasis, cystic fibrosis, lung surgery) and worsening symptoms (i.e., increased sputum purulence or amount, increased breathing difficulty)    Patient wants to be seen    Negative: Continuous (nonstop) coughing interferes with work or school and no improvement using cough treatment per Care Advice    Answer Assessment - Initial Assessment Questions  1. ONSET: \"When did the cough begin?\"       Yesterday    2. SEVERITY: \"How bad is the cough today?\"       Mild to moderate    3. RESPIRATORY DISTRESS: \"Describe your breathing.\"       No distress    4. FEVER: \"Do you have a fever?\" If so, ask: \"What is your temperature, how was it measured, and when did it start?\"      No    5. HEMOPTYSIS: \"Are you coughing up any blood?\" If so ask: \"How much?\" (flecks, streaks, tablespoons, etc.)      No    6. TREATMENT: \"What have you done so far to treat the cough?\" (e.g., meds, fluids, humidifier)      Cough drop    7. CARDIAC HISTORY: \"Do you have any history of heart disease?\" (e.g., heart attack, congestive heart failure)       No    8. LUNG HISTORY: \"Do you have any history of lung disease?\"  (e.g., pulmonary embolus, asthma, emphysema)      Adult onset asthma    9. PE RISK FACTORS: \"Do you have a history of blood clots?\" (or: recent major surgery, recent prolonged travel, bedridden)      No    10. OTHER SYMPTOMS: \"Do you have any other symptoms? (e.g., runny nose, wheezing, chest pain)        No    11. PREGNANCY: \"Is there any chance you are pregnant?\" \"When was your last menstrual period?\"        N/a    12. TRAVEL: \"Have you traveled out of the country in the last month?\" (e.g., travel history, exposures)        No    Protocols used: COUGH-A-OH      "

## 2020-11-18 NOTE — TELEPHONE ENCOUNTER
Patient called regarding a cough which started yesterday   When to [email or web address]  They say she is to old for an e visit what can she do wants to talk to   Nurse  Please call patient back regarding this phone is [phone]

## 2020-11-19 ENCOUNTER — VIRTUAL VISIT (OUTPATIENT)
Dept: FAMILY MEDICINE | Facility: CLINIC | Age: 75
End: 2020-11-19
Payer: MEDICARE

## 2020-11-19 DIAGNOSIS — I10 BENIGN HYPERTENSION: ICD-10-CM

## 2020-11-19 DIAGNOSIS — E03.9 ACQUIRED HYPOTHYROIDISM: ICD-10-CM

## 2020-11-19 DIAGNOSIS — I10 HTN, GOAL BELOW 140/90: ICD-10-CM

## 2020-11-19 DIAGNOSIS — J40 BRONCHITIS: Primary | ICD-10-CM

## 2020-11-19 PROCEDURE — 99442 PR PHYSICIAN TELEPHONE EVALUATION 11-20 MIN: CPT | Mod: 95 | Performed by: FAMILY MEDICINE

## 2020-11-19 RX ORDER — AZITHROMYCIN 250 MG/1
TABLET, FILM COATED ORAL
Qty: 6 TABLET | Refills: 0 | Status: SHIPPED | OUTPATIENT
Start: 2020-11-19 | End: 2021-02-09

## 2020-11-19 RX ORDER — AMLODIPINE BESYLATE 5 MG/1
5 TABLET ORAL DAILY
Qty: 90 TABLET | Refills: 1 | Status: SHIPPED | OUTPATIENT
Start: 2020-11-19 | End: 2021-05-27

## 2020-11-19 RX ORDER — HYDROCHLOROTHIAZIDE 12.5 MG/1
12.5 TABLET ORAL DAILY
Qty: 90 TABLET | Refills: 1 | Status: SHIPPED | OUTPATIENT
Start: 2020-11-19 | End: 2021-05-27

## 2020-11-19 RX ORDER — LOSARTAN POTASSIUM 100 MG/1
TABLET ORAL
Qty: 90 TABLET | Refills: 1 | Status: SHIPPED | OUTPATIENT
Start: 2020-11-19 | End: 2021-05-27

## 2020-11-19 RX ORDER — LEVOTHYROXINE SODIUM 125 UG/1
125 TABLET ORAL DAILY
Qty: 90 TABLET | Refills: 1 | Status: SHIPPED | OUTPATIENT
Start: 2020-11-19 | End: 2021-05-27

## 2020-11-19 RX ORDER — CODEINE PHOSPHATE AND GUAIFENESIN 10; 100 MG/5ML; MG/5ML
1-2 SOLUTION ORAL EVERY 6 HOURS PRN
Qty: 118 ML | Refills: 0 | Status: SHIPPED | OUTPATIENT
Start: 2020-11-19 | End: 2021-02-09

## 2020-11-19 NOTE — PROGRESS NOTES
"Manasa French is a 75 year old female who is being evaluated via a billable telephone visit.      The patient has been notified of following:     \"This telephone visit will be conducted via a call between you and your physician/provider. We have found that certain health care needs can be provided without the need for a physical exam.  This service lets us provide the care you need with a short phone conversation.  If a prescription is necessary we can send it directly to your pharmacy.  If lab work is needed we can place an order for that and you can then stop by our lab to have the test done at a later time.    Telephone visits are billed at different rates depending on your insurance coverage. During this emergency period, for some insurers they may be billed the same as an in-person visit.  Please reach out to your insurance provider with any questions.    If during the course of the call the physician/provider feels a telephone visit is not appropriate, you will not be charged for this service.\"    Patient has given verbal consent for Telephone visit?  Yes    What phone number would you like to be contacted at? [phone]    How would you like to obtain your AVS? Mail a copy    Subjective     Manasa French is a 75 year old female who presents via phone visit today for the following health issues:    HPI     Acute Illness  Acute illness concerns: cough  Onset/Duration: 3 days  Symptoms:  Fever: no  Chills/Sweats: no  Headache (location?): YES  Sinus Pressure: no  Conjunctivitis:  no  Ear Pain: no  Rhinorrhea: YES  Congestion: YES  Sore Throat: no  Cough: YES-productive of clear sputum, barking  Wheeze: no  Decreased Appetite: no  Nausea: no  Vomiting: no  Diarrhea: YES- looser than normal  Dysuria/Freq.: no  Dysuria or Hematuria: no  Fatigue/Achiness: no  Sick/Strep Exposure: no  Therapies tried and outcome: humidifier, hot showers, tylenol, hot tea w/honey    Started late Tuesday afternoon.  Cough is " somewhat better last night and today. +Sinus congestion      Review of Systems   C: NEGATIVE for fever, chills, change in weight  I: NEGATIVE for worrisome rashes, moles or lesions  E: NEGATIVE for vision changes or irritation  CV: NEGATIVE for chest pain, palpitations or peripheral edema  GI: NEGATIVE for nausea, abdominal pain, or heartburn  M: NEGATIVE for significant arthralgias or myalgia  H: NEGATIVE for bleeding problems         Objective      Vitals:  No vitals were obtained today due to virtual visit.    GEN: alert and no distress  PSYCH: Alert and oriented times 3; coherent speech, normal   rate and volume, able to articulate logical thoughts, able   to abstract reason, no tangential thoughts, no hallucinations   or delusions  Her affect is normal  RESP: No audible wheezing, able to talk in full sentences.  +coughs intermittently  Remainder of exam unable to be completed due to telephone visits    No results found for this or any previous visit (from the past 24 hour(s)).        Assessment/Plan:    Assessment & Plan   Problem List Items Addressed This Visit     HTN, goal below 140/90    Relevant Medications    amLODIPine (NORVASC) 5 MG tablet    hydrochlorothiazide (HYDRODIURIL) 12.5 MG tablet    losartan (COZAAR) 100 MG tablet      Other Visit Diagnoses     Bronchitis    -  Primary    Relevant Medications    guaiFENesin-codeine (ROBITUSSIN AC) 100-10 MG/5ML solution    azithromycin (ZITHROMAX) 250 MG tablet    Acquired hypothyroidism        Relevant Medications    levothyroxine (SYNTHROID/LEVOTHROID) 125 MCG tablet    Benign hypertension        Relevant Medications    losartan (COZAAR) 100 MG tablet         Bronchitis:  Rx Z-pack and cough syrup.  Closely monitor INR due to abx use  Keep up fluids/hydration    Also needs chronic meds refilled and sent through mail delivery service        See Patient Instructions  Return in about 1 week (around 11/26/2020) for re-check / follow-up (virtual appt), If  needed.    Rema Braxton [facility]    Phone call duration: 15 minutes

## 2020-11-20 ENCOUNTER — HOSPITAL ENCOUNTER (EMERGENCY)
Facility: CLINIC | Age: 75
Discharge: HOME | End: 2020-11-20
Attending: EMERGENCY MEDICINE | Admitting: EMERGENCY MEDICINE
Payer: MEDICARE

## 2020-11-20 ENCOUNTER — APPOINTMENT (OUTPATIENT)
Dept: CT IMAGING | Facility: CLINIC | Age: 75
End: 2020-11-20
Attending: EMERGENCY MEDICINE
Payer: MEDICARE

## 2020-11-20 ENCOUNTER — APPOINTMENT (OUTPATIENT)
Dept: GENERAL RADIOLOGY | Facility: CLINIC | Age: 75
End: 2020-11-20
Attending: EMERGENCY MEDICINE
Payer: MEDICARE

## 2020-11-20 VITALS
BODY MASS INDEX: 25.33 KG/M2 | SYSTOLIC BLOOD PRESSURE: 142 MMHG | WEIGHT: 152 LBS | HEART RATE: 88 BPM | RESPIRATION RATE: 11 BRPM | OXYGEN SATURATION: 93 % | TEMPERATURE: 97.1 F | DIASTOLIC BLOOD PRESSURE: 66 MMHG | HEIGHT: 65 IN

## 2020-11-20 DIAGNOSIS — R05.9 COUGH: ICD-10-CM

## 2020-11-20 DIAGNOSIS — S01.81XA FACIAL LACERATION, INITIAL ENCOUNTER: ICD-10-CM

## 2020-11-20 DIAGNOSIS — R55 SYNCOPE, UNSPECIFIED SYNCOPE TYPE: ICD-10-CM

## 2020-11-20 LAB
ANION GAP SERPL CALCULATED.3IONS-SCNC: 4 MMOL/L (ref 3–14)
BASOPHILS # BLD AUTO: 0 10E9/L (ref 0–0.2)
BASOPHILS NFR BLD AUTO: 0.5 %
BUN SERPL-MCNC: 16 MG/DL (ref 7–30)
CALCIUM SERPL-MCNC: 8.2 MG/DL (ref 8.5–10.1)
CHLORIDE SERPL-SCNC: 100 MMOL/L (ref 94–109)
CO2 SERPL-SCNC: 30 MMOL/L (ref 20–32)
CREAT SERPL-MCNC: 0.83 MG/DL (ref 0.52–1.04)
DIFFERENTIAL METHOD BLD: ABNORMAL
EOSINOPHIL # BLD AUTO: 0 10E9/L (ref 0–0.7)
EOSINOPHIL NFR BLD AUTO: 1.1 %
ERYTHROCYTE [DISTWIDTH] IN BLOOD BY AUTOMATED COUNT: 14.2 % (ref 10–15)
GFR SERPL CREATININE-BSD FRML MDRD: 68 ML/MIN/{1.73_M2}
GLUCOSE SERPL-MCNC: 121 MG/DL (ref 70–99)
HCT VFR BLD AUTO: 35.9 % (ref 35–47)
HGB BLD-MCNC: 11.5 G/DL (ref 11.7–15.7)
IMM GRANULOCYTES # BLD: 0 10E9/L (ref 0–0.4)
IMM GRANULOCYTES NFR BLD: 0 %
INR PPP: 2.4 (ref 0.86–1.14)
INTERPRETATION ECG - MUSE: NORMAL
LYMPHOCYTES # BLD AUTO: 1 10E9/L (ref 0.8–5.3)
LYMPHOCYTES NFR BLD AUTO: 26.9 %
MCH RBC QN AUTO: 31 PG (ref 26.5–33)
MCHC RBC AUTO-ENTMCNC: 32 G/DL (ref 31.5–36.5)
MCV RBC AUTO: 97 FL (ref 78–100)
MONOCYTES # BLD AUTO: 0.8 10E9/L (ref 0–1.3)
MONOCYTES NFR BLD AUTO: 21.3 %
NEUTROPHILS # BLD AUTO: 1.9 10E9/L (ref 1.6–8.3)
NEUTROPHILS NFR BLD AUTO: 50.2 %
NRBC # BLD AUTO: 0 10*3/UL
NRBC BLD AUTO-RTO: 0 /100
PLATELET # BLD AUTO: 185 10E9/L (ref 150–450)
PLATELET # BLD EST: ABNORMAL 10*3/UL
POTASSIUM SERPL-SCNC: 3.6 MMOL/L (ref 3.4–5.3)
RBC # BLD AUTO: 3.71 10E12/L (ref 3.8–5.2)
RBC MORPH BLD: ABNORMAL
SODIUM SERPL-SCNC: 134 MMOL/L (ref 133–144)
TROPONIN I SERPL-MCNC: <0.015 UG/L (ref 0–0.04)
WBC # BLD AUTO: 3.8 10E9/L (ref 4–11)

## 2020-11-20 PROCEDURE — 258N000003 HC RX IP 258 OP 636: Performed by: EMERGENCY MEDICINE

## 2020-11-20 PROCEDURE — C9803 HOPD COVID-19 SPEC COLLECT: HCPCS

## 2020-11-20 PROCEDURE — 250N000011 HC RX IP 250 OP 636: Performed by: EMERGENCY MEDICINE

## 2020-11-20 PROCEDURE — 12052 INTMD RPR FACE/MM 2.6-5.0 CM: CPT

## 2020-11-20 PROCEDURE — 70450 CT HEAD/BRAIN W/O DYE: CPT

## 2020-11-20 PROCEDURE — 72125 CT NECK SPINE W/O DYE: CPT

## 2020-11-20 PROCEDURE — 93005 ELECTROCARDIOGRAM TRACING: CPT | Mod: 59

## 2020-11-20 PROCEDURE — 71045 X-RAY EXAM CHEST 1 VIEW: CPT

## 2020-11-20 PROCEDURE — 90471 IMMUNIZATION ADMIN: CPT | Performed by: EMERGENCY MEDICINE

## 2020-11-20 PROCEDURE — 80048 BASIC METABOLIC PNL TOTAL CA: CPT | Performed by: EMERGENCY MEDICINE

## 2020-11-20 PROCEDURE — 84484 ASSAY OF TROPONIN QUANT: CPT | Performed by: EMERGENCY MEDICINE

## 2020-11-20 PROCEDURE — 90715 TDAP VACCINE 7 YRS/> IM: CPT | Performed by: EMERGENCY MEDICINE

## 2020-11-20 PROCEDURE — 85610 PROTHROMBIN TIME: CPT | Performed by: EMERGENCY MEDICINE

## 2020-11-20 PROCEDURE — U0003 INFECTIOUS AGENT DETECTION BY NUCLEIC ACID (DNA OR RNA); SEVERE ACUTE RESPIRATORY SYNDROME CORONAVIRUS 2 (SARS-COV-2) (CORONAVIRUS DISEASE [COVID-19]), AMPLIFIED PROBE TECHNIQUE, MAKING USE OF HIGH THROUGHPUT TECHNOLOGIES AS DESCRIBED BY CMS-2020-01-R: HCPCS | Performed by: EMERGENCY MEDICINE

## 2020-11-20 PROCEDURE — 85025 COMPLETE CBC W/AUTO DIFF WBC: CPT | Performed by: EMERGENCY MEDICINE

## 2020-11-20 PROCEDURE — 99285 EMERGENCY DEPT VISIT HI MDM: CPT | Mod: 25

## 2020-11-20 RX ADMIN — SODIUM CHLORIDE 1000 ML: 9 INJECTION, SOLUTION INTRAVENOUS at 12:55

## 2020-11-20 RX ADMIN — CLOSTRIDIUM TETANI TOXOID ANTIGEN (FORMALDEHYDE INACTIVATED), CORYNEBACTERIUM DIPHTHERIAE TOXOID ANTIGEN (FORMALDEHYDE INACTIVATED), BORDETELLA PERTUSSIS TOXOID ANTIGEN (GLUTARALDEHYDE INACTIVATED), BORDETELLA PERTUSSIS FILAMENTOUS HEMAGGLUTININ ANTIGEN (FORMALDEHYDE INACTIVATED), BORDETELLA PERTUSSIS PERTACTIN ANTIGEN, AND BORDETELLA PERTUSSIS FIMBRIAE 2/3 ANTIGEN 0.5 ML: 5; 2; 2.5; 5; 3; 5 INJECTION, SUSPENSION INTRAMUSCULAR at 15:39

## 2020-11-20 ASSESSMENT — MIFFLIN-ST. JEOR: SCORE: 1180.35

## 2020-11-20 NOTE — ED PROVIDER NOTES
"History     Chief Complaint:    Loss of Consciousness       HPI  Manasa French is a 75 year old female with who presented to the ED after syncope. Patient with new cough so took her new robitussen with codeine medicine prior to going [place] [employer]. Started feeling lightheaded and then fell with syncope. Has a history of feeling lightheaded and syncope in past. No chest pain or shortness of breath. No fever, abdominal pain.    Allergies:  Alphagan [Brimonidine]  Animal Dander  Cosopt [Dorzolamide-Timolol]  No Clinical Screening - See Comments  Seasonal Allergies    Medications:   Fosamax  norvasc  zithromax  Robitussin  Hydrodiuril  levoxythroxine  Cozaar  Coumadin     Medical History:   6th nerve palsy  Cataract  Graves disease  HTN  Nystagmus  Osteopenia  postablative hypothyroidism  Pseudophakia  vertebral fractures osteoporotic  Diverticulitis of colon  Hydrocephalus  Ventricular shunt placement  Asthma  Spinal stenosis  Acute DVT of axillary and jugular  Punctate keratitis    Surgical History   Back surgery  Cataract IOL  Eye surgery  Hernia repair  Lumpectomy  Shoulder surgery  Wrist surgery    Family History:   Colon cancer  Glaucoma  HTN  Breast cancer  Thyroid disease  Leukemia  Diabetes    Social History:  Patient was not accompanied to the ED.  Smoking Status: Negative  Smokeless Tobacco: Negative   Alcohol Use: Positive   Drug Use: Negative   Primary Physician: Rema Braxton    Review of Systems  +head trauma, neck pain  Denies chest pain, shortness of breath, fever  10 point review of systems was obtained and negative other than mentioned above.      Physical Exam     Patient Vitals for the past 24 hrs:   BP Temp Temp src Pulse Resp SpO2 Height Weight   11/20/20 1330 139/71 -- -- 75 13 98 % -- --   11/20/20 1300 (!) 152/69 -- -- 74 11 99 % -- --   11/20/20 1236 -- 97.1  F (36.2  C) Temporal -- -- -- -- --   11/20/20 1234 (!) 155/75 -- -- 67 16 99 % 1.651 m (5' 5\") 68.9 kg (152 lb)        Physical " Exam   General: Sitting up in bed  Eyes:  The pupils are equal and round    Conjunctivae and sclerae are normal  ENT:    Wearing a mask. Laceration on middle of forehead  Neck:  Normal range of motion, no midline neck tenderness though has mild paraspinal muscle tenderness and decreased ROM  CV:  Regular rate, regular rhythm     Skin warm and well perfused   Resp:  Non labored breathing on room air    No tachypnea    No cough heard  GI:  Abdomen is soft, there is no rigidity    No distension    No rebound tenderness     No abdominal tenderness  MS:  Non tender UE/LE. Non tender spine  Skin:  No rash or acute skin lesions noted  Neuro:   Awake, alert.      GCS 15    Speech is normal and fluent.    Face is symmetric.     Moves all extremities equally  Psych: Normal affect.  Appropriate interactions.    Emergency Department Course     ECG:  ECG taken at 1254 ,   Normal sinus rhythm    Imaging:  Radiology results were communicated with the patient who voiced understanding of the findings.    Head CT w/o contrast   Final Result   IMPRESSION:      1. No acute intracranial hemorrhage, mass, or herniation.   2. Right frontal approach ventriculostomy catheter remains in place.   Ventricular size is unchanged since prior without evidence of   hydrocephalus.   3. Persistent downward extension of the cerebellar tonsils below the   foramen magnum with crowding of the foramen magnum suggestive of   Chiari I malformation.         DENIS WIGGINS MD      Cervical spine CT w/o contrast   Final Result   IMPRESSION:    1. No evidence of acute fracture or subluxation in the cervical spine.   2. Degenerative changes in the cervical spine as described above.         DENIS WIGGINS MD      XR Chest Port 1 View   Final Result   IMPRESSION: Portable AP view of the chest was obtained. Partially   visualized right IJ central catheter with the distal tip projects over   mid SVC. Stable enlargement of the cardiac silhouette. No suspicious   focal  pulmonary opacities. No significant pleural effusion or   pneumothorax.      SUZANNE RICHTER MD           Reading per radiology     Laboratory:  Laboratory findings were communicated with the patient who voiced understanding of the findings.    Procedures    Laceration Repair        LACERATION:  A clean t- shaped 3 x 1 cm laceration.      LOCATION:  Forehead       ANESTHESIA:  Local using 0.5% Bupivacaine with epinephrine total of 4 mLs      PREPARATION:  Irrigation with Normal Saline and Shur Clens      DEBRIDEMENT:  no debridement      CLOSURE:  Wound was closed with Two Layers: Subcutaneous layer closed with 1 x 4.0 Vicryl Sutures. Skin closed with 7 x 5.0 Ethylon using interrupted sutures     Interventions:  tdap    Emergency Department Course:   Nursing notes and vitals reviewed.   EKG obtained as noted above.      I performed an exam of the patient as documented above.      IV was inserted and blood was drawn for laboratory testing, results above.     The patient was sent for imaging while in the emergency department, results above.     1630:  A laceration repair was performed as outlined in the procedure note above.  The patient tolerated well and there were no complications.     Discharged home    Impression & Plan     Medical Decision Making:  Manasa French is a 75 year old female who presented to the ED with fall and syncope. Is on anticoagulation. INR therapeutic. CT head with stable chronic findings but no acute bleed. CT cervical spine with no acute fractures. Does have new cough so covid swab done as well as chest xray. She reports history of feeling lightheaded and syncope in past. Her new medication robitussen with codeine likely contributing to this so won't take during day anymore. Laceration repaired. Doubt arrhythmia, ACS, PE, AAA, WPW, brugada or other concerning etiology of syncope. Patient feels comfortable with discharge home. Recommended suture removal in about 5 days through PCP.  Recommended isolation until covid swab back. Discussed unlikely but risk of delayed head bleed. Reasons to return to ED discussed with patient. Able to ambulate in ED.    Diagnosis:     ICD-10-CM    1. Syncope, unspecified syncope type  R55    2. Facial laceration, initial encounter  S01.81XA    3. Cough  R05         Disposition:  Home    Scribe Disclosure:  ADAIR Sosa Hanna, am serving as a scribe at 1:57 PM on 11/20/2020 to document services personally performed by Ember Hernandez MD based on my observations and the provider's statements to me.     Ember Hernandez, Ember Lozada MD  11/20/20 2034

## 2020-11-20 NOTE — ED TRIAGE NOTES
At [employer], sitting down, felt onset of warmth, nausea, light headed.  Witnesses states fell forward off bench hitting head.  Brief LOC.  Pt denies CP or SOB.  States has had a cough for past 4 days.  A/o x4. [place].   End of Shift Notes:

Patient continues to be on Subutex and Ativan taper as ordered. No adverse reactions noted. 
VS monitored closely. No significant abnormalities noted. Withdrawal symptoms were closely 
monitored. Initial COWS 4/CIWA 3, patient presented with anxiety, yawning, myalgia, and 
restlessness. Last COWS 3/ CIWA 1. Per patient, Subutex and Ativan has been effective in 
reducing his withdrawal symptoms. Patient is compliant with care and treatment. Seen and 
examined by MD Kingston today with no changes in current orders. All needs met and attended. 
Will continue to monitor closely.

## 2020-11-20 NOTE — DISCHARGE INSTRUCTIONS
Sutures should be removed in about 5 days- have them removed by primary care provider  Stop the cough medicine  The covid swab will be back in about 2-3 days. Still isolated at home till then  Discharge Instructions  Laceration (Cut)    You were seen today for a laceration (cut).  Your provider examined your laceration for any problems such a buried foreign body (like glass, a splinter, or gravel), or injury to blood vessels, tendons, and nerves.  Your provider may have also rinsed and/or scrubbed your laceration to help prevent an infection. It may not be possible to find all problems with your laceration on the first visit; occasionally foreign bodies or a tendon injury can go undetected.    Your laceration may have been closed in one of several ways:  No closure: many wounds will heal just fine without closure.  Stitches: regular stitches that require removal.  Staples: skin staples are often used in the scalp/head.  Wound adhesive (glue): skin glue can be used for certain lacerations and doesn t require removal.  Wound strips (aka Butterfly bandages or steri-strips): these are bandages that help to close a wound.  Absorbable stitches:  dissolving  stitches that go away on their own and usually don t require removal.    A small percentage of wounds will develop an infection regardless of how well the wound is cared for. Antibiotics are generally not indicated to prevent an infection so are only given for a small number of high-risk wounds. Some lacerations are too high risk to close, and are left open to heal because closure can increase the likelihood that an infection will develop.    Remember that all lacerations, no matter how expertly repaired, will cause scarring. We consider many factors, techniques, and materials, in our efforts to provide the best possible cosmetic outcome.    Generally, every Emergency Department visit should have a follow-up clinic visit with either a primary or a specialty  clinic/provider. Please follow-up as instructed by your emergency provider today.     Return to the Emergency Department right away if:  You have more redness, swelling, pain, drainage (pus), a bad smell, or red streaking from your laceration as these symptoms could indicate an infection.  You have a fever of 100.4 F or more.  You have bleeding that you cannot stop at home. If your cut starts to bleed, hold pressure on the bleeding area with a clean cloth or put pressure over the bandage.  If the bleeding does not stop after using constant pressure for 30 minutes, you should return to the Emergency Department for further treatment.  An area past the laceration is cool, pale, or blue compared with the other side, or has a slower return of color when squeezed.  Your dressing seems too tight or starts to get uncomfortable or painful. For children, signs of a problem might be irritability or restlessness.  You have loss of normal function or use of an area, such as being unable to straighten or bend a finger normally.  You have a numb area past the laceration.    Return to the Emergency Department or see your regular provider if:  The laceration starts to come open.   You have something coming out of the cut or a feeling that there is something in the laceration.  Your wound will not heal, or keeps breaking open. There can always be glass, wood, dirt or other things in any wound.  They will not always show up, even on x-rays.  If a wound does not heal, this may be why, and it is important to follow-up with your regular provider.    Home Care:  Take your dressing off in 12-24 hours, or as instructed by your provider, to check your laceration. Remove the dressing sooner if it seems too tight or painful, or if it is getting numb, tingly, or pale past the dressing.  Gently wash your laceration 1-2 times daily with clean water and mild soap. It is okay to shower or run clean water over the laceration, but do not let the  laceration soak in water (no swimming).  If your laceration was closed with wound adhesive or strips: pat it dry and leave it open to the air. For all other repairs: after you wash your laceration, or at least 2 times a day, apply antibiotic ointment (such as Neosporin  or Bacitracin ) to the laceration, then cover it with a Band-Aid  or gauze.  Keep the laceration clean. Wear gloves or other protective clothing if you are around dirt.    Follow-up for removal:  If your wound was closed with staples or regular stitches, they need to be removed according to the instructions and timeline specified by your provider today.  If your wound was closed with absorbable ( dissolving ) sutures, they should fall out, dissolve, or not be visible in about one week. If they are still visible, then they should be removed according to the instructions and timeline specified by your provider today.    Scars:  To help minimize scarring:  Wear sunscreen over the healed laceration when out in the sun.  Massage the area regularly once healed.  You may apply Vitamin E to the healed wound.  Wait. Scars improve in appearance over months and years.    If you were given a prescription for medicine here today, be sure to read all of the information (including the package insert) that comes with your prescription.  This will include important information about the medicine, its side effects, and any warnings that you need to know about.  The pharmacist who fills the prescription can provide more information and answer questions you may have about the medicine.  If you have questions or concerns that the pharmacist cannot address, please call or return to the Emergency Department.       Remember that you can always come back to the Emergency Department if you are not able to see your regular provider in the amount of time listed above, if you get any new symptoms, or if there is anything that worries you.

## 2020-11-20 NOTE — ED NOTES
Bed: ED02  Expected date:   Expected time:   Means of arrival:   Comments:  E2  75 F fall/syncope/forehead lac  1210

## 2020-11-20 NOTE — ED AVS SNAPSHOT
[facility] Emergency Dept  [address] [address]  Phone: [phone]  Fax: [phone]                                    Manasa French   MRN: [id number]    Department: [facility] Emergency Dept   Date of Visit: 11/20/2020           After Visit Summary Signature Page    I have received my discharge instructions, and my questions have been answered. I have discussed any challenges I see with this plan with the nurse or doctor.    ..........................................................................................................................................  Patient/Patient Representative Signature      ..........................................................................................................................................  Patient Representative Print Name and Relationship to Patient    ..................................................               ................................................  Date                                   Time    ..........................................................................................................................................  Reviewed by Signature/Title    ...................................................              ..............................................  Date                                               Time          22EPIC Rev 08/18

## 2020-11-21 LAB
SARS-COV-2 RNA SPEC QL NAA+PROBE: ABNORMAL
SPECIMEN SOURCE: ABNORMAL

## 2020-11-22 ENCOUNTER — TELEPHONE (OUTPATIENT)
Dept: EMERGENCY MEDICINE | Facility: CLINIC | Age: 75
End: 2020-11-22

## 2020-11-22 NOTE — TELEPHONE ENCOUNTER
"Coronavirus (COVID-19) Notification    Caller Name (Patient, parent, daughter/son, grandparent, etc)  Rhonda    Reason for call  Notify of Positive Coronavirus (COVID-19) lab results, assess symptoms,  review [facility] recommendations    Lab Result    Lab test:  2019-nCoV rRt-PCR or SARS-CoV-2 PCR    Oropharyngeal AND/OR nasopharyngeal swabs is POSITIVE for 2019-nCoV RNA/SARS-COV-2 PCR (COVID-19 virus)    RN Recommendations/Instructions per [facility] Coronavirus COVID-19 recommendations    Brief introduction script  Introduce self then review script:  \"I am calling on behalf of [employer].  We were notified that your Coronavirus test (COVID-19) for was POSITIVE for the virus.  I have some information to relay to you but first I wanted to mention that the [place] Dept of Health will be contacting you shortly [it's possible MD already called Patient] to talk to you more about how you are feeling and other people you have had contact with who might now also have the virus.  Also, [facility] is Partnering with the [facility] for Covid-19 research, you may be contacted directly by research staff.\"    Assessment (Inquire about Patient's current symptoms)   Assessment   Current Symptoms at time of phone call: (if no symptoms, document No symptoms] Body aches but contributes it to a fall at [employer].   Symptoms onset (if applicable) Unknown when symptoms started.  Ongoing cough.  Self isolate from date of testing.     If at time of call, Patients symptoms hare worsened, the Patient should contact 911 or have someone drive them to Emergency Dept promptly:      If Patient calling 911, inform 911 personal that you have tested positive for the Coronavirus (COVID-19).  Place mask on and await 911 to arrive.    If Emergency Dept, If possible, please have another adult drive you to the Emergency Dept but you need to wear mask when in contact with other people.      Review information with " Patient    Your result was positive. This means you have COVID-19 (coronavirus).  We have sent you a letter that reviews the information that I'll be reviewing with you now.    How can I protect others?    If you have symptoms: stay home and away from others (self-isolate) until:    You've had no fever--and no medicine that reduces fever--for 1 full day (24 hours). And       Your other symptoms have gotten better. For example, your cough or breathing has improved. And     At least 10 days have passed since your symptoms started. (If you've been told by a doctor that you have a weak immune system, wait 20 days.)     If you don't have symptoms: Stay home and away from others (self-isolate) until at least 10 days have passed since your first positive COVID-19 test. (Date test collected)    During this time:    Stay in your own room, including for meals. Use your own bathroom if you can.    Stay away from others in your home. No hugging, kissing or shaking hands. No visitors.     Don't go to work, school or anywhere else.     Clean  high touch  surfaces often (doorknobs, counters, handles, etc.). Use a household cleaning spray or wipes. You'll find a full list on the EPA website at www.epa.gov/pesticide-registration/list-n-disinfectants-use-against-sars-cov-2.     Cover your mouth and nose with a mask, tissue or other face covering to avoid spreading germs.    Wash your hands and face often with soap and water.    Caregivers in these groups are at risk for severe illness due to COVID-19:  o People 65 years and older  o People who live in a nursing home or long-term care facility  o People with chronic disease (lung, heart, cancer, diabetes, kidney, liver, immunologic)  o People who have a weakened immune system, including those who:  - Are in cancer treatment  - Take medicine that weakens the immune system, such as corticosteroids  - Had a bone marrow or organ transplant  - Have an immune deficiency  - Have poorly  controlled HIV or AIDS  - Are obese (body mass index of 40 or higher)  - Smoke regularly    Caregivers should wear gloves while washing dishes, handling laundry and cleaning bedrooms and bathrooms.    Wash and dry laundry with special caution. Don't shake dirty laundry, and use the warmest water setting you can.    If you have a weakened immune system, ask your doctor about other actions you should take.    For more tips, go to www.cdc.gov/coronavirus/2019-ncov/downloads/10Things.pdf.    You should not go back to work until you meet the guidelines above for ending your home isolation. You don't need to be retested for COVID-19 before going back to work--studies show that you won't spread the virus if it's been at least 10 days since your symptoms started (or 20 days, if you have a weak immune system).    Employers: This document serves as formal notice of your employee's medical guidelines for going back to work. They must meet the above guidelines before going back to work in person.    How can I take care of myself?    1. Get lots of rest. Drink extra fluids (unless a doctor has told you not to).    2. Take Tylenol (acetaminophen) for fever or pain. If you have liver or kidney problems, ask your family doctor if it's okay to take Tylenol.     Take either:     650 mg (two 325 mg pills) every 4 to 6 hours, or     1,000 mg (two 500 mg pills) every 8 hours as needed.     Note: Don't take more than 3,000 mg in one day. Acetaminophen is found in many medicines (both prescribed and over-the-counter medicines). Read all labels to be sure you don't take too much.    For children, check the Tylenol bottle for the right dose (based on their age or weight).    3. If you have other health problems (like cancer, heart failure, an organ transplant or severe kidney disease): Call your specialty clinic if you don't feel better in the next 2 days.    4. Know when to call 911: Emergency warning signs include:    Trouble breathing or  shortness of breath    Pain or pressure in the chest that doesn't go away    Feeling confused like you haven't felt before, or not being able to wake up    Bluish-colored lips or face    5. Sign up for [employer]. We know it's scary to hear that you have COVID-19. We want to track your symptoms to make sure you're okay over the next 2 weeks. Please look for an email from [employer]--this is a free, online program that we'll use to keep in touch. To sign up, follow the link in the email. Learn more at [email or web address].    Where can I get more information?    [facility]: [email or web address]    Coronavirus Basics: www.health.[facility].[place]/diseases/coronavirus/basics.html    What to Do If You're Sick: www.cdc.gov/coronavirus/2019-ncov/about/steps-when-sick.html    Ending Home Isolation: www.cdc.gov/coronavirus/2019-ncov/hcp/disposition-in-home-patients.html     Caring for Someone with COVID-19: www.cdc.gov/coronavirus/2019-ncov/if-you-are-sick/care-for-someone.html     [facility] clinical trials (COVID-19 research studies): clinicalaffairs.[facility]/n-clinical-trials     A Positive COVID-19 letter will be sent via [employer] or the mail. (Exception, no letters sent to Presurgerical/Preprocedure Patients)    Lexy Mixon, MSN, RN

## 2020-11-23 ENCOUNTER — PATIENT OUTREACH (OUTPATIENT)
Dept: CARE COORDINATION | Facility: CLINIC | Age: 75
End: 2020-11-23

## 2020-11-23 DIAGNOSIS — U07.1 INFECTION DUE TO 2019 NOVEL CORONAVIRUS: Primary | ICD-10-CM

## 2020-11-23 NOTE — PROGRESS NOTES
Clinic Care Coordination Contact  [facility]/Voicemail     Clinical Data: Care Coordinator Outreach  ED follow up - COVID-19 positive    Pt has phone appt tomorrow 11/24/20 at 9:40 am with PCP for follow up.     Outreach attempted x 1.  Left message on patient's voicemail with call back information and requested return call.    Plan: Care Coordinator will try to reach patient again in 1-2 business days.    LUIS Quiñones   Social Work Clinic Care Coordinator   [facility] and [facility]   PH: [phone]  [email or web address]

## 2020-11-24 ENCOUNTER — PATIENT OUTREACH (OUTPATIENT)
Dept: NURSING | Facility: CLINIC | Age: 75
End: 2020-11-24
Payer: MEDICARE

## 2020-11-24 ENCOUNTER — TELEPHONE (OUTPATIENT)
Dept: FAMILY MEDICINE | Facility: CLINIC | Age: 75
End: 2020-11-24

## 2020-11-24 ENCOUNTER — VIRTUAL VISIT (OUTPATIENT)
Dept: FAMILY MEDICINE | Facility: CLINIC | Age: 75
End: 2020-11-24
Payer: MEDICARE

## 2020-11-24 ENCOUNTER — TELEPHONE (OUTPATIENT)
Dept: CARE COORDINATION | Facility: CLINIC | Age: 75
End: 2020-11-24

## 2020-11-24 DIAGNOSIS — M54.2 NECK PAIN: Primary | ICD-10-CM

## 2020-11-24 DIAGNOSIS — U07.1 2019 NOVEL CORONAVIRUS DISEASE (COVID-19): ICD-10-CM

## 2020-11-24 DIAGNOSIS — S01.81XA FACIAL LACERATION, INITIAL ENCOUNTER: Primary | ICD-10-CM

## 2020-11-24 DIAGNOSIS — Z79.01 CURRENT USE OF LONG TERM ANTICOAGULATION: ICD-10-CM

## 2020-11-24 DIAGNOSIS — I82.A11 ACUTE DEEP VEIN THROMBOSIS (DVT) OF AXILLARY VEIN OF RIGHT UPPER EXTREMITY (H): Primary | ICD-10-CM

## 2020-11-24 PROCEDURE — 99441 PR PHYSICIAN TELEPHONE EVALUATION 5-10 MIN: CPT | Mod: 95 | Performed by: FAMILY MEDICINE

## 2020-11-24 RX ORDER — LIDOCAINE 4 G/G
1 PATCH TOPICAL EVERY 24 HOURS
Qty: 6 PATCH | Refills: 1 | Status: SHIPPED | OUTPATIENT
Start: 2020-11-24 | End: 2021-05-05

## 2020-11-24 NOTE — LETTER
Health Care Home - Access Care Plan    About Me:    Patient Name:  Manasa Salomon    YOB: 1945  Age:                      75 year old   Sissy MRN:     [id number] Telephone Information:   Home Phone [phone]   Mobile [phone]       Address:  [address]  [address]  [facility] [place] [facility] [address] Email address:  [email or web address]      Emergency Contact(s)   Name Relationship Lgl Grd Work Phone Home Phone Mobile Phone   Frantz. FADI SALOMON Spouse   [phone]             Health Maintenance: Routine Health maintenance Reviewed: Due/Overdue   Health Maintenance Due   Topic Date Due     HEPATITIS C SCREENING  08/05/1962     ZOSTER IMMUNIZATION (1 of 2) 08/05/1994     ASTHMA ACTION PLAN  03/08/2020     COLORECTAL CANCER SCREENING  06/22/2020     My Access Plan  Medical Emergency 911   Questions or concerns during clinic hours Primary Clinic Line, I will call the clinic directly: [facility] - [phone]   24 Hour Appointment Line [phone] or  [phone]) (toll free)   24 Hour Nurse Line [phone] (toll free)   Questions or concerns outside clinic hours 24 Hour Appointment Line, I will call the after-hours on-call line:   [facility] [phone] or [phone] ([phone]) (toll-free)   Preferred Urgent Care [facility], [phone]   Preferred Hospital [facility]  [phone]   Preferred Pharmacy [id number] [address]     Behavioral Health Crisis Line The National Suicide Prevention Lifeline at 1-480.497.6475 or 911     My Care Team Members  Patient Care Team       Relationship Specialty Notifications Start End    Rema Braxton DO PCP - General Family Practice  8/24/20     Phone: [phone] Fax: [phone]         [address] [facility] [address]    Rema Braxton DO Assigned PCP   7/19/20     Phone:  [phone] Fax: [phone]         [address] [facility] [address]    Kevin Hernandez MD Assigned Endocrinology Provider   10/23/20     Phone: [phone] Fax: [phone] [address] [facility] [address]    Jarred Leon MD Assigned Surgical Provider   10/23/20     Phone: [phone] Fax: [phone]         [address] [facility] [address]           My Medical and Care Information  Problem List   Patient Active Problem List   Diagnosis     Diverticulitis of colon     Hypothyroidism, postablative     HTN, goal below 140/90     Glaucoma with ocular hypertension, bilateral     History of hydrocephalus     Age-related osteoporosis with current pathological fracture with routine healing     Nonexudative senile macular degeneration of retina     ACP (advance care planning)     Injury of lower back, initial encounter     S/P ventricular shunt placement 1987     Graves disease     Low vision, both eyes     Mild persistent asthma without complication     Spinal stenosis, lumbar region with neurogenic claudication     Closed compression fracture of L2 vertebra (H)     Falls frequently     Abnormal electrocardiogram     Acute deep vein thrombosis (DVT) of axillary vein and internal jugular (H)     Foreign body of left conjunctival sac     Dry eyes, bilateral     Punctate keratitis     S/P lumbar spinal fusion      Current Medications and Allergies:    Current Outpatient Medications   Medication     alendronate (FOSAMAX) 70 MG tablet     amLODIPine (NORVASC) 5 MG tablet     azithromycin (ZITHROMAX) 250 MG tablet     Calcium Carb-Cholecalciferol (CALCIUM CARBONATE-VITAMIN D3 PO)     carboxymethylcellulose (CELLUVISC/REFRESH LIQUIGEL) 1 % ophthalmic solution     Cholecalciferol (VITAMIN D) 1000 UNITS capsule     guaiFENesin-codeine (ROBITUSSIN AC) 100-10 MG/5ML solution     hydrochlorothiazide (HYDRODIURIL) 12.5 MG tablet     latanoprost (XALATAN) 0.005 %  ophthalmic solution     levothyroxine (SYNTHROID/LEVOTHROID) 125 MCG tablet     Lidocaine (LIDOCARE) 4 % Patch     losartan (COZAAR) 100 MG tablet     Multiple Vitamins-Minerals (ICAPS AREDS 2 PO)     timolol maleate (TIMOPTIC) 0.5 % ophthalmic solution     Vitamin K 100 MCG TABS     warfarin ANTICOAGULANT (COUMADIN) 5 MG tablet     No current facility-administered medications for this visit.

## 2020-11-24 NOTE — TELEPHONE ENCOUNTER
NATALIE CC outreach to pt for COVID-19 follow up.     Pt reported she thinks she has whiplash, or at least extreme pain from back of head to mid shoulders from the fall.     Pt reported she did not discuss this much at PCP appt this AM. She has been taking Tylenol. Mentioned her friend uses a pain patch of some sort. Wondering about any other treatments for this pain.

## 2020-11-24 NOTE — TELEPHONE ENCOUNTER
I'll send a pain patch, but it's not typically covered.    The pharmacist should be able to direct her to the Lidoderm pain patch available OTC.

## 2020-11-24 NOTE — PROGRESS NOTES
Clinic Care Coordination Contact    Clinic Care Coordination Contact  OUTREACH    Referral Information:  Referral Source: IP Report    Primary Diagnosis: Injury/Fall    Chief Complaint   Patient presents with     Clinic Care Coordination - Post Hospital     ED follow up - fall w/ injury, syncope; COVID-19 positive        Universal Utilization:   Clinic Utilization  No PCP office visit in Past Year: No    Utilization    Last refreshed: 11/24/2020  2:11 PM: Hospital Admissions 0           Last refreshed: 11/24/2020  2:11 PM: ED Visits 1           Last refreshed: 11/24/2020  2:11 PM: No Show Count (past year) 1              Current as of: 11/24/2020  2:11 PM              Clinical Concerns:  Pt presented to [facility] 11/20/20 for evaluation of syncope and collapse. Pt suffered facial laceration. Pt had a cough and COVID-19 test completed. Pt tested positive for COVID-19. Pt received results call 11/22/20.     Pt had virtual PCP follow up visit today. Pt is scheduled for suture removal in clinic 11/27/20.     NATALIE LÓPEZ outreach to pt for COVID-19 follow up. Pt discussed pain in the back of her head to mid shoulder blades. Discussed that her friend uses/suggested a patch with lidocaine. Pt thinks she may have whiplash pain from fall. NATALIE LÓPEZ inquired if pt discussed this at appt this AM with PCP - she did not. NATALIE LÓPEZ confirmed with pt that she has appt 11/27/20 in clinic for suture removal.     Pt reported overall she is highly fatigued. She has been taking Tylenol but trying to keep it in moderation. Reported she does not have much of an appetite, NATALIE LÓPEZ encouraged pt to continue to try to eat. She said she is good about eating breakfast and her friends are bringing them supper on a regular basis.     No other questions or concerns today.     Medication Management:  Requested pain patch. Sent message to PCP/triage RN. Medications were reviewed and reconciled at PCP appt today.     Post-discharge medication  reconciliation status: Discharge medications reviewed and reconciled.  Continue medications without change.     Functional Status:  Any fall with injury in the past year?: Yes    Living Situation:  Current living arrangement: I live in a private home with family  Type of residence: Apartment    Lifestyle & Psychosocial Needs:  Church or spiritual beliefs that impact treatment: No  Mental health DX: No  Mental health management concern: No  Chemical Dependency Status: No Current Concerns  Informal Support system: Spouse     Socioeconomic History     Marital status:      Spouse name: Not on file     Number of children: Not on file     Years of education: Not on file     Highest education level: Not on file     Tobacco Use     Smoking status: Never Smoker     Smokeless tobacco: Never Used   Substance and Sexual Activity     Alcohol use: Yes     Alcohol/week: 0.0 standard drinks     Comment: wine with supper     Drug use: No     Sexual activity: Yes     Partners: Male       Care Coordinator has reviewed patient's Social Determinants of Health (SDoH) on this date. Upon review, changes were not made.      Resources and Interventions:  Current Resources:   Community Resources: None  Supplies used at home: None  Employment Status: retired    Advance Care Plan/Directive  Advanced Care Plans/Directives on file: No    Referrals Placed: Other (Get Well Loop)     COVID-19 GetWell Loop:  Testing Results: positive  Email on file OR Smartphone: Yes  Does the patient speak English: Yes  Is the patient pregnant: No  Candidate for GetWell Loop: Yes - referral placed     Patient/Caregiver understanding: Pt reports understanding and denies any additional questions or concerns at this times. NATALIE CC engaged in AIDET communication during encounter.       Future Appointments              In 3 days Laxmi Haney PA-C [facility],     In 2 weeks [facility]  [facility], [facility]    In 3 months Katy Page MD [facility]    In 5 months Jarred Leon MD [facility]          Plan: No further outreaches will be made at this time unless a new referral is made or a change in the pt's status occurs.     Patient was provided with this writer's contact information and encouraged to call with any questions or concerns. Pt will attend upcoming appts. Pt will follow plan of care.     NATALIE CC will e-mail care coordination introduction letter and 24 hr access plan to patient. NATALIE LÓPEZ sent message to clinic for pain management.     LUIS Quiñones   Social Work Clinic Care Coordinator   [facility]   PH: [phone]  [email or web address]

## 2020-11-24 NOTE — PROGRESS NOTES
"Manasa French is a 75 year old female who is being evaluated via a billable telephone visit.      The patient has been notified of following:     \"This telephone visit will be conducted via a call between you and your physician/provider. We have found that certain health care needs can be provided without the need for a physical exam.  This service lets us provide the care you need with a short phone conversation.  If a prescription is necessary we can send it directly to your pharmacy.  If lab work is needed we can place an order for that and you can then stop by our lab to have the test done at a later time.    Telephone visits are billed at different rates depending on your insurance coverage. During this emergency period, for some insurers they may be billed the same as an in-person visit.  Please reach out to your insurance provider with any questions.    If during the course of the call the physician/provider feels a telephone visit is not appropriate, you will not be charged for this service.\"    Patient has given verbal consent for Telephone visit?  Yes    What phone number would you like to be contacted at? [phone]     How would you like to obtain your AVS? Mail a copy    Subjective     Manasa French is a 75 year old female who presents via phone visit today for the following health issues:    HPI       How are you feeling today? Worse  In the past 24 hours have you had shortness of breath when speaking, walking, or climbing stairs? My breathing issues have stayed the same  Do you have a cough? I don't have a cough  When is the last time you had a fever greater than 100?   Are you having any other symptoms? Fatigue, Body aches and Headaches   Do you have any other stressors you would like to discuss with your provider? OTHER: recent fall at [employer]            ED/UC Followup:    Facility:  [facility]  Date of visit: 11/20/20  Reason for visit: SYNCOPE, laceration on head  Current Status: stable "         Review of Systems   CONSTITUTIONAL: NEGATIVE for fever, chills, change in weight  INTEGUMENTARY/SKIN: NEGATIVE for worrisome rashes, moles or lesions  EYES: NEGATIVE for vision changes or irritation  ENT/MOUTH: NEGATIVE for ear, mouth and throat problems  CV: NEGATIVE for chest pain, palpitations or peripheral edema  GI: NEGATIVE for nausea, abdominal pain, heartburn, or change in bowel habits  MUSCULOSKELETAL: NEGATIVE for significant arthralgias or myalgia       Objective      Vitals:  No vitals were obtained today due to virtual visit.    GEN: alert and no distress  PSYCH: Alert and oriented times 3; coherent speech, normal   rate and volume, able to articulate logical thoughts, able   to abstract reason, no tangential thoughts, no hallucinations   or delusions  Her affect is normal  RESP: No cough, no audible wheezing, able to talk in full sentences  Remainder of exam unable to be completed due to telephone visits    No results found for this or any previous visit (from the past 24 hour(s)).        Assessment/Plan:    Assessment & Plan   Problem List Items Addressed This Visit     None      Visit Diagnoses     Facial laceration, initial encounter    -  Primary    2019 novel coronavirus disease (COVID-19)             Will need sutures out by Friday; will call to schedule appt  Will need to be rapidly roomed due to COVID     See Patient Instructions  Return in about 3 days (around 11/27/2020) for suture removal; has COVID so needs to be rapidly roomed.    Rema Braxton [facility]    Phone call duration:  8 minutes

## 2020-11-24 NOTE — LETTER
[place] CARE COORDINATION  [facility] [address]  [address], [place] [address]    November 24, 2020    Manasa French  [address]    [facility] [address]      Dear Manasa,    I am a clinic care coordinator who works with Rema Braxton DO at [facility]. I wanted to thank you for spending the time to talk with me.  Below is a description of clinic care coordination and how I can further assist you.      The clinic care coordination team is made up of a registered nurse,  and community health worker who understand the health care system. The goal of clinic care coordination is to help you manage your health and improve access to the health care system in the most efficient manner. The team can assist you in meeting your health care goals by providing education, coordinating services, strengthening the communication among your providers and supporting you with any resource needs.    Please feel free to contact the Community Health Worker at [phone] with any questions or concerns. We are focused on providing you with the highest-quality healthcare experience possible and that all starts with you.     Sincerely,     LUIS Quiñones   Social Work Clinic Care Coordinator   [facility] and [facility]   PH: [phone]  [email or web address]     Enclosed: I have enclosed a copy of a 24 Hour Access Plan. This has helpful phone numbers for you to call when needed. Please keep this in an easy to access place to use as needed.

## 2020-11-25 NOTE — TELEPHONE ENCOUNTER
ANTICOAGULATION MANAGEMENT      Manasa French due for annual renewal of referral to anticoagulation monitoring. Order pended for your review and signature.      ANTICOAGULATION SUMMARY      Warfarin indication(s)     DVT    Heart valve present?  NO       Current goal range   INR: 2.0-3.0     Goal appropriate for indication? Yes, INR 2-3 appropriate for hx of DVT, PE, hypercoagulable state, Afib, LVAD, or bileaflet AVR without risk factors     Current duration of therapy Indefinite/long term therapy   Time in Therapeutic Range (TTR)  (Goal > 60%) 72.5 %       Office visit with referring provider's group within last year yes on 11/24/20       Brandi Arriola RN

## 2020-11-27 ENCOUNTER — TELEPHONE (OUTPATIENT)
Dept: OPHTHALMOLOGY | Facility: CLINIC | Age: 75
End: 2020-11-27

## 2020-11-27 ENCOUNTER — OFFICE VISIT (OUTPATIENT)
Dept: INTERNAL MEDICINE | Facility: CLINIC | Age: 75
End: 2020-11-27
Payer: MEDICARE

## 2020-11-27 ENCOUNTER — HOSPITAL ENCOUNTER (EMERGENCY)
Facility: CLINIC | Age: 75
Discharge: HOME | End: 2020-11-27
Attending: EMERGENCY MEDICINE | Admitting: EMERGENCY MEDICINE
Payer: MEDICARE

## 2020-11-27 ENCOUNTER — APPOINTMENT (OUTPATIENT)
Dept: CT IMAGING | Facility: CLINIC | Age: 75
End: 2020-11-27
Attending: EMERGENCY MEDICINE
Payer: MEDICARE

## 2020-11-27 VITALS
DIASTOLIC BLOOD PRESSURE: 67 MMHG | WEIGHT: 152 LBS | OXYGEN SATURATION: 98 % | HEIGHT: 65 IN | BODY MASS INDEX: 25.33 KG/M2 | SYSTOLIC BLOOD PRESSURE: 117 MMHG | HEART RATE: 77 BPM | TEMPERATURE: 98.5 F

## 2020-11-27 VITALS
TEMPERATURE: 98.1 F | HEART RATE: 77 BPM | RESPIRATION RATE: 16 BRPM | DIASTOLIC BLOOD PRESSURE: 70 MMHG | HEIGHT: 65 IN | WEIGHT: 152 LBS | SYSTOLIC BLOOD PRESSURE: 147 MMHG | OXYGEN SATURATION: 94 % | BODY MASS INDEX: 25.33 KG/M2

## 2020-11-27 DIAGNOSIS — S16.1XXA STRAIN OF NECK MUSCLE, INITIAL ENCOUNTER: ICD-10-CM

## 2020-11-27 DIAGNOSIS — R79.1 ELEVATED INR: ICD-10-CM

## 2020-11-27 DIAGNOSIS — S09.90XA CLOSED HEAD INJURY, INITIAL ENCOUNTER: ICD-10-CM

## 2020-11-27 DIAGNOSIS — H57.02 PUPIL ASYMMETRY: ICD-10-CM

## 2020-11-27 DIAGNOSIS — Z48.02 VISIT FOR SUTURE REMOVAL: Primary | ICD-10-CM

## 2020-11-27 LAB — INR PPP: 3.53 (ref 0.86–1.14)

## 2020-11-27 PROCEDURE — 250N000009 HC RX 250: Performed by: EMERGENCY MEDICINE

## 2020-11-27 PROCEDURE — 85610 PROTHROMBIN TIME: CPT | Performed by: EMERGENCY MEDICINE

## 2020-11-27 PROCEDURE — 250N000011 HC RX IP 250 OP 636: Performed by: EMERGENCY MEDICINE

## 2020-11-27 PROCEDURE — 99214 OFFICE O/P EST MOD 30 MIN: CPT | Performed by: PHYSICIAN ASSISTANT

## 2020-11-27 PROCEDURE — 70450 CT HEAD/BRAIN W/O DYE: CPT | Mod: 59

## 2020-11-27 PROCEDURE — 99285 EMERGENCY DEPT VISIT HI MDM: CPT | Mod: 25

## 2020-11-27 PROCEDURE — 70496 CT ANGIOGRAPHY HEAD: CPT

## 2020-11-27 RX ORDER — IOPAMIDOL 755 MG/ML
70 INJECTION, SOLUTION INTRAVASCULAR ONCE
Status: COMPLETED | OUTPATIENT
Start: 2020-11-27 | End: 2020-11-27

## 2020-11-27 RX ORDER — METHOCARBAMOL 500 MG/1
500 TABLET, FILM COATED ORAL 3 TIMES DAILY PRN
Qty: 20 TABLET | Refills: 0 | Status: SHIPPED | OUTPATIENT
Start: 2020-11-27 | End: 2020-12-04

## 2020-11-27 RX ADMIN — IOPAMIDOL 70 ML: 755 INJECTION, SOLUTION INTRAVENOUS at 16:53

## 2020-11-27 RX ADMIN — SODIUM CHLORIDE 90 ML: 9 INJECTION, SOLUTION INTRAVENOUS at 16:54

## 2020-11-27 ASSESSMENT — ENCOUNTER SYMPTOMS
VOMITING: 0
EYE PAIN: 0
NECK PAIN: 1
HEADACHES: 1
NECK STIFFNESS: 1
PHOTOPHOBIA: 0

## 2020-11-27 ASSESSMENT — MIFFLIN-ST. JEOR
SCORE: 1180.35
SCORE: 1180.35

## 2020-11-27 NOTE — ED AVS SNAPSHOT
[facility] Emergency Dept  [address] [address]  Phone: [phone]  Fax: [phone]                                    Manasa French   MRN: [id number]    Department: [facility] Emergency Dept   Date of Visit: 11/27/2020           After Visit Summary Signature Page    I have received my discharge instructions, and my questions have been answered. I have discussed any challenges I see with this plan with the nurse or doctor.    ..........................................................................................................................................  Patient/Patient Representative Signature      ..........................................................................................................................................  Patient Representative Print Name and Relationship to Patient    ..................................................               ................................................  Date                                   Time    ..........................................................................................................................................  Reviewed by Signature/Title    ...................................................              ..............................................  Date                                               Time          22EPIC Rev 08/18

## 2020-11-27 NOTE — ED PROVIDER NOTES
History     Chief Complaint:  Head injury, Eye problem    HPI   Manasa French is a 75 year old female with a history of DVT on Warfarin who presents for evaluation of an eye problem after a head injury.  The patient had a fall with syncope and head trauma 7 days ago.  She was evaluated at that time and had CT scans of the head and neck which were negative for acute injury.  She had a forehead laceration which was repaired.  Since that time she has had an ongoing headache, head pressure, and neck pain and stiffness.  Her symptoms feel somewhat like whiplash.  Her neck pain is actually worse than her headache and she has been taking Tylenol at home.  She went to clinic today for suture removal and the provider was concerned her pupils were different sizes.  The patient was sent to the ED for further evaluation and CT angiogram.  The patient notes she has had cataract surgery on both eyes.  She has a lazy eye on the left and double vision chronically due to that.  Of note, she tested positive for COVID on 11/20/2020    Allergies:  Alphagan   Cosopt     Medications:    Fosamax  Warfarin  Amlodipine  Losartan   Hydrochlorothiazide   Levothyroxine   Calcium   Vitamin D  Latanoprost ophthalmic solution   Timolol ophthalmic solution     Past Medical History:    Deep vein thrombosis of axillary vein and internal jugular vein  Osteoporosis   Graves' disease   Postablative hypothyroidism   Nystagmus  Primary open angle glaucoma  Cataracts   Thyroid eye disease  Macular degeneration   Hypertension   6th nerve palsy  Spider veins  Spinal stenosis  Mild persistent asthma   Hydrocephalus   Diverticulitis     Past Surgical History:    Back surgery  Phacoemulsification clear cornea with intraocular lens implantation    shunt placement  Umbilical hernia repair  Breast lumpectomy   Shoulder surgery, left   Ganglion cyst excision, right wrist    Family History:    Colon cancer - mother   Glaucoma - mother, father   Hypertension  "- mother, sister   Thyroid disease - mother, sister   Breast cancer - mother  Leukemia - father   Diabetes - father, son     Social History:  Presents to the ED alone.  Tobacco Use: No previous or current tobacco use.   Alcohol Use: Occasional alcohol use.   PCP: Rema Braxton     Review of Systems   Eyes: Positive for visual disturbance (chronic). Negative for photophobia and pain.   Gastrointestinal: Negative for vomiting.   Musculoskeletal: Positive for neck pain and neck stiffness.   Neurological: Positive for headaches.   All other systems reviewed and are negative.    Physical Exam   First Vitals:  BP: 122/49  Pulse: 88  Temp: 98.1  F (36.7  C)  Resp: 16  Height: 165.1 cm (5' 5\")  Weight: 68.9 kg (152 lb)  SpO2: 96 %      Physical Exam  GENERAL: well developed, pleasant  HEAD: Healed wound midline forehead without signs of infection.  EYES: Lateral rectus on the left is weak.  Pupils are equal and round.   ENT:  mucus membranes moist  NECK:  trachea midline, normal range of motion. Generalized neck pain.  RESPIRATORY: no tachypnea, breath sounds clear to auscultation   CVS: normal S1/S2, no murmurs, intact distal pulses  ABDOMEN: soft, nontender, nondistention  MUSCULOSKELETAL: no deformities  SKIN: warm and dry, no acute rashes or ulceration  NEURO: GCS 15, cranial nerves intact, alert and oriented x3. Equal  strength. Normal finger to nose.  PSYCH:  Mood/affect normal    Emergency Department Course     Imaging:  Head CT without contrast:  Normal head CT. Right frontal  shunt catheter again  noted. No evidence for hydrocephalus.  Report per radiology.     Head CT angiogram with contrast:  1. Atherosclerotic plaque of the intracranial distal internal carotid arteries on both sides that does not result in any significant vascular narrowing.  2. Otherwise, normal head CTA.  Report per radiology.     Radiographic findings were communicated with the patient who voiced understanding of the " findings.    Laboratory:  INR: 3.53 (H)     Emergency Department Course:  Nursing notes and vitals reviewed.  I performed an exam of the patient as documented above. GCS 15    Blood was drawn from the patient. This was sent for laboratory testing, findings above.     The patient was sent for a head CT and head CTA while in the emergency department, findings above.      Findings and plan explained to the patient. Patient discharged home with instructions regarding supportive care, medications, and reasons to return. The importance of close follow-up was reviewed.      Impression & Plan      Medical Decision Making:    Patient presents with ongoing headache and neck stiffness after recent trauma.  At that time she had a head CT and neck CT that were unremarkable.  She notes the neck pain is actually worse than the headache but denies any radicular symptoms.  She feels like she has whiplash.  She went in today to get sutures removed and they noted asymmetry of her pupils and called ophthalmology who suggested CTA.  I did not see the asymmetric pupils, she has had prior cataract surgery.  Patient been waiting an hour to be seen by myself.  CTA and CT are unremarkable.  Images were done given her anticoagulation and ongoing symptoms.  Discussed with her muscle relaxants such as Robaxin to help with her cervical spine issues she notes that Tylenol is not sufficient.  She can continue taking Tylenol.  No evidence of an aneurysm bleed or abnormalities.  INR slightly elevated and discussed holding it.    Diagnosis:    ICD-10-CM    1. Closed head injury, initial encounter  S09.90XA    2. Strain of neck muscle, initial encounter  S16.1XXA    3. Elevated INR  R79.1        Disposition:  Discharged to home.     Discharge Medications:  New Prescriptions    METHOCARBAMOL (ROBAXIN) 500 MG TABLET    Take 1 tablet (500 mg) by mouth 3 times daily as needed for muscle spasms       I, Kyleigh Victoria, am serving as a scribe on 11/27/2020 at  4:28 PM to personally document services performed by Luca Ayers MD based on my observations and the provider's statements to me.     Kyleigh Victoria  11/27/2020   [facility] EMERGENCY DEPT       Luca Ayers MD  11/27/20 [id number]

## 2020-11-27 NOTE — TELEPHONE ENCOUNTER
Dr. Collazo/Dr. Emanuel spoke   Pt to have CTA performed in ED today-- Dr. Emanuel will arrange  Jaiden Galarza RN 2:49 PM 11/27/20    ---      Pt seen by IM MD today-- Dr. Emanuel    Recent fall 11-/head injury    Scans perform during ED visit 11-   Report below: IMPRESSION:     1. No acute intracranial hemorrhage, mass, or herniation.  2. Right frontal approach ventriculostomy catheter remains in place.  Ventricular size is unchanged since prior without evidence of  hydrocephalus.  3. Persistent downward extension of the cerebellar tonsils below the  foramen magnum with crowding of the foramen magnum suggestive of  Chiari I malformation.      during exam today Dr. Emanuel noted right eye pupil more oval than round compared to left eye  No noted vision changes no noted eye pain  Residual headache following fall.    Pupil very reactive.    MD would like to review eye note documentation of irregular pupil and if repeat images may be warranted    Will review with on call this afternoon    Jaiden Galarza RN 2:08 PM 11/27/20    .

## 2020-11-27 NOTE — PROGRESS NOTES
"Subjective     Manasa French is a 75 year old female who presents to clinic today for the following health issues:    HPI       suture removal mid forehead    ED/UC Followup:    Facility:  [facility]  Date of visit: 11-20-20-COVID done then-positive result  Reason for visit: Syncope and forehead laceration  Current Status: fatigue, body aches due to fall-no known fever- or other symptoms of COVID     Update since emergency room:  - low grade headache ongoing. but no change since ED   - she reports chronic vision changes including diplopia and loss of peripheral vision   - she follows with [facility] ophthalmology   - she does have some neck pain   - she denies memory,sppech, vision, and concentration changes   - some tingling in left arm otherwise, but no nunbness or weakness           Objective    /67   Pulse 77   Temp 98.5  F (36.9  C)   Ht 1.651 m (5' 5\")   Wt 68.9 kg (152 lb)   SpO2 98%   Breastfeeding No   BMI 25.29 kg/m    Body mass index is 25.29 kg/m .  Physical Exam   GENERAL: healthy, alert and no distress  EYES: EOMs intact, right pupil constricts, but appears more oval and slight larger than left eye, left eye with round pupil and constriction, eoms intact   HENT: ear canals and TM's normal, nose and mouth without ulcers or lesions  RESP: lungs clear to auscultation - no rales, rhonchi or wheezes  CV: regular rates and rhythm, normal S1 S2, no S3 or S4 and no murmur, click or rub  MS: continued neck tenderness   SKIN: sutures are clean, dry, and intact without erythema, swelling or warmth   NEURO: Normal strength and tone, sensory exam grossly normal, mentation intact and cranial nerves 2-12 intact  PSYCH: mentation appears normal, affect normal/bright, judgement and insight intact and appearance well groomed    No results found for this or any previous visit (from the past 24 hour(s)).        Assessment & Plan     Visit for suture removal  - sutures removed without problems, wound is " healed     Pupil asymmetry  - [facility] as I was unsure if this is a new pupil change and patient does not recall being told this before  - was able to connect on call provider who recommend ED for further imaging with CTA or MRA           No follow-ups on file.    [facility]IR[facility]

## 2020-11-27 NOTE — ED TRIAGE NOTES
pt had fall with forehead lac on friday, had ct done friday - pt on warfarin. went to get stitches out today, PA concerned that pupil sizes were unequal. pt reports constant headache and neck pain since fall.

## 2020-11-28 PROBLEM — Z79.01 CURRENT USE OF LONG TERM ANTICOAGULATION: Status: ACTIVE | Noted: 2020-11-28

## 2020-11-28 PROBLEM — I82.A11 ACUTE DEEP VEIN THROMBOSIS (DVT) OF AXILLARY VEIN OF RIGHT UPPER EXTREMITY (H): Status: ACTIVE | Noted: 2020-11-28

## 2020-11-28 NOTE — DISCHARGE INSTRUCTIONS
Discharge Instructions  Head Injury    You have been seen today for a head injury. You were checked for serious problems, like bleeding on the brain, but these problems cannot always be found right away.  Due to this risk, you should not be alone for 24 hours after your injury.  Follow up with your regular physician in 3-4 days. If you are taking a blood thinner, such as aspirin, Pradaxa  (dabigatran), Coumadin  (warfarin), or Plavix  (clopidogrel), you are at especially high risk for immediate or delayed bleeding, and need to re-check with a physician in 24 hours, or sooner if any of the symptoms below happen.     Return to the Emergency Department if:  You are confused, have amnesia, or you are not acting right.  Your headache gets worse or you start to have a really bad headache even with your recommended treatment plan.  You vomit more than once.  You have a convulsion or seizure.  You have trouble walking.  You have weakness or paralysis in an arm or a leg.  You have blood or fluid coming from your ears or nose.  You have new symptoms or anything that worries you.    Sleeping:  It is okay for you to sleep, but someone should wake you up as instructed by your doctor, and someone should check on you at your usual time to wake up.     Activity:  Do not drive for at least 24 hours.  Do not drive if you have dizzy spells or trouble concentrating, or remembering things.  Do not return to any contact sports until cleared by your regular doctor.     Follow-up:  It is very important that you make an appointment with your clinic and go to the appointment.  If you do not follow-up with your regular doctor, it may result in missing an important development which could result in permanent injury or disability and/or lasting pain.  If there is any problem keeping your appointment, call your doctor or return to the Emergency Department.    MORE INFORMATION:    Concussion:  A concussion is a minor head injury that may cause  temporary problems with the way your brain works.  Some symptoms include:  confusion, amnesia, nausea and vomiting, dizziness, fatigue, memory or concentration problems, irritability and sleep problems.    CT Scans: Your evaluation today may have included a CT scan (CAT scan) to look for things like bleeding or a skull fracture (break).  CT scans involve radiation and too many CT scans can cause serious health problems like cancer, especially in children.  Because of this, your doctor may not have ordered a CT scan today if they think you are at low risk for a serious or life threatening problem.    If you were given a prescription for medicine here today, be sure to read all of the information (including the package insert) that comes with your prescription.  This will include important information about the medicine, its side effects, and any warnings that you need to know about.  The pharmacist who fills the prescription can provide more information and answer questions you may have about the medicine.  If you have questions or concerns that the pharmacist cannot address, please call or return to the Emergency Department.     Opioid Medication Information    Pain medications are among the most commonly prescribed medicines, so we are including this information for all our patients. If you did not receive pain medication or get a prescription for pain medicine, you can ignore it.     You may have been given a prescription for an opioid (narcotic) pain medicine and/or have received a pain medicine while here in the Emergency Department. These medicines can make you drowsy or impaired. You must not drive, operate dangerous equipment, or engage in any other dangerous activities while taking these medications. If you drive while taking these medications, you could be arrested for DUI, or driving under the influence. Do not drink any alcohol while you are taking these medications.     Opioid pain medications can cause  addiction. If you have a history of chemical dependency of any type, you are at a higher risk of becoming addicted to pain medications.  Only take these prescribed medications to treat your pain when all other options have been tried. Take it for as short a time and as few doses as possible. Store your pain pills in a secure place, as they are frequently stolen and provide a dangerous opportunity for children or visitors in your house to start abusing these powerful medications. We will not replace any lost or stolen medicine.  As soon as your pain is better, you should flush all your remaining medication.     Many prescription pain medications contain Tylenol  (acetaminophen), including Vicodin , Tylenol #3 , Norco , Lortab , and Percocet .  You should not take any extra pills of Tylenol  if you are using these prescription medications or you can get very sick.  Do not ever take more than 3000 mg of acetaminophen in any 24 hour period.    All opioids tend to cause constipation. Drink plenty of water and eat foods that have a lot of fiber, such as fruits, vegetables, prune juice, apple juice and high fiber cereal.  Take a laxative if you don t move your bowels at least every other day. Miralax , Milk of Magnesia, Colace , or Senna  can be used to keep you regular.      Remember that you can always come back to the Emergency Department if you are not able to see your regular doctor in the amount of time listed above, if you get any new symptoms, or if there is anything that worries you.

## 2020-12-04 ENCOUNTER — VIRTUAL VISIT (OUTPATIENT)
Dept: FAMILY MEDICINE | Facility: CLINIC | Age: 75
End: 2020-12-04
Payer: MEDICARE

## 2020-12-04 DIAGNOSIS — Z12.11 ENCOUNTER FOR SCREENING FOR MALIGNANT NEOPLASM OF COLON: ICD-10-CM

## 2020-12-04 DIAGNOSIS — M62.838 NECK MUSCLE SPASM: ICD-10-CM

## 2020-12-04 DIAGNOSIS — M50.30 DDD (DEGENERATIVE DISC DISEASE), CERVICAL: Primary | ICD-10-CM

## 2020-12-04 PROCEDURE — 99441 PR PHYSICIAN TELEPHONE EVALUATION 5-10 MIN: CPT | Mod: 95 | Performed by: INTERNAL MEDICINE

## 2020-12-04 RX ORDER — PNEUMOCOCCAL VACCINE POLYVALENT 25; 25; 25; 25; 25; 25; 25; 25; 25; 25; 25; 25; 25; 25; 25; 25; 25; 25; 25; 25; 25; 25; 25 UG/.5ML; UG/.5ML; UG/.5ML; UG/.5ML; UG/.5ML; UG/.5ML; UG/.5ML; UG/.5ML; UG/.5ML; UG/.5ML; UG/.5ML; UG/.5ML; UG/.5ML; UG/.5ML; UG/.5ML; UG/.5ML; UG/.5ML; UG/.5ML; UG/.5ML; UG/.5ML; UG/.5ML; UG/.5ML; UG/.5ML
INJECTION, SOLUTION INTRAMUSCULAR; SUBCUTANEOUS
COMMUNITY
Start: 2020-10-06 | End: 2021-05-27

## 2020-12-04 RX ORDER — INFLUENZA A VIRUS A/HAWAII/70/2019 (H1N1) RECOMBINANT HEMAGGLUTININ ANTIGEN, INFLUENZA A VIRUS A/MINNESOTA/41/2019 (H3N2) RECOMBINANT HEMAGGLUTININ ANTIGEN, INFLUENZA B VIRUS B/WASHINGTON/02/2019 RECOMBINANT HEMAGGLUTININ ANTIGEN, AND INFLUENZA B VIRUS B/PHUKET/3073/2013 RECOMBINANT HEMAGGLUTININ ANTIGEN 45; 45; 45; 45 UG/.5ML; UG/.5ML; UG/.5ML; UG/.5ML
INJECTION INTRAMUSCULAR
COMMUNITY
Start: 2020-10-06 | End: 2021-05-27

## 2020-12-04 RX ORDER — TIZANIDINE HYDROCHLORIDE 4 MG/1
4 CAPSULE, GELATIN COATED ORAL 3 TIMES DAILY PRN
Qty: 30 CAPSULE | Refills: 0 | Status: SHIPPED | OUTPATIENT
Start: 2020-12-04 | End: 2020-12-08

## 2020-12-04 RX ORDER — LEVOTHYROXINE SODIUM 137 UG/1
TABLET ORAL
COMMUNITY
Start: 2020-04-30 | End: 2021-02-09

## 2020-12-04 NOTE — LETTER
My Asthma Action Plan    Name: Manasa French   YOB: 1945  Date: 12/4/2020   My doctor: Елена Guevara MD   My clinic: [facility]        My Rescue Medicine:   Albuterol inhaler (Proair/Ventolin/Proventil HFA)  2-4 puffs EVERY 4 HOURS as needed. Use a spacer if recommended by your provider.   My Asthma Severity:   Intermittent / Exercise Induced  Know your asthma triggers: N/A             GREEN ZONE   Good Control    I feel good    No cough or wheeze    Can work, sleep and play without asthma symptoms       Take your asthma control medicine every day.     1. If exercise triggers your asthma, take your rescue medication    15 minutes before exercise or sports, and    During exercise if you have asthma symptoms  2. Spacer to use with inhaler: If you have a spacer, make sure to use it with your inhaler             YELLOW ZONE Getting Worse  I have ANY of these:    I do not feel good    Cough or wheeze    Chest feels tight    Wake up at night   1. Keep taking your Green Zone medications  2. Start taking your rescue medicine:    every 20 minutes for up to 1 hour. Then every 4 hours for 24-48 hours.  3. If you stay in the Yellow Zone for more than 12-24 hours, contact your doctor.  4. If you do not return to the Green Zone in 12-24 hours or you get worse, start taking your oral steroid medicine if prescribed by your provider.           RED ZONE Medical Alert - Get Help  I have ANY of these:    I feel awful    Medicine is not helping    Breathing getting harder    Trouble walking or talking    Nose opens wide to breathe       1. Take your rescue medicine NOW  2. If your provider has prescribed an oral steroid medicine, start taking it NOW  3. Call your doctor NOW  4. If you are still in the Red Zone after 20 minutes and you have not reached your doctor:    Take your rescue medicine again and    Call 911 or go to the emergency room right away    See your regular  doctor within 2 weeks of an Emergency Room or Urgent Care visit for follow-up treatment.          Annual Reminders:  Meet with Asthma Educator,  Flu Shot in the Fall, consider Pneumonia Vaccination for patients with asthma (aged 19 and older).    Pharmacy:    [id number] [place], [place] - [address] [facility] - [place], [place] - [address]    Electronically signed by Елена Guevara MD   Date: 12/04/20                    Asthma Triggers  How To Control Things That Make Your Asthma Worse    Triggers are things that make your asthma worse.  Look at the list below to help you find your triggers and   what you can do about them. You can help prevent asthma flare-ups by staying away from your triggers.      Trigger                                                          What you can do   Cigarette Smoke  Tobacco smoke can make asthma worse. Do not allow smoking in your home, car or around you.  Be sure no one smokes at a child s day care or school.  If you smoke, ask your health care provider for ways to help you quit.  Ask family members to quit too.  Ask your health care provider for a referral to Quit Plan to help you quit smoking, or call 5-461-031-PLAN.     Colds, Flu, Bronchitis  These are common triggers of asthma. Wash your hands often.  Don t touch your eyes, nose or mouth.  Get a flu shot every year.     Dust Mites  These are tiny bugs that live in cloth or carpet. They are too small to see. Wash sheets and blankets in hot water every week.   Encase pillows and mattress in dust mite proof covers.  Avoid having carpet if you can. If you have carpet, vacuum weekly.   Use a dust mask and HEPA vacuum.   Pollen and Outdoor Mold  Some people are allergic to trees, grass, or weed pollen, or molds. Try to keep your windows closed.  Limit time out doors when pollen count is high.   Ask you health care provider about taking medicine during allergy season.     Animal  Dander  Some people are allergic to skin flakes, urine or saliva from pets with fur or feathers. Keep pets with fur or feathers out of your home.    If you can t keep the pet outdoors, then keep the pet out of your bedroom.  Keep the bedroom door closed.  Keep pets off cloth furniture and away from stuffed toys.     Mice, Rats, and Cockroaches  Some people are allergic to the waste from these pests.   Cover food and garbage.  Clean up spills and food crumbs.  Store grease in the refrigerator.   Keep food out of the bedroom.   Indoor Mold  This can be a trigger if your home has high moisture. Fix leaking faucets, pipes, or other sources of water.   Clean moldy surfaces.  Dehumidify basement if it is damp and smelly.   Smoke, Strong Odors, and Sprays  These can reduce air quality. Stay away from strong odors and sprays, such as perfume, powder, hair spray, paints, smoke incense, paint, cleaning products, candles and new carpet.   Exercise or Sports  Some people with asthma have this trigger. Be active!  Ask your doctor about taking medicine before sports or exercise to prevent symptoms.    Warm up for 5-10 minutes before and after sports or exercise.     Other Triggers of Asthma  Cold air:  Cover your nose and mouth with a scarf.  Sometimes laughing or crying can be a trigger.  Some medicines and food can trigger asthma.
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
## 2020-12-04 NOTE — PATIENT INSTRUCTIONS
Have sent in alternative medication for your neck muscle spasm. As discussed on the side effects of medication, please take precautions to avoid falls.   Placed referral to Physical therapy for improvement before considering next steps of Spine specialist as discussed.     You are due for FIT test for colon cancer screen which is ordered, please get the kit from lab and mail it back.     Asthma action plan sent to you which is due at this time.    =======================    Patient Education     Understanding Neck Problems       If you suffer from neck pain, you re not alone. Many people have neck pain at some point in their lives. Problems such as poor posture, injury, and wear and tear can lead to neck pain. Your healthcare provider will work with you to find the treatment that s best for your neck.  Types of neck problems    The following problems can cause pain or injury in your neck:    Strains and sprains. Strains (stretched or torn muscles) and sprains (stretched or torn ligaments) can cause neck pain. Strains and sprains can happen during an accident, or when you overuse your neck through repetitive motion. They can also cause your muscles and ligaments to become inflamed (swollen and painful).    Whiplash and other injuries. Whiplash can result when an impact throws your head, forcing your neck too far forward, then too far backward. When combined, the two motions can cause a painful injury to different parts of your neck, such as muscles, ligaments, or joints. The most common cause of whiplash is a car accident. But it can also happen during a fall or sports injury.    Weakened disks. A simple action, such as a sneeze or a cough, can cause one of your disks to bulge or rupture (herniate). A herniated disk can put pressure on your nerve and cause pain. Over time, disks can also thin out (degenerate). Flattened disks don t cushion vertebrae well and can cause vertebrae to rub together. Also, there is less  space for the nerves. This can pinch nerves and cause pain.    Weakened joints. Aging and injury can cause joints to slowly degenerate. Thinned joints can also cause vertebrae to rub together. This can cause abnormal growths of bone (bone spurs) to form on vertebrae. Bone spurs put pressure on nerves, causing pain.  Common symptoms  If you have a neck problem, you may have one or more of the following symptoms:    Muscle tension and spasm. You may not be able to move your neck, arms, or shoulders comfortably if you have muscle tension or stiffness in your neck. If your symptoms aren t relieved, you may experience muscle spasms, or knots of contracted tissue (trigger points) in areas of your neck and shoulders.    Aches and pains. Dull aches in your head or neck, sharp pains, and swelling of the soft tissue of your neck and shoulders are common symptoms. If there s pressure on the nerves in your neck, you may feel pain in your arms or hands.    Numbness or weakness. If you injure the nerves in your neck, you may have numbness, tingling, or weakness in your shoulders, arms, or hands. These symptoms arise when disks or bone spurs press on the nerves in your neck. Severe disease can also affect your legs.  "[employer] last reviewed this educational content on 10/1/2017    5409-3324 [employer]. [address] [place] [address], [place] [address]. All rights reserved. This information is not intended as a substitute for professional medical care. Always follow your healthcare professional's instructions.

## 2020-12-07 ENCOUNTER — TELEPHONE (OUTPATIENT)
Dept: FAMILY MEDICINE | Facility: CLINIC | Age: 75
End: 2020-12-07

## 2020-12-07 DIAGNOSIS — M62.838 NECK MUSCLE SPASM: Primary | ICD-10-CM

## 2020-12-07 NOTE — TELEPHONE ENCOUNTER
Prior Authorization Retail Medication Request    Medication/Dose: tiZANidine (ZANAFLEX) 4 MG capsule  ICD code (if different than what is on RX):  M50.30, M62.838  Previously Tried and Failed:    Rationale:      Insurance Name:  Medicare  Insurance ID:  [id number]        Pharmacy Information (if different than what is on RX)  Name:  [facility]  Phone:  [phone]

## 2020-12-08 ENCOUNTER — ANTICOAGULATION THERAPY VISIT (OUTPATIENT)
Dept: FAMILY MEDICINE | Facility: CLINIC | Age: 75
End: 2020-12-08

## 2020-12-08 DIAGNOSIS — I82.409 DVT (DEEP VENOUS THROMBOSIS) (H): ICD-10-CM

## 2020-12-08 DIAGNOSIS — Z79.01 CURRENT USE OF LONG TERM ANTICOAGULATION: ICD-10-CM

## 2020-12-08 DIAGNOSIS — I82.A11 ACUTE DEEP VEIN THROMBOSIS (DVT) OF AXILLARY VEIN OF RIGHT UPPER EXTREMITY (H): ICD-10-CM

## 2020-12-08 LAB
CAPILLARY BLOOD COLLECTION: NORMAL
INR PPP: 2.6 (ref 0.86–1.14)

## 2020-12-08 PROCEDURE — 36416 COLLJ CAPILLARY BLOOD SPEC: CPT | Performed by: FAMILY MEDICINE

## 2020-12-08 PROCEDURE — 99207 PR NO CHARGE NURSE ONLY: CPT | Performed by: FAMILY MEDICINE

## 2020-12-08 PROCEDURE — 85610 PROTHROMBIN TIME: CPT | Performed by: FAMILY MEDICINE

## 2020-12-08 NOTE — TELEPHONE ENCOUNTER
Central Prior Authorization Team   Phone: [phone]      PA Initiation    Medication: tiZANidine (ZANAFLEX) 4 MG capsule  Insurance Company: [employer] - Phone [phone] Fax [phone]  Pharmacy Filling the Rx: [id number] [address]  Filling Pharmacy Phone: [phone]  Filling Pharmacy Fax:    Start Date: 12/8/2020    Can the patient be switched to tablets?  If so, please send a new prescription to the pharmacy.  If not, please give medical justification as to why they would not be as effective and route back to me.

## 2020-12-08 NOTE — PROGRESS NOTES
Unable to reach patient, lvm with dosing and recheck plan (within 2-3 weeks d/t recent fall and ER visit). INR stable. Encouraged callback to report any changes to health, diet, meds, activity or any s/s of bleeding or clotting. Brandi Arriola, BENJAMINN, RN

## 2020-12-09 NOTE — PROGRESS NOTES
ANTICOAGULATION FOLLOW-UP CLINIC VISIT    Patient Name:  Manasa French  Date:  2020  Contact Type:  Telephone/ Patient    SUBJECTIVE:  Patient Findings     Positives:  Change in health (Pt was positive for COVID, only sx reported was significant fatigue. Since fall, pt has had ongoing headache, head pressure, and neck pain and stiffness. Dx w/ whiplash.), Change in activity (Low. First outing yesterday, felt so good to get out of the house. ), Emergency department visit (: syncopal episode at [employer], received 7 stitches at forehead. ER visit on  for evaluation of an eye problems and repeat CT.), Change in medications (TiZANidine TID for pain. However, pt is suffering from the dizziness s/e. Tylenol prn.)             OBJECTIVE    Recent labs: (last 7 days)     20  1125   INR 2.60*       ASSESSMENT / PLAN  INR assessment THER    Recheck INR In: 3 WEEKS    INR Location Clinic      Anticoagulation Summary  As of 2020    INR goal:  2.0-3.0   TTR:  74.5 % (1 y)   INR used for dosin.60 (2020)   Warfarin maintenance plan:  7.5 mg (5 mg x 1.5) every Mon, Wed, Fri; 5 mg (5 mg x 1) all other days   Full warfarin instructions:  7.5 mg every Mon, Wed, Fri; 5 mg all other days   Weekly warfarin total:  42.5 mg   No change documented:  Brandi Arriola RN   Plan last modified:  Noemy Dailey RN (2019)   Next INR check:  2020   Target end date:  Indefinite    Indications    Acute deep vein thrombosis (DVT) of axillary vein and internal jugular (H) [I82.A19]  Current use of long term anticoagulation [Z79.01]  Acute deep vein thrombosis (DVT) of axillary vein of right upper extremity (H) [I82.A11]             Anticoagulation Episode Summary     INR check location:      Preferred lab:      Send INR reminders to:  [facility]    Comments:  40-50 min walk/lifting. 3 swimming. Greens 2-4/week.  started 100 mcg of vit k.      Anticoagulation Care Providers     Provider  Role Specialty Phone number    Rema Braxton DO Sonia Referring Family Medicine [phone]    Christopher Smith MD Responsible Hematology [phone]            See the Encounter Report to view Anticoagulation Flowsheet and Dosing Calendar (Go to Encounters tab in chart review, and find the Anticoagulation Therapy Visit)    Patient made aware if signs of clotting (pain, tenderness, swelling, or color change in any extremity) AND/OR bleeding occur (nosebleeds, bleeding gums, bruising, or blood in stool or urine) to notify provider & seek medical attention. If severe symptoms develop, such as major bleeding, chest pain, shortness of breath, fall, trauma or s/s of stroke, patient to call 911 immediately.     Dosage adjustment made based on physician directed care plan.    Brandi Arriola RN

## 2020-12-11 ENCOUNTER — TELEPHONE (OUTPATIENT)
Dept: FAMILY MEDICINE | Facility: CLINIC | Age: 75
End: 2020-12-11

## 2020-12-11 NOTE — TELEPHONE ENCOUNTER
Prior Authorization Retail Medication Request    Medication/Dose: tiZANidine (ZANAFLEX) 4 MG tablet  ICD code (if different than what is on RX):    Previously Tried and Failed:    Rationale:      Insurance Name:  NA  Insurance ID:  NA      Pharmacy Information (if different than what is on RX)  Name:  same  Phone:  same

## 2020-12-29 ENCOUNTER — ANTICOAGULATION THERAPY VISIT (OUTPATIENT)
Dept: FAMILY MEDICINE | Facility: CLINIC | Age: 75
End: 2020-12-29

## 2020-12-29 DIAGNOSIS — I82.A11 ACUTE DEEP VEIN THROMBOSIS (DVT) OF AXILLARY VEIN OF RIGHT UPPER EXTREMITY (H): ICD-10-CM

## 2020-12-29 DIAGNOSIS — Z79.01 CURRENT USE OF LONG TERM ANTICOAGULATION: ICD-10-CM

## 2020-12-29 DIAGNOSIS — I82.409 DVT (DEEP VENOUS THROMBOSIS) (H): ICD-10-CM

## 2020-12-29 LAB
CAPILLARY BLOOD COLLECTION: NORMAL
INR PPP: 3.7 (ref 0.86–1.14)

## 2020-12-29 PROCEDURE — 85610 PROTHROMBIN TIME: CPT | Performed by: FAMILY MEDICINE

## 2020-12-29 PROCEDURE — 36416 COLLJ CAPILLARY BLOOD SPEC: CPT | Performed by: FAMILY MEDICINE

## 2020-12-29 NOTE — PROGRESS NOTES
ANTICOAGULATION MANAGEMENT     Patient Name:  Manasa French  Date:  12/29/2020    ASSESSMENT /SUBJECTIVE:    Today's INR result of 3.7 is supratherapeutic. Goal INR of 2.0-3.0      Warfarin dose taken: Warfarin taken as instructed    Diet: No new diet changes affecting INR    Medication changes/ interactions: Taking Tylenol and warfarin may increase risk of bleeding, but not expected to affect INR    Previous INR: Therapeutic     S/S of bleeding or thromboembolism: No    New injury or illness: No injury, however, significant knee pain noted.     Upcoming surgery, procedure or cardioversion: No    Additional findings: Knee pain improves with heat. Writer suggested trying 500 mg Tylenol 1-2 hours prior to walking to see if sx improve. Patient reports inactivity over the last few days d/t pain (previously walking 40 min/day). Patient will call Ortho ([facility]) and request appt today.       PLAN:    Telephone call with Manasa regarding INR result and instructed:     Warfarin Dosing Instructions: 2.5 mg tonight then continue your current warfarin dose of 7.5 mg Mon, Wed, Fri + 5 mg ROW    Instructed patient to follow up no later than: 2 weeks  Lab visit scheduled    Education provided: Monitoring for bleeding signs and symptoms and When to seek medical attention/emergency care      Patient verbalizes understanding and agrees to warfarin dosing plan.    Instructed to call the Anticoagulation Clinic for any changes, questions or concerns. (#[phone])        Brandi Arriola RN      OBJECTIVE:  Recent labs: (last 7 days)     12/29/20  1127   INR 3.70*         No question data found.  Anticoagulation Summary  As of 12/29/2020    INR goal:  2.0-3.0   TTR:  74.3 % (1 y)   INR used for dosing:  3.70 (12/29/2020)   Warfarin maintenance plan:  7.5 mg (5 mg x 1.5) every Mon, Wed, Fri; 5 mg (5 mg x 1) all other days   Full warfarin instructions:  7.5 mg every Mon, Wed, Fri; 5 mg all other days   Weekly warfarin total:   42.5 mg   Plan last modified:  Noemy Dailey, RN (12/19/2019)   Next INR check:     Target end date:  Indefinite    Indications    Acute deep vein thrombosis (DVT) of axillary vein and internal jugular (H) [I82.A19]  Current use of long term anticoagulation [Z79.01]  Acute deep vein thrombosis (DVT) of axillary vein of right upper extremity (H) [I82.A11]             Anticoagulation Episode Summary     INR check location:      Preferred lab:      Send INR reminders to:  [facility]    Comments:  40-50 min walk/lifting. 3 swimming. Greens 2-4/week. 9/16 started 100 mcg of vit k.      Anticoagulation Care Providers     Provider Role Specialty Phone number    Rema Braxton DO Referring Family Medicine [phone]    Christopher Smith MD Responsible Hematology [phone]

## 2021-01-12 ENCOUNTER — ANTICOAGULATION THERAPY VISIT (OUTPATIENT)
Dept: FAMILY MEDICINE | Facility: CLINIC | Age: 76
End: 2021-01-12

## 2021-01-12 DIAGNOSIS — I82.409 DVT (DEEP VENOUS THROMBOSIS) (H): ICD-10-CM

## 2021-01-12 DIAGNOSIS — Z79.01 CURRENT USE OF LONG TERM ANTICOAGULATION: ICD-10-CM

## 2021-01-12 DIAGNOSIS — I82.A11 ACUTE DEEP VEIN THROMBOSIS (DVT) OF AXILLARY VEIN OF RIGHT UPPER EXTREMITY (H): ICD-10-CM

## 2021-01-12 LAB
CAPILLARY BLOOD COLLECTION: NORMAL
INR PPP: 2.2 (ref 0.86–1.14)

## 2021-01-12 PROCEDURE — 36416 COLLJ CAPILLARY BLOOD SPEC: CPT | Performed by: FAMILY MEDICINE

## 2021-01-12 PROCEDURE — 85610 PROTHROMBIN TIME: CPT | Performed by: FAMILY MEDICINE

## 2021-01-12 NOTE — PROGRESS NOTES
ANTICOAGULATION MANAGEMENT     Patient Name:  Manasa French  Date:  2021    ASSESSMENT /SUBJECTIVE:    Today's INR result of 2.2 is therapeutic. Goal INR of 2.0-3.0      Warfarin dose taken: Warfarin taken as instructed    Diet: No new diet changes affecting INR    Medication changes/ interactions: No new medications/supplements affecting INR    Previous INR: Therapeutic     S/S of bleeding or thromboembolism: No    New injury or illness: No    Upcoming surgery, procedure or cardioversion: No    Additional findings: Cortisone injection to right knee at  Orthopedics. Knee pain dramatically improved.      PLAN:    Telephone call with Manasa regarding INR result and instructed:     Warfarin Dosing Instructions: Continue your current warfarin dose 7.5 mg MWF + 5 mg ROW.    Instructed patient to follow up no later than: 3 weeks  Lab visit scheduled    Education provided: Please call back if any changes to your diet, medications or how you've been taking warfarin, Monitoring for bleeding signs and symptoms, Monitoring for clotting signs and symptoms, When to seek medical attention/emergency care, Importance of notifying clinic for changes in medications; a sooner lab recheck maybe needed. and Importance of notifying clinic for diarrhea, nausea/vomiting, reduced intake, and/or illness; a sooner lab recheck maybe needed.      Patient verbalizes understanding and agrees to warfarin dosing plan.    Instructed to call the Anticoagulation Clinic for any changes, questions or concerns. (#[phone])        Branid Arriola RN      OBJECTIVE:  Recent labs: (last 7 days)     21  1253   INR 2.20*         INR assessment THER    Recheck INR In: 3 WEEKS    INR Location Clinic      Anticoagulation Summary  As of 2021    INR goal:  2.0-3.0   TTR:  73.3 % (1 y)   INR used for dosin.20 (2021)   Warfarin maintenance plan:  7.5 mg (5 mg x 1.5) every Mon, Wed, Fri; 5 mg (5 mg x 1) all other days   Full  warfarin instructions:  7.5 mg every Mon, Wed, Fri; 5 mg all other days   Weekly warfarin total:  42.5 mg   Plan last modified:  Noemy Dailey RN (12/19/2019)   Next INR check:  1/26/2021   Target end date:  Indefinite    Indications    Acute deep vein thrombosis (DVT) of axillary vein and internal jugular (H) [I82.A19]  Current use of long term anticoagulation [Z79.01]  Acute deep vein thrombosis (DVT) of axillary vein of right upper extremity (H) [I82.A11]             Anticoagulation Episode Summary     INR check location:      Preferred lab:      Send INR reminders to:  [facility]    Comments:  40-50 min walk/lifting. 3 swimming. Greens 2-4/week. 9/16 started 100 mcg of vit k.      Anticoagulation Care Providers     Provider Role Specialty Phone number    Rema Braxton DO Referring Family Medicine [phone]    Christopher Smith MD Responsible Hematology [phone]

## 2021-02-02 ENCOUNTER — ANTICOAGULATION THERAPY VISIT (OUTPATIENT)
Dept: FAMILY MEDICINE | Facility: CLINIC | Age: 76
End: 2021-02-02

## 2021-02-02 DIAGNOSIS — I82.409 DVT (DEEP VENOUS THROMBOSIS) (H): ICD-10-CM

## 2021-02-02 DIAGNOSIS — Z79.01 CURRENT USE OF LONG TERM ANTICOAGULATION: ICD-10-CM

## 2021-02-02 DIAGNOSIS — I82.A11 ACUTE DEEP VEIN THROMBOSIS (DVT) OF AXILLARY VEIN OF RIGHT UPPER EXTREMITY (H): ICD-10-CM

## 2021-02-02 LAB
CAPILLARY BLOOD COLLECTION: NORMAL
INR PPP: 2.4 (ref 0.86–1.14)

## 2021-02-02 PROCEDURE — 85610 PROTHROMBIN TIME: CPT | Performed by: FAMILY MEDICINE

## 2021-02-02 PROCEDURE — 36416 COLLJ CAPILLARY BLOOD SPEC: CPT | Performed by: FAMILY MEDICINE

## 2021-02-02 NOTE — PROGRESS NOTES
ANTICOAGULATION MANAGEMENT     Patient Name:  Manasa French  Date:  2021    ASSESSMENT /SUBJECTIVE:    Today's INR result of 2.4 is therapeutic. Goal INR of 2.0-3.0      Warfarin dose taken: Warfarin taken as instructed    Diet: No new diet changes affecting INR    Medication changes/ interactions: No new medications/supplements affecting INR    Previous INR: Therapeutic     S/S of bleeding or thromboembolism: No    New injury or illness: No    Upcoming surgery, procedure or cardioversion: No    Additional findings: Patient cut finger on  cutting vegetables, bled for a short period.       PLAN:    Telephone call with Manasa regarding INR result and instructed:     Warfarin Dosing Instructions: Continue your current warfarin dose 7.5 mg every Mon, Wed, Fri; 5 mg all other days.    Instructed patient to follow up no later than: 4 weeks  Lab visit scheduled    Education provided: Please call back if any changes to your diet, medications or how you've been taking warfarin, Monitoring for bleeding signs and symptoms, Monitoring for clotting signs and symptoms and Importance of notifying clinic for changes in medications; a sooner lab recheck maybe needed.      Patient verbalizes understanding and agrees to warfarin dosing plan.    Instructed to call the Anticoagulation Clinic for any changes, questions or concerns. (#[phone])        Brandi Arriola RN      OBJECTIVE:  Recent labs: (last 7 days)     21  1056   INR 2.40*         No question data found.  Anticoagulation Summary  As of 2021    INR goal:  2.0-3.0   TTR:  73.3 % (1 y)   INR used for dosin.40 (2021)   Warfarin maintenance plan:  7.5 mg (5 mg x 1.5) every Mon, Wed, Fri; 5 mg (5 mg x 1) all other days   Full warfarin instructions:  7.5 mg every Mon, Wed, Fri; 5 mg all other days   Weekly warfarin total:  42.5 mg   No change documented:  Brandi Arriola RN   Plan last modified:  Noemy Dailey RN (2019)    Next INR check:  3/2/2021   Target end date:  Indefinite    Indications    Acute deep vein thrombosis (DVT) of axillary vein and internal jugular (H) [I82.A19]  Current use of long term anticoagulation [Z79.01]  Acute deep vein thrombosis (DVT) of axillary vein of right upper extremity (H) [I82.A11]             Anticoagulation Episode Summary     INR check location:      Preferred lab:      Send INR reminders to:  KAN HILL    Comments:  40-50 min walk/lifting. 3 swimming. Greens 2-4/week. 9/16 started 100 mcg of vit k.      Anticoagulation Care Providers     Provider Role Specialty Phone number    Rema Braxton DO Referring Family Medicine [phone]    Christopher Smith MD Responsible Hematology [phone]

## 2021-02-09 ENCOUNTER — VIRTUAL VISIT (OUTPATIENT)
Dept: ENDOCRINOLOGY | Facility: CLINIC | Age: 76
End: 2021-02-09
Payer: MEDICARE

## 2021-02-09 DIAGNOSIS — M80.08XA AGE-RELATED OSTEOPOROSIS WITH CURRENT PATHOLOGICAL FRACTURE, VERTEBRA(E), INITIAL ENCOUNTER FOR FRACTURE (H): ICD-10-CM

## 2021-02-09 DIAGNOSIS — M80.00XD AGE-RELATED OSTEOPOROSIS WITH CURRENT PATHOLOGICAL FRACTURE WITH ROUTINE HEALING, SUBSEQUENT ENCOUNTER: Primary | ICD-10-CM

## 2021-02-09 PROCEDURE — 99213 OFFICE O/P EST LOW 20 MIN: CPT | Mod: 95 | Performed by: INTERNAL MEDICINE

## 2021-02-09 RX ORDER — ALENDRONATE SODIUM 70 MG/1
TABLET ORAL
Qty: 12 TABLET | Refills: 0 | Status: SHIPPED | OUTPATIENT
Start: 2021-02-09 | End: 2021-05-09

## 2021-02-09 NOTE — PROGRESS NOTES
Rhonda is a 75 year old who is being evaluated via a billable video visit.      How would you like to obtain your AVS? Verbally Reviewed  If the video visit is dropped, the invitation should be resent by: Cellphone  Will anyone else be joining your video visit? No      Video Start Time: 10:45 am      Recent issues:  Osteoporosis follow-up evaluation  He and her  had COVID-19 illness in 11/2020, recovered well  Reviewed medical history from patient and Epic chart record           History of chronic back pain  Medical evaluation, then surgical evaluation  2006. Spine surgery with 2 vertebral disc replacements at [facility] with Dr. Welsh     2016. Back pain while pulling hose backwards, fell and had vertebral fracture lumbar spine, back brace treatment  Medical evaluation  MRI lumbar spine:                Mild compression fracture superior endplate L4 vertebrae     ~2/2018. Another fall 2018 falling from treadmill, back pain and vertebrae fracture  Medical evaluation  2/28/18 MRI lumbar spine:              T12 and L2 superior endplate compression fractures, new since 8/2016              Previous compression fracture along the superior endplate of L3 has mature                       High-grade central canal stenosis persists at both L2-3 and L3-4              Previous combined interbody and instrumented bilateral posterolateral fusion from L4/S1.                            Solid bony bridging across the operated levels, appeared unchanged  Patient reports she wore back brace x 3 mo     7/2/18 Medical evaluation with Dr. MOON Box/[facility] Endocrinology  Review of health history, osteoporotic vertebral compression fractures, lab testing     Previous DEXA scans include 5/17/12, 9/23/14,  7/9/18 DEXA:               Left Femoral Neck            T-score:  -1.8               Right Femoral Neck          T-score:  -1.6               Forearm (radius 33%)       T-score:  -0.4     Subsequent surgical evaluation with   Mehbod  9/11/18, Back surgery with ORIF and pin placement L1 to S1  9/2018. Plan to start alendronate, began alendronate 70 mg weekly dose (Sundays)     Previous [facility] labs include:            Lab Test 07/26/19  1348   07/02/18  1610   TETE 8.4*   < > 9.5   PTHI  --   --  45   VITDT  --   --  43    < > = values in this interval not displayed.      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 Initial osteoporosis evaluation with me at [place]  Reviewed health history and osteoporosis management  Decided to continue alendronate 70 mg weekly dose plan    Recent [facility] labs include:  Lab Results   Component Value Date     11/20/2020    POTASSIUM 3.6 11/20/2020    CHLORIDE 100 11/20/2020    CO2 30 11/20/2020    ANIONGAP 4 11/20/2020     (H) 11/20/2020    BUN 16 11/20/2020    CR 0.83 11/20/2020    GFRESTIMATED 68 11/20/2020    GFRESTBLACK 79 11/20/2020    TETE 8.2 (L) 11/20/2020    TSH 0.59 11/10/2020    VITDT 43 07/02/2018    PTHI 45 07/02/2018           Lives in [place]  Sees Dr. Evelia Castro for general medicine evaluations.        ROS: 10 point ROS neg other than the symptoms noted above in the HPI.     GENERAL: some fatigue, wt stable; denies fevers, chills, night sweats.    HEENT: no dysphagia, odonophagia, diplopia, neck pain  THYROID:  no apparent hyper or hypothyroid  symptoms  CV: no chest pain, pressure, palpitations  LUNGS: no SOB, ALLEN, cough, wheezing   ABDOMEN: no diarrhea, constipation, abdominal pain  EXTREMITIES: no rashes, ulcers, edema  NEUROLOGY: no headaches, denies changes in vision, tingling, extremitiy numbness   MSK: low back stiffness without significant pain; denies muscle weakness  SKIN: no rashes or lesions  : no menses, denies hot flashes  PSYCH:  stable mood, no significant anxiety or depression  ENDOCRINE: no heat or cold intolerance     Physical Exam (visual exam)  VS:  no vital signs taken for video visit  CONSTITUTIONAL: healthy, alert and NAD, well dressed, answering questions appropriately  ENT: no nose swelling or nasal discharge, mouth redness or gum changes.  EYES: eyes grossly normal to inspection, conjunctivae and sclerae normal, no exophthalmos or proptosis  THYROID:  no apparent nodules or goiter  LUNGS: no audible wheeze, cough or visible cyanosis, no visible retractions or increased work of breathing  ABDOMEN: abdomen not evaluated  EXTREMITIES: no hand tremors, limited exam  NEUROLOGY: CN grossly intact, mentation intact and speech normal   SKIN:  no apparent skin lesions, rash, or edema with visualized skin appearance  PSYCH: mentation appears normal, affect normal/bright, judgement and insight intact,   normal speech and appearance well groomed    LABS:     All pertinent notes, labs, and images personally reviewed by me.      A/P:       Encounter Diagnoses   Name      Age-related osteoporosis with current pathological fracture with routine healing, subsequent encounter      Compression fracture of T12 vertebra with routine healing, subsequent encounter      Closed fracture of second lumbar vertebra with routine healing, unspecified fracture morphology, subsequent encounter      Closed fracture of third lumbar vertebra with routine healing, unspecified fracture morphology, subsequent encounter           Comments:  Reviewed health history  and osteoporosis issues  While osteopenia-range BMD at hips, vertebral compression fractures define osteoporosis.  Overdue for DEXA imaging... need to assess BMD on current treatment plan  Reviewed and interpreted tests that I previously ordered. Ordered appropriate tests for the endocrinology disease management.    Management options discussed and implemented after shared medical decision making with the patient.     Plan:  Discussed general issues with the osteoporosis diagnosis and management  Reviewed concept of using the DEXA scan imaging to assess bone mineral density (BMD)  We have reviewed treatment options with oral bisphosphonate, SERM, IV Boniva vs Reclast, SQ Prolia vs Forteo     Recommend:  After reviewing medication treatment options, we decided to continue the current alendronate 70 mg weekly dose  Advised repeat DEXA scan   Scan order placed   Call the [facility] at [phone] to schedule   No lab tests ordered today  Continue calcium and vitamin D supplement use  Avoid heavy lifting and fall injuries  Repeat spine Xray and/or MRI if new acute back pain    Addressed patient questions today     Total time spent in with the patient evaluation:  15 min  Additional time spent reviewing pertinent lab tests and chart notes, and documentation:  5 min    Follow-up:  2/2022    ROBERT Hernandez MD, MS  Endocrinology  [facility]    CC:  BARBER Braxton      Video-Visit Details    Type of service:  Video Visit    Video End Time: 11:00 am    Originating Location (pt. Location): home    Distant Location (provider location):  [facility]/home     Platform used for Video Visit:  [employer]

## 2021-02-09 NOTE — LETTER
2/9/2021         RE: Manasa French  [address]  [address]  [place] [address]        Dear Colleague,    Thank you for referring your patient, Manasa French, to the [facility]. Please see a copy of my visit note below.    Rhonda is a 75 year old who is being evaluated via a billable video visit.      How would you like to obtain your AVS? Verbally Reviewed  If the video visit is dropped, the invitation should be resent by: Cellphone  Will anyone else be joining your video visit? No      Video Start Time: 10:45 am      Recent issues:  Osteoporosis follow-up evaluation  He and her  had COVID-19 illness in 11/2020, recovered well  Reviewed medical history from patient and Epic chart record           History of chronic back pain  Medical evaluation, then surgical evaluation  2006. Spine surgery with 2 vertebral disc replacements at [facility] with Dr. Welsh     2016. Back pain while pulling hose backwards, fell and had vertebral fracture lumbar spine, back brace treatment  Medical evaluation  MRI lumbar spine:                Mild compression fracture superior endplate L4 vertebrae     ~2/2018. Another fall 2018 falling from treadmill, back pain and vertebrae fracture  Medical evaluation  2/28/18 MRI lumbar spine:              T12 and L2 superior endplate compression fractures, new since 8/2016              Previous compression fracture along the superior endplate of L3 has mature                       High-grade central canal stenosis persists at both L2-3 and L3-4              Previous combined interbody and instrumented bilateral posterolateral fusion from L4/S1.                            Solid bony bridging across the operated levels, appeared unchanged  Patient reports she wore back brace x 3 mo     7/2/18 Medical evaluation with Dr. MOON Box/[facility] Endocrinology  Review of health history, osteoporotic vertebral compression fractures, lab testing     Previous DEXA scans  include 5/17/12, 9/23/14,  7/9/18 DEXA:               Left Femoral Neck            T-score:  -1.8               Right Femoral Neck          T-score:  -1.6               Forearm (radius 33%)       T-score:  -0.4     Subsequent surgical evaluation with Dr. Christensen  9/11/18, Back surgery with ORIF and pin placement L1 to S1  9/2018. Plan to start alendronate, began alendronate 70 mg weekly dose (Sundays)     Previous [facility] labs include:            Lab Test 07/26/19  1348   07/02/18  1610   TETE 8.4*   < > 9.5   PTHI  --   --  45   VITDT  --   --  43    < > = values in this interval not displayed.      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 Initial osteoporosis evaluation with me at [place]  Reviewed health history and osteoporosis management  Decided to continue alendronate 70 mg weekly dose plan    Recent [facility] labs include:  Lab Results   Component Value Date     11/20/2020    POTASSIUM 3.6 11/20/2020    CHLORIDE 100 11/20/2020    CO2 30 11/20/2020    ANIONGAP 4 11/20/2020     (H) 11/20/2020    BUN 16 11/20/2020    CR 0.83 11/20/2020    GFRESTIMATED 68 11/20/2020    GFRESTBLACK 79 11/20/2020    TETE 8.2 (L) 11/20/2020    TSH 0.59 11/10/2020    VITDT 43 07/02/2018    PTHI 45 07/02/2018           Lives in [place]  Sees Dr. Evelia Castro for general medicine  evaluations.        ROS: 10 point ROS neg other than the symptoms noted above in the HPI.     GENERAL: some fatigue, wt stable; denies fevers, chills, night sweats.    HEENT: no dysphagia, odonophagia, diplopia, neck pain  THYROID:  no apparent hyper or hypothyroid symptoms  CV: no chest pain, pressure, palpitations  LUNGS: no SOB, ALLEN, cough, wheezing   ABDOMEN: no diarrhea, constipation, abdominal pain  EXTREMITIES: no rashes, ulcers, edema  NEUROLOGY: no headaches, denies changes in vision, tingling, extremitiy numbness   MSK: low back stiffness without significant pain; denies muscle weakness  SKIN: no rashes or lesions  : no menses, denies hot flashes  PSYCH:  stable mood, no significant anxiety or depression  ENDOCRINE: no heat or cold intolerance     Physical Exam (visual exam)  VS:  no vital signs taken for video visit  CONSTITUTIONAL: healthy, alert and NAD, well dressed, answering questions appropriately  ENT: no nose swelling or nasal discharge, mouth redness or gum changes.  EYES: eyes grossly normal to inspection, conjunctivae and sclerae normal, no exophthalmos or proptosis  THYROID:  no apparent nodules or goiter  LUNGS: no audible wheeze, cough or visible cyanosis, no visible retractions or increased work of breathing  ABDOMEN: abdomen not evaluated  EXTREMITIES: no hand tremors, limited exam  NEUROLOGY: CN grossly intact, mentation intact and speech normal   SKIN:  no apparent skin lesions, rash, or edema with visualized skin appearance  PSYCH: mentation appears normal, affect normal/bright, judgement and insight intact,   normal speech and appearance well groomed    LABS:     All pertinent notes, labs, and images personally reviewed by me.      A/P:       Encounter Diagnoses   Name      Age-related osteoporosis with current pathological fracture with routine healing, subsequent encounter      Compression fracture of T12 vertebra with routine healing, subsequent encounter      Closed fracture of  second lumbar vertebra with routine healing, unspecified fracture morphology, subsequent encounter      Closed fracture of third lumbar vertebra with routine healing, unspecified fracture morphology, subsequent encounter           Comments:  Reviewed health history and osteoporosis issues  While osteopenia-range BMD at hips, vertebral compression fractures define osteoporosis.  Overdue for DEXA imaging... need to assess BMD on current treatment plan  Reviewed and interpreted tests that I previously ordered. Ordered appropriate tests for the endocrinology disease management.    Management options discussed and implemented after shared medical decision making with the patient.     Plan:  Discussed general issues with the osteoporosis diagnosis and management  Reviewed concept of using the DEXA scan imaging to assess bone mineral density (BMD)  We have reviewed treatment options with oral bisphosphonate, SERM, IV Boniva vs Reclast, SQ Prolia vs Forteo     Recommend:  After reviewing medication treatment options, we decided to continue the current alendronate 70 mg weekly dose  Advised repeat DEXA scan   Scan order placed   Call the [facility] at [phone] to schedule   No lab tests ordered today  Continue calcium and vitamin D supplement use  Avoid heavy lifting and fall injuries  Repeat spine Xray and/or MRI if new acute back pain    Addressed patient questions today     Total time spent in with the patient evaluation:  15 min  Additional time spent reviewing pertinent lab tests and chart notes, and documentation:  5 min    Follow-up:  2/2022    ROBERT Hernandez MD, MS  Endocrinology  [facility]    CC:  BARBER Braxton      Video-Visit Details    Type of service:  Video Visit    Video End Time: 11:00 am    Originating Location (pt. Location): home    Distant Location (provider location):  [facility] [place]/home     Platform used for Video Visit:  AmWell        Again, thank you for  allowing me to participate in the care of your patient.        Sincerely,        Kevin Hernandez MD

## 2021-02-16 ENCOUNTER — HOSPITAL ENCOUNTER (OUTPATIENT)
Dept: BONE DENSITY | Facility: CLINIC | Age: 76
End: 2021-02-16
Attending: INTERNAL MEDICINE
Payer: MEDICARE

## 2021-02-16 DIAGNOSIS — M80.08XA AGE-RELATED OSTEOPOROSIS WITH CURRENT PATHOLOGICAL FRACTURE, VERTEBRA(E), INITIAL ENCOUNTER FOR FRACTURE (H): ICD-10-CM

## 2021-02-16 DIAGNOSIS — M80.00XD AGE-RELATED OSTEOPOROSIS WITH CURRENT PATHOLOGICAL FRACTURE WITH ROUTINE HEALING, SUBSEQUENT ENCOUNTER: ICD-10-CM

## 2021-02-16 PROCEDURE — 77081 DXA BONE DENSITY APPENDICULR: CPT

## 2021-02-16 PROCEDURE — 77080 DXA BONE DENSITY AXIAL: CPT

## 2021-03-01 ENCOUNTER — ANTICOAGULATION THERAPY VISIT (OUTPATIENT)
Dept: FAMILY MEDICINE | Facility: CLINIC | Age: 76
End: 2021-03-01

## 2021-03-01 DIAGNOSIS — Z79.01 CURRENT USE OF LONG TERM ANTICOAGULATION: ICD-10-CM

## 2021-03-01 DIAGNOSIS — I82.409 DVT (DEEP VENOUS THROMBOSIS) (H): ICD-10-CM

## 2021-03-01 DIAGNOSIS — I82.A11 ACUTE DEEP VEIN THROMBOSIS (DVT) OF AXILLARY VEIN OF RIGHT UPPER EXTREMITY (H): ICD-10-CM

## 2021-03-01 DIAGNOSIS — I82.A19: ICD-10-CM

## 2021-03-01 LAB
CAPILLARY BLOOD COLLECTION: NORMAL
INR PPP: 2.5 (ref 0.86–1.14)

## 2021-03-01 PROCEDURE — 36416 COLLJ CAPILLARY BLOOD SPEC: CPT | Performed by: FAMILY MEDICINE

## 2021-03-01 PROCEDURE — 99207 PR NO CHARGE NURSE ONLY: CPT | Performed by: FAMILY MEDICINE

## 2021-03-01 PROCEDURE — 85610 PROTHROMBIN TIME: CPT | Performed by: FAMILY MEDICINE

## 2021-03-01 NOTE — PROGRESS NOTES
ANTICOAGULATION FOLLOW-UP CLINIC VISIT    Patient Name:  Manasa French  Date:  3/1/2021  Contact Type:  Telephone    SUBJECTIVE:  Patient Findings     Comments:  Called patient to discuss today's INR results: The patient was assessed for diet, medication, and activity level changes, missed or extra doses, bruising or bleeding, with no problem findings. Reviewed maintenance warfarin dosing with patient. Patient will remain on the same dose until next INR check. No other questions or concerns. Scheduled next lab-only INR in 6 weeks.  Jami DEVINE RN  Anticoagulation Team          Clinical Outcomes     Negatives:  Major bleeding event, Thromboembolic event, Anticoagulation-related hospital admission, Anticoagulation-related ED visit, Anticoagulation-related fatality    Comments:  Called patient to discuss today's INR results: The patient was assessed for diet, medication, and activity level changes, missed or extra doses, bruising or bleeding, with no problem findings. Reviewed maintenance warfarin dosing with patient. Patient will remain on the same dose until next INR check. No other questions or concerns. Scheduled next lab-only INR in 6 weeks.  Jami DEVINE RN  Anticoagulation Team             OBJECTIVE    Recent labs: (last 7 days)     21  1036   INR 2.50*       ASSESSMENT / PLAN  INR assessment THER    Recheck INR In: 6 WEEKS    INR Location Clinic      Anticoagulation Summary  As of 3/1/2021    INR goal:  2.0-3.0   TTR:  73.3 % (1 y)   INR used for dosin.50 (3/1/2021)   Warfarin maintenance plan:  7.5 mg (5 mg x 1.5) every Mon, Wed, Fri; 5 mg (5 mg x 1) all other days   Full warfarin instructions:  7.5 mg every Mon, Wed, Fri; 5 mg all other days   Weekly warfarin total:  42.5 mg   No change documented:  Jami Pizarro RN   Plan last modified:  Noemy Dailey RN (2019)   Next INR check:  2021   Priority:  Maintenance   Target end date:  Indefinite    Indications    Acute deep vein  thrombosis (DVT) of axillary vein and internal jugular (H) [I82.A19]  Current use of long term anticoagulation [Z79.01]  Acute deep vein thrombosis (DVT) of axillary vein of right upper extremity (H) [I82.A11]             Anticoagulation Episode Summary     INR check location:      Preferred lab:      Send INR reminders to:  KAN SPAULDINGDANYELLE    Comments:  40-50 min walk/lifting. 3 swimming. Greens 2-4/week. 9/16 started 100 mcg of vit k.      Anticoagulation Care Providers     Provider Role Specialty Phone number    Rema Braxton DO Referring Family Medicine [phone]    Christopher Smith MD Responsible Hematology [phone]            See the Encounter Report to view Anticoagulation Flowsheet and Dosing Calendar (Go to Encounters tab in chart review, and find the Anticoagulation Therapy Visit)    Jami Pizarro RN

## 2021-03-03 ENCOUNTER — IMMUNIZATION (OUTPATIENT)
Dept: NURSING | Facility: CLINIC | Age: 76
End: 2021-03-03
Payer: MEDICARE

## 2021-03-03 PROCEDURE — 91300 PR COVID VAC PFIZER DIL RECON 30 MCG/0.3 ML IM: CPT

## 2021-03-03 PROCEDURE — 0001A PR COVID VAC PFIZER DIL RECON 30 MCG/0.3 ML IM: CPT

## 2021-03-11 DIAGNOSIS — H35.3132 INTERMEDIATE STAGE NONEXUDATIVE AGE-RELATED MACULAR DEGENERATION OF BOTH EYES: Primary | ICD-10-CM

## 2021-03-15 ENCOUNTER — OFFICE VISIT (OUTPATIENT)
Dept: OPHTHALMOLOGY | Facility: CLINIC | Age: 76
End: 2021-03-15
Attending: OPHTHALMOLOGY
Payer: MEDICARE

## 2021-03-15 DIAGNOSIS — H35.3132 INTERMEDIATE STAGE NONEXUDATIVE AGE-RELATED MACULAR DEGENERATION OF BOTH EYES: ICD-10-CM

## 2021-03-15 PROCEDURE — 99207 FUNDUS AUTOFLUORESCENCE IMAGE (FAF) OU (BOTH EYES): CPT | Performed by: OPHTHALMOLOGY

## 2021-03-15 PROCEDURE — 99214 OFFICE O/P EST MOD 30 MIN: CPT | Mod: GC | Performed by: OPHTHALMOLOGY

## 2021-03-15 PROCEDURE — 92134 CPTRZ OPH DX IMG PST SGM RTA: CPT | Performed by: OPHTHALMOLOGY

## 2021-03-15 PROCEDURE — G0463 HOSPITAL OUTPT CLINIC VISIT: HCPCS

## 2021-03-15 PROCEDURE — 92250 FUNDUS PHOTOGRAPHY W/I&R: CPT | Performed by: OPHTHALMOLOGY

## 2021-03-15 ASSESSMENT — REFRACTION_WEARINGRX
OS_VPRISM: 3 BD
OD_CYLINDER: SPHERE
SPECS_TYPE: SVL
OD_HPRISM: 7 BO
OS_SPHERE: -2.50
OS_CYLINDER: +1.00
OD_VPRISM: 3 BU
OD_SPHERE: -0.75
OS_HPRISM: 7 BO
OS_AXIS: 050

## 2021-03-15 ASSESSMENT — CONF VISUAL FIELD
OS_NORMAL: 1
OD_NORMAL: 1
METHOD: COUNTING FINGERS

## 2021-03-15 ASSESSMENT — TONOMETRY
IOP_METHOD: TONOPEN
OD_IOP_MMHG: 14
OS_IOP_MMHG: 11

## 2021-03-15 ASSESSMENT — SLIT LAMP EXAM - LIDS
COMMENTS: NORMAL
COMMENTS: NORMAL

## 2021-03-15 ASSESSMENT — VISUAL ACUITY
OS_CC: 20/40+
OS_PH_CC: 20/40+2
OD_CC: 20/30-
METHOD: SNELLEN - LINEAR
CORRECTION_TYPE: GLASSES

## 2021-03-15 ASSESSMENT — CUP TO DISC RATIO
OD_RATIO: 0.7
OS_RATIO: 0.5

## 2021-03-15 NOTE — PROGRESS NOTES
I have confirmed the patient's and reviewed Past Medical History, Past Surgical History, Social History, Family History, Problem List, Medication List and agree with Tech note.    CC: f/u AMD    HPI: 74 yo female with h/o Dry AMD here for annual f/u.  Patient reports relatively stable vision with persistent diplopia that is more manageable on some days than others (has h/o WIL f/b orthoptics for diplopia). States vision fluctuates.     POHx:  Thyroid Eye Disease  POAG  Pseudophakia    Eye Meds:  Timolol QAM OU  Xalatan QPM OU    OCT (03/15/21)  RE: (multiple single scans d/t pt eye movements) Drusen, no IRF/SRF  LE: (multiple single scans d/t pt eye movements) Drusen, no IRF/SRF    FAF (03/15/21)  RE: multiple areas of hyper-autofluroescence corresponding to drusen  LE:  area of hyper-autofluroescence corresponding to drusen    Assessment & Plan:  1.  Dry Macular Degeneration, both eyes   - continue AREDS II vitamins   - AG precautions   - annual exam    2.  PVD, both eyes   - RT/RD precautions    3. POAG, mild, both eyes   - continue management/follow-up per Dr. Leon    4.  Thyroid Eye Disease   - continue with orthoptics for diplopia management    5.  Pseudophakia, both eyes   - observe    RTC: 1 year for exam, OCT, FAF    Astrid Collazo MD  Ophthalmology Resident, PGY-3      Attestation:  I have seen and examined the patient with Dr. Collazo and agree with the findings in this note, as well as the interpretations of the diagnostic tests.      Katy Peter MD PhD.  Professor & Chair

## 2021-03-24 ENCOUNTER — IMMUNIZATION (OUTPATIENT)
Dept: NURSING | Facility: CLINIC | Age: 76
End: 2021-03-24
Attending: INTERNAL MEDICINE
Payer: MEDICARE

## 2021-03-24 PROCEDURE — 91300 PR COVID VAC PFIZER DIL RECON 30 MCG/0.3 ML IM: CPT

## 2021-03-24 PROCEDURE — 0002A PR COVID VAC PFIZER DIL RECON 30 MCG/0.3 ML IM: CPT

## 2021-04-12 ENCOUNTER — ANTICOAGULATION THERAPY VISIT (OUTPATIENT)
Dept: FAMILY MEDICINE | Facility: CLINIC | Age: 76
End: 2021-04-12

## 2021-04-12 DIAGNOSIS — I82.A19: ICD-10-CM

## 2021-04-12 DIAGNOSIS — I82.409 DVT (DEEP VENOUS THROMBOSIS) (H): ICD-10-CM

## 2021-04-12 DIAGNOSIS — I82.A11 ACUTE DEEP VEIN THROMBOSIS (DVT) OF AXILLARY VEIN OF RIGHT UPPER EXTREMITY (H): ICD-10-CM

## 2021-04-12 DIAGNOSIS — Z79.01 CURRENT USE OF LONG TERM ANTICOAGULATION: ICD-10-CM

## 2021-04-12 LAB
CAPILLARY BLOOD COLLECTION: NORMAL
INR PPP: 3 (ref 0.86–1.14)

## 2021-04-12 PROCEDURE — 85610 PROTHROMBIN TIME: CPT | Performed by: FAMILY MEDICINE

## 2021-04-12 PROCEDURE — 36416 COLLJ CAPILLARY BLOOD SPEC: CPT | Performed by: FAMILY MEDICINE

## 2021-04-12 NOTE — PROGRESS NOTES
ANTICOAGULATION FOLLOW-UP CLINIC VISIT    Patient Name:  Manasa French  Date:  4/12/2021  Contact Type:  Telephone    SUBJECTIVE:  Patient Findings     Comments:  Called patient to discuss today's INR results: The patient was assessed for diet, medication, and activity level changes, missed or extra doses, bruising or bleeding, with no problem findings. Reviewed maintenance warfarin dosing with patient. Patient will remain on the same dose until next INR check. No other questions or concerns. Scheduled next lab-only INR in 5 weeks.  Jami DEVINE RN  Anticoagulation Team          Clinical Outcomes     Negatives:  Major bleeding event, Thromboembolic event, Anticoagulation-related hospital admission, Anticoagulation-related ED visit, Anticoagulation-related fatality    Comments:  Called patient to discuss today's INR results: The patient was assessed for diet, medication, and activity level changes, missed or extra doses, bruising or bleeding, with no problem findings. Reviewed maintenance warfarin dosing with patient. Patient will remain on the same dose until next INR check. No other questions or concerns. Scheduled next lab-only INR in 5 weeks.  Jami DEVINE RN  Anticoagulation Team             OBJECTIVE    Recent labs: (last 7 days)     04/12/21  1045   INR 3.00*       ASSESSMENT / PLAN  INR assessment THER    Recheck INR In: 6 WEEKS    INR Location Clinic      Anticoagulation Summary  As of 4/12/2021    INR goal:  2.0-3.0   TTR:  80.5 % (1 y)   INR used for dosing:  3.00 (4/12/2021)   Warfarin maintenance plan:  7.5 mg (5 mg x 1.5) every Mon, Wed, Fri; 5 mg (5 mg x 1) all other days   Full warfarin instructions:  7.5 mg every Mon, Wed, Fri; 5 mg all other days   Weekly warfarin total:  42.5 mg   No change documented:  Jami Pizarro RN   Plan last modified:  Noemy Dailey RN (12/19/2019)   Next INR check:  5/24/2021   Priority:  Maintenance   Target end date:  Indefinite    Indications    Acute deep vein  thrombosis (DVT) of axillary vein and internal jugular (H) [I82.A19]  Current use of long term anticoagulation [Z79.01]  Acute deep vein thrombosis (DVT) of axillary vein of right upper extremity (H) [I82.A11]             Anticoagulation Episode Summary     INR check location:      Preferred lab:      Send INR reminders to:  KAN SPAULDINGDANYELLE    Comments:  40-50 min walk/lifting. 3 swimming. Greens 2-4/week. 9/16 started 100 mcg of vit k.      Anticoagulation Care Providers     Provider Role Specialty Phone number    Rema Braxton DO Referring Family Medicine [phone]    Christopher Smith MD Responsible Hematology [phone]            See the Encounter Report to view Anticoagulation Flowsheet and Dosing Calendar (Go to Encounters tab in chart review, and find the Anticoagulation Therapy Visit)    Jami Pizarro RN

## 2021-04-16 ENCOUNTER — TELEPHONE (OUTPATIENT)
Dept: FAMILY MEDICINE | Facility: CLINIC | Age: 76
End: 2021-04-16

## 2021-04-16 DIAGNOSIS — Z79.01 CURRENT USE OF LONG TERM ANTICOAGULATION: Primary | ICD-10-CM

## 2021-04-16 DIAGNOSIS — I82.A11 ACUTE DEEP VEIN THROMBOSIS (DVT) OF AXILLARY VEIN OF RIGHT UPPER EXTREMITY (H): ICD-10-CM

## 2021-04-30 DIAGNOSIS — H40.1132 PRIMARY OPEN ANGLE GLAUCOMA (POAG) OF BOTH EYES, MODERATE STAGE: Primary | ICD-10-CM

## 2021-05-05 ENCOUNTER — OFFICE VISIT (OUTPATIENT)
Dept: OPHTHALMOLOGY | Facility: CLINIC | Age: 76
End: 2021-05-05
Attending: OPHTHALMOLOGY
Payer: MEDICARE

## 2021-05-05 DIAGNOSIS — H57.89 THYROID EYE DISEASE: ICD-10-CM

## 2021-05-05 DIAGNOSIS — H40.1132 PRIMARY OPEN ANGLE GLAUCOMA OF BOTH EYES, MODERATE STAGE: ICD-10-CM

## 2021-05-05 DIAGNOSIS — E07.9 THYROID EYE DISEASE: ICD-10-CM

## 2021-05-05 DIAGNOSIS — H35.3132 INTERMEDIATE STAGE NONEXUDATIVE AGE-RELATED MACULAR DEGENERATION OF BOTH EYES: ICD-10-CM

## 2021-05-05 DIAGNOSIS — Z96.1 PSEUDOPHAKIA OF BOTH EYES: ICD-10-CM

## 2021-05-05 DIAGNOSIS — H40.1132 PRIMARY OPEN ANGLE GLAUCOMA (POAG) OF BOTH EYES, MODERATE STAGE: Primary | ICD-10-CM

## 2021-05-05 PROCEDURE — 92133 CPTRZD OPH DX IMG PST SGM ON: CPT | Performed by: OPHTHALMOLOGY

## 2021-05-05 PROCEDURE — G0463 HOSPITAL OUTPT CLINIC VISIT: HCPCS

## 2021-05-05 PROCEDURE — 99214 OFFICE O/P EST MOD 30 MIN: CPT | Performed by: OPHTHALMOLOGY

## 2021-05-05 RX ORDER — TIMOLOL MALEATE 5 MG/ML
1 SOLUTION/ DROPS OPHTHALMIC EVERY MORNING
Qty: 5 ML | Refills: 3 | Status: SHIPPED | OUTPATIENT
Start: 2021-05-05 | End: 2022-05-04

## 2021-05-05 RX ORDER — LATANOPROST 50 UG/ML
1 SOLUTION/ DROPS OPHTHALMIC AT BEDTIME
Qty: 7.5 ML | Refills: 3 | Status: SHIPPED | OUTPATIENT
Start: 2021-05-05 | End: 2022-05-04

## 2021-05-05 ASSESSMENT — SLIT LAMP EXAM - LIDS
COMMENTS: NORMAL
COMMENTS: NORMAL

## 2021-05-05 ASSESSMENT — REFRACTION_WEARINGRX
OD_CYLINDER: SPHERE
OS_SPHERE: -2.50
OD_VPRISM: 3 BU
OS_HPRISM: 7 BO
OS_VPRISM: 3 BD
OS_CYLINDER: +1.00
OD_SPHERE: -0.75
OD_HPRISM: 7 BO
SPECS_TYPE: SVL
OS_AXIS: 050

## 2021-05-05 ASSESSMENT — VISUAL ACUITY
OS_CC: 20/40
OD_CC: 20/30
OS_CC+: -2
OD_CC+: -2
METHOD: SNELLEN - LINEAR

## 2021-05-05 ASSESSMENT — CUP TO DISC RATIO
OS_RATIO: 0.5
OD_RATIO: 0.75

## 2021-05-05 ASSESSMENT — CONF VISUAL FIELD
METHOD: COUNTING FINGERS
OD_NORMAL: 1
OS_NORMAL: 1

## 2021-05-05 ASSESSMENT — TONOMETRY
IOP_METHOD: APPLANATION
OS_IOP_MMHG: 14
OD_IOP_MMHG: 16

## 2021-05-05 NOTE — PROGRESS NOTES
Chief Complaint(s) and History of Present Illness(es)     Glaucoma Follow-Up     Laterality: both eyes    Associated symptoms: Negative for eye pain, floaters, flashes, dryness,   itching and discharge    Compliance with Treatment: always    Pain scale: 0/10              Comments     Rhonda is here to continue care for Primary open angle glaucoma (POAG) of   both eyes, moderate stage. She feels vision is about the same as last   visit.    Eleazar Alex COT 12:25 PM May 5, 2021                      Review of systems for the eyes was negative other than the pertinent positives/negatives listed in the HPI.      Assessment & Plan      Manasa French is a 75 year old female with the following diagnoses:   1. Primary open angle glaucoma (POAG) of both eyes, moderate stage    2. Pseudophakia of both eyes    3. Intermediate stage nonexudative age-related macular degeneration of both eyes    4. Thyroid eye disease         POAG right eye moderate, left eye mild  IOP today 16/14  Tmax 26/20 (records review)    Good response to repeat selected laser trabeculoplasty (SLT) right eye 9/16/2020 (previously done nicholas Gallardo 2/2018)  ?Further OCT progression, but may be due to previous elevation and macular degenerative progression      - Monitor closely for now   - Continue latanoprost qHS both eyes    - Continue timolol daily both eyes (Vyzulta not on formulary, doubt she will tolerate Rhopressa, Intolerant to cosopt and alphagan)        Patient disposition:   Return in about 4 months (around 9/5/2021) for VT only, OCT NFL, 24-2 Dynamic VF.           Attending Physician Attestation:  Complete documentation of historical and exam elements from today's encounter can be found in the full encounter summary report (not reduplicated in this progress note).  I personally obtained the chief complaint(s) and history of present illness.  I confirmed and edited as necessary the review of systems, past medical/surgical history, family history,  social history, and examination findings as documented by others; and I examined the patient myself.  I personally reviewed the relevant tests, images, and reports as documented above.  I formulated and edited as necessary the assessment and plan and discussed the findings and management plan with the patient and family. . - Jarred Leon MD

## 2021-05-05 NOTE — NURSING NOTE
Chief Complaints and History of Present Illnesses   Patient presents with     Glaucoma Follow-Up     Chief Complaint(s) and History of Present Illness(es)     Glaucoma Follow-Up     Laterality: both eyes    Associated symptoms: Negative for eye pain, floaters, flashes, dryness, itching and discharge    Compliance with Treatment: always    Pain scale: 0/10              Comments     Rhonda is here to continue care for Primary open angle glaucoma (POAG) of both eyes, moderate stage. She feels vision is about the same as last visit.    lEeazar Johnson COT 12:25 PM May 5, 2021

## 2021-05-07 DIAGNOSIS — M80.00XD AGE-RELATED OSTEOPOROSIS WITH CURRENT PATHOLOGICAL FRACTURE WITH ROUTINE HEALING, SUBSEQUENT ENCOUNTER: ICD-10-CM

## 2021-05-07 NOTE — TELEPHONE ENCOUNTER
alendronate (FOSAMAX) 70 MG tablet  Last Written Prescription Date:  2/09/21  Last Fill Quantity: 12 tablet,  # refills: 0   Last office visit: 2/09/2021 (virtual visit) with Dr Hernandez  Future Office Visit:   Next 5 appointments (look out 90 days)    May 27, 2021  2:20 PM  PHYSICAL with Jessica Anthony MD  [facility] ([facility] ) [address] [address] [phone]

## 2021-05-09 RX ORDER — ALENDRONATE SODIUM 70 MG/1
TABLET ORAL
Qty: 12 TABLET | Refills: 3 | Status: SHIPPED | OUTPATIENT
Start: 2021-05-09 | End: 2022-01-24

## 2021-05-17 ENCOUNTER — ANTICOAGULATION THERAPY VISIT (OUTPATIENT)
Dept: NURSING | Facility: CLINIC | Age: 76
End: 2021-05-17

## 2021-05-17 DIAGNOSIS — Z79.01 CURRENT USE OF LONG TERM ANTICOAGULATION: ICD-10-CM

## 2021-05-17 DIAGNOSIS — I82.A11 ACUTE DEEP VEIN THROMBOSIS (DVT) OF AXILLARY VEIN OF RIGHT UPPER EXTREMITY (H): ICD-10-CM

## 2021-05-17 DIAGNOSIS — I82.A19: ICD-10-CM

## 2021-05-17 LAB
CAPILLARY BLOOD COLLECTION: NORMAL
INR PPP: 2.4 (ref 0.86–1.14)

## 2021-05-17 PROCEDURE — 36416 COLLJ CAPILLARY BLOOD SPEC: CPT | Performed by: FAMILY MEDICINE

## 2021-05-17 PROCEDURE — 85610 PROTHROMBIN TIME: CPT | Performed by: FAMILY MEDICINE

## 2021-05-17 NOTE — PROGRESS NOTES
ANTICOAGULATION MANAGEMENT     Patient Name:  Manasa French  Date:  5/17/2021    ASSESSMENT /SUBJECTIVE:    Today's INR result of 2.40 is therapeutic. Goal INR of 2.0-3.0      Warfarin dose taken: Warfarin taken as instructed    Diet: No new diet changes affecting INR    Medication changes/ interactions: No new medications/supplements affecting INR    Previous INR: Therapeutic     S/S of bleeding or thromboembolism: No    New injury or illness: No    Upcoming surgery, procedure or cardioversion: No    Additional findings: None      PLAN:    Telephone call with Manasa regarding INR result and instructed:     Warfarin Dosing Instructions: Continue your current warfarin dose 7.5mg every Mon, Wed, & Fri; 5mg all other days of the week,     Instructed patient to follow up no later than: 6 weeks  Lab visit scheduled    Education provided: Target INR goal and significance of current INR result and Importance of therapeutic range      Rhonda verbalizes understanding and agrees to warfarin dosing plan.    Instructed to call the Anticoagulation Clinic for any changes, questions or concerns. (#[phone])        Shekhar Del Rio RN      OBJECTIVE:  Recent labs: (last 7 days)     05/17/21  1038   INR 2.40*         No question data found.  Anticoagulation Summary  As of 5/17/2021    INR goal:  2.0-3.0   TTR:  80.8 % (1 y)   INR used for dosing:     Warfarin maintenance plan:  7.5 mg (5 mg x 1.5) every Mon, Wed, Fri; 5 mg (5 mg x 1) all other days   Full warfarin instructions:  7.5 mg every Mon, Wed, Fri; 5 mg all other days   Weekly warfarin total:  42.5 mg   Plan last modified:  Noemy Dailey RN (12/19/2019)   Next INR check:  6/28/2021   Priority:  Maintenance   Target end date:  Indefinite    Indications    Acute deep vein thrombosis (DVT) of axillary vein and internal jugular (H) [I82.A19]  Current use of long term anticoagulation [Z79.01]  Acute deep vein thrombosis (DVT) of axillary vein of right upper extremity (H)  [I82.A11]             Anticoagulation Episode Summary     INR check location:      Preferred lab:      Send INR reminders to:  KAN HILL    Comments:  40-50 min walk/lifting. 3 swimming. Greens 2-4/week. 9/16 started 100 mcg of vit k.      Anticoagulation Care Providers     Provider Role Specialty Phone number    Rema Braxton DO Referring Family Medicine [phone]    Christopher Smith MD Responsible Hematology [phone]

## 2021-05-27 ENCOUNTER — TELEPHONE (OUTPATIENT)
Dept: INTERNAL MEDICINE | Facility: CLINIC | Age: 76
End: 2021-05-27

## 2021-05-27 ENCOUNTER — OFFICE VISIT (OUTPATIENT)
Dept: INTERNAL MEDICINE | Facility: CLINIC | Age: 76
End: 2021-05-27
Payer: MEDICARE

## 2021-05-27 VITALS
HEART RATE: 70 BPM | OXYGEN SATURATION: 97 % | HEIGHT: 65 IN | SYSTOLIC BLOOD PRESSURE: 120 MMHG | RESPIRATION RATE: 16 BRPM | DIASTOLIC BLOOD PRESSURE: 64 MMHG | TEMPERATURE: 98.4 F | BODY MASS INDEX: 25.33 KG/M2 | WEIGHT: 152 LBS

## 2021-05-27 DIAGNOSIS — Z00.00 MEDICARE ANNUAL WELLNESS VISIT, SUBSEQUENT: Primary | ICD-10-CM

## 2021-05-27 DIAGNOSIS — I95.9 HYPOTENSIVE EPISODE: ICD-10-CM

## 2021-05-27 DIAGNOSIS — I10 BENIGN ESSENTIAL HYPERTENSION: ICD-10-CM

## 2021-05-27 DIAGNOSIS — E03.9 ACQUIRED HYPOTHYROIDISM: ICD-10-CM

## 2021-05-27 PROBLEM — Z79.01 CURRENT USE OF LONG TERM ANTICOAGULATION: Status: RESOLVED | Noted: 2020-11-28 | Resolved: 2021-05-27

## 2021-05-27 PROBLEM — E05.00 GRAVES DISEASE: Status: RESOLVED | Noted: 2018-05-23 | Resolved: 2021-05-27

## 2021-05-27 PROBLEM — I82.A11 ACUTE DEEP VEIN THROMBOSIS (DVT) OF AXILLARY VEIN OF RIGHT UPPER EXTREMITY (H): Status: RESOLVED | Noted: 2020-11-28 | Resolved: 2021-05-27

## 2021-05-27 PROBLEM — R29.6 FALLS FREQUENTLY: Status: RESOLVED | Noted: 2018-05-23 | Resolved: 2021-05-27

## 2021-05-27 PROBLEM — R94.31 ABNORMAL ELECTROCARDIOGRAM: Status: RESOLVED | Noted: 2018-09-10 | Resolved: 2021-05-27

## 2021-05-27 PROBLEM — J45.30 MILD PERSISTENT ASTHMA WITHOUT COMPLICATION: Status: RESOLVED | Noted: 2018-05-23 | Resolved: 2021-05-27

## 2021-05-27 PROBLEM — Z98.2 S/P VENTRICULAR SHUNT PLACEMENT: Status: RESOLVED | Noted: 2018-05-23 | Resolved: 2021-05-27

## 2021-05-27 PROBLEM — T15.12XA FOREIGN BODY OF LEFT CONJUNCTIVAL SAC: Status: RESOLVED | Noted: 2019-06-15 | Resolved: 2021-05-27

## 2021-05-27 PROBLEM — H04.123 DRY EYES, BILATERAL: Status: RESOLVED | Noted: 2019-06-15 | Resolved: 2021-05-27

## 2021-05-27 PROBLEM — H16.149 PUNCTATE KERATITIS: Status: RESOLVED | Noted: 2019-06-15 | Resolved: 2021-05-27

## 2021-05-27 PROBLEM — S32.020A CLOSED COMPRESSION FRACTURE OF L2 VERTEBRA (H): Status: RESOLVED | Noted: 2018-05-23 | Resolved: 2021-05-27

## 2021-05-27 PROBLEM — M48.062 SPINAL STENOSIS, LUMBAR REGION WITH NEUROGENIC CLAUDICATION: Status: RESOLVED | Noted: 2018-05-23 | Resolved: 2021-05-27

## 2021-05-27 PROBLEM — H54.3 LOW VISION, BOTH EYES: Status: RESOLVED | Noted: 2018-05-23 | Resolved: 2021-05-27

## 2021-05-27 PROCEDURE — G0439 PPPS, SUBSEQ VISIT: HCPCS | Performed by: INTERNAL MEDICINE

## 2021-05-27 RX ORDER — LEVOTHYROXINE SODIUM 125 UG/1
125 TABLET ORAL DAILY
Qty: 90 TABLET | Refills: 3 | Status: SHIPPED | OUTPATIENT
Start: 2021-05-27 | End: 2022-06-08

## 2021-05-27 RX ORDER — HYDROCHLOROTHIAZIDE 12.5 MG/1
12.5 TABLET ORAL DAILY
Qty: 90 TABLET | Refills: 3 | Status: SHIPPED | OUTPATIENT
Start: 2021-05-27 | End: 2022-06-08

## 2021-05-27 RX ORDER — AMLODIPINE BESYLATE 5 MG/1
5 TABLET ORAL DAILY
Qty: 90 TABLET | Refills: 3 | Status: SHIPPED | OUTPATIENT
Start: 2021-05-27 | End: 2022-06-08

## 2021-05-27 RX ORDER — LOSARTAN POTASSIUM 100 MG/1
100 TABLET ORAL DAILY
Qty: 90 TABLET | Refills: 3 | Status: SHIPPED | OUTPATIENT
Start: 2021-05-27 | End: 2022-06-08

## 2021-05-27 SDOH — HEALTH STABILITY: MENTAL HEALTH: HOW OFTEN DO YOU HAVE 6 OR MORE DRINKS ON ONE OCCASION?: NEVER

## 2021-05-27 SDOH — ECONOMIC STABILITY: TRANSPORTATION INSECURITY
IN THE PAST 12 MONTHS, HAS LACK OF TRANSPORTATION KEPT YOU FROM MEETINGS, WORK, OR FROM GETTING THINGS NEEDED FOR DAILY LIVING?: NOT ASKED

## 2021-05-27 SDOH — ECONOMIC STABILITY: FOOD INSECURITY: WITHIN THE PAST 12 MONTHS, THE FOOD YOU BOUGHT JUST DIDN'T LAST AND YOU DIDN'T HAVE MONEY TO GET MORE.: NOT ASKED

## 2021-05-27 SDOH — HEALTH STABILITY: MENTAL HEALTH: HOW MANY STANDARD DRINKS CONTAINING ALCOHOL DO YOU HAVE ON A TYPICAL DAY?: 1 OR 2

## 2021-05-27 SDOH — ECONOMIC STABILITY: FOOD INSECURITY: WITHIN THE PAST 12 MONTHS, YOU WORRIED THAT YOUR FOOD WOULD RUN OUT BEFORE YOU GOT MONEY TO BUY MORE.: NOT ASKED

## 2021-05-27 SDOH — ECONOMIC STABILITY: TRANSPORTATION INSECURITY
IN THE PAST 12 MONTHS, HAS THE LACK OF TRANSPORTATION KEPT YOU FROM MEDICAL APPOINTMENTS OR FROM GETTING MEDICATIONS?: NOT ASKED

## 2021-05-27 SDOH — ECONOMIC STABILITY: INCOME INSECURITY: HOW HARD IS IT FOR YOU TO PAY FOR THE VERY BASICS LIKE FOOD, HOUSING, MEDICAL CARE, AND HEATING?: NOT ASKED

## 2021-05-27 SDOH — HEALTH STABILITY: MENTAL HEALTH: HOW OFTEN DO YOU HAVE A DRINK CONTAINING ALCOHOL?: 4 OR MORE TIMES A WEEK

## 2021-05-27 ASSESSMENT — MIFFLIN-ST. JEOR: SCORE: 1180.35

## 2021-05-27 NOTE — TELEPHONE ENCOUNTER
LM on VM to confirm if patient would also be establishing care today. Provider does not do physical exams on patients that will not be also establishing care. If plans to establish care then leave appointment as scheduled. If not please reschedule with another provider.   Diann Del Rio CMA on 5/27/2021 at 10:43 AM

## 2021-05-27 NOTE — PROGRESS NOTES
ASSESSMENT/PLAN                                                       (Z00.00) Medicare annual wellness visit, subsequent  (primary encounter diagnosis)  Comment: PMH, PSH, FH, SH, medications, allergies, immunizations, and preventative health measures reviewed and updated as appropriate.  Plan: see below for plans.      (I95.9) Hypotensive episode  Comment: etiology unclear.   Plan: TRIAL of taking losartan and hydrochlorothiazide in the morning and amlodipine in the evening; if continued episodes, patient should let me know.     (I10) Benign essential hypertension  Comment: well-controlled on current regimen.    Plan: continue present management; refills provided.     (E03.9) Acquired hypothyroidism  Comment: well-controlled on current regimen.    Plan: continue present management; refills provided.     Appropriate preventive services were discussed with this patient, including applicable screening as appropriate for cardiovascular disease, diabetes, osteopenia/osteoporosis, and glaucoma. As appropriate for age/gender, discussed screening for colorectal cancer, prostate cancer, breast cancer, and cervical cancer. Checklist reviewing preventive services available has been given to the patient.    Reviewed patients plan of care. The Complex Care Plan (for patients with higher acuity and needing more deliberate coordination of services) for Manasa French meets the Care Plan requirement. This Care Plan has been established and reviewed with the Patient.    Jessica Anthony MD   [address] [address]  T: [phone], F: [phone]    SUBJECTIVE                                                      Manasa French is a very pleasant 75 year old female who presents for her subsequent AWV:    Current providers (other than myself): Carolyn (ophthalmology), David (endocrinology)    Patient reports occasional episodes of symptomatic hypotension.  These seem to occur at  random during the day. In general, her blood pressure is well controlled and within normal limits on amlodipine 5 mg daily, hydrochlorothiazide 12.5 mg daily and losartan 100 mg daily.    PMH, PSH, FH, SH, medications, allergies, immunizations, preventative health, and health risk assessment reviewed and updated as appropriate.    Past Medical History:   Diagnosis Date     Benign essential hypertension      History of Graves' disease     s/p BARAHONA     History of hydrocephalus     s/p  shunt placement in 1987     Macular degeneration of both eyes      Osteoporosis      Personal history of DVT (deep vein thrombosis) 2019    RUE; on chronic AC     Postablative hypothyroidism      Primary open angle glaucoma (POAG) of both eyes     right eye moderate, left eye mild     Pseudophakia      Sensorineural hearing loss, bilateral      Past Surgical History:   Procedure Laterality Date     ARTHROSCOPY SHOULDER Left      CATARACT IOL, RT/LT Bilateral      DECOMPRESSION, FUSION LUMBAR POSTERIOR ONE LEVEL, COMBINED      L5-S1     EXCISE GANGLION WRIST Right      HERNIA REPAIR, UMBILICAL       LUMPECTOMY BREAST Left     benign     Family History   Problem Relation Age of Onset     Colon Cancer Mother      Hypertension Mother      Breast Cancer Mother      Leukemia Father      Diabetes Type 2  Father      Hypertension Sister      Diabetes Type 2  Brother      Breast Cancer Maternal Aunt         multiple aunts     Myocardial Infarction No family hx of      Cerebrovascular Disease No family hx of      Coronary Artery Disease Early Onset No family hx of      Ovarian Cancer No family hx of      Social History     Occupational History     Occupation: Retired - wedding    Tobacco Use     Smoking status: Never Smoker     Smokeless tobacco: Never Used   Substance and Sexual Activity     Alcohol use: Yes     Frequency: 4 or more times a week     Drinks per session: 1 or 2     Binge frequency: Never     Drug use: No     Sexual  activity: Yes     Partners: Male   Social History Narrative    .    Two sons.    3 grandchildren.    Swims 3x/week x 1 hour; walks 4x/week x 1 hour.      Allergies   Allergen Reactions     Alphagan [Brimonidine] Other (See Comments)     Itchy eyes     Cosopt [Dorzolamide-Timolol] Other (See Comments)     Itchy eyes     Current Outpatient Medications   Medication Sig     alendronate (FOSAMAX) 70 MG tablet Take 1-tablet by mouth weekly, dosing as directed     amLODIPine (NORVASC) 5 MG tablet Take 1 tablet (5 mg) by mouth daily     Calcium Carb-Cholecalciferol (CALCIUM CARBONATE-VITAMIN D3 PO)      carboxymethylcellulose (CELLUVISC/REFRESH LIQUIGEL) 1 % ophthalmic solution Place 1 drop into both eyes 3 times daily     Cholecalciferol (VITAMIN D) 1000 UNITS capsule Take  by mouth.     hydrochlorothiazide (HYDRODIURIL) 12.5 MG tablet Take 1 tablet (12.5 mg) by mouth daily     latanoprost (XALATAN) 0.005 % ophthalmic solution Place 1 drop into both eyes At Bedtime     levothyroxine (SYNTHROID/LEVOTHROID) 125 MCG tablet Take 1 tablet (125 mcg) by mouth daily     losartan (COZAAR) 100 MG tablet TAKE 1 TABLET EVERY DAY     Multiple Vitamins-Minerals (ICAPS AREDS 2 PO)      timolol maleate (TIMOPTIC) 0.5 % ophthalmic solution Place 1 drop into both eyes every morning     Vitamin K 100 MCG TABS Take 1 tablet by mouth daily     warfarin ANTICOAGULANT (COUMADIN) 5 MG tablet Current dose (7/15/19): 5 mg Mon, Wed, Fri + 7.5 mg all other days or as directed by Coumadin clinic.     Immunization History   Administered Date(s) Administered     COVID-19,PF,Pfizer 03/03/2021, 03/24/2021     Flu 65+ Years 11/13/2018     Influenza (High Dose) 3 valent vaccine 10/02/2015, 09/21/2016, 11/06/2017     Influenza (IIV3) PF 09/12/2012, 09/01/2013, 09/17/2014     Influenza Quad, Recombinant, p-free (RIV4) 10/18/2019, 10/06/2020     Pneumo Conj 13-V (2010&after) 10/02/2015     Pneumococcal 23 valent 04/05/2010, 10/06/2020     TD (ADULT, 7+)  "02/15/2001     Tdap (Adacel,Boostrix) 12/19/2011, 11/20/2020     PREVENTATIVE HEALTH                                                      BMI: within normal limits   Blood pressure: well-controlled on current regimen   Breast CA screening: screening no longer indicated  Colon CA screening: screening no longer indicated  Lung CA screening: n/a   Dexa: up to date   Screening cholesterol: screening no longer indicated   Screening diabetes: up to date   Alcohol misuse screening: alcohol use reviewed - no intervention indicated at this time  Immunizations: reviewed; Shingrix series DUE - not covered by Medicare (may obtain in pharmacy if desired)     HEALTH RISK ASSESSMENT                                                      In general, how would you rate your overall physical health? very good  Outside of work, how many days during the week do you exercise? 6-7 days/week  Outside of work, approximately how many minutes a day do you exercise? greater than 60 minutes    If you drink alcohol do you typically have >3 drinks per day or >7 drinks per week? No  Do you usually eat at least 4 servings of fruit and vegetables a day, include whole grains & fiber and avoid regularly eating high fat or \"junk\" foods? Yes     Do you have any problems taking medications regularly? No  Do you have any side effects from medications? No    Assistance with daily activities: No    Safety concerns: No    Fall risk assessment: completed today (see ambulatory assessments)    Hearing concerns: No    In the past 6 months, have you been bothered by leaking of urine: No    In general, how would you rate your overall mental or emotional health: very good    PHQ-2/PHQ-9 assessment: completed today (see ambulatory assessments)    Additional concerns today: No    OBJECTIVE                                                      /64 (BP Location: Left arm, Patient Position: Chair, Cuff Size: Adult Regular)   Pulse 70   Temp 98.4  F (36.9  C) " "(Temporal)   Resp 16   Ht 1.651 m (5' 5\")   Wt 68.9 kg (152 lb)   SpO2 97%   BMI 25.29 kg/m    Constitutional: well-appearing  Head, Ears, and Eyes: normocephalic; normal external auditory canal and pinna; tympanic membranes visualized and normal; normal lids and conjunctivae  Neck: supple, symmetric, no thyromegaly or lymphadenopathy  Respiratory: normal respiratory effort; clear to auscultation bilaterally  Cardiovascular: regular rate and rhythm; no edema  Gastrointestinal: soft, non-tender, and non-distended; no organomegaly or masses  Musculoskeletal: walks with cane  Psych: normal judgment and insight; normal mood and affect; recent and remote memory intact    Cognitive impairment noted: No  ---  (Note was completed, in part, with [employer] voice-recognition software. Documentation was reviewed, but some grammatical, spelling, and word errors may remain.)    "

## 2021-06-28 ENCOUNTER — ANTICOAGULATION THERAPY VISIT (OUTPATIENT)
Dept: INTERNAL MEDICINE | Facility: CLINIC | Age: 76
End: 2021-06-28

## 2021-06-28 ENCOUNTER — TELEPHONE (OUTPATIENT)
Dept: INTERNAL MEDICINE | Facility: CLINIC | Age: 76
End: 2021-06-28

## 2021-06-28 DIAGNOSIS — Z79.01 CURRENT USE OF LONG TERM ANTICOAGULATION: ICD-10-CM

## 2021-06-28 DIAGNOSIS — I82.A11 ACUTE DEEP VEIN THROMBOSIS (DVT) OF AXILLARY VEIN OF RIGHT UPPER EXTREMITY (H): ICD-10-CM

## 2021-06-28 LAB — INR PPP: 3.5 (ref 0.86–1.14)

## 2021-06-28 PROCEDURE — 36416 COLLJ CAPILLARY BLOOD SPEC: CPT | Performed by: FAMILY MEDICINE

## 2021-06-28 PROCEDURE — 85610 PROTHROMBIN TIME: CPT | Performed by: FAMILY MEDICINE

## 2021-06-28 NOTE — TELEPHONE ENCOUNTER
Anticoagulation Management    Discussed INR home monitoring program with Manasa French reviewing:      [employer] requirements: >= 3 months of anticoagulation therapy, indication for chronic anticoagulation and order from provider    Required testing frequency (q1-2 weeks)    Home meters, testing supplies, meter training, and reporting of INR results done through an outside company. Patient would be contacted by home monitoring company to review insurance coverage with home monitoring company prior to enrolling.    [facility] would continue to receive and manage INR results.    Home monitoring application may take several weeks and must continue to follow up with recommended INR monitoring in clinic until receives monitor and training completed.     Home monitoring terms reviewed with patient      Patient agrees to frequency of testing as directed by referring provider ( weekly or biweekly) Yes    Testing to be performed during business hours of [facility] Yes    Patient agrees they have the skill (or a designated caregiver) necessary to perform the self test Yes    Patient agrees to report all INR results to INR home monitoring company Yes    Patient agrees to have additional INR test in clinic if a home result is critical Yes    Patient agrees to schedule an INR test at a [facility] clinic yearly for technique observation and quality check of INR results with their home meter Yes    Patient agrees to use a [facility] approved service provider and device for home monitoring Yes    [facility]    Referring provider: Dr. Anthony    Referring providers Clinic Fax number [phone]    Manasa French is interested home INR monitoring and requests order be submitted.

## 2021-06-28 NOTE — PROGRESS NOTES
ANTICOAGULATION MANAGEMENT     Manasa CORTEZ Manolo 75 year old female is on warfarin with supratherapeutic INR result. (Goal INR 2.0-3.0)    Recent labs: (last 7 days)     06/28/21  1118   INR 3.50*       ASSESSMENT     Source(s): Patient/Caregiver Call       Warfarin doses taken: Warfarin taken as instructed    Diet: No new diet changes identified    New illness, injury, or hospitalization: Patient reports having a lightheaded episode this morning. Patient sat down for 30 minutes and sx went away. Patient reports intermittent hypotension and lightheaded sx have been occurring the last 10 years. Patient states, PCP is aware and cannot find any cause for sx. Patient has collapsed several times as well, typically out of the home, at the store, at [employer] and at [employer]. Sending today's encounter to PCP for review. Of note, patient was previously on Eliquis prior to Warfarin therapy and dc'd d/t cost.    Medication/supplement changes: None noted    Signs or symptoms of bleeding or clotting: No    Previous INR: Therapeutic last 2(+) visits    Additional findings: None     PLAN     Recommended plan for no diet, medication or health factor changes affecting INR     Dosing Instructions: Continue your current warfarin dose with next INR in 2 weeks       Summary  As of 6/28/2021    Full warfarin instructions:  6/28: 5 mg; Otherwise 7.5 mg every Mon, Wed, Fri; 5 mg all other days   Next INR check:  7/12/2021             Telephone call with Manasa whom verbalizes understanding and agrees to plan    Lab visit scheduled    Education provided: Please call back if any changes to your diet, medications or how you've been taking warfarin, Monitoring for bleeding signs and symptoms, Monitoring for clotting signs and symptoms and Importance of notifying clinic for changes in medications; a sooner lab recheck maybe needed.    Plan made per [facility] anticoagulation protocol    Brandi Arriola, [facility]  6/28/2021    _______________________________________________________________________     Anticoagulation Episode Summary     Current INR goal:  2.0-3.0   TTR:  75.6 % (1 y)   Target end date:  Indefinite   Send INR reminders to:  K[facility]    Indications    Acute deep vein thrombosis (DVT) of axillary vein and internal jugular (H) (Resolved) [I82.A19]  Current use of long term anticoagulation (Resolved) [Z79.01]  Acute deep vein thrombosis (DVT) of axillary vein of right upper extremity (H) (Resolved) [I82.A11]           Comments:  40-50 min walk/lifting. 3 swimming. Greens 2-4/week. 9/16 started 100 mcg of vit k.         Anticoagulation Care Providers     Provider Role Specialty Phone number    Rema Braxton DO Referring Family Medicine [phone]    Christopher Smith MD Responsible Hematology [phone]

## 2021-07-01 ENCOUNTER — MEDICAL CORRESPONDENCE (OUTPATIENT)
Dept: HEALTH INFORMATION MANAGEMENT | Facility: CLINIC | Age: 76
End: 2021-07-01

## 2021-07-07 DIAGNOSIS — Z86.718 PERSONAL HISTORY OF DVT (DEEP VEIN THROMBOSIS): Primary | ICD-10-CM

## 2021-07-12 ENCOUNTER — ANTICOAGULATION THERAPY VISIT (OUTPATIENT)
Dept: ANTICOAGULATION | Facility: CLINIC | Age: 76
End: 2021-07-12

## 2021-07-12 ENCOUNTER — LAB (OUTPATIENT)
Dept: LAB | Facility: CLINIC | Age: 76
End: 2021-07-12
Payer: MEDICARE

## 2021-07-12 DIAGNOSIS — Z86.718 PERSONAL HISTORY OF DVT (DEEP VEIN THROMBOSIS): ICD-10-CM

## 2021-07-12 LAB — INR BLD: 2.8 (ref 0.9–1.1)

## 2021-07-12 PROCEDURE — 36416 COLLJ CAPILLARY BLOOD SPEC: CPT

## 2021-07-12 PROCEDURE — 85610 PROTHROMBIN TIME: CPT

## 2021-07-12 NOTE — PROGRESS NOTES
ANTICOAGULATION MANAGEMENT     Manasatiff French 75 year old female is on warfarin with therapeutic INR result. (Goal INR 2.0-3.0)    Recent labs: (last 7 days)     07/12/21  1132   INR 2.8*       ASSESSMENT     Source(s): Patient/Caregiver Call       Warfarin doses taken: Warfarin taken as instructed    Diet: No new diet changes identified    New illness, injury, or hospitalization: No    Medication/supplement changes: None noted    Signs or symptoms of bleeding or clotting: No    Previous INR: Supratherapeutic    Additional findings: None     PLAN     Recommended plan for no diet, medication or health factor changes affecting INR     Dosing Instructions: Continue your current warfarin dose with next INR in 4 weeks       Summary  As of 7/12/2021    Full warfarin instructions:  7.5 mg every Mon, Wed, Fri; 5 mg all other days   Next INR check:  8/9/2021             Telephone call with Manasa who verbalizes understanding and agrees to plan    Lab visit scheduled    Education provided: Monitoring for bleeding signs and symptoms and Monitoring for clotting signs and symptoms    Plan made per [facility] anticoagulation protocol    Maribell Rea RN  Anticoagulation Clinic  7/12/2021    _______________________________________________________________________     Anticoagulation Episode Summary     Current INR goal:  2.0-3.0   TTR:  75.4 % (1 y)   Target end date:  Indefinite   Send INR reminders to:  [facility]    Indications    Acute deep vein thrombosis (DVT) of axillary vein and internal jugular (H) (Resolved) [I82.A19]  Current use of long term anticoagulation (Resolved) [Z79.01]  Acute deep vein thrombosis (DVT) of axillary vein of right upper extremity (H) (Resolved) [I82.A11]           Comments:  40-50 min walk/lifting. 3 swimming. Greens 2-4/week. 9/16 started 100 mcg of vit k.         Anticoagulation Care Providers     Provider Role Specialty Phone number    Rema Braxton DO Referring  Family Medicine [phone]    Christopher Smith MD [facility] Hematology [phone]

## 2021-07-21 DIAGNOSIS — I82.A11 ACUTE DEEP VEIN THROMBOSIS (DVT) OF AXILLARY VEIN OF RIGHT UPPER EXTREMITY (H): ICD-10-CM

## 2021-07-22 RX ORDER — WARFARIN SODIUM 5 MG/1
TABLET ORAL
Qty: 110 TABLET | Refills: 3 | Status: SHIPPED | OUTPATIENT
Start: 2021-07-22 | End: 2022-09-01

## 2021-08-09 ENCOUNTER — ANTICOAGULATION THERAPY VISIT (OUTPATIENT)
Dept: ANTICOAGULATION | Facility: CLINIC | Age: 76
End: 2021-08-09

## 2021-08-09 ENCOUNTER — LAB (OUTPATIENT)
Dept: LAB | Facility: CLINIC | Age: 76
End: 2021-08-09
Payer: MEDICARE

## 2021-08-09 DIAGNOSIS — Z86.718 PERSONAL HISTORY OF DVT (DEEP VEIN THROMBOSIS): ICD-10-CM

## 2021-08-09 LAB — INR BLD: 2.6 (ref 0.9–1.1)

## 2021-08-09 PROCEDURE — 85610 PROTHROMBIN TIME: CPT

## 2021-08-09 PROCEDURE — 36416 COLLJ CAPILLARY BLOOD SPEC: CPT

## 2021-08-09 NOTE — PROGRESS NOTES
ANTICOAGULATION MANAGEMENT     Manasa CORTEZ French 76 year old female is on warfarin with therapeutic INR result. (Goal INR 2.0-3.0)    Recent labs: (last 7 days)     08/09/21  1131   INR 2.6*       ASSESSMENT     Source(s): Chart Review and Patient/Caregiver Call       Warfarin doses taken: Warfarin taken as instructed    Diet: No new diet changes identified    New illness, injury, or hospitalization: No    Medication/supplement changes: None noted    Signs or symptoms of bleeding or clotting: No    Previous INR: Therapeutic last visit; previously outside of goal range    Additional findings: None     PLAN     Recommended plan for no diet, medication or health factor changes affecting INR     Dosing Instructions: Continue your current warfarin dose with next INR in 4 weeks       Summary  As of 8/9/2021    Full warfarin instructions:  7.5 mg every Mon, Wed, Fri; 5 mg all other days   Next INR check:  9/9/2021             Telephone call with Manasa who verbalizes understanding and agrees to plan    Lab visit scheduled    Education provided: None required    Plan made per [facility] anticoagulation protocol    Marga Cuello RN  Anticoagulation Clinic  8/9/2021    _______________________________________________________________________     Anticoagulation Episode Summary     Current INR goal:  2.0-3.0   TTR:  75.7 % (1 y)   Target end date:  Indefinite   Send INR reminders to:  [facility]    Indications    Acute deep vein thrombosis (DVT) of axillary vein and internal jugular (H) (Resolved) [I82.A19]  Current use of long term anticoagulation (Resolved) [Z79.01]  Acute deep vein thrombosis (DVT) of axillary vein of right upper extremity (H) (Resolved) [I82.A11]           Comments:  40-50 min walk/lifting. 3 swimming. Greens 2-4/week. 9/16 started 100 mcg of vit k.         Anticoagulation Care Providers     Provider Role Specialty Phone number    Rema Braxton DO Referring Family Medicine  [phone]    Christopher Smith MD [facility] Hematology [phone]

## 2021-08-09 NOTE — PROGRESS NOTES
ANTICOAGULATION FOLLOW-UP CLINIC VISIT    Patient Name:  Manasa French  Date:  2021  Contact Type: Telephone    SUBJECTIVE:         OBJECTIVE    Recent labs: (last 7 days)     21  1131   INR 2.6*       ASSESSMENT / PLAN  INR assessment THER    Recheck INR In: 4 WEEKS    INR Location Clinic      Anticoagulation Summary  As of 2021    INR goal:  2.0-3.0   TTR:  75.7 % (1 y)   INR used for dosin.6 (2021)   Warfarin maintenance plan:  7.5 mg (5 mg x 1.5) every Mon, Wed, Fri; 5 mg (5 mg x 1) all other days   Full warfarin instructions:  7.5 mg every Mon, Wed, Fri; 5 mg all other days   Weekly warfarin total:  42.5 mg   Plan last modified:  Noemy Dailey RN (2019)   Next INR check:  2021   Priority:  Maintenance   Target end date:  Indefinite    Indications    Acute deep vein thrombosis (DVT) of axillary vein and internal jugular (H) (Resolved) [I82.A19]  Current use of long term anticoagulation (Resolved) [Z79.01]  Acute deep vein thrombosis (DVT) of axillary vein of right upper extremity (H) (Resolved) [I82.A11]             Anticoagulation Episode Summary     INR check location:      Preferred lab:      Send INR reminders to:  [facility]    Comments:  40-50 min walk/lifting. 3 swimming. Greens 2-4/week.  started 100 mcg of vit k.      Anticoagulation Care Providers     Provider Role Specialty Phone number    Rema Braxton DO Referring Family Medicine [phone]    Christopher Smith MD Responsible Hematology [phone]            See the Encounter Report to view Anticoagulation Flowsheet and Dosing Calendar (Go to Encounters tab in chart review, and find the Anticoagulation Therapy Visit)        Marga Cuello, RN

## 2021-08-27 ENCOUNTER — ANTICOAGULATION THERAPY VISIT (OUTPATIENT)
Dept: ANTICOAGULATION | Facility: CLINIC | Age: 76
End: 2021-08-27

## 2021-08-27 ENCOUNTER — TRANSFERRED RECORDS (OUTPATIENT)
Dept: HEALTH INFORMATION MANAGEMENT | Facility: CLINIC | Age: 76
End: 2021-08-27

## 2021-08-27 LAB — INR (EXTERNAL): 2.9 (ref 2–3)

## 2021-08-27 NOTE — PROGRESS NOTES
ANTICOAGULATION MANAGEMENT     Manasa CORTEZ Manolo 76 year old female is on warfarin with therapeutic INR result. (Goal INR 2.0-3.0)    Recent labs: (last 7 days)     08/27/21  0000   INR 2.9       ASSESSMENT     Source(s): Chart Review and Patient/Caregiver Call       Warfarin doses taken: Warfarin taken as instructed    Diet: No new diet changes identified    New illness, injury, or hospitalization: No    Medication/supplement changes: None noted    Signs or symptoms of bleeding or clotting: No    Previous INR: Therapeutic last 2(+) visits    Additional findings: None     PLAN     Recommended plan for no diet, medication or health factor changes affecting INR     Dosing Instructions: Continue your current warfarin dose with next INR in 2 weeks       Summary  As of 8/27/2021    Full warfarin instructions:  7.5 mg every Mon, Wed, Fri; 5 mg all other days   Next INR check:               Telephone call with Manasa who verbalizes understanding and agrees to plan    Lab visit scheduled    Education provided: Please call back if any changes to your diet, medications or how you've been taking warfarin    Plan made per [facility] anticoagulation protocol    Gavi Crenshaw RN  Anticoagulation Clinic  8/27/2021    _______________________________________________________________________     Anticoagulation Episode Summary     Current INR goal:  2.0-3.0   TTR:  75.7 % (1 y)   Target end date:  Indefinite   Send INR reminders to:  [facility]    Indications    Acute deep vein thrombosis (DVT) of axillary vein and internal jugular (H) (Resolved) [I82.A19]  Current use of long term anticoagulation (Resolved) [Z79.01]  Acute deep vein thrombosis (DVT) of axillary vein of right upper extremity (H) (Resolved) [I82.A11]           Comments:  40-50 min walk/lifting. 3 swimming. Greens 2-4/week. 9/16 started 100 mcg of vit k.         Anticoagulation Care Providers     Provider Role Specialty Phone number    Fox,  Rema Scott DO Referring Family Medicine [phone]    Christopher Smith MD Responsible Hematology [phone]        Incoming fax [employer]  Date 8/27/2021  Inr result 2.9  Leander eHrnandez Rn  [facility]

## 2021-09-01 DIAGNOSIS — H40.1132 PRIMARY OPEN ANGLE GLAUCOMA (POAG) OF BOTH EYES, MODERATE STAGE: Primary | ICD-10-CM

## 2021-09-08 ENCOUNTER — OFFICE VISIT (OUTPATIENT)
Dept: OPHTHALMOLOGY | Facility: CLINIC | Age: 76
End: 2021-09-08
Attending: OPHTHALMOLOGY
Payer: MEDICARE

## 2021-09-08 DIAGNOSIS — H40.1132 PRIMARY OPEN ANGLE GLAUCOMA (POAG) OF BOTH EYES, MODERATE STAGE: Primary | ICD-10-CM

## 2021-09-08 DIAGNOSIS — H57.89 THYROID EYE DISEASE: ICD-10-CM

## 2021-09-08 DIAGNOSIS — H35.3132 INTERMEDIATE STAGE NONEXUDATIVE AGE-RELATED MACULAR DEGENERATION OF BOTH EYES: ICD-10-CM

## 2021-09-08 DIAGNOSIS — E07.9 THYROID EYE DISEASE: ICD-10-CM

## 2021-09-08 DIAGNOSIS — Z96.1 PSEUDOPHAKIA OF BOTH EYES: ICD-10-CM

## 2021-09-08 PROCEDURE — G0463 HOSPITAL OUTPT CLINIC VISIT: HCPCS

## 2021-09-08 PROCEDURE — 92083 EXTENDED VISUAL FIELD XM: CPT | Performed by: OPHTHALMOLOGY

## 2021-09-08 PROCEDURE — 99214 OFFICE O/P EST MOD 30 MIN: CPT | Mod: GC | Performed by: OPHTHALMOLOGY

## 2021-09-08 PROCEDURE — 92133 CPTRZD OPH DX IMG PST SGM ON: CPT | Performed by: OPHTHALMOLOGY

## 2021-09-08 ASSESSMENT — REFRACTION_WEARINGRX
OS_HPRISM: 7 BO
OD_HPRISM: 7 BO
OS_VPRISM: 3 BD
OD_CYLINDER: SPHERE
OS_CYLINDER: +1.00
SPECS_TYPE: SVL
OD_VPRISM: 3 BU
OS_AXIS: 050
OD_SPHERE: -0.75
OS_SPHERE: -2.50

## 2021-09-08 ASSESSMENT — TONOMETRY
OD_IOP_MMHG: 18
IOP_METHOD: APPLANATION
IOP_METHOD: APPLANATION
OS_IOP_MMHG: 16
OD_IOP_MMHG: 18
OS_IOP_MMHG: 18

## 2021-09-08 ASSESSMENT — VISUAL ACUITY
OD_CC: 20/30
OD_PH_CC+: +2
OD_PH_CC: 20/30
OS_CC: 20/40
METHOD: SNELLEN - LINEAR

## 2021-09-08 ASSESSMENT — SLIT LAMP EXAM - LIDS
COMMENTS: NORMAL
COMMENTS: NORMAL

## 2021-09-08 ASSESSMENT — CUP TO DISC RATIO
OD_RATIO: 0.85
OS_RATIO: 0.55

## 2021-09-08 ASSESSMENT — CONF VISUAL FIELD
OS_NORMAL: 1
OD_NORMAL: 1

## 2021-09-08 NOTE — NURSING NOTE
Chief Complaints and History of Present Illnesses   Patient presents with     Glaucoma Follow Up     Chief Complaint(s) and History of Present Illness(es)     Glaucoma Follow Up     Laterality: both eyes    Associated symptoms: Negative for floaters, flashes, pain with eye movement and eye pain    Treatment side effects: none    Compliance with Treatment: always    Pain scale: 0/10              Comments     4 month glaucoma follow up   Eyes ache at times, last less than 15 mins.   Timolol every day BBE  Latanoprost every day at bedtime BE  Refresh bid BE  Refresh Oint at bedtime BE  Jessie HARRIS 12:46 PM September 8, 2021

## 2021-09-08 NOTE — PROGRESS NOTES
Chief Complaint(s) and History of Present Illness(es)     Glaucoma Follow-Up     Laterality: both eyes    Associated symptoms: Negative for eye pain, floaters, flashes, dryness,   itching and discharge    Compliance with Treatment: always    Pain scale: 0/10         Comments     Rhonda is here to continue care for Primary open angle glaucoma (POAG) of   both eyes, moderate stage. She feels vision is about the same as last   visit.    Eleazar Waded COT 12:25 PM May 5, 2021     Patient reports that she never misses her eye drops.       Review of systems for the eyes was negative other than the pertinent positives/negatives listed in the HPI.      Assessment & Plan      Manasa French is a 75 year old female with the following diagnoses:   1. Primary open angle glaucoma (POAG) of both eyes, moderate stage    2. Intermediate stage nonexudative age-related macular degeneration of both eyes    3. Pseudophakia of both eyes    4. Thyroid eye disease       POAG right eye moderate, left eye mild  IOP today 18/16 mmHg   Tmax 26/20 (records review)    Visual field 24-2 09/08/21:  -Right eye.  Reliable, nonspecific nasal /early inferior nasal step and inferior visual field deficits.  MD equals -4.1, PSD equals 3.4.  -Left eye.  False positive equals 28%.  Inferior nasal step, appears slightly progressed compared to last check, 9/2020 -- although fluctuates on prior testing.  MD equals -3.8, PSD equals 4.1    OCT MAC 09/08/21:  -Right eye, drusen subfoveal no intraretinal fluid  -Left eye, drusen subfoveal no intraretinal fluid     OCT RNFL  - Right eye: G equals 56. Thinning and NS, TS, T, TI.  Stable compared to May, 2021. (worsened c/t prior 2018 tests)  - Left eye: G equals 56, thinning TS, T eye, borderline thinning nasally, and S, T.  Overall stable    Good response to repeat selected laser trabeculoplasty (SLT) right eye 9/16/2020 (previously done paul/ Sami 2/2018)  OCT nerve fiber layer stable both eyes today   Visual  field continues to be variable, but good reliability right eye today and appears stable    Plan:   - Monitor closely for now - overall stable   - Continue latanoprost qHS both eyes    - Continue timolol daily both eyes (Vyzulta not on formulary, doubt she will tolerate Rhopressa, Intolerant to cosopt and alphagan)    Patient disposition:   Return in about 6 months (around 3/8/2022) for Follow Up DFE, OCT RNFL, VF 24-2.    Barron Ayers MD - PGY3  Department of Ophthalmology  Pager: [phone]         Attending Physician Attestation:  Complete documentation of historical and exam elements from today's encounter can be found in the full encounter summary report (not reduplicated in this progress note).  I personally obtained the chief complaint(s) and history of present illness.  I confirmed and edited as necessary the review of systems, past medical/surgical history, family history, social history, and examination findings as documented by others; and I examined the patient myself.  I personally reviewed the relevant tests, images, and reports as documented above.  I formulated and edited as necessary the assessment and plan and discussed the findings and management plan with the patient and family. . - Attending Physician Image/Tesing Attestation: I personally reviewed the ophthalmic test(s) associated with this encounter, agree with the interpretation(s) as documented by the resident/fellow, and have edited the corresponding report(s) as necessary.  Jarred Leon MD

## 2021-09-10 ENCOUNTER — ANTICOAGULATION THERAPY VISIT (OUTPATIENT)
Dept: ANTICOAGULATION | Facility: CLINIC | Age: 76
End: 2021-09-10

## 2021-09-10 ENCOUNTER — TRANSFERRED RECORDS (OUTPATIENT)
Dept: HEALTH INFORMATION MANAGEMENT | Facility: CLINIC | Age: 76
End: 2021-09-10

## 2021-09-10 LAB — INR (EXTERNAL): 2.2 (ref 0.9–1.1)

## 2021-09-10 NOTE — PROGRESS NOTES
ANTICOAGULATION MANAGEMENT     Manasa CORTEZ Manolo 76 year old female is on warfarin with therapeutic INR result. (Goal INR 2.0-3.0)    Recent labs: (last 7 days)     09/10/21  1027   INR 2.2*       ASSESSMENT     Source(s): Chart Review and Patient/Caregiver Call       Warfarin doses taken: Warfarin taken as instructed    Diet: No new diet changes identified    New illness, injury, or hospitalization: No    Medication/supplement changes: None noted    Signs or symptoms of bleeding or clotting: No    Previous INR: Therapeutic last 2(+) visits    Additional findings: None     PLAN     Recommended plan for no diet, medication or health factor changes affecting INR     Dosing Instructions: Continue your current warfarin dose with next INR in 2 weeks       Summary  As of 9/10/2021    Full warfarin instructions:  7.5 mg every Mon, Wed, Fri; 5 mg all other days   Next INR check:               Telephone call with Manasa who verbalizes understanding and agrees to plan    Patient to recheck with home meter    Education provided: None required    Plan made per [facility] anticoagulation protocol    Ember Amezcua RN  Anticoagulation Clinic  9/10/2021    _______________________________________________________________________     Anticoagulation Episode Summary     Current INR goal:  2.0-3.0   TTR:  75.7 % (1 y)   Target end date:  Indefinite   Send INR reminders to:  [facility]    Indications    Acute deep vein thrombosis (DVT) of axillary vein and internal jugular (H) (Resolved) [I82.A19]  Current use of long term anticoagulation (Resolved) [Z79.01]  Acute deep vein thrombosis (DVT) of axillary vein of right upper extremity (H) (Resolved) [I82.A11]           Comments:  40-50 min walk/lifting. 3 swimming. Greens 2-4/week. 9/16 started 100 mcg of vit k.         Anticoagulation Care Providers     Provider Role Specialty Phone number    Rema Braxton DO Referring Family Medicine [phone]    Sarah  Christopher BASSETT MD Responsible Hematology [phone]

## 2021-09-24 ENCOUNTER — TRANSFERRED RECORDS (OUTPATIENT)
Dept: HEALTH INFORMATION MANAGEMENT | Facility: CLINIC | Age: 76
End: 2021-09-24

## 2021-09-24 ENCOUNTER — ANTICOAGULATION THERAPY VISIT (OUTPATIENT)
Dept: INTERNAL MEDICINE | Facility: CLINIC | Age: 76
End: 2021-09-24

## 2021-09-24 LAB — INR (EXTERNAL): 2.5 (ref 2–3)

## 2021-09-24 NOTE — PROGRESS NOTES
ANTICOAGULATION MANAGEMENT     Manasa CORTEZ Manolo 76 year old female is on warfarin with therapeutic INR result. (Goal INR 2.0-3.0)    Recent labs: (last 7 days)     09/24/21  1223   INR 2.5       ASSESSMENT     Source(s): Chart Review and Patient/Caregiver Call       Warfarin doses taken: Warfarin taken as instructed    Diet: No new diet changes identified    New illness, injury, or hospitalization: No    Medication/supplement changes: None noted    Signs or symptoms of bleeding or clotting: No    Previous INR: Therapeutic last 2(+) visits    Additional findings: None     PLAN     Recommended plan for no diet, medication or health factor changes affecting INR     Dosing Instructions: Continue your current warfarin dose with next INR in 2 weeks       Summary  As of 9/24/2021    Full warfarin instructions:  7.5 mg every Mon, Wed, Fri; 5 mg all other days   Next INR check:  10/8/2021             Telephone call with Manasa who verbalizes understanding and agrees to plan    Check with home meter     Education provided: Contact [phone]  with any changes, questions or concerns.     Plan made per [facility] anticoagulation protocol    Aixa Steven RN  Anticoagulation Clinic  9/24/2021    _______________________________________________________________________     Anticoagulation Episode Summary     Current INR goal:  2.0-3.0   TTR:  75.7 % (1 y)   Target end date:  Indefinite   Send INR reminders to:  [facility]    Indications    Acute deep vein thrombosis (DVT) of axillary vein and internal jugular (H) (Resolved) [I82.A19]  Current use of long term anticoagulation (Resolved) [Z79.01]  Acute deep vein thrombosis (DVT) of axillary vein of right upper extremity (H) (Resolved) [I82.A11]           Comments:  40-50 min walk/lifting. 3 swimming. Greens 2-4/week. 9/16 started 100 mcg of vit k.         Anticoagulation Care Providers     Provider Role Specialty Phone number    Rema Braxton DO Referring Family Medicine  [phone]    Christopher Smith MD Responsible Hematology [phone]          Aixa Alfaro RN, BSN, PHN  Anticoagulation Nurse  [phone]

## 2021-10-07 ENCOUNTER — VIRTUAL VISIT (OUTPATIENT)
Dept: HEMATOLOGY | Facility: CLINIC | Age: 76
End: 2021-10-07
Attending: INTERNAL MEDICINE
Payer: MEDICARE

## 2021-10-07 VITALS — WEIGHT: 148 LBS | BODY MASS INDEX: 24.63 KG/M2

## 2021-10-07 DIAGNOSIS — Z86.718 PERSONAL HISTORY OF DVT (DEEP VEIN THROMBOSIS): Primary | ICD-10-CM

## 2021-10-07 PROCEDURE — 99442 PR PHYSICIAN TELEPHONE EVALUATION 11-20 MIN: CPT | Mod: 95 | Performed by: INTERNAL MEDICINE

## 2021-10-07 NOTE — PROGRESS NOTES
Patient was contacted to complete the pre-visit call prior to their video visit with the provider.      Allergies and medications were reviewed.    I thanked them for their time to cover this information     King Hernandes CMA

## 2021-10-07 NOTE — PROGRESS NOTES
[facility]  [address] [place] [id number]  Phone: [phone], Fax: [phone]      Outpatient Visit Note:    Patient: Manasa French  MRN: [id number]  : 1945  JENNIFER: Oct 7, 2021    Reason for Visit:  Annual follow up for indefinite anticoagluation    Assessment:  In summary, Manasa French is a 76 year old woman with history of an unprovoked right upper extremity deep vein thrombosis.  We have excluded the presence of thoracic outlet syndrome and was clearly this thrombosis appears to be unprovoked and hence her risk of recurrence is sufficiently high to recommend indefinite anticoagulation for secondary prophylaxis.     Overall weighing her risks and benefits for continued and indefinite anticoagulation, I think that her risk of recurrent thrombosis is likely high based on what we know about unprovoked deep vein thrombosis.  She has done better with INRs and had no falls or other bleeding concerns in the last year that make her uncomfortable to continue with anticoagulation so we agree to continue another year     Plan:  1. Majority of today's visit was spent counseling the patient regarding the natural history of unprovoked deep vein thrombosis, and results of our study for thoracic outlet syndrome.  2.  At this point we will continue warfarin indefinitely given that we think her risk for thrombosis is likely high given the unprovoked nature of the thrombus.   3. Continue warfarin, goal INR 2-3  4. Continue vitamin K supplementation for INR stability  5. She will call with questions or concerns and plan to follow up in 1 year.     The patient is given our center's contact information and is instructed to call if she should have any further questions or concerns.  Otherwise, we will plan on seeing her back in 1 year.       Christopher Smith MD   of Medicine  [facility] [employer] [facility]      Total telephone time: 12  minutes  20 minutes spent on the date of the encounter doing chart review, review of test results, interpretation of tests, patient visit and documentation        --------------------------------  Forward History:  Feb 2019 right arm swelling, found to have RIJ, Subclav, Axillary and brachial vein, unprovoked, started on apixaban but was expensive and changed to warfarin  May 2019 venous ultrasound performed for thoracic outlet syndrome and there was no obstructive physiology appreciated.    Interval History: Manasa French is a 75 year old female with a history of right upper extremity DVT who returns for routine follow up.  She reports no bleeding issues since I saw her last year.  Tolerating anticoagulation well.  No residual or new arm swelling or pain.    Past Medical History:  Past Medical History:   Diagnosis Date     Benign essential hypertension      History of Graves' disease     s/p BARAHONA     History of hydrocephalus     s/p  shunt placement in 1987     Macular degeneration of both eyes      Osteoporosis      Personal history of DVT (deep vein thrombosis) 2019    RUE; on chronic AC     Postablative hypothyroidism      Primary open angle glaucoma (POAG) of both eyes     right eye moderate, left eye mild     Pseudophakia      Sensorineural hearing loss, bilateral        Medications:  Current Outpatient Medications   Medication Sig     alendronate (FOSAMAX) 70 MG tablet Take 1-tablet by mouth weekly, dosing as directed     amLODIPine (NORVASC) 5 MG tablet Take 1 tablet (5 mg) by mouth daily     Calcium Carb-Cholecalciferol (CALCIUM CARBONATE-VITAMIN D3 PO)      carboxymethylcellulose (CELLUVISC/REFRESH LIQUIGEL) 1 % ophthalmic solution Place 1 drop into both eyes 3 times daily     Cholecalciferol (VITAMIN D) 1000 UNITS capsule Take  by mouth.     hydrochlorothiazide (HYDRODIURIL) 12.5 MG tablet Take 1 tablet (12.5 mg) by mouth daily     latanoprost (XALATAN) 0.005 % ophthalmic solution Place 1 drop into  both eyes At Bedtime     levothyroxine (SYNTHROID/LEVOTHROID) 125 MCG tablet Take 1 tablet (125 mcg) by mouth daily     losartan (COZAAR) 100 MG tablet Take 1 tablet (100 mg) by mouth daily TAKE 1 TABLET EVERY DAY     Multiple Vitamins-Minerals (ICAPS AREDS 2 PO)      timolol maleate (TIMOPTIC) 0.5 % ophthalmic solution Place 1 drop into both eyes every morning     Vitamin K 100 MCG TABS Take 1 tablet by mouth daily     warfarin ANTICOAGULANT (COUMADIN) 5 MG tablet TAKE 1 TABLET ON MONDAY, WEDNESDAY AND FRIDAY THEN TAKE 1 AND 1/2 TABLETS DAILY ON ALL OTHER DAYS OR AS DIRECTED BY CLINIC     No current facility-administered medications for this visit.       Allergies:  Allergies   Allergen Reactions     Alphagan [Brimonidine] Other (See Comments)     Itchy eyes     Cosopt [Dorzolamide-Timolol] Other (See Comments)     Itchy eyes       ROS:  A 14 point ROS is negative except as stated in the HPI    Objective:  Vitals: B/P: 144/68, T: 97.6, P: 82, R: 12, Wt: 148 lbs 0 oz  Exam:   Gen: Appears well, no distress  HEENT: no scleral icterus or hemorrhage, no wet purpura, no lymphadenopathy  Ext: no edema of the right upper extremity    Labs:  Results for BRIGIDO SALOMON DIEGO (MRN [id number]) as of 9/14/2019 22:29   Ref. Range 7/29/2019 00:00 8/9/2019 07:54 8/28/2019 00:00 9/5/2019 00:00   INR Latest Ref Range: 0.86 - 1.14  2.6 (A) 3.70 (H) 1.8 (A) 2.6 (A)       Imaging:  Reviewed her May 2020 thoracic outlet syndrome study which is negative.

## 2021-10-08 ENCOUNTER — TRANSFERRED RECORDS (OUTPATIENT)
Dept: HEALTH INFORMATION MANAGEMENT | Facility: CLINIC | Age: 76
End: 2021-10-08

## 2021-10-08 ENCOUNTER — ANTICOAGULATION THERAPY VISIT (OUTPATIENT)
Dept: INTERNAL MEDICINE | Facility: CLINIC | Age: 76
End: 2021-10-08

## 2021-10-08 LAB — INR (EXTERNAL): 2.7 (ref 2–3)

## 2021-10-08 NOTE — PROGRESS NOTES
ANTICOAGULATION MANAGEMENT     Manasa CORTEZ Manolo 76 year old female is on warfarin with therapeutic INR result. (Goal INR 2.0-3.0)    Recent labs: (last 7 days)     10/08/21  1108   INR 2.7*       ASSESSMENT     Source(s): Patient/Caregiver Call       Warfarin doses taken: Warfarin taken as instructed    Diet: No new diet changes identified    New illness, injury, or hospitalization: No    Medication/supplement changes: None noted    Signs or symptoms of bleeding or clotting: No    Previous INR: Therapeutic last 2(+) visits    Additional findings: None     PLAN     Recommended plan for no diet, medication or health factor changes affecting INR     Dosing Instructions: Continue your current warfarin dose with next INR in 2 weeks       Summary  As of 10/8/2021    Full warfarin instructions:  7.5 mg every Mon, Wed, Fri; 5 mg all other days   Next INR check:  10/22/2021             Telephone call with Manasa who agrees to plan     Patient to recheck with home meter    Education provided: Goal range and significance of current result    Plan made per [facility] anticoagulation protocol    Wolf Hoang RN  Anticoagulation Clinic  10/8/2021    _______________________________________________________________________     Anticoagulation Episode Summary     Current INR goal:  2.0-3.0   TTR:  76.7 % (1 y)   Target end date:  Indefinite   Send INR reminders to:  [facility]    Indications    Acute deep vein thrombosis (DVT) of axillary vein and internal jugular (H) (Resolved) [I82.A19]  Current use of long term anticoagulation (Resolved) [Z79.01]  Acute deep vein thrombosis (DVT) of axillary vein of right upper extremity (H) (Resolved) [I82.A11]           Comments:  40-50 min walk/lifting. 3 swimming. Greens 2-4/week. 9/16 started 100 mcg of vit k.         Anticoagulation Care Providers     Provider Role Specialty Phone number    Rema Braxton DO Referring Family Medicine [phone]    Christopher Smith MD  [facility] Hematology [phone]

## 2021-10-22 ENCOUNTER — ANTICOAGULATION THERAPY VISIT (OUTPATIENT)
Dept: INTERNAL MEDICINE | Facility: CLINIC | Age: 76
End: 2021-10-22

## 2021-10-22 ENCOUNTER — TRANSFERRED RECORDS (OUTPATIENT)
Dept: HEALTH INFORMATION MANAGEMENT | Facility: CLINIC | Age: 76
End: 2021-10-22
Payer: MEDICARE

## 2021-10-22 LAB — INR (EXTERNAL): 2.6 (ref 2–3)

## 2021-10-22 NOTE — PROGRESS NOTES
ANTICOAGULATION  MANAGEMENT-Home Monitor Managed by Exception    Manasa French 76 year old female is on warfarin with therapeutic INR result. (Goal INR 2.0-3.0)    Recent labs: (last 7 days)     10/22/21  1143   INR 2.6         Previous INR was Therapeutic    Medication, diet, health changes since last INR:chart reviewed; none idientified    Contacted within the last 12 weeks by phone on 10/8      AUBREY     Manasa was NOT contacted regarding therapeutic result today per home monitoring policy manage by exception agreement.   Current warfarin dose is to be continued:     Summary  As of 10/22/2021    Full warfarin instructions:  7.5 mg every Mon, Wed, Fri; 5 mg all other days   Next INR check:             ?   Aixa Steven RN  Anticoagulation Clinic  10/22/2021    _______________________________________________________________________     Anticoagulation Episode Summary     Current INR goal:  2.0-3.0   TTR:  80.5 % (1 y)   Target end date:  Indefinite   Send INR reminders to:  [facility]    Indications    Acute deep vein thrombosis (DVT) of axillary vein and internal jugular (H) (Resolved) [I82.A19]  Current use of long term anticoagulation (Resolved) [Z79.01]  Acute deep vein thrombosis (DVT) of axillary vein of right upper extremity (H) (Resolved) [I82.A11]           Comments:  40-50 min walk/lifting. 3 swimming. Greens 2-4/week. 9/16 started 100 mcg of vit k.         Anticoagulation Care Providers     Provider Role Specialty Phone number    Rema Braxton DO Referring Family Medicine [phone]    Christopher Smith MD Responsible Hematology [phone]          Aixa Alfaro RN, BSN, PHN  Anticoagulation Nurse  [phone]

## 2021-10-24 ENCOUNTER — HEALTH MAINTENANCE LETTER (OUTPATIENT)
Age: 76
End: 2021-10-24

## 2021-11-05 ENCOUNTER — TRANSFERRED RECORDS (OUTPATIENT)
Dept: HEALTH INFORMATION MANAGEMENT | Facility: CLINIC | Age: 76
End: 2021-11-05
Payer: MEDICARE

## 2021-11-05 ENCOUNTER — DOCUMENTATION ONLY (OUTPATIENT)
Dept: INTERNAL MEDICINE | Facility: CLINIC | Age: 76
End: 2021-11-05

## 2021-11-05 LAB — INR (EXTERNAL): 2.5 (ref 0.9–1.1)

## 2021-11-05 NOTE — PROGRESS NOTES
ANTICOAGULATION  MANAGEMENT-Home Monitor Managed by Exception    Manasa French 76 year old female is on warfarin with therapeutic INR result. (Goal INR 2.0-3.0)    Recent labs: (last 7 days)     11/05/21  0819   INR 2.5*         Previous INR was Therapeutic    Medication, diet, health changes since last INR:chart reviewed; none identified    Contacted within the last 12 weeks by phone on 10/08/21      AUBREY     Manasa was NOT contacted regarding therapeutic result today per home monitoring policy manage by exception agreement.   Current warfarin dose is to be continued:     Summary  As of 11/5/2021    Full warfarin instructions:  7.5 mg every Mon, Wed, Fri; 5 mg all other days   Next INR check:  11/19/2021           ?   Ember Amezcua RN  Anticoagulation Clinic  11/5/2021    _______________________________________________________________________     Anticoagulation Episode Summary     Current INR goal:  2.0-3.0   TTR:  84.0 % (1 y)   Target end date:  Indefinite   Send INR reminders to:  [facility]    Indications    Acute deep vein thrombosis (DVT) of axillary vein and internal jugular (H) (Resolved) [I82.A19]  Current use of long term anticoagulation (Resolved) [Z79.01]  Acute deep vein thrombosis (DVT) of axillary vein of right upper extremity (H) (Resolved) [I82.A11]           Comments:  [employer] meter- Managed by exception         Anticoagulation Care Providers     Provider Role Specialty Phone number    Ream Braxton DO Referring Family Medicine [phone]    Christopher Smith MD Responsible Hematology [phone]

## 2021-11-19 ENCOUNTER — DOCUMENTATION ONLY (OUTPATIENT)
Dept: INTERNAL MEDICINE | Facility: CLINIC | Age: 76
End: 2021-11-19
Payer: MEDICARE

## 2021-11-19 ENCOUNTER — TRANSFERRED RECORDS (OUTPATIENT)
Dept: HEALTH INFORMATION MANAGEMENT | Facility: CLINIC | Age: 76
End: 2021-11-19
Payer: MEDICARE

## 2021-11-19 ENCOUNTER — ANTICOAGULATION THERAPY VISIT (OUTPATIENT)
Dept: INTERNAL MEDICINE | Facility: CLINIC | Age: 76
End: 2021-11-19
Payer: MEDICARE

## 2021-11-19 DIAGNOSIS — Z86.718 PERSONAL HISTORY OF DVT (DEEP VEIN THROMBOSIS): Primary | ICD-10-CM

## 2021-11-19 LAB — INR (EXTERNAL): 3.4 (ref 0.9–1.1)

## 2021-11-19 NOTE — PROGRESS NOTES
Anticoagulation Management    Unable to reach Rhonda today.    Today's INR result of 3.4 is supratherapeutic (goal INR of 2.0-3.0).  Result received from: Home Monitor    Follow up required to discuss out of range INR     Left message to take reduced dose of warfarin, 5 mg tonight. and continue current dose of 5 mg Saturday and 5 mg Sunday.  Advised if patient already took warfarin today, to increase greens intake.      Anticoagulation clinic to follow up    Diann Orellana RN

## 2021-11-19 NOTE — PROGRESS NOTES
ANTICOAGULATION MANAGEMENT      Manasa French due for annual renewal of referral to anticoagulation monitoring. Order pended for your review and signature.      ANTICOAGULATION SUMMARY      Warfarin indication(s)     DVT    Heart valve present?  NO       Current goal range   INR: 2.0-3.0     Goal appropriate for indication? Yes, INR 2-3 appropriate for hx of DVT, PE, hypercoagulable state, Afib, LVAD, or bileaflet AVR without risk factors     Current duration of therapy Indefinite/long term therapy   Time in Therapeutic Range (TTR)  (Goal > 60%) 80%       Office visit with referring provider's group within last year yes on 5/27/2021       Diann Orellana RN

## 2021-11-19 NOTE — PROGRESS NOTES
Incoming fax from [facility] home monitoring company    Date of result 11/19    INR result 3.4

## 2021-11-22 NOTE — PROGRESS NOTES
ANTICOAGULATION MANAGEMENT     Manasa CORTEZ French 76 year old female is on warfarin with supratherapeutic INR result. (Goal INR 2.0-3.0)    Recent labs: (last 7 days)     11/19/21  1100   INR 3.4*       ASSESSMENT     Source(s): Chart Review I left a detailed voicemail with the orders below. I have also requested a call back if there have been any missed doses, concerns, illness, fever, or if there have been any changes in medications, activity level, or diet        Warfarin doses taken: Warfarin taken as instructed    Diet: No new diet changes identified    New illness, injury, or hospitalization: No    Medication/supplement changes: None noted    Signs or symptoms of bleeding or clotting: No    Previous INR: Therapeutic last 2(+) visits    Additional findings: None     PLAN     Recommended plan for no diet, medication or health factor changes affecting INR     Dosing Instructions: Partial hold then continue your current warfarin dose with next INR in 1 week       Summary  As of 11/19/2021    Full warfarin instructions:  11/19: 5 mg; Otherwise 7.5 mg every Mon, Wed, Fri; 5 mg all other days   Next INR check:  11/26/2021             Detailed voice message left for Manasa with dosing instructions and follow up date.     Patient to recheck with home meter    Education provided: Please call back if any changes to your diet, medications or how you've been taking warfarin    Plan made per [facility] anticoagulation protocol    Biju Gonzlaes RN  Anticoagulation Clinic  11/22/2021    _______________________________________________________________________     Anticoagulation Episode Summary     Current INR goal:  2.0-3.0   TTR:  82.1 % (1 y)   Target end date:  Indefinite   Send INR reminders to:  [facility]    Indications    Acute deep vein thrombosis (DVT) of axillary vein and internal jugular (H) (Resolved) [I82.A19]  Current use of long term anticoagulation (Resolved) [Z79.01]  Acute deep vein thrombosis (DVT) of  axillary vein of right upper extremity (H) (Resolved) [I82.A11]  Personal history of DVT (deep vein thrombosis) [Z86.718]           Comments:  [employer] meter- Managed by exception         Anticoagulation Care Providers     Provider Role Specialty Phone number    Rema Braxton DO Referring Family Medicine [phone]    Jessica Anthony MD Referring Internal Medicine [phone]    Christopher Smith MD Responsible Hematology [phone]

## 2021-12-03 ENCOUNTER — TRANSFERRED RECORDS (OUTPATIENT)
Dept: HEALTH INFORMATION MANAGEMENT | Facility: CLINIC | Age: 76
End: 2021-12-03
Payer: MEDICARE

## 2021-12-03 ENCOUNTER — ANTICOAGULATION THERAPY VISIT (OUTPATIENT)
Dept: INTERNAL MEDICINE | Facility: CLINIC | Age: 76
End: 2021-12-03
Payer: MEDICARE

## 2021-12-03 DIAGNOSIS — Z86.718 PERSONAL HISTORY OF DVT (DEEP VEIN THROMBOSIS): Primary | ICD-10-CM

## 2021-12-03 LAB — INR (EXTERNAL): 3.8 (ref 0.9–1.1)

## 2021-12-03 NOTE — PROGRESS NOTES
ANTICOAGULATION MANAGEMENT     Manasa French 76 year old female is on warfarin with supratherapeutic INR result. (Goal INR 2.0-3.0)    Recent labs: (last 7 days)     12/03/21  1129   INR 3.8*       ASSESSMENT     Source(s): Chart Review and Patient/Caregiver Call       Warfarin doses taken: Less warfarin taken than planned which may be affecting INR    Diet: broccoli on Tuesday and brussel sprouts on Wednesday    New illness, injury, or hospitalization: No    Medication/supplement changes: Patient states she does take Vitamin K 100 mcg per day    Signs or symptoms of bleeding or clotting: No    Previous INR: Supratherapeutic    Additional findings: None     PLAN     Recommended plan for no diet, medication or health factor changes affecting INR     Dosing Instructions: Hold dose then Decrease your warfarin dose (11.8% change) with next INR in 1 week       Summary  As of 12/3/2021    Full warfarin instructions:  12/3: Hold; Otherwise 7.5 mg every Mon; 5 mg all other days   Next INR check:  12/10/2021             Telephone call with Manasa who agrees to plan and repeated back plan correctly    Patient to recheck with home meter    Education provided: Importance of consistent vitamin K intake, Impact of vitamin K foods on INR, Vitamin K content of foods, Goal range and significance of current result, Importance of therapeutic range, Importance of following up at instructed interval, Importance of taking warfarin as instructed, Monitoring for bleeding signs and symptoms and When to seek medical attention/emergency care    Plan made per [facility] anticoagulation protocol    Diann Orellana RN  Anticoagulation Clinic  12/3/2021    _______________________________________________________________________     Anticoagulation Episode Summary     Current INR goal:  2.0-3.0   TTR:  80.8 % (1 y)   Target end date:  Indefinite   Send INR reminders to:  [facility]    Indications    Acute deep vein thrombosis (DVT) of  axillary vein and internal jugular (H) (Resolved) [I82.A19]  Current use of long term anticoagulation (Resolved) [Z79.01]  Acute deep vein thrombosis (DVT) of axillary vein of right upper extremity (H) (Resolved) [I82.A11]  Personal history of DVT (deep vein thrombosis) [Z86.718]           Comments:  [employer] meter- Managed by exception         Anticoagulation Care Providers     Provider Role Specialty Phone number    Rema Braxton DO Referring Family Medicine [phone]    Jessica Anthony MD Referring Internal Medicine [phone]    Christopher Smith MD Responsible Hematology [phone]

## 2021-12-03 NOTE — PROGRESS NOTES
Incoming fax    Receieved on 12/03/21    From [employer]   INR 3.8       Aixa Alfaro RN, BSN, PHN

## 2021-12-10 ENCOUNTER — TRANSFERRED RECORDS (OUTPATIENT)
Dept: HEALTH INFORMATION MANAGEMENT | Facility: CLINIC | Age: 76
End: 2021-12-10
Payer: MEDICARE

## 2021-12-10 ENCOUNTER — ANTICOAGULATION THERAPY VISIT (OUTPATIENT)
Dept: INTERNAL MEDICINE | Facility: CLINIC | Age: 76
End: 2021-12-10
Payer: MEDICARE

## 2021-12-10 ENCOUNTER — TELEPHONE (OUTPATIENT)
Dept: INTERNAL MEDICINE | Facility: CLINIC | Age: 76
End: 2021-12-10
Payer: MEDICARE

## 2021-12-10 DIAGNOSIS — Z86.718 PERSONAL HISTORY OF DVT (DEEP VEIN THROMBOSIS): Primary | ICD-10-CM

## 2021-12-10 LAB — INR (EXTERNAL): 2.7 (ref 2–3)

## 2021-12-10 NOTE — TELEPHONE ENCOUNTER
Attempted to call patient again, call dropped x2.    If patient calls back, please her look at dosing instructions sent in [employer] message from 12/10/21.

## 2021-12-10 NOTE — PROGRESS NOTES
ANTICOAGULATION MANAGEMENT     Manasa CORTEZ French 76 year old female is on warfarin with therapeutic INR result. (Goal INR 2.0-3.0)    Recent labs: (last 7 days)     12/10/21  1019   INR 2.7       ASSESSMENT     Source(s): Chart Review and phone call to ana gaines phone dropped call all 3 times - [employer] sent        Warfarin doses taken: Warfarin taken as instructed    Diet: No new diet changes identified    New illness, injury, or hospitalization: No    Medication/supplement changes: None noted    Signs or symptoms of bleeding or clotting: No    Previous INR: Supratherapeutic    Additional findings: None     PLAN     Recommended plan for no diet, medication or health factor changes affecting INR     Dosing Instructions: Continue your current warfarin dose with next INR in 2 weeks       Summary  As of 12/10/2021    Full warfarin instructions:  7.5 mg every Mon; 5 mg all other days   Next INR check:               Telephone call with Manasa phone call dropped before dosing could be given  Sent [employer] message with dosing and follow up instructions    Patient to recheck with home meter    Education provided: Contact [phone]  with any changes, questions or concerns.     Plan made per [facility] anticoagulation protocol    Aixa Steven RN  Anticoagulation Clinic  12/10/2021    _______________________________________________________________________     Anticoagulation Episode Summary     Current INR goal:  2.0-3.0   TTR:  79.6 % (1 y)   Target end date:  Indefinite   Send INR reminders to:  [facility]    Indications    Acute deep vein thrombosis (DVT) of axillary vein and internal jugular (H) (Resolved) [I82.A19]  Current use of long term anticoagulation (Resolved) [Z79.01]  Acute deep vein thrombosis (DVT) of axillary vein of right upper extremity (H) (Resolved) [I82.A11]  Personal history of DVT (deep vein thrombosis) [Z86.718]           Comments:  [employer] meter- Managed by exception          Anticoagulation Care Providers     Provider Role Specialty Phone number    Rema Braxton DO Referring Family Medicine [phone]    Jessica Anthony MD Referring Internal Medicine [phone]    Christopher Smith MD Responsible Hematology [phone]        Aixa Alfaro RN, BSN, PHN  Anticoagulation Nurse  [phone]

## 2021-12-10 NOTE — TELEPHONE ENCOUNTER
"Reason for Call:  Patient returning call/per patient there have been a lot of \"broken calls\" when anticoagulation calls patient/ Please return call to patient/There is not a call back number listed in today's INR encounter with Jessica    Phone Number Patient can be reached at: Home number on file [phone] (home)    Best Time: ANY    Can we leave a detailed message on this number? YES    Call taken on 12/10/2021 at 10:26 AM by Jesi Pandya      "

## 2021-12-17 ENCOUNTER — ANTICOAGULATION THERAPY VISIT (OUTPATIENT)
Dept: INTERNAL MEDICINE | Facility: CLINIC | Age: 76
End: 2021-12-17
Payer: MEDICARE

## 2021-12-17 ENCOUNTER — TRANSFERRED RECORDS (OUTPATIENT)
Dept: HEALTH INFORMATION MANAGEMENT | Facility: CLINIC | Age: 76
End: 2021-12-17
Payer: MEDICARE

## 2021-12-17 DIAGNOSIS — Z86.718 PERSONAL HISTORY OF DVT (DEEP VEIN THROMBOSIS): Primary | ICD-10-CM

## 2021-12-17 LAB — INR HOME MONITORING: 2.8 (ref 2–3)

## 2021-12-17 NOTE — PROGRESS NOTES
ANTICOAGULATION  MANAGEMENT-Home Monitor Managed by Exception    Manasa DIEGO Manolo 76 year old female is on warfarin with therapeutic INR result. (Goal INR 2.0-3.0)    Recent labs: (last 7 days)     12/17/21  0000   INR 2.80         Previous INR was Therapeutic    Medication, diet, health changes since last INR:chart reviewed; none identified    Contacted within the last 12 weeks by phone on 12/10/21      AUBREY     Manasa was NOT contacted regarding therapeutic result today per home monitoring policy manage by exception agreement.   Current warfarin dose is to be continued:     Summary  As of 12/17/2021    Full warfarin instructions:  7.5 mg every Mon; 5 mg all other days   Next INR check:             ?   Ember Amezcua RN  Anticoagulation Clinic  12/17/2021    _______________________________________________________________________     Anticoagulation Episode Summary     Current INR goal:  2.0-3.0   TTR:  79.9 % (1 y)   Target end date:  Indefinite   Send INR reminders to:  [facility]    Indications    Acute deep vein thrombosis (DVT) of axillary vein and internal jugular (H) (Resolved) [I82.A19]  Current use of long term anticoagulation (Resolved) [Z79.01]  Acute deep vein thrombosis (DVT) of axillary vein of right upper extremity (H) (Resolved) [I82.A11]  Personal history of DVT (deep vein thrombosis) [Z86.718]           Comments:  [employer] meter- Managed by exception         Anticoagulation Care Providers     Provider Role Specialty Phone number    Rema Braxton DO Referring Family Medicine [phone]    Jessica Anthony MD Referring Internal Medicine [phone]    Christopher Smith MD Responsible Hematology [phone]

## 2021-12-31 ENCOUNTER — ANTICOAGULATION THERAPY VISIT (OUTPATIENT)
Dept: INTERNAL MEDICINE | Facility: CLINIC | Age: 76
End: 2021-12-31
Payer: MEDICARE

## 2021-12-31 ENCOUNTER — TRANSFERRED RECORDS (OUTPATIENT)
Dept: HEALTH INFORMATION MANAGEMENT | Facility: CLINIC | Age: 76
End: 2021-12-31
Payer: MEDICARE

## 2021-12-31 DIAGNOSIS — Z86.718 PERSONAL HISTORY OF DVT (DEEP VEIN THROMBOSIS): Primary | ICD-10-CM

## 2021-12-31 LAB — INR HOME MONITORING: 2.4 (ref 2–3)

## 2021-12-31 NOTE — PROGRESS NOTES
ANTICOAGULATION  MANAGEMENT-Home Monitor Managed by Exception    Manasa French 76 year old female is on warfarin with therapeutic INR result. (Goal INR 2.0-3.0)    Recent labs: (last 7 days)     12/31/21  0000   INR 2.40         Previous INR was Therapeutic    Medication, diet, health changes since last INR:chart reviewed; none identified    Contacted within the last 12 weeks by phone on 12/10/21          AUBREY     Manasa was NOT contacted regarding therapeutic result today per home monitoring policy manage by exception agreement.   Current warfarin dose is to be continued:     Summary  As of 12/31/2021    Full warfarin instructions:  7.5 mg every Mon; 5 mg all other days   Next INR check:             ?   Ember Amezcua RN  Anticoagulation Clinic  12/31/2021    _______________________________________________________________________     Anticoagulation Episode Summary     Current INR goal:  2.0-3.0   TTR:  83.7 % (1 y)   Target end date:  Indefinite   Send INR reminders to:  [facility]    Indications    Acute deep vein thrombosis (DVT) of axillary vein and internal jugular (H) (Resolved) [I82.A19]  Current use of long term anticoagulation (Resolved) [Z79.01]  Acute deep vein thrombosis (DVT) of axillary vein of right upper extremity (H) (Resolved) [I82.A11]  Personal history of DVT (deep vein thrombosis) [Z86.718]           Comments:  [employer] meter- Managed by exception         Anticoagulation Care Providers     Provider Role Specialty Phone number    Rema Braxton DO Referring Family Medicine [phone]    Jessica Anthony MD Referring Internal Medicine [phone]    Christopher Smith MD Responsible Hematology [phone]

## 2022-01-14 ENCOUNTER — DOCUMENTATION ONLY (OUTPATIENT)
Dept: INTERNAL MEDICINE | Facility: CLINIC | Age: 77
End: 2022-01-14
Payer: MEDICARE

## 2022-01-14 DIAGNOSIS — Z86.718 PERSONAL HISTORY OF DVT (DEEP VEIN THROMBOSIS): Primary | ICD-10-CM

## 2022-01-14 LAB — INR HOME MONITORING: 2.8 (ref 2–3)

## 2022-01-14 NOTE — PROGRESS NOTES
ANTICOAGULATION  MANAGEMENT-Home Monitor Managed by Exception    Manasa DIEGO Manolo 76 year old female is on warfarin with therapeutic INR result. (Goal INR 2.0-3.0)    Recent labs: (last 7 days)     01/14/22  0000   INR 2.80         Previous INR was Therapeutic    Medication, diet, health changes since last INR:chart reviewed; none identified    Contacted within the last 12 weeks by phone on 12/10/21          AUBREY     Manasa was NOT contacted regarding therapeutic result today per home monitoring policy manage by exception agreement.   Current warfarin dose is to be continued:     Summary  As of 1/14/2022    Full warfarin instructions:  7.5 mg every Mon; 5 mg all other days   Next INR check:  1/28/2022           ?   Ember Amezcua RN  Anticoagulation Clinic  1/14/2022    _______________________________________________________________________     Anticoagulation Episode Summary     Current INR goal:  2.0-3.0   TTR:  85.1 % (1 y)   Target end date:  Indefinite   Send INR reminders to:  [facility]    Indications    Acute deep vein thrombosis (DVT) of axillary vein and internal jugular (H) (Resolved) [I82.A19]  Current use of long term anticoagulation (Resolved) [Z79.01]  Acute deep vein thrombosis (DVT) of axillary vein of right upper extremity (H) (Resolved) [I82.A11]  Personal history of DVT (deep vein thrombosis) [Z86.718]           Comments:  [employer] meter- Managed by exception         Anticoagulation Care Providers     Provider Role Specialty Phone number    Rema Braxton DO Referring Family Medicine [phone]    Jessica Anthony MD Referring Internal Medicine [phone]    Christopher Smith MD Responsible Hematology [phone]

## 2022-01-28 ENCOUNTER — DOCUMENTATION ONLY (OUTPATIENT)
Dept: INTERNAL MEDICINE | Facility: CLINIC | Age: 77
End: 2022-01-28
Payer: MEDICARE

## 2022-01-28 DIAGNOSIS — Z86.718 PERSONAL HISTORY OF DVT (DEEP VEIN THROMBOSIS): Primary | ICD-10-CM

## 2022-01-28 LAB — INR HOME MONITORING: 2.8 (ref 2–3)

## 2022-01-28 NOTE — PROGRESS NOTES
ANTICOAGULATION  MANAGEMENT-Home Monitor Managed by Exception    Manasa DIEGO Manolo 76 year old female is on warfarin with therapeutic INR result. (Goal INR 2.0-3.0)    Recent labs: (last 7 days)     01/28/22  0000   INR 2.80         Previous INR was Therapeutic    Medication, diet, health changes since last INR:chart reviewed; none identified    Contacted within the last 12 weeks by phone on 12/10/21          AUBREY     Manasa was NOT contacted regarding therapeutic result today per home monitoring policy manage by exception agreement.   Current warfarin dose is to be continued:     Summary  As of 1/28/2022    Full warfarin instructions:  7.5 mg every Mon; 5 mg all other days   Next INR check:  2/11/2022           ?   Ember Amezcua RN  Anticoagulation Clinic  1/28/2022    _______________________________________________________________________     Anticoagulation Episode Summary     Current INR goal:  2.0-3.0   TTR:  85.1 % (1 y)   Target end date:  Indefinite   Send INR reminders to:  [facility]    Indications    Acute deep vein thrombosis (DVT) of axillary vein and internal jugular (H) (Resolved) [I82.A19]  Current use of long term anticoagulation (Resolved) [Z79.01]  Acute deep vein thrombosis (DVT) of axillary vein of right upper extremity (H) (Resolved) [I82.A11]  Personal history of DVT (deep vein thrombosis) [Z86.718]           Comments:  [employer] meter- Managed by exception         Anticoagulation Care Providers     Provider Role Specialty Phone number    Rema Braxton DO Referring Family Medicine [phone]    Jessica Anthony MD Referring Internal Medicine [phone]    Christopher Smith MD Responsible Hematology [phone]

## 2022-02-14 ENCOUNTER — DOCUMENTATION ONLY (OUTPATIENT)
Dept: INTERNAL MEDICINE | Facility: CLINIC | Age: 77
End: 2022-02-14
Payer: MEDICARE

## 2022-02-14 DIAGNOSIS — Z86.718 PERSONAL HISTORY OF DVT (DEEP VEIN THROMBOSIS): Primary | ICD-10-CM

## 2022-02-14 LAB — INR HOME MONITORING: 2.2 (ref 2–3)

## 2022-02-14 NOTE — PROGRESS NOTES
ANTICOAGULATION  MANAGEMENT-Home Monitor Managed by Exception    Manasa French 76 year old female is on warfarin with therapeutic INR result. (Goal INR 2.0-3.0)    Recent labs: (last 7 days)     02/12/22  0000   INR 2.20         Previous INR was Therapeutic    Medication, diet, health changes since last INR:chart reviewed; none identified    Contacted within the last 12 weeks by phone on 12/10/21      AUBREY     Manasa was NOT contacted regarding therapeutic result today per home monitoring policy manage by exception agreement.   Current warfarin dose is to be continued:     Summary  As of 2/14/2022    Full warfarin instructions:  7.5 mg every Mon; 5 mg all other days   Next INR check:             ?   Kyleigh Valdez RN  Anticoagulation Clinic  2/14/2022    _______________________________________________________________________     Anticoagulation Episode Summary     Current INR goal:  2.0-3.0   TTR:  85.0 % (1 y)   Target end date:  Indefinite   Send INR reminders to:  [facility]    Indications    Acute deep vein thrombosis (DVT) of axillary vein and internal jugular (H) (Resolved) [I82.A19]  Current use of long term anticoagulation (Resolved) [Z79.01]  Acute deep vein thrombosis (DVT) of axillary vein of right upper extremity (H) (Resolved) [I82.A11]  Personal history of DVT (deep vein thrombosis) [Z86.718]           Comments:  [employer] meter- Managed by exception         Anticoagulation Care Providers     Provider Role Specialty Phone number    Rema Braxton DO Referring Family Medicine [phone]    Jessica Anthony MD Referring Internal Medicine [phone]    Christopher Smith MD Responsible Hematology [phone]

## 2022-02-25 ENCOUNTER — DOCUMENTATION ONLY (OUTPATIENT)
Dept: INTERNAL MEDICINE | Facility: CLINIC | Age: 77
End: 2022-02-25
Payer: MEDICARE

## 2022-02-25 DIAGNOSIS — Z86.718 PERSONAL HISTORY OF DVT (DEEP VEIN THROMBOSIS): Primary | ICD-10-CM

## 2022-02-25 LAB — INR HOME MONITORING: 2.6 (ref 2–3)

## 2022-02-25 NOTE — PROGRESS NOTES
ANTICOAGULATION  MANAGEMENT-Home Monitor Managed by Exception    Manasa French 76 year old female is on warfarin with therapeutic INR result. (Goal INR 2.0-3.0)    Recent labs: (last 7 days)     02/25/22  0000   INR 2.60         Previous INR was Therapeutic    Medication, diet, health changes since last INR:chart reviewed; none identified    Contacted within the last 12 weeks by phone on 12/10/21      AUBREY     Manasa was NOT contacted regarding therapeutic result today per home monitoring policy manage by exception agreement.   Current warfarin dose is to be continued:     Summary  As of 2/25/2022    Full warfarin instructions:  7.5 mg every Mon; 5 mg all other days   Next INR check:  3/11/2022           ?   Kyleigh Valdez RN  Anticoagulation Clinic  2/25/2022    _______________________________________________________________________     Anticoagulation Episode Summary     Current INR goal:  2.0-3.0   TTR:  85.1 % (1 y)   Target end date:  Indefinite   Send INR reminders to:  [facility]    Indications    Acute deep vein thrombosis (DVT) of axillary vein and internal jugular (H) (Resolved) [I82.A19]  Current use of long term anticoagulation (Resolved) [Z79.01]  Acute deep vein thrombosis (DVT) of axillary vein of right upper extremity (H) (Resolved) [I82.A11]  Personal history of DVT (deep vein thrombosis) [Z86.718]           Comments:  [employer] meter- Managed by exception         Anticoagulation Care Providers     Provider Role Specialty Phone number    Rema Braxton DO Referring Family Medicine [phone]    Jessica Anthony MD Referring Internal Medicine [phone]    Christopher Smith MD Responsible Hematology [phone]

## 2022-03-04 DIAGNOSIS — H40.1132 PRIMARY OPEN ANGLE GLAUCOMA (POAG) OF BOTH EYES, MODERATE STAGE: Primary | ICD-10-CM

## 2022-03-09 ENCOUNTER — OFFICE VISIT (OUTPATIENT)
Dept: OPHTHALMOLOGY | Facility: CLINIC | Age: 77
End: 2022-03-09
Attending: OPHTHALMOLOGY
Payer: MEDICARE

## 2022-03-09 DIAGNOSIS — H40.1132 PRIMARY OPEN ANGLE GLAUCOMA (POAG) OF BOTH EYES, MODERATE STAGE: Primary | ICD-10-CM

## 2022-03-09 DIAGNOSIS — Z96.1 PSEUDOPHAKIA OF BOTH EYES: ICD-10-CM

## 2022-03-09 DIAGNOSIS — H35.3132 INTERMEDIATE STAGE NONEXUDATIVE AGE-RELATED MACULAR DEGENERATION OF BOTH EYES: ICD-10-CM

## 2022-03-09 PROCEDURE — 92133 CPTRZD OPH DX IMG PST SGM ON: CPT | Performed by: OPHTHALMOLOGY

## 2022-03-09 PROCEDURE — 92083 EXTENDED VISUAL FIELD XM: CPT | Performed by: OPHTHALMOLOGY

## 2022-03-09 PROCEDURE — G0463 HOSPITAL OUTPT CLINIC VISIT: HCPCS

## 2022-03-09 PROCEDURE — 99214 OFFICE O/P EST MOD 30 MIN: CPT | Mod: GC | Performed by: OPHTHALMOLOGY

## 2022-03-09 ASSESSMENT — REFRACTION_WEARINGRX
OD_HPRISM: 9.0 BO
OS_AXIS: 005
OD_AXIS: 005
SPECS_TYPE: SVL DIST
OS_VBASE: DOWN
OS_VPRISM: 2.0
OD_VBASE: UP
OD_CYLINDER: +1.25
OD_SPHERE: -1.00
OD_VPRISM: 2.0
OS_CYLINDER: +1.00
OS_SPHERE: -1.50

## 2022-03-09 ASSESSMENT — VISUAL ACUITY
OD_CC: 20/30
OD_PH_CC: 20/30
OD_CC+: -2
METHOD: SNELLEN - LINEAR
OD_PH_CC+: +1
CORRECTION_TYPE: GLASSES
OS_CC: 20/40
OS_CC+: +2

## 2022-03-09 ASSESSMENT — EXTERNAL EXAM - RIGHT EYE: OD_EXAM: NORMAL

## 2022-03-09 ASSESSMENT — EXTERNAL EXAM - LEFT EYE: OS_EXAM: NORMAL

## 2022-03-09 ASSESSMENT — TONOMETRY
IOP_METHOD: TONOPEN
OS_IOP_MMHG: 16
OS_IOP_MMHG: 17
OD_IOP_MMHG: 18
IOP_METHOD: APPLANATION
OD_IOP_MMHG: 20

## 2022-03-09 ASSESSMENT — CONF VISUAL FIELD
METHOD: COUNTING FINGERS
OS_NORMAL: 1
OD_NORMAL: 1

## 2022-03-09 ASSESSMENT — SLIT LAMP EXAM - LIDS
COMMENTS: NORMAL
COMMENTS: NORMAL

## 2022-03-09 ASSESSMENT — CUP TO DISC RATIO
OD_RATIO: 0.85
OS_RATIO: 0.5

## 2022-03-09 NOTE — PROGRESS NOTES
Chief Complaint(s) and History of Present Illness(es)     Glaucoma Follow-Up     Laterality: both eyes    Associated symptoms: dryness (mild).  Negative for eye pain, discharge,   headache and fever    Pain scale: 0/10              Comments     Timolol every morning to BE / LD @ 8:25 am today  Latanoprost at bedtime to BE / LD @ 9:30 pm last mynor  PFAT BID to TID to BE  Minoo Reyna, COT COT 12:35 PM 03/09/2022              Review of systems for the eyes was negative other than the pertinent positives/negatives listed in the HPI.      Assessment & Plan      Manasa French is a 76 year old female with the following diagnoses:   1. Primary open angle glaucoma (POAG) of both eyes, moderate stage    2. Intermediate stage nonexudative age-related macular degeneration of both eyes    3. Pseudophakia of both eyes      Once in a while sees blurry top vision in the morning when reading the paper she is not sure if blinking or lifting eyelid helps.      POAG right eye moderate, left eye mild  IOP today 20/17 mmHg   Tmax 26/20 (records review)     Visual field 24-2 09/08/21:  -Right eye.  14% FN and FP.  Nonspecific scattered defects.  MD equals -3.4 stable.  -Left eye.  Reliable. Persistent inferior nasal step with new early superior nasal step, has fluctuated on prior testing. MD equals -3.3     OCT RNFL  - Right eye: G equals 57. Thinning and NS, TS, T, TI.  Stable compared to May, 2021. (worsened c/t prior 2018 tests)  - Left eye: G equals 58, thinning TS, TI eye, borderline thinning nasally, and T.  Overall stable     Good response to repeat selected laser trabeculoplasty (SLT) right eye 9/16/2020 (previously done paul/ Sami 2/2018)  OCT nerve fiber layer stable both eyes today   Visual field continues to be variable, but good reliability right eye today and appears stable    Continue latanoprost at bedtime both eyes and timolol daily both eyes  Discuss tighter IOP goal in the right eye, r/b/a SLT right eye discussed,  elects to proceed at next visit; Right eye first    Dry AMD both eyes, has follow up with Dr. Page on 4/4/22  Continue AREDS II         Patient disposition:   Return for VT only; SLT RIGHT EYE.      Farzana Rubio MD  Ophthalmology, PGY3    Attending Physician Attestation:  Complete documentation of historical and exam elements from today's encounter can be found in the full encounter summary report (not reduplicated in this progress note).  I personally obtained the chief complaint(s) and history of present illness.  I confirmed and edited as necessary the review of systems, past medical/surgical history, family history, social history, and examination findings as documented by others; and I examined the patient myself.  I personally reviewed the relevant tests, images, and reports as documented above.  I formulated and edited as necessary the assessment and plan and discussed the findings and management plan with the patient and family. Attending Physician Image/Tesing Attestation: I personally reviewed the ophthalmic test(s) associated with this encounter, agree with the interpretation(s) as documented by the resident/fellow, and have edited the corresponding report(s) as necessary.  . - Jarred Leon MD

## 2022-03-09 NOTE — NURSING NOTE
Chief Complaints and History of Present Illnesses   Patient presents with     Glaucoma Follow-Up     Chief Complaint(s) and History of Present Illness(es)     Glaucoma Follow-Up     Laterality: both eyes    Associated symptoms: dryness (mild).  Negative for eye pain, discharge, headache and fever    Pain scale: 0/10              Comments     Timolol every morning to BE / LD @ 8:25 am today  Latanoprost at bedtime to BE / LD @ 9:30 pm last mynor  PFAT BID to TID to BE  BILL Padilla COT 12:35 PM 03/09/2022

## 2022-03-11 ENCOUNTER — ANTICOAGULATION THERAPY VISIT (OUTPATIENT)
Dept: ANTICOAGULATION | Facility: CLINIC | Age: 77
End: 2022-03-11
Payer: MEDICARE

## 2022-03-11 DIAGNOSIS — Z86.718 PERSONAL HISTORY OF DVT (DEEP VEIN THROMBOSIS): Primary | ICD-10-CM

## 2022-03-11 LAB — INR HOME MONITORING: 3.5 (ref 2–3)

## 2022-03-11 NOTE — PROGRESS NOTES
ANTICOAGULATION MANAGEMENT     Manasatiff French 76 year old female is on warfarin with supratherapeutic INR result. (Goal INR 2.0-3.0)    Recent labs: (last 7 days)     03/11/22  0000   INR 3.50*       ASSESSMENT       Source(s): Chart Review and Patient/Caregiver Call       Warfarin doses taken: Warfarin taken as instructed    Diet: No new diet changes identified    New illness, injury, or hospitalization: No    Medication/supplement changes: None noted    Signs or symptoms of bleeding or clotting: No    Previous INR: Therapeutic last 2(+) visits    Additional findings: None       PLAN     Recommended plan for no diet, medication or health factor changes affecting INR     Dosing Instructions: Partial hold then continue your current warfarin dose with next INR in 1-2 weeks. Discussed a maintenance dose change since no changes were found, patient declined stating she would feel more comfortable with a partial hold today.     Summary  As of 3/11/2022    Full warfarin instructions:  3/11: 2.5 mg; Otherwise 7.5 mg every Mon; 5 mg all other days   Next INR check:               Telephone call with Manasa who verbalizes understanding and agrees to plan    Patient to recheck with home meter    Education provided: Please call back if any changes to your diet, medications or how you've been taking warfarin, Importance of consistent vitamin K intake, Monitoring for bleeding signs and symptoms and When to seek medical attention/emergency care    Plan made per [facility] anticoagulation protocol    Kyleigh Valdez RN  Anticoagulation Clinic  3/11/2022    _______________________________________________________________________     Anticoagulation Episode Summary     Current INR goal:  2.0-3.0   TTR:  82.9 % (1 y)   Target end date:  Indefinite   Send INR reminders to:  [facility]    Indications    Acute deep vein thrombosis (DVT) of axillary vein and internal jugular (H) (Resolved) [I82.A19]  Current use of long term  anticoagulation (Resolved) [Z79.01]  Acute deep vein thrombosis (DVT) of axillary vein of right upper extremity (H) (Resolved) [I82.A11]  Personal history of DVT (deep vein thrombosis) [Z86.718]           Comments:  [employer] meter- Managed by exception         Anticoagulation Care Providers     Provider Role Specialty Phone number    Rema Braxton DO Referring Family Medicine [phone]    Jessica Anthony MD Referring Internal Medicine [phone]    Christopher Smith MD Responsible Hematology [phone]

## 2022-03-11 NOTE — PROGRESS NOTES
ANTICOAGULATION MANAGEMENT     Manasa French 76 year old female is on warfarin with supratherapeutic INR result. (Goal INR 2.0-3.0)    Recent labs: (last 7 days)     03/11/22  0000   INR 3.50*       ASSESSMENT       Source(s): Chart Review    Previous INR was Therapeutic last 2(+) visits    Medication, diet, health changes since last INR chart reviewed; none identified           PLAN     Unable to reach Rhonda today.    Attempted to call patient x3, and call drops. Please try back later.    Follow up required to confirm warfarin dose taken and assess for changes    Kyleigh Valdez RN  Anticoagulation Clinic  3/11/2022

## 2022-03-18 ENCOUNTER — ANTICOAGULATION THERAPY VISIT (OUTPATIENT)
Dept: ANTICOAGULATION | Facility: CLINIC | Age: 77
End: 2022-03-18
Payer: MEDICARE

## 2022-03-18 DIAGNOSIS — Z86.718 PERSONAL HISTORY OF DVT (DEEP VEIN THROMBOSIS): Primary | ICD-10-CM

## 2022-03-18 LAB — INR HOME MONITORING: 2.3 (ref 2–3)

## 2022-03-18 NOTE — PROGRESS NOTES
ANTICOAGULATION MANAGEMENT     Manasa French 76 year old female is on warfarin with therapeutic INR result. (Goal INR 2.0-3.0)    Recent labs: (last 7 days)     03/18/22  0000   INR 2.30       ASSESSMENT       Source(s): Chart Review    Previous INR was Supratherapeutic    Medication, diet, health changes since last INR chart reviewed; none identified           PLAN     Unable to reach Rhonda today.    Left message requesting patient return call to [facility] for INR follow up.    Follow up required to confirm warfarin dose taken and assess for changes and discuss dosing instructions and confirm understanding of instructions.    Carmela Sharp RN  Anticoagulation Clinic  3/18/2022

## 2022-03-25 ENCOUNTER — DOCUMENTATION ONLY (OUTPATIENT)
Dept: ANTICOAGULATION | Facility: CLINIC | Age: 77
End: 2022-03-25
Payer: MEDICARE

## 2022-03-25 DIAGNOSIS — Z86.718 PERSONAL HISTORY OF DVT (DEEP VEIN THROMBOSIS): Primary | ICD-10-CM

## 2022-03-25 LAB — INR HOME MONITORING: 2.6 (ref 2–3)

## 2022-03-25 NOTE — PROGRESS NOTES
ANTICOAGULATION  MANAGEMENT-Home Monitor Managed by Exception    Manasa French 76 year old female is on warfarin with therapeutic INR result. (Goal INR 2.0-3.0)    Recent labs: (last 7 days)     03/25/22  0000   INR 2.60         Previous INR was Therapeutic    Medication, diet, health changes since last INR:chart reviewed; none identified    Contacted within the last 12 weeks by phone on 3/18/22      AUBREY     Manasa was NOT contacted regarding therapeutic result today per home monitoring policy manage by exception agreement.   Current warfarin dose is to be continued:     Summary  As of 3/25/2022    Full warfarin instructions:  7.5 mg every Mon; 5 mg all other days   Next INR check:  4/8/2022           ?   Ember Amezcua RN  Anticoagulation Clinic  3/25/2022    _______________________________________________________________________     Anticoagulation Episode Summary     Current INR goal:  2.0-3.0   TTR:  82.1 % (1 y)   Target end date:  Indefinite   Send INR reminders to:  [facility]    Indications    Acute deep vein thrombosis (DVT) of axillary vein and internal jugular (H) (Resolved) [I82.A19]  Current use of long term anticoagulation (Resolved) [Z79.01]  Acute deep vein thrombosis (DVT) of axillary vein of right upper extremity (H) (Resolved) [I82.A11]  Personal history of DVT (deep vein thrombosis) [Z86.718]           Comments:  [employer] meter- Managed by exception         Anticoagulation Care Providers     Provider Role Specialty Phone number    Rema Braxton DO Referring Family Medicine [phone]    Jessica Anthony MD Referring Internal Medicine [phone]    Christopher Smith MD Responsible Hematology [phone]

## 2022-03-30 DIAGNOSIS — H35.3132 INTERMEDIATE STAGE NONEXUDATIVE AGE-RELATED MACULAR DEGENERATION OF BOTH EYES: Primary | ICD-10-CM

## 2022-04-07 ENCOUNTER — OFFICE VISIT (OUTPATIENT)
Dept: OPHTHALMOLOGY | Facility: CLINIC | Age: 77
End: 2022-04-07
Attending: OPHTHALMOLOGY
Payer: MEDICARE

## 2022-04-07 DIAGNOSIS — H35.3132 INTERMEDIATE STAGE NONEXUDATIVE AGE-RELATED MACULAR DEGENERATION OF BOTH EYES: ICD-10-CM

## 2022-04-07 DIAGNOSIS — H40.1132 PRIMARY OPEN ANGLE GLAUCOMA (POAG) OF BOTH EYES, MODERATE STAGE: Primary | ICD-10-CM

## 2022-04-07 PROCEDURE — G0463 HOSPITAL OUTPT CLINIC VISIT: HCPCS | Mod: 25

## 2022-04-07 PROCEDURE — 250N000009 HC RX 250: Performed by: OPHTHALMOLOGY

## 2022-04-07 PROCEDURE — 65855 TRABECULOPLASTY LASER SURG: CPT | Mod: RT | Performed by: OPHTHALMOLOGY

## 2022-04-07 PROCEDURE — 92250 FUNDUS PHOTOGRAPHY W/I&R: CPT | Performed by: OPHTHALMOLOGY

## 2022-04-07 PROCEDURE — 99214 OFFICE O/P EST MOD 30 MIN: CPT | Performed by: OPHTHALMOLOGY

## 2022-04-07 PROCEDURE — 999N000103 HC STATISTIC NO CHARGE FACILITY FEE

## 2022-04-07 PROCEDURE — 92134 CPTRZ OPH DX IMG PST SGM RTA: CPT | Performed by: OPHTHALMOLOGY

## 2022-04-07 RX ADMIN — APRACLONIDINE HYDROCHLORIDE 1 DROP: 10 SOLUTION/ DROPS OPHTHALMIC at 13:39

## 2022-04-07 ASSESSMENT — TONOMETRY
OS_IOP_MMHG: 15
IOP_METHOD: APPLANATION
OD_IOP_MMHG: 19
OS_IOP_MMHG: 15
OD_IOP_MMHG: 19
IOP_METHOD: TONOPEN
OS_IOP_MMHG: 21
OD_IOP_MMHG: 26
IOP_METHOD: APPLANATION
IOP_METHOD: TONOPEN
OD_IOP_MMHG: 26
OS_IOP_MMHG: 21

## 2022-04-07 ASSESSMENT — REFRACTION_WEARINGRX
OD_AXIS: 005
OD_HPRISM: 9.0 BO
OD_VBASE: UP
OS_VBASE: DOWN
OD_CYLINDER: +1.25
OS_VPRISM: 2.0
SPECS_TYPE: SVL DIST
OD_VBASE: UP
OS_AXIS: 005
OD_SPHERE: -1.00
OD_HPRISM: 9.0 BO
OD_VPRISM: 2.0
OD_SPHERE: -1.00
OS_CYLINDER: +1.00
OD_VPRISM: 2.0
OD_AXIS: 005
OS_CYLINDER: +1.00
OS_SPHERE: -1.50
OS_VPRISM: 2.0
OS_VBASE: DOWN
OD_CYLINDER: +1.25
OS_AXIS: 005
SPECS_TYPE: SVL DIST
OS_SPHERE: -1.50

## 2022-04-07 ASSESSMENT — CONF VISUAL FIELD
METHOD: COUNTING FINGERS
OS_NORMAL: 1
OS_NORMAL: 1
OD_NORMAL: 1
OD_NORMAL: 1

## 2022-04-07 ASSESSMENT — CUP TO DISC RATIO
OS_RATIO: 0.5
OD_RATIO: 0.85

## 2022-04-07 ASSESSMENT — VISUAL ACUITY
METHOD: SNELLEN - LINEAR
OD_CC: 20/30
METHOD: SNELLEN - LINEAR
OS_CC: 20/30
CORRECTION_TYPE: GLASSES
CORRECTION_TYPE: GLASSES
OS_CC: 20/30
OD_CC: 20/30

## 2022-04-07 ASSESSMENT — EXTERNAL EXAM - LEFT EYE: OS_EXAM: NORMAL

## 2022-04-07 ASSESSMENT — EXTERNAL EXAM - RIGHT EYE: OD_EXAM: NORMAL

## 2022-04-07 ASSESSMENT — SLIT LAMP EXAM - LIDS
COMMENTS: NORMAL
COMMENTS: NORMAL

## 2022-04-07 NOTE — PROGRESS NOTES
Chief Complaint(s) and History of Present Illness(es)     Follow Up     Laterality: both eyes    Course: stable    Associated symptoms: dryness.  Negative for eye pain, flashes and   floaters    Treatments tried: eye drops and artificial tears    Pain scale: 0/10    Comments: Primary open angle glaucoma (POAG) of both eyes              Comments     She states that her vision has seemed stable in both eyes since her last   eye exam.      She uses latanoprost at bedtime both eyes and timolol daily both eyes plus   PF artificial tears throughout the day.    Cristina Diop, COT 1:07 PM  April 7, 2022               Review of systems for the eyes was negative other than the pertinent positives/negatives listed in the HPI.      Assessment & Plan      Manasa French is a 76 year old female with the following diagnoses:   1. Primary open angle glaucoma (POAG) of both eyes, moderate stage         Here for selected laser trabeculoplasty (SLT) right eye   RBA discussed, consent obtained, will proceed today.   Return precautions reviewed     Patient disposition:   Return in about 6 weeks (around 5/19/2022) for VT only.           Attending Physician Attestation:  Complete documentation of historical and exam elements from today's encounter can be found in the full encounter summary report (not reduplicated in this progress note).  I personally obtained the chief complaint(s) and history of present illness.  I confirmed and edited as necessary the review of systems, past medical/surgical history, family history, social history, and examination findings as documented by others; and I examined the patient myself.  I personally reviewed the relevant tests, images, and reports as documented above.  I formulated and edited as necessary the assessment and plan and discussed the findings and management plan with the patient and family. . - Jarred Leon MD

## 2022-04-07 NOTE — NURSING NOTE
Chief Complaints and History of Present Illnesses   Patient presents with     Follow Up     Chief Complaint(s) and History of Present Illness(es)     Follow Up     Laterality: both eyes    Course: stable    Associated symptoms: dryness.  Negative for eye pain, flashes and floaters    Treatments tried: eye drops and artificial tears    Pain scale: 0/10              Comments     She states that her vision has seemed stable in both eyes since her last eye exam.       She uses latanoprost at bedtime both eyes and timolol daily both eyes plus PF artificial tears throughout the day.     Cristina Diop, COT 1:07 PM  April 7, 2022

## 2022-04-07 NOTE — PROGRESS NOTES
I have confirmed the patient's and reviewed Past Medical History, Past Surgical History, Social History, Family History, Problem List, Medication List and agree with Tech note.    CC: f/u AMD    HPI: 76 yo female with h/o Dry AMD here for annual f/u.  Patient reports relatively stable vision with persistent diplopia that is more manageable on some days than others (has h/o WIL f/b orthoptics for diplopia). States vision fluctuates.     POHx:  Thyroid Eye Disease  POAG  Pseudophakia    Eye Meds:  Timolol QAM OU  Xalatan QPM OU    OCT (04/04/22)  RE: (multiple single scans d/t pt eye movements) Drusen, no IRF/SRF  LE: (multiple single scans d/t pt eye movements) Drusen, no IRF/SRF    FAF  (04/04/22)  RE: multiple areas of hyper-autofluroescence corresponding to drusen  LE:  area of hyper-autofluroescence corresponding to drusen    Assessment & Plan:  1.  Dry Macular Degeneration, both eyes   - continue AREDS II vitamins   - AG precautions   - annual exam    2.  PVD, both eyes   - RT/RD precautions    3. POAG, mild, both eyes   - continue management/follow-up per Dr. Leon    4.  Thyroid Eye Disease   - continue with orthoptics for diplopia management    5.  Pseudophakia, both eyes   - observe    RTC: 1 year for exam, OCT, FAF        Katy Peter MD PhD.  Professor & Chair

## 2022-04-08 ENCOUNTER — DOCUMENTATION ONLY (OUTPATIENT)
Dept: ANTICOAGULATION | Facility: CLINIC | Age: 77
End: 2022-04-08
Payer: MEDICARE

## 2022-04-08 DIAGNOSIS — Z86.718 PERSONAL HISTORY OF DVT (DEEP VEIN THROMBOSIS): Primary | ICD-10-CM

## 2022-04-08 LAB — INR HOME MONITORING: 2.2 (ref 2–3)

## 2022-04-08 NOTE — PROGRESS NOTES
ANTICOAGULATION  MANAGEMENT-Home Monitor Managed by Exception    Manasa DIEGO Manolo 76 year old female is on warfarin with therapeutic INR result. (Goal INR 2.0-3.0)    Recent labs: (last 7 days)     04/08/22  0000   INR 2.20         Previous INR was Therapeutic    Medication, diet, health changes since last INR:chart reviewed; none identified    Contacted within the last 12 weeks by phone on 3/18/2022      AUBREY     Manasa was NOT contacted regarding therapeutic result today per home monitoring policy manage by exception agreement.   Current warfarin dose is to be continued:     Summary  As of 4/8/2022    Full warfarin instructions:  7.5 mg every Mon; 5 mg all other days   Next INR check:  4/22/2022           ?   Shelby Kate RN  Anticoagulation Clinic  4/8/2022    _______________________________________________________________________     Anticoagulation Episode Summary     Current INR goal:  2.0-3.0   TTR:  82.1 % (1 y)   Target end date:  Indefinite   Send INR reminders to:  [facility]    Indications    Acute deep vein thrombosis (DVT) of axillary vein and internal jugular (H) (Resolved) [I82.A19]  Current use of long term anticoagulation (Resolved) [Z79.01]  Acute deep vein thrombosis (DVT) of axillary vein of right upper extremity (H) (Resolved) [I82.A11]  Personal history of DVT (deep vein thrombosis) [Z86.718]           Comments:  [employer] meter- Managed by exception         Anticoagulation Care Providers     Provider Role Specialty Phone number    Rema Braxton DO Referring Family Medicine [phone]    Jessica Anthony MD Referring Internal Medicine [phone]    Christopher Smith MD Responsible Hematology [phone]

## 2022-04-22 ENCOUNTER — DOCUMENTATION ONLY (OUTPATIENT)
Dept: ANTICOAGULATION | Facility: CLINIC | Age: 77
End: 2022-04-22
Payer: MEDICARE

## 2022-04-22 DIAGNOSIS — Z86.718 PERSONAL HISTORY OF DVT (DEEP VEIN THROMBOSIS): Primary | ICD-10-CM

## 2022-04-22 LAB — INR HOME MONITORING: 2.5 (ref 2–3)

## 2022-04-22 NOTE — PROGRESS NOTES
ANTICOAGULATION  MANAGEMENT-Home Monitor Managed by Exception    Manasa DIEGO Manolo 76 year old female is on warfarin with therapeutic INR result. (Goal INR 2.0-3.0)    Recent labs: (last 7 days)     04/22/22  0000   INR 2.50         Previous INR was Therapeutic    Medication, diet, health changes since last INR:chart reviewed; none identified    Contacted within the last 12 weeks by phone on 3/18/2022      AUBREY     Manasa was NOT contacted regarding therapeutic result today per home monitoring policy manage by exception agreement.   Current warfarin dose is to be continued:     Summary  As of 4/22/2022    Full warfarin instructions:  7.5 mg every Mon; 5 mg all other days   Next INR check:  5/6/2022           ?   Kyleigh Molina RN  Anticoagulation Clinic  4/22/2022    _______________________________________________________________________     Anticoagulation Episode Summary     Current INR goal:  2.0-3.0   TTR:  82.1 % (1 y)   Target end date:  Indefinite   Send INR reminders to:  [facility]    Indications    Acute deep vein thrombosis (DVT) of axillary vein and internal jugular (H) (Resolved) [I82.A19]  Current use of long term anticoagulation (Resolved) [Z79.01]  Acute deep vein thrombosis (DVT) of axillary vein of right upper extremity (H) (Resolved) [I82.A11]  Personal history of DVT (deep vein thrombosis) [Z86.718]           Comments:  [employer] meter- Managed by exception         Anticoagulation Care Providers     Provider Role Specialty Phone number    Rema Braxton DO Referring Family Medicine [phone]    Jessica Anthony MD Referring Internal Medicine [phone]    Christopher Smith MD Responsible Hematology [phone]

## 2022-05-03 DIAGNOSIS — H40.1132 PRIMARY OPEN ANGLE GLAUCOMA OF BOTH EYES, MODERATE STAGE: ICD-10-CM

## 2022-05-04 RX ORDER — LATANOPROST 50 UG/ML
1 SOLUTION/ DROPS OPHTHALMIC AT BEDTIME
Qty: 7.5 ML | Refills: 1 | Status: SHIPPED | OUTPATIENT
Start: 2022-05-04 | End: 2023-01-17

## 2022-05-04 RX ORDER — TIMOLOL MALEATE 5 MG/ML
1 SOLUTION/ DROPS OPHTHALMIC EVERY MORNING
Qty: 15 ML | Refills: 1 | Status: SHIPPED | OUTPATIENT
Start: 2022-05-04 | End: 2023-01-17

## 2022-05-04 NOTE — TELEPHONE ENCOUNTER
TIMOLOL MALEATE 0.5 % Solution  Last Written Prescription Date:   5/5/2021  Last Fill Quantity: 5,   # refills: 3  Last Office Visit :  4/7/2022  Future Office visit:   5/19/2022   Jarred Leon MD    Ophthalmology     Assessment & Plan         Manasa French is a 76 year old female with the following diagnoses:   1. Primary open angle glaucoma (POAG) of both eyes, moderate stage          Here for selected laser trabeculoplasty (SLT) right eye   RBA discussed, consent obtained, will proceed today.   Return precautions reviewed      Patient disposition:   Return in about 6 weeks (around 5/19/2022) for VT only.     Comments     She states that her vision has seemed stable in both eyes since her last   eye exam.       She uses latanoprost at bedtime both eyes and timolol daily both eyes plus   PF artificial tears throughout the day.     Cristina Diop, COT 1:07 PM  April 7, 2022   15 Ml, 1 Refill sent to pharm     LATANOPROST 0.005 % Solution  Last Written Prescription Date:   5/5/2021  Last Fill Quantity: 7.5,   # refills: 3  Last Office Visit :  4/7/2022  Future Office visit:   5/19/2022  7.5 mL, 1 Refill sent to pharm       Lissa Rainey RN  Central Triage Red Flags/Med Refills    Warnings Override History for timolol maleate (TIMOPTIC) 0.5 % ophthalmic solution [[id number]]    Overridden by Jessica Anthony MD on May 27, 2021 2:32 PM   Allergy/Contraindication   1. DORZOLAMIDE-TIMOLOL [Level: Ingredient Match]      Overridden by Jarred Leon MD on May 5, 2021 1:19 PM   Allergy/Contraindication   1. DORZOLAMIDE-TIMOLOL [Level: Ingredient Match] [Reason: Tolerated medication/side effects in past]

## 2022-05-06 ENCOUNTER — ANTICOAGULATION THERAPY VISIT (OUTPATIENT)
Dept: ANTICOAGULATION | Facility: CLINIC | Age: 77
End: 2022-05-06
Payer: MEDICARE

## 2022-05-06 LAB — INR HOME MONITORING: 3.2 (ref 2–3)

## 2022-05-06 NOTE — PROGRESS NOTES
ANTICOAGULATION MANAGEMENT     Manasa French 76 year old female is on warfarin with supratherapeutic INR result. (Goal INR 2.0-3.0)    Recent labs: (last 7 days)     05/06/22  0000   INR 3.20*       ASSESSMENT       Source(s): Chart Review and Patient/Caregiver Call       Warfarin doses taken: Warfarin taken as instructed    Diet: No new diet changes identified    New illness, injury, or hospitalization: No    Medication/supplement changes: None noted    Signs or symptoms of bleeding or clotting: No    Previous INR: Therapeutic last 2(+) visits    Additional findings: Pt denies any changes in health, diet,activity or medication.       PLAN     Recommended plan for no diet, medication or health factor changes affecting INR     Dosing Instructions: partial hold then continue your current warfarin dose with next INR in 2 weeks       Summary  As of 5/6/2022    Full warfarin instructions:  5/6: 2.5 mg; Otherwise 7.5 mg every Mon; 5 mg all other days   Next INR check:  5/20/2022             Telephone call with Rhonda who verbalizes understanding and agrees to plan    Patient to recheck with home meter    Education provided: Importance of notifying clinic for changes in medications; a sooner lab recheck maybe needed. and Contact [phone]  with any changes, questions or concerns.     Plan made per [facility] anticoagulation protocol    Geraldine Pham RN  Anticoagulation Clinic  5/6/2022    _______________________________________________________________________     Anticoagulation Episode Summary     Current INR goal:  2.0-3.0   TTR:  81.0 % (1 y)   Target end date:  Indefinite   Send INR reminders to:  [facility]    Indications    Acute deep vein thrombosis (DVT) of axillary vein and internal jugular (H) (Resolved) [I82.A19]  Current use of long term anticoagulation (Resolved) [Z79.01]  Acute deep vein thrombosis (DVT) of axillary vein of right upper extremity (H) (Resolved) [I82.A11]  Personal history of DVT (deep  vein thrombosis) [Z86.718]           Comments:  [employer] meter- Managed by exception         Anticoagulation Care Providers     Provider Role Specialty Phone number    Rema Braxton DO Referring Family Medicine [phone]    Jessica Anthony MD Referring Internal Medicine [phone]    Christopher Smith MD Responsible Hematology [phone]

## 2022-05-19 ENCOUNTER — OFFICE VISIT (OUTPATIENT)
Dept: OPHTHALMOLOGY | Facility: CLINIC | Age: 77
End: 2022-05-19
Attending: EMERGENCY MEDICINE
Payer: MEDICARE

## 2022-05-19 DIAGNOSIS — Z96.1 PSEUDOPHAKIA OF BOTH EYES: ICD-10-CM

## 2022-05-19 DIAGNOSIS — H40.1132 PRIMARY OPEN ANGLE GLAUCOMA OF BOTH EYES, MODERATE STAGE: Primary | ICD-10-CM

## 2022-05-19 DIAGNOSIS — H35.3132 INTERMEDIATE STAGE NONEXUDATIVE AGE-RELATED MACULAR DEGENERATION OF BOTH EYES: ICD-10-CM

## 2022-05-19 PROCEDURE — 99213 OFFICE O/P EST LOW 20 MIN: CPT | Performed by: OPHTHALMOLOGY

## 2022-05-19 PROCEDURE — G0463 HOSPITAL OUTPT CLINIC VISIT: HCPCS

## 2022-05-19 ASSESSMENT — CONF VISUAL FIELD
OD_NORMAL: 1
OS_NORMAL: 1

## 2022-05-19 ASSESSMENT — VISUAL ACUITY
OS_CC: 20/40
CORRECTION_TYPE: GLASSES
OS_CC+: -1
OD_CC: 20/40
METHOD: SNELLEN - LINEAR
OD_PH_CC+: -2
OD_CC+: -1
OD_PH_CC: 20/30

## 2022-05-19 ASSESSMENT — TONOMETRY
OD_IOP_MMHG: 18
IOP_METHOD: GOLDMAN
OD_IOP_MMHG: 18
IOP_METHOD: ICARE
OS_IOP_MMHG: 16

## 2022-05-19 ASSESSMENT — REFRACTION_WEARINGRX
OS_CYLINDER: +1.00
OD_VBASE: UP
OS_SPHERE: -1.50
OD_CYLINDER: +1.25
OS_AXIS: 005
OD_HPRISM: 9.0 BO
OD_AXIS: 005
SPECS_TYPE: SVL DIST
OS_VBASE: DOWN
OD_VPRISM: 2.0
OD_SPHERE: -1.00
OS_VPRISM: 2.0

## 2022-05-19 ASSESSMENT — EXTERNAL EXAM - RIGHT EYE: OD_EXAM: NORMAL

## 2022-05-19 ASSESSMENT — SLIT LAMP EXAM - LIDS
COMMENTS: NORMAL
COMMENTS: NORMAL

## 2022-05-19 ASSESSMENT — EXTERNAL EXAM - LEFT EYE: OS_EXAM: NORMAL

## 2022-05-19 NOTE — NURSING NOTE
Chief Complaints and History of Present Illnesses   Patient presents with     Follow Up     Chief Complaint(s) and History of Present Illness(es)     Follow Up               Comments     Pt states that there have been no changes to her vision since she was last seen, and has not had any pains. Pt denies any other concerns.    Arsen King OT 12:44 PM May 19, 2022

## 2022-05-19 NOTE — PROGRESS NOTES
Chief Complaint(s) and History of Present Illness(es)     Follow Up               Comments     Pt states that there have been no changes to her vision since she was   last seen, and has not had any pains. Pt denies any other concerns.    Arsennora King OT 12:44 PM May 19, 2022            Review of systems for the eyes was negative other than the pertinent positives/negatives listed in the HPI.      Assessment & Plan      Manasa French is a 76 year old female with the following diagnoses:   1. Primary open angle glaucoma of both eyes, moderate stage    2. Intermediate stage nonexudative age-related macular degeneration of both eyes    3. Pseudophakia of both eyes       S/P repeat selected laser trabeculoplasty (SLT) right eye 4/7/22  Previous right eye 9/16/2020 (previously done paul/ Sami 2/2018)      Plan:     - Continue latanoprost qHS both eyes    - Continue timolol daily both eyes (Vyzulta not on formulary, doubt she will tolerate Rhopressa, Intolerant to cosopt and alphagan)      Patient disposition:   Return in about 6 months (around 11/19/2022) for VT only, OCT NFL.           Attending Physician Attestation:  Complete documentation of historical and exam elements from today's encounter can be found in the full encounter summary report (not reduplicated in this progress note).  I personally obtained the chief complaint(s) and history of present illness.  I confirmed and edited as necessary the review of systems, past medical/surgical history, family history, social history, and examination findings as documented by others; and I examined the patient myself.  I personally reviewed the relevant tests, images, and reports as documented above.  I formulated and edited as necessary the assessment and plan and discussed the findings and management plan with the patient and family. . - Jarred Leon MD

## 2022-05-20 ENCOUNTER — ANTICOAGULATION THERAPY VISIT (OUTPATIENT)
Dept: ANTICOAGULATION | Facility: CLINIC | Age: 77
End: 2022-05-20
Payer: MEDICARE

## 2022-05-20 DIAGNOSIS — Z86.718 PERSONAL HISTORY OF DVT (DEEP VEIN THROMBOSIS): Primary | ICD-10-CM

## 2022-05-20 LAB — INR HOME MONITORING: 2.3 (ref 2–3)

## 2022-05-20 NOTE — PROGRESS NOTES
ANTICOAGULATION MANAGEMENT     Manasa French 76 year old female is on warfarin with therapeutic INR result. (Goal INR 2.0-3.0)    Recent labs: (last 7 days)     05/20/22  0000   INR 2.30       ASSESSMENT       Source(s): Chart Review and Patient/Caregiver Call       Warfarin doses taken: Warfarin taken as instructed    Diet: No new diet changes identified    New illness, injury, or hospitalization: No    Medication/supplement changes: None noted    Signs or symptoms of bleeding or clotting: No    Previous INR: Supratherapeutic    Additional findings: None       PLAN     Recommended plan for no diet, medication or health factor changes affecting INR     Dosing Instructions: continue your current warfarin dose with next INR in 2 weeks       Summary  As of 5/20/2022    Full warfarin instructions:  7.5 mg every Mon; 5 mg all other days   Next INR check:  6/3/2022             Telephone call with Rhonda who verbalizes understanding and agrees to plan    Patient to recheck with home meter    Education provided: Please call back if any changes to your diet, medications or how you've been taking warfarin    Plan made per [facility] anticoagulation protocol    Ember Amezcua, RN  Anticoagulation Clinic  5/20/2022    _______________________________________________________________________     Anticoagulation Episode Summary     Current INR goal:  2.0-3.0   TTR:  80.2 % (1 y)   Target end date:  Indefinite   Send INR reminders to:  KAN DEL RIO    Indications    Acute deep vein thrombosis (DVT) of axillary vein and internal jugular (H) (Resolved) [I82.A19]  Current use of long term anticoagulation (Resolved) [Z79.01]  Acute deep vein thrombosis (DVT) of axillary vein of right upper extremity (H) (Resolved) [I82.A11]  Personal history of DVT (deep vein thrombosis) [Z86.718]           Comments:  [employer] [facility] meter- Managed by exception         Anticoagulation Care Providers     Provider Role Specialty Phone number    Fox,  Rema Scott DO Referring Family Medicine [phone]    Jessica Anthony MD Referring Internal Medicine [phone]    Christopher Smith MD Responsible Hematology [phone]

## 2022-06-03 ENCOUNTER — DOCUMENTATION ONLY (OUTPATIENT)
Dept: ANTICOAGULATION | Facility: CLINIC | Age: 77
End: 2022-06-03
Payer: MEDICARE

## 2022-06-03 DIAGNOSIS — Z86.718 PERSONAL HISTORY OF DVT (DEEP VEIN THROMBOSIS): Primary | ICD-10-CM

## 2022-06-03 LAB — INR HOME MONITORING: 2.5 (ref 2–3)

## 2022-06-03 NOTE — PROGRESS NOTES
ANTICOAGULATION  MANAGEMENT-Home Monitor Managed by Exception    Manasa French 76 year old female is on warfarin with therapeutic INR result. (Goal INR 2.0-3.0)    Recent labs: (last 7 days)     06/03/22  0000   INR 2.50         Previous INR was Therapeutic    Medication, diet, health changes since last INR:chart reviewed; none identified    Contacted within the last 12 weeks by phone on 5/20/22      AUBREY     Manasa was NOT contacted regarding therapeutic result today per home monitoring policy manage by exception agreement.   Current warfarin dose is to be continued:     Summary  As of 6/3/2022    Full warfarin instructions:  7.5 mg every Mon; 5 mg all other days   Next INR check:  6/17/2022           ?   Ember Amezcua RN  Anticoagulation Clinic  6/3/2022    _______________________________________________________________________     Anticoagulation Episode Summary     Current INR goal:  2.0-3.0   TTR:  80.2 % (1 y)   Target end date:  Indefinite   Send INR reminders to:  [facility]    Indications    Acute deep vein thrombosis (DVT) of axillary vein and internal jugular (H) (Resolved) [I82.A19]  Current use of long term anticoagulation (Resolved) [Z79.01]  Acute deep vein thrombosis (DVT) of axillary vein of right upper extremity (H) (Resolved) [I82.A11]  Personal history of DVT (deep vein thrombosis) [Z86.718]           Comments:  [employer] Home meter- Managed by exception         Anticoagulation Care Providers     Provider Role Specialty Phone number    Rema Braxton DO Referring Family Medicine [phone]    Jessica Anthony MD Referring Internal Medicine [phone]    Christopher Smith MD Responsible Hematology [phone]

## 2022-06-08 ENCOUNTER — ANCILLARY PROCEDURE (OUTPATIENT)
Dept: MAMMOGRAPHY | Facility: CLINIC | Age: 77
End: 2022-06-08
Attending: NURSE PRACTITIONER
Payer: MEDICARE

## 2022-06-08 ENCOUNTER — OFFICE VISIT (OUTPATIENT)
Dept: INTERNAL MEDICINE | Facility: CLINIC | Age: 77
End: 2022-06-08
Payer: MEDICARE

## 2022-06-08 VITALS
BODY MASS INDEX: 24.99 KG/M2 | SYSTOLIC BLOOD PRESSURE: 116 MMHG | TEMPERATURE: 97 F | WEIGHT: 150 LBS | HEART RATE: 85 BPM | OXYGEN SATURATION: 99 % | DIASTOLIC BLOOD PRESSURE: 68 MMHG | HEIGHT: 65 IN

## 2022-06-08 DIAGNOSIS — Z86.718 PERSONAL HISTORY OF DVT (DEEP VEIN THROMBOSIS): ICD-10-CM

## 2022-06-08 DIAGNOSIS — E03.9 ACQUIRED HYPOTHYROIDISM: ICD-10-CM

## 2022-06-08 DIAGNOSIS — M81.0 OSTEOPOROSIS, UNSPECIFIED OSTEOPOROSIS TYPE, UNSPECIFIED PATHOLOGICAL FRACTURE PRESENCE: ICD-10-CM

## 2022-06-08 DIAGNOSIS — R73.09 ELEVATED GLUCOSE: ICD-10-CM

## 2022-06-08 DIAGNOSIS — E89.0 POSTABLATIVE HYPOTHYROIDISM: ICD-10-CM

## 2022-06-08 DIAGNOSIS — R55 PRE-SYNCOPE: ICD-10-CM

## 2022-06-08 DIAGNOSIS — L98.9 SKIN LESION: ICD-10-CM

## 2022-06-08 DIAGNOSIS — Z12.31 VISIT FOR SCREENING MAMMOGRAM: ICD-10-CM

## 2022-06-08 DIAGNOSIS — Z00.00 ENCOUNTER FOR MEDICARE ANNUAL WELLNESS EXAM: Primary | ICD-10-CM

## 2022-06-08 DIAGNOSIS — Z86.718 PERSONAL HISTORY OF DVT (DEEP VEIN THROMBOSIS): Primary | ICD-10-CM

## 2022-06-08 DIAGNOSIS — Z13.220 SCREENING FOR LIPID DISORDERS: ICD-10-CM

## 2022-06-08 DIAGNOSIS — I10 BENIGN ESSENTIAL HYPERTENSION: ICD-10-CM

## 2022-06-08 LAB
ALBUMIN SERPL-MCNC: 3.9 G/DL (ref 3.4–5)
ALP SERPL-CCNC: 76 U/L (ref 40–150)
ALT SERPL W P-5'-P-CCNC: 21 U/L (ref 0–50)
ANION GAP SERPL CALCULATED.3IONS-SCNC: 7 MMOL/L (ref 3–14)
AST SERPL W P-5'-P-CCNC: 23 U/L (ref 0–45)
BILIRUB SERPL-MCNC: 0.8 MG/DL (ref 0.2–1.3)
BUN SERPL-MCNC: 28 MG/DL (ref 7–30)
CALCIUM SERPL-MCNC: 9.8 MG/DL (ref 8.5–10.1)
CHLORIDE BLD-SCNC: 106 MMOL/L (ref 94–109)
CHOLEST SERPL-MCNC: 224 MG/DL
CO2 SERPL-SCNC: 28 MMOL/L (ref 20–32)
CREAT SERPL-MCNC: 0.87 MG/DL (ref 0.52–1.04)
ERYTHROCYTE [DISTWIDTH] IN BLOOD BY AUTOMATED COUNT: 13.9 % (ref 10–15)
FASTING STATUS PATIENT QL REPORTED: NO
FASTING STATUS PATIENT QL REPORTED: NO
GFR SERPL CREATININE-BSD FRML MDRD: 68 ML/MIN/1.73M2
GLUCOSE BLD-MCNC: 110 MG/DL (ref 70–99)
GLUCOSE BLD-MCNC: 110 MG/DL (ref 70–99)
HBA1C MFR BLD: 6.6 % (ref 0–5.6)
HCT VFR BLD AUTO: 36.5 % (ref 35–47)
HDLC SERPL-MCNC: 75 MG/DL
HGB BLD-MCNC: 11.4 G/DL (ref 11.7–15.7)
LDLC SERPL CALC-MCNC: 97 MG/DL
MCH RBC QN AUTO: 30.3 PG (ref 26.5–33)
MCHC RBC AUTO-ENTMCNC: 31.2 G/DL (ref 31.5–36.5)
MCV RBC AUTO: 97 FL (ref 78–100)
NONHDLC SERPL-MCNC: 149 MG/DL
PLATELET # BLD AUTO: 235 10E3/UL (ref 150–450)
POTASSIUM BLD-SCNC: 4.1 MMOL/L (ref 3.4–5.3)
PROT SERPL-MCNC: 8.2 G/DL (ref 6.8–8.8)
RBC # BLD AUTO: 3.76 10E6/UL (ref 3.8–5.2)
SODIUM SERPL-SCNC: 141 MMOL/L (ref 133–144)
T4 FREE SERPL-MCNC: 1.58 NG/DL (ref 0.76–1.46)
TRIGL SERPL-MCNC: 260 MG/DL
TSH SERPL DL<=0.005 MIU/L-ACNC: 0.18 MU/L (ref 0.4–4)
WBC # BLD AUTO: 6.3 10E3/UL (ref 4–11)

## 2022-06-08 PROCEDURE — 36415 COLL VENOUS BLD VENIPUNCTURE: CPT | Performed by: NURSE PRACTITIONER

## 2022-06-08 PROCEDURE — 99214 OFFICE O/P EST MOD 30 MIN: CPT | Mod: 25 | Performed by: NURSE PRACTITIONER

## 2022-06-08 PROCEDURE — 83036 HEMOGLOBIN GLYCOSYLATED A1C: CPT | Performed by: NURSE PRACTITIONER

## 2022-06-08 PROCEDURE — 80061 LIPID PANEL: CPT | Performed by: NURSE PRACTITIONER

## 2022-06-08 PROCEDURE — 84443 ASSAY THYROID STIM HORMONE: CPT | Performed by: NURSE PRACTITIONER

## 2022-06-08 PROCEDURE — 77067 SCR MAMMO BI INCL CAD: CPT | Mod: TC | Performed by: RADIOLOGY

## 2022-06-08 PROCEDURE — G0439 PPPS, SUBSEQ VISIT: HCPCS | Performed by: NURSE PRACTITIONER

## 2022-06-08 PROCEDURE — 77063 BREAST TOMOSYNTHESIS BI: CPT | Mod: TC | Performed by: RADIOLOGY

## 2022-06-08 PROCEDURE — 84439 ASSAY OF FREE THYROXINE: CPT | Performed by: NURSE PRACTITIONER

## 2022-06-08 PROCEDURE — 85027 COMPLETE CBC AUTOMATED: CPT | Performed by: NURSE PRACTITIONER

## 2022-06-08 PROCEDURE — 80053 COMPREHEN METABOLIC PANEL: CPT | Performed by: NURSE PRACTITIONER

## 2022-06-08 RX ORDER — LEVOTHYROXINE SODIUM 125 UG/1
125 TABLET ORAL DAILY
Qty: 90 TABLET | Refills: 3 | Status: SHIPPED | OUTPATIENT
Start: 2022-06-08 | End: 2022-06-09

## 2022-06-08 RX ORDER — HYDROCHLOROTHIAZIDE 12.5 MG/1
12.5 TABLET ORAL DAILY
Qty: 90 TABLET | Refills: 3 | Status: CANCELLED | OUTPATIENT
Start: 2022-06-08

## 2022-06-08 RX ORDER — LOSARTAN POTASSIUM 100 MG/1
100 TABLET ORAL DAILY
Qty: 90 TABLET | Refills: 3 | Status: SHIPPED | OUTPATIENT
Start: 2022-06-08 | End: 2023-08-22

## 2022-06-08 RX ORDER — AMLODIPINE BESYLATE 5 MG/1
5 TABLET ORAL DAILY
Qty: 90 TABLET | Refills: 3 | Status: SHIPPED | OUTPATIENT
Start: 2022-06-08 | End: 2023-06-10

## 2022-06-08 ASSESSMENT — ENCOUNTER SYMPTOMS
PALPITATIONS: 1
COUGH: 0
BREAST MASS: 0
JOINT SWELLING: 0
HEMATOCHEZIA: 0
CONSTIPATION: 0
SORE THROAT: 0
SHORTNESS OF BREATH: 1
ABDOMINAL PAIN: 0
PARESTHESIAS: 0
FEVER: 0
DIARRHEA: 1
WEAKNESS: 1
HEARTBURN: 0
NAUSEA: 0
HEADACHES: 1
ARTHRALGIAS: 1
FREQUENCY: 1
DYSURIA: 0
HEMATURIA: 0
MYALGIAS: 1
EYE PAIN: 0
NERVOUS/ANXIOUS: 0
DIZZINESS: 1
CHILLS: 1

## 2022-06-08 ASSESSMENT — ACTIVITIES OF DAILY LIVING (ADL): CURRENT_FUNCTION: TRANSPORTATION REQUIRES ASSISTANCE

## 2022-06-08 NOTE — PROGRESS NOTES
"SUBJECTIVE:   Manasa French is a 76 year old female who presents for Preventive Visit.    Understands split billing: yes    Are you in the first 12 months of your Medicare coverage?  No    Healthy Habits:     In general, how would you rate your overall health?  Good    Frequency of exercise:  6-7 days/week    Duration of exercise:  30-45 minutes    Do you usually eat at least 4 servings of fruit and vegetables a day, include whole grains    & fiber and avoid regularly eating high fat or \"junk\" foods?  Yes    Taking medications regularly:  Yes    Barriers to taking medications:  None    Medication side effects:  None    Ability to successfully perform activities of daily living:  Transportation requires assistance    Home Safety:  No safety concerns identified    Hearing Impairment:  Feel that people are mumbling or not speaking clearly, difficult to understand a speaker at a public meeting or [employer] service and find that men's voices are easier to understand than woman's    In the past 6 months, have you been bothered by leaking of urine? Yes    In general, how would you rate your overall mental or emotional health?  Good      PHQ-2 Total Score: 0    Additional concerns today:  No     Health Maintenance Screening:    -Immunizations:   -Influenza: Up to date   -Pneumococcal: Up to date   -Td/Tdap: Up to date   -Shingles: Due   -HPV: NA   -MMR: NA   -COVID Up to date- fully vaccinated    -Colon Cancer Screening:  NA; aged out    -Lung Cancer Screening:  NA    -Breast Cancer Screening: Up to date    -Cervical Cancer Screening:  NA    -STD Screen:  NA, Low risk    -Cholesterol Screening:  Due    -Diabetes Screening:  Due    -Depression Screening: Due    PHQ-2 Score:   PHQ-2 ( 1999 Pfizer) 6/8/2022 3/9/2022   Q1: Little interest or pleasure in doing things 0 0   Q2: Feeling down, depressed or hopeless 0 0   PHQ-2 Score 0 0   PHQ-2 Total Score (12-17 Years)- Positive if 3 or more points; Administer PHQ-A if " "positive - -   Q1: Little interest or pleasure in doing things Not at all -   Q2: Feeling down, depressed or hopeless Not at all -   PHQ-2 Score 0 -     Other Concerns:    HTN:  Concern for hypotension:  Hx of HTN, maintained on amlodipine, losartan and hydrochlorothiazide, however, reports concern for episodes re: hypotension.  Endorses episodes where she \"crashes\", describing syncopal or near syncopal episodes and her vision going \"black and white\".  Monitors BP at home and BP ranges 110-124/50's.  Reports passing out in [employer] this past Fall, but reports that she also had COVID at that time.  She currently denies any new symptoms.  Denies CP, SOB.  States she drinks 2 glasses of water in the morning along with OJ, and during the day and night time- feels she is staying hydrated.    Hypothyroidism:  Maintained on levothyroxine 125 mcg/day.  Due for TSH, refills.     Does endorse issues with loose bowels- sometimes she passes a \"pea sized\" BM, but later followed by \"good sized\".  No blood in stool.     Do you feel safe in your environment? Yes    Have you ever done Advance Care Planning? (For example, a Health Directive, POLST, or a discussion with a medical provider or your loved ones about your wishes): No, advance care planning information given to patient to review.  Patient plans to discuss their wishes with loved ones or provider.         Fall risk  Fallen 2 or more times in the past year?: Yes  Any fall with injury in the past year?: No  Consider appropriate referrals if necessary    -Is very active- swims 3 times per week for 1 hour  -Walks every mynor with spouse    Cognitive Screening   1) Repeat 3 items (Leader, Season, Table)    2) Clock draw: NORMAL  3) 3 item recall: Recalls 3 objects  Results: 3 items recalled: COGNITIVE IMPAIRMENT LESS LIKELY    Mini-CogTM Copyright S Mitch. Licensed by the author for use in [employer]; reprinted with permission (lyn@.[facility]). All rights reserved.  "     Do you have sleep apnea, excessive snoring or daytime drowsiness?: no    Reviewed and updated as needed this visit by clinical staff                    Reviewed and updated as needed this visit by Provider                   Social History     Tobacco Use     Smoking status: Never Smoker     Smokeless tobacco: Never Used   Substance Use Topics     Alcohol use: Yes     If you drink alcohol do you typically have >3 drinks per day or >7 drinks per week? No    Alcohol Use 6/8/2022   Prescreen: >3 drinks/day or >7 drinks/week? No   Prescreen: >3 drinks/day or >7 drinks/week? -   No flowsheet data found.    Current providers sharing in care for this patient include:   Patient Care Team:  Jessica Anthony MD as PCP - General (Internal Medicine)  Kevin Hernandez MD as Assigned Endocrinology Provider  Jarred Leon MD as Assigned Surgical Provider  Jessica Anthony MD as Assigned PCP    The following health maintenance items are reviewed in Epic and correct as of today:  Health Maintenance Due   Topic Date Due     ASTHMA CONTROL TEST  11/10/2021     BMP  11/20/2021     ASTHMA ACTION PLAN  12/04/2021     ANNUAL REVIEW OF HM ORDERS  05/27/2022     MEDICARE ANNUAL WELLNESS VISIT  05/27/2022     Lab work is in process  Labs reviewed in EPIC    Mammogram Screening - Patient over age 75, has elected to continue with screening.  Pertinent mammograms are reviewed under the imaging tab.    Review of Systems   Constitutional: Positive for chills. Negative for fever.   HENT: Positive for hearing loss. Negative for congestion, ear pain and sore throat.    Eyes: Negative for pain and visual disturbance.   Respiratory: Positive for shortness of breath. Negative for cough.    Cardiovascular: Positive for palpitations and peripheral edema. Negative for chest pain.   Gastrointestinal: Positive for diarrhea. Negative for abdominal pain, constipation, heartburn, hematochezia and nausea.   Breasts:  Negative for tenderness,  "breast mass and discharge.   Genitourinary: Positive for frequency and urgency. Negative for dysuria, genital sores, hematuria, pelvic pain, vaginal bleeding and vaginal discharge.   Musculoskeletal: Positive for arthralgias and myalgias. Negative for joint swelling.   Skin: Negative for rash.   Neurological: Positive for dizziness, weakness and headaches. Negative for paresthesias.   Psychiatric/Behavioral: Negative for mood changes. The patient is not nervous/anxious.      CONSTITUTIONAL:NEGATIVE for fever, chills, change in weight  INTEGUMENTARY/SKIN: POSITIVE for skin lesion- raised flesh colored lesion left shoulder  EYES: Macular degeneration  RESP:NEGATIVE for significant cough or SOB  CV: NEGATIVE for chest pain, palpitations or peripheral edema  GI: NEGATIVE for nausea, abdominal pain, heartburn, or change in bowel habits   MUSCULOSKELETAL: NEGATIVE for significant arthralgias or myalgia  NEURO: Positive for pre-syncope  PSYCHIATRIC: NEGATIVE for changes in mood or affect    OBJECTIVE:   /68   Pulse 85   Temp 97  F (36.1  C) (Tympanic)   Ht 1.638 m (5' 4.5\")   Wt 68 kg (150 lb)   LMP  (LMP Unknown)   SpO2 99%   Breastfeeding No   BMI 25.35 kg/m   Estimated body mass index is 25.35 kg/m  as calculated from the following:    Height as of this encounter: 1.638 m (5' 4.5\").    Weight as of this encounter: 68 kg (150 lb).     Physical Exam   GENERAL APPEARANCE: healthy, alert and no distress  EYES: Eyes grossly normal to inspection, PERRL and conjunctivae and sclerae normal  HENT: ear canals and TM's normal, nose and mouth without ulcers or lesions, oropharynx clear and oral mucous membranes moist; wearing bilateral hearing aids  NECK: no adenopathy, no asymmetry, masses, or scars  RESP: lungs clear to auscultation - no rales, rhonchi or wheezes  CV: regular rate and rhythm, normal S1 S2, no S3 or S4, no murmur, click or rub, no peripheral edema and peripheral pulses strong  ABDOMEN: soft, " "nontender, no hepatosplenomegaly, no masses and bowel sounds normal  MS: no musculoskeletal defects are noted and gait is age appropriate without ataxia; uses a cane  SKIN: no suspicious lesions or rashes  NEURO: Normal strength and tone, sensory exam grossly normal, mentation intact and speech normal  PSYCH: mentation appears normal and affect normal/bright    Diagnostic Test Results:  Labs reviewed in Epic    ASSESSMENT / PLAN:   Manasa was seen today for wellness visit.    Diagnoses and all orders for this visit:    Encounter for Medicare annual wellness exam  -     REVIEW OF HEALTH MAINTENANCE PROTOCOL ORDERS  -     Labs ordered  -     Patient completed a mammogram this year- she wishes to continue  -     Colon ca screening no longer indicated- discussed with patient  -     DEXA screening up to date  -     Due for shingles vaccine- discussed with patient- can receive at any pharmacy    Benign essential hypertension  -     BASIC METABOLIC PANEL; Future  -     amLODIPine (NORVASC) 5 MG tablet; Take 1 tablet (5 mg) by mouth daily  -     losartan (COZAAR) 100 MG tablet; Take 1 tablet (100 mg) by mouth daily TAKE 1 TABLET EVERY DAY  -     Comprehensive metabolic panel (BMP + Alb, Alk Phos, ALT, AST, Total. Bili, TP); Future  -     CBC with platelets; Future  -     Cancel: BASIC METABOLIC PANEL  -     Comprehensive metabolic panel (BMP + Alb, Alk Phos, ALT, AST, Total. Bili, TP)  -     CBC with platelets  -     Check labs  -     Stopping hydrochlorothiazide for now and monitoring BP given pre-syncopal episodes; continue amlodipine and losartan; monitor home BP- call if persistently > 140/90    Presyncope  - Endorses episodes of \"crashing\", reporting pre syncope symptoms- vision gets \"black\"; had a syncopal episode last Nov while at [employer].  Continues to have episodes  - BP is well controlled.  Differentials include orthostatic hypotension, dehydration, cardiac etiology, vs other  - As BP is well controlled, will " "stop hydrochlorothiazide for now.  Pt will check BP at home; notify provider if BP persistently over 140/90  - If ongoing symptoms despite discontinuation of hydrochlorothiazide, please notify provider and will pursue other work up such as cardiac monitoring  - Patient agrees to plan    Acquired hypothyroidism  -     levothyroxine (SYNTHROID/LEVOTHROID) 125 MCG tablet; Take 1 tablet (125 mcg) by mouth daily    Postablative hypothyroidism  -     TSH with free T4 reflex; Future  -     TSH with free T4 reflex  -     T4 free  -     Check TSH, continue levothyroxine    Osteoporosis, unspecified osteoporosis type, unspecified pathological fracture presence  - Surveillance DEXA scan up to date  - Continue Fosamax, calcium/D    Personal history of DVT (deep vein thrombosis)  -     CBC with platelets; Future  -     CBC with platelets  -     Following with Hematology- on warfarin/Vit K; most recent home INR check was 2.5    Elevated glucose  -     Glucose; Future  -     Hemoglobin A1c; Future  -     Glucose  -     Hemoglobin A1c    Screening for lipid disorders  -     Lipid Profile (Chol, Trig, HDL, LDL calc); Future  -     Lipid Profile (Chol, Trig, HDL, LDL calc)    Skin lesion  -     Adult Dermatology Referral; Future        Patient has been advised of split billing requirements and indicates understanding: Yes    COUNSELING:  Reviewed preventive health counseling, as reflected in patient instructions    Estimated body mass index is 24.63 kg/m  as calculated from the following:    Height as of 5/27/21: 1.651 m (5' 5\").    Weight as of 10/7/21: 67.1 kg (148 lb).        She reports that she has never smoked. She has never used smokeless tobacco.      Appropriate preventive services were discussed with this patient, including applicable screening as appropriate for cardiovascular disease, diabetes, osteopenia/osteoporosis, and glaucoma.  As appropriate for age/gender, discussed screening for colorectal cancer, prostate " cancer, breast cancer, and cervical cancer. Checklist reviewing preventive services available has been given to the patient.    Reviewed patients plan of care and provided an AVS. The Basic Care Plan (routine screening as documented in Health Maintenance) for Manasa meets the Care Plan requirement. This Care Plan has been established and reviewed with the Patient.    Counseling Resources:  ATP IV Guidelines  Pooled Cohorts Equation Calculator  Breast Cancer Risk Calculator  Breast Cancer: Medication to Reduce Risk  FRAX Risk Assessment  ICSI Preventive Guidelines  Dietary Guidelines for Americans, 2010  USDA's MyPlate  ASA Prophylaxis  Lung CA Screening    SURESH Sinclair CNP  [facility]    Identified Health Risks:

## 2022-06-08 NOTE — PROGRESS NOTES
"    The patient reports that she has difficulty with activities of daily living. I have asked that the patient make a follow up appointment in 53 weeks where this issue will be further evaluated and addressed.  The patient was provided with written information regarding signs of hearing loss.  Information on urinary incontinence and treatment options given to patient.  She is at risk for falling and has been provided with information to reduce the risk of falling at home.  Answers for HPI/ROS submitted by the patient on 6/8/2022  In general, how would you rate your overall physical health?: good  Frequency of exercise:: 6-7 days/week  Do you usually eat at least 4 servings of fruit and vegetables a day, include whole grains & fiber, and avoid regularly eating high fat or \"junk\" foods? : Yes  Taking medications regularly:: Yes  Medication side effects:: None  Activities of Daily Living: transportation requires assistance  Home safety: no safety concerns identified  Hearing Impairment:: feel that people are mumbling or not speaking clearly, difficult to understand a speaker at a public meeting or [employer] service, find that men's voices are easier to understand than woman's  In the past 6 months, have you been bothered by leaking of urine?: Yes  abdominal pain: No  Blood in stool: No  Blood in urine: No  chest pain: No  chills: Yes  congestion: No  constipation: No  cough: No  diarrhea: Yes  dizziness: Yes  ear pain: No  eye pain: No  nervous/anxious: No  fever: No  frequency: Yes  genital sores: No  headaches: Yes  hearing loss: Yes  heartburn: No  arthralgias: Yes  joint swelling: No  peripheral edema: Yes  mood changes: No  myalgias: Yes  nausea: No  dysuria: No  palpitations: Yes  Skin sensation changes: No  sore throat: No  urgency: Yes  rash: No  shortness of breath: Yes  visual disturbance: No  weakness: Yes  pelvic pain: No  vaginal bleeding: No  vaginal discharge: No  tenderness: No  breast mass: No  breast " discharge: No  In general, how would you rate your overall mental or emotional health?: good  Additional concerns today:: No  Duration of exercise:: 30-45 minutes

## 2022-06-08 NOTE — PATIENT INSTRUCTIONS
Patient Education   Personalized Prevention Plan  You are due for the preventive services outlined below.  Your care team is available to assist you in scheduling these services.  If you have already completed any of these items, please share that information with your care team to update in your medical record.  Health Maintenance Due   Topic Date Due    Asthma Control Test  11/10/2021    Basic Metabolic Panel  11/20/2021    Asthma Action Plan - yearly  12/04/2021    ANNUAL REVIEW OF HM ORDERS  05/27/2022     Activities of Daily Living    Your Health Risk Assessment indicates you have difficulties with activities of daily living such as housework, bathing, preparing meals, taking medication, etc. Please make a follow up appointment for us to address this issue in more detail.    Signs of Hearing Loss      Hearing much better with one ear can be a sign of hearing loss.   Hearing loss is a problem shared by many people. In fact, it is one of the most common health problems, particularly as people age. Most people age 65 and older have some hearing loss. By age 80, almost everyone does. Hearing loss often occurs slowly over the years. So you may not realize your hearing has gotten worse.  Have your hearing checked  Call your healthcare provider if you:  Have to strain to hear normal conversation  Have to watch other people s faces very carefully to follow what they re saying  Need to ask people to repeat what they ve said  Often misunderstand what people are saying  Turn the volume of the television or radio up so high that others complain  Feel that people are mumbling when they re talking to you  Find that the effort to hear leaves you feeling tired and irritated  Notice, when using the phone, that you hear better with one ear than the other  [employer] last reviewed this educational content on 1/1/2020 2000-2021 [employer]. All rights reserved. This information is not intended as a substitute for  professional medical care. Always follow your healthcare professional's instructions.          Urinary Incontinence, Female (Adult)   Urinary incontinence means loss of bladder control. This problem affects many women, especially as they get older. If you have incontinence, you may be embarrassed to ask for help. But know that this problem can be treated.   Types of Incontinence  There are different types of incontinence. Two of the main types are described here. You can have more than one type.   Stress incontinence. With this type, urine leaks when pressure (stress) is put on the bladder. This may happen when you cough, sneeze, or laugh. Stress incontinence most often occurs because the pelvic floor muscles that support the bladder and urethra are weak. This can happen after pregnancy and vaginal childbirth or a hysterectomy. It can also be due to excess body weight or hormone changes.  Urge incontinence (also called overactive bladder). With this type, a sudden urge to urinate is felt often. This may happen even though there may not be much urine in the bladder. The need to urinate often during the night is common. Urge incontinence most often occurs because of bladder spasms. This may be due to bladder irritation or infection. Damage to bladder nerves or pelvic muscles, constipation, and certain medicines can also lead to urge incontinence.  Treatment depends on the cause. Further evaluation is needed to find the type you have. This will likely include an exam and certain tests. Based on the results, you and your healthcare provider can then plan treatment. Until a diagnosis is made, the home care tips below can help ease symptoms.   Home care  Do pelvic floor muscle exercises, if they are prescribed. The pelvic floor muscles help support the bladder and urethra. Many women find that their symptoms improve when doing special exercises that strengthen these muscles. To do the exercises, contract the muscles you  would use to stop your stream of urine. But do this when you re not urinating. Hold for 10 seconds, then relax. Repeat 10 to 20 times in a row, at least 3 times a day. Your healthcare provider may give you other instructions for how to do the exercises and how often.  Keep a bladder diary. This helps track how often and how much you urinate over a set period of time. Bring this diary with you to your next visit with the provider. The information can help your provider learn more about your bladder problem.  Lose weight, if advised to by your provider. Extra weight puts pressure on the bladder. Your provider can help you create a weight-loss plan that s right for you. This may include exercising more and making certain diet changes.  Don't have foods and drinks that may irritate the bladder. These can include alcohol and caffeinated drinks.  Quit smoking. Smoking and other tobacco use can lead to a long-term (chronic) cough that strains the pelvic floor muscles. Smoking may also damage the bladder and urethra. Talk with your provider about treatments or methods you can use to quit smoking.  If drinking large amounts of fluid makes you have symptoms, you may be advised to limit your fluid intake. You may also be advised to drink most of your fluids during the day and to limit fluids at night.  If you re worried about urine leakage or accidents, you may wear absorbent pads to catch urine. Change the pads often. This helps reduce discomfort. It may also reduce the risk of skin or bladder infections.    Follow-up care  Follow up with your healthcare provider, or as directed. It may take some to find the right treatment for your problem. But healthy lifestyle changes can be made right away. These include such things as exercising on a regular basis, eating a healthy diet, losing weight (if needed), and quitting smoking. Your treatment plan may include special therapies or medicines. Certain procedures or surgery may also  be options. Talk about any questions you have with your provider.   When to seek medical advice  Call the healthcare provider right away if any of these occur:  Fever of 100.4 F (38 C) or higher, or as directed by your provider  Bladder pain or fullness  Belly swelling  Nausea or vomiting  Back pain  Weakness, dizziness, or fainting  [employer] last reviewed this educational content on 1/1/2020 2000-2021 [employer]. All rights reserved. This information is not intended as a substitute for professional medical care. Always follow your healthcare professional's instructions.          Preventing Falls at Home  A person can fall for many reasons. Older adults may fall because reaction time slows down as we age. Your muscles and joints may get stiff, weak, or less flexible because of illness, medicines, or a physical condition.   Other health problems that make falls more likely include:   Arthritis  Dizziness or lightheadedness when you stand up (orthostatic hypotension)  History of a stroke  Dizziness  Anemia  Certain medicines taken for mental illness or to control blood pressure.  Problems with balance or gait  Bladder or urinary problems  History of falling  Changes in vision (vision impairment)  Changes in thinking skills and memory (cognitive impairment)  Injuries from a fall can include serious injuries such as broken bones, dislocated joints, internal bleeding and cuts. Injuries like these can limit your independence.   Prevention tips  To help prevent falls and fall-related injuries, follow the tips below.    Floors  To make floors safer:   Put nonskid pads under area rugs.  Remove small rugs.  Replace worn floor coverings.  Tack carpets firmly to each step on carpeted stairs. Put nonskid strips on the edges of uncarpeted stairs.  Keep floors and stairs free of clutter and cords.  Arrange furniture so there are clear pathways.  Clean up any spills right away.  Bathrooms    To make bathrooms  safer:   Install grab bars in the tub or shower.  Apply nonskid strips or put a nonskid rubber mat in the tub or shower.  Sit on a bath chair to bathe.  Use bathmats with nonskid backing.  Lighting  To improve visibility in your home:    Keep a flashlight in each room. Or put a lamp next to the bed within easy reach.  Put nightlights in the bedrooms, hallways, kitchen, and bathrooms.  Make sure all stairways have good lighting.  Take your time when going up and down stairs.  Put handrails on both sides of stairs and in walkways for more support. To prevent injury to your wrist or arm, don t use handrails to pull yourself up.  Install grab bars to pull yourself up.  Move or rearrange items that you use often. This will make them easier to find or reach.  Look at your home to find any safety hazards. Especially look at doorways, walkways, and the driveway. Remove or repair any safety problems that you find.  Other changes to make  Look around to find any safety hazards. Look closely at doorways, walkways, and the driveway. Remove or repair any safety problems that you find.  Wear shoes that fit well.  Take your time when going up and down stairs.  Put handrails on both sides of stairs and in walkways for more support. To prevent injury to your wrist or arm, don t use handrails to pull yourself up.  Install grab bars wherever needed to pull yourself up.  Arrange items that you use often. This will make them easier to find or reach.    [employer] last reviewed this educational content on 3/1/2020    3594-4667 [employer]. All rights reserved. This information is not intended as a substitute for professional medical care. Always follow your healthcare professional's instructions.    -Stop the hydrochlorothiazide for now  -Check your blood pressure daily- call if over 140/90 persistently  -Call if you have ongoing symptoms of feeling faint  -Referral to Dermatology for skin lesion check  -Please go to lab  today

## 2022-06-09 DIAGNOSIS — E03.9 ACQUIRED HYPOTHYROIDISM: ICD-10-CM

## 2022-06-09 DIAGNOSIS — Z13.220 LIPID SCREENING: Primary | ICD-10-CM

## 2022-06-09 RX ORDER — LEVOTHYROXINE SODIUM 100 UG/1
100 TABLET ORAL DAILY
Qty: 60 TABLET | Refills: 1 | Status: SHIPPED | OUTPATIENT
Start: 2022-06-09 | End: 2022-08-10

## 2022-06-17 ENCOUNTER — DOCUMENTATION ONLY (OUTPATIENT)
Dept: ANTICOAGULATION | Facility: CLINIC | Age: 77
End: 2022-06-17
Payer: MEDICARE

## 2022-06-17 DIAGNOSIS — Z86.718 PERSONAL HISTORY OF DVT (DEEP VEIN THROMBOSIS): Primary | ICD-10-CM

## 2022-06-17 LAB — INR HOME MONITORING: 2.7 (ref 2–3)

## 2022-06-17 NOTE — PROGRESS NOTES
ANTICOAGULATION  MANAGEMENT-Home Monitor Managed by Exception    Manasa French 76 year old female is on warfarin with therapeutic INR result. (Goal INR 2.0-3.0)    Recent labs: (last 7 days)     06/17/22  0000   INR 2.7         Previous INR was Therapeutic    Medication, diet, health changes since last INR:chart reviewed; none identified    Contacted within the last 12 weeks by phone on  5/20/22          AUBREY     Manasa was NOT contacted regarding therapeutic result today per home monitoring policy manage by exception agreement.   Current warfarin dose is to be continued:     Summary  As of 6/17/2022    Full warfarin instructions:  7.5 mg every Mon; 5 mg all other days   Next INR check:  7/1/2022           ?   Ember Amezcua RN  Anticoagulation Clinic  6/17/2022    _______________________________________________________________________     Anticoagulation Episode Summary     Current INR goal:  2.0-3.0   TTR:  82.3 % (1 y)   Target end date:  Indefinite   Send INR reminders to:  [facility]    Indications    Acute deep vein thrombosis (DVT) of axillary vein and internal jugular (H) (Resolved) [I82.A19]  Current use of long term anticoagulation (Resolved) [Z79.01]  Acute deep vein thrombosis (DVT) of axillary vein of right upper extremity (H) (Resolved) [I82.A11]  Personal history of DVT (deep vein thrombosis) [Z86.718]           Comments:  [employer] Home meter- Managed by exception         Anticoagulation Care Providers     Provider Role Specialty Phone number    Rema Braxton DO Referring Family Medicine [phone]    Jessica Anthony MD Referring Internal Medicine [phone]    Christopher Smith MD Responsible Hematology [phone]

## 2022-07-01 ENCOUNTER — DOCUMENTATION ONLY (OUTPATIENT)
Dept: ANTICOAGULATION | Facility: CLINIC | Age: 77
End: 2022-07-01

## 2022-07-01 DIAGNOSIS — Z86.718 PERSONAL HISTORY OF DVT (DEEP VEIN THROMBOSIS): Primary | ICD-10-CM

## 2022-07-01 LAB — INR HOME MONITORING: 2.3 (ref 2–3)

## 2022-07-01 NOTE — PROGRESS NOTES
ANTICOAGULATION  MANAGEMENT-Home Monitor Managed by Exception    Manasa DIEGO Manolo 76 year old female is on warfarin with therapeutic INR result. (Goal INR 2.0-3.0)    Recent labs: (last 7 days)     07/01/22  0000   INR 2.3         Previous INR was Therapeutic    Medication, diet, health changes since last INR:chart reviewed; none identified    Contacted within the last 12 weeks by phone on 05/20/2022      AUBREY     Manasa was NOT contacted regarding therapeutic result today per home monitoring policy manage by exception agreement.   Current warfarin dose is to be continued:     Summary  As of 7/1/2022    Full warfarin instructions:  7.5 mg every Mon; 5 mg all other days   Next INR check:  7/15/2022           ?   Shelby Kate RN  Anticoagulation Clinic  7/1/2022    _______________________________________________________________________     Anticoagulation Episode Summary     Current INR goal:  2.0-3.0   TTR:  86.2 % (1 y)   Target end date:  Indefinite   Send INR reminders to:  [facility]    Indications    Acute deep vein thrombosis (DVT) of axillary vein and internal jugular (H) (Resolved) [I82.A19]  Current use of long term anticoagulation (Resolved) [Z79.01]  Acute deep vein thrombosis (DVT) of axillary vein of right upper extremity (H) (Resolved) [I82.A11]  Personal history of DVT (deep vein thrombosis) [Z86.718]           Comments:  [employer] Home meter- Managed by exception         Anticoagulation Care Providers     Provider Role Specialty Phone number    Rema Braxton DO Referring Family Medicine [phone]    Jessica Anthony MD Referring Internal Medicine [phone]    Christopher Smith MD Responsible Hematology [phone]

## 2022-07-15 ENCOUNTER — DOCUMENTATION ONLY (OUTPATIENT)
Dept: ANTICOAGULATION | Facility: CLINIC | Age: 77
End: 2022-07-15

## 2022-07-15 DIAGNOSIS — Z86.718 PERSONAL HISTORY OF DVT (DEEP VEIN THROMBOSIS): Primary | ICD-10-CM

## 2022-07-15 LAB — INR HOME MONITORING: 2.3 (ref 2–3)

## 2022-07-15 NOTE — PROGRESS NOTES
ANTICOAGULATION  MANAGEMENT-Home Monitor Managed by Exception    Manasa DIEGO Manolo 76 year old female is on warfarin with therapeutic INR result. (Goal INR 2.0-3.0)    Recent labs: (last 7 days)     07/15/22  0000   INR 2.3         Previous INR was Therapeutic    Medication, diet, health changes since last INR:chart reviewed; none identified    Contacted within the last 12 weeks by phone on 5/20/2022      AUBREY     Manasa was NOT contacted regarding therapeutic result today per home monitoring policy manage by exception agreement.   Current warfarin dose is to be continued:     Summary  As of 7/15/2022    Full warfarin instructions:  7.5 mg every Mon; 5 mg all other days   Next INR check:  7/29/2022           ?   Aixa Steven RN  Anticoagulation Clinic  7/15/2022    _______________________________________________________________________     Anticoagulation Episode Summary     Current INR goal:  2.0-3.0   TTR:  88.2 % (1 y)   Target end date:  Indefinite   Send INR reminders to:  [facility]    Indications    Acute deep vein thrombosis (DVT) of axillary vein and internal jugular (H) (Resolved) [I82.A19]  Current use of long term anticoagulation (Resolved) [Z79.01]  Acute deep vein thrombosis (DVT) of axillary vein of right upper extremity (H) (Resolved) [I82.A11]  Personal history of DVT (deep vein thrombosis) [Z86.718]           Comments:  [employer] meter- Managed by exception         Anticoagulation Care Providers     Provider Role Specialty Phone number    Rema Braxton DO Referring Family Medicine [phone]    Jessica Anthony MD Referring Internal Medicine [phone]    Christopher Smith MD Responsible Hematology [phone]

## 2022-07-20 ENCOUNTER — LAB (OUTPATIENT)
Dept: LAB | Facility: CLINIC | Age: 77
End: 2022-07-20
Payer: MEDICARE

## 2022-07-20 DIAGNOSIS — E03.9 ACQUIRED HYPOTHYROIDISM: ICD-10-CM

## 2022-07-20 DIAGNOSIS — Z13.220 LIPID SCREENING: ICD-10-CM

## 2022-07-20 LAB
CHOLEST SERPL-MCNC: 202 MG/DL
FASTING STATUS PATIENT QL REPORTED: YES
HDLC SERPL-MCNC: 83 MG/DL
LDLC SERPL CALC-MCNC: 91 MG/DL
NONHDLC SERPL-MCNC: 119 MG/DL
TRIGL SERPL-MCNC: 138 MG/DL

## 2022-07-20 PROCEDURE — 80061 LIPID PANEL: CPT

## 2022-07-20 PROCEDURE — 84443 ASSAY THYROID STIM HORMONE: CPT

## 2022-07-20 PROCEDURE — 36415 COLL VENOUS BLD VENIPUNCTURE: CPT

## 2022-07-23 LAB — TSH SERPL DL<=0.005 MIU/L-ACNC: 3.48 MU/L (ref 0.4–4)

## 2022-08-01 ENCOUNTER — ANTICOAGULATION THERAPY VISIT (OUTPATIENT)
Dept: ANTICOAGULATION | Facility: CLINIC | Age: 77
End: 2022-08-01

## 2022-08-01 DIAGNOSIS — Z86.718 PERSONAL HISTORY OF DVT (DEEP VEIN THROMBOSIS): Primary | ICD-10-CM

## 2022-08-01 LAB — INR HOME MONITORING: 1.9 (ref 2–3)

## 2022-08-01 NOTE — PROGRESS NOTES
ANTICOAGULATION MANAGEMENT     Manasa French 76 year old female is on warfarin with subtherapeutic INR result. (Goal INR 2.0-3.0)    Recent labs: (last 7 days)     08/01/22  0000   INR 1.9*       ASSESSMENT       Source(s): Chart Review and Patient/Caregiver Call       Warfarin doses taken: Warfarin taken as instructed    Diet: No new diet changes identified    New illness, injury, or hospitalization: Yes: Fell yesterday and hit her right arm - hurts a little , no bruise yet. Denied  hitting head.    Medication/supplement changes: None noted    Signs or symptoms of bleeding or clotting: No    Previous INR: Therapeutic last 2(+) visits    Additional findings: None       PLAN     Recommended plan for no diet, medication or health factor changes affecting INR     Dosing Instructions: Continue your current warfarin dose - she is scheduled to take her big dose today ~with next INR in 1-2 weeks       Summary  As of 8/1/2022    Full warfarin instructions:  7.5 mg every Mon; 5 mg all other days   Next INR check:  8/15/2022             Telephone call with Rhonda who verbalizes understanding and agrees to plan    Patient to recheck with home meter    Education provided: Goal range and significance of current result, Importance of therapeutic range, Importance of following up at instructed interval and Importance of taking warfarin as instructed    Plan made per [facility] anticoagulation protocol    Kyleigh Molina RN  Anticoagulation Clinic  8/1/2022    _______________________________________________________________________     Anticoagulation Episode Summary     Current INR goal:  2.0-3.0   TTR:  86.9 % (1 y)   Target end date:  Indefinite   Send INR reminders to:  [facility]    Indications    Acute deep vein thrombosis (DVT) of axillary vein and internal jugular (H) (Resolved) [I82.A19]  Current use of long term anticoagulation (Resolved) [Z79.01]  Acute deep vein thrombosis (DVT) of axillary vein of right upper extremity  (H) (Resolved) [I82.A11]  Personal history of DVT (deep vein thrombosis) [Z86.718]           Comments:  [employer] meter- Managed by exception         Anticoagulation Care Providers     Provider Role Specialty Phone number    Rema Braxton DO Referring Family Medicine [phone]    Jessica Anthony MD Referring Internal Medicine [phone]    Christopher Smith MD Responsible Hematology [phone]

## 2022-08-10 ENCOUNTER — OFFICE VISIT (OUTPATIENT)
Dept: FAMILY MEDICINE | Facility: CLINIC | Age: 77
End: 2022-08-10
Payer: MEDICARE

## 2022-08-10 VITALS
OXYGEN SATURATION: 98 % | SYSTOLIC BLOOD PRESSURE: 150 MMHG | TEMPERATURE: 99.1 F | HEART RATE: 79 BPM | DIASTOLIC BLOOD PRESSURE: 74 MMHG

## 2022-08-10 DIAGNOSIS — R21 RASH: Primary | ICD-10-CM

## 2022-08-10 DIAGNOSIS — E03.9 ACQUIRED HYPOTHYROIDISM: ICD-10-CM

## 2022-08-10 PROCEDURE — 99214 OFFICE O/P EST MOD 30 MIN: CPT | Performed by: NURSE PRACTITIONER

## 2022-08-10 RX ORDER — VALACYCLOVIR HYDROCHLORIDE 1 G/1
1000 TABLET, FILM COATED ORAL 3 TIMES DAILY
Qty: 21 TABLET | Refills: 0 | Status: SHIPPED | OUTPATIENT
Start: 2022-08-10 | End: 2023-05-29

## 2022-08-10 RX ORDER — LEVOTHYROXINE SODIUM 100 UG/1
100 TABLET ORAL DAILY
Qty: 90 TABLET | Refills: 3 | Status: SHIPPED | OUTPATIENT
Start: 2022-08-10 | End: 2023-08-23

## 2022-08-10 RX ORDER — LEVOTHYROXINE SODIUM 100 UG/1
100 TABLET ORAL DAILY
Qty: 60 TABLET | Refills: 1 | Status: SHIPPED | OUTPATIENT
Start: 2022-08-10 | End: 2022-08-10

## 2022-08-10 NOTE — PATIENT INSTRUCTIONS
1:10 on Monday for recheck     Start the Valtrex right away. Let me know if anything new pops up

## 2022-08-10 NOTE — PROGRESS NOTES
Assessment & Plan     (R21) Rash  (primary encounter diagnosis)  Comment: Pt presents with rash to lumbar area. Rash is bilateral, red patches. Reports tenderness, pain with movement and burning sensation. Possible atypical shingles presentation.   Plan: valACYclovir (VALTREX) 1000 mg tablet        Follow up on Monday 8/15 at 1:30 PM    (E03.9) Acquired hypothyroidism  Comment: TSH 3.48 on 7/20/22, pt requesting refill of levothyroxine.   Plan: levothyroxine (SYNTHROID/LEVOTHROID) 100 MCG         tablet, DISCONTINUED: levothyroxine         (SYNTHROID/LEVOTHROID) 100 MCG tablet,         CANCELED: TSH, CANCELED: T4, free                SURESH Mehta CNP  [facility] ROCIO Ellis is a 77 year old accompanied by her spouse, presenting for the following health issues:  No chief complaint on file.      HPI       Rash started 8/7 on R side, is now bilateral low back. Painful to touch and pt reports pain in back like pulled muscle. Tenderness with twisting movement and touch. Pt reports burning sensation. Red patch rash bilateral low back. Denies recent illness, afebrile. Reports increased stress. Denies radicular pain, no bowel or bladder issues.     Review of Systems   Detailed as above       Objective    BP (!) 150/74 (BP Location: Left arm)   Pulse 79   Temp 99.1  F (37.3  C)   LMP  (LMP Unknown)   SpO2 98%   There is no height or weight on file to calculate BMI.  Physical Exam  Constitutional:       Appearance: Normal appearance.   Pulmonary:      Effort: Pulmonary effort is normal.   Musculoskeletal:      Lumbar back: Tenderness present.   Skin:     Findings: Rash (bilateral low back, macular red patch) present.   Neurological:      Mental Status: She is alert.   Psychiatric:         Mood and Affect: Mood normal.         Behavior: Behavior normal.                 .  .. I saw this patient in collaboration with Sergio Weiner      I was present with the APRN/PA student who  participated in the service and in the documentation of the services provided. I have verified the history and personally performed the physical exam and medical decision making, as documented by the student and edited by me.     SURESH Tobar, ROOSEVELT Weiner NP Student

## 2022-08-12 ENCOUNTER — ANTICOAGULATION THERAPY VISIT (OUTPATIENT)
Dept: ANTICOAGULATION | Facility: CLINIC | Age: 77
End: 2022-08-12

## 2022-08-12 DIAGNOSIS — Z86.718 PERSONAL HISTORY OF DVT (DEEP VEIN THROMBOSIS): Primary | ICD-10-CM

## 2022-08-12 LAB — INR HOME MONITORING: 2 (ref 2–3)

## 2022-08-12 NOTE — PROGRESS NOTES
ANTICOAGULATION MANAGEMENT     Manasa French 77 year old female is on warfarin with therapeutic INR result. (Goal INR 2.0-3.0)    Recent labs: (last 7 days)     08/12/22  0000   INR 2.0       ASSESSMENT       Source(s): Chart Review    Previous INR was Subtherapeutic    Medication, diet, health changes since last INR chart reviewed; none identified           PLAN     Recommended plan for no diet, medication or health factor changes affecting INR     Dosing Instructions: Continue your current warfarin dose with next INR in 2 weeks       Summary  As of 8/12/2022    Full warfarin instructions:  7.5 mg every Mon; 5 mg all other days   Next INR check:  8/26/2022             Detailed voice message left for Rhonda with dosing instructions and follow up date.     Patient to recheck with home meter    Education provided: Please call back if any changes to your diet, medications or how you've been taking warfarin    Plan made per [facility] anticoagulation protocol    Ember Amezcua RN  Anticoagulation Clinic  8/12/2022    _______________________________________________________________________     Anticoagulation Episode Summary     Current INR goal:  2.0-3.0   TTR:  84.0 % (1 y)   Target end date:  Indefinite   Send INR reminders to:  K[facility]    Indications    Acute deep vein thrombosis (DVT) of axillary vein and internal jugular (H) (Resolved) [I82.A19]  Current use of long term anticoagulation (Resolved) [Z79.01]  Acute deep vein thrombosis (DVT) of axillary vein of right upper extremity (H) (Resolved) [I82.A11]  Personal history of DVT (deep vein thrombosis) [Z86.718]           Comments:  [employer] meter- Managed by exception         Anticoagulation Care Providers     Provider Role Specialty Phone number    Rema Braxton DO Referring Family Medicine [phone]    Jessica Anthony MD Referring Internal Medicine [phone]    Christopher Smith MD Responsible Hematology [phone]

## 2022-08-15 ENCOUNTER — OFFICE VISIT (OUTPATIENT)
Dept: FAMILY MEDICINE | Facility: CLINIC | Age: 77
End: 2022-08-15
Payer: MEDICARE

## 2022-08-15 VITALS
BODY MASS INDEX: 25.66 KG/M2 | TEMPERATURE: 98.1 F | HEIGHT: 65 IN | HEART RATE: 81 BPM | DIASTOLIC BLOOD PRESSURE: 65 MMHG | SYSTOLIC BLOOD PRESSURE: 121 MMHG | WEIGHT: 154 LBS | RESPIRATION RATE: 16 BRPM | OXYGEN SATURATION: 94 %

## 2022-08-15 DIAGNOSIS — R21 RASH: Primary | ICD-10-CM

## 2022-08-15 PROCEDURE — 99213 OFFICE O/P EST LOW 20 MIN: CPT | Performed by: NURSE PRACTITIONER

## 2022-08-15 RX ORDER — CEPHALEXIN 500 MG/1
500 CAPSULE ORAL 3 TIMES DAILY
Qty: 21 CAPSULE | Refills: 0 | Status: SHIPPED | OUTPATIENT
Start: 2022-08-15 | End: 2022-08-22

## 2022-08-15 ASSESSMENT — PAIN SCALES - GENERAL: PAINLEVEL: MODERATE PAIN (5)

## 2022-08-15 ASSESSMENT — ASTHMA QUESTIONNAIRES: ACT_TOTALSCORE: 25

## 2022-08-15 NOTE — PROGRESS NOTES
"  Assessment & Plan   Problem List Items Addressed This Visit    None     Visit Diagnoses     Rash    -  Primary    Relevant Medications    cephALEXin (KEFLEX) 500 MG capsule           Pt presents to clinic for follow up of rash. Was seen 8/10 and started on valacyclovir course for potential shingles rash. Today rash is improving on L side, R side remains tender to light touch, erythematous and is now warm and firm. Pt will begin Keflex 50 mg TID for 7 days. Follow up appointment scheduled for Friday 8/19 if no improvement.       SURESH Mehta CNP  [facility] ROCIO Ellis is a 77 year old accompanied by her spouse, presenting for the following health issues:  No chief complaint on file.    Mian rash on Low back    History of Present Illness       Reason for visit:  See if symptoms are lessening  Symptom onset:  3-7 days ago  Symptoms include:  Pain and redness  Symptom intensity:  Moderate  Symptom progression:  Improving  Had these symptoms before:  No    She eats 2-3 servings of fruits and vegetables daily.She consumes 1 sweetened beverage(s) daily.She exercises with enough effort to increase her heart rate 30 to 60 minutes per day.  She exercises with enough effort to increase her heart rate 7 days per week.   She is taking medications regularly.     Pt reports rash to L side is somewhat improved but R side is   Reports rash feels like bad bruise   Had been using ice but was not helping   Is finishing up valacyclovir dose       Review of Systems   Detailed as above       Objective    /65 (BP Location: Right arm, Cuff Size: Adult Large)   Pulse 81   Temp 98.1  F (36.7  C) (Tympanic)   Resp 16   Ht 1.638 m (5' 4.5\")   Wt 69.9 kg (154 lb)   LMP  (LMP Unknown)   SpO2 94%   BMI 26.03 kg/m    Body mass index is 26.03 kg/m .  Physical Exam  Constitutional:       Appearance: Normal appearance.   Pulmonary:      Effort: Pulmonary effort is normal. "   Musculoskeletal:      Lumbar back: Tenderness present.   Skin:     Findings: Erythema (area of erythema warm and firm) and rash present. Rash is macular (bilateral lumbar red patches).   Neurological:      Mental Status: She is alert.   Psychiatric:         Mood and Affect: Mood normal.         Behavior: Behavior normal.                    .  ..   I saw this patient in collaboration with Jessica Weiner      I was present with the APRN/PA student who participated in the service and in the documentation of the services provided. I have verified the history and personally performed the physical exam and medical decision making, as documented by the student and edited by me.     Catrachita Wallace, APRN, CNP    Jessica Weiner NP Student

## 2022-08-19 ENCOUNTER — OFFICE VISIT (OUTPATIENT)
Dept: FAMILY MEDICINE | Facility: CLINIC | Age: 77
End: 2022-08-19
Payer: MEDICARE

## 2022-08-19 VITALS
OXYGEN SATURATION: 97 % | DIASTOLIC BLOOD PRESSURE: 70 MMHG | HEART RATE: 82 BPM | HEIGHT: 65 IN | SYSTOLIC BLOOD PRESSURE: 158 MMHG | BODY MASS INDEX: 25.49 KG/M2 | RESPIRATION RATE: 16 BRPM | WEIGHT: 153 LBS | TEMPERATURE: 98.1 F

## 2022-08-19 DIAGNOSIS — R21 RASH: Primary | ICD-10-CM

## 2022-08-19 PROCEDURE — 99212 OFFICE O/P EST SF 10 MIN: CPT | Performed by: NURSE PRACTITIONER

## 2022-08-19 ASSESSMENT — PAIN SCALES - GENERAL: PAINLEVEL: NO PAIN (0)

## 2022-08-19 NOTE — PROGRESS NOTES
"  Assessment & Plan   Problem List Items Addressed This Visit    None     Visit Diagnoses     Rash    -  Primary           Pt seen to follow up on bilateral lumbar rash. L side is improved, R side has gotten better but is  and mildly red and edematous. Pt will continue keflex and follow up next week for potential imaging if rash does not continue to improve. Wondering about potential abscess or underlying infection if this does not improve       Catrachita Wallace, SURESH CNP   [facility] ROCIO Ellis is a 77 year old accompanied by her spouse, presenting for the following health issues:  RECHECK      HPI     Follow up    Pt has been seen twice now for a bilateral lumbar rash. Initially trialed valtrex, then went to  Keflex   L side improved, R side is improving but , red and edematous   R side continues to feel like a bad bruise but pain is improved  Continuing antibiotics  Denies fevers      Review of Systems   Detailed as above       Objective    BP (!) 158/70   Pulse 82   Temp 98.1  F (36.7  C) (Oral)   Resp 16   Ht 1.638 m (5' 4.5\")   Wt 69.4 kg (153 lb)   LMP  (LMP Unknown)   SpO2 97%   BMI 25.86 kg/m    Body mass index is 25.86 kg/m .  Physical Exam  Constitutional:       Appearance: Normal appearance.   Pulmonary:      Effort: Pulmonary effort is normal.   Musculoskeletal:      Lumbar back: Tenderness present.   Skin:     Findings: Rash (macular, erythematous area on R lumbar) present.   Neurological:      Mental Status: She is alert.   Psychiatric:         Mood and Affect: Mood normal.         Behavior: Behavior normal.          .  ..   I saw this patient in collaboration with Jessica Weiner      I was present with the APRN/PA student who participated in the service and in the documentation of the services provided. I have verified the history and personally performed the physical exam and medical decision making, as documented by the student and " edited by me.     SURESH Tobar, ROOSEVELT Weiner NP Student

## 2022-08-26 ENCOUNTER — DOCUMENTATION ONLY (OUTPATIENT)
Dept: ANTICOAGULATION | Facility: CLINIC | Age: 77
End: 2022-08-26

## 2022-08-26 DIAGNOSIS — Z86.718 PERSONAL HISTORY OF DVT (DEEP VEIN THROMBOSIS): Primary | ICD-10-CM

## 2022-08-26 LAB — INR HOME MONITORING: 2.5 (ref 2–3)

## 2022-08-26 NOTE — PROGRESS NOTES
ANTICOAGULATION  MANAGEMENT-Home Monitor Managed by Exception    Manasa DIEGO French 77 year old female is on warfarin with therapeutic INR result. (Goal INR 2.0-3.0)    Recent labs: (last 7 days)     08/26/22  0000   INR 2.5         Previous INR was Therapeutic    Medication, diet, health changes since last INR:chart reviewed; none identified    Contacted within the last 12 weeks by phone on 08/01/2022      AUBREY     Manasa was NOT contacted regarding therapeutic result today per home monitoring policy manage by exception agreement.   Current warfarin dose is to be continued:     Summary  As of 8/26/2022    Full warfarin instructions:  7.5 mg every Mon; 5 mg all other days   Next INR check:  9/9/2022           ?   Shelby Kate RN  Anticoagulation Clinic  8/26/2022    _______________________________________________________________________     Anticoagulation Episode Summary     Current INR goal:  2.0-3.0   TTR:  84.0 % (1 y)   Target end date:  Indefinite   Send INR reminders to:  [facility]    Indications    Acute deep vein thrombosis (DVT) of axillary vein and internal jugular (H) (Resolved) [I82.A19]  Current use of long term anticoagulation (Resolved) [Z79.01]  Acute deep vein thrombosis (DVT) of axillary vein of right upper extremity (H) (Resolved) [I82.A11]  Personal history of DVT (deep vein thrombosis) [Z86.718]           Comments:  [employer] meter- Managed by exception         Anticoagulation Care Providers     Provider Role Specialty Phone number    Rema Braxton DO Referring Family Medicine [phone]    Jessica Anthony MD Referring Internal Medicine [phone]    Christopher Smith MD Responsible Hematology [phone]

## 2022-09-01 DIAGNOSIS — I82.A11 ACUTE DEEP VEIN THROMBOSIS (DVT) OF AXILLARY VEIN OF RIGHT UPPER EXTREMITY (H): ICD-10-CM

## 2022-09-01 RX ORDER — WARFARIN SODIUM 5 MG/1
TABLET ORAL
Qty: 110 TABLET | Refills: 3 | Status: SHIPPED | OUTPATIENT
Start: 2022-09-01 | End: 2023-11-13

## 2022-09-01 NOTE — PROGRESS NOTES
Rhonda was last seen 10/2021 with instructions to stay on Warfarin and RTC in 1 year. Refill request came through today for her Warfarin Rx. Message will be sent to  to reach out to Rhonda to schedule her annual return visit.    Nevaeh ALEXANDERN, RN, PHN   [facility]   Office: [phone]  Fax: [phone]

## 2022-09-10 LAB — INR HOME MONITORING: 2.2 (ref 2–3)

## 2022-09-12 ENCOUNTER — DOCUMENTATION ONLY (OUTPATIENT)
Dept: ANTICOAGULATION | Facility: CLINIC | Age: 77
End: 2022-09-12

## 2022-09-12 DIAGNOSIS — Z86.718 PERSONAL HISTORY OF DVT (DEEP VEIN THROMBOSIS): Primary | ICD-10-CM

## 2022-09-12 NOTE — PROGRESS NOTES
ANTICOAGULATION  MANAGEMENT-Home Monitor Managed by Exception    Manasa DIEGO French 77 year old female is on warfarin with therapeutic INR result. (Goal INR 2.0-3.0)    Recent labs: (last 7 days)     09/10/22  0000   INR 2.2         Previous INR was Therapeutic    Medication, diet, health changes since last INR:chart reviewed; none identified    Contacted within the last 12 weeks by phone on 8/1/22      AUBREY     Manasa was NOT contacted regarding therapeutic result today per home monitoring policy manage by exception agreement.   Current warfarin dose is to be continued:     Summary  As of 9/12/2022    Full warfarin instructions:  7.5 mg every Mon; 5 mg all other days   Next INR check:  9/26/2022           ?   Kyleigh Valdez RN  Anticoagulation Clinic  9/12/2022    _______________________________________________________________________     Anticoagulation Episode Summary     Current INR goal:  2.0-3.0   TTR:  83.9 % (1 y)   Target end date:  Indefinite   Send INR reminders to:  [facility]    Indications    Acute deep vein thrombosis (DVT) of axillary vein and internal jugular (H) (Resolved) [I82.A19]  Current use of long term anticoagulation (Resolved) [Z79.01]  Acute deep vein thrombosis (DVT) of axillary vein of right upper extremity (H) (Resolved) [I82.A11]  Personal history of DVT (deep vein thrombosis) [Z86.718]           Comments:  [employer] meter- Managed by exception         Anticoagulation Care Providers     Provider Role Specialty Phone number    Rema Braxton DO Referring Family Medicine [phone]    Jessica Anthony MD Referring Internal Medicine [phone]    Christopher Smith MD Responsible Hematology [phone]

## 2022-09-23 ENCOUNTER — DOCUMENTATION ONLY (OUTPATIENT)
Dept: ANTICOAGULATION | Facility: CLINIC | Age: 77
End: 2022-09-23

## 2022-09-23 DIAGNOSIS — Z86.718 PERSONAL HISTORY OF DVT (DEEP VEIN THROMBOSIS): Primary | ICD-10-CM

## 2022-09-23 LAB — INR HOME MONITORING: 2.4 (ref 2–3)

## 2022-09-23 NOTE — PROGRESS NOTES
ANTICOAGULATION  MANAGEMENT-Home Monitor Managed by Exception    Manasa DIEGO Manolo 77 year old female is on warfarin with therapeutic INR result. (Goal INR 2.0-3.0)    Recent labs: (last 7 days)     09/23/22  0000   INR 2.4         Previous INR was Therapeutic    Medication, diet, health changes since last INR:chart reviewed; none identified    Contacted within the last 12 weeks by phone on 9/12/22 ([facility])      AUBREY     Manasa was NOT contacted regarding therapeutic result today per home monitoring policy manage by exception agreement.   Current warfarin dose is to be continued:     Summary  As of 9/23/2022    Full warfarin instructions:  7.5 mg every Mon; 5 mg all other days   Next INR check:  10/7/2022           ?   Irina Mcghee RN  Anticoagulation Clinic  9/23/2022    _______________________________________________________________________     Anticoagulation Episode Summary     Current INR goal:  2.0-3.0   TTR:  84.0 % (1 y)   Target end date:  Indefinite   Send INR reminders to:  [facility]    Indications    Acute deep vein thrombosis (DVT) of axillary vein and internal jugular (H) (Resolved) [I82.A19]  Current use of long term anticoagulation (Resolved) [Z79.01]  Acute deep vein thrombosis (DVT) of axillary vein of right upper extremity (H) (Resolved) [I82.A11]  Personal history of DVT (deep vein thrombosis) [Z86.718]           Comments:  [employer] meter- Managed by exception         Anticoagulation Care Providers     Provider Role Specialty Phone number    Rema Braxton DO Referring Family Medicine [phone]    Jessica Anthony MD Referring Internal Medicine [phone]    Christopher Smith MD Responsible Hematology [phone]

## 2022-10-07 ENCOUNTER — DOCUMENTATION ONLY (OUTPATIENT)
Dept: ANTICOAGULATION | Facility: CLINIC | Age: 77
End: 2022-10-07

## 2022-10-07 DIAGNOSIS — Z86.718 PERSONAL HISTORY OF DVT (DEEP VEIN THROMBOSIS): Primary | ICD-10-CM

## 2022-10-07 LAB — INR HOME MONITORING: 2.1 (ref 2–3)

## 2022-10-07 NOTE — PROGRESS NOTES
ANTICOAGULATION  MANAGEMENT-Home Monitor Managed by Exception    Manasa DIEGO French 77 year old female is on warfarin with therapeutic INR result. (Goal INR 2.0-3.0)    Recent labs: (last 7 days)     10/07/22  0000   INR 2.1         Previous INR was Therapeutic    Medication, diet, health changes since last INR:chart reviewed; none identified    Contacted within the last 12 weeks by phone on 09/12/2022      AUBREY     Manasa was NOT contacted regarding therapeutic result today per home monitoring policy manage by exception agreement.   Current warfarin dose is to be continued:     Summary  As of 10/7/2022    Full warfarin instructions:  7.5 mg every Mon; 5 mg all other days   Next INR check:  10/21/2022           ?   Wolf Hoang RN  Anticoagulation Clinic  10/7/2022    _______________________________________________________________________     Anticoagulation Episode Summary     Current INR goal:  2.0-3.0   TTR:  84.0 % (1 y)   Target end date:  Indefinite   Send INR reminders to:  [employer]    Indications    Acute deep vein thrombosis (DVT) of axillary vein and internal jugular (H) (Resolved) [I82.A19]  Current use of long term anticoagulation (Resolved) [Z79.01]  Acute deep vein thrombosis (DVT) of axillary vein of right upper extremity (H) (Resolved) [I82.A11]  Personal history of DVT (deep vein thrombosis) [Z86.718]           Comments:  [employer] meter- Managed by exception         Anticoagulation Care Providers     Provider Role Specialty Phone number    Rema Braxton DO Referring Family Medicine [phone]    Jessica Anthony MD Referring Internal Medicine [phone]    Christopher Smith MD Responsible Hematology [phone]

## 2022-10-16 ENCOUNTER — HEALTH MAINTENANCE LETTER (OUTPATIENT)
Age: 77
End: 2022-10-16

## 2022-10-19 ENCOUNTER — VIRTUAL VISIT (OUTPATIENT)
Dept: HEMATOLOGY | Facility: CLINIC | Age: 77
End: 2022-10-19
Attending: INTERNAL MEDICINE
Payer: MEDICARE

## 2022-10-19 VITALS — WEIGHT: 149 LBS | BODY MASS INDEX: 25.18 KG/M2

## 2022-10-19 DIAGNOSIS — I82.A11 ACUTE DEEP VEIN THROMBOSIS (DVT) OF AXILLARY VEIN OF RIGHT UPPER EXTREMITY (H): Primary | ICD-10-CM

## 2022-10-19 PROCEDURE — 99441 PR PHYSICIAN TELEPHONE EVALUATION 5-10 MIN: CPT | Mod: 95 | Performed by: INTERNAL MEDICINE

## 2022-10-19 NOTE — PROGRESS NOTES
[facility]  [address] [place] [id number]  Phone: [phone], Fax: [phone]      Outpatient Visit Note:    Patient: Manasa French  MRN: [id number]  : 1945  JENNIFER: Oct 19, 2022    Reason for Visit:  Annual follow up for indefinite anticoagluation    Assessment:  In summary, Manasa French is a 77 year old woman with history of an unprovoked right upper extremity deep vein thrombosis, having excluded the presence of thoracic outlet syndrome.  Given that this was an unprovoked DVT, her risk of recurrence is sufficiently high to recommend indefinite anticoagulation for secondary prophylaxis.     She has done well on warfarin with TTR ov >80% which is outstanding.  Bleed risk appears very low currently.  We will continue anticoagulation with warfarin, goal INR 2-3.    Plan:  1.  At this point we will continue warfarin indefinitely given that we think her risk for thrombosis is likely high given the unprovoked nature of the thrombus.   2. Continue warfarin, goal INR 2-3  3. Continue vitamin K supplementation for INR stability  4. She will call with questions or concerns and plan to follow up in 1 year.     The patient is given our center's contact information and is instructed to call if she should have any further questions or concerns.  Otherwise, we will plan on seeing her back in 1 year.       Christopher Smith MD   of Medicine  [employer] [facility] [employer]      Total telephone time: 10 minutes  20 minutes spent on the date of the encounter doing chart review, review of test results, interpretation of tests, patient visit and documentation        --------------------------------  Forward History:  2019 right arm swelling, found to have RIJ, Subclav, Axillary and brachial vein, unprovoked, started on apixaban but was expensive and changed to warfarin  May 2019 venous ultrasound performed for thoracic outlet syndrome and  there was no obstructive physiology appreciated.    Interval History: Manasa French is a 77 year old female with a history of right upper extremity DVT who returns for routine follow up.  She reports no bleeding issues since I saw her last year.  Tolerating anticoagulation well.  No residual or new arm swelling or pain.  She is comfortable with continuing anticoagulation.  Her only question is whether Fossamax could have caused this clot since it was started around the time of the DVT.    Medications were reviewed.  Medical history is essentially unchanged.    Objective:  Vitals: B/P: 144/68, T: 97.6, P: 82, R: 12, Wt: 149 lbs 0 oz  Exam:   Gen: Appears well, no distress  HEENT: no scleral icterus or hemorrhage, no wet purpura, no lymphadenopathy  Ext: no edema of the right upper extremity    Labs:  Reviewed INRs from last year    Imaging:  Reviewed her May 2020 thoracic outlet syndrome study which is negative.

## 2022-10-21 ENCOUNTER — DOCUMENTATION ONLY (OUTPATIENT)
Dept: ANTICOAGULATION | Facility: CLINIC | Age: 77
End: 2022-10-21

## 2022-10-21 DIAGNOSIS — Z86.718 PERSONAL HISTORY OF DVT (DEEP VEIN THROMBOSIS): Primary | ICD-10-CM

## 2022-10-21 LAB — INR HOME MONITORING: 2.1 (ref 2–3)

## 2022-10-21 NOTE — PROGRESS NOTES
ANTICOAGULATION  MANAGEMENT-Home Monitor Managed by Exception    Manasa DIEGO Manolo 77 year old female is on warfarin with therapeutic INR result. (Goal INR 2.0-3.0)    Recent labs: (last 7 days)     10/21/22  0000   INR 2.1         Previous INR was Therapeutic    Medication, diet, health changes since last INR:chart reviewed; none identified    Contacted within the last 12 weeks by phone on 9/12/22      AUBREY     Manasa was NOT contacted regarding therapeutic result today per home monitoring policy manage by exception agreement.   Current warfarin dose is to be continued:     Summary  As of 10/21/2022    Full warfarin instructions:  7.5 mg every Mon; 5 mg all other days; Starting 10/21/2022   Next INR check:  11/4/2022           ?   Manju Mueller RN  Anticoagulation Clinic  10/21/2022    _______________________________________________________________________     Anticoagulation Episode Summary     Current INR goal:  2.0-3.0   TTR:  84.0 % (1 y)   Target end date:  Indefinite   Send INR reminders to:  [facility]    Indications    Acute deep vein thrombosis (DVT) of axillary vein and internal jugular (H) (Resolved) [I82.A19]  Current use of long term anticoagulation (Resolved) [Z79.01]  Acute deep vein thrombosis (DVT) of axillary vein of right upper extremity (H) (Resolved) [I82.A11]  Personal history of DVT (deep vein thrombosis) [Z86.718]           Comments:  [employer] meter- Managed by exception         Anticoagulation Care Providers     Provider Role Specialty Phone number    Rema Braxton DO Referring Family Medicine [phone]    Jessica Anthony MD Referring Internal Medicine [phone]    Christopher Smith MD Responsible Hematology [phone]

## 2022-11-04 ENCOUNTER — DOCUMENTATION ONLY (OUTPATIENT)
Dept: ANTICOAGULATION | Facility: CLINIC | Age: 77
End: 2022-11-04

## 2022-11-04 DIAGNOSIS — Z86.718 PERSONAL HISTORY OF DVT (DEEP VEIN THROMBOSIS): Primary | ICD-10-CM

## 2022-11-04 LAB — INR HOME MONITORING: 2.8 (ref 2–3)

## 2022-11-04 NOTE — PROGRESS NOTES
ANTICOAGULATION  MANAGEMENT-Home Monitor Managed by Exception    Manasa DIEGO Manolo 77 year old female is on warfarin with therapeutic INR result. (Goal INR 2.0-3.0)    Recent labs: (last 7 days)     11/04/22  0000   INR 2.8         Previous INR was Therapeutic    Medication, diet, health changes since last INR:chart reviewed; none identified    Contacted within the last 12 weeks by phone on 09/12/2022      AUBREY     Manasa was NOT contacted regarding therapeutic result today per home monitoring policy manage by exception agreement.   Current warfarin dose is to be continued:     Summary  As of 11/4/2022    Full warfarin instructions:  7.5 mg every Mon; 5 mg all other days; Starting 11/4/2022   Next INR check:  11/18/2022           ?   Shelby Kate RN  Anticoagulation Clinic  11/4/2022    _______________________________________________________________________     Anticoagulation Episode Summary     Current INR goal:  2.0-3.0   TTR:  84.0 % (1 y)   Target end date:  Indefinite   Send INR reminders to:  [facility]    Indications    Acute deep vein thrombosis (DVT) of axillary vein and internal jugular (H) (Resolved) [I82.A19]  Current use of long term anticoagulation (Resolved) [Z79.01]  Acute deep vein thrombosis (DVT) of axillary vein of right upper extremity (H) (Resolved) [I82.A11]  Personal history of DVT (deep vein thrombosis) [Z86.718]           Comments:  [employer] meter- Managed by exception         Anticoagulation Care Providers     Provider Role Specialty Phone number    Rema Braxton DO Referring Family Medicine [phone]    Jessica Anthony MD Referring Internal Medicine [phone]    Christopher Smith MD Responsible Hematology [phone]

## 2022-11-18 ENCOUNTER — DOCUMENTATION ONLY (OUTPATIENT)
Dept: ANTICOAGULATION | Facility: CLINIC | Age: 77
End: 2022-11-18

## 2022-11-18 DIAGNOSIS — Z86.718 PERSONAL HISTORY OF DVT (DEEP VEIN THROMBOSIS): Primary | ICD-10-CM

## 2022-11-18 LAB — INR HOME MONITORING: 2.6 (ref 2–3)

## 2022-11-18 NOTE — PROGRESS NOTES
ANTICOAGULATION  MANAGEMENT-Home Monitor Managed by Exception    Manasa DIEGO Manolo 77 year old female is on warfarin with therapeutic INR result. (Goal INR 2.0-3.0)    Recent labs: (last 7 days)     11/18/22  0000   INR 2.6         Previous INR was Therapeutic    Medication, diet, health changes since last INR:chart reviewed; none identified    Contacted within the last 12 weeks by phone on 9/12/22      AUBREY     Manasa was NOT contacted regarding therapeutic result today per home monitoring policy manage by exception agreement.   Current warfarin dose is to be continued:     Summary  As of 11/18/2022    Full warfarin instructions:  7.5 mg every Mon; 5 mg all other days; Starting 11/18/2022   Next INR check:  12/2/2022           ?   Manju Mueller RN  Anticoagulation Clinic  11/18/2022    _______________________________________________________________________     Anticoagulation Episode Summary     Current INR goal:  2.0-3.0   TTR:  85.3 % (1 y)   Target end date:  Indefinite   Send INR reminders to:  [facility]    Indications    Acute deep vein thrombosis (DVT) of axillary vein and internal jugular (H) (Resolved) [I82.A19]  Current use of long term anticoagulation (Resolved) [Z79.01]  Acute deep vein thrombosis (DVT) of axillary vein of right upper extremity (H) (Resolved) [I82.A11]  Personal history of DVT (deep vein thrombosis) [Z86.718]           Comments:  [employer] meter- Managed by exception         Anticoagulation Care Providers     Provider Role Specialty Phone number    Rema Braxton DO Referring Family Medicine [phone]    Jessica Anthony MD Referring Internal Medicine [phone]    Christopher Smith MD Responsible Hematology [phone]

## 2022-11-23 ENCOUNTER — DOCUMENTATION ONLY (OUTPATIENT)
Dept: ANTICOAGULATION | Facility: CLINIC | Age: 77
End: 2022-11-23

## 2022-11-23 DIAGNOSIS — Z86.718 PERSONAL HISTORY OF DVT (DEEP VEIN THROMBOSIS): Primary | ICD-10-CM

## 2022-11-23 NOTE — PROGRESS NOTES
ANTICOAGULATION CLINIC REFERRAL RENEWAL REQUEST       An annual renewal order is required for all patients referred to [facility].?  Please review and sign the pended referral order for Manasa French.       ANTICOAGULATION SUMMARY      Warfarin indication(s)   DVT    Mechanical heart valve present?  NO       Current goal range   INR: 2.0-3.0     Goal appropriate for indication? Goal INR 2-3, standard for indication(s) above     Time in Therapeutic Range (TTR)  (Goal > 60%) 85.3%       Office visit with referring provider's group within last year Yes on 10/19/22 with Sarah Amezcua RN  [facility]

## 2022-12-02 ENCOUNTER — DOCUMENTATION ONLY (OUTPATIENT)
Dept: ANTICOAGULATION | Facility: CLINIC | Age: 77
End: 2022-12-02

## 2022-12-02 DIAGNOSIS — Z86.718 PERSONAL HISTORY OF DVT (DEEP VEIN THROMBOSIS): Primary | ICD-10-CM

## 2022-12-02 LAB — INR HOME MONITORING: 2.7 (ref 2–3)

## 2022-12-02 NOTE — PROGRESS NOTES
ANTICOAGULATION  MANAGEMENT-Home Monitor Managed by Exception    Manasa DIEGO Manolo 77 year old female is on warfarin with therapeutic INR result. (Goal INR 2.0-3.0)    Recent labs: (last 7 days)     12/02/22  0000   INR 2.7         Previous INR was Therapeutic    Medication, diet, health changes since last INR:chart reviewed; none identified    Contacted within the last 12 weeks by phone on 9/12/22           AUBREY     Manasa was NOT contacted regarding therapeutic result today per home monitoring policy manage by exception agreement.   Current warfarin dose is to be continued:     Summary  As of 12/2/2022    Full warfarin instructions:  7.5 mg every Mon; 5 mg all other days; Starting 12/2/2022   Next INR check:  12/16/2022           ?   Ember Amezcua RN  Anticoagulation Clinic  12/2/2022    _______________________________________________________________________     Anticoagulation Episode Summary     Current INR goal:  2.0-3.0   TTR:  90.7 % (12 mo)   Target end date:  Indefinite   Send INR reminders to:  [facility]    Indications    Acute deep vein thrombosis (DVT) of axillary vein and internal jugular (H) (Resolved) [I82.A19]  Current use of long term anticoagulation (Resolved) [Z79.01]  Acute deep vein thrombosis (DVT) of axillary vein of right upper extremity (H) (Resolved) [I82.A11]  Personal history of DVT (deep vein thrombosis) [Z86.718]           Comments:  [employer] [facility]- Managed by exception         Anticoagulation Care Providers     Provider Role Specialty Phone number    Rema Braxton DO Referring Family Medicine [phone]    Jessica Anthony MD Referring Internal Medicine [phone]    Christopher Smith MD Responsible Hematology [phone]

## 2022-12-09 ENCOUNTER — DOCUMENTATION ONLY (OUTPATIENT)
Dept: ANTICOAGULATION | Facility: CLINIC | Age: 77
End: 2022-12-09

## 2022-12-09 DIAGNOSIS — Z86.718 PERSONAL HISTORY OF DVT (DEEP VEIN THROMBOSIS): Primary | ICD-10-CM

## 2022-12-09 LAB — INR HOME MONITORING: 2.5 (ref 2–3)

## 2022-12-09 NOTE — PROGRESS NOTES
ANTICOAGULATION  MANAGEMENT-Home Monitor Managed by Exception    Manasa DIEGO Manolo 77 year old female is on warfarin with therapeutic INR result. (Goal INR 2.0-3.0)    Recent labs: (last 7 days)     12/09/22  0000   INR 2.5         Previous INR was Therapeutic    Medication, diet, health changes since last INR:chart reviewed; none identified    Contacted within the last 12 weeks by phone on 9/12/22      AUBREY     Manasa was NOT contacted regarding therapeutic result today per home monitoring policy manage by exception agreement.   Current warfarin dose is to be continued:     Summary  As of 12/9/2022    Full warfarin instructions:  7.5 mg every Mon; 5 mg all other days; Starting 12/9/2022   Next INR check:  12/23/2022           ?   Ember Amezcua RN  Anticoagulation Clinic  12/9/2022    _______________________________________________________________________     Anticoagulation Episode Summary     Current INR goal:  2.0-3.0   TTR:  90.9 % (1 y)   Target end date:  Indefinite   Send INR reminders to:  [facility]    Indications    Acute deep vein thrombosis (DVT) of axillary vein and internal jugular (H) (Resolved) [I82.A19]  Current use of long term anticoagulation (Resolved) [Z79.01]  Acute deep vein thrombosis (DVT) of axillary vein of right upper extremity (H) (Resolved) [I82.A11]  Personal history of DVT (deep vein thrombosis) [Z86.718]           Comments:  [employer] meter- Managed by exception         Anticoagulation Care Providers     Provider Role Specialty Phone number    Rema Braxton DO Referring Family Medicine [phone]    Jessica Anthony MD Referring Internal Medicine [phone]    Christopher Smith MD Responsible Hematology [phone]

## 2022-12-23 ENCOUNTER — ANTICOAGULATION THERAPY VISIT (OUTPATIENT)
Dept: ANTICOAGULATION | Facility: CLINIC | Age: 77
End: 2022-12-23

## 2022-12-23 DIAGNOSIS — Z86.718 PERSONAL HISTORY OF DVT (DEEP VEIN THROMBOSIS): Primary | ICD-10-CM

## 2022-12-23 LAB — INR HOME MONITORING: 2.7 (ref 2–3)

## 2022-12-23 NOTE — PROGRESS NOTES
ANTICOAGULATION MANAGEMENT     Manasa French 77 year old female is on warfarin with therapeutic INR result. (Goal INR 2.0-3.0)    Recent labs: (last 7 days)     12/23/22  0000   INR 2.7       ASSESSMENT       Source(s): Chart Review and Patient/Caregiver Call       Warfarin doses taken: Warfarin taken as instructed    Diet: No new diet changes identified    New illness, injury, or hospitalization: No    Medication/supplement changes: None noted    Signs or symptoms of bleeding or clotting: No    Previous INR: Therapeutic last 2(+) visits    Additional findings: Outreach to Rhonda today as it has been 12 weeks, she is MBE with her home INR monitor       PLAN     Recommended plan for no diet, medication or health factor changes affecting INR     Dosing Instructions: Continue your current warfarin dose with next INR in 2 weeks       Summary  As of 12/23/2022    Full warfarin instructions:  7.5 mg every Mon; 5 mg all other days   Next INR check:  1/6/2023             Telephone call with Rhonda who verbalizes understanding and agrees to plan    Patient to recheck with home meter    Education provided:     Goal range and lab monitoring: goal range and significance of current result    Importance of notifying anticoagulation clinic for: changes in medications; a sooner lab recheck maybe needed, diarrhea, nausea/vomiting, reduced intake, cold/flu, and/or infections; a sooner lab recheck maybe needed and upcoming surgeries and procedures 2 weeks in advance    Resume manage by exception with home monitor. Continue to submit INR results to home monitor company.You will only be called when your result is out of range. Please call and notify [facility] if new medication started, dose missed, signs or symptoms of bleeding or clotting, or a surgery/procedure is scheduled.    Contact [phone]  with any changes, questions or concerns.     Plan made per [facility] anticoagulation protocol    Ayala Montemayor RN  Anticoagulation  Clinic  12/23/2022    _______________________________________________________________________     Anticoagulation Episode Summary     Current INR goal:  2.0-3.0   TTR:  90.9 % (1 y)   Target end date:  Indefinite   Send INR reminders to:  [facility]    Indications    Acute deep vein thrombosis (DVT) of axillary vein and internal jugular (H) (Resolved) [I82.A19]  Current use of long term anticoagulation (Resolved) [Z79.01]  Acute deep vein thrombosis (DVT) of axillary vein of right upper extremity (H) (Resolved) [I82.A11]  Personal history of DVT (deep vein thrombosis) [Z86.718]           Comments:  [employer] meter- Managed by exception         Anticoagulation Care Providers     Provider Role Specialty Phone number    Rema Braxton DO Referring Family Medicine [phone]    Jessica Anthony MD Referring Internal Medicine [phone]    Christopher Smith MD Responsible Hematology [phone]

## 2023-01-13 ENCOUNTER — ANTICOAGULATION THERAPY VISIT (OUTPATIENT)
Dept: ANTICOAGULATION | Facility: CLINIC | Age: 78
End: 2023-01-13

## 2023-01-13 DIAGNOSIS — Z86.718 PERSONAL HISTORY OF DVT (DEEP VEIN THROMBOSIS): Primary | ICD-10-CM

## 2023-01-13 LAB — INR HOME MONITORING: 3.2 (ref 2–3)

## 2023-01-13 NOTE — PROGRESS NOTES
ANTICOAGULATION MANAGEMENT     Manasa French 77 year old female is on warfarin with supratherapeutic INR result. (Goal INR 2.0-3.0)    Recent labs: (last 7 days)     01/13/23  0000   INR 3.2*       ASSESSMENT       Source(s): Chart Review and Patient/Caregiver Call       Warfarin doses taken: Warfarin taken as instructed    Diet: No new diet changes identified    New illness, injury, or hospitalization: No    Medication/supplement changes: None noted    Signs or symptoms of bleeding or clotting: No    Previous INR: Therapeutic last 2(+) visits    Additional findings: None       PLAN     Recommended plan for no diet, medication or health factor changes affecting INR     Dosing Instructions: partial hold then continue your current warfarin dose with next INR in 2 weeks       Summary  As of 1/13/2023    Full warfarin instructions:  1/13: 2.5 mg; Otherwise 7.5 mg every Mon; 5 mg all other days   Next INR check:  1/27/2023             Telephone call with Rhonda who verbalizes understanding and agrees to plan    Patient to recheck with home meter    Education provided:     Please call back if any changes to your diet, medications or how you've been taking warfarin    Plan made per [facility] anticoagulation protocol    Sofia Sanabria, RN  Anticoagulation Clinic  1/13/2023    _______________________________________________________________________     Anticoagulation Episode Summary     Current INR goal:  2.0-3.0   TTR:  88.6 % (1 y)   Target end date:  Indefinite   Send INR reminders to:  [facility]    Indications    Acute deep vein thrombosis (DVT) of axillary vein and internal jugular (H) (Resolved) [I82.A19]  Current use of long term anticoagulation (Resolved) [Z79.01]  Acute deep vein thrombosis (DVT) of axillary vein of right upper extremity (H) (Resolved) [I82.A11]  Personal history of DVT (deep vein thrombosis) [Z86.718]           Comments:  [employer] meter- Managed by exception          Anticoagulation Care Providers     Provider Role Specialty Phone number    Rema Braxton DO Referring Family Medicine [phone]    Jessica Anthony MD Referring Internal Medicine [phone]    Christopher Smith MD Responsible Hematology [phone]

## 2023-01-13 NOTE — PROGRESS NOTES
ANTICOAGULATION MANAGEMENT     Manasa French 77 year old female is on warfarin with supratherapeutic INR result. (Goal INR 2.0-3.0)    Recent labs: (last 7 days)     01/13/23  0000   INR 3.2*       ASSESSMENT       Source(s): Chart Review    Previous INR was Therapeutic last 2(+) visits    Medication, diet, health changes since last INR chart reviewed; none identified           PLAN     Unable to reach pt today.    VM left to call [facility] back. Will try again later.     Follow up required to confirm warfarin dose taken and assess for changes    Sofia Sanabria, RN  Anticoagulation Clinic  1/13/2023

## 2023-01-17 ENCOUNTER — OFFICE VISIT (OUTPATIENT)
Dept: OPHTHALMOLOGY | Facility: CLINIC | Age: 78
End: 2023-01-17
Attending: OPHTHALMOLOGY
Payer: MEDICARE

## 2023-01-17 DIAGNOSIS — Z96.1 PSEUDOPHAKIA OF BOTH EYES: ICD-10-CM

## 2023-01-17 DIAGNOSIS — H40.1132 PRIMARY OPEN ANGLE GLAUCOMA OF BOTH EYES, MODERATE STAGE: Primary | ICD-10-CM

## 2023-01-17 DIAGNOSIS — H35.3132 INTERMEDIATE STAGE NONEXUDATIVE AGE-RELATED MACULAR DEGENERATION OF BOTH EYES: ICD-10-CM

## 2023-01-17 PROCEDURE — 99213 OFFICE O/P EST LOW 20 MIN: CPT | Performed by: OPHTHALMOLOGY

## 2023-01-17 PROCEDURE — 92133 CPTRZD OPH DX IMG PST SGM ON: CPT | Performed by: OPHTHALMOLOGY

## 2023-01-17 PROCEDURE — G0463 HOSPITAL OUTPT CLINIC VISIT: HCPCS | Mod: 25

## 2023-01-17 RX ORDER — LATANOPROST 50 UG/ML
1 SOLUTION/ DROPS OPHTHALMIC AT BEDTIME
Qty: 7.5 ML | Refills: 1 | Status: SHIPPED | OUTPATIENT
Start: 2023-01-17 | End: 2023-07-18

## 2023-01-17 RX ORDER — TIMOLOL MALEATE 5 MG/ML
1 SOLUTION/ DROPS OPHTHALMIC EVERY MORNING
Qty: 15 ML | Refills: 1 | Status: SHIPPED | OUTPATIENT
Start: 2023-01-17 | End: 2023-07-18

## 2023-01-17 ASSESSMENT — VISUAL ACUITY
OS_PH_CC: 20/40
CORRECTION_TYPE: GLASSES
OS_CC: 20/50
METHOD: SNELLEN - LINEAR
OS_PH_CC+: -1
OD_CC: 20/40
OD_CC+: +2

## 2023-01-17 ASSESSMENT — CONF VISUAL FIELD
METHOD: COUNTING FINGERS
OD_SUPERIOR_NASAL_RESTRICTION: 0
OD_INFERIOR_TEMPORAL_RESTRICTION: 0
OD_INFERIOR_NASAL_RESTRICTION: 0
OS_SUPERIOR_TEMPORAL_RESTRICTION: 3
OD_SUPERIOR_TEMPORAL_RESTRICTION: 0
OD_NORMAL: 1

## 2023-01-17 ASSESSMENT — EXTERNAL EXAM - LEFT EYE: OS_EXAM: NORMAL

## 2023-01-17 ASSESSMENT — SLIT LAMP EXAM - LIDS
COMMENTS: NORMAL
COMMENTS: NORMAL

## 2023-01-17 ASSESSMENT — REFRACTION_WEARINGRX
OS_CYLINDER: +1.00
OD_VBASE: UP
SPECS_TYPE: SVL DIST
OS_SPHERE: -1.50
OD_HPRISM: 9.0 BO
OD_SPHERE: -1.00
OD_VPRISM: 2.0
OS_VPRISM: 2.0
OS_VBASE: DOWN
OS_AXIS: 005
OD_CYLINDER: +1.25
OD_AXIS: 005

## 2023-01-17 ASSESSMENT — TONOMETRY
OS_IOP_MMHG: 15
IOP_METHOD: APPLANATION
OD_IOP_MMHG: 16

## 2023-01-17 ASSESSMENT — EXTERNAL EXAM - RIGHT EYE: OD_EXAM: NORMAL

## 2023-01-17 NOTE — PROGRESS NOTES
Chief Complaint(s) and History of Present Illness(es)     Follow Up            Laterality: both eyes    Course: stable    Associated symptoms: dryness.  Negative for eye pain, flashes and   floaters    Treatments tried: eye drops, artificial tears and ointment    Pain scale: 0/10    Comments: Primary open angle glaucoma of both eyes          Comments    She states that her vision has seemed stable in both eyes since her last   eye exam.  She does often feel like things are too bright.     She is using:  Latanoprost at night in both eyes   Timolol daily both eyes     Cristina Diop, COT 2:15 PM  January 17, 2023                  Review of systems for the eyes was negative other than the pertinent positives/negatives listed in the HPI.      Assessment & Plan      Manasa French is a 77 year old female with the following diagnoses:   1. Primary open angle glaucoma of both eyes, moderate stage    2. Intermediate stage nonexudative age-related macular degeneration of both eyes    3. Pseudophakia of both eyes         Here for intraocular pressure recheck.  Had to cancel Dec appt due to illness  S/P repeat selected laser trabeculoplasty (SLT) right eye 4/7/22  Previous right eye 9/16/2020 (previously done paul/ Sami 2/2018)  Intraocular pressure remains acceptable both eyes (midteen goal)  Stable OCT Nerve fiber layer both eyes     Plan:     - Continue latanoprost qHS both eyes    - Continue timolol daily both eyes (Vyzulta not on formulary, doubt she will tolerate Rhopressa, Intolerant to cosopt and alphagan)            Patient disposition:   Return in about 6 months (around 7/17/2023) for VT only, OCT NFL, LVC VF.           Attending Physician Attestation:  Complete documentation of historical and exam elements from today's encounter can be found in the full encounter summary report (not reduplicated in this progress note).  I personally obtained the chief complaint(s) and history of present illness.  I confirmed and  edited as necessary the review of systems, past medical/surgical history, family history, social history, and examination findings as documented by others; and I examined the patient myself.  I personally reviewed the relevant tests, images, and reports as documented above.  I formulated and edited as necessary the assessment and plan and discussed the findings and management plan with the patient and family. . - Jarred Leon MD

## 2023-01-17 NOTE — NURSING NOTE
Chief Complaints and History of Present Illnesses   Patient presents with     Follow Up     Primary open angle glaucoma of both eyes     Chief Complaint(s) and History of Present Illness(es)     Follow Up            Laterality: both eyes    Course: stable    Associated symptoms: dryness.  Negative for eye pain, flashes and floaters    Treatments tried: eye drops, artificial tears and ointment    Pain scale: 0/10    Comments: Primary open angle glaucoma of both eyes          Comments    She states that her vision has seemed stable in both eyes since her last eye exam.  She does often feel like things are too bright.     She is using:  Latanoprost at night in both eyes   Timolol daily both eyes     BILL Oh 2:15 PM  January 17, 2023

## 2023-01-27 ENCOUNTER — ANTICOAGULATION THERAPY VISIT (OUTPATIENT)
Dept: ANTICOAGULATION | Facility: CLINIC | Age: 78
End: 2023-01-27
Payer: MEDICARE

## 2023-01-27 DIAGNOSIS — Z86.718 PERSONAL HISTORY OF DVT (DEEP VEIN THROMBOSIS): Primary | ICD-10-CM

## 2023-01-27 LAB — INR HOME MONITORING: 2.6 (ref 2–3)

## 2023-01-27 NOTE — PROGRESS NOTES
ANTICOAGULATION MANAGEMENT     Manasa French 77 year old female is on warfarin with therapeutic INR result. (Goal INR 2.0-3.0)    Recent labs: (last 7 days)     01/27/23  0000   INR 2.6       ASSESSMENT       Source(s): Chart Review and Patient/Caregiver Call       Warfarin doses taken: Warfarin taken as instructed    Diet: No new diet changes identified    New illness, injury, or hospitalization: No    Medication/supplement changes: None noted    Signs or symptoms of bleeding or clotting: No    Previous INR: Supratherapeutic    Additional findings: None       PLAN     Recommended plan for no diet, medication or health factor changes affecting INR     Dosing Instructions: Continue your current warfarin dose with next INR in 2 weeks       Summary  As of 1/27/2023    Full warfarin instructions:  7.5 mg every Mon; 5 mg all other days   Next INR check:  2/10/2023             Telephone call with Rhonda who verbalizes understanding and agrees to plan    Patient to recheck with home meter    Education provided:     Please call back if any changes to your diet, medications or how you've been taking warfarin    Resume manage by exception with home monitor. Continue to submit INR results to home monitor company.You will only be called when your result is out of range. Please call and notify [facility] if new medication started, dose missed, signs or symptoms of bleeding or clotting, or a surgery/procedure is scheduled.    Plan made per [facility] anticoagulation protocol    Ember Amezcua RN  Anticoagulation Clinic  1/27/2023    _______________________________________________________________________     Anticoagulation Episode Summary     Current INR goal:  2.0-3.0   TTR:  87.3 % (1 y)   Target end date:  Indefinite   Send INR reminders to:  [facility]    Indications    Acute deep vein thrombosis (DVT) of axillary vein and internal jugular (H) (Resolved) [I82.A19]  Current use of long term anticoagulation (Resolved)  [Z79.01]  Acute deep vein thrombosis (DVT) of axillary vein of right upper extremity (H) (Resolved) [I82.A11]  Personal history of DVT (deep vein thrombosis) [Z86.718]           Comments:  [employer] meter- Managed by exception         Anticoagulation Care Providers     Provider Role Specialty Phone number    Rema Braxton DO Referring Family Medicine [phone]    Jessica Anthony MD Referring Internal Medicine [phone]    Christopher Smith MD Responsible Hematology [phone]

## 2023-02-10 ENCOUNTER — DOCUMENTATION ONLY (OUTPATIENT)
Dept: ANTICOAGULATION | Facility: CLINIC | Age: 78
End: 2023-02-10
Payer: MEDICARE

## 2023-02-10 DIAGNOSIS — Z86.718 PERSONAL HISTORY OF DVT (DEEP VEIN THROMBOSIS): Primary | ICD-10-CM

## 2023-02-10 LAB — INR HOME MONITORING: 2.7 (ref 2–3)

## 2023-02-10 NOTE — PROGRESS NOTES
ANTICOAGULATION  MANAGEMENT-Home Monitor Managed by Exception    Manasa DIEGO French 77 year old female is on warfarin with therapeutic INR result. (Goal INR 2.0-3.0)    Recent labs: (last 7 days)     02/10/23  0000   INR 2.7         Previous INR was Therapeutic    Medication, diet, health changes since last INR:chart reviewed; none identified    Contacted within the last 12 weeks by phone on 1/27/23      AUBREY     Manasa was NOT contacted regarding therapeutic result today per home monitoring policy manage by exception agreement.   Current warfarin dose is to be continued:     Summary  As of 2/10/2023    Full warfarin instructions:  7.5 mg every Mon; 5 mg all other days   Next INR check:  2/24/2023           ?   Ember Amezcua RN  Anticoagulation Clinic  2/10/2023    _______________________________________________________________________     Anticoagulation Episode Summary     Current INR goal:  2.0-3.0   TTR:  87.4 % (1 y)   Target end date:  Indefinite   Send INR reminders to:  [facility]    Indications    Acute deep vein thrombosis (DVT) of axillary vein and internal jugular (H) (Resolved) [I82.A19]  Current use of long term anticoagulation (Resolved) [Z79.01]  Acute deep vein thrombosis (DVT) of axillary vein of right upper extremity (H) (Resolved) [I82.A11]  Personal history of DVT (deep vein thrombosis) [Z86.718]           Comments:  [employer] meter- Managed by exception         Anticoagulation Care Providers     Provider Role Specialty Phone number    Rema Braxton DO Referring Family Medicine [phone]    Jessica Anthony MD Referring Internal Medicine [phone]    Christopher Smith MD Responsible Hematology [phone]

## 2023-02-24 ENCOUNTER — DOCUMENTATION ONLY (OUTPATIENT)
Dept: ANTICOAGULATION | Facility: CLINIC | Age: 78
End: 2023-02-24
Payer: MEDICARE

## 2023-02-24 DIAGNOSIS — Z86.718 PERSONAL HISTORY OF DVT (DEEP VEIN THROMBOSIS): Primary | ICD-10-CM

## 2023-02-24 LAB — INR HOME MONITORING: 2.4 (ref 2–3)

## 2023-02-24 NOTE — PROGRESS NOTES
ANTICOAGULATION  MANAGEMENT-Home Monitor Managed by Exception    Manasa French 77 year old female is on warfarin with therapeutic INR result. (Goal INR 2.0-3.0)    Recent labs: (last 7 days)     02/24/23  0000   INR 2.4         Previous INR was Therapeutic    Medication, diet, health changes since last INR:chart reviewed; none identified    Contacted within the last 12 weeks by phone on 1/27/23      AUBREY     Manasa was NOT contacted regarding therapeutic result today per home monitoring policy manage by exception agreement.   Current warfarin dose is to be continued:       ?   Enrique HUGHES RN  Anticoagulation Clinic  2/24/2023    _______________________________________________________________________     Anticoagulation Episode Summary     Current INR goal:  2.0-3.0   TTR:  87.4 % (1 y)   Target end date:  Indefinite   Send INR reminders to:  [facility]    Indications    Acute deep vein thrombosis (DVT) of axillary vein and internal jugular (H) (Resolved) [I82.A19]  Current use of long term anticoagulation (Resolved) [Z79.01]  Acute deep vein thrombosis (DVT) of axillary vein of right upper extremity (H) (Resolved) [I82.A11]  Personal history of DVT (deep vein thrombosis) [Z86.718]           Comments:  [employer] meter- Managed by exception         Anticoagulation Care Providers     Provider Role Specialty Phone number    Rema Braxton DO Referring Family Medicine [phone]    Jessica Anthony MD Referring Internal Medicine [phone]    Christopher Smith MD Responsible Hematology [phone]

## 2023-03-10 ENCOUNTER — DOCUMENTATION ONLY (OUTPATIENT)
Dept: ANTICOAGULATION | Facility: CLINIC | Age: 78
End: 2023-03-10
Payer: MEDICARE

## 2023-03-10 DIAGNOSIS — Z86.718 PERSONAL HISTORY OF DVT (DEEP VEIN THROMBOSIS): Primary | ICD-10-CM

## 2023-03-10 LAB — INR HOME MONITORING: 2.3 (ref 2–3)

## 2023-03-22 DIAGNOSIS — M80.00XD AGE-RELATED OSTEOPOROSIS WITH CURRENT PATHOLOGICAL FRACTURE WITH ROUTINE HEALING, SUBSEQUENT ENCOUNTER: ICD-10-CM

## 2023-03-23 DIAGNOSIS — H35.3132 INTERMEDIATE STAGE NONEXUDATIVE AGE-RELATED MACULAR DEGENERATION OF BOTH EYES: Primary | ICD-10-CM

## 2023-03-23 RX ORDER — ALENDRONATE SODIUM 70 MG/1
TABLET ORAL
Qty: 12 TABLET | Refills: 1 | Status: SHIPPED | OUTPATIENT
Start: 2023-03-23 | End: 2023-11-27

## 2023-03-23 NOTE — TELEPHONE ENCOUNTER
"Last Written Prescription Date:  1/24/22  Last Fill Quantity: 12,  # refills: 3   Last office visit:2/9/21 with Dr. Hernandez  Future Office Visit: was due back 2/22        Requested Prescriptions   Pending Prescriptions Disp Refills     alendronate (FOSAMAX) 70 MG tablet [Pharmacy Med Name: ALENDRONATE SODIUM 70 MG Tablet] 12 tablet 3     Sig: TAKE 1 TABLET EVERY WEEK AS DIRECTED       Bisphosphonates Failed - 3/22/2023  3:29 PM        Failed - Recent (12 mo) or future (30 days) visit within the authorizing provider's specialty     Patient has had an office visit with the authorizing provider or a provider within the authorizing providers department within the previous 12 mos or has a future within next 30 days. See \"Patient Info\" tab in inbasket, or \"Choose Columns\" in Meds & Orders section of the refill encounter.              Failed - Dexa on file within past 2 years     Please review last Dexa result.           Passed - Medication is active on med list        Passed - Patient is age 18 or older        Passed - Normal serum creatinine on file within past 12 months     Recent Labs   Lab Test 06/08/22  1438   CR 0.87       Ok to refill medication if creatinine is low             Pt overdue for appt  Will have FD call pt to schedule appt, and will then send a refill  Leena Ash RN    "

## 2023-03-24 ENCOUNTER — DOCUMENTATION ONLY (OUTPATIENT)
Dept: ANTICOAGULATION | Facility: CLINIC | Age: 78
End: 2023-03-24
Payer: MEDICARE

## 2023-03-24 DIAGNOSIS — Z86.718 PERSONAL HISTORY OF DVT (DEEP VEIN THROMBOSIS): Primary | ICD-10-CM

## 2023-03-24 LAB — INR HOME MONITORING: 2.5 (ref 2–3)

## 2023-03-24 NOTE — PROGRESS NOTES
ANTICOAGULATION  MANAGEMENT-Home Monitor Managed by Exception    Manasa DIEGO French 77 year old female is on warfarin with therapeutic INR result. (Goal INR 2.0-3.0)    Recent labs: (last 7 days)     03/24/23  0000   INR 2.5         Previous INR was Therapeutic    Medication, diet, health changes since last INR:chart reviewed; none identified    Contacted within the last 12 weeks by phone on 01/27/2023      AUBREY     Manasa was NOT contacted regarding therapeutic result today per home monitoring policy manage by exception agreement.   Current warfarin dose is to be continued:     Summary  As of 3/24/2023    Full warfarin instructions:  7.5 mg every Mon; 5 mg all other days   Next INR check:  4/7/2023           ?   Shelby Kate RN  Anticoagulation Clinic  3/24/2023    _______________________________________________________________________     Anticoagulation Episode Summary     Current INR goal:  2.0-3.0   TTR:  90.3 % (1 y)   Target end date:  Indefinite   Send INR reminders to:  [facility]    Indications    Acute deep vein thrombosis (DVT) of axillary vein and internal jugular (H) (Resolved) [I82.A19]  Current use of long term anticoagulation (Resolved) [Z79.01]  Acute deep vein thrombosis (DVT) of axillary vein of right upper extremity (H) (Resolved) [I82.A11]  Personal history of DVT (deep vein thrombosis) [Z86.718]           Comments:  [employer] meter- Managed by exception         Anticoagulation Care Providers     Provider Role Specialty Phone number    Rema Braxton DO Referring Family Medicine [phone]    Jessica Anthony MD Referring Internal Medicine [phone]    Christopher Smith MD Responsible Hematology [phone]

## 2023-04-06 ENCOUNTER — OFFICE VISIT (OUTPATIENT)
Dept: OPHTHALMOLOGY | Facility: CLINIC | Age: 78
End: 2023-04-06
Attending: OPHTHALMOLOGY
Payer: MEDICARE

## 2023-04-06 DIAGNOSIS — H40.1132 PRIMARY OPEN ANGLE GLAUCOMA OF BOTH EYES, MODERATE STAGE: Primary | ICD-10-CM

## 2023-04-06 DIAGNOSIS — H35.3132 INTERMEDIATE STAGE NONEXUDATIVE AGE-RELATED MACULAR DEGENERATION OF BOTH EYES: ICD-10-CM

## 2023-04-06 PROBLEM — E11.9 DIABETES MELLITUS, TYPE 2 (H): Status: ACTIVE | Noted: 2023-04-06

## 2023-04-06 PROCEDURE — 99207 FUNDUS AUTOFLUORESCENCE IMAGE (FAF) OU (BOTH EYES): CPT | Mod: 26 | Performed by: OPHTHALMOLOGY

## 2023-04-06 PROCEDURE — 92250 FUNDUS PHOTOGRAPHY W/I&R: CPT | Mod: 59 | Performed by: OPHTHALMOLOGY

## 2023-04-06 PROCEDURE — 99213 OFFICE O/P EST LOW 20 MIN: CPT | Performed by: OPHTHALMOLOGY

## 2023-04-06 PROCEDURE — 92134 CPTRZ OPH DX IMG PST SGM RTA: CPT | Performed by: OPHTHALMOLOGY

## 2023-04-06 PROCEDURE — G0463 HOSPITAL OUTPT CLINIC VISIT: HCPCS | Performed by: OPHTHALMOLOGY

## 2023-04-06 ASSESSMENT — REFRACTION_WEARINGRX
OS_SPHERE: -1.50
SPECS_TYPE: SVL DIST
OD_SPHERE: -1.00
OS_AXIS: 005
OD_CYLINDER: +1.25
OS_VBASE: DOWN
OD_AXIS: 005
OS_CYLINDER: +1.00
OD_VPRISM: 2.0
OD_HPRISM: 9.0 BO
OD_VBASE: UP
OS_VPRISM: 2.0

## 2023-04-06 ASSESSMENT — VISUAL ACUITY
OS_CC+: +2
OS_CC: 20/50
OD_CC: 20/25
METHOD: SNELLEN - LINEAR
OD_CC+: -1
CORRECTION_TYPE: GLASSES

## 2023-04-06 ASSESSMENT — CUP TO DISC RATIO
OD_RATIO: 0.85
OS_RATIO: 0.5

## 2023-04-06 ASSESSMENT — TONOMETRY
OD_IOP_MMHG: 12
IOP_METHOD: TONOPEN
OS_IOP_MMHG: 11

## 2023-04-06 ASSESSMENT — CONF VISUAL FIELD
OD_NORMAL: 1
OS_NORMAL: 1
METHOD: COUNTING FINGERS
OS_SUPERIOR_NASAL_RESTRICTION: 0
OS_INFERIOR_NASAL_RESTRICTION: 0
OS_INFERIOR_TEMPORAL_RESTRICTION: 0
OD_INFERIOR_NASAL_RESTRICTION: 0
OS_SUPERIOR_TEMPORAL_RESTRICTION: 0
OD_SUPERIOR_NASAL_RESTRICTION: 0
OD_SUPERIOR_TEMPORAL_RESTRICTION: 0
OD_INFERIOR_TEMPORAL_RESTRICTION: 0

## 2023-04-06 ASSESSMENT — SLIT LAMP EXAM - LIDS
COMMENTS: NORMAL
COMMENTS: NORMAL

## 2023-04-06 ASSESSMENT — EXTERNAL EXAM - LEFT EYE: OS_EXAM: NORMAL

## 2023-04-06 ASSESSMENT — EXTERNAL EXAM - RIGHT EYE: OD_EXAM: NORMAL

## 2023-04-06 NOTE — PROGRESS NOTES
I have confirmed the patient's and reviewed Past Medical History, Past Surgical History, Social History, Family History, Problem List, Medication List and agree with Tech note.    CC: f/u AMD    HPI: 74 yo female with h/o Dry AMD here for annual f/u.  Patient reports relatively stable vision with persistent diplopia that is more manageable on some days than others (has h/o WIL f/b orthoptics for diplopia). States vision fluctuates.     POHx:  Thyroid Eye Disease  POAG  Pseudophakia    Eye Meds:  Timolol QAM OU  Xalatan QPM OU    OCT (04/04/22)  RE: (multiple single scans d/t pt eye movements) Drusen, no IRF/SRF  LE: (multiple single scans d/t pt eye movements) Drusen, no IRF/SRF    FAF  (04/04/22)  RE: multiple areas of hyper-autofluroescence corresponding to drusen  LE:  area of hyper-autofluroescence corresponding to drusen    Assessment & Plan:  1.  Dry Macular Degeneration, both eyes   - continue AREDS II vitamins   - AG precautions   - annual exam    2.  PVD, both eyes   - RT/RD precautions    3. POAG, mild, both eyes   - continue management/follow-up per Dr. Leon    4.  Thyroid Eye Disease   - continue with orthoptics for diplopia management    5.  Pseudophakia, both eyes   - observe    RTC: 1 year for exam, OCT, FAF        Katy Peter MD PhD.  Professor & Chair

## 2023-04-06 NOTE — NURSING NOTE
Chief Complaints and History of Present Illnesses   Patient presents with     Follow Up     1 year follow up Dry Macular Degeneration, both eyes     Chief Complaint(s) and History of Present Illness(es)     Follow Up            Laterality: both eyes    Associated symptoms: Negative for eye pain, flashes and floaters    Treatments tried: eye drops, artificial tears and ointment    Pain scale: 0/10    Comments: 1 year follow up Dry Macular Degeneration, both eyes          Comments    Pt reports no vision changes since last visit.   Both eyes are generally feeling more tired.  No flashes or floaters.    Ocular meds:  Timolol QAM both eyes  Latanaprost QPM both eyes      AREDS twice daily  Refresh yan both eyes at bedtime  AT PRN    Alexandro Ho 12:48 PM April 6, 2023

## 2023-04-10 ENCOUNTER — DOCUMENTATION ONLY (OUTPATIENT)
Dept: ANTICOAGULATION | Facility: CLINIC | Age: 78
End: 2023-04-10
Payer: MEDICARE

## 2023-04-10 LAB — INR HOME MONITORING: 2.6 (ref 2–3)

## 2023-04-10 NOTE — PROGRESS NOTES
ANTICOAGULATION  MANAGEMENT-Home Monitor Managed by Exception    Manasa DIEGO French 77 year old female is on warfarin with therapeutic INR result. (Goal INR 2.0-3.0)    Recent labs: (last 7 days)     04/10/23  0000   INR 2.6         Previous INR was Therapeutic    Medication, diet, health changes since last INR:chart reviewed; none identified  Contacted within the last 12 weeks by phone on 1/27/23    AUBREY     Manasa was NOT contacted regarding therapeutic result today per home monitoring policy manage by exception agreement.   Current warfarin dose is to be continued:     Summary  As of 4/10/2023    Full warfarin instructions:  7.5 mg every Mon; 5 mg all other days   Next INR check:  4/24/2023           ?   Carmela Sharp RN  Anticoagulation Clinic  4/10/2023    _______________________________________________________________________     Anticoagulation Episode Summary     Current INR goal:  2.0-3.0   TTR:  90.3 % (1 y)   Target end date:  Indefinite   Send INR reminders to:  [facility]    Indications    Acute deep vein thrombosis (DVT) of axillary vein and internal jugular (H) (Resolved) [I82.A19]  Current use of long term anticoagulation (Resolved) [Z79.01]  Acute deep vein thrombosis (DVT) of axillary vein of right upper extremity (H) (Resolved) [I82.A11]  Personal history of DVT (deep vein thrombosis) [Z86.718]           Comments:  [employer] meter- Managed by exception         Anticoagulation Care Providers     Provider Role Specialty Phone number    Rema Braxton DO Referring Family Medicine [phone]    Jessica Anthony MD Referring Internal Medicine [phone]    Christopher Smith MD Responsible Hematology [phone]

## 2023-04-21 ENCOUNTER — ANTICOAGULATION THERAPY VISIT (OUTPATIENT)
Dept: ANTICOAGULATION | Facility: CLINIC | Age: 78
End: 2023-04-21
Payer: MEDICARE

## 2023-04-21 LAB — INR HOME MONITORING: 3.9 (ref 2–3)

## 2023-04-21 NOTE — PROGRESS NOTES
ANTICOAGULATION MANAGEMENT     Manasa French 77 year old female is on warfarin with supratherapeutic INR result. (Goal INR 2.0-3.0)    Recent labs: (last 7 days)     04/21/23  0000   INR 3.9*       ASSESSMENT       Source(s): Chart Review and Patient/Caregiver Call       Warfarin doses taken: Warfarin taken as instructed    Diet: No new diet changes identified    Medication/supplement changes: None noted    New illness, injury, or hospitalization: Yes: Right knee pain has worsened. Pt got a steroid injection on 4/17/23. Pt may need a knee replacement. Pt still having discomfort and a decrease in activity.    Signs or symptoms of bleeding or clotting: No    Previous INR: Therapeutic last 2(+) visits    Additional findings: None         PLAN     Recommended plan for ongoing change(s) affecting INR     Dosing Instructions: hold dose then decrease your warfarin dose (6.7% change) with next INR in 10 days       Summary  As of 4/21/2023    Full warfarin instructions:  4/21: Hold; Otherwise 5 mg every day   Next INR check:  5/1/2023             Telephone call with Rhonda who agrees to plan and repeated back plan correctly    Patient to recheck with home meter    Education provided:     Contact [phone]  with any changes, questions or concerns.     Plan made per [facility] anticoagulation protocol    Geraldine Pham RN  Anticoagulation Clinic  4/21/2023    _______________________________________________________________________     Anticoagulation Episode Summary     Current INR goal:  2.0-3.0   TTR:  88.2 % (1 y)   Target end date:  Indefinite   Send INR reminders to:  [facility]AG [facility] MONITOR[facility]    Indications    Acute deep vein thrombosis (DVT) of axillary vein and internal jugular (H) (Resolved) [I82.A19]  Current use of long term anticoagulation (Resolved) [Z79.01]  Acute deep vein thrombosis (DVT) of axillary vein of right upper extremity (H) (Resolved) [I82.A11]  Personal history of DVT (deep vein thrombosis)  [G99.935]           Comments:  [employer] [facility] meter- Managed by exception         Anticoagulation Care Providers     Provider Role Specialty Phone number    Rema Braxton DO Referring Family Medicine [phone]    Jessica Anthony MD Referring Internal Medicine [phone]    Christopher Smith MD Responsible Hematology [phone]

## 2023-05-01 ENCOUNTER — ANTICOAGULATION THERAPY VISIT (OUTPATIENT)
Dept: ANTICOAGULATION | Facility: CLINIC | Age: 78
End: 2023-05-01
Payer: MEDICARE

## 2023-05-01 ENCOUNTER — DOCUMENTATION ONLY (OUTPATIENT)
Dept: ANTICOAGULATION | Facility: CLINIC | Age: 78
End: 2023-05-01
Payer: MEDICARE

## 2023-05-01 DIAGNOSIS — Z86.718 PERSONAL HISTORY OF DVT (DEEP VEIN THROMBOSIS): Primary | ICD-10-CM

## 2023-05-01 LAB
INR (EXTERNAL): 2.2 (ref 0.9–1.1)
INR HOME MONITORING: 2.2 (ref 2–3)

## 2023-05-01 NOTE — PROGRESS NOTES
ANTICOAGULATION MANAGEMENT     Manasa French 77 year old female is on warfarin with therapeutic INR result. (Goal INR 2.0-3.0)    Recent labs: (last 7 days)     05/01/23  0905   INR 2.2*       ASSESSMENT       Source(s): Chart Review and Patient/Caregiver Call       Warfarin doses taken: Warfarin taken as instructed    Diet: No new diet changes identified    Medication/supplement changes: None noted    New illness, injury, or hospitalization: No    Signs or symptoms of bleeding or clotting: No    Previous result: Supratherapeutic    Additional findings: None     *OK to switch back to MBE if next INR is within range and there are no changes/concerns.   Pt likes to check her INR on Fridays, so she will recheck on Friday 5/19         PLAN     Recommended plan for no diet, medication or health factor changes affecting INR     Dosing Instructions: Continue your current warfarin dose with next INR in 2 weeks       Summary  As of 5/1/2023    Full warfarin instructions:  5 mg every day   Next INR check:  5/19/2023             Telephone call with Rhonda who verbalizes understanding and agrees to plan    Patient to recheck with home meter    Education provided:     Please call back if any changes to your diet, medications or how you've been taking warfarin    Plan made per [facility] anticoagulation protocol       Sofia Sanabria, RN  Anticoagulation Clinic  5/1/2023    _______________________________________________________________________     Anticoagulation Episode Summary     Current INR goal:  2.0-3.0   TTR:  86.8 % (1 y)   Target end date:  Indefinite   Send INR reminders to:  [facility]IT[facility]    Indications    Acute deep vein thrombosis (DVT) of axillary vein and internal jugular (H) (Resolved) [I82.A19]  Current use of long term anticoagulation (Resolved) [Z79.01]  Acute deep vein thrombosis (DVT) of axillary vein of right upper extremity (H) (Resolved) [I82.A11]  Personal history of DVT (deep vein thrombosis)  [C70.119]           Comments:  [employer] meter- Managed by exception         Anticoagulation Care Providers     Provider Role Specialty Phone number    Rema Braxton DO Referring Family Medicine [phone]    Jesisca Anthony MD Referring Internal Medicine [phone]    Christopher Smith MD Responsible Hematology [phone]

## 2023-05-10 ENCOUNTER — MEDICAL CORRESPONDENCE (OUTPATIENT)
Dept: HEALTH INFORMATION MANAGEMENT | Facility: CLINIC | Age: 78
End: 2023-05-10
Payer: MEDICARE

## 2023-05-15 ENCOUNTER — OFFICE VISIT (OUTPATIENT)
Dept: FAMILY MEDICINE | Facility: CLINIC | Age: 78
End: 2023-05-15
Payer: MEDICARE

## 2023-05-15 ENCOUNTER — DOCUMENTATION ONLY (OUTPATIENT)
Dept: ANTICOAGULATION | Facility: CLINIC | Age: 78
End: 2023-05-15

## 2023-05-15 ENCOUNTER — MYC MEDICAL ADVICE (OUTPATIENT)
Dept: FAMILY MEDICINE | Facility: CLINIC | Age: 78
End: 2023-05-15

## 2023-05-15 VITALS
TEMPERATURE: 97.4 F | HEART RATE: 86 BPM | WEIGHT: 158.1 LBS | DIASTOLIC BLOOD PRESSURE: 66 MMHG | OXYGEN SATURATION: 98 % | BODY MASS INDEX: 26.34 KG/M2 | HEIGHT: 65 IN | SYSTOLIC BLOOD PRESSURE: 140 MMHG | RESPIRATION RATE: 18 BRPM

## 2023-05-15 DIAGNOSIS — E11.9 TYPE 2 DIABETES MELLITUS WITHOUT COMPLICATION, WITHOUT LONG-TERM CURRENT USE OF INSULIN (H): ICD-10-CM

## 2023-05-15 DIAGNOSIS — I51.7 LEFT ATRIAL ENLARGEMENT: ICD-10-CM

## 2023-05-15 DIAGNOSIS — E53.8 VITAMIN B12 DEFICIENCY (NON ANEMIC): ICD-10-CM

## 2023-05-15 DIAGNOSIS — M16.11 OSTEOARTHRITIS OF RIGHT HIP, UNSPECIFIED OSTEOARTHRITIS TYPE: ICD-10-CM

## 2023-05-15 DIAGNOSIS — I10 BENIGN ESSENTIAL HYPERTENSION: ICD-10-CM

## 2023-05-15 DIAGNOSIS — Z86.718 PERSONAL HISTORY OF DVT (DEEP VEIN THROMBOSIS): Primary | ICD-10-CM

## 2023-05-15 DIAGNOSIS — D51.9 ANEMIA DUE TO VITAMIN B12 DEFICIENCY, UNSPECIFIED B12 DEFICIENCY TYPE: ICD-10-CM

## 2023-05-15 DIAGNOSIS — Z01.818 PREOP GENERAL PHYSICAL EXAM: Primary | ICD-10-CM

## 2023-05-15 DIAGNOSIS — D64.9 ANEMIA, UNSPECIFIED TYPE: ICD-10-CM

## 2023-05-15 LAB
ANION GAP SERPL CALCULATED.3IONS-SCNC: 11 MMOL/L (ref 7–15)
BUN SERPL-MCNC: 16.1 MG/DL (ref 8–23)
CALCIUM SERPL-MCNC: 9.6 MG/DL (ref 8.8–10.2)
CHLORIDE SERPL-SCNC: 104 MMOL/L (ref 98–107)
CREAT SERPL-MCNC: 0.79 MG/DL (ref 0.51–0.95)
CREAT UR-MCNC: 72.2 MG/DL
DEPRECATED HCO3 PLAS-SCNC: 26 MMOL/L (ref 22–29)
ERYTHROCYTE [DISTWIDTH] IN BLOOD BY AUTOMATED COUNT: 14.7 % (ref 10–15)
FERRITIN SERPL-MCNC: 76 NG/ML (ref 11–328)
GFR SERPL CREATININE-BSD FRML MDRD: 76 ML/MIN/1.73M2
GLUCOSE SERPL-MCNC: 213 MG/DL (ref 70–99)
HBA1C MFR BLD: 6.6 % (ref 0–5.6)
HCT VFR BLD AUTO: 34.1 % (ref 35–47)
HGB BLD-MCNC: 10.7 G/DL (ref 11.7–15.7)
HOLD SPECIMEN: NORMAL
IRON BINDING CAPACITY (ROCHE): 237 UG/DL (ref 240–430)
IRON SATN MFR SERPL: 24 % (ref 15–46)
IRON SERPL-MCNC: 58 UG/DL (ref 37–145)
MCH RBC QN AUTO: 30.7 PG (ref 26.5–33)
MCHC RBC AUTO-ENTMCNC: 31.4 G/DL (ref 31.5–36.5)
MCV RBC AUTO: 98 FL (ref 78–100)
MICROALBUMIN UR-MCNC: <12 MG/L
MICROALBUMIN/CREAT UR: NORMAL MG/G{CREAT}
PLATELET # BLD AUTO: 303 10E3/UL (ref 150–450)
POTASSIUM SERPL-SCNC: 4.6 MMOL/L (ref 3.4–5.3)
RBC # BLD AUTO: 3.48 10E6/UL (ref 3.8–5.2)
SODIUM SERPL-SCNC: 141 MMOL/L (ref 136–145)
VIT B12 SERPL-MCNC: 203 PG/ML (ref 232–1245)
WBC # BLD AUTO: 5.6 10E3/UL (ref 4–11)

## 2023-05-15 PROCEDURE — 83540 ASSAY OF IRON: CPT | Performed by: NURSE PRACTITIONER

## 2023-05-15 PROCEDURE — 83550 IRON BINDING TEST: CPT | Performed by: NURSE PRACTITIONER

## 2023-05-15 PROCEDURE — 82570 ASSAY OF URINE CREATININE: CPT | Performed by: NURSE PRACTITIONER

## 2023-05-15 PROCEDURE — 82043 UR ALBUMIN QUANTITATIVE: CPT | Performed by: NURSE PRACTITIONER

## 2023-05-15 PROCEDURE — 83036 HEMOGLOBIN GLYCOSYLATED A1C: CPT | Performed by: NURSE PRACTITIONER

## 2023-05-15 PROCEDURE — 82607 VITAMIN B-12: CPT | Performed by: NURSE PRACTITIONER

## 2023-05-15 PROCEDURE — 99215 OFFICE O/P EST HI 40 MIN: CPT | Performed by: NURSE PRACTITIONER

## 2023-05-15 PROCEDURE — 82728 ASSAY OF FERRITIN: CPT | Performed by: NURSE PRACTITIONER

## 2023-05-15 PROCEDURE — 80048 BASIC METABOLIC PNL TOTAL CA: CPT | Performed by: NURSE PRACTITIONER

## 2023-05-15 PROCEDURE — 36415 COLL VENOUS BLD VENIPUNCTURE: CPT | Performed by: NURSE PRACTITIONER

## 2023-05-15 PROCEDURE — 85027 COMPLETE CBC AUTOMATED: CPT | Performed by: NURSE PRACTITIONER

## 2023-05-15 PROCEDURE — 93000 ELECTROCARDIOGRAM COMPLETE: CPT | Performed by: NURSE PRACTITIONER

## 2023-05-15 RX ORDER — CYANOCOBALAMIN 1000 UG/ML
1000 INJECTION, SOLUTION INTRAMUSCULAR; SUBCUTANEOUS ONCE
Status: COMPLETED | OUTPATIENT
Start: 2023-05-16 | End: 2023-05-17

## 2023-05-15 NOTE — PROGRESS NOTES
[address], [address]  [facility] [address]  Phone: [phone]  Primary Provider: Jessica Anthony  Pre-op Performing Provider: BELLA HANSON      PREOPERATIVE EVALUATION:  Today's date: 5/15/2023    Manasa French is a 77 year old female who presents for a preoperative evaluation.       View : No data to display.              Surgical Information:  Surgery/Procedure: Right total hip arthroplasty  Surgery Location:  OR  Surgeon: Dr. Mixon  Surgery Date: 05/30/23  Time of Surgery: 0730 am  Where patient plans to recover: At home with family  Fax number for surgical facility: Note does not need to be faxed, will be available electronically in Epic.    Assessment & Plan     The proposed surgical procedure is considered INTERMEDIATE risk.    (Z01.818) Preop general physical exam  (primary encounter diagnosis)  Comment: ok for surgery. No concerns today   Plan: CBC w/ Reflex to Iron Studies, EKG 12-lead         complete w/read - Clinics, Iron & Iron Binding         Capacity, Ferritin            (E11.9) Type 2 diabetes mellitus without complication, without long-term current use of insulin (H)  Comment: New dx last year of A1C of 6.6. Same result today.   Plan: Albumin Random Urine Quantitative with Creat         Ratio, HEMOGLOBIN A1C            (I10) Benign essential hypertension  Comment: minimal elevation today. Due for labs   Plan: BASIC METABOLIC PANEL            (M16.11) Osteoarthritis of right hip, unspecified osteoarthritis type  Comment: plan surgery   Plan:     (I51.7) Left atrial enlargement  Comment: New finding on EKG. Along with possible LVH. This should not preclude surgery. She has no chest symptoms and can perform 4 METS.   Plan: echocardiogram is ordered today for further eval         (D64.9) Anemia, unspecified type  Comment: Hgb 10.7 today. Usually is in the low 11s. Ferritin Normal. Added B12 for borderline high MCV.    Addendum: B12  deficiency noted. Called and messaged pt to come get injection done and to take 2500mcg daily for the next month then switch to 1000mcg daily for now.   Plan: Vitamin B12                  - No identified additional risk factors other than previously addressed    Antiplatelet or Anticoagulation Medication Instructions:   - warfarin: Thromboembolic risk is low (<4%/year). HOLD warfarin for 5 days without bridging before procedure.       Additional Medication Instructions:  Only take synthroid morning of surgery       RECOMMENDATION:  APPROVAL GIVEN to proceed with proposed procedure, without further diagnostic evaluation.        Subjective     HPI related to upcoming procedure:   Going for R SANTHOSH   Has been feeling well. No recent illness.   No CP. SOB comes with 2 flights of stairs. Struggles with a lot of pain from this hip.           5/15/2023     8:35 AM   Preop Questions   1. Have you ever had a heart attack or stroke? No   2. Have you ever had surgery on your heart or blood vessels, such as a stent placement, a coronary artery bypass, or surgery on an artery in your head, neck, heart, or legs? No   3. Do you have chest pain with activity? No   4. Do you have a history of  heart failure? No   5. Do you currently have a cold, bronchitis or symptoms of other infection? No. Chronic rhinits    6. Do you have a cough, shortness of breath, or wheezing? No   7. Do you or anyone in your family have previous history of blood clots? YES - DVT RUE on coumadin   8. Do you or does anyone in your family have a serious bleeding problem such as prolonged bleeding following surgeries or cuts? No   9. Have you ever had problems with anemia or been told to take iron pills? No   10. Have you had any abnormal blood loss such as black, tarry or bloody stools, or abnormal vaginal bleeding? No   11. Have you ever had a blood transfusion? No   12. Are you willing to have a blood transfusion if it is medically needed before, during, or  after your surgery? Yes   13. Have you or any of your relatives ever had problems with anesthesia? No   14. Do you have sleep apnea, excessive snoring or daytime drowsiness? No   15. Do you have any artifical heart valves or other implanted medical devices like a pacemaker, defibrillator, or continuous glucose monitor? No   16. Do you have artificial joints? No   17. Are you allergic to latex? No     Health Care Directive:  Patient does not have a Health Care Directive or Living Will: Patient states has Advance Directive and will bring in a copy to clinic.    Preoperative Review of :   reviewed - no record of controlled substances prescribed.      Status of Chronic Conditions:  See problem list for active medical problems.  Problems all longstanding and stable, except as noted/documented.  See ROS for pertinent symptoms related to these conditions.      Review of Systems  Constitutional, neuro, ENT, endocrine, pulmonary, cardiac, gastrointestinal, genitourinary, musculoskeletal, integument and psychiatric systems are negative, except as otherwise noted.    Patient Active Problem List    Diagnosis Date Noted     Diabetes mellitus, type 2 (H) 04/06/2023     Priority: Medium     Primary open angle glaucoma (POAG) of both eyes      Priority: Medium     right eye moderate, left eye mild       Macular degeneration of both eyes      Priority: Medium     Pseudophakia      Priority: Medium     Osteoporosis      Priority: Medium     History of Graves' disease      Priority: Medium     s/p BARAHONA       Postablative hypothyroidism      Priority: Medium     Benign essential hypertension      Priority: Medium     History of hydrocephalus      Priority: Medium     s/p  shunt placement in 1987       Sensorineural hearing loss, bilateral      Priority: Medium     Personal history of DVT (deep vein thrombosis) 2019     Priority: Medium     RUE; on chronic AC        Past Medical History:   Diagnosis Date     Benign essential  hypertension      History of Graves' disease     s/p BARAHONA     History of hydrocephalus     s/p  shunt placement in 1987     Macular degeneration of both eyes      Osteoporosis      Personal history of DVT (deep vein thrombosis) 2019    RUE; on chronic AC     Postablative hypothyroidism      Primary open angle glaucoma (POAG) of both eyes     right eye moderate, left eye mild     Pseudophakia      Sensorineural hearing loss, bilateral      Past Surgical History:   Procedure Laterality Date     ARTHROSCOPY SHOULDER Left      CATARACT IOL, RT/LT Bilateral      DECOMPRESSION, FUSION LUMBAR POSTERIOR ONE LEVEL, COMBINED      L5-S1     EXCISE GANGLION WRIST Right      HERNIA REPAIR, UMBILICAL       LUMPECTOMY BREAST Left     benign     Current Outpatient Medications   Medication Sig Dispense Refill     alendronate (FOSAMAX) 70 MG tablet TAKE 1 TABLET EVERY WEEK AS DIRECTED 12 tablet 1     amLODIPine (NORVASC) 5 MG tablet Take 1 tablet (5 mg) by mouth daily 90 tablet 3     Calcium Carb-Cholecalciferol (CALCIUM CARBONATE-VITAMIN D3 PO)        carboxymethylcellulose (CELLUVISC/REFRESH LIQUIGEL) 1 % ophthalmic solution Place 1 drop into both eyes 3 times daily       Cholecalciferol (VITAMIN D) 1000 UNITS capsule Take  by mouth.       latanoprost (XALATAN) 0.005 % ophthalmic solution Place 1 drop into both eyes At Bedtime 7.5 mL 1     levothyroxine (SYNTHROID/LEVOTHROID) 100 MCG tablet Take 1 tablet (100 mcg) by mouth daily 90 tablet 3     losartan (COZAAR) 100 MG tablet Take 1 tablet (100 mg) by mouth daily TAKE 1 TABLET EVERY DAY 90 tablet 3     Multiple Vitamins-Minerals (ICAPS AREDS 2 PO)        timolol maleate (TIMOPTIC) 0.5 % ophthalmic solution Place 1 drop into both eyes every morning 15 mL 1     valACYclovir (VALTREX) 1000 mg tablet Take 1 tablet (1,000 mg) by mouth 3 times daily for 7 days 21 tablet 0     Vitamin K 100 MCG TABS Take 1 tablet by mouth daily 90 tablet 3     warfarin ANTICOAGULANT (COUMADIN) 5 MG  "tablet TAKE 1 TABLET ON MONDAY, WEDNESDAY AND FRIDAY THEN TAKE 1 AND 1/2 TABLETS DAILY ON ALL OTHER DAYS OR AS DIRECTED BY CLINIC 110 tablet 3       Allergies   Allergen Reactions     Alphagan [Brimonidine] Other (See Comments)     Itchy eyes     Cosopt [Dorzolamide Hcl-Timolol Mal] Other (See Comments)     Itchy eyes        Social History     Tobacco Use     Smoking status: Never     Smokeless tobacco: Never   Vaping Use     Vaping status: Not on file   Substance Use Topics     Alcohol use: Yes     Comment: 4 glasses wine per week     Family History   Problem Relation Age of Onset     Colon Cancer Mother      Hypertension Mother      Breast Cancer Mother      Glaucoma Mother      Leukemia Father      Diabetes Type 2  Father      Hypertension Sister      Macular Degeneration Sister      Diabetes Type 2  Brother      Breast Cancer Maternal Aunt         multiple aunts     Myocardial Infarction No family hx of      Cerebrovascular Disease No family hx of      Coronary Artery Disease Early Onset No family hx of      Ovarian Cancer No family hx of      History   Drug Use No         Objective     BP (!) 140/66 (BP Location: Right arm, Patient Position: Sitting, Cuff Size: Adult Regular)   Pulse 86   Temp 97.4  F (36.3  C)   Resp 18   Ht 1.638 m (5' 4.5\")   Wt 71.7 kg (158 lb 1.6 oz)   LMP  (LMP Unknown)   SpO2 98%   BMI 26.72 kg/m      Physical Exam    GENERAL APPEARANCE: healthy, alert and no distress     EYES: EOMI     RESP: lungs clear to auscultation - no rales, rhonchi or wheezes     CV: regular rates and rhythm, normal S1 S2, no S3 or S4 and no murmur, click or rub     MS: extremities normal- no gross deformities noted, no evidence of inflammation in joints, FROM in all extremities.     SKIN: no suspicious lesions or rashes     NEURO: Normal strength and tone, sensory exam grossly normal, mentation intact and speech normal     PSYCH: mentation appears normal. and affect normal/bright    Recent Labs   Lab " Test 05/01/23  0905 05/01/23  0000 06/17/22  0000 06/08/22  1438   HGB  --   --   --  11.4*   PLT  --   --   --  235   INR 2.2* 2.2   < >  --    NA  --   --   --  141   POTASSIUM  --   --   --  4.1   CR  --   --   --  0.87   A1C  --   --   --  6.6*    < > = values in this interval not displayed.        Diagnostics:  Recent Results (from the past 24 hour(s))   Albumin Random Urine Quantitative with Creat Ratio    Collection Time: 05/15/23  9:48 AM   Result Value Ref Range    Creatinine Urine mg/dL 72.2 mg/dL    Albumin Urine mg/L <12.0 mg/L    Albumin Urine mg/g Cr     HEMOGLOBIN A1C    Collection Time: 05/15/23  9:48 AM   Result Value Ref Range    Hemoglobin A1C 6.6 (H) 0.0 - 5.6 %   BASIC METABOLIC PANEL    Collection Time: 05/15/23  9:48 AM   Result Value Ref Range    Sodium 141 136 - 145 mmol/L    Potassium 4.6 3.4 - 5.3 mmol/L    Chloride 104 98 - 107 mmol/L    Carbon Dioxide (CO2) 26 22 - 29 mmol/L    Anion Gap 11 7 - 15 mmol/L    Urea Nitrogen 16.1 8.0 - 23.0 mg/dL    Creatinine 0.79 0.51 - 0.95 mg/dL    Calcium 9.6 8.8 - 10.2 mg/dL    Glucose 213 (H) 70 - 99 mg/dL    GFR Estimate 76 >60 mL/min/1.73m2   CBC with Platelets and Reflex to Iron Studies    Collection Time: 05/15/23  9:48 AM   Result Value Ref Range    WBC Count 5.6 4.0 - 11.0 10e3/uL    RBC Count 3.48 (L) 3.80 - 5.20 10e6/uL    Hemoglobin 10.7 (L) 11.7 - 15.7 g/dL    Hematocrit 34.1 (L) 35.0 - 47.0 %    MCV 98 78 - 100 fL    MCH 30.7 26.5 - 33.0 pg    MCHC 31.4 (L) 31.5 - 36.5 g/dL    RDW 14.7 10.0 - 15.0 %    Platelet Count 303 150 - 450 10e3/uL   Extra Green Top (Lithium Heparin) Tube    Collection Time: 05/15/23  9:48 AM   Result Value Ref Range    Hold Specimen JIC    Iron & Iron Binding Capacity    Collection Time: 05/15/23  9:48 AM   Result Value Ref Range    Iron 58 37 - 145 ug/dL    Iron Binding Capacity 237 (L) 240 - 430 ug/dL    Iron Sat Index 24 15 - 46 %      EKG: Normal Sinus Rhythm, normal axis, normal intervals, no acute ST/T changes  c/w ischemia, no LVH by voltage criteria Left atrial enlargement. Possible LVH.       Revised Cardiac Risk Index (RCRI):  The patient has the following serious cardiovascular risks for perioperative complications:   - No serious cardiac risks = 0 points     RCRI Interpretation: 0 points: Class I (very low risk - 0.4% complication rate)           Signed Electronically by: SURESH Mehta CNP  Copy of this evaluation report is provided to requesting physician.

## 2023-05-15 NOTE — PATIENT INSTRUCTIONS
For informational purposes only. Not to replace the advice of your health care provider. Copyright   2003,  [employer] [facility]. All rights reserved. Clinically reviewed by Lianet Gatica MD. [employer] [id number] - REV .  Preparing for Your Surgery  Getting started  A nurse will call you to review your health history and instructions. They will give you an arrival time based on your scheduled surgery time. Please be ready to share:  Your doctor's clinic name and phone number  Your medical, surgical, and anesthesia history  A list of allergies and sensitivities  A list of medicines, including herbal treatments and over-the-counter drugs  Whether the patient has a legal guardian (ask how to send us the papers in advance)  Please tell us if you're pregnant--or if there's any chance you might be pregnant. Some surgeries may injure a fetus (unborn baby), so they require a pregnancy test. Surgeries that are safe for a fetus don't always need a test, and you can choose whether to have one.   If you have a child who's having surgery, please ask for a copy of Preparing for Your Child's Surgery.    Preparing for surgery  Within 10 to 30 days of surgery: Have a pre-op exam (sometimes called an H&P, or History and Physical). This can be done at a clinic or pre-operative center.  If you're having a , you may not need this exam. Talk to your care team.  At your pre-op exam, talk to your care team about all medicines you take. If you need to stop any medicines before surgery, ask when to start taking them again.  We do this for your safety. Many medicines can make you bleed too much during surgery. Some change how well surgery (anesthesia) drugs work.  Call your insurance company to let them know you're having surgery. (If you don't have insurance, call [phone].)  Call your clinic if there's any change in your health. This includes signs of a cold or flu (sore throat, runny nose, cough, rash, fever). It  also includes a scrape or scratch near the surgery site.  If you have questions on the day of surgery, call your hospital or surgery center.  Eating and drinking guidelines  For your safety: Unless your surgeon tells you otherwise, follow the guidelines below.  Eat and drink as usual until 8 hours before you arrive for surgery. After that, no food or milk.  Drink clear liquids until 2 hours before you arrive. These are liquids you can see through, like water, Gatorade, and Propel Water. They also include plain black coffee and tea (no cream or milk), candy, and breath mints. You can spit out gum when you arrive.  If you drink alcohol: Stop drinking it the night before surgery.  If your care team tells you to take medicine on the morning of surgery, it's okay to take it with a sip of water.  Preventing infection  Shower or bathe the night before and morning of your surgery. Follow the instructions your clinic gave you. (If no instructions, use regular soap.)  Don't shave or clip hair near your surgery site. We'll remove the hair if needed.  Don't smoke or vape the morning of surgery. You may chew nicotine gum up to 2 hours before surgery. A nicotine patch is okay.  Note: Some surgeries require you to completely quit smoking and nicotine. Check with your surgeon.  Your care team will make every effort to keep you safe from infection. We will:  Clean our hands often with soap and water (or an alcohol-based hand rub).  Clean the skin at your surgery site with a special soap that kills germs.  Give you a special gown to keep you warm. (Cold raises the risk of infection.)  Wear special hair covers, masks, gowns and gloves during surgery.  Give antibiotic medicine, if prescribed. Not all surgeries need antibiotics.  What to bring on the day of surgery  Photo ID and insurance card  Copy of your health care directive, if you have one  Glasses and hearing aids (bring cases)  You can't wear contacts during surgery  Inhaler and  eye drops, if you use them (tell us about these when you arrive)  CPAP machine or breathing device, if you use them  A few personal items, if spending the night  If you have . . .  A pacemaker, ICD (cardiac defibrillator) or other implant: Bring the ID card.  An implanted stimulator: Bring the remote control.  A legal guardian: Bring a copy of the certified (court-stamped) guardianship papers.  Please remove any jewelry, including body piercings. Leave jewelry and other valuables at home.  If you're going home the day of surgery  You must have a responsible adult drive you home. They should stay with you overnight as well.  If you don't have someone to stay with you, and you aren't safe to go home alone, we may keep you overnight. Insurance often won't pay for this.  After surgery  If it's hard to control your pain or you need more pain medicine, please call your surgeon's office.  Questions?   If you have any questions for your care team, list them here: _________________________________________________________________________________________________________________________________________________________________________ ____________________________________ ____________________________________ ____________________________________      Only take synthroid morning of surgery     IF YOU DO NOT GET A CALL FOR THE ECHOCARDIOGRAM, PLEASE CALL: [phone]  They are located in [address] on the [facility]

## 2023-05-15 NOTE — H&P (VIEW-ONLY)
[address], [address]  [facility] [address]  Phone: [phone]  Primary Provider: Jessica Anthony  Pre-op Performing Provider: BELLA HANSON      PREOPERATIVE EVALUATION:  Today's date: 5/15/2023    Manasa French is a 77 year old female who presents for a preoperative evaluation.       View : No data to display.              Surgical Information:  Surgery/Procedure: Right total hip arthroplasty  Surgery Location:  OR  Surgeon: Dr. Mixon  Surgery Date: 05/30/23  Time of Surgery: 0730 am  Where patient plans to recover: At home with family  Fax number for surgical facility: Note does not need to be faxed, will be available electronically in Epic.    Assessment & Plan     The proposed surgical procedure is considered INTERMEDIATE risk.    (Z01.818) Preop general physical exam  (primary encounter diagnosis)  Comment: ok for surgery. No concerns today   Plan: CBC w/ Reflex to Iron Studies, EKG 12-lead         complete w/read - Clinics, Iron & Iron Binding         Capacity, Ferritin            (E11.9) Type 2 diabetes mellitus without complication, without long-term current use of insulin (H)  Comment: New dx last year of A1C of 6.6. Same result today.   Plan: Albumin Random Urine Quantitative with Creat         Ratio, HEMOGLOBIN A1C            (I10) Benign essential hypertension  Comment: minimal elevation today. Due for labs   Plan: BASIC METABOLIC PANEL            (M16.11) Osteoarthritis of right hip, unspecified osteoarthritis type  Comment: plan surgery   Plan:     (I51.7) Left atrial enlargement  Comment: New finding on EKG. Along with possible LVH. This should not preclude surgery. She has no chest symptoms and can perform 4 METS.   Plan: echocardiogram is ordered today for further eval         (D64.9) Anemia, unspecified type  Comment: Hgb 10.7 today. Usually is in the low 11s. Ferritin Normal. Added B12 for borderline high MCV.    Addendum: B12  deficiency noted. Called and messaged pt to come get injection done and to take 2500mcg daily for the next month then switch to 1000mcg daily for now.   Plan: Vitamin B12                  - No identified additional risk factors other than previously addressed    Antiplatelet or Anticoagulation Medication Instructions:   - warfarin: Thromboembolic risk is low (<4%/year). HOLD warfarin for 5 days without bridging before procedure.       Additional Medication Instructions:  Only take synthroid morning of surgery       RECOMMENDATION:  APPROVAL GIVEN to proceed with proposed procedure, without further diagnostic evaluation.        Subjective     HPI related to upcoming procedure:   Going for R SANTHOSH   Has been feeling well. No recent illness.   No CP. SOB comes with 2 flights of stairs. Struggles with a lot of pain from this hip.           5/15/2023     8:35 AM   Preop Questions   1. Have you ever had a heart attack or stroke? No   2. Have you ever had surgery on your heart or blood vessels, such as a stent placement, a coronary artery bypass, or surgery on an artery in your head, neck, heart, or legs? No   3. Do you have chest pain with activity? No   4. Do you have a history of  heart failure? No   5. Do you currently have a cold, bronchitis or symptoms of other infection? No. Chronic rhinits    6. Do you have a cough, shortness of breath, or wheezing? No   7. Do you or anyone in your family have previous history of blood clots? YES - DVT RUE on coumadin   8. Do you or does anyone in your family have a serious bleeding problem such as prolonged bleeding following surgeries or cuts? No   9. Have you ever had problems with anemia or been told to take iron pills? No   10. Have you had any abnormal blood loss such as black, tarry or bloody stools, or abnormal vaginal bleeding? No   11. Have you ever had a blood transfusion? No   12. Are you willing to have a blood transfusion if it is medically needed before, during, or  after your surgery? Yes   13. Have you or any of your relatives ever had problems with anesthesia? No   14. Do you have sleep apnea, excessive snoring or daytime drowsiness? No   15. Do you have any artifical heart valves or other implanted medical devices like a pacemaker, defibrillator, or continuous glucose monitor? No   16. Do you have artificial joints? No   17. Are you allergic to latex? No     Health Care Directive:  Patient does not have a Health Care Directive or Living Will: Patient states has Advance Directive and will bring in a copy to clinic.    Preoperative Review of :   reviewed - no record of controlled substances prescribed.      Status of Chronic Conditions:  See problem list for active medical problems.  Problems all longstanding and stable, except as noted/documented.  See ROS for pertinent symptoms related to these conditions.      Review of Systems  Constitutional, neuro, ENT, endocrine, pulmonary, cardiac, gastrointestinal, genitourinary, musculoskeletal, integument and psychiatric systems are negative, except as otherwise noted.    Patient Active Problem List    Diagnosis Date Noted     Diabetes mellitus, type 2 (H) 04/06/2023     Priority: Medium     Primary open angle glaucoma (POAG) of both eyes      Priority: Medium     right eye moderate, left eye mild       Macular degeneration of both eyes      Priority: Medium     Pseudophakia      Priority: Medium     Osteoporosis      Priority: Medium     History of Graves' disease      Priority: Medium     s/p BARAHONA       Postablative hypothyroidism      Priority: Medium     Benign essential hypertension      Priority: Medium     History of hydrocephalus      Priority: Medium     s/p  shunt placement in 1987       Sensorineural hearing loss, bilateral      Priority: Medium     Personal history of DVT (deep vein thrombosis) 2019     Priority: Medium     RUE; on chronic AC        Past Medical History:   Diagnosis Date     Benign essential  hypertension      History of Graves' disease     s/p BARAHONA     History of hydrocephalus     s/p  shunt placement in 1987     Macular degeneration of both eyes      Osteoporosis      Personal history of DVT (deep vein thrombosis) 2019    RUE; on chronic AC     Postablative hypothyroidism      Primary open angle glaucoma (POAG) of both eyes     right eye moderate, left eye mild     Pseudophakia      Sensorineural hearing loss, bilateral      Past Surgical History:   Procedure Laterality Date     ARTHROSCOPY SHOULDER Left      CATARACT IOL, RT/LT Bilateral      DECOMPRESSION, FUSION LUMBAR POSTERIOR ONE LEVEL, COMBINED      L5-S1     EXCISE GANGLION WRIST Right      HERNIA REPAIR, UMBILICAL       LUMPECTOMY BREAST Left     benign     Current Outpatient Medications   Medication Sig Dispense Refill     alendronate (FOSAMAX) 70 MG tablet TAKE 1 TABLET EVERY WEEK AS DIRECTED 12 tablet 1     amLODIPine (NORVASC) 5 MG tablet Take 1 tablet (5 mg) by mouth daily 90 tablet 3     Calcium Carb-Cholecalciferol (CALCIUM CARBONATE-VITAMIN D3 PO)        carboxymethylcellulose (CELLUVISC/REFRESH LIQUIGEL) 1 % ophthalmic solution Place 1 drop into both eyes 3 times daily       Cholecalciferol (VITAMIN D) 1000 UNITS capsule Take  by mouth.       latanoprost (XALATAN) 0.005 % ophthalmic solution Place 1 drop into both eyes At Bedtime 7.5 mL 1     levothyroxine (SYNTHROID/LEVOTHROID) 100 MCG tablet Take 1 tablet (100 mcg) by mouth daily 90 tablet 3     losartan (COZAAR) 100 MG tablet Take 1 tablet (100 mg) by mouth daily TAKE 1 TABLET EVERY DAY 90 tablet 3     Multiple Vitamins-Minerals (ICAPS AREDS 2 PO)        timolol maleate (TIMOPTIC) 0.5 % ophthalmic solution Place 1 drop into both eyes every morning 15 mL 1     valACYclovir (VALTREX) 1000 mg tablet Take 1 tablet (1,000 mg) by mouth 3 times daily for 7 days 21 tablet 0     Vitamin K 100 MCG TABS Take 1 tablet by mouth daily 90 tablet 3     warfarin ANTICOAGULANT (COUMADIN) 5 MG  "tablet TAKE 1 TABLET ON MONDAY, WEDNESDAY AND FRIDAY THEN TAKE 1 AND 1/2 TABLETS DAILY ON ALL OTHER DAYS OR AS DIRECTED BY CLINIC 110 tablet 3       Allergies   Allergen Reactions     Alphagan [Brimonidine] Other (See Comments)     Itchy eyes     Cosopt [Dorzolamide Hcl-Timolol Mal] Other (See Comments)     Itchy eyes        Social History     Tobacco Use     Smoking status: Never     Smokeless tobacco: Never   Vaping Use     Vaping status: Not on file   Substance Use Topics     Alcohol use: Yes     Comment: 4 glasses wine per week     Family History   Problem Relation Age of Onset     Colon Cancer Mother      Hypertension Mother      Breast Cancer Mother      Glaucoma Mother      Leukemia Father      Diabetes Type 2  Father      Hypertension Sister      Macular Degeneration Sister      Diabetes Type 2  Brother      Breast Cancer Maternal Aunt         multiple aunts     Myocardial Infarction No family hx of      Cerebrovascular Disease No family hx of      Coronary Artery Disease Early Onset No family hx of      Ovarian Cancer No family hx of      History   Drug Use No         Objective     BP (!) 140/66 (BP Location: Right arm, Patient Position: Sitting, Cuff Size: Adult Regular)   Pulse 86   Temp 97.4  F (36.3  C)   Resp 18   Ht 1.638 m (5' 4.5\")   Wt 71.7 kg (158 lb 1.6 oz)   LMP  (LMP Unknown)   SpO2 98%   BMI 26.72 kg/m      Physical Exam    GENERAL APPEARANCE: healthy, alert and no distress     EYES: EOMI     RESP: lungs clear to auscultation - no rales, rhonchi or wheezes     CV: regular rates and rhythm, normal S1 S2, no S3 or S4 and no murmur, click or rub     MS: extremities normal- no gross deformities noted, no evidence of inflammation in joints, FROM in all extremities.     SKIN: no suspicious lesions or rashes     NEURO: Normal strength and tone, sensory exam grossly normal, mentation intact and speech normal     PSYCH: mentation appears normal. and affect normal/bright    Recent Labs   Lab " Test 05/01/23  0905 05/01/23  0000 06/17/22  0000 06/08/22  1438   HGB  --   --   --  11.4*   PLT  --   --   --  235   INR 2.2* 2.2   < >  --    NA  --   --   --  141   POTASSIUM  --   --   --  4.1   CR  --   --   --  0.87   A1C  --   --   --  6.6*    < > = values in this interval not displayed.        Diagnostics:  Recent Results (from the past 24 hour(s))   Albumin Random Urine Quantitative with Creat Ratio    Collection Time: 05/15/23  9:48 AM   Result Value Ref Range    Creatinine Urine mg/dL 72.2 mg/dL    Albumin Urine mg/L <12.0 mg/L    Albumin Urine mg/g Cr     HEMOGLOBIN A1C    Collection Time: 05/15/23  9:48 AM   Result Value Ref Range    Hemoglobin A1C 6.6 (H) 0.0 - 5.6 %   BASIC METABOLIC PANEL    Collection Time: 05/15/23  9:48 AM   Result Value Ref Range    Sodium 141 136 - 145 mmol/L    Potassium 4.6 3.4 - 5.3 mmol/L    Chloride 104 98 - 107 mmol/L    Carbon Dioxide (CO2) 26 22 - 29 mmol/L    Anion Gap 11 7 - 15 mmol/L    Urea Nitrogen 16.1 8.0 - 23.0 mg/dL    Creatinine 0.79 0.51 - 0.95 mg/dL    Calcium 9.6 8.8 - 10.2 mg/dL    Glucose 213 (H) 70 - 99 mg/dL    GFR Estimate 76 >60 mL/min/1.73m2   CBC with Platelets and Reflex to Iron Studies    Collection Time: 05/15/23  9:48 AM   Result Value Ref Range    WBC Count 5.6 4.0 - 11.0 10e3/uL    RBC Count 3.48 (L) 3.80 - 5.20 10e6/uL    Hemoglobin 10.7 (L) 11.7 - 15.7 g/dL    Hematocrit 34.1 (L) 35.0 - 47.0 %    MCV 98 78 - 100 fL    MCH 30.7 26.5 - 33.0 pg    MCHC 31.4 (L) 31.5 - 36.5 g/dL    RDW 14.7 10.0 - 15.0 %    Platelet Count 303 150 - 450 10e3/uL   Extra Green Top (Lithium Heparin) Tube    Collection Time: 05/15/23  9:48 AM   Result Value Ref Range    Hold Specimen JIC    Iron & Iron Binding Capacity    Collection Time: 05/15/23  9:48 AM   Result Value Ref Range    Iron 58 37 - 145 ug/dL    Iron Binding Capacity 237 (L) 240 - 430 ug/dL    Iron Sat Index 24 15 - 46 %      EKG: Normal Sinus Rhythm, normal axis, normal intervals, no acute ST/T changes  c/w ischemia, no LVH by voltage criteria Left atrial enlargement. Possible LVH.       Revised Cardiac Risk Index (RCRI):  The patient has the following serious cardiovascular risks for perioperative complications:   - No serious cardiac risks = 0 points     RCRI Interpretation: 0 points: Class I (very low risk - 0.4% complication rate)           Signed Electronically by: SURESH Mehta CNP  Copy of this evaluation report is provided to requesting physician.

## 2023-05-15 NOTE — PROGRESS NOTES
Upcoming procedure: 5/30 23 Right Hip Arthroplasty    Per PCP: 5/15/23 Pre Op visit note    Antiplatelet or Anticoagulation Medication Instructions:   - warfarin: Thromboembolic risk is low (<4%/year). HOLD warfarin for 5 days without bridging before procedure.     Will route to [facility] for post procedure plan.

## 2023-05-15 NOTE — TELEPHONE ENCOUNTER
See MyChart from patient needing provider review.  Please write back to pt or advise if clinic follow up needed.     Sofia Catalan, NING  [facility] RN Triage Team

## 2023-05-15 NOTE — RESULT ENCOUNTER NOTE
Glen Ellis, I left you a message but also wanted to send over the details.  Your B12 is very low Which is causing your anemia. I want you to come in for B12 injection tomorrow if you are able to  so we can get this number up.  I also want you to take a pill of 2500 mcg daily for the next month and then switch to 1000 mcg daily for now.    You also do have diabetes as your number is still the same. Please be sure to work on diet and exercise as you are able. You likely will be offered medication at your upcoming visit   Let me know if you have any questions  Catrachita

## 2023-05-16 NOTE — PROGRESS NOTES
"BETH-PROCEDURAL ANTICOAGULATION  MANAGEMENT    ASSESSMENT     Warfarin interruption plan for Right Hip Arthroplasty on 2023.    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 For Caprini Score >= 9: enoxaparin or heparin prophylaxis recommended for 30 days (or until therapeutic on warfarin).     RECOMMENDATION       Pre-Procedure:  o Hold warfarin for 5 days, until after procedure startin2023   o No Bridge      Post-Procedure:  o Resume warfarin dose if okay with provider doing procedure on night of procedure, 2023 PM: 5mg  o Parenteral anticoagulation per surgeon discretion/protocols  o Recheck INR ~ 7 days after resuming warfarin       Plan routed to referring provider for approval  ?   Jesi Castro, [facility]    SUBJECTIVE/OBJECTIVE     Manasa French, a 77 year old female    Goal INR Range: 2.0-3.0     Patient bridged in past: No (started on DOAC at time of VTE, therefore Lovenox bridge not needed)    Wt Readings from Last 3 Encounters:   05/15/23 71.7 kg (158 lb 1.6 oz)   10/19/22 67.6 kg (149 lb)   22 69.4 kg (153 lb)      Ideal body weight: 55.8 kg (123 lb 2 oz)  Adjusted ideal body weight: 62.2 kg (137 lb 1.9 oz)     Estimated body mass index is 26.72 kg/m  as calculated from the following:    Height as of an earlier encounter on 5/15/23: 1.638 m (5' 4.5\").    Weight as of an earlier encounter on 5/15/23: 71.7 kg (158 lb 1.6 oz).    Lab Results   Component " Value Date    INR 2.2 (A) 05/01/2023    INR 2.2 05/01/2023    INR 3.9 (H) 04/21/2023     Lab Results   Component Value Date    HGB 10.7 (L) 05/15/2023    HCT 34.1 (L) 05/15/2023     05/15/2023     Lab Results   Component Value Date    CR 0.79 05/15/2023    CR 0.87 06/08/2022    CR 0.83 11/20/2020     Estimated Creatinine Clearance: 57.6 mL/min (based on SCr of 0.79 mg/dL).

## 2023-05-17 ENCOUNTER — ALLIED HEALTH/NURSE VISIT (OUTPATIENT)
Dept: FAMILY MEDICINE | Facility: CLINIC | Age: 78
End: 2023-05-17
Payer: MEDICARE

## 2023-05-17 DIAGNOSIS — E53.8 VITAMIN B12 DEFICIENCY (NON ANEMIC): Primary | ICD-10-CM

## 2023-05-17 PROCEDURE — 99207 PR NO CHARGE NURSE ONLY: CPT

## 2023-05-17 PROCEDURE — 96372 THER/PROPH/DIAG INJ SC/IM: CPT | Performed by: NURSE PRACTITIONER

## 2023-05-17 RX ADMIN — CYANOCOBALAMIN 1000 MCG: 1000 INJECTION, SOLUTION INTRAMUSCULAR; SUBCUTANEOUS at 11:13

## 2023-05-19 ENCOUNTER — DOCUMENTATION ONLY (OUTPATIENT)
Dept: ANTICOAGULATION | Facility: CLINIC | Age: 78
End: 2023-05-19
Payer: MEDICARE

## 2023-05-19 DIAGNOSIS — Z86.718 PERSONAL HISTORY OF DVT (DEEP VEIN THROMBOSIS): Primary | ICD-10-CM

## 2023-05-19 LAB — INR HOME MONITORING: 2.7 (ref 2–3)

## 2023-05-19 NOTE — PROGRESS NOTES
ANTICOAGULATION  MANAGEMENT-Home Monitor Managed by Exception    Manasa DIEGO French 77 year old female is on warfarin with therapeutic INR result. (Goal INR 2.0-3.0)    Recent labs: (last 7 days)     05/19/23  0000   INR 2.7         Previous INR was Therapeutic    Medication, diet, health changes since last INR:chart reviewed; none identified    Contacted within the last 12 weeks by phone on 5/1/23      AUBREY     Manasa was NOT contacted regarding therapeutic result today per home monitoring policy manage by exception agreement.   Current warfarin dose is to be continued:     Summary  As of 5/19/2023    Full warfarin instructions:  5/25: Hold; 5/26: Hold; 5/27: Hold; 5/28: Hold; 5/29: Hold; Otherwise 5 mg every day   Next INR check:  6/2/2023           ?   Ember Amezcua RN  Anticoagulation Clinic  5/19/2023    _______________________________________________________________________     Anticoagulation Episode Summary     Current INR goal:  2.0-3.0   TTR:  88.7 % (1 y)   Target end date:  Indefinite   Send INR reminders to:  [facility]    Indications    Acute deep vein thrombosis (DVT) of axillary vein and internal jugular (H) (Resolved) [I82.A19]  Current use of long term anticoagulation (Resolved) [Z79.01]  Acute deep vein thrombosis (DVT) of axillary vein of right upper extremity (H) (Resolved) [I82.A11]  Personal history of DVT (deep vein thrombosis) [Z86.718]           Comments:  [employer] [facility]- Managed by exception         Anticoagulation Care Providers     Provider Role Specialty Phone number    Rema Braxton DO Referring Family Medicine [phone]    Jessica Anthony MD Referring Internal Medicine [phone]    Christopher Smith MD Responsible Hematology [phone]

## 2023-05-19 NOTE — PROGRESS NOTES
Called and spoke with patient and discussed warfarin interruption plan for upcoming procedure on 5/30/23 ~ Total Right Hip Arthroplasty at [facility] and she will be staying overnight post procedure.    Patient reported that she had INR done  during pre op visit on 5/15 and it was 2.7.  No result routed to [facility] and no result found in the chart.    ACN called [facility] and they don't have the result either.  Will update patient.

## 2023-05-25 NOTE — PROGRESS NOTES
Called patient with lab results:  -TSH is low at 0.18, T4 is elevated at 1.58.  Per chart review it appears that patient has had this pattern for quite some time with fluctuations.  -Reduce levothyroxine to 100 mcg daily.  Prescription was sent to the patient's mail order pharmacy.  In the interim patient was advised to take half tablet of her levothyroxine every other day alternating with a full tablet every other day until her new prescription comes  -Get TSH/T4 in 6 weeks (ordered)  -Reviewed other labs, her lipid profile was elevated but this was only on 6 hours of fasting so we will repeat  -Reviewed A1c of 6.6, up from 5.9%.  At this time we will just continue to monitor and patient will continue to follow her healthy lifestyle.  Recommend repeat in 3 to 6 months.  -Discussed starting a statin but patient wishes to wait at this time and repeat lipids in a few months and A1c, which is reasonable    SURESH Sinclair CNP    
DISCHARGE

## 2023-05-29 RX ORDER — ACETAMINOPHEN 325 MG/1
1-2 TABLET ORAL DAILY PRN
Status: ON HOLD | COMMUNITY
End: 2023-05-31

## 2023-05-29 RX ORDER — CALCIUM CARBONATE/VITAMIN D3 600 MG-10
1 TABLET ORAL 2 TIMES DAILY
COMMUNITY

## 2023-05-29 RX ORDER — VITAMIN B COMPLEX
1 TABLET ORAL DAILY
COMMUNITY

## 2023-05-30 ENCOUNTER — ANESTHESIA (OUTPATIENT)
Dept: SURGERY | Facility: CLINIC | Age: 78
End: 2023-05-30
Payer: MEDICARE

## 2023-05-30 ENCOUNTER — APPOINTMENT (OUTPATIENT)
Dept: PHYSICAL THERAPY | Facility: CLINIC | Age: 78
End: 2023-05-30
Attending: ORTHOPAEDIC SURGERY
Payer: MEDICARE

## 2023-05-30 ENCOUNTER — HOSPITAL ENCOUNTER (OUTPATIENT)
Facility: CLINIC | Age: 78
Discharge: HOME | End: 2023-05-31
Attending: ORTHOPAEDIC SURGERY | Admitting: ORTHOPAEDIC SURGERY
Payer: MEDICARE

## 2023-05-30 ENCOUNTER — ANESTHESIA EVENT (OUTPATIENT)
Dept: SURGERY | Facility: CLINIC | Age: 78
End: 2023-05-30
Payer: MEDICARE

## 2023-05-30 ENCOUNTER — APPOINTMENT (OUTPATIENT)
Dept: GENERAL RADIOLOGY | Facility: CLINIC | Age: 78
End: 2023-05-30
Attending: ORTHOPAEDIC SURGERY
Payer: MEDICARE

## 2023-05-30 DIAGNOSIS — Z96.641 S/P TOTAL RIGHT HIP ARTHROPLASTY: Primary | ICD-10-CM

## 2023-05-30 LAB
ABO/RH(D): NORMAL
ANTIBODY SCREEN: NEGATIVE
CREAT SERPL-MCNC: 0.91 MG/DL (ref 0.51–0.95)
FASTING STATUS PATIENT QL REPORTED: YES
GFR SERPL CREATININE-BSD FRML MDRD: 64 ML/MIN/1.73M2
GLUCOSE SERPL-MCNC: 123 MG/DL (ref 70–99)
INR PPP: 1.27 (ref 0.85–1.15)
POTASSIUM SERPL-SCNC: 3.7 MMOL/L (ref 3.4–5.3)
SPECIMEN EXPIRATION DATE: NORMAL

## 2023-05-30 PROCEDURE — 710N000009 HC RECOVERY PHASE 1, LEVEL 1, PER MIN: Performed by: ORTHOPAEDIC SURGERY

## 2023-05-30 PROCEDURE — 250N000025 HC SEVOFLURANE, PER MIN: Performed by: ORTHOPAEDIC SURGERY

## 2023-05-30 PROCEDURE — 370N000017 HC ANESTHESIA TECHNICAL FEE, PER MIN: Performed by: ORTHOPAEDIC SURGERY

## 2023-05-30 PROCEDURE — 250N000011 HC RX IP 250 OP 636: Performed by: ORTHOPAEDIC SURGERY

## 2023-05-30 PROCEDURE — 258N000003 HC RX IP 258 OP 636: Performed by: PHYSICIAN ASSISTANT

## 2023-05-30 PROCEDURE — 97530 THERAPEUTIC ACTIVITIES: CPT | Mod: GP

## 2023-05-30 PROCEDURE — 258N000003 HC RX IP 258 OP 636: Performed by: ANESTHESIOLOGY

## 2023-05-30 PROCEDURE — 97161 PT EVAL LOW COMPLEX 20 MIN: CPT | Mod: GP

## 2023-05-30 PROCEDURE — 250N000013 HC RX MED GY IP 250 OP 250 PS 637: Performed by: ORTHOPAEDIC SURGERY

## 2023-05-30 PROCEDURE — 82947 ASSAY GLUCOSE BLOOD QUANT: CPT | Performed by: ANESTHESIOLOGY

## 2023-05-30 PROCEDURE — 85610 PROTHROMBIN TIME: CPT | Performed by: ANESTHESIOLOGY

## 2023-05-30 PROCEDURE — 82565 ASSAY OF CREATININE: CPT | Performed by: ANESTHESIOLOGY

## 2023-05-30 PROCEDURE — 272N000001 HC OR GENERAL SUPPLY STERILE: Performed by: ORTHOPAEDIC SURGERY

## 2023-05-30 PROCEDURE — 999N000141 HC STATISTIC PRE-PROCEDURE NURSING ASSESSMENT: Performed by: ORTHOPAEDIC SURGERY

## 2023-05-30 PROCEDURE — 258N000003 HC RX IP 258 OP 636: Performed by: NURSE ANESTHETIST, CERTIFIED REGISTERED

## 2023-05-30 PROCEDURE — 36415 COLL VENOUS BLD VENIPUNCTURE: CPT | Performed by: ANESTHESIOLOGY

## 2023-05-30 PROCEDURE — C1713 ANCHOR/SCREW BN/BN,TIS/BN: HCPCS | Performed by: ORTHOPAEDIC SURGERY

## 2023-05-30 PROCEDURE — 250N000013 HC RX MED GY IP 250 OP 250 PS 637: Performed by: PHYSICIAN ASSISTANT

## 2023-05-30 PROCEDURE — 97116 GAIT TRAINING THERAPY: CPT | Mod: GP

## 2023-05-30 PROCEDURE — 250N000011 HC RX IP 250 OP 636: Performed by: PHYSICIAN ASSISTANT

## 2023-05-30 PROCEDURE — 84132 ASSAY OF SERUM POTASSIUM: CPT | Performed by: ANESTHESIOLOGY

## 2023-05-30 PROCEDURE — 999N000063 XR PELVIS AND HIP PORTABLE RIGHT 1 VIEW

## 2023-05-30 PROCEDURE — 250N000009 HC RX 250: Performed by: ORTHOPAEDIC SURGERY

## 2023-05-30 PROCEDURE — 250N000009 HC RX 250: Performed by: NURSE ANESTHETIST, CERTIFIED REGISTERED

## 2023-05-30 PROCEDURE — 258N000001 HC RX 258: Performed by: ORTHOPAEDIC SURGERY

## 2023-05-30 PROCEDURE — C1776 JOINT DEVICE (IMPLANTABLE): HCPCS | Performed by: ORTHOPAEDIC SURGERY

## 2023-05-30 PROCEDURE — 360N000077 HC SURGERY LEVEL 4, PER MIN: Performed by: ORTHOPAEDIC SURGERY

## 2023-05-30 PROCEDURE — 250N000011 HC RX IP 250 OP 636: Performed by: NURSE ANESTHETIST, CERTIFIED REGISTERED

## 2023-05-30 PROCEDURE — 86901 BLOOD TYPING SEROLOGIC RH(D): CPT | Performed by: PHYSICIAN ASSISTANT

## 2023-05-30 PROCEDURE — 86850 RBC ANTIBODY SCREEN: CPT | Performed by: PHYSICIAN ASSISTANT

## 2023-05-30 DEVICE — IMP HEAD FEM S&N BIOLOX DELTA 36MM 12/14 TPR SM +0 76539165: Type: IMPLANTABLE DEVICE | Site: HIP | Status: FUNCTIONAL

## 2023-05-30 DEVICE — IMP STEM SNN HIGH OFFSET SZ 8 71356108: Type: IMPLANTABLE DEVICE | Site: HIP | Status: FUNCTIONAL

## 2023-05-30 DEVICE — IMP SCR ACET SNN SPHERICAL HEAD 6.5X25MM 71332525: Type: IMPLANTABLE DEVICE | Site: HIP | Status: FUNCTIONAL

## 2023-05-30 DEVICE — IMPLANTABLE DEVICE: Type: IMPLANTABLE DEVICE | Site: HIP | Status: FUNCTIONAL

## 2023-05-30 DEVICE — IMP SHELL SNR ACET R3 3H 52MM 71335552: Type: IMPLANTABLE DEVICE | Site: HIP | Status: FUNCTIONAL

## 2023-05-30 RX ORDER — ACETAMINOPHEN 325 MG/1
975 TABLET ORAL EVERY 8 HOURS
Status: DISCONTINUED | OUTPATIENT
Start: 2023-05-30 | End: 2023-05-31 | Stop reason: HOSPADM

## 2023-05-30 RX ORDER — AMOXICILLIN 250 MG
1 CAPSULE ORAL 2 TIMES DAILY
Status: DISCONTINUED | OUTPATIENT
Start: 2023-05-30 | End: 2023-05-31 | Stop reason: HOSPADM

## 2023-05-30 RX ORDER — CEFAZOLIN SODIUM/WATER 2 G/20 ML
2 SYRINGE (ML) INTRAVENOUS SEE ADMIN INSTRUCTIONS
Status: DISCONTINUED | OUTPATIENT
Start: 2023-05-30 | End: 2023-05-30 | Stop reason: HOSPADM

## 2023-05-30 RX ORDER — FENTANYL CITRATE 0.05 MG/ML
25 INJECTION, SOLUTION INTRAMUSCULAR; INTRAVENOUS EVERY 5 MIN PRN
Status: DISCONTINUED | OUTPATIENT
Start: 2023-05-30 | End: 2023-05-30 | Stop reason: HOSPADM

## 2023-05-30 RX ORDER — AMLODIPINE BESYLATE 5 MG/1
5 TABLET ORAL DAILY
Status: DISCONTINUED | OUTPATIENT
Start: 2023-05-30 | End: 2023-05-31 | Stop reason: HOSPADM

## 2023-05-30 RX ORDER — CARBOXYMETHYLCELLULOSE SODIUM 10 MG/ML
1 GEL OPHTHALMIC 3 TIMES DAILY
Status: DISCONTINUED | OUTPATIENT
Start: 2023-05-30 | End: 2023-05-31 | Stop reason: HOSPADM

## 2023-05-30 RX ORDER — OXYCODONE HYDROCHLORIDE 5 MG/1
5-10 TABLET ORAL EVERY 4 HOURS PRN
Qty: 26 TABLET | Refills: 0 | Status: SHIPPED | OUTPATIENT
Start: 2023-05-30 | End: 2023-05-31

## 2023-05-30 RX ORDER — POLYETHYLENE GLYCOL 3350 17 G/17G
17 POWDER, FOR SOLUTION ORAL DAILY
Status: DISCONTINUED | OUTPATIENT
Start: 2023-05-31 | End: 2023-05-31 | Stop reason: HOSPADM

## 2023-05-30 RX ORDER — HYDROXYZINE HYDROCHLORIDE 10 MG/1
10 TABLET, FILM COATED ORAL EVERY 6 HOURS PRN
Status: DISCONTINUED | OUTPATIENT
Start: 2023-05-30 | End: 2023-05-31 | Stop reason: HOSPADM

## 2023-05-30 RX ORDER — CEFAZOLIN SODIUM 1 G/3ML
1 INJECTION, POWDER, FOR SOLUTION INTRAMUSCULAR; INTRAVENOUS EVERY 8 HOURS
Status: COMPLETED | OUTPATIENT
Start: 2023-05-30 | End: 2023-05-31

## 2023-05-30 RX ORDER — ACETAMINOPHEN 325 MG/1
650 TABLET ORAL EVERY 4 HOURS PRN
Qty: 100 TABLET | Refills: 0 | Status: SHIPPED | OUTPATIENT
Start: 2023-05-30

## 2023-05-30 RX ORDER — FENTANYL CITRATE 50 UG/ML
INJECTION, SOLUTION INTRAMUSCULAR; INTRAVENOUS PRN
Status: DISCONTINUED | OUTPATIENT
Start: 2023-05-30 | End: 2023-05-30

## 2023-05-30 RX ORDER — PROPOFOL 10 MG/ML
INJECTION, EMULSION INTRAVENOUS PRN
Status: DISCONTINUED | OUTPATIENT
Start: 2023-05-30 | End: 2023-05-30

## 2023-05-30 RX ORDER — OXYCODONE HYDROCHLORIDE 5 MG/1
5 TABLET ORAL EVERY 4 HOURS PRN
Status: DISCONTINUED | OUTPATIENT
Start: 2023-05-30 | End: 2023-05-31 | Stop reason: HOSPADM

## 2023-05-30 RX ORDER — LATANOPROST 50 UG/ML
1 SOLUTION/ DROPS OPHTHALMIC AT BEDTIME
Status: DISCONTINUED | OUTPATIENT
Start: 2023-05-30 | End: 2023-05-31 | Stop reason: HOSPADM

## 2023-05-30 RX ORDER — HYDROMORPHONE HCL IN WATER/PF 6 MG/30 ML
0.2 PATIENT CONTROLLED ANALGESIA SYRINGE INTRAVENOUS EVERY 5 MIN PRN
Status: DISCONTINUED | OUTPATIENT
Start: 2023-05-30 | End: 2023-05-30 | Stop reason: HOSPADM

## 2023-05-30 RX ORDER — TRANEXAMIC ACID 650 MG/1
1950 TABLET ORAL ONCE
Status: COMPLETED | OUTPATIENT
Start: 2023-05-30 | End: 2023-05-30

## 2023-05-30 RX ORDER — HYDROMORPHONE HCL IN WATER/PF 6 MG/30 ML
0.4 PATIENT CONTROLLED ANALGESIA SYRINGE INTRAVENOUS EVERY 5 MIN PRN
Status: DISCONTINUED | OUTPATIENT
Start: 2023-05-30 | End: 2023-05-30 | Stop reason: HOSPADM

## 2023-05-30 RX ORDER — NALOXONE HYDROCHLORIDE 0.4 MG/ML
0.2 INJECTION, SOLUTION INTRAMUSCULAR; INTRAVENOUS; SUBCUTANEOUS
Status: DISCONTINUED | OUTPATIENT
Start: 2023-05-30 | End: 2023-05-31 | Stop reason: HOSPADM

## 2023-05-30 RX ORDER — ONDANSETRON 2 MG/ML
4 INJECTION INTRAMUSCULAR; INTRAVENOUS EVERY 6 HOURS PRN
Status: DISCONTINUED | OUTPATIENT
Start: 2023-05-30 | End: 2023-05-31 | Stop reason: HOSPADM

## 2023-05-30 RX ORDER — PROCHLORPERAZINE MALEATE 5 MG
5 TABLET ORAL EVERY 6 HOURS PRN
Status: DISCONTINUED | OUTPATIENT
Start: 2023-05-30 | End: 2023-05-31 | Stop reason: HOSPADM

## 2023-05-30 RX ORDER — LABETALOL 20 MG/4 ML (5 MG/ML) INTRAVENOUS SYRINGE
PRN
Status: DISCONTINUED | OUTPATIENT
Start: 2023-05-30 | End: 2023-05-30

## 2023-05-30 RX ORDER — SODIUM CHLORIDE, SODIUM LACTATE, POTASSIUM CHLORIDE, CALCIUM CHLORIDE 600; 310; 30; 20 MG/100ML; MG/100ML; MG/100ML; MG/100ML
INJECTION, SOLUTION INTRAVENOUS CONTINUOUS
Status: DISCONTINUED | OUTPATIENT
Start: 2023-05-30 | End: 2023-05-30 | Stop reason: HOSPADM

## 2023-05-30 RX ORDER — SODIUM CHLORIDE, SODIUM LACTATE, POTASSIUM CHLORIDE, CALCIUM CHLORIDE 600; 310; 30; 20 MG/100ML; MG/100ML; MG/100ML; MG/100ML
INJECTION, SOLUTION INTRAVENOUS CONTINUOUS
Status: DISCONTINUED | OUTPATIENT
Start: 2023-05-30 | End: 2023-05-31 | Stop reason: HOSPADM

## 2023-05-30 RX ORDER — FENTANYL CITRATE 0.05 MG/ML
50 INJECTION, SOLUTION INTRAMUSCULAR; INTRAVENOUS EVERY 5 MIN PRN
Status: DISCONTINUED | OUTPATIENT
Start: 2023-05-30 | End: 2023-05-30 | Stop reason: HOSPADM

## 2023-05-30 RX ORDER — HYDROMORPHONE HCL IN WATER/PF 6 MG/30 ML
0.4 PATIENT CONTROLLED ANALGESIA SYRINGE INTRAVENOUS
Status: DISCONTINUED | OUTPATIENT
Start: 2023-05-30 | End: 2023-05-31 | Stop reason: HOSPADM

## 2023-05-30 RX ORDER — NALOXONE HYDROCHLORIDE 0.4 MG/ML
0.4 INJECTION, SOLUTION INTRAMUSCULAR; INTRAVENOUS; SUBCUTANEOUS
Status: DISCONTINUED | OUTPATIENT
Start: 2023-05-30 | End: 2023-05-31 | Stop reason: HOSPADM

## 2023-05-30 RX ORDER — LOSARTAN POTASSIUM 100 MG/1
100 TABLET ORAL DAILY
Status: DISCONTINUED | OUTPATIENT
Start: 2023-05-30 | End: 2023-05-31 | Stop reason: HOSPADM

## 2023-05-30 RX ORDER — ONDANSETRON 4 MG/1
4 TABLET, ORALLY DISINTEGRATING ORAL EVERY 6 HOURS PRN
Status: DISCONTINUED | OUTPATIENT
Start: 2023-05-30 | End: 2023-05-31 | Stop reason: HOSPADM

## 2023-05-30 RX ORDER — LIDOCAINE 40 MG/G
CREAM TOPICAL
Status: DISCONTINUED | OUTPATIENT
Start: 2023-05-30 | End: 2023-05-31 | Stop reason: HOSPADM

## 2023-05-30 RX ORDER — LABETALOL HYDROCHLORIDE 5 MG/ML
10 INJECTION, SOLUTION INTRAVENOUS
Status: DISCONTINUED | OUTPATIENT
Start: 2023-05-30 | End: 2023-05-30 | Stop reason: HOSPADM

## 2023-05-30 RX ORDER — LIDOCAINE HYDROCHLORIDE 20 MG/ML
INJECTION, SOLUTION INFILTRATION; PERINEURAL PRN
Status: DISCONTINUED | OUTPATIENT
Start: 2023-05-30 | End: 2023-05-30

## 2023-05-30 RX ORDER — BISACODYL 10 MG
10 SUPPOSITORY, RECTAL RECTAL DAILY PRN
Status: DISCONTINUED | OUTPATIENT
Start: 2023-05-30 | End: 2023-05-31 | Stop reason: HOSPADM

## 2023-05-30 RX ORDER — DEXAMETHASONE SODIUM PHOSPHATE 4 MG/ML
INJECTION, SOLUTION INTRA-ARTICULAR; INTRALESIONAL; INTRAMUSCULAR; INTRAVENOUS; SOFT TISSUE PRN
Status: DISCONTINUED | OUTPATIENT
Start: 2023-05-30 | End: 2023-05-30

## 2023-05-30 RX ORDER — LEVOTHYROXINE SODIUM 100 UG/1
100 TABLET ORAL DAILY
Status: DISCONTINUED | OUTPATIENT
Start: 2023-05-31 | End: 2023-05-31 | Stop reason: HOSPADM

## 2023-05-30 RX ORDER — HYDROMORPHONE HCL IN WATER/PF 6 MG/30 ML
0.2 PATIENT CONTROLLED ANALGESIA SYRINGE INTRAVENOUS
Status: DISCONTINUED | OUTPATIENT
Start: 2023-05-30 | End: 2023-05-31 | Stop reason: HOSPADM

## 2023-05-30 RX ORDER — CEFAZOLIN SODIUM/WATER 2 G/20 ML
2 SYRINGE (ML) INTRAVENOUS
Status: COMPLETED | OUTPATIENT
Start: 2023-05-30 | End: 2023-05-30

## 2023-05-30 RX ORDER — ONDANSETRON 4 MG/1
4 TABLET, ORALLY DISINTEGRATING ORAL EVERY 30 MIN PRN
Status: DISCONTINUED | OUTPATIENT
Start: 2023-05-30 | End: 2023-05-30 | Stop reason: HOSPADM

## 2023-05-30 RX ORDER — EPHEDRINE SULFATE 50 MG/ML
INJECTION, SOLUTION INTRAMUSCULAR; INTRAVENOUS; SUBCUTANEOUS PRN
Status: DISCONTINUED | OUTPATIENT
Start: 2023-05-30 | End: 2023-05-30

## 2023-05-30 RX ORDER — ONDANSETRON 2 MG/ML
INJECTION INTRAMUSCULAR; INTRAVENOUS PRN
Status: DISCONTINUED | OUTPATIENT
Start: 2023-05-30 | End: 2023-05-30

## 2023-05-30 RX ORDER — OXYCODONE HYDROCHLORIDE 5 MG/1
10 TABLET ORAL EVERY 4 HOURS PRN
Status: DISCONTINUED | OUTPATIENT
Start: 2023-05-30 | End: 2023-05-31 | Stop reason: HOSPADM

## 2023-05-30 RX ORDER — ONDANSETRON 2 MG/ML
4 INJECTION INTRAMUSCULAR; INTRAVENOUS EVERY 30 MIN PRN
Status: DISCONTINUED | OUTPATIENT
Start: 2023-05-30 | End: 2023-05-30 | Stop reason: HOSPADM

## 2023-05-30 RX ORDER — TIMOLOL MALEATE 5 MG/ML
1 SOLUTION/ DROPS OPHTHALMIC EVERY MORNING
Status: DISCONTINUED | OUTPATIENT
Start: 2023-05-31 | End: 2023-05-31 | Stop reason: HOSPADM

## 2023-05-30 RX ORDER — VANCOMYCIN HYDROCHLORIDE 1 G/20ML
INJECTION, POWDER, LYOPHILIZED, FOR SOLUTION INTRAVENOUS PRN
Status: DISCONTINUED | OUTPATIENT
Start: 2023-05-30 | End: 2023-05-30 | Stop reason: HOSPADM

## 2023-05-30 RX ORDER — AMOXICILLIN 250 MG
1-2 CAPSULE ORAL 2 TIMES DAILY
Qty: 30 TABLET | Refills: 0 | Status: SHIPPED | OUTPATIENT
Start: 2023-05-30 | End: 2023-08-22

## 2023-05-30 RX ORDER — ACETAMINOPHEN 325 MG/1
650 TABLET ORAL EVERY 4 HOURS PRN
Status: DISCONTINUED | OUTPATIENT
Start: 2023-06-02 | End: 2023-05-31 | Stop reason: HOSPADM

## 2023-05-30 RX ORDER — WARFARIN SODIUM 5 MG/1
10 TABLET ORAL
Status: COMPLETED | OUTPATIENT
Start: 2023-05-30 | End: 2023-05-30

## 2023-05-30 RX ADMIN — LIDOCAINE HYDROCHLORIDE 80 MG: 20 INJECTION, SOLUTION INFILTRATION; PERINEURAL at 07:32

## 2023-05-30 RX ADMIN — OXYCODONE HYDROCHLORIDE 5 MG: 5 TABLET ORAL at 14:20

## 2023-05-30 RX ADMIN — Medication 2.5 MG: at 08:56

## 2023-05-30 RX ADMIN — FENTANYL CITRATE 50 MCG: 50 INJECTION, SOLUTION INTRAMUSCULAR; INTRAVENOUS at 09:21

## 2023-05-30 RX ADMIN — CEFAZOLIN 1 G: 1 INJECTION, POWDER, FOR SOLUTION INTRAMUSCULAR; INTRAVENOUS at 23:49

## 2023-05-30 RX ADMIN — ACETAMINOPHEN 975 MG: 325 TABLET ORAL at 14:20

## 2023-05-30 RX ADMIN — LOSARTAN POTASSIUM 100 MG: 100 TABLET, FILM COATED ORAL at 14:20

## 2023-05-30 RX ADMIN — ROCURONIUM BROMIDE 10 MG: 50 INJECTION, SOLUTION INTRAVENOUS at 08:50

## 2023-05-30 RX ADMIN — FENTANYL CITRATE 50 MCG: 50 INJECTION, SOLUTION INTRAMUSCULAR; INTRAVENOUS at 07:42

## 2023-05-30 RX ADMIN — SUGAMMADEX 200 MG: 100 INJECTION, SOLUTION INTRAVENOUS at 09:25

## 2023-05-30 RX ADMIN — Medication 5 MG: at 08:03

## 2023-05-30 RX ADMIN — ROCURONIUM BROMIDE 40 MG: 50 INJECTION, SOLUTION INTRAVENOUS at 07:32

## 2023-05-30 RX ADMIN — SODIUM CHLORIDE, POTASSIUM CHLORIDE, SODIUM LACTATE AND CALCIUM CHLORIDE: 600; 310; 30; 20 INJECTION, SOLUTION INTRAVENOUS at 07:05

## 2023-05-30 RX ADMIN — Medication 2 G: at 07:27

## 2023-05-30 RX ADMIN — TRANEXAMIC ACID 1950 MG: 650 TABLET ORAL at 06:45

## 2023-05-30 RX ADMIN — PROPOFOL 30 MG: 10 INJECTION, EMULSION INTRAVENOUS at 09:17

## 2023-05-30 RX ADMIN — CEFAZOLIN 1 G: 1 INJECTION, POWDER, FOR SOLUTION INTRAMUSCULAR; INTRAVENOUS at 16:33

## 2023-05-30 RX ADMIN — ONDANSETRON 4 MG: 2 INJECTION INTRAMUSCULAR; INTRAVENOUS at 07:47

## 2023-05-30 RX ADMIN — Medication 2.5 MG: at 09:05

## 2023-05-30 RX ADMIN — SENNOSIDES AND DOCUSATE SODIUM 1 TABLET: 50; 8.6 TABLET ORAL at 21:16

## 2023-05-30 RX ADMIN — ACETAMINOPHEN 975 MG: 325 TABLET ORAL at 21:16

## 2023-05-30 RX ADMIN — HYDROMORPHONE HYDROCHLORIDE 0.5 MG: 1 INJECTION, SOLUTION INTRAMUSCULAR; INTRAVENOUS; SUBCUTANEOUS at 07:57

## 2023-05-30 RX ADMIN — PHENYLEPHRINE HYDROCHLORIDE 100 MCG: 10 INJECTION INTRAVENOUS at 07:47

## 2023-05-30 RX ADMIN — AMLODIPINE BESYLATE 5 MG: 5 TABLET ORAL at 21:16

## 2023-05-30 RX ADMIN — WARFARIN SODIUM 10 MG: 5 TABLET ORAL at 17:59

## 2023-05-30 RX ADMIN — FENTANYL CITRATE 50 MCG: 50 INJECTION, SOLUTION INTRAMUSCULAR; INTRAVENOUS at 07:32

## 2023-05-30 RX ADMIN — Medication 2.5 MG: at 08:35

## 2023-05-30 RX ADMIN — PROPOFOL 120 MG: 10 INJECTION, EMULSION INTRAVENOUS at 07:32

## 2023-05-30 RX ADMIN — LABETALOL 20 MG/4 ML (5 MG/ML) INTRAVENOUS SYRINGE 2.5 MG: at 09:41

## 2023-05-30 RX ADMIN — Medication 2.5 MG: at 08:26

## 2023-05-30 RX ADMIN — DEXAMETHASONE SODIUM PHOSPHATE 10 MG: 4 INJECTION, SOLUTION INTRA-ARTICULAR; INTRALESIONAL; INTRAMUSCULAR; INTRAVENOUS; SOFT TISSUE at 07:42

## 2023-05-30 RX ADMIN — LABETALOL 20 MG/4 ML (5 MG/ML) INTRAVENOUS SYRINGE 2.5 MG: at 09:35

## 2023-05-30 RX ADMIN — PHENYLEPHRINE HYDROCHLORIDE 150 MCG: 10 INJECTION INTRAVENOUS at 07:51

## 2023-05-30 ASSESSMENT — ACTIVITIES OF DAILY LIVING (ADL)
ADLS_ACUITY_SCORE: 22

## 2023-05-30 NOTE — PLAN OF CARE
Goal Outcome Evaluation:  Summary:  5/30     Patient vital signs are at baseline: Yes  Patient able to ambulate as they were prior to admission or with assist devices provided by therapies during their stay:  Yes  Patient MUST void prior to discharge:  Yes  Patient able to tolerate oral intake:  Yes  Pain has adequate pain control using Oral analgesics:  Yes  Does patient have an identified :  Yes  Has goal D/C date and time been discussed with patient:  Yes

## 2023-05-30 NOTE — BRIEF OP NOTE
[facility]    Brief Operative Note    Pre-operative diagnosis: Osteoarthritis Right Hip   Post-operative diagnosis Same as pre-operative diagnosis    Procedure: Procedure(s):  Right Total Hip Arthroplasty  Surgeon: Surgeon(s) and Role:     * Vasu Mixon MD - Primary     * Marietta Cavazos PA-C - Assisting  Anesthesia: Choice   Estimated Blood Loss: 250    Drains: None  Specimens: * No specimens in log *  Findings:   None.  Complications: None.  Implants:   Implant Name Type Inv. Item Serial No.  Lot No. LRB No. Used Action   IMP SCR ACET SNN SPHERICAL HEAD 6.5X25MM [id number] - [id number] Metallic Hardware/Vonore IMP SCR ACET SNN SPHERICAL HEAD 6.5X25MM [id number]  [employer] [id number] Right 1 Implanted   IMP SHELL SNR ACET R3 3H 52MM [id number] - [id number] Total Joint Component/Insert IMP SHELL SNR ACET R3 3H 52MM [id number]  [employer] [id number] Right 1 Implanted   IMP LINER S&N ACET R3 +4 XLPE [id number] 20DEG [id number] - [id number] Total Joint Component/Insert IMP LINER S&N ACET R3 +4 XLPE [id number] 20DEG [id number]  [employer] [id number] Right 1 Implanted   IMP STEM SNN HIGH OFFSET SZ 8 [id number] - [id number] Total Joint Component/Insert IMP STEM SNN HIGH OFFSET SZ 8 [id number]  [employer] [id number] Right 1 Implanted   IMP HEAD FEM S&N BIOLOX DELTA 36MM 12/14 TPR SM +0 [id number] - [id number] Total Joint Component/Insert IMP HEAD FEM S&N BIOLOX DELTA 36MM 12/14 TPR SM +0 [id number]  [employer] [id number] Right 1 Implanted

## 2023-05-30 NOTE — ANESTHESIA POSTPROCEDURE EVALUATION
Patient: Manasa French    Procedure: Procedure(s):  Right Total Hip Arthroplasty       Anesthesia Type:  General    Note:  Disposition: Admission   Postop Pain Control: Uneventful            Sign Out: Well controlled pain   PONV: No   Neuro/Psych: Uneventful            Sign Out: Acceptable/Baseline neuro status   Airway/Respiratory: Uneventful            Sign Out: Acceptable/Baseline resp. status   CV/Hemodynamics: Uneventful            Sign Out: Acceptable CV status; No obvious hypovolemia; No obvious fluid overload   Other NRE: NONE   DID A NON-ROUTINE EVENT OCCUR? No           Last vitals:  Vitals Value Taken Time   /59 05/30/23 1045   Temp 36.2  C (97.2  F) 05/30/23 1045   Pulse 67 05/30/23 1057   Resp 0 05/30/23 1057   SpO2 100 % 05/30/23 1057   Vitals shown include unvalidated device data.    Electronically Signed By: ANITA CAVANAUGH MD  May 30, 2023  2:24 PM

## 2023-05-30 NOTE — OP NOTE
Procedure Date: 05/30/2023    PREOPERATIVE DIAGNOSIS:  Right hip advanced osteoarthritis.    POSTOPERATIVE DIAGNOSIS:  Right hip advanced osteoarthritis.    PROCEDURE:  Right total hip arthroplasty, posterior approach.    SURGEON:  Vasu Mixon MD    ASSISTANT:  Alejo Cavazos PA-C    COMPLICATIONS:  None.    ESTIMATED BLOOD LOSS:  250 mL.    IMPLANTS:  [employer]:   1.  Size 52 R3 uncemented acetabular component.  2.  25 mm acetabular shell screw.  3.  +4 offset 20-degree posterior lipped highly cross-linked polyethylene liner for 52 cup and 36 head.  4.  +0, 36 mm ceramic femoral head.  5.  Size 8 high-offset Anthology uncemented femoral stem.     DESCRIPTION OF PROCEDURE:  The patient was brought to the operating room 05/30/2023 for right total hip arthroplasty.  She has a history of advanced bone-on-bone osteoarthritis with pain and dysfunction refractory to conservative measures.  She has a history of previous lumbosacral spine fusion surgery and is therefore a high risk patient for postsurgical instability.  We discussed this at length including other risks and benefits of surgery in the outpatient clinic.  She expressed understanding and acceptance of the risks and wished to proceed.      She was seen day of surgery in the preoperative holding area and the right hip was marked as the correct operative site.  She received a dose of IV antibiotic within 30 minutes prior to surgical incision.  Post-surgical antibiotic and DVT prophylaxis are indicated and will be prescribed.  She has a history of previous thromboembolic disease and is on lifetime warfarin.  This will be resumed day of surgery.  Skilled assistant was used for positioning, draping, retraction, closure, and dressing application.      The patient was brought to the operating room and placed under general anesthesia.  She was positioned in lateral decubitus with the right hip up.  Limb lengths were carefully assessed.  The right lower  extremity was prepped and draped in the standard sterile fashion.  Preoperative timeout was taken.  I made a posterolateral surgical incision over the right hip.  Dissection was carried down to deep fascia which was incised longitudinally.  A posterior hip approach was then carried out including a T capsulotomy.  The femoral head was dislocated from the acetabulum and the femoral neck was cut 5 mm proximal to the lesser trochanter.  The femoral head was removed and deep retractors were used to expose the acetabulum.  Pulvinar, labrum and marginal osteophytes were debrided.  I reamed the acetabulum starting with a size 44 reamer, extending up to a 53.  After trialing, I implanted a size 52 R3 uncemented acetabular component into a position of approximately 45 degrees of abduction and 20 degrees of anteversion.  A 25 mm acetabular shell screw was placed and trial liner placed.  The femur was then addressed.  The box osteotome, canal finder, and sequential broaching for the Anthology uncemented femoral stem were used.  I broached the proximal femur in a slightly anteverted position to maximize stability.  With the size 8 broach seated it had excellent fit and stability.  I trialed and selected a high-offset trial modular neck and a +0, 36 mm trial modular head.  After trialing, I selected a +4 offset 20-degree posterior lipped polyethylene trial component.  This helped to optimize prosthesis stability.  Trial components were removed.  Jet lavage irrigation was performed.  I implanted the +4 offset 20-degree posterior lipped highly cross-linked polyethylene liner for the 52 cup and 36 head.  I implanted the size 8 high-offset Anthology uncemented femoral stem.  I implanted the +0, 36 mm ceramic femoral head after reconfirming trialing.  Betadine wash was performed.  Jet lavage irrigation was performed.  Soft tissues were injected with a cocktail of Toradol, ropivacaine and saline.  The posterior capsule and piriformis  tendon were repaired over a medium over a gram of vancomycin powder.  No drain was used.  The deep fascia was then closed with interrupted Ethibond sutures.  Superficial soft tissues were irrigated and closed in layers and a hip abduction pillow and sterile dressing were applied.  The patient was brought to recovery in stable condition.  Needle and lap counts were correct at the end of the case.  There were no known complications.    Vasu Mixon MD        D: 2023   T: 2023   MT: TOMMY    Name:     KELLIE SALOMONVERO CAIN  MRN:      [id number]        Account:        [id number]   :      1945           Procedure Date: 2023     Document: [id number]

## 2023-05-30 NOTE — ANESTHESIA CARE TRANSFER NOTE
Patient: Manasa French    Procedure: Procedure(s):  Right Total Hip Arthroplasty       Diagnosis: Osteoarthritis [M19.90]  Diagnosis Additional Information: No value filed.    Anesthesia Type:   General     Note:    Oropharynx: oropharynx clear of all foreign objects  Level of Consciousness: awake  Oxygen Supplementation: face mask  Level of Supplemental Oxygen (L/min / FiO2): 8  Independent Airway: airway patency satisfactory and stable  Dentition: dentition unchanged  Vital Signs Stable: post-procedure vital signs reviewed and stable  Report to RN Given: handoff report given  Patient transferred to: PACU    Handoff Report: Identifed the Patient, Identified the Reponsible Provider, Reviewed the pertinent medical history, Discussed the surgical course, Reviewed Intra-OP anesthesia mangement and issues during anesthesia, Set expectations for post-procedure period and Allowed opportunity for questions and acknowledgement of understanding      Vitals:  Vitals Value Taken Time   BP     Temp     Pulse 66 05/30/23 0947   Resp 14 05/30/23 0947   SpO2 99 % 05/30/23 0947   Vitals shown include unvalidated device data.    Electronically Signed By: SURESH Hernandez CRNA  May 30, 2023  9:48 AM

## 2023-05-30 NOTE — PROGRESS NOTES
PTA medications updated by Medication Scribe prior to surgery via phone call with patient (last doses completed by Nurse)     Medication history sources: Patient, Surescripts and H&P  In the past week, patient estimated taking medication this percent of the time: Greater than 90%      Significant changes made to the medication list:  None      Additional medication history information:   Patient was advised to bring: REFRESH OP SOLN, LATANOPROST OP SOLN, & TIMOLOL 0.5% OP SOLN    Medication reconciliation completed by provider prior to medication history? No    Time spent in this activity: 40 MINUTES    The information provided in this note is only as accurate as the sources available at the time of update(s)      Prior to Admission medications    Medication Sig Last Dose Taking? Auth Provider Long Term End Date   acetaminophen (TYLENOL) 325 MG tablet Take 1-2 tablets by mouth daily as needed for mild pain  at PRN Yes Reported, Patient     alendronate (FOSAMAX) 70 MG tablet TAKE 1 TABLET EVERY WEEK AS DIRECTED  at AM Yes Kevin Hernandez MD Yes    amLODIPine (NORVASC) 5 MG tablet Take 1 tablet (5 mg) by mouth daily  at PM Yes Cristina Castro APRN CNP Yes    calcium carbonate-vitamin D (CALTRATE) 600-10 MG-MCG per tablet Take 1 tablet by mouth 2 times daily  Yes Reported, Patient     carboxymethylcellulose (CELLUVISC/REFRESH LIQUIGEL) 1 % ophthalmic solution Place 1 drop into both eyes 3 times daily  at AM Yes Reported, Patient     latanoprost (XALATAN) 0.005 % ophthalmic solution Place 1 drop into both eyes At Bedtime  at PM Yes Jarred Leon MD Yes    levothyroxine (SYNTHROID/LEVOTHROID) 100 MCG tablet Take 1 tablet (100 mcg) by mouth daily  at AM Yes Catrachita Wallace APRN CNP Yes    losartan (COZAAR) 100 MG tablet Take 1 tablet (100 mg) by mouth daily TAKE 1 TABLET EVERY DAY  at AM Yes Cristina Castro APRN CNP Yes    Multiple Vitamins-Minerals (ICAPS AREDS 2 PO) Take 1 capsule by mouth 2 times  daily  at PM Yes Reported, Patient     timolol maleate (TIMOPTIC) 0.5 % ophthalmic solution Place 1 drop into both eyes every morning  at AM Yes Jarred Leon MD     vitamin B-12 (CYANOCOBALAMIN) 1000 MCG tablet Take 1,000 mcg by mouth daily  at AM Yes Reported, Patient     Vitamin D3 (CHOLECALCIFEROL) 25 mcg (1000 units) tablet Take 1 tablet by mouth daily  at AM Yes Reported, Patient     Vitamin K 100 MCG TABS Take 1 tablet by mouth daily  at PM Yes Christopher Smith MD     warfarin ANTICOAGULANT (COUMADIN) 5 MG tablet TAKE 1 TABLET ON MONDAY, WEDNESDAY AND FRIDAY THEN TAKE 1 AND 1/2 TABLETS DAILY ON ALL OTHER DAYS OR AS DIRECTED BY CLINIC  Patient taking differently: TAKE 1.5 TABLET ON Monday, THEN TAKE 1 TABLET DAILY ON ALL OTHER DAYS OR AS DIRECTED BY CLINIC  at PM Yes Christopher Smith MD

## 2023-05-30 NOTE — ANESTHESIA PREPROCEDURE EVALUATION
Anesthesia Pre-Procedure Evaluation    Patient: Manasa French   MRN: [id number] : 1945        Procedure : Procedure(s):  Right Total Hip Arthroplasty          Past Medical History:   Diagnosis Date     Benign essential hypertension      History of Graves' disease     s/p BARAHONA     History of hydrocephalus     s/p  shunt placement in      Macular degeneration of both eyes      Osteoporosis      Personal history of DVT (deep vein thrombosis) 2019    RUE; on chronic AC     Postablative hypothyroidism      Primary open angle glaucoma (POAG) of both eyes     right eye moderate, left eye mild     Pseudophakia      Sensorineural hearing loss, bilateral       Past Surgical History:   Procedure Laterality Date     ARTHROSCOPY SHOULDER Left      CATARACT IOL, RT/LT Bilateral      DECOMPRESSION, FUSION LUMBAR POSTERIOR ONE LEVEL, COMBINED      L5-S1     EXCISE GANGLION WRIST Right      HERNIA REPAIR, UMBILICAL       LUMPECTOMY BREAST Left     benign      Allergies   Allergen Reactions     Alphagan [Brimonidine] Other (See Comments)     Itchy eyes     Cosopt [Dorzolamide Hcl-Timolol Mal] Other (See Comments)     Itchy eyes      Social History     Tobacco Use     Smoking status: Never     Smokeless tobacco: Never   Vaping Use     Vaping status: Not on file   Substance Use Topics     Alcohol use: Yes     Comment: 4 glasses wine per week      Wt Readings from Last 1 Encounters:   23 70.2 kg (154 lb 11.2 oz)        Anesthesia Evaluation            ROS/MED HX  ENT/Pulmonary:    (-) sleep apnea   Neurologic: Comment: Hx hydrocephalus - has V-P shunt in place      Cardiovascular:     (+) hypertension-----    METS/Exercise Tolerance:     Hematologic:     (+) History of blood clots,     Musculoskeletal: Comment: Multiple back surgeries;      GI/Hepatic:    (-) GERD   Renal/Genitourinary:       Endo:     (+) type II DM, thyroid problem, hypothyroidism,     Psychiatric/Substance Use:       Infectious Disease:        Malignancy:       Other:            Physical Exam    Airway        Mallampati: II   TM distance: > 3 FB   Neck ROM: full   Mouth opening: > 3 cm    Respiratory Devices and Support         Dental       (+) Modest Abnormalities - crowns, retainers, 1 or 2 missing teeth      Cardiovascular   cardiovascular exam normal          Pulmonary   pulmonary exam normal                OUTSIDE LABS:  CBC:   Lab Results   Component Value Date    WBC 5.6 05/15/2023    WBC 6.3 06/08/2022    HGB 10.7 (L) 05/15/2023    HGB 11.4 (L) 06/08/2022    HCT 34.1 (L) 05/15/2023    HCT 36.5 06/08/2022     05/15/2023     06/08/2022     BMP:   Lab Results   Component Value Date     05/15/2023     06/08/2022    POTASSIUM 4.6 05/15/2023    POTASSIUM 4.1 06/08/2022    CHLORIDE 104 05/15/2023    CHLORIDE 106 06/08/2022    CO2 26 05/15/2023    CO2 28 06/08/2022    BUN 16.1 05/15/2023    BUN 28 06/08/2022    CR 0.79 05/15/2023    CR 0.87 06/08/2022     (H) 05/15/2023     (H) 06/08/2022     (H) 06/08/2022     COAGS:   Lab Results   Component Value Date    PTT 30 08/31/2018    INR 2.7 05/19/2023     POC: No results found for: BGM, HCG, HCGS  HEPATIC:   Lab Results   Component Value Date    ALBUMIN 3.9 06/08/2022    PROTTOTAL 8.2 06/08/2022    ALT 21 06/08/2022    AST 23 06/08/2022    ALKPHOS 76 06/08/2022    BILITOTAL 0.8 06/08/2022     OTHER:   Lab Results   Component Value Date    A1C 6.6 (H) 05/15/2023    TETE 9.6 05/15/2023    TSH 3.48 07/20/2022    T4 1.58 (H) 06/08/2022    SED 30 02/26/2019       Anesthesia Plan    ASA Status:  3   NPO Status:  NPO Appropriate    Anesthesia Type: General.     - Airway: ETT   Induction: Intravenous.   Maintenance: Balanced.        Consents    Anesthesia Plan(s) and associated risks, benefits, and realistic alternatives discussed. Questions answered and patient/representative(s) expressed understanding.    - Discussed:     - Discussed with:  Patient          Postoperative Care    Pain management: IV analgesics.   PONV prophylaxis: Ondansetron (or other 5HT-3), Background Propofol Infusion     Comments:                ANITA CAVANAUGH MD

## 2023-05-30 NOTE — PHARMACY-ANTICOAGULATION SERVICE
Clinical Pharmacy - Warfarin Dosing Consult     Pharmacy has been consulted to manage this patient s warfarin therapy.  Indication: DVT/ PE Treatment  Therapy Goal: INR 2-3  Warfarin Prior to Admission: Yes  Warfarin PTA Regimen: 7.5mg mon, 5mg row    INR   Date Value Ref Range Status   05/30/2023 1.27 (H) 0.85 - 1.15 Final     INR HOME MONITORING   Date Value Ref Range Status   05/19/2023 2.7 2.000 - 3.000 Final       Recommend warfarin 10 mg today.  Pharmacy will monitor Manasa French daily and order warfarin doses to achieve specified goal.      Please contact pharmacy as soon as possible if the warfarin needs to be held for a procedure or if the warfarin goals change.

## 2023-05-30 NOTE — INTERVAL H&P NOTE
"I have reviewed the surgical (or preoperative) H&P that is linked to this encounter, and examined the patient. There are no significant changes    Clinical Conditions Present on Arrival:  Clinically Significant Risk Factors Present on Admission                # Drug Induced Coagulation Defect: home medication list includes an anticoagulant medication   # DMII: A1C = 6.6 % (Ref range: 0.0 - 5.6 %) within past 6 months  # Overweight: Estimated body mass index is 26.14 kg/m  as calculated from the following:    Height as of this encounter: 1.638 m (5' 4.5\").    Weight as of this encounter: 70.2 kg (154 lb 11.2 oz).       "

## 2023-05-30 NOTE — OR NURSING
VSS. A&O. Pain rated 4/10 in right hip and low back, tolerable per pt. Dressing to right hip CDI. BLE pulses palpable. No n/v. Report to ortho RN and transferred to [id number].

## 2023-05-30 NOTE — PROGRESS NOTES
05/30/23 1700   Appointment Info   Signing Clinician's Name / Credentials (PT) Shine Bull DPT   Rehab Comments (PT) WBAT RLE, No: IR, ADD past midline, flex>90, pivoting/twisting, hip ABD pillow in bed   Living Environment   People in Home spouse   Current Living Arrangements apartment   Home Accessibility no concerns   Transportation Anticipated family or friend will provide   Self-Care   Usual Activity Tolerance good   Current Activity Tolerance moderate   Equipment Currently Used at Home cane, straight  (walking sticks sometimes for walking)   Fall history within last six months yes   Number of times patient has fallen within last six months 1   Activity/Exercise/Self-Care Comment At baseline pt is Mod-I with SEC she uses for balance issues, IND with all ADL's   General Information   Onset of Illness/Injury or Date of Surgery 05/30/23   Referring Physician Vasu Mixon MD   Patient/Family Therapy Goals Statement (PT) go home   Pertinent History of Current Problem (include personal factors and/or comorbidities that impact the POC) Pt is a 77 y.o. female with hx of back surgeries, brain shunt implant, s/p right SANTHOSH on 5/30/2023, POD#0   Existing Precautions/Restrictions fall;weight bearing;no hip IR;no hip ADD past midline;90 degree hip flexion;no pivoting or twisting   Weight-Bearing Status - RLE weight-bearing as tolerated   Cognition   Affect/Mental Status (Cognition) WNL   Orientation Status (Cognition) oriented x 4   Follows Commands (Cognition) WNL   Pain Assessment   Patient Currently in Pain Yes, see Vital Sign flowsheet  (4/10 right hip)   Integumentary/Edema   Integumentary/Edema Comments dressing over right hip appears CDI   Posture    Posture Forward head position   Range of Motion (ROM)   Range of Motion ROM deficits secondary to surgical procedure;ROM deficits secondary to pain   ROM Comment deficits with right hip from post surgical pain and due to hip precautions, otherwise appears  grossly WFL   Strength (Manual Muscle Testing)   Strength (Manual Muscle Testing) Able to perform L SLR;Deficits observed during functional mobility   Strength Comments deficits with right hip after SANTHOSH, not formally tested   Bed Mobility   Comment, (Bed Mobility) supine<>sit Garcia   Transfers   Comment, (Transfers) sit<>stand with FWW Garcia   Gait/Stairs (Locomotion)   [place] Level (Gait) contact guard   Assistive Device (Gait) walker, front-wheeled   Distance in Feet 10'   Distance in Feet (Gait) 30' + 30'   Pattern (Gait) step-to   Deviations/Abnormal Patterns (Gait) right sided deviations;antalgic;base of support, wide;noemy decreased;gait speed decreased;festinating/shuffling;stride length decreased;weight shifting decreased   Maintains Weight-bearing Status (Gait) able to maintain   Balance   Balance Comments deficits with dynamic balance   Sensory Examination   Sensory Perception patient reports no sensory changes   Clinical Impression   Criteria for Skilled Therapeutic Intervention Yes, treatment indicated   PT Diagnosis (PT) impaired gait   Influenced by the following impairments pain, deficits with ROM, strength, balance, activity tolerance   Functional limitations due to impairments decreased ind with bed mobility, transfers, amb, ADL's   Clinical Presentation (PT Evaluation Complexity) Stable/Uncomplicated   Clinical Presentation Rationale PMH and clinical judgement   Clinical Decision Making (Complexity) low complexity   Planned Therapy Interventions (PT) balance training;bed mobility training;cryotherapy;gait training;home exercise program;patient/family education;ROM (range of motion);strengthening;stretching;stair training;transfer training;progressive activity/exercise   Anticipated Equipment Needs at Discharge (PT)   (pt will provide FWW, SEC, raised toilet seat)   Risk & Benefits of therapy have been explained evaluation/treatment results reviewed;care plan/treatment goals  reviewed;risks/benefits reviewed;current/potential barriers reviewed;participants voiced agreement with care plan;participants included;patient   PT Total Evaluation Time   PT Eval, Low Complexity Minutes (05785) 10   Plan of Care Review   Plan of Care Reviewed With patient   Physical Therapy Goals   PT Frequency 2x/day   PT Predicted Duration/Target Date for Goal Attainment 05/31/23   PT Goals Bed Mobility;Transfers;Gait   PT: Bed Mobility Supervision/stand-by assist;Supine to/from sit;Rolling;Bridging;Within precautions   PT: Transfers Supervision/stand-by assist;Sit to/from stand;Assistive device;Within precautions   PT: Gait Modified independent;Rolling walker;Within precautions;100 feet   Interventions   Interventions Quick Adds Gait Training;Therapeutic Activity;Therapeutic Procedure   Therapeutic Procedure/Exercise   Ther. Procedure: strength, endurance, ROM, flexibillity Minutes (83837) 1   Symptoms Noted During/After Treatment none   Treatment Detail/Skilled Intervention Educated on circulatory benefits of AP's after surgery and to perform throughout the day whenever resting. With pt in supine cued for AP's x 30 and pt tolerated well   Therapeutic Activity   Therapeutic Activities: dynamic activities to improve functional performance Minutes (29217) 23   Symptoms Noted During/After Treatment Increased pain   Treatment Detail/Skilled Intervention Greeted pt sitting up in recliner. Eval completed. Educated on orders for WBAT and no: IR, ADD past midline, flex>90, pivoting/twisting, hip ABD pillow on when in bed. Left pt with hip precautions handout and she verbalized understanding. With HOB flat supine<>sit Garcia, cues for set up with pillow between legs to help avoid crossing, cues for sequencing pt needing Garcia with legs into/out of bedPt able to reposition in bed.  Sit<>stand x 3 with FWW CGA needing Garcia at times for safety as pt had mild LOB, cues for safe walker and hand placement and to put right foot  ahead to reduce hip flexion, pt needing reinforcement of cues for safety and safe foot placement for set up. Once pt back in bed pillow placed between legs to avoid crossing legs.   Gait Training   Gait Training Minutes (93271) 8   Symptoms Noted During/After Treatment (Gait Training) fatigue;increased pain   Treatment Detail/Skilled Intervention Gait training in room with 2 bouts, and rest between. Pt used FWW and CGA, very slow step-to pattern initially, cues for step-through and pt able to follow but difficulty maintaining even step lengths, pt has balance impairments at baseline, gait appears unsteady and antalgic but pt had no overt LOB, cues for safe walker placement   [place] Level (Gait Training) stand-by assist   Physical Assistance Level (Gait Training) supervision   Weight Bearing (Gait Training) weight-bearing as tolerated   Assistive Device (Gait Training) rolling walker   Gait Analysis Deviations decreased noemy;increased time in double stance;decreased velocity of limb motion;decreased step length;decreased stride length;decreased weight-shifting ability;decreased swing-to-stance ratio   Impairments (Gait Analysis/Training) pain;strength decreased;balance impaired   PT Discharge Planning   PT Plan review hip precautions, progress gait distance, progress bed mobility, safe transfers   PT Discharge Recommendation (DC Rec)   (defer to ortho)   PT Rationale for DC Rec Pt below baseline but moving fairly well and following hip precautions with intermittent cueing. Anticipate pt will progress and by discharge be at/near SBA bed mobility, SBA transfers with FWW, SBA amb with FWW and pt has no steps at home. Pt will have good 24/7 support at home from spouse   PT Brief overview of current status bed mobility Garcia, STS wth FWW CGA, amb with FWW SBA   Total Session Time   Timed Code Treatment Minutes 32   Total Session Time (sum of timed and untimed services) 42

## 2023-05-30 NOTE — ANESTHESIA PROCEDURE NOTES
Airway       Patient location during procedure: OR       Procedure Start/Stop Times: 5/30/2023 7:36 AM  Staff -        Anesthesiologist:  Domenic Kenyon MD       CRNA: Jacinda Hart APRN CRNA       Performed By: CRNA  Consent for Airway        Urgency: elective  Indications and Patient Condition       Indications for airway management: eduardo-procedural       Induction type:intravenous       Mask difficulty assessment: 2 - vent by mask + OA or adjuvant +/- NMBA    Final Airway Details       Final airway type: endotracheal airway       Successful airway: ETT - single  Endotracheal Airway Details        ETT size (mm): 7.0       Cuffed: yes       Successful intubation technique: direct laryngoscopy       DL Blade Type: Cason 2       Grade View of Cords: 1       Adjucts: stylet       Position: Right       Measured from: gums/teeth       Secured at (cm): 21       Bite block used: None    Post intubation assessment        Placement verified by: capnometry, equal breath sounds and chest rise        Number of attempts at approach: 1       Number of other approaches attempted: 0       Secured with: pink tape       Ease of procedure: easy       Dentition: Intact and Unchanged    Medication(s) Administered   Medication Administration Time: 5/30/2023 7:36 AM

## 2023-05-31 ENCOUNTER — APPOINTMENT (OUTPATIENT)
Dept: OCCUPATIONAL THERAPY | Facility: CLINIC | Age: 78
End: 2023-05-31
Attending: ORTHOPAEDIC SURGERY
Payer: MEDICARE

## 2023-05-31 ENCOUNTER — DOCUMENTATION ONLY (OUTPATIENT)
Dept: ANTICOAGULATION | Facility: CLINIC | Age: 78
End: 2023-05-31
Payer: MEDICARE

## 2023-05-31 ENCOUNTER — DOCUMENTATION ONLY (OUTPATIENT)
Dept: OTHER | Facility: CLINIC | Age: 78
End: 2023-05-31
Payer: MEDICARE

## 2023-05-31 ENCOUNTER — APPOINTMENT (OUTPATIENT)
Dept: PHYSICAL THERAPY | Facility: CLINIC | Age: 78
End: 2023-05-31
Attending: ORTHOPAEDIC SURGERY
Payer: MEDICARE

## 2023-05-31 VITALS
OXYGEN SATURATION: 96 % | RESPIRATION RATE: 16 BRPM | BODY MASS INDEX: 25.77 KG/M2 | TEMPERATURE: 98.2 F | SYSTOLIC BLOOD PRESSURE: 138 MMHG | DIASTOLIC BLOOD PRESSURE: 68 MMHG | WEIGHT: 154.7 LBS | HEIGHT: 65 IN | HEART RATE: 95 BPM

## 2023-05-31 DIAGNOSIS — Z86.718 PERSONAL HISTORY OF DVT (DEEP VEIN THROMBOSIS): Primary | ICD-10-CM

## 2023-05-31 LAB
GLUCOSE BLDC GLUCOMTR-MCNC: 124 MG/DL (ref 70–99)
HGB BLD-MCNC: 9.7 G/DL (ref 11.7–15.7)
INR PPP: 1.35 (ref 0.85–1.15)

## 2023-05-31 PROCEDURE — 85018 HEMOGLOBIN: CPT | Performed by: ORTHOPAEDIC SURGERY

## 2023-05-31 PROCEDURE — 97165 OT EVAL LOW COMPLEX 30 MIN: CPT | Mod: GO

## 2023-05-31 PROCEDURE — 85610 PROTHROMBIN TIME: CPT | Performed by: ORTHOPAEDIC SURGERY

## 2023-05-31 PROCEDURE — 97535 SELF CARE MNGMENT TRAINING: CPT | Mod: GO

## 2023-05-31 PROCEDURE — 97530 THERAPEUTIC ACTIVITIES: CPT | Mod: GP

## 2023-05-31 PROCEDURE — 250N000013 HC RX MED GY IP 250 OP 250 PS 637: Performed by: ORTHOPAEDIC SURGERY

## 2023-05-31 PROCEDURE — 82962 GLUCOSE BLOOD TEST: CPT

## 2023-05-31 PROCEDURE — 36415 COLL VENOUS BLD VENIPUNCTURE: CPT | Performed by: ORTHOPAEDIC SURGERY

## 2023-05-31 PROCEDURE — 97116 GAIT TRAINING THERAPY: CPT | Mod: GP

## 2023-05-31 RX ORDER — OXYCODONE HYDROCHLORIDE 5 MG/1
5-10 TABLET ORAL EVERY 4 HOURS PRN
Qty: 26 TABLET | Refills: 0 | Status: SHIPPED | OUTPATIENT
Start: 2023-05-31 | End: 2023-05-31

## 2023-05-31 RX ORDER — OXYCODONE HYDROCHLORIDE 5 MG/1
2.5-5 TABLET ORAL EVERY 4 HOURS PRN
Qty: 12 TABLET | Refills: 0 | Status: SHIPPED | OUTPATIENT
Start: 2023-05-31 | End: 2023-08-22

## 2023-05-31 RX ADMIN — SENNOSIDES AND DOCUSATE SODIUM 1 TABLET: 50; 8.6 TABLET ORAL at 08:24

## 2023-05-31 RX ADMIN — ACETAMINOPHEN 975 MG: 325 TABLET ORAL at 08:24

## 2023-05-31 RX ADMIN — LOSARTAN POTASSIUM 100 MG: 100 TABLET, FILM COATED ORAL at 08:24

## 2023-05-31 RX ADMIN — LEVOTHYROXINE SODIUM 100 MCG: 100 TABLET ORAL at 07:07

## 2023-05-31 ASSESSMENT — ACTIVITIES OF DAILY LIVING (ADL)
ADLS_ACUITY_SCORE: 22

## 2023-05-31 NOTE — PROGRESS NOTES
"ORTHOPEDIC LOWER EXTREMITY PROGRESS NOTE    POD#1  Patient is a 77 year old female who underwent Procedure(s):  Right Total Hip Arthroplasty on 2023. Pain is well controlled. Tolerating medication well, no nausea or vomiting.  Does not want to take oxycodone if she does not have to.  Checks her own INR at home.  Discharge instructions reviewed.    Vitals:   Blood pressure 138/68, pulse 95, temperature 98.2  F (36.8  C), temperature source Oral, resp. rate 16, height 1.638 m (5' 4.5\"), weight 70.2 kg (154 lb 11.2 oz), SpO2 96 %, not currently breastfeeding.  Temp (24hrs), Av.7  F (36.5  C), Min:97.1  F (36.2  C), Max:98.2  F (36.8  C)      Drains: None    EXAM   The patient is awake and alert.  Calves are soft and non-tender.   Sensation is intact.  Dorsiflexion and plantar flexion is intact.  Foot warm with nl cap refill.  The incision is covered with bulky surgical dressing.     Labs:   Recent Labs   Lab Test 23  0625 23  0000 05/15/23  0948 23  0905 22  0000 22  1438 20  1641 20  1252   HGB  --   --  10.7*  --   --  11.4*  --  11.5*   INR 1.27* 2.7  --  2.2*   < >  --    < > 2.40*    < > = values in this interval not displayed.       ASSESSMENT  S/p R SANTHOSH   PLAN  1. DVT prophylaxis: Coumadin  2. Weight Bearing: WBAT (Weight bearing as tolerated).  3. Anticipated discharge date today . Discharge to Home with Outpatient Treatment.  4. Cont Pain Control Tylenol and oxycodone if needed    Jeannette Chakraborty PA-C  [facility]        "

## 2023-05-31 NOTE — PROGRESS NOTES
ANTICOAGULATION  MANAGEMENT: Discharge Review    Manasa French chart reviewed for anticoagulation continuity of care    Hospital Admission on 5/30/23 - 5/31/23 for right SANTHOSH.    Discharge disposition: Home    Results:  Recent labs: (last 7 days)     05/30/23  0625 05/31/23  0819   INR 1.27* 1.35*     Anticoagulation inpatient management:     home regimen resumed post procedure on 5/30/23 with booster dose of 10mg    Anticoagulation discharge instructions:     Warfarin dosing: resume per AC instructions   Bridging: No   INR goal change: No      Medication changes affecting anticoagulation: Yes: tylenol for pain mgmt    Additional factors affecting anticoagulation: Yes: recent surgery     PLAN     No adjustment to anticoagulation plan needed    Patient not contacted  Will check with home monitor as advised    Anticoagulation Calendar updated       Irina Mcghee RN

## 2023-05-31 NOTE — PLAN OF CARE
Physical Therapy Discharge Summary    Reason for therapy discharge:    All goals and outcomes met, no further needs identified.    Progress towards therapy goal(s). See goals on Care Plan in Epic electronic health record for goal details.  Goals met    Therapy recommendation(s):    Pt below baseline but moving fairly well and following hip precautions with intermittent cueing. Pt has progressed to mod I w/ FWW for ambulation and SBA for transfers. Pt will have good 24/7 support at home from spouse. Pt has plans to follow up with outpatient PT for continued progression of mobility.

## 2023-05-31 NOTE — PROGRESS NOTES
"Occupational Therapy Discharge Summary    Reason for therapy discharge:    All goals and outcomes met, no further needs identified.    Progress towards therapy goal(s). See goals on Care Plan in [facility] electronic health record for goal details.  Goals met    Therapy recommendation(s):    No further therapy is recommended.           05/31/23 2237   Appointment Info   Signing Clinician's Name / Credentials (OT) SMILEY Posey   Living Environment   People in Home spouse   Current Living Arrangements apartment   Home Accessibility no concerns   Transportation Anticipated family or friend will provide   Self-Care   Usual Activity Tolerance good   Current Activity Tolerance moderate   Equipment Currently Used at Home cane, straight  (walking stick sometimes for walking)   Fall history within last six months yes   Number of times patient has fallen within last six months 1   Activity/Exercise/Self-Care Comment At baseline pt is Mod-I with SEC she uses for balance issues, IND with all ADL's   General Information   Onset of Illness/Injury or Date of Surgery 05/30/23   Referring Physician Vasu Mixon MD   Additional Occupational Profile Info/Pertinent History of Current Problem POD #1 R SANTHOSH   Existing Precautions/Restrictions no hip IR;no hip ADD past midline;no hip hyperextension;no pivoting or twisting   Right Lower Extremity (Weight-bearing Status) weight-bearing as tolerated (WBAT)   Cognitive Status Examination   Orientation Status orientation to person, place and time   Visual Perception   Visual Impairment/Limitations WFL;corrective lenses full-time   Posture   Posture not impaired   Range of Motion Comprehensive   General Range of Motion bilateral upper extremity ROM WFL   Strength Comprehensive (MMT)   General Manual Muscle Testing (MMT) Assessment no strength deficits identified   Bed Mobility   Comment (Bed Mobility) NT during this session, pt reports \"very difficult\" when she got up this morning and is " planning on sleeping in a recliner chair.   Transfers   Transfers sit-stand transfer   Transfer Comments SBA with FWW   Activities of Daily Living   BADL Assessment/Intervention toileting   Toileting   Assistive Devices (Toileting) raised toilet seat   Comment, (Toileting) Completed with RN, results per pt report   [place] Level (Toileting) supervision   Clinical Impression   Criteria for Skilled Therapeutic Interventions Met (OT) Yes, treatment indicated   OT Diagnosis impaired functional mobility and self-cares   OT Problem List-Impairments impacting ADL problems related to;activity tolerance impaired;balance;mobility;range of motion (ROM);post-surgical precautions   Assessment of Occupational Performance 1-3 Performance Deficits   Identified Performance Deficits functional mobility, toileting, dressing   Planned Therapy Interventions (OT) ADL retraining;transfer training;progressive activity/exercise   Clinical Decision Making Complexity (OT) low complexity   Risk & Benefits of therapy have been explained evaluation/treatment results reviewed;care plan/treatment goals reviewed   OT Total Evaluation Time   OT Eval, Low Complexity Minutes (94536) 10   OT Goals   Therapy Frequency (OT) One time eval and treatment   OT Predicted Duration/Target Date for Goal Attainment 05/31/23   OT Goals Hygiene/Grooming;Lower Body Dressing;Toilet Transfer/Toileting;OT Goal 1   OT: Hygiene/Grooming supervision/stand-by assist   OT: Lower Body Dressing Supervision/stand-by assist;within precautions;using adaptive equipment   OT: Toilet Transfer/Toileting Supervision/stand-by assist   OT: Goal 1 Pt will indep verbalize hip precautions   Interventions   Interventions Quick Adds Self-Care/Home Management   Self-Care/Home Management   Self-Care/Home Mgmt/ADL, Compensatory, Meal Prep Minutes (78983) 26   Symptoms Noted During/After Treatment (Meal Preparation/Planning Training) none   Treatment Detail/Skilled Intervention Pt up in  chair and willing to complete OT. Pt reports she has had 2 back surgies so it is likely that she knows about all necessary equipment but willing to participate in OT. Educated on hip precautions. Needs VCs on 2 occasions and able to free recall on 3rd attempt. Had out in room with written precautions as needed. Pt demos ability to use reacher for LB dressing. Does not don socks but uses a sock aid at home. Pt reports she has raised toilet seat and shower chair at home.  available to assist. Discussed car transfers and pt verbalizes understanding.   OT Discharge Planning   OT Rationale for DC Rec Pt mobilizing well with FWW. Able to maintain precautions during dressing with AE. Has support from  as needed at home.   OT Brief overview of current status SBA with FWW for mobility, SBA with AE for dressing   Total Session Time   Timed Code Treatment Minutes 26   Total Session Time (sum of timed and untimed services) 36

## 2023-05-31 NOTE — PROGRESS NOTES
5/31   Patient vital signs are at baseline: Yes  Patient able to ambulate as they were prior to admission or with assist devices provided by therapies during their stay:  Yes  Patient MUST void prior to discharge:  Yes  Patient able to tolerate oral intake:  Yes  Pain has adequate pain control using Oral analgesics:  Yes  Does patient have an identified :  Yes  Has goal D/C date and time been discussed with patient:  Yes   Discharge information reviewed with pt.  Medication reviewed and teach back done.  Pt will be going home with her .  Pt has met all her goals.

## 2023-06-02 ENCOUNTER — ANTICOAGULATION THERAPY VISIT (OUTPATIENT)
Dept: ANTICOAGULATION | Facility: CLINIC | Age: 78
End: 2023-06-02
Payer: MEDICARE

## 2023-06-02 ENCOUNTER — TELEPHONE (OUTPATIENT)
Dept: INTERNAL MEDICINE | Facility: CLINIC | Age: 78
End: 2023-06-02
Payer: MEDICARE

## 2023-06-02 DIAGNOSIS — Z86.718 PERSONAL HISTORY OF DVT (DEEP VEIN THROMBOSIS): Primary | ICD-10-CM

## 2023-06-02 LAB — INR HOME MONITORING: 1.8 (ref 2–3)

## 2023-06-02 NOTE — TELEPHONE ENCOUNTER
Patient Returning Call    Reason for call:  INR called her    Information relayed to patient: Rhonda was calling nurse back  Patient has additional questions:  No    What are your questions/concerns:      Could we send this information to you in [facility] or would you prefer to receive a phone call?:   Patient would prefer a phone call   Okay to leave a detailed message?: Yes at Home number on file [phone] (home)

## 2023-06-02 NOTE — PROGRESS NOTES
ANTICOAGULATION MANAGEMENT     Manasa French 77 year old female is on warfarin with subtherapeutic INR result. (Goal INR 2.0-3.0)    Recent labs: (last 7 days)     06/02/23  0000   INR 1.8*       ASSESSMENT       Source(s): Chart Review and Patient/Caregiver Call       Warfarin doses taken: Held for Hip surgery on 5/30  recently which may be affecting INR    Diet: No new diet changes identified,    Medication/supplement changes: Patient missed her Vitamin K tablet yesterday due to low INR. Patient is also taking pain medication (2.5 mg every 4 hours, 1000 mg of tylenol every 6 hours)    New illness, injury, or hospitalization: Yes: Patient reports some diarrhea yesterday due to stool softener given during hospital stay, has resolved    Signs or symptoms of bleeding or clotting: No    Previous result: Subtherapeutic    Additional findings: None         PLAN     Recommended plan for temporary change(s) affecting INR     Dosing Instructions: booster dose then continue your current warfarin dose with next INR in 5 days   (patient is resuming regular vitamin K tablet tonight so small boost to help with this offset)    Summary  As of 6/2/2023    Full warfarin instructions:  6/2: 7.5 mg; Otherwise 5 mg every day   Next INR check:  6/6/2023             Telephone call with Rhonda who verbalizes understanding and agrees to plan    Patient to recheck with home meter    Education provided:     Please call back if any changes to your diet, medications or how you've been taking warfarin    Plan made per [facility] anticoagulation protocol    Ember Amezcua, RN  Anticoagulation Clinic  6/2/2023    _______________________________________________________________________     Anticoagulation Episode Summary     Current INR goal:  2.0-3.0   TTR:  86.4 % (1 y)   Target end date:  Indefinite   Send INR reminders to:  [employer] [facility]    Indications    Acute deep vein thrombosis (DVT) of axillary vein and internal jugular (H)  (Resolved) [I82.A19]  Current use of long term anticoagulation (Resolved) [Z79.01]  Acute deep vein thrombosis (DVT) of axillary vein of right upper extremity (H) (Resolved) [I82.A11]  Personal history of DVT (deep vein thrombosis) [Z86.718]           Comments:  [employer] meter- Managed by exception         Anticoagulation Care Providers     Provider Role Specialty Phone number    Rema Braxton DO Referring Family Medicine [phone]    Jessica Anthony MD Referring Internal Medicine [phone]    Christopher Smith MD Responsible Hematology [phone]

## 2023-06-06 ENCOUNTER — ANTICOAGULATION THERAPY VISIT (OUTPATIENT)
Dept: ANTICOAGULATION | Facility: CLINIC | Age: 78
End: 2023-06-06
Payer: MEDICARE

## 2023-06-06 DIAGNOSIS — Z86.718 PERSONAL HISTORY OF DVT (DEEP VEIN THROMBOSIS): Primary | ICD-10-CM

## 2023-06-06 LAB — INR HOME MONITORING: 2.4 (ref 2–3)

## 2023-06-06 NOTE — PROGRESS NOTES
ANTICOAGULATION MANAGEMENT     Manasa French 77 year old female is on warfarin with therapeutic INR result. (Goal INR 2.0-3.0)    Recent labs: (last 7 days)     06/06/23  0000   INR 2.4       ASSESSMENT       Source(s): Chart Review and Patient/Caregiver Call       Warfarin doses taken: Warfarin taken as instructed    Diet: No new diet changes identified    Medication/supplement changes: None noted    New illness, injury, or hospitalization: No    Signs or symptoms of bleeding or clotting: No    Previous result: Subtherapeutic    Additional findings: Hip replacement surgery lat week. Patient doing well.          PLAN     Recommended plan for temporary change(s) affecting INR     Dosing Instructions: Continue your current warfarin dose with next INR in 1 week       Summary  As of 6/6/2023    Full warfarin instructions:  5 mg every day   Next INR check:  6/13/2023             Telephone call with   and Patient who agrees to plan and repeated back plan correctly    Patient to recheck with home meter    Education provided:     Please call back if any changes to your diet, medications or how you've been taking warfarin    Resume manage by exception with home monitor. Continue to submit INR results to home monitor company.You will only be called when your result is out of range. Please call and notify [facility] if new medication started, dose missed, signs or symptoms of bleeding or clotting, or a surgery/procedure is scheduled.    Plan made per [facility] anticoagulation protocol    Manju Mueller RN  Anticoagulation Clinic  6/6/2023    _______________________________________________________________________     Anticoagulation Episode Summary     Current INR goal:  2.0-3.0   TTR:  86.0 % (1 y)   Target end date:  Indefinite   Send INR reminders to:  [facility]    Indications    Acute deep vein thrombosis (DVT) of axillary vein and internal jugular (H) (Resolved) [I82.A19]  Current use of long term  anticoagulation (Resolved) [Z79.01]  Acute deep vein thrombosis (DVT) of axillary vein of right upper extremity (H) (Resolved) [I82.A11]  Personal history of DVT (deep vein thrombosis) [Z86.718]           Comments:  [employer] meter- Managed by exception         Anticoagulation Care Providers     Provider Role Specialty Phone number    Rema Braxton DO Referring Family Medicine [phone]    Jessica Anthony MD Referring Internal Medicine [phone]    Christopher Smith MD Responsible Hematology [phone]

## 2023-06-10 ENCOUNTER — TELEPHONE (OUTPATIENT)
Dept: INTERNAL MEDICINE | Facility: CLINIC | Age: 78
End: 2023-06-10
Payer: MEDICARE

## 2023-06-10 DIAGNOSIS — I10 BENIGN ESSENTIAL HYPERTENSION: ICD-10-CM

## 2023-06-10 RX ORDER — AMLODIPINE BESYLATE 5 MG/1
5 TABLET ORAL DAILY
Qty: 90 TABLET | Refills: 3 | Status: CANCELLED | OUTPATIENT
Start: 2023-06-10

## 2023-06-10 RX ORDER — AMLODIPINE BESYLATE 5 MG/1
5 TABLET ORAL DAILY
Qty: 90 TABLET | Refills: 3 | Status: SHIPPED | OUTPATIENT
Start: 2023-06-10 | End: 2023-06-10

## 2023-06-10 RX ORDER — AMLODIPINE BESYLATE 5 MG/1
5 TABLET ORAL DAILY
Qty: 90 TABLET | Refills: 0 | Status: SHIPPED | OUTPATIENT
Start: 2023-06-10 | End: 2023-06-13

## 2023-06-10 NOTE — TELEPHONE ENCOUNTER
"Last Written Prescription Date:  6/8/22  Last Fill Quantity: 90,  # refills: 3   Last office visit provider:  5/15/23     Requested Prescriptions   Pending Prescriptions Disp Refills     amLODIPine (NORVASC) 5 MG tablet 90 tablet 3     Sig: Take 1 tablet (5 mg) by mouth daily       Calcium Channel Blockers Protocol  Failed - 6/10/2023  8:19 AM        Failed - Recent (12 mo) or future (30 days) visit within the authorizing provider's specialty     Patient has had an office visit with the authorizing provider or a provider within the authorizing providers department within the previous 12 mos or has a future within next 30 days. See \"Patient Info\" tab in inbasket, or \"Choose Columns\" in Meds & Orders section of the refill encounter.      Seen on 5/15/23 - preop ([facility])        Passed - Blood pressure under 140/90 in past 12 months     BP Readings from Last 3 Encounters:   05/31/23 138/68   05/15/23 (!) 140/66   08/19/22 (!) 158/70                 Passed - Medication is active on med list        Passed - Patient is age 18 or older        Passed - No active pregnancy on record        Passed - Normal serum creatinine on file in past 12 months     Recent Labs   Lab Test 05/30/23  0625   CR 0.91       Ok to refill medication if creatinine is low          Passed - No positive pregnancy test in past 12 months             Amber Alamo RN 06/10/23 8:20 AM    "

## 2023-06-10 NOTE — TELEPHONE ENCOUNTER
Medication Question or Refill    Contacts       Type Contact Phone/Fax    06/10/2023 08:09 AM CDT Phone (Incoming) Rhonda French (Self) [phone] (H)          What medication are you calling about (include dose and sig)?: amLODIPine (NORVASC) 5 MG tablet [53105]      Preferred Pharmacy:       [facility] - [facility] [address]  [address] [address]  Phone: [phone] Fax: [phone]      Controlled Substance Agreement on file:   CSA -- Patient Level:    CSA: None found at the patient level.       Who prescribed the medication?: Cristina Castro CNP    Do you need a refill? Yes    When did you use the medication last? 6/10    Patient offered an appointment? No - pt has her annual wellness appt scheduled for 8/22    Do you have any questions or concerns?  Yes: Pt needed a refill on medication and was notified that the  Denied the refill - Pt would like to know why and if she can get a refill.      Could we send this information to you in [facility] or would you prefer to receive a phone call?:   Patient would prefer a phone call   Okay to leave a detailed message?: Yes at Home number on file [phone] (home)

## 2023-06-13 ENCOUNTER — DOCUMENTATION ONLY (OUTPATIENT)
Dept: ANTICOAGULATION | Facility: CLINIC | Age: 78
End: 2023-06-13
Payer: MEDICARE

## 2023-06-13 DIAGNOSIS — Z86.718 PERSONAL HISTORY OF DVT (DEEP VEIN THROMBOSIS): Primary | ICD-10-CM

## 2023-06-13 LAB — INR HOME MONITORING: 2.2 (ref 2–3)

## 2023-06-13 NOTE — PROGRESS NOTES
ANTICOAGULATION  MANAGEMENT-Home Monitor Managed by Exception    Manasa DIEGO Manolo 77 year old female is on warfarin with therapeutic INR result. (Goal INR 2.0-3.0)    Recent labs: (last 7 days)     06/13/23  0000   INR 2.2         Previous INR was Therapeutic    Medication, diet, health changes since last INR:chart reviewed; none identified    Contacted within the last 12 weeks by phone on 6/6/23      AUBREY     Manasa was NOT contacted regarding therapeutic result today per home monitoring policy manage by exception agreement.   Current warfarin dose is to be continued:     Summary  As of 6/13/2023    Full warfarin instructions:  5 mg every day   Next INR check:  6/20/2023           ?   Manju Mueller, RN  Anticoagulation Clinic  6/13/2023    _______________________________________________________________________     Anticoagulation Episode Summary     Current INR goal:  2.0-3.0   TTR:  86.0 % (1 y)   Target end date:  Indefinite   Send INR reminders to:  [facility]ITOR[facility]    Indications    Acute deep vein thrombosis (DVT) of axillary vein and internal jugular (H) (Resolved) [I82.A19]  Current use of long term anticoagulation (Resolved) [Z79.01]  Acute deep vein thrombosis (DVT) of axillary vein of right upper extremity (H) (Resolved) [I82.A11]  Personal history of DVT (deep vein thrombosis) [Z86.718]           Comments:  [employer] meter- Managed by exception         Anticoagulation Care Providers     Provider Role Specialty Phone number    Rema Braxton DO Referring Family Medicine [phone]    Jessica Anthony MD Referring Internal Medicine [phone]    Christopher Smith MD Responsible Hematology [phone]            
Yes

## 2023-06-22 ENCOUNTER — TELEPHONE (OUTPATIENT)
Dept: ANTICOAGULATION | Facility: CLINIC | Age: 78
End: 2023-06-22
Payer: MEDICARE

## 2023-06-22 NOTE — TELEPHONE ENCOUNTER
ANTICOAGULATION     Manasa French is overdue for INR check.      Spoke with Brooke who will test tomorrow    Wolf Hoang RN

## 2023-06-23 ENCOUNTER — DOCUMENTATION ONLY (OUTPATIENT)
Dept: ANTICOAGULATION | Facility: CLINIC | Age: 78
End: 2023-06-23
Payer: MEDICARE

## 2023-06-23 DIAGNOSIS — Z86.718 PERSONAL HISTORY OF DVT (DEEP VEIN THROMBOSIS): Primary | ICD-10-CM

## 2023-06-23 LAB — INR HOME MONITORING: 2.4 (ref 2–3)

## 2023-06-23 NOTE — PROGRESS NOTES
ANTICOAGULATION  MANAGEMENT-Home Monitor Managed by Exception    Manasa French 77 year old female is on warfarin with therapeutic INR result. (Goal INR 2.0-3.0)    Recent labs: (last 7 days)     06/23/23  0000   INR 2.4         Previous INR was Therapeutic    Medication, diet, health changes since last INR:chart reviewed; none identified    Contacted within the last 12 weeks by phone on 6/6/23      AUBREY     Manasa was NOT contacted regarding therapeutic result today per home monitoring policy manage by exception agreement.   Current warfarin dose is to be continued:     Summary  As of 6/23/2023    Full warfarin instructions:  5 mg every day   Next INR check:  6/30/2023           ?   Manju Mueller, RN  Anticoagulation Clinic  6/23/2023    _______________________________________________________________________     Anticoagulation Episode Summary     Current INR goal:  2.0-3.0   TTR:  86.0 % (1 y)   Target end date:  Indefinite   Send INR reminders to:  [facility] MONITOR[facility]    Indications    Acute deep vein thrombosis (DVT) of axillary vein and internal jugular (H) (Resolved) [I82.A19]  Current use of long term anticoagulation (Resolved) [Z79.01]  Acute deep vein thrombosis (DVT) of axillary vein of right upper extremity (H) (Resolved) [I82.A11]  Personal history of DVT (deep vein thrombosis) [Z86.718]           Comments:  [employer] meter- Managed by exception         Anticoagulation Care Providers     Provider Role Specialty Phone number    Rema Braxton DO Referring Family Medicine [phone]    Jessica Anthony MD Referring Internal Medicine [phone]    Christopher Smith MD Responsible Hematology [phone]

## 2023-06-29 ENCOUNTER — OFFICE VISIT (OUTPATIENT)
Dept: ENDOCRINOLOGY | Facility: CLINIC | Age: 78
End: 2023-06-29
Payer: MEDICARE

## 2023-06-29 VITALS
SYSTOLIC BLOOD PRESSURE: 144 MMHG | HEART RATE: 86 BPM | HEIGHT: 65 IN | BODY MASS INDEX: 26.14 KG/M2 | WEIGHT: 156.9 LBS | DIASTOLIC BLOOD PRESSURE: 68 MMHG

## 2023-06-29 DIAGNOSIS — M80.00XD AGE-RELATED OSTEOPOROSIS WITH CURRENT PATHOLOGICAL FRACTURE WITH ROUTINE HEALING, SUBSEQUENT ENCOUNTER: Primary | ICD-10-CM

## 2023-06-29 DIAGNOSIS — Z78.0 MENOPAUSE: ICD-10-CM

## 2023-06-29 DIAGNOSIS — Z92.29 HISTORY OF BISPHOSPHONATE THERAPY: ICD-10-CM

## 2023-06-29 PROCEDURE — 99214 OFFICE O/P EST MOD 30 MIN: CPT | Performed by: INTERNAL MEDICINE

## 2023-06-29 NOTE — PROGRESS NOTES
Recent issues:  Osteoporosis follow-up evaluation, last appt with me 2/9/21, here with  Josias  She had right hip replacement 5/30/23  Reviewed medical history from patient and Epic chart record           History of chronic back pain  Medical evaluation, then surgical evaluation  2006. Spine surgery with 2 vertebral disc replacements at [facility] with Dr. Welsh     2016. Back pain while pulling hose backwards, fell and had vertebral fracture lumbar spine, back brace treatment  Medical evaluation  MRI lumbar spine:                Mild compression fracture superior endplate L4 vertebrae     ~2/2018. Another fall 2018 falling from treadmill, back pain and vertebrae fracture  Medical evaluation  2/28/18 MRI lumbar spine:              T12 and L2 superior endplate compression fractures, new since 8/2016              Previous compression fracture along the superior endplate of L3 has mature                       High-grade central canal stenosis persists at both L2-3 and L3-4              Previous combined interbody and instrumented bilateral posterolateral fusion from L4/S1.                            Solid bony bridging across the operated levels, appeared unchanged  Patient reports she wore back brace x 3 mo     7/2/18 Medical evaluation with Dr. MOON Box/ Endocrinology  Review of health history, osteoporotic vertebral compression fractures, lab testing     Previous DEXA scans include 5/17/12, 9/23/14,  7/9/18 DEXA:               Left Femoral Neck            T-score:  -1.8               Right Femoral Neck          T-score:  -1.6               Forearm (radius 33%)       T-score:  -0.4     Subsequent surgical evaluation with Dr. Christensen  9/11/18, Back surgery with ORIF and pin placement L1 to S1  9/2018. Plan to start alendronate, began alendronate 70 mg weekly dose (Sundays)     Health history includes:  Bone fractures:                       Left leg spiral fracture '14                                                   Vertebral fracture s/p fall from horse '16, fall injury '18                                    Vitamin D deficiency:              None                  Kidney stones:                        None  Thyroid disease:                     Graves' disease hyperthyroidism                                                  Radioiodine ablation 2003, then postablative hypothyroidism                                                  Treatment with levothyroxine  Fam Hx Osteoporosis:            No  Osteoporosis med use:           Alendronate  Previous [facility] labs include:            Lab Test 07/26/19  1348   07/02/18  1610   TETE 8.4*   < > 9.5   PTHI  --   --  45   VITDT  --   --  43    < > = values in this interval not displayed.          10/30/19. Initial osteoporosis evaluation with me at [place]  Reviewed health history and osteoporosis management  Decided to continue alendronate 70 mg weekly dose plan    2/16/21 DEXA:   Left fem neck:  T-score: -1.0   Right fem neck: T-score: -1.3   Left Radius UD  T-score: -1.8    5/30/23 Right SANTHOSH surgery at [facility] with Dr. Vasu Mixon    Recent [facility] labs include:  Lab Results   Component Value Date     05/15/2023    POTASSIUM 3.7 05/30/2023    CHLORIDE 104 05/15/2023    CO2 26 05/15/2023    ANIONGAP 11 05/15/2023     (H) 05/31/2023    BUN 16.1 05/15/2023    CR 0.91 05/30/2023    GFRESTIMATED 64 05/30/2023    GFRESTBLACK 79 11/20/2020    TETE 9.6 05/15/2023    TSH 3.48 07/20/2022    VITDT 43 07/02/2018    PTHI 45 07/02/2018     Current med:  Alendronate 70 mg weekly        Lives in [place]  Sees Dr. Bettye Anthony/[facility] for general medicine evaluations.       PMH/PSH:  Past Medical History:   Diagnosis Date     Benign essential hypertension      History of Graves' disease     s/p BARAHONA     History of hydrocephalus     s/p  shunt placement in 1987     Macular degeneration of both eyes      Osteoporosis      Personal history of DVT (deep vein  thrombosis) 2019    RUE; on chronic AC     Postablative hypothyroidism      Primary open angle glaucoma (POAG) of both eyes     right eye moderate, left eye mild     Pseudophakia      Sensorineural hearing loss, bilateral      Past Surgical History:   Procedure Laterality Date     ARTHROPLASTY HIP Right 5/30/2023    Procedure: Right Total Hip Arthroplasty;  Surgeon: Vasu Mixon MD;  Location: [facility]     ARTHROSCOPY SHOULDER Left      CATARACT IOL, RT/LT Bilateral      DECOMPRESSION, FUSION LUMBAR POSTERIOR ONE LEVEL, COMBINED      L5-S1     EXCISE GANGLION WRIST Right      HERNIA REPAIR, UMBILICAL       LUMPECTOMY BREAST Left     benign       Family Hx:  Family History   Problem Relation Age of Onset     Colon Cancer Mother      Hypertension Mother      Breast Cancer Mother      Glaucoma Mother      Leukemia Father      Diabetes Type 2  Father      Hypertension Sister      Macular Degeneration Sister      Diabetes Type 2  Brother      Breast Cancer Maternal Aunt         multiple aunts     Myocardial Infarction No family hx of      Cerebrovascular Disease No family hx of      Coronary Artery Disease Early Onset No family hx of      Ovarian Cancer No family hx of          Social Hx:  Social History     Socioeconomic History     Marital status:      Spouse name: Not on file     Number of children: Not on file     Years of education: Not on file     Highest education level: Not on file   Occupational History     Occupation: Retired - wedding    Tobacco Use     Smoking status: Never     Smokeless tobacco: Never   Vaping Use     Vaping Use: Never used   Substance and Sexual Activity     Alcohol use: Yes     Comment: 4 glasses wine per week     Drug use: No     Sexual activity: Yes     Partners: Male   Other Topics Concern     Parent/sibling w/ CABG, MI or angioplasty before 65F 55M? No   Social History Narrative    .    Two sons.    3 grandchildren.    Swims 3x/week x 1 hour; walks  "4x/week x 1 hour.      Social Determinants of Health     Financial Resource Strain: Not on file   Food Insecurity: Not on file   Transportation Needs: Not on file   Physical Activity: Not on file   Stress: Not on file   Social Connections: Not on file   Intimate Partner Violence: Not At Risk (9/8/2021)    Humiliation, Afraid, Rape, and Kick questionnaire      Fear of Current or Ex-Partner: No      Emotionally Abused: No      Physically Abused: No      Sexually Abused: No   Housing Stability: Not on file          MEDICATIONS:  has a current medication list which includes the following prescription(s): acetaminophen, alendronate, amlodipine, calcium carbonate-vitamin d, carboxymethylcellulose, latanoprost, levothyroxine, losartan, multiple vitamins-minerals, timolol maleate, vitamin b-12, vitamin d3, vitamin k, warfarin anticoagulant, oxycodone, and senna-docusate.      ROS: 10 point ROS neg other than the symptoms noted above in the HPI.     GENERAL: some fatigue, wt stable; denies fevers, chills, night sweats.    HEENT: no dysphagia, odonophagia, diplopia, neck pain  THYROID:  no apparent hyper or hypothyroid symptoms  CV: no chest pain, pressure, palpitations  LUNGS: no SOB, ALLEN, cough, wheezing   ABDOMEN: no diarrhea, constipation, abdominal pain  EXTREMITIES: no rashes, ulcers, edema  NEUROLOGY: no headaches, denies changes in vision, tingling, extremitiy numbness   MSK: right hip pain, low back stiffness; denies muscle weakness  SKIN: no rashes or lesions  : no menses, denies hot flashes  PSYCH:  stable mood, no significant anxiety or depression  ENDOCRINE: no heat or cold intolerance    Physical Exam   VS: BP (!) 144/68   Pulse 86   Ht 1.638 m (5' 4.5\")   Wt 71.2 kg (156 lb 14.4 oz)   LMP  (LMP Unknown)   BMI 26.52 kg/m    GENERAL: AXOX3, NAD, well dressed, answering questions appropriately, appears stated age.  ENT: no nose swelling or nasal discharge, mouth redness or gum changes.  EYES: eyes grossly " normal to inspection, conjunctivae and sclerae normal, no exophthalmos or proptosis  THYROID:  no apparent nodules or goiter  LUNGS: no audible wheeze, cough or visible cyanosis, or increased work of breathing  ABDOMEN: abdomen normal size  EXTREMITIES: no edema noted  NEUROLOGY: walks slowly with 2 canes as gait aids; CN grossly intact, no tremors  MSK: grossly intact  SKIN:  no apparent skin lesions, rash, or edema with visualized skin appearance  PSYCH: mentation appears normal, affect normal/bright, judgement and insight intact,   normal speech and appearance well groomed    LABS:     All pertinent notes, labs, and images personally reviewed by me.        A/P:  Encounter Diagnoses   Name Primary?     Age-related osteoporosis with current pathological fracture with routine healing, subsequent encounter Yes     History of bisphosphonate therapy      Menopause        Comments:  Reviewed health history and osteoporosis issues  While osteopenia-range BMD at hips, vertebral compression fractures define osteoporosis.  Reviewed and interpreted tests that I previously ordered.   Ordered appropriate tests for the endocrinology disease management.    Management options discussed and implemented after shared medical decision making with the patient.  Osteoporosis problem is chronic-stable    Plan:  Discussed general issues with the osteoporosis diagnosis and management  Reviewed concept of using the DEXA scan imaging to assess bone mineral density (BMD)  We have reviewed treatment options with oral bisphosphonate, SERM, IV Boniva vs Reclast, SQ Prolia vs Forteo     Recommend:  Reviewed the previous 5-year treatment course with alendronate medication  Continue the current alendronate 70 mg weekly dose until current supply gone   She anticipates having supply until 10/2023  Advised repeat DEXA scan soon   Procedure order placed   Scan at [facility], gave [phone] to schedule   Repeat lab tests in  8/2023   Lab orders placed  Change medication treatment to Prolia injections Q 6 months ~10/2023   Reviewed Prolia, dosing, potential benefits and SE's  Continue calcium and vitamin D supplement use  Avoid heavy lifting and fall injuries  Repeat spine Xray and/or MRI if new acute back pain    Addressed patient questions today     There are no Patient Instructions on file for this visit.    Future labs ordered today:   Orders Placed This Encounter   Procedures     DX Hip/Pelvis/Spine     Basic metabolic panel     Vitamin D Deficiency     Radiology/Consults ordered today: DX HIP/PELVIS/SPINE    Total time spent on day of encounter:   31 min    Follow-up:  7/2023    ROBERT Hernandez MD, MS  Endocrinology  [facility]    CC:  MYAA Anthony

## 2023-06-29 NOTE — LETTER
6/29/2023         RE: Manasa French  [address]  [place] [address]        Dear Colleague,    Thank you for referring your patient, Manasa French, to the [facility] [place]. Please see a copy of my visit note below.      Recent issues:  Osteoporosis follow-up evaluation, last appt with me 2/9/21, here with  Josias  She had right hip replacement 5/30/23  Reviewed medical history from patient and Epic chart record           History of chronic back pain  Medical evaluation, then surgical evaluation  2006. Spine surgery with 2 vertebral disc replacements at [facility] with Dr. Welsh     2016. Back pain while pulling hose backwards, fell and had vertebral fracture lumbar spine, back brace treatment  Medical evaluation  MRI lumbar spine:                Mild compression fracture superior endplate L4 vertebrae     ~2/2018. Another fall 2018 falling from treadmill, back pain and vertebrae fracture  Medical evaluation  2/28/18 MRI lumbar spine:              T12 and L2 superior endplate compression fractures, new since 8/2016              Previous compression fracture along the superior endplate of L3 has mature                       High-grade central canal stenosis persists at both L2-3 and L3-4              Previous combined interbody and instrumented bilateral posterolateral fusion from L4/S1.                            Solid bony bridging across the operated levels, appeared unchanged  Patient reports she wore back brace x 3 mo     7/2/18 Medical evaluation with Dr. MOON Box/[facility]  Review of health history, osteoporotic vertebral compression fractures, lab testing     Previous DEXA scans include 5/17/12, 9/23/14,  7/9/18 DEXA:               Left Femoral Neck            T-score:  -1.8               Right Femoral Neck          T-score:  -1.6               Forearm (radius 33%)       T-score:  -0.4     Subsequent surgical evaluation with   Mehbod  9/11/18, Back surgery with ORIF and pin placement L1 to S1  9/2018. Plan to start alendronate, began alendronate 70 mg weekly dose (Sundays)     Health history includes:  Bone fractures:                       Left leg spiral fracture '14                                                  Vertebral fracture s/p fall from horse '16, fall injury '18                                    Vitamin D deficiency:              None                  Kidney stones:                        None  Thyroid disease:                     Graves' disease hyperthyroidism                                                  Radioiodine ablation 2003, then postablative hypothyroidism                                                  Treatment with levothyroxine  Fam Hx Osteoporosis:            No  Osteoporosis med use:           Alendronate  Previous [facility] labs include:            Lab Test 07/26/19  1348   07/02/18  1610   TETE 8.4*   < > 9.5   PTHI  --   --  45   VITDT  --   --  43    < > = values in this interval not displayed.          10/30/19. Initial osteoporosis evaluation with me at [place]  Reviewed health history and osteoporosis management  Decided to continue alendronate 70 mg weekly dose plan    2/16/21 DEXA:   Left fem neck:  T-score: -1.0   Right fem neck: T-score: -1.3   Left Radius UD  T-score: -1.8    5/30/23 Right SANTHOSH surgery at [facility] with Dr. Vasu Mixon    Recent [facility] labs include:  Lab Results   Component Value Date     05/15/2023    POTASSIUM 3.7 05/30/2023    CHLORIDE 104 05/15/2023    CO2 26 05/15/2023    ANIONGAP 11 05/15/2023     (H) 05/31/2023    BUN 16.1 05/15/2023    CR 0.91 05/30/2023    GFRESTIMATED 64 05/30/2023    GFRESTBLACK 79 11/20/2020    TETE 9.6 05/15/2023    TSH 3.48 07/20/2022    VITDT 43 07/02/2018    PTHI 45 07/02/2018     Current med:  Alendronate 70 mg weekly        Lives in [place]  Sees Dr. Bettye Anthony/[facility] general medicine  evaluations.       PMH/PSH:  Past Medical History:   Diagnosis Date     Benign essential hypertension      History of Graves' disease     s/p BARAHONA     History of hydrocephalus     s/p  shunt placement in 1987     Macular degeneration of both eyes      Osteoporosis      Personal history of DVT (deep vein thrombosis) 2019    RUE; on chronic AC     Postablative hypothyroidism      Primary open angle glaucoma (POAG) of both eyes     right eye moderate, left eye mild     Pseudophakia      Sensorineural hearing loss, bilateral      Past Surgical History:   Procedure Laterality Date     ARTHROPLASTY HIP Right 5/30/2023    Procedure: Right Total Hip Arthroplasty;  Surgeon: Vasu Mixon MD;  Location: [facility] OR     ARTHROSCOPY SHOULDER Left      CATARACT IOL, RT/LT Bilateral      DECOMPRESSION, FUSION LUMBAR POSTERIOR ONE LEVEL, COMBINED      L5-S1     EXCISE GANGLION WRIST Right      HERNIA REPAIR, UMBILICAL       LUMPECTOMY BREAST Left     benign       Family Hx:  Family History   Problem Relation Age of Onset     Colon Cancer Mother      Hypertension Mother      Breast Cancer Mother      Glaucoma Mother      Leukemia Father      Diabetes Type 2  Father      Hypertension Sister      Macular Degeneration Sister      Diabetes Type 2  Brother      Breast Cancer Maternal Aunt         multiple aunts     Myocardial Infarction No family hx of      Cerebrovascular Disease No family hx of      Coronary Artery Disease Early Onset No family hx of      Ovarian Cancer No family hx of          Social Hx:  Social History     Socioeconomic History     Marital status:      Spouse name: Not on file     Number of children: Not on file     Years of education: Not on file     Highest education level: Not on file   Occupational History     Occupation: Retired - wedding    Tobacco Use     Smoking status: Never     Smokeless tobacco: Never   Vaping Use     Vaping Use: Never used   Substance and Sexual Activity     Alcohol  use: Yes     Comment: 4 glasses wine per week     Drug use: No     Sexual activity: Yes     Partners: Male   Other Topics Concern     Parent/sibling w/ CABG, MI or angioplasty before 65F 55M? No   Social History Narrative    .    Two sons.    3 grandchildren.    Swims 3x/week x 1 hour; walks 4x/week x 1 hour.      Social Determinants of Health     Financial Resource Strain: Not on file   Food Insecurity: Not on file   Transportation Needs: Not on file   Physical Activity: Not on file   Stress: Not on file   Social Connections: Not on file   Intimate Partner Violence: Not At Risk (9/8/2021)    Humiliation, Afraid, Rape, and Kick questionnaire      Fear of Current or Ex-Partner: No      Emotionally Abused: No      Physically Abused: No      Sexually Abused: No   Housing Stability: Not on file          MEDICATIONS:  has a current medication list which includes the following prescription(s): acetaminophen, alendronate, amlodipine, calcium carbonate-vitamin d, carboxymethylcellulose, latanoprost, levothyroxine, losartan, multiple vitamins-minerals, timolol maleate, vitamin b-12, vitamin d3, vitamin k, warfarin anticoagulant, oxycodone, and senna-docusate.      ROS: 10 point ROS neg other than the symptoms noted above in the HPI.     GENERAL: some fatigue, wt stable; denies fevers, chills, night sweats.    HEENT: no dysphagia, odonophagia, diplopia, neck pain  THYROID:  no apparent hyper or hypothyroid symptoms  CV: no chest pain, pressure, palpitations  LUNGS: no SOB, ALLEN, cough, wheezing   ABDOMEN: no diarrhea, constipation, abdominal pain  EXTREMITIES: no rashes, ulcers, edema  NEUROLOGY: no headaches, denies changes in vision, tingling, extremitiy numbness   MSK: right hip pain, low back stiffness; denies muscle weakness  SKIN: no rashes or lesions  : no menses, denies hot flashes  PSYCH:  stable mood, no significant anxiety or depression  ENDOCRINE: no heat or cold intolerance    Physical Exam   VS: BP (!)  "144/68   Pulse 86   Ht 1.638 m (5' 4.5\")   Wt 71.2 kg (156 lb 14.4 oz)   LMP  (LMP Unknown)   BMI 26.52 kg/m    GENERAL: AXOX3, NAD, well dressed, answering questions appropriately, appears stated age.  ENT: no nose swelling or nasal discharge, mouth redness or gum changes.  EYES: eyes grossly normal to inspection, conjunctivae and sclerae normal, no exophthalmos or proptosis  THYROID:  no apparent nodules or goiter  LUNGS: no audible wheeze, cough or visible cyanosis, or increased work of breathing  ABDOMEN: abdomen normal size  EXTREMITIES: no edema noted  NEUROLOGY: walks slowly with 2 canes as gait aids; CN grossly intact, no tremors  MSK: grossly intact  SKIN:  no apparent skin lesions, rash, or edema with visualized skin appearance  PSYCH: mentation appears normal, affect normal/bright, judgement and insight intact,   normal speech and appearance well groomed    LABS:     All pertinent notes, labs, and images personally reviewed by me.        A/P:  Encounter Diagnoses   Name Primary?     Age-related osteoporosis with current pathological fracture with routine healing, subsequent encounter Yes     History of bisphosphonate therapy      Menopause        Comments:  Reviewed health history and osteoporosis issues  While osteopenia-range BMD at hips, vertebral compression fractures define osteoporosis.  Reviewed and interpreted tests that I previously ordered.   Ordered appropriate tests for the endocrinology disease management.    Management options discussed and implemented after shared medical decision making with the patient.  Osteoporosis problem is chronic-stable    Plan:  Discussed general issues with the osteoporosis diagnosis and management  Reviewed concept of using the DEXA scan imaging to assess bone mineral density (BMD)  We have reviewed treatment options with oral bisphosphonate, SERM, IV Boniva vs Reclast, SQ Prolia vs Forteo     Recommend:  Reviewed the previous 5-year treatment course with " alendronate medication  Continue the current alendronate 70 mg weekly dose until current supply gone   She anticipates having supply until 10/2023  Advised repeat DEXA scan soon   Procedure order placed   Scan at [facility], gave [phone] to schedule   Repeat lab tests in 8/2023   Lab orders placed  Change medication treatment to Prolia injections Q 6 months ~10/2023   Reviewed Prolia, dosing, potential benefits and SE's  Continue calcium and vitamin D supplement use  Avoid heavy lifting and fall injuries  Repeat spine Xray and/or MRI if new acute back pain    Addressed patient questions today     There are no Patient Instructions on file for this visit.    Future labs ordered today:   Orders Placed This Encounter   Procedures     DX Hip/Pelvis/Spine     Basic metabolic panel     Vitamin D Deficiency     Radiology/Consults ordered today: DX HIP/PELVIS/SPINE    Total time spent on day of encounter:   31 min    Follow-up:  7/2023    ROBERT Hernandez MD, MS  Endocrinology  [facility]    CC:  MAYA Anthony        Again, thank you for allowing me to participate in the care of your patient.        Sincerely,        Kevin Hernandez MD

## 2023-06-30 ENCOUNTER — DOCUMENTATION ONLY (OUTPATIENT)
Dept: ANTICOAGULATION | Facility: CLINIC | Age: 78
End: 2023-06-30
Payer: MEDICARE

## 2023-06-30 DIAGNOSIS — Z86.718 PERSONAL HISTORY OF DVT (DEEP VEIN THROMBOSIS): Primary | ICD-10-CM

## 2023-06-30 LAB — INR HOME MONITORING: 2.6 (ref 2–3)

## 2023-06-30 NOTE — PROGRESS NOTES
ANTICOAGULATION  MANAGEMENT-Home Monitor Managed by Exception    Manasa DIEGO French 77 year old female is on warfarin with therapeutic INR result. (Goal INR 2.0-3.0)    Recent labs: (last 7 days)     06/30/23  0000   INR 2.6         Previous INR was Therapeutic    Medication, diet, health changes since last INR:chart reviewed; none identified    Contacted within the last 12 weeks by phone on 6/6/23 (3 weeks ago)      AUBREY     Manasa was NOT contacted regarding therapeutic result today per home monitoring policy manage by exception agreement.   Current warfarin dose is to be continued:     Summary  As of 6/30/2023    Full warfarin instructions:  5 mg every day   Next INR check:  7/7/2023           ?   Cris Harley RN  Anticoagulation Clinic  6/30/2023    _______________________________________________________________________     Anticoagulation Episode Summary     Current INR goal:  2.0-3.0   TTR:  86.0 % (1 y)   Target end date:  Indefinite   Send INR reminders to:  [facility]    Indications    Acute deep vein thrombosis (DVT) of axillary vein and internal jugular (H) (Resolved) [I82.A19]  Current use of long term anticoagulation (Resolved) [Z79.01]  Acute deep vein thrombosis (DVT) of axillary vein of right upper extremity (H) (Resolved) [I82.A11]  Personal history of DVT (deep vein thrombosis) [Z86.718]           Comments:  [employer] [facility]- Managed by exception         Anticoagulation Care Providers     Provider Role Specialty Phone number    Rema Braxton DO Referring Family Medicine [phone]    Jessica Anthony MD Referring Internal Medicine [phone]    Christopher Smith MD Responsible Hematology [phone]

## 2023-07-14 ENCOUNTER — ANTICOAGULATION THERAPY VISIT (OUTPATIENT)
Dept: ANTICOAGULATION | Facility: CLINIC | Age: 78
End: 2023-07-14
Payer: MEDICARE

## 2023-07-14 DIAGNOSIS — Z86.718 PERSONAL HISTORY OF DVT (DEEP VEIN THROMBOSIS): Primary | ICD-10-CM

## 2023-07-14 LAB — INR HOME MONITORING: 3.5 (ref 2–3)

## 2023-07-14 NOTE — PROGRESS NOTES
ANTICOAGULATION MANAGEMENT     Manasa French 77 year old female is on warfarin with supratherapeutic INR result. (Goal INR 2.0-3.0)    Recent labs: (last 7 days)     07/14/23  0000   INR 3.5*       ASSESSMENT       Source(s): Chart Review and Patient/Caregiver Call       Warfarin doses taken: Warfarin taken as instructed    Diet: No new diet changes identified    Medication/supplement changes: None noted    New illness, injury, or hospitalization: No    Signs or symptoms of bleeding or clotting: No    Previous result: Therapeutic last 2(+) visits    Additional findings: None         PLAN     Recommended plan for no diet, medication or health factor changes affecting INR     Dosing Instructions: decrease your warfarin dose (7% change) with next INR in 2 weeks       Summary  As of 7/14/2023    Full warfarin instructions:  2.5 mg every Fri; 5 mg all other days   Next INR check:  7/28/2023             Telephone call with Rhonda who verbalizes understanding and agrees to plan and who agrees to plan and repeated back plan correctly    Patient to recheck with home meter    Education provided:     Contact [phone]  with any changes, questions or concerns.     Plan made per [facility] anticoagulation protocol    Kyleigh Molina, RN  Anticoagulation Clinic  7/14/2023    _______________________________________________________________________     Anticoagulation Episode Summary     Current INR goal:  2.0-3.0   TTR:  83.9 % (1 y)   Target end date:  Indefinite   Send INR reminders to:  [facility]    Indications    Acute deep vein thrombosis (DVT) of axillary vein and internal jugular (H) (Resolved) [I82.A19]  Current use of long term anticoagulation (Resolved) [Z79.01]  Acute deep vein thrombosis (DVT) of axillary vein of right upper extremity (H) (Resolved) [I82.A11]  Personal history of DVT (deep vein thrombosis) [Z86.718]           Comments:  [facility] [facility] meter- Managed by [facility]         Anticoagulation Care  Providers     Provider Role Specialty Phone number    Rema Braxton DO Referring Family Medicine [phone]    Jessica Anthony MD Referring Internal Medicine [phone]    Christopher Smith MD Responsible Hematology [phone]

## 2023-07-17 ENCOUNTER — HOSPITAL ENCOUNTER (OUTPATIENT)
Dept: MAMMOGRAPHY | Facility: CLINIC | Age: 78
Discharge: HOME | End: 2023-07-17
Attending: INTERNAL MEDICINE
Payer: MEDICARE

## 2023-07-17 ENCOUNTER — HOSPITAL ENCOUNTER (OUTPATIENT)
Dept: BONE DENSITY | Facility: CLINIC | Age: 78
Discharge: HOME | End: 2023-07-17
Attending: INTERNAL MEDICINE
Payer: MEDICARE

## 2023-07-17 DIAGNOSIS — Z12.31 VISIT FOR SCREENING MAMMOGRAM: ICD-10-CM

## 2023-07-17 DIAGNOSIS — M80.00XD AGE-RELATED OSTEOPOROSIS WITH CURRENT PATHOLOGICAL FRACTURE WITH ROUTINE HEALING, SUBSEQUENT ENCOUNTER: ICD-10-CM

## 2023-07-17 DIAGNOSIS — Z78.0 MENOPAUSE: ICD-10-CM

## 2023-07-17 DIAGNOSIS — Z92.29 HISTORY OF BISPHOSPHONATE THERAPY: ICD-10-CM

## 2023-07-17 PROCEDURE — 77067 SCR MAMMO BI INCL CAD: CPT

## 2023-07-17 PROCEDURE — 77080 DXA BONE DENSITY AXIAL: CPT

## 2023-07-18 ENCOUNTER — OFFICE VISIT (OUTPATIENT)
Dept: OPHTHALMOLOGY | Facility: CLINIC | Age: 78
End: 2023-07-18
Attending: OPHTHALMOLOGY
Payer: MEDICARE

## 2023-07-18 DIAGNOSIS — H55.09: ICD-10-CM

## 2023-07-18 DIAGNOSIS — H50.9 STRABISMUS: ICD-10-CM

## 2023-07-18 DIAGNOSIS — H35.3132 INTERMEDIATE STAGE NONEXUDATIVE AGE-RELATED MACULAR DEGENERATION OF BOTH EYES: ICD-10-CM

## 2023-07-18 DIAGNOSIS — H40.1132 PRIMARY OPEN ANGLE GLAUCOMA OF BOTH EYES, MODERATE STAGE: Primary | ICD-10-CM

## 2023-07-18 PROCEDURE — 92083 EXTENDED VISUAL FIELD XM: CPT | Performed by: OPHTHALMOLOGY

## 2023-07-18 PROCEDURE — 99214 OFFICE O/P EST MOD 30 MIN: CPT | Performed by: OPHTHALMOLOGY

## 2023-07-18 PROCEDURE — 92133 CPTRZD OPH DX IMG PST SGM ON: CPT | Performed by: OPHTHALMOLOGY

## 2023-07-18 PROCEDURE — G0463 HOSPITAL OUTPT CLINIC VISIT: HCPCS | Performed by: OPHTHALMOLOGY

## 2023-07-18 RX ORDER — LATANOPROST 50 UG/ML
1 SOLUTION/ DROPS OPHTHALMIC AT BEDTIME
Qty: 7.5 ML | Refills: 1 | Status: SHIPPED | OUTPATIENT
Start: 2023-07-18 | End: 2024-05-05

## 2023-07-18 RX ORDER — TIMOLOL MALEATE 5 MG/ML
1 SOLUTION/ DROPS OPHTHALMIC EVERY MORNING
Qty: 15 ML | Refills: 1 | Status: SHIPPED | OUTPATIENT
Start: 2023-07-18 | End: 2024-07-23

## 2023-07-18 ASSESSMENT — REFRACTION_WEARINGRX
OD_AXIS: 005
OS_VBASE: DOWN
OS_VPRISM: 2.0
OS_SPHERE: -1.50
OD_VPRISM: 2.0
OD_CYLINDER: +1.25
SPECS_TYPE: SVL DIST
OS_CYLINDER: +1.00
OD_VBASE: UP
OD_HPRISM: 9.0 BO
OS_AXIS: 005
OD_SPHERE: -1.00

## 2023-07-18 ASSESSMENT — CONF VISUAL FIELD
OD_INFERIOR_TEMPORAL_RESTRICTION: 0
OS_NORMAL: 1
OS_INFERIOR_TEMPORAL_RESTRICTION: 0
OS_SUPERIOR_NASAL_RESTRICTION: 0
OD_SUPERIOR_NASAL_RESTRICTION: 0
OD_INFERIOR_NASAL_RESTRICTION: 3
OD_SUPERIOR_TEMPORAL_RESTRICTION: 0
OS_SUPERIOR_TEMPORAL_RESTRICTION: 0
OS_INFERIOR_NASAL_RESTRICTION: 0

## 2023-07-18 ASSESSMENT — VISUAL ACUITY
OD_CC: 20/40
METHOD: SNELLEN - LINEAR
OS_CC: 20/30
OS_CC+: -2
CORRECTION_TYPE: GLASSES

## 2023-07-18 ASSESSMENT — SLIT LAMP EXAM - LIDS
COMMENTS: NORMAL
COMMENTS: NORMAL

## 2023-07-18 ASSESSMENT — TONOMETRY
OS_IOP_MMHG: 15
OD_IOP_MMHG: 18
IOP_METHOD: TONOPEN

## 2023-07-18 ASSESSMENT — EXTERNAL EXAM - LEFT EYE: OS_EXAM: NORMAL

## 2023-07-18 ASSESSMENT — EXTERNAL EXAM - RIGHT EYE: OD_EXAM: NORMAL

## 2023-07-18 NOTE — PROGRESS NOTES
Chief Complaint(s) and History of Present Illness(es)     Glaucoma Follow Up            Laterality: both eyes          Comments    Pt. States that [facility] doesn't seem quite as good. Would like to schedule   appointment with Orthoptist. No pain BE. Does have some dryness BE. No   flashes or floaters BE.  Layla Gale COT 1:56 PM July 18, 2023           Review of systems for the eyes was negative other than the pertinent positives/negatives listed in the HPI.          S/P repeat selected laser trabeculoplasty (SLT) right eye 4/7/22  Previous right eye 9/16/2020 (previously done w/ Sami 2/2018)    Assessment & Plan      Manasa French is a 77 year old female with the following diagnoses:   1. Primary open angle glaucoma of both eyes, moderate stage    2. Intermediate stage nonexudative age-related macular degeneration of both eyes    3. Strabismus    4. Gaze evoked nystagmus         Here for glaucoma recheck  Feels vision is worse in both eyes  Noted to have more difficulty maintaining gaze with OCT testing today and at the previous visit  Gaze evoked nystagmus seen on exam today, but not nystagmus in primary  Intraocular pressure remains borderline right eye, at goal left eye (midteen goal)  Stable OCT Nerve fiber layer and improved visual field with LVC today in both eyes        Plan:                 - Continue latanoprost qHS both eyes               - Continue timolol daily both eyes (Vyzulta not on formulary, doubt she will tolerate Rhopressa, Intolerant to cosopt and alphagan)   - Refer to Dr. Ardon for strab/nystagmus consult       Patient disposition:   Return in about 6 months (around 1/18/2024) for LVC VF, OCT NFL, VT only.           Attending Physician Attestation:  Complete documentation of historical and exam elements from today's encounter can be found in the full encounter summary report (not reduplicated in this progress note).  I personally obtained the chief complaint(s) and history of present  illness.  I confirmed and edited as necessary the review of systems, past medical/surgical history, family history, social history, and examination findings as documented by others; and I examined the patient myself.  I personally reviewed the relevant tests, images, and reports as documented above.  I formulated and edited as necessary the assessment and plan and discussed the findings and management plan with the patient and family. . - Jarred Leon MD

## 2023-07-18 NOTE — NURSING NOTE
Chief Complaints and History of Present Illnesses   Patient presents with     Glaucoma Follow Up     Chief Complaint(s) and History of Present Illness(es)     Glaucoma Follow Up            Laterality: both eyes          Comments    Pt. States that [facility] doesn't seem quite as good. Would like to schedule appointment with Orthoptist. No pain BE. Does have some dryness BE. No flashes or floaters BE.  Layla Gale COT 1:56 PM July 18, 2023

## 2023-07-25 ENCOUNTER — TELEPHONE (OUTPATIENT)
Dept: OPHTHALMOLOGY | Facility: CLINIC | Age: 78
End: 2023-07-25
Payer: MEDICARE

## 2023-07-25 NOTE — TELEPHONE ENCOUNTER
[facility] Call Center    Phone Message    May a detailed message be left on voicemail: yes     Reason for Call: Other: Patient called stating she has been waiting for a call from Geraldine to schedule an appt. Please call to advise.      Action Taken: Other: eye    Travel Screening: Not Applicable

## 2023-07-25 NOTE — TELEPHONE ENCOUNTER
Patient has been seen by ROSETTE Johnson in the past (2018).  She says she is still having some double vision with her prism glasses.  She is about at maximum strength with her ground-in prism glasses (total 18pd horizontal and 4pd vertical).  We discussed her options and came down to a conclusion that it may be best if she follows Dr. Leon's recommendation and see Dr. Stone for a strabismus surgery consult.  Patient said besides her recent hip placement, she is in good general health.  She would like to discuss surgical options to see if she would be an ideal candidate.  Will sent a note to Kody Barry to schedule her an appt with Dr. Ardon.    ROSETTE Tesfaye 7/25/2023 11:00 AM

## 2023-07-28 ENCOUNTER — ANTICOAGULATION THERAPY VISIT (OUTPATIENT)
Dept: ANTICOAGULATION | Facility: CLINIC | Age: 78
End: 2023-07-28
Payer: MEDICARE

## 2023-07-28 DIAGNOSIS — Z86.718 PERSONAL HISTORY OF DVT (DEEP VEIN THROMBOSIS): Primary | ICD-10-CM

## 2023-07-28 LAB — INR HOME MONITORING: 2.5 (ref 2–3)

## 2023-07-28 NOTE — PROGRESS NOTES
ANTICOAGULATION MANAGEMENT     Manasa French 77 year old female is on warfarin with therapeutic INR result. (Goal INR 2.0-3.0)    Recent labs: (last 7 days)     07/28/23  0000   INR 2.5       ASSESSMENT     Source(s): Chart Review and Patient/Caregiver Call     Warfarin doses taken: Warfarin taken as instructed  Diet: No new diet changes identified  Medication/supplement changes: None noted  New illness, injury, or hospitalization: No  Signs or symptoms of bleeding or clotting: No  Previous result: Supratherapeutic-7% decrease  Additional findings: None       PLAN     Recommended plan for no diet, medication or health factor changes affecting INR     Dosing Instructions: Continue your current warfarin dose with next INR in 2 weeks       Summary  As of 7/28/2023      Full warfarin instructions:  2.5 mg every Fri; 5 mg all other days   Next INR check:  8/11/2023               Telephone call with Rhonda who verbalizes understanding and agrees to plan    Patient to recheck with home meter    Education provided:   Goal range and lab monitoring: goal range and significance of current result  Contact [phone]  with any changes, questions or concerns.     Plan made per [facility] anticoagulation protocol    Cris Harley RN  Anticoagulation Clinic  7/28/2023    _______________________________________________________________________     Anticoagulation Episode Summary       Current INR goal:  2.0-3.0   TTR:  82.0 % (1 y)   Target end date:  Indefinite   Send INR reminders to:  [facility]    Indications    Acute deep vein thrombosis (DVT) of axillary vein and internal jugular (H) (Resolved) [I82.A19]  Current use of long term anticoagulation (Resolved) [Z79.01]  Acute deep vein thrombosis (DVT) of axillary vein of right upper extremity (H) (Resolved) [I82.A11]  Personal history of DVT (deep vein thrombosis) [Z86.738]             Comments:  [employer] [facility] meter- Managed by [facility]             Anticoagulation Care  Providers       Provider Role Specialty Phone number    Rema Braxton DO Referring Family Medicine [phone]    Jessica Anthony MD Referring Internal Medicine [phone]    Christopher Smith MD Responsible Hematology [phone]

## 2023-08-11 ENCOUNTER — DOCUMENTATION ONLY (OUTPATIENT)
Dept: ANTICOAGULATION | Facility: CLINIC | Age: 78
End: 2023-08-11
Payer: MEDICARE

## 2023-08-11 ENCOUNTER — HOSPITAL ENCOUNTER (OUTPATIENT)
Dept: CARDIOLOGY | Facility: CLINIC | Age: 78
Discharge: HOME | End: 2023-08-11
Attending: NURSE PRACTITIONER | Admitting: NURSE PRACTITIONER
Payer: MEDICARE

## 2023-08-11 DIAGNOSIS — I51.7 LEFT ATRIAL ENLARGEMENT: ICD-10-CM

## 2023-08-11 DIAGNOSIS — Z86.718 PERSONAL HISTORY OF DVT (DEEP VEIN THROMBOSIS): Primary | ICD-10-CM

## 2023-08-11 LAB
INR HOME MONITORING: 2 (ref 2–3)
LVEF ECHO: NORMAL

## 2023-08-11 PROCEDURE — 93306 TTE W/DOPPLER COMPLETE: CPT | Mod: 26 | Performed by: INTERNAL MEDICINE

## 2023-08-11 PROCEDURE — 93306 TTE W/DOPPLER COMPLETE: CPT

## 2023-08-11 NOTE — PROGRESS NOTES
ANTICOAGULATION  MANAGEMENT-Home Monitor Managed by Exception    Manasa DIEGO Manolo 78 year old female is on warfarin with therapeutic INR result. (Goal INR 2.0-3.0)    Recent labs: (last 7 days)     08/11/23  0000   INR 2.0       Previous INR was Therapeutic  Medication, diet, health changes since last INR:chart reviewed; none identified  Contacted within the last 12 weeks by phone on 7/28/23      AUBREY     Manasa was NOT contacted regarding therapeutic result today per home monitoring policy manage by exception agreement.   Current warfarin dose is to be continued:     Summary  As of 8/11/2023      Full warfarin instructions:  2.5 mg every Fri; 5 mg all other days   Next INR check:  8/25/2023             ?   Ember Amezcua RN  Anticoagulation Clinic  8/11/2023    _______________________________________________________________________     Anticoagulation Episode Summary       Current INR goal:  2.0-3.0   TTR:  85.9 % (1 y)   Target end date:  Indefinite   Send INR reminders to:  [facility]    Indications    Acute deep vein thrombosis (DVT) of axillary vein and internal jugular (H) (Resolved) [I82.A19]  Current use of long term anticoagulation (Resolved) [Z79.01]  Acute deep vein thrombosis (DVT) of axillary vein of right upper extremity (H) (Resolved) [I82.A11]  Personal history of DVT (deep vein thrombosis) [Z86.718]             Comments:  [employer] [facility] meter- Managed by exception             Anticoagulation Care Providers       Provider Role Specialty Phone number    Rema Braxton DO Referring Family Medicine [phone]    Jessica Anthony MD Referring Internal Medicine [phone]    Christopehr Smith MD Responsible Hematology [phone]

## 2023-08-15 ENCOUNTER — TELEPHONE (OUTPATIENT)
Dept: INTERNAL MEDICINE | Facility: CLINIC | Age: 78
End: 2023-08-15
Payer: MEDICARE

## 2023-08-15 NOTE — TELEPHONE ENCOUNTER
Called and left message for patient stating the provider was in agreement with RN advise earlier. If questions/concerns gave clinic number to call  back.     Suzy Hughes RN on 8/15/2023 at 1:49 PM

## 2023-08-22 ENCOUNTER — OFFICE VISIT (OUTPATIENT)
Dept: FAMILY MEDICINE | Facility: CLINIC | Age: 78
End: 2023-08-22
Payer: MEDICARE

## 2023-08-22 ENCOUNTER — HOSPITAL ENCOUNTER (EMERGENCY)
Facility: CLINIC | Age: 78
End: 2023-08-22
Payer: MEDICARE

## 2023-08-22 ENCOUNTER — HOSPITAL ENCOUNTER (OUTPATIENT)
Dept: ULTRASOUND IMAGING | Facility: CLINIC | Age: 78
Discharge: HOME | End: 2023-08-22
Attending: INTERNAL MEDICINE | Admitting: INTERNAL MEDICINE
Payer: MEDICARE

## 2023-08-22 VITALS
SYSTOLIC BLOOD PRESSURE: 134 MMHG | HEIGHT: 65 IN | DIASTOLIC BLOOD PRESSURE: 80 MMHG | RESPIRATION RATE: 17 BRPM | BODY MASS INDEX: 25.96 KG/M2 | OXYGEN SATURATION: 98 % | HEART RATE: 89 BPM | WEIGHT: 155.8 LBS | TEMPERATURE: 97.5 F

## 2023-08-22 DIAGNOSIS — M80.00XD AGE-RELATED OSTEOPOROSIS WITH CURRENT PATHOLOGICAL FRACTURE WITH ROUTINE HEALING, SUBSEQUENT ENCOUNTER: ICD-10-CM

## 2023-08-22 DIAGNOSIS — M79.89 LEG SWELLING: ICD-10-CM

## 2023-08-22 DIAGNOSIS — I10 BENIGN ESSENTIAL HYPERTENSION: ICD-10-CM

## 2023-08-22 DIAGNOSIS — E89.0 POSTABLATIVE HYPOTHYROIDISM: ICD-10-CM

## 2023-08-22 DIAGNOSIS — Z00.00 ANNUAL PHYSICAL EXAM: ICD-10-CM

## 2023-08-22 DIAGNOSIS — D64.9 ANEMIA, UNSPECIFIED TYPE: ICD-10-CM

## 2023-08-22 DIAGNOSIS — I82.A11 ACUTE DEEP VEIN THROMBOSIS (DVT) OF AXILLARY VEIN OF RIGHT UPPER EXTREMITY (H): ICD-10-CM

## 2023-08-22 DIAGNOSIS — E53.8 VITAMIN B 12 DEFICIENCY: ICD-10-CM

## 2023-08-22 DIAGNOSIS — Z78.0 MENOPAUSE: ICD-10-CM

## 2023-08-22 DIAGNOSIS — Z86.69 HISTORY OF HYDROCEPHALUS: ICD-10-CM

## 2023-08-22 DIAGNOSIS — M81.0 OSTEOPOROSIS, UNSPECIFIED OSTEOPOROSIS TYPE, UNSPECIFIED PATHOLOGICAL FRACTURE PRESENCE: ICD-10-CM

## 2023-08-22 DIAGNOSIS — E11.69 TYPE 2 DIABETES MELLITUS WITH OTHER SPECIFIED COMPLICATION, WITHOUT LONG-TERM CURRENT USE OF INSULIN (H): Primary | ICD-10-CM

## 2023-08-22 LAB
ANION GAP SERPL CALCULATED.3IONS-SCNC: 12 MMOL/L (ref 7–15)
BUN SERPL-MCNC: 20 MG/DL (ref 8–23)
CALCIUM SERPL-MCNC: 9.3 MG/DL (ref 8.8–10.2)
CHLORIDE SERPL-SCNC: 105 MMOL/L (ref 98–107)
CHOLEST SERPL-MCNC: 225 MG/DL
CREAT SERPL-MCNC: 0.99 MG/DL (ref 0.51–0.95)
DEPRECATED HCO3 PLAS-SCNC: 25 MMOL/L (ref 22–29)
ERYTHROCYTE [DISTWIDTH] IN BLOOD BY AUTOMATED COUNT: 14.7 % (ref 10–15)
GFR SERPL CREATININE-BSD FRML MDRD: 58 ML/MIN/1.73M2
GLUCOSE SERPL-MCNC: 105 MG/DL (ref 70–99)
HCT VFR BLD AUTO: 35.8 % (ref 35–47)
HDLC SERPL-MCNC: 83 MG/DL
HGB BLD-MCNC: 11 G/DL (ref 11.7–15.7)
LDLC SERPL CALC-MCNC: 94 MG/DL
MCH RBC QN AUTO: 30.1 PG (ref 26.5–33)
MCHC RBC AUTO-ENTMCNC: 30.7 G/DL (ref 31.5–36.5)
MCV RBC AUTO: 98 FL (ref 78–100)
NONHDLC SERPL-MCNC: 142 MG/DL
PLATELET # BLD AUTO: 262 10E3/UL (ref 150–450)
POTASSIUM SERPL-SCNC: 4.4 MMOL/L (ref 3.4–5.3)
RBC # BLD AUTO: 3.65 10E6/UL (ref 3.8–5.2)
SODIUM SERPL-SCNC: 142 MMOL/L (ref 136–145)
TRIGL SERPL-MCNC: 238 MG/DL
WBC # BLD AUTO: 6.5 10E3/UL (ref 4–11)

## 2023-08-22 PROCEDURE — G0439 PPPS, SUBSEQ VISIT: HCPCS | Performed by: INTERNAL MEDICINE

## 2023-08-22 PROCEDURE — 85027 COMPLETE CBC AUTOMATED: CPT | Performed by: INTERNAL MEDICINE

## 2023-08-22 PROCEDURE — 36415 COLL VENOUS BLD VENIPUNCTURE: CPT | Performed by: INTERNAL MEDICINE

## 2023-08-22 PROCEDURE — 82306 VITAMIN D 25 HYDROXY: CPT | Performed by: INTERNAL MEDICINE

## 2023-08-22 PROCEDURE — 80061 LIPID PANEL: CPT | Performed by: INTERNAL MEDICINE

## 2023-08-22 PROCEDURE — 93970 EXTREMITY STUDY: CPT

## 2023-08-22 PROCEDURE — 84443 ASSAY THYROID STIM HORMONE: CPT | Performed by: INTERNAL MEDICINE

## 2023-08-22 PROCEDURE — 99214 OFFICE O/P EST MOD 30 MIN: CPT | Mod: 25 | Performed by: INTERNAL MEDICINE

## 2023-08-22 PROCEDURE — 80048 BASIC METABOLIC PNL TOTAL CA: CPT | Performed by: INTERNAL MEDICINE

## 2023-08-22 RX ORDER — CHLORTHALIDONE 25 MG/1
25 TABLET ORAL DAILY
Qty: 30 TABLET | Refills: 0 | Status: SHIPPED | OUTPATIENT
Start: 2023-08-22 | End: 2023-09-11

## 2023-08-22 RX ORDER — LOSARTAN POTASSIUM 100 MG/1
100 TABLET ORAL DAILY
Qty: 90 TABLET | Refills: 3 | Status: SHIPPED | OUTPATIENT
Start: 2023-08-22 | End: 2024-08-14

## 2023-08-22 RX ORDER — METFORMIN HCL 500 MG
500 TABLET, EXTENDED RELEASE 24 HR ORAL
Qty: 90 TABLET | Refills: 3 | Status: SHIPPED | OUTPATIENT
Start: 2023-08-22 | End: 2024-08-14

## 2023-08-22 ASSESSMENT — ACTIVITIES OF DAILY LIVING (ADL): CURRENT_FUNCTION: NO ASSISTANCE NEEDED

## 2023-08-22 ASSESSMENT — ENCOUNTER SYMPTOMS
HEMATOCHEZIA: 0
EYE PAIN: 0
DIARRHEA: 0
MYALGIAS: 0
DIZZINESS: 0
ABDOMINAL PAIN: 0
SORE THROAT: 0
HEMATURIA: 0
HEADACHES: 0
FREQUENCY: 1
HEARTBURN: 0
SHORTNESS OF BREATH: 0
JOINT SWELLING: 1
BREAST MASS: 0
FEVER: 0
CHILLS: 1
PARESTHESIAS: 1
PALPITATIONS: 0
NERVOUS/ANXIOUS: 0
ARTHRALGIAS: 1
CONSTIPATION: 0
WEAKNESS: 0
NAUSEA: 0
COUGH: 0
DYSURIA: 0

## 2023-08-22 ASSESSMENT — PAIN SCALES - GENERAL: PAINLEVEL: NO PAIN (0)

## 2023-08-22 NOTE — PATIENT INSTRUCTIONS
For blood pressure: take chlorthalidone 25 mg in the morning, take losartan 100 mg in the evening.   Start metformin 500 mg daily for type 2 diabetes.     BMP - lab work in 2 weeks     Call to schedule an appointment for imaging at [phone]

## 2023-08-22 NOTE — PROGRESS NOTES
"SUBJECTIVE:   Rhonda is a 78 year old who presents for Preventive Visit.    Are you in the first 12 months of your Medicare coverage?  No    Healthy Habits:     In general, how would you rate your overall health?  Fair    Frequency of exercise:  4-5 days/week    Duration of exercise:  45-60 minutes    Do you usually eat at least 4 servings of fruit and vegetables a day, include whole grains    & fiber and avoid regularly eating high fat or \"junk\" foods?  Yes    Taking medications regularly:  Yes    Medication side effects:  None    Ability to successfully perform activities of daily living:  No assistance needed    Home Safety:  No safety concerns identified    Hearing Impairment:  Difficulty following a conversation in a noisy restaurant or crowded room, feel that people are mumbling or not speaking clearly, need to ask people to speak up or repeat themselves and difficulty understanding soft or whispered speech    In the past 6 months, have you been bothered by leaking of urine?  No    In general, how would you rate your overall mental or emotional health?  Fair    Additional concerns today:  Yes    Manasa is a very pleasant 78-year-old lady who presented to the clinic accompanied by her , to establish care and for annual physical exam.  She has type 2 diabetes, most recent A1c was checked about 3 months ago which was 6.6%, she is not on medications at this time.  She has history of DVT on warfarin, hypertension.  She brings blood pressure readings which are elevated ranging from 1 40-1 70 systolic.  She is currently on amlodipine 5 mg daily and losartan 100 mg daily.  She also has bilateral lower extremity swelling.  She sees endocrinology for her osteoporosis currently on Fosamax, hypothyroidism currently on levothyroxine.  Per patient she will be switching to Prolia as she has been on Fosamax for 5 years.  She was diagnosed with B12 deficiency causing anemia.  She took high-dose vitamin B12 supplements " in the last 3 months.  We will recheck CBC today.  ETOH: 2 glasses of wine per week.    Have you ever done Advance Care Planning? (For example, a Health Directive, POLST, or a discussion with a medical provider or your loved ones about your wishes): Yes, patient states has an Advance Care Planning document and will bring a copy to the clinic.       Fall risk  Fallen 2 or more times in the past year?: Yes  Any fall with injury in the past year?: No    Cognitive Screening   1) Repeat 3 items (Leader, Season, Table)    2) Clock draw: NORMAL  3) 3 item recall: Recalls 3 objects  Results: 3 items recalled: COGNITIVE IMPAIRMENT LESS LIKELY    Mini-CogTM Copyright S Mitch. Licensed by the author for use in [facility]; reprinted with permission (lyn@[facility]). All rights reserved.      Do you have sleep apnea, excessive snoring or daytime drowsiness? : yes    Reviewed and updated as needed this visit by clinical staff   Tobacco  Allergies  Meds              Reviewed and updated as needed this visit by Provider     Meds             Social History     Tobacco Use    Smoking status: Never    Smokeless tobacco: Never   Substance Use Topics    Alcohol use: Yes     Comment: 4 glasses wine per week         8/22/2023     2:20 PM   Alcohol Use   Prescreen: >3 drinks/day or >7 drinks/week? No     Do you have a current opioid prescription? No  Do you use any other controlled substances or medications that are not prescribed by a provider? None    Current providers sharing in care for this patient include:   Patient Care Team:  Jana Giles MD as PCP - General (Internal Medicine)  Jarred Leon MD as Assigned Surgical Provider  Silas Petersen MD as MD (Dermatology)  Cristina Castro APRN CNP as Nurse Practitioner (Internal Medicine)  Cristina Castro APRN CNP as Assigned PCP  Christopher Smith MD as Assigned Cancer Care Provider  Kevin Hernandez MD as Assigned Endocrinology Provider    The  following health maintenance items are reviewed in Epic and correct as of today:  Health Maintenance   Topic Date Due    DIABETIC FOOT EXAM  Never done    COVID-19 Vaccine (6 - Pfizer series) 02/11/2023    MEDICARE ANNUAL WELLNESS VISIT  06/08/2023    ANNUAL REVIEW OF HM ORDERS  06/08/2023    LIPID  07/20/2023    A1C  08/15/2023    INFLUENZA VACCINE (1) 09/01/2023    EYE EXAM  04/06/2024    BMP  05/15/2024    MICROALBUMIN  05/15/2024    FALL RISK ASSESSMENT  08/22/2024    ADVANCE CARE PLANNING  08/22/2028    DTAP/TDAP/TD IMMUNIZATION (3 - Td or Tdap) 11/20/2030    DEXA  07/17/2038    MAMMO SCREENING  07/17/2122    PHQ-2 (once per calendar year)  Completed    Pneumococcal Vaccine: 65+ Years  Completed    IPV IMMUNIZATION  Aged Out    MENINGITIS IMMUNIZATION  Aged Out    TSH W/FREE T4 REFLEX  Discontinued    HEPATITIS C SCREENING  Discontinued    COLORECTAL CANCER SCREENING  Discontinued    ZOSTER IMMUNIZATION  Discontinued     Mammogram Screening - Patient over age 75, has elected to continue with screening.  Pertinent mammograms are reviewed under the imaging tab.    Review of Systems   Constitutional:  Positive for chills. Negative for fever.   HENT:  Positive for hearing loss. Negative for congestion, ear pain and sore throat.    Eyes:  Negative for pain and visual disturbance.   Respiratory:  Negative for cough and shortness of breath.    Cardiovascular:  Positive for peripheral edema. Negative for chest pain and palpitations.   Gastrointestinal:  Negative for abdominal pain, constipation, diarrhea, heartburn, hematochezia and nausea.   Breasts:  Negative for tenderness and breast mass.   Genitourinary:  Positive for frequency. Negative for dysuria, genital sores, hematuria, pelvic pain, urgency, vaginal bleeding and vaginal discharge.   Musculoskeletal:  Positive for arthralgias and joint swelling. Negative for myalgias.   Skin:  Negative for rash.   Neurological:  Positive for paresthesias. Negative for  "dizziness, weakness and headaches.   Psychiatric/Behavioral:  Negative for mood changes. The patient is not nervous/anxious.      OBJECTIVE:   /80 (BP Location: Left arm, Patient Position: Sitting, Cuff Size: Adult Large)   Pulse 89   Temp 97.5  F (36.4  C) (Temporal)   Resp 17   Ht 1.638 m (5' 4.5\")   Wt 70.7 kg (155 lb 12.8 oz)   LMP  (LMP Unknown)   SpO2 98%   BMI 26.33 kg/m   Estimated body mass index is 26.33 kg/m  as calculated from the following:    Height as of this encounter: 1.638 m (5' 4.5\").    Weight as of this encounter: 70.7 kg (155 lb 12.8 oz).  Physical Exam  Vitals reviewed.   Constitutional:       Appearance: Normal appearance.   HENT:      Right Ear: Tympanic membrane normal. There is no impacted cerumen.      Left Ear: Tympanic membrane normal. There is no impacted cerumen.      Mouth/Throat:      Mouth: Mucous membranes are moist.      Pharynx: Oropharynx is clear. No oropharyngeal exudate or posterior oropharyngeal erythema.   Cardiovascular:      Rate and Rhythm: Normal rate and regular rhythm.      Heart sounds: Normal heart sounds. No murmur heard.     No gallop.   Pulmonary:      Effort: Pulmonary effort is normal. No respiratory distress.      Breath sounds: Normal breath sounds. No stridor. No wheezing, rhonchi or rales.   Abdominal:      General: Abdomen is flat. There is no distension.      Palpations: Abdomen is soft. There is no mass.      Tenderness: There is no abdominal tenderness. There is no guarding.      Hernia: No hernia is present.   Musculoskeletal:         General: Normal range of motion.      Cervical back: Normal range of motion and neck supple. No rigidity or tenderness.      Right lower leg: Edema present.      Left lower leg: Edema present.   Lymphadenopathy:      Cervical: No cervical adenopathy.   Skin:     General: Skin is warm and dry.   Neurological:      General: No focal deficit present.      Mental Status: She is alert.   Psychiatric:         " Mood and Affect: Mood normal.         ASSESSMENT / PLAN:   Manasa was seen today for physical.    Diagnoses and all orders for this visit:    Type 2 diabetes mellitus with other specified complication, without long-term current use of insulin (H)  Start metformin 500 mg daily.  Discussed side effects.  We will check A1c in 3 months.  We will do foot exam at next visit.  Urine microalbumin within normal limits.  -     Lipid panel reflex to direct LDL Non-fasting; Future  -     metFORMIN (GLUCOPHAGE XR) 500 MG 24 hr tablet; Take 1 tablet (500 mg) by mouth daily (with dinner)      Acute deep vein thrombosis (DVT) of axillary vein of right upper extremity (H)  Stable. Continue medication.  Continue warfarin.  INR goal 2-3.  Patient manages INR at home by herself.  Currently takes vitamin K as well.    Benign essential hypertension  Blood pressure was not controlled on current regimen.  Amlodipine discontinued due to leg swelling.  Start chlorthalidone 25 mg in the morning losartan 100 mg in the evening.  Continue to check blood pressure daily and nocturnal readings.  We will follow-up with her in 3 months.  We will check BMP in 2 weeks  -     chlorthalidone (HYGROTON) 25 MG tablet; Take 1 tablet (25 mg) by mouth daily  -     losartan (COZAAR) 100 MG tablet; Take 1 tablet (100 mg) by mouth daily TAKE 1 TABLET EVERY DAY    Leg swelling  -     US Lower Extremity Venous Duplex Bilateral; Future    Postablative hypothyroidism  Stable. Continue medication.    Osteoporosis, unspecified osteoporosis type, unspecified pathological fracture presence  Stable. Continue medication.    Annual physical exam  -     CBC with Platelets (Today)    Age-related osteoporosis with current pathological fracture with routine healing, subsequent encounter  Menopause  Other orders  Vitamin B 12 deficiency  Anemia, unspecified type.    Patient has been advised of split billing requirements and indicates understanding:  "Yes      COUNSELING:  Reviewed preventive health counseling, as reflected in patient instructions       Fall risk prevention      BMI:   Estimated body mass index is 26.33 kg/m  as calculated from the following:    Height as of this encounter: 1.638 m (5' 4.5\").    Weight as of this encounter: 70.7 kg (155 lb 12.8 oz).         She reports that she has never smoked. She has never used smokeless tobacco.      Appropriate preventive services were discussed with this patient, including applicable screening as appropriate for cardiovascular disease, diabetes, osteopenia/osteoporosis, and glaucoma.  As appropriate for age/gender, discussed screening for colorectal cancer, prostate cancer, breast cancer, and cervical cancer. Checklist reviewing preventive services available has been given to the patient.    Reviewed patients plan of care and provided an AVS. The Basic Care Plan (routine screening as documented in Health Maintenance) for Manasa meets the Care Plan requirement. This Care Plan has been established and reviewed with the Patient.          Jana Giles MD  [facility]    Identified Health Risks:  I have reviewed Opioid Use Disorder and Substance Use Disorder risk factors and made any needed referrals.   "

## 2023-08-23 DIAGNOSIS — E03.9 ACQUIRED HYPOTHYROIDISM: ICD-10-CM

## 2023-08-23 LAB — DEPRECATED CALCIDIOL+CALCIFEROL SERPL-MC: 48 UG/L (ref 20–75)

## 2023-08-24 LAB — TSH SERPL DL<=0.005 MIU/L-ACNC: 28.4 UIU/ML (ref 0.3–4.2)

## 2023-08-24 RX ORDER — LEVOTHYROXINE SODIUM 112 UG/1
112 TABLET ORAL DAILY
Qty: 90 TABLET | Refills: 0 | Status: SHIPPED | OUTPATIENT
Start: 2023-08-24 | End: 2023-10-30

## 2023-08-25 ENCOUNTER — DOCUMENTATION ONLY (OUTPATIENT)
Dept: ANTICOAGULATION | Facility: CLINIC | Age: 78
End: 2023-08-25
Payer: MEDICARE

## 2023-08-25 LAB — INR HOME MONITORING: 2 (ref 2–3)

## 2023-08-25 NOTE — PROGRESS NOTES
ANTICOAGULATION  MANAGEMENT-Home Monitor Managed by Exception    Manasa French 78 year old female is on warfarin with therapeutic INR result. (Goal INR 2.0-3.0)    Recent labs: (last 7 days)     08/25/23  0000   INR 2.0       Previous INR was Therapeutic  Medication, diet, health changes since last INR:Yes: metforrnin started, but not anticipated to affect INR  Contacted within the last 12 weeks by phone on 7/28/23      AUBREY     Manasa was NOT contacted regarding therapeutic result today per home monitoring policy manage by exception agreement.   Current warfarin dose is to be continued:     Summary  As of 8/25/2023      Full warfarin instructions:  2.5 mg every Fri; 5 mg all other days   Next INR check:  9/8/2023             ?   Geraldine Pham RN  Anticoagulation Clinic  8/25/2023    _______________________________________________________________________     Anticoagulation Episode Summary       Current INR goal:  2.0-3.0   TTR:  86.1 % (1 y)   Target end date:  Indefinite   Send INR reminders to:  [facility]    Indications    Acute deep vein thrombosis (DVT) of axillary vein and internal jugular (H) (Resolved) [I82.A19]  Current use of long term anticoagulation (Resolved) [Z79.01]  Acute deep vein thrombosis (DVT) of axillary vein of right upper extremity (H) [I82.A11]  Personal history of DVT (deep vein thrombosis) [Z86.718]             Comments:  [employer] [facility] meter- Managed by exception             Anticoagulation Care Providers       Provider Role Specialty Phone number    Rema Braxton DO Referring Family Medicine [phone]    Jessica Anthony MD Referring Internal Medicine [phone]    Christopher Smith MD Responsible Hematology [phone]

## 2023-08-26 ENCOUNTER — HEALTH MAINTENANCE LETTER (OUTPATIENT)
Age: 78
End: 2023-08-26

## 2023-09-08 ENCOUNTER — DOCUMENTATION ONLY (OUTPATIENT)
Dept: ANTICOAGULATION | Facility: CLINIC | Age: 78
End: 2023-09-08

## 2023-09-08 ENCOUNTER — LAB (OUTPATIENT)
Dept: LAB | Facility: CLINIC | Age: 78
End: 2023-09-08
Payer: MEDICARE

## 2023-09-08 DIAGNOSIS — E89.0 POSTABLATIVE HYPOTHYROIDISM: ICD-10-CM

## 2023-09-08 DIAGNOSIS — I82.A11 ACUTE DEEP VEIN THROMBOSIS (DVT) OF AXILLARY VEIN OF RIGHT UPPER EXTREMITY (H): Primary | ICD-10-CM

## 2023-09-08 DIAGNOSIS — Z86.718 PERSONAL HISTORY OF DVT (DEEP VEIN THROMBOSIS): ICD-10-CM

## 2023-09-08 DIAGNOSIS — I10 BENIGN ESSENTIAL HYPERTENSION: ICD-10-CM

## 2023-09-08 LAB
ANION GAP SERPL CALCULATED.3IONS-SCNC: 12 MMOL/L (ref 7–15)
BUN SERPL-MCNC: 21.5 MG/DL (ref 8–23)
CALCIUM SERPL-MCNC: 9.3 MG/DL (ref 8.8–10.2)
CHLORIDE SERPL-SCNC: 100 MMOL/L (ref 98–107)
CREAT SERPL-MCNC: 0.93 MG/DL (ref 0.51–0.95)
DEPRECATED HCO3 PLAS-SCNC: 27 MMOL/L (ref 22–29)
EGFRCR SERPLBLD CKD-EPI 2021: 62 ML/MIN/1.73M2
GLUCOSE SERPL-MCNC: 120 MG/DL (ref 70–99)
INR HOME MONITORING: 2.1 (ref 2–3)
POTASSIUM SERPL-SCNC: 3.9 MMOL/L (ref 3.4–5.3)
SODIUM SERPL-SCNC: 139 MMOL/L (ref 136–145)

## 2023-09-08 PROCEDURE — 36415 COLL VENOUS BLD VENIPUNCTURE: CPT

## 2023-09-08 PROCEDURE — 80048 BASIC METABOLIC PNL TOTAL CA: CPT

## 2023-09-08 NOTE — PROGRESS NOTES
ANTICOAGULATION  MANAGEMENT-Home Monitor Managed by Exception    Manasa DIEGO French 78 year old female is on warfarin with therapeutic INR result. (Goal INR 2.0-3.0)    Recent labs: (last 7 days)     09/08/23  0000   INR 2.1       Previous INR was Therapeutic  Medication, diet, health changes since last INR:chart reviewed; none identified  Contacted within the last 12 weeks by phone on 7/28/23       AUBREY     Manasa was NOT contacted regarding therapeutic result today per home monitoring policy manage by exception agreement.   Current warfarin dose is to be continued:     Summary  As of 9/8/2023      Full warfarin instructions:  2.5 mg every Fri; 5 mg all other days   Next INR check:  9/15/2023             ?   Ember Amezcua RN  Anticoagulation Clinic  9/8/2023    _______________________________________________________________________     Anticoagulation Episode Summary       Current INR goal:  2.0-3.0   TTR:  86.1 % (1 y)   Target end date:  Indefinite   Send INR reminders to:  [facility]    Indications    Acute deep vein thrombosis (DVT) of axillary vein and internal jugular (H) (Resolved) [I82.A19]  Current use of long term anticoagulation (Resolved) [Z79.01]  Acute deep vein thrombosis (DVT) of axillary vein of right upper extremity (H) [I82.A11]  Personal history of DVT (deep vein thrombosis) [Z86.718]             Comments:  [facility]- Managed by exception             Anticoagulation Care Providers       Provider Role Specialty Phone number    Rema Braxton DO Referring Family Medicine [phone]    Jessica Anthony MD Referring Internal Medicine [phone]    Christopher Smith MD Responsible Hematology [phone]

## 2023-09-11 DIAGNOSIS — I10 BENIGN ESSENTIAL HYPERTENSION: ICD-10-CM

## 2023-09-11 RX ORDER — CHLORTHALIDONE 25 MG/1
25 TABLET ORAL DAILY
Qty: 90 TABLET | Refills: 3 | Status: SHIPPED | OUTPATIENT
Start: 2023-09-11 | End: 2024-06-28

## 2023-09-11 NOTE — TELEPHONE ENCOUNTER
Pt started new BP med and was instructed to complete lab work as a follow up. 9/8/23 Pt completed lab appointment and would like provider to review. If labs are ok, please send [place] refill to [facility] pharmacy mail order.     Refill request pending

## 2023-09-18 NOTE — PROGRESS NOTES
1. Longstanding progressive moderate angle concomitant pattern (with distance fixation) divergence insufficiency pattern esotropia and left hypertropia: ocularmotor dysfunction including saccadic smooth pursuit and mix of downbeat nystagmus / gaze evoked nystagmus strongly suggest this is a central esotropia related to brainstem versus cerebellar dysfunction related strabismus.    Given the downbeat nystagmus and my review of her prior neuro-imaging (CT and MRI) showing prominent Arnold Chiari malformation with significant crowding of posterior fossa this is highly likely Arnold Chiari malformation associated.  Her progression of symptoms however has been very slow with documentation of nystagmus on eye exam dating back to at least 2014 in [facility] notes (earliest notes available in EPIC).  Arnold Chiari malformation is well established as a cause of hydrocephalus and may have been a contributing factor to that diagnosis as well however without reviewing neuro-imaging from 40 years ago when she was diagnosed and shunted, it would be difficult to be certain about this.    We discussed that surgical intervention for Arnold Chiari malformation (neurosurgical posterior fossa decompression) in the setting of such long duration and very slow progression would not make sense at 78 years of age as this surgery carries significant morbidity risk.   We did discuss however downstream surgical intervention to help with her strabismus considering her prism requirements to see single under binocular conditions are exceptionally high and she is experiencing significant prism distortion in her current prism glasses. To allow her to fuse she would need more prism than she currently has in her glasses ( 18 base out and 4 left hypertropia correction).      Discussed the risks, benefits, and alternatives of strabismus surgery with the patient and she wishes to proceed.  Patient on coumadin for blood clots in neck 2-3  years ago. I would strongly prefer patient stop 5 days before strabismus surgery. If required patient can bridge to lovenox as coordinated by primary care physician and stop the day before.  Patient may restart either immediately after strabismus surgery.    Primary care physician: Dr. Giles. Letter for coumadin management before strabismus surgery    Manasa French is a pleasant 78 year old White female who presents to my neuro-ophthalmology clinic today having been referred by Dr. Leon for persistent diplopia despite prism refractive correction    HPI:    Manasa French follows with Dr. Leon for primary open angle glaucoma and age-related macular degeneration. She has a history of thyroid eye disease, and has had several years of esotropia secondary to her disease. She previously followed with Becki Harris who has been prescribing prism glasses. Last correction she was prescribed 18 ROSE and 4BU right eye. She had a visit with Geraldine Landon 7/25/23 where her diplopia was refractory to prisms.    The patient reports she was diagnosed with hydrocephalus when she was 39, and had a  shunt placed and had a shunt placed at that time. She reports the hydrocephalus was pressing against her optic nerves. She initially presented due to double vision, thus she has had double vision for around 40 years.  She also has thyroid eye disease which was diagnosed in 2003, and she is s/p BARAHONA. She does not feel her double vision has anything to do with her thyroid eye disease, and think it is from her history of hydrocephalus. She feels her double vision is stable at this time.    Without her glasses, her double vision is constant. Her glasses help her control the double vision. She often finds herself closing one eye. With her glasses on she still has breakthrough diplopia.     The double vision improves when she closes one eye. At distance, the double vision is horizontal, but the right image is above the left image. At near, the  double vision is still horizontal, but the left image is above the right image. It significantly worsens when she is tired.    Patient has had increasing difficulty with walking and balance. Has had balance troubles her entire life.  She believes it it related to hydrocephalus diagnosed 40 years ago. Started using a cane 10 years ago.  Started using walker in May 2023.      Nystagmus documented as far back as 2014 in her [facility] notes.  Patient unsure how long it has been diagnosed. Patient has oscillopsia sometimes (vertical)    Independent historians:  Patient    Review of outside testing:    CT head/CTA head 11/27/20  IMPRESSION:  Normal head CT. Right frontal  shunt catheter again  noted. No evidence for hydrocephalus.  IMPRESSION:  1. Atherosclerotic plaque of the intracranial distal internal carotid  arteries on both sides that does not result in any significant  vascular narrowing.  2. Otherwise, normal head CTA.    TSH 8/22/23: 28.40  No TSI results in our system    MR BRAIN W/O CONTRAST 10/5/2015 8:32 AM     History: evaluate brain, ventricle and shunt for pathology,Headache     Comparison:  MRI brain 5/13/2004      Technique: Sagittal T1-weighted and axial T2-weighted, turboFLAIR and  diffusion-weighted with ADC map images of the brain were obtained  without intravenous contrast.     Findings: Again seen is a right frontal approach ventriculoperitoneal  shunt catheter with the tip terminating in the third ventricle. Mild  dural thickening is again seen, especially over the right hemisphere.  These images reveal no intracranial mass lesion, mass effect, midline  shift or abnormal extraaxial fluid collection. There is slight  progression of the nonspecific T2 hyperintensities in bilateral  periventricular and subcortical white matter. The ventricles and sulci  appear normal for age. Gray/white differentiation is preserved  throughout both cerebral hemispheres.  Diffusion-weighted  images  demonstrate no restricted diffusion.  Normal intravascular flow voids  are identified.     IMPRESSION  Impression:  1. Stable position of the right frontal approach ventricle peritoneal  shunt catheter. No ventriculomegaly.  2. Slight progression of nonspecific white matter T2 hyperintensities  likely related to chronic small vessel disease.     I have personally reviewed the examination and initial interpretation  and I agree with the findings.       My interpretation performed today of outside testing:  I have independently reviewed CT head/CTA head performed 11/27/20.     I reviewed these 2015 MRI images today and I note that there is significant Arnold Chiari malformation with tonsilar ectopia and significant crowding of the posterior fossa at the foramen magnum.    Review of outside clinical notes:    8/22/23 -- Visit with Dr. Giles  Type 2 diabetes mellitus with other specified complication, without long-term current use of insulin (H)  Start metformin 500 mg daily.  Discussed side effects.  We will check A1c in 3 months.  We will do foot exam at next visit.  Urine microalbumin within normal limits.  -     Lipid panel reflex to direct LDL Non-fasting; Future  -     metFORMIN (GLUCOPHAGE XR) 500 MG 24 hr tablet; Take 1 tablet (500 mg) by mouth daily (with dinner)     Acute deep vein thrombosis (DVT) of axillary vein of right upper extremity (H)  Stable. Continue medication.  Continue warfarin.  INR goal 2-3.  Patient manages INR at home by herself.  Currently takes vitamin K as well.     Benign essential hypertension  Blood pressure was not controlled on current regimen.  Amlodipine discontinued due to leg swelling.  Start chlorthalidone 25 mg in the morning losartan 100 mg in the evening.  Continue to check blood pressure daily and nocturnal readings.  We will follow-up with her in 3 months.  We will check BMP in 2 weeks  -     chlorthalidone (HYGROTON) 25 MG tablet; Take 1 tablet (25 mg) by mouth  daily  -     losartan (COZAAR) 100 MG tablet; Take 1 tablet (100 mg) by mouth daily TAKE 1 TABLET EVERY DAY     Leg swelling  -     US Lower Extremity Venous Duplex Bilateral; Future     Postablative hypothyroidism  Stable. Continue medication.     Osteoporosis, unspecified osteoporosis type, unspecified pathological fracture presence  Stable. Continue medication.     Annual physical exam  -     CBC with Platelets (Today)     Age-related osteoporosis with current pathological fracture with routine healing, subsequent encounter  Menopause  Other orders  Vitamin B 12 deficiency  Anemia, unspecified type.    7/25/23  Here for glaucoma recheck  Feels vision is worse in both eyes  Noted to have more difficulty maintaining gaze with OCT testing today and at the previous visit  Gaze evoked nystagmus seen on exam today, but not nystagmus in primary  Intraocular pressure remains borderline right eye, at goal left eye (midteen goal)  Stable OCT Nerve fiber layer and improved visual field with LVC today in both eyes    Plan:                 - Continue latanoprost qHS both eyes               - Continue timolol daily both eyes (Vyzulta not on formulary, doubt she will tolerate Rhopressa, Intolerant to cosopt and alphagan)              - Refer to Dr. Ardon for strab/nystagmus consult     Past medical history:    Patient Active Problem List   Diagnosis    Acute deep vein thrombosis (DVT) of axillary vein of right upper extremity (H)    Primary open angle glaucoma (POAG) of both eyes    Macular degeneration of both eyes    Pseudophakia    Osteoporosis    Personal history of DVT (deep vein thrombosis)    History of Graves' disease    Postablative hypothyroidism    Benign essential hypertension    History of hydrocephalus    Sensorineural hearing loss, bilateral    Diabetes mellitus, type 2 (H)    S/P total right hip arthroplasty    Leg swelling    Annual physical exam    Menopause    Anemia, unspecified type    Vitamin B 12  deficiency       Patient has a current medication list which includes the following prescription(s): acetaminophen, alendronate, calcium carbonate-vitamin d, carboxymethylcellulose, chlorthalidone, latanoprost, levothyroxine, losartan, metformin, multiple vitamins-minerals, timolol maleate, vitamin b-12, vitamin d3, vitamin k, and warfarin anticoagulant..     Family history / social history:  Patient's family history includes Breast Cancer in her maternal aunt and mother; Colon Cancer in her mother; Diabetes Type 2  in her brother and father; Glaucoma in her mother; Hypertension in her mother and sister; Leukemia in her father; Macular Degeneration in her sister.     Patient  reports that she has never smoked. She has never used smokeless tobacco. She reports current alcohol use. She reports that she does not use drugs.     Exam:    Visual acuity is 20/25-1 right eye, 20/40-2 left eye. Intraocular pressures are 18 each eye. Pupils are round and reactive with no afferent pupillary defect. Confrontation visual fields are full. Color vision is 11/11 each eye.    Please see epic chart for complete exam    Tests ordered and interpreted today:    Sensorimotor exam showed diffuse mild ophthalmoplegia deficits most notably moderate abduction deficits and mild adduction deficits.  She has saccadic smooth pursuit and clearly showed downbeat nystagmus in all gaze positions (worse in lateral gaze to both sides).  She had a concomitant pattern 15-18 prism diopter esotropia in all gaze positions and a concomitant 4-8 prism diopter left hypertropia in all gaze positions.  In primary gaze she fused 18-25 base out prism and 4-8 prism diopter left hypertropia correction.    We discussed in detail the risks, benefits, and alternatives of eye muscle correction surgery including the very rare risk of death or serious morbidity from a general anesthesia complication and the rare risk of severe vision loss in the operative eye(s)  secondary to retinal detachment or endophthalmitis.  We discussed more likely sub-optimal outcomes including the unanticipated need for additional strabismus surgery.  Finally the patient was aware that prisms glasses may be required to optimize single vision following surgery.    After a thorough discussion of these risks, the patient decided to proceed with strabismus surgery.  The surgical plan is as follows:     1. Bilateral eye muscle correction.    Bilateral lateral rectus resections. Left inferior rectus resection.    Follow-up 1 week after strabismus surgery.    Plan to operate at: [facility]  Prism free glasses for adjustment:  Non-adjustable    Patient on coumadin for blood clots in neck 2-3 years ago. Prefer patient stop 5 days before. If required patient can bridge to heparin as coordinated by primary care physician.         50 minutes were spent on the date of the encounter by me doing chart review, history and exam, documentation, and further activities as noted above    Complete documentation of historical and exam elements from today's encounter can be found in the full encounter summary report (not reduplicated in this progress note).  I personally obtained the chief complaint(s) and history of present illness.  I confirmed and edited as necessary the review of systems, past medical/surgical history, family history, social history, and examination findings as documented by others; and I examined the patient myself.  I personally reviewed the relevant tests, images, and reports as documented above.  I formulated and edited as necessary the assessment and plan and discussed the findings and management plan with the patient and family.  I personally reviewed the ophthalmic test(s) associated with this encounter, agree with the interpretation(s) as documented by the resident/fellow, and have edited the corresponding report(s) as necessary.     A medical student was also involved in the care of the patient  today. I was present with the medical student who participated in the service and in the documentation of the note. I have verified the history and personally performed the physical exam and medical decision making. I extensively reviewed and edited when necessary the assessment and plan. I agree with the assessment and plan of care as documented in the note    MD Camelia Suazo  Medical Student, MS4    Precharting:  Indy Mora MD, PGY3  Ophthalmology Resident  [facility]

## 2023-09-20 ENCOUNTER — OFFICE VISIT (OUTPATIENT)
Dept: OPHTHALMOLOGY | Facility: CLINIC | Age: 78
End: 2023-09-20
Attending: OPHTHALMOLOGY
Payer: MEDICARE

## 2023-09-20 DIAGNOSIS — H53.10 SUBJECTIVE VISUAL DISTURBANCE: ICD-10-CM

## 2023-09-20 DIAGNOSIS — H50.15 ALTERNATING EXOTROPIA: ICD-10-CM

## 2023-09-20 DIAGNOSIS — H40.1132 PRIMARY OPEN ANGLE GLAUCOMA OF BOTH EYES, MODERATE STAGE: ICD-10-CM

## 2023-09-20 DIAGNOSIS — H50.22 HYPERTROPIA OF LEFT EYE: ICD-10-CM

## 2023-09-20 DIAGNOSIS — H53.2 DOUBLE VISION: Primary | ICD-10-CM

## 2023-09-20 PROCEDURE — 99215 OFFICE O/P EST HI 40 MIN: CPT | Mod: GC | Performed by: OPHTHALMOLOGY

## 2023-09-20 PROCEDURE — 92060 SENSORIMOTOR EXAMINATION: CPT | Performed by: OPHTHALMOLOGY

## 2023-09-20 PROCEDURE — G0463 HOSPITAL OUTPT CLINIC VISIT: HCPCS | Performed by: OPHTHALMOLOGY

## 2023-09-20 PROCEDURE — 99207 SENSORIMOTOR: CPT | Mod: 26 | Performed by: OPHTHALMOLOGY

## 2023-09-20 PROCEDURE — 92060 SENSORIMOTOR EXAMINATION: CPT | Mod: 26 | Performed by: OPHTHALMOLOGY

## 2023-09-20 PROCEDURE — 92015 DETERMINE REFRACTIVE STATE: CPT | Mod: GY

## 2023-09-20 ASSESSMENT — CONF VISUAL FIELD
OD_NORMAL: 1
OD_INFERIOR_TEMPORAL_RESTRICTION: 0
METHOD: COUNTING FINGERS
OD_INFERIOR_NASAL_RESTRICTION: 0
OD_SUPERIOR_NASAL_RESTRICTION: 0
OD_SUPERIOR_TEMPORAL_RESTRICTION: 0
OS_INFERIOR_TEMPORAL_RESTRICTION: 0
OS_INFERIOR_NASAL_RESTRICTION: 0
OS_NORMAL: 1
OS_SUPERIOR_TEMPORAL_RESTRICTION: 0
OS_SUPERIOR_NASAL_RESTRICTION: 0

## 2023-09-20 ASSESSMENT — REFRACTION_WEARINGRX
OS_HPRISM: 9.0
OD_HPRISM: 9.0
OD_VPRISM: 2.0
OD_SPHERE: -1.00
OD_VBASE: UP
OS_VPRISM: 2.0
OD_CYLINDER: +1.25
OS_AXIS: 005
OS_VBASE: DOWN
OD_AXIS: 015
OS_CYLINDER: +1.00
OS_HBASE: OUT
OS_SPHERE: -1.50
OD_HBASE: OUT

## 2023-09-20 ASSESSMENT — REFRACTION_MANIFEST
OD_AXIS: 001
OS_CYLINDER: +0.75
OD_SPHERE: -1.25
OS_ADD: +2.50
OD_CYLINDER: +1.50
OS_AXIS: 005
OS_SPHERE: -2.00
OD_ADD: +2.50

## 2023-09-20 ASSESSMENT — EXTERNAL EXAM - LEFT EYE: OS_EXAM: NORMAL

## 2023-09-20 ASSESSMENT — VISUAL ACUITY
CORRECTION_TYPE: GLASSES
OD_CC+: -2
METHOD: SNELLEN - LINEAR
OS_CC+: -2
OD_PH_CC+: -2
OD_PH_CC: 20/25
OS_CC: 20/40
OD_CC: 20/30

## 2023-09-20 ASSESSMENT — CUP TO DISC RATIO
OS_RATIO: 0.55
OD_RATIO: 0.7

## 2023-09-20 ASSESSMENT — TONOMETRY
OS_IOP_MMHG: 18
IOP_METHOD: ICARE
OD_IOP_MMHG: 18

## 2023-09-20 ASSESSMENT — SLIT LAMP EXAM - LIDS
COMMENTS: NORMAL
COMMENTS: NORMAL

## 2023-09-20 ASSESSMENT — EXTERNAL EXAM - RIGHT EYE: OD_EXAM: NORMAL

## 2023-09-20 NOTE — LETTER
2023    RE: Manasa French  : 1945  MRN: [id number]    Dear Dr. Leon and Dr. Giles    Thank you for referring your patient, Manasa French, to my neuro-ophthalmology clinic recently.  After a thorough neuro-ophthalmic history and examination, I came to the following conclusions:     1. Longstanding progressive moderate angle concomitant pattern (with distance fixation) divergence insufficiency pattern esotropia and left hypertropia: ocularmotor dysfunction including saccadic smooth pursuit and mix of downbeat nystagmus / gaze evoked nystagmus strongly suggest this is a central esotropia related to brainstem versus cerebellar dysfunction related strabismus.    Given the downbeat nystagmus and my review of her prior neuro-imaging (CT and MRI) showing prominent Arnold Chiari malformation with significant crowding of posterior fossa this is highly likely Arnold Chiari malformation associated.  Her progression of symptoms however has been very slow with documentation of nystagmus on eye exam dating back to at least  in [facility] notes (earliest notes available in EPIC).  Arnold Chiari malformation is well established as a cause of hydrocephalus and may have been a contributing factor to that diagnosis as well however without reviewing neuro-imaging from 40 years ago when she was diagnosed and shunted, it would be difficult to be certain about this.    We discussed that surgical intervention for Arnold Chiari malformation (neurosurgical posterior fossa decompression) in the setting of such long duration and very slow progression would not make sense at 78 years of age as this surgery carries significant morbidity risk.   We did discuss however downstream surgical intervention to help with her strabismus considering her prism requirements to see single under binocular conditions are exceptionally high and she is experiencing significant prism distortion in her current prism  glasses. To allow her to fuse she would need more prism than she currently has in her glasses ( 18 base out and 4 left hypertropia correction).      Discussed the risks, benefits, and alternatives of strabismus surgery with the patient and she wishes to proceed.  Patient on coumadin for blood clots in neck 2-3 years ago. I would strongly prefer patient stop 5 days before strabismus surgery. If required patient can bridge to lovenox as coordinated by primary care physician and stop the day before.  Patient may restart either immediately after strabismus surgery.    Primary care physician: Dr. Giles. Letter for coumadin management before strabismus surgery    Manasa French is a pleasant 78 year old White female who presents to my neuro-ophthalmology clinic today having been referred by Dr. Leon for persistent diplopia despite prism refractive correction    HPI:    Manasa French follows with Dr. Leon for primary open angle glaucoma and age-related macular degeneration. She has a history of thyroid eye disease, and has had several years of esotropia secondary to her disease. She previously followed with Becki Harris who has been prescribing prism glasses. Last correction she was prescribed 18 ROSE and 4BU right eye. She had a visit with Geraldine Landon 7/25/23 where her diplopia was refractory to prisms.    The patient reports she was diagnosed with hydrocephalus when she was 39, and had a  shunt placed and had a shunt placed at that time. She reports the hydrocephalus was pressing against her optic nerves. She initially presented due to double vision, thus she has had double vision for around 40 years.  She also has thyroid eye disease which was diagnosed in 2003, and she is s/p BARAHONA. She does not feel her double vision has anything to do with her thyroid eye disease, and think it is from her history of hydrocephalus. She feels her double vision is stable at this time.    Without her glasses, her double vision is  constant. Her glasses help her control the double vision. She often finds herself closing one eye. With her glasses on she still has breakthrough diplopia.     The double vision improves when she closes one eye. At distance, the double vision is horizontal, but the right image is above the left image. At near, the double vision is still horizontal, but the left image is above the right image. It significantly worsens when she is tired.    Patient has had increasing difficulty with walking and balance. Has had balance troubles her entire life.  She believes it it related to hydrocephalus diagnosed 40 years ago. Started using a cane 10 years ago.  Started using walker in May 2023.      Nystagmus documented as far back as 2014 in her [facility] notes.  Patient unsure how long it has been diagnosed. Patient has oscillopsia sometimes (vertical)    Independent historians:  Patient    Review of outside testing:    CT head/CTA head 11/27/20  IMPRESSION:  Normal head CT. Right frontal  shunt catheter again  noted. No evidence for hydrocephalus.  IMPRESSION:  1. Atherosclerotic plaque of the intracranial distal internal carotid  arteries on both sides that does not result in any significant  vascular narrowing.  2. Otherwise, normal head CTA.    TSH 8/22/23: 28.40  No TSI results in our system    MR BRAIN W/O CONTRAST 10/5/2015 8:32 AM     History: evaluate brain, ventricle and shunt for pathology,Headache     Comparison:  MRI brain 5/13/2004      Technique: Sagittal T1-weighted and axial T2-weighted, turboFLAIR and  diffusion-weighted with ADC map images of the brain were obtained  without intravenous contrast.     Findings: Again seen is a right frontal approach ventriculoperitoneal  shunt catheter with the tip terminating in the third ventricle. Mild  dural thickening is again seen, especially over the right hemisphere.  These images reveal no intracranial mass lesion, mass effect, midline  shift or abnormal  extraaxial fluid collection. There is slight  progression of the nonspecific T2 hyperintensities in bilateral  periventricular and subcortical white matter. The ventricles and sulci  appear normal for age. Gray/white differentiation is preserved  throughout both cerebral hemispheres.  Diffusion-weighted images  demonstrate no restricted diffusion.  Normal intravascular flow voids  are identified.     IMPRESSION  Impression:  1. Stable position of the right frontal approach ventricle peritoneal  shunt catheter. No ventriculomegaly.  2. Slight progression of nonspecific white matter T2 hyperintensities  likely related to chronic small vessel disease.     I have personally reviewed the examination and initial interpretation  and I agree with the findings.       My interpretation performed today of outside testing:  I have independently reviewed CT head/CTA head performed 11/27/20.     I reviewed these 2015 MRI images today and I note that there is significant Arnold Chiari malformation with tonsilar ectopia and significant crowding of the posterior fossa at the foramen magnum.    Review of outside clinical notes:    8/22/23 -- Visit with Dr. Giles  Type 2 diabetes mellitus with other specified complication, without long-term current use of insulin (H)  Start metformin 500 mg daily.  Discussed side effects.  We will check A1c in 3 months.  We will do foot exam at next visit.  Urine microalbumin within normal limits.  -     Lipid panel reflex to direct LDL Non-fasting; Future  -     metFORMIN (GLUCOPHAGE XR) 500 MG 24 hr tablet; Take 1 tablet (500 mg) by mouth daily (with dinner)     Acute deep vein thrombosis (DVT) of axillary vein of right upper extremity (H)  Stable. Continue medication.  Continue warfarin.  INR goal 2-3.  Patient manages INR at home by herself.  Currently takes vitamin K as well.     Benign essential hypertension  Blood pressure was not controlled on current regimen.  Amlodipine discontinued due to  leg swelling.  Start chlorthalidone 25 mg in the morning losartan 100 mg in the evening.  Continue to check blood pressure daily and nocturnal readings.  We will follow-up with her in 3 months.  We will check BMP in 2 weeks  -     chlorthalidone (HYGROTON) 25 MG tablet; Take 1 tablet (25 mg) by mouth daily  -     losartan (COZAAR) 100 MG tablet; Take 1 tablet (100 mg) by mouth daily TAKE 1 TABLET EVERY DAY     Leg swelling  -     US Lower Extremity Venous Duplex Bilateral; Future     Postablative hypothyroidism  Stable. Continue medication.     Osteoporosis, unspecified osteoporosis type, unspecified pathological fracture presence  Stable. Continue medication.     Annual physical exam  -     CBC with Platelets (Today)     Age-related osteoporosis with current pathological fracture with routine healing, subsequent encounter  Menopause  Other orders  Vitamin B 12 deficiency  Anemia, unspecified type.    7/25/23  Here for glaucoma recheck  Feels vision is worse in both eyes  Noted to have more difficulty maintaining gaze with OCT testing today and at the previous visit  Gaze evoked nystagmus seen on exam today, but not nystagmus in primary  Intraocular pressure remains borderline right eye, at goal left eye (midteen goal)  Stable OCT Nerve fiber layer and improved visual field with LVC today in both eyes    Plan:                 - Continue latanoprost qHS both eyes               - Continue timolol daily both eyes (Vyzulta not on formulary, doubt she will tolerate Rhopressa, Intolerant to cosopt and alphagan)              - Refer to Dr. Ardon for strab/nystagmus consult     Past medical history:    Patient Active Problem List   Diagnosis    Acute deep vein thrombosis (DVT) of axillary vein of right upper extremity (H)    Primary open angle glaucoma (POAG) of both eyes    Macular degeneration of both eyes    Pseudophakia    Osteoporosis    Personal history of DVT (deep vein thrombosis)    History of Graves' disease     Postablative hypothyroidism    Benign essential hypertension    History of hydrocephalus    Sensorineural hearing loss, bilateral    Diabetes mellitus, type 2 (H)    S/P total right hip arthroplasty    Leg swelling    Annual physical exam    Menopause    Anemia, unspecified type    Vitamin B 12 deficiency       Patient has a current medication list which includes the following prescription(s): acetaminophen, alendronate, calcium carbonate-vitamin d, carboxymethylcellulose, chlorthalidone, latanoprost, levothyroxine, losartan, metformin, multiple vitamins-minerals, timolol maleate, vitamin b-12, vitamin d3, vitamin k, and warfarin anticoagulant..     Family history / social history:  Patient's family history includes Breast Cancer in her maternal aunt and mother; Colon Cancer in her mother; Diabetes Type 2  in her brother and father; Glaucoma in her mother; Hypertension in her mother and sister; Leukemia in her father; Macular Degeneration in her sister.     Patient  reports that she has never smoked. She has never used smokeless tobacco. She reports current alcohol use. She reports that she does not use drugs.     Exam:    Visual acuity is 20/25-1 right eye, 20/40-2 left eye. Intraocular pressures are 18 each eye. Pupils are round and reactive with no afferent pupillary defect. Confrontation visual fields are full. Color vision is 11/11 each eye.    Please see epic chart for complete exam    Tests ordered and interpreted today:    Sensorimotor exam showed diffuse mild ophthalmoplegia deficits most notably moderate abduction deficits and mild adduction deficits.  She has saccadic smooth pursuit and clearly showed downbeat nystagmus in all gaze positions (worse in lateral gaze to both sides).  She had a concomitant pattern 15-18 prism diopter esotropia in all gaze positions and a concomitant 4-8 prism diopter left hypertropia in all gaze positions.  In primary gaze she fused 18-25 base out prism and 4-8 prism  diopter left hypertropia correction.    We discussed in detail the risks, benefits, and alternatives of eye muscle correction surgery including the very rare risk of death or serious morbidity from a general anesthesia complication and the rare risk of severe vision loss in the operative eye(s) secondary to retinal detachment or endophthalmitis.  We discussed more likely sub-optimal outcomes including the unanticipated need for additional strabismus surgery.  Finally the patient was aware that prisms glasses may be required to optimize single vision following surgery.    After a thorough discussion of these risks, the patient decided to proceed with strabismus surgery.  The surgical plan is as follows:     1. Bilateral eye muscle correction.    Bilateral lateral rectus resections. Left inferior rectus resection.    Follow-up 1 week after strabismus surgery.    Plan to operate at: [facility]  Prism free glasses for adjustment:  Non-adjustable    Patient on coumadin for blood clots in neck 2-3 years ago. Prefer patient stop 5 days before. If required patient can bridge to heparin as coordinated by primary care physician.      Again, thank you for trusting me with the care of your patient.  For further exam details, please feel free to contact our office for additional records.  If you wish to contact me regarding this patient please email me at [email or web address] or give my clinic a call to arrange a phone conversation.    Sincerely,    Antwan Ardon MD  , Neuro-Ophthalmology and Adult Strabismus Surgery  The [facility] in Neuro-Ophthalmology  Department of Ophthalmology and Visual Neurosciences  [facility]    DX: Arnold Chiari malformation, esotropia, downbeat nystagmus

## 2023-09-20 NOTE — NURSING NOTE
Chief Complaint(s) and History of Present Illness(es)       Strabismus Evaluation    In both eyes.             Comments    Manasa French is a 78 year old female sent for consultation by Dr. Leon for strabismus surgery consult.    Rhonda reports she has been wearing prism glasses since the 1980's.  Prism glasses are helping with the double vision for the most part but feels she has been needing to pull images together recently.  She is currently having oblique diplopia.  Closes an eye when she reads, the closer the images get the further the images are .  No previous hx of EOM sx.  ROSETTE Tesfaye 9/20/2023 7:15 AM

## 2023-09-22 ENCOUNTER — DOCUMENTATION ONLY (OUTPATIENT)
Dept: ANTICOAGULATION | Facility: CLINIC | Age: 78
End: 2023-09-22
Payer: MEDICARE

## 2023-09-22 DIAGNOSIS — Z86.718 PERSONAL HISTORY OF DVT (DEEP VEIN THROMBOSIS): ICD-10-CM

## 2023-09-22 DIAGNOSIS — I82.A11 ACUTE DEEP VEIN THROMBOSIS (DVT) OF AXILLARY VEIN OF RIGHT UPPER EXTREMITY (H): Primary | ICD-10-CM

## 2023-09-22 LAB — INR HOME MONITORING: 2.6 (ref 2–3)

## 2023-09-22 NOTE — PROGRESS NOTES
ANTICOAGULATION  MANAGEMENT-Home Monitor Managed by Exception    Manasa DIEGO French 78 year old female is on warfarin with therapeutic INR result. (Goal INR 2.0-3.0)    Recent labs: (last 7 days)     09/22/23  0000   INR 2.6       Previous INR was Therapeutic  Medication, diet, health changes since last INR:chart reviewed; none identified  Contacted within the last 12 weeks by phone on 7/28/23        AUBREY     Manasa was NOT contacted regarding therapeutic result today per home monitoring policy manage by exception agreement.   Current warfarin dose is to be continued:     Summary  As of 9/22/2023      Full warfarin instructions:  2.5 mg every Fri; 5 mg all other days   Next INR check:  10/6/2023             ?   Ember Amezcua RN  Anticoagulation Clinic  9/22/2023    _______________________________________________________________________     Anticoagulation Episode Summary       Current INR goal:  2.0-3.0   TTR:  86.1 % (1 y)   Target end date:  Indefinite   Send INR reminders to:  [facility]    Indications    Acute deep vein thrombosis (DVT) of axillary vein and internal jugular (H) (Resolved) [I82.A19]  Current use of long term anticoagulation (Resolved) [Z79.01]  Acute deep vein thrombosis (DVT) of axillary vein of right upper extremity (H) [I82.A11]  Personal history of DVT (deep vein thrombosis) [Z86.718]             Comments:  [employer] [facility] meter- Managed by exception             Anticoagulation Care Providers       Provider Role Specialty Phone number    Rema Braxton DO Referring Family Medicine [phone]    Jessica Anthony MD Referring Internal Medicine [phone]    Christopher Smith MD Responsible Hematology [phone]

## 2023-10-05 ENCOUNTER — TELEPHONE (OUTPATIENT)
Dept: FAMILY MEDICINE | Facility: CLINIC | Age: 78
End: 2023-10-05

## 2023-10-05 DIAGNOSIS — M81.0 OSTEOPOROSIS, UNSPECIFIED OSTEOPOROSIS TYPE, UNSPECIFIED PATHOLOGICAL FRACTURE PRESENCE: Primary | ICD-10-CM

## 2023-10-05 NOTE — TELEPHONE ENCOUNTER
Reason for Call:  Appointment Request    Patient requesting this type of appt: Procedure: Prolia    Requested provider: Jana Giles    Reason patient unable to be scheduled: Needs to be scheduled by clinic    When does patient want to be seen/preferred time:  ASAP    Comments: Patient was recommended to have Prolia shot by Dr. Hernandez and would like to get this scheduled.    Could we send this information to you in [employer] or would you prefer to receive a phone call?:   Patient would prefer a phone call   Okay to leave a detailed message?: Yes at Home number on file [phone] (home)    Call taken on 10/5/2023 at 3:07 PM by Hiwot Lobo

## 2023-10-06 ENCOUNTER — DOCUMENTATION ONLY (OUTPATIENT)
Dept: ANTICOAGULATION | Facility: CLINIC | Age: 78
End: 2023-10-06
Payer: MEDICARE

## 2023-10-06 DIAGNOSIS — I82.A11 ACUTE DEEP VEIN THROMBOSIS (DVT) OF AXILLARY VEIN OF RIGHT UPPER EXTREMITY (H): Primary | ICD-10-CM

## 2023-10-06 DIAGNOSIS — Z86.718 PERSONAL HISTORY OF DVT (DEEP VEIN THROMBOSIS): ICD-10-CM

## 2023-10-06 LAB — INR HOME MONITORING: 2.6 (ref 2–3)

## 2023-10-06 RX ORDER — METHYLPREDNISOLONE SODIUM SUCCINATE 125 MG/2ML
125 INJECTION INTRAMUSCULAR; INTRAVENOUS
Status: CANCELLED
Start: 2023-10-06

## 2023-10-06 RX ORDER — MEPERIDINE HYDROCHLORIDE 25 MG/ML
25 INJECTION INTRAMUSCULAR; INTRAVENOUS; SUBCUTANEOUS EVERY 30 MIN PRN
Status: CANCELLED | OUTPATIENT
Start: 2023-10-06

## 2023-10-06 RX ORDER — EPINEPHRINE 1 MG/ML
0.3 INJECTION, SOLUTION, CONCENTRATE INTRAVENOUS EVERY 5 MIN PRN
Status: CANCELLED | OUTPATIENT
Start: 2023-10-06

## 2023-10-06 RX ORDER — DIPHENHYDRAMINE HYDROCHLORIDE 50 MG/ML
50 INJECTION, SOLUTION INTRAMUSCULAR; INTRAVENOUS
Status: CANCELLED
Start: 2023-10-06

## 2023-10-06 RX ORDER — ALBUTEROL SULFATE 90 UG/1
1-2 INHALANT RESPIRATORY (INHALATION)
Status: CANCELLED
Start: 2023-10-06

## 2023-10-06 RX ORDER — ALBUTEROL SULFATE 0.83 MG/ML
2.5 SOLUTION RESPIRATORY (INHALATION)
Status: CANCELLED | OUTPATIENT
Start: 2023-10-06

## 2023-10-06 NOTE — TELEPHONE ENCOUNTER
Patient calling to state she cannot receive calls on her home phone. Please call mobile phone on file.

## 2023-10-09 ENCOUNTER — TELEPHONE (OUTPATIENT)
Dept: ENDOCRINOLOGY | Facility: CLINIC | Age: 78
End: 2023-10-09

## 2023-10-09 DIAGNOSIS — Z92.29 HISTORY OF BISPHOSPHONATE THERAPY: ICD-10-CM

## 2023-10-09 DIAGNOSIS — M80.00XD AGE-RELATED OSTEOPOROSIS WITH CURRENT PATHOLOGICAL FRACTURE WITH ROUTINE HEALING, SUBSEQUENT ENCOUNTER: Primary | ICD-10-CM

## 2023-10-09 NOTE — TELEPHONE ENCOUNTER
Called patient to reschedule Prolia injection. Patient needs order placed and prior auth completed. Patient had BMP completed 9/8/23 and Vit D drawn 8/22/23.    Per LOV with Dr Hernandez (6/29/23)      Pended Rx for Prolia and routed to Dr Hernandez to review.    Silas Dong RN

## 2023-10-10 NOTE — TELEPHONE ENCOUNTER
Noted verification is in process and Prolia was ordered by Dr Hernandez - routing message to endo team to follow up     Sofia DOWNEY, Triage RN  [facility]

## 2023-10-10 NOTE — TELEPHONE ENCOUNTER
Message reviewed.  I have now signed the new Prolia CAM order, thanks.    ROBERT Hernandez MD, MS  [facility]

## 2023-10-16 DIAGNOSIS — M80.08XD AGE-RELATED OSTEOPOR WITH CURR PATHOL FX OF VERTEBRA WITH ROUTINE HEAL: Primary | ICD-10-CM

## 2023-10-16 DIAGNOSIS — Z92.29 HISTORY OF BISPHOSPHONATE THERAPY: ICD-10-CM

## 2023-10-20 ENCOUNTER — DOCUMENTATION ONLY (OUTPATIENT)
Dept: ANTICOAGULATION | Facility: CLINIC | Age: 78
End: 2023-10-20
Payer: MEDICARE

## 2023-10-20 DIAGNOSIS — I82.A11 ACUTE DEEP VEIN THROMBOSIS (DVT) OF AXILLARY VEIN OF RIGHT UPPER EXTREMITY (H): Primary | ICD-10-CM

## 2023-10-20 DIAGNOSIS — Z86.718 PERSONAL HISTORY OF DVT (DEEP VEIN THROMBOSIS): ICD-10-CM

## 2023-10-20 LAB — INR HOME MONITORING: 2.1 (ref 2–3)

## 2023-10-20 NOTE — PROGRESS NOTES
ANTICOAGULATION  MANAGEMENT-Home Monitor Managed by Exception    Manasa DIEGO French 78 year old female is on warfarin with therapeutic INR result. (Goal INR 2.0-3.0)    Recent labs: (last 7 days)     10/20/23  0000   INR 2.1       Previous INR was Therapeutic  Medication, diet, health changes since last INR:chart reviewed; none identified  Contacted within the last 12 weeks by phone on 7/28/23       AUBREY     Manasa was NOT contacted regarding therapeutic result today per home monitoring policy manage by exception agreement.   Current warfarin dose is to be continued:     Summary  As of 10/20/2023      Full warfarin instructions:  2.5 mg every Fri; 5 mg all other days   Next INR check:  11/3/2023             ?   Ember Amezcua RN  Anticoagulation Clinic  10/20/2023    _______________________________________________________________________     Anticoagulation Episode Summary       Current INR goal:  2.0-3.0   TTR:  86.1% (1 y)   Target end date:  Indefinite   Send INR reminders to:  [facility]    Indications    Acute deep vein thrombosis (DVT) of axillary vein and internal jugular (H) (Resolved) [I82.A19]  Current use of long term anticoagulation (Resolved) [Z79.01]  Acute deep vein thrombosis (DVT) of axillary vein of right upper extremity (H) [I82.A11]  Personal history of DVT (deep vein thrombosis) [Z86.718]             Comments:  [employer] [facility]- Managed by exception             Anticoagulation Care Providers       Provider Role Specialty Phone number    Rema Braxton DO Referring Family Medicine [phone]    Jessica Anthony MD Referring Internal Medicine [phone]    Christopher Smith MD Responsible Hematology [phone]

## 2023-10-23 ENCOUNTER — ALLIED HEALTH/NURSE VISIT (OUTPATIENT)
Dept: ENDOCRINOLOGY | Facility: CLINIC | Age: 78
End: 2023-10-23
Payer: MEDICARE

## 2023-10-23 DIAGNOSIS — M81.0 OSTEOPOROSIS, UNSPECIFIED OSTEOPOROSIS TYPE, UNSPECIFIED PATHOLOGICAL FRACTURE PRESENCE: ICD-10-CM

## 2023-10-23 DIAGNOSIS — M80.08XD AGE-RELATED OSTEOPOR WITH CURR PATHOL FX OF VERTEBRA WITH ROUTINE HEAL: Primary | ICD-10-CM

## 2023-10-23 DIAGNOSIS — Z92.29 HISTORY OF BISPHOSPHONATE THERAPY: ICD-10-CM

## 2023-10-23 PROCEDURE — 99207 PR NO CHARGE NURSE ONLY: CPT

## 2023-10-23 PROCEDURE — 96372 THER/PROPH/DIAG INJ SC/IM: CPT | Performed by: INTERNAL MEDICINE

## 2023-10-23 NOTE — PATIENT INSTRUCTIONS
You received your Prolia injection today  Your next injection is due in 6 months, scheduled 4/23/24  If you plan on having any dental work done within the next 6 months, please let your dentist know that you are on this medication.   Make sure you do not have any dental work completed involving the jaw bone within 1 month prior to your scheduled injection.   Provided Prolia VIS Sheet to patient.     Silas Dong RN

## 2023-10-23 NOTE — PROGRESS NOTES
Indication: Prolia  (denosumab) is a prescription medicine used to treat osteoporosis in patients who:   Are at high risk for fracture, meaning patients who have had a fracture related to osteoporosis, or who have multiple risk factors for fracture   Cannot use another osteoporosis medicine or other osteoporosis medicines did not work well   The timeline for early/late injections would be 4 weeks early and any time after the 6 month hemal. If a patient receives their injection late, then the subsequent injection would be 6 months from the date that they actually received the injection    1.  When was the last injection?  New  2.  Did they check with their insurance for this calendar year?  Yes  3.  Is there an order in the chart?  Yes  4.  Has the patient had dental work involving the bone in the past month or will have work in the next 6 months?  No  5.  Did you have the patient wait 15 minutes after the injection?  yes  6.  Remember to use .injection under the medication notes    The following steps were completed to comply with the REMS program for Prolia:  Reviewed information in the Medication Guide and Patient Counseling Chart, including the serious risks of Prolia  and the symptoms of each risk.  Advised patient to seek prompt medical attention if they have signs or symptoms of any of the serious risks.  Provided each patient a copy of the Medication Guide and Patient Brochure.     Clinic Administered Medication Documentation      Prolia Documentation    Indication: Prolia  (denosumab) is a prescription medicine used to treat osteoporosis in patients who:   Are at high risk for fracture, meaning patients who have had a fracture related to osteoporosis, or who have multiple risk factors for fracture.  Cannot use another osteoporosis medicine or other osteoporosis medicines did not work well.  The timeline for early/late injections would be 4 weeks early and any time after the 6 month hemal. If a patient receives  their injection late, then the subsequent injection would be 6 months from the date that they actually received the injection.    When was the last injection?  New  Was the last injection at least 6 months ago? Yes  Has the prior authorization been completed?  Yes  Is there an active order (written within the past 365 days, with administrations remaining, not ) in the chart?  Yes  Patient denies any dental work involving the bone (e.g. tooth extraction or dental implants) in the past 4 weeks?  Yes  Patient denies plans for any dental work involving the bone (e.g. tooth extraction or dental implants) in the next 4 weeks? Yes    The following steps were completed to comply with the REMS program for Prolia:  Reviewed information in the Medication Guide and Patient Counseling Chart, including the serious risks of Prolia  and the symptoms of each risk.  Advised patient to seek prompt medical attention if they have signs or symptoms of any of the serious risks.  Provided each patient a copy of the Medication Guide and Patient Brochure.    Prior to injection, verified patient identity using patient's name and date of birth. Medication was administered. Please see MAR and medication order for additional information. Patient instructed to remain in clinic for 15 minutes and report any adverse reaction to staff immediately.    Vial/Syringe: Syringe  Was this medication supplied by the patient? No  Verified that the patient has refills remaining in their prescription.     Manasa French comes into clinic today at the request of Dr Hernandez Ordering Provider for Med Injection only Prolia .    This service provided today was under the supervising provider of the day Dr Pate, who was available if needed.    Silas Dong RN

## 2023-10-24 ENCOUNTER — LAB (OUTPATIENT)
Dept: LAB | Facility: CLINIC | Age: 78
End: 2023-10-24
Payer: MEDICARE

## 2023-10-24 ENCOUNTER — TELEPHONE (OUTPATIENT)
Dept: FAMILY MEDICINE | Facility: CLINIC | Age: 78
End: 2023-10-24

## 2023-10-24 DIAGNOSIS — E89.0 POSTABLATIVE HYPOTHYROIDISM: ICD-10-CM

## 2023-10-24 PROCEDURE — 84443 ASSAY THYROID STIM HORMONE: CPT

## 2023-10-24 PROCEDURE — 36415 COLL VENOUS BLD VENIPUNCTURE: CPT

## 2023-10-24 NOTE — TELEPHONE ENCOUNTER
"Patient calling stating, \"Do I have to fast for my labs today?\"    Writer advised no fasting necessary for a TSH. Patient verbalized understanding.     Suzy Hughes RN on 10/24/2023 at 8:11 AM    "

## 2023-10-25 LAB — TSH SERPL DL<=0.005 MIU/L-ACNC: 4.29 UIU/ML (ref 0.3–4.2)

## 2023-10-30 DIAGNOSIS — E03.9 ACQUIRED HYPOTHYROIDISM: ICD-10-CM

## 2023-10-30 RX ORDER — LEVOTHYROXINE SODIUM 112 UG/1
112 TABLET ORAL DAILY
Qty: 90 TABLET | Refills: 0 | Status: SHIPPED | OUTPATIENT
Start: 2023-10-30 | End: 2024-02-05

## 2023-10-30 NOTE — TELEPHONE ENCOUNTER
Patient asking for TSH results. Nurse relayed to patient. No need for dosage change. Sent in new prescription. Patient has 7 pills left.

## 2023-11-03 ENCOUNTER — ANTICOAGULATION THERAPY VISIT (OUTPATIENT)
Dept: ANTICOAGULATION | Facility: CLINIC | Age: 78
End: 2023-11-03
Payer: MEDICARE

## 2023-11-03 ENCOUNTER — TELEPHONE (OUTPATIENT)
Dept: ANTICOAGULATION | Facility: CLINIC | Age: 78
End: 2023-11-03
Payer: MEDICARE

## 2023-11-03 DIAGNOSIS — I82.A11 ACUTE DEEP VEIN THROMBOSIS (DVT) OF AXILLARY VEIN OF RIGHT UPPER EXTREMITY (H): Primary | ICD-10-CM

## 2023-11-03 DIAGNOSIS — Z86.718 PERSONAL HISTORY OF DVT (DEEP VEIN THROMBOSIS): ICD-10-CM

## 2023-11-03 LAB — INR HOME MONITORING: 2.3 (ref 2–3)

## 2023-11-03 NOTE — PROGRESS NOTES
"ANTICOAGULATION MANAGEMENT     Manasa French 78 year old female is on warfarin with therapeutic INR result. (Goal INR 2.0-3.0)    Recent labs: (last 7 days)     11/03/23  0000   INR 2.3       ASSESSMENT     Source(s): Chart Review and Patient/Caregiver Call     Warfarin doses taken: More warfarin taken than planned which may be affecting INR, patient has been taking for \"months\" she reports, will up date maintenance  Diet: No new diet changes identified  Medication/supplement changes: None noted  New illness, injury, or hospitalization: No  Signs or symptoms of bleeding or clotting: No  Previous result: Therapeutic last 2(+) visits  Additional findings: Upcoming surgery/procedure Eye repair surgery on 12/11/23, needs to hold warfarin 6 days prior.        PLAN     Recommended plan for no diet, medication or health factor changes affecting INR     Dosing Instructions: Continue your current warfarin dose with next INR in 2 weeks       Summary  As of 11/3/2023      Full warfarin instructions:  7.5 mg every Mon, Fri; 5 mg all other days   Next INR check:  11/17/2023               Telephone call with Rhonda who verbalizes understanding and agrees to plan    Patient to recheck with home meter    Education provided:   Please call back if any changes to your diet, medications or how you've been taking warfarin  Resume manage by exception with home monitor. Continue to submit INR results to home monitor company.You will only be called when your result is out of range. Please call and notify [facility] if new medication started, dose missed, signs or symptoms of bleeding or clotting, or a surgery/procedure is scheduled.    Plan made per [facility] anticoagulation protocol    Ember Amezcua RN  Anticoagulation Clinic  11/3/2023    _______________________________________________________________________     Anticoagulation Episode Summary       Current INR goal:  2.0-3.0   TTR:  86.1% (1 y)   Target end date:  Indefinite   Send INR " reminders to:  [facility]    Indications    Acute deep vein thrombosis (DVT) of axillary vein and internal jugular (H) (Resolved) [I82.A19]  Current use of long term anticoagulation (Resolved) [Z79.01]  Acute deep vein thrombosis (DVT) of axillary vein of right upper extremity (H) [I82.A11]  Personal history of DVT (deep vein thrombosis) [Z86.718]             Comments:  [employer] meter- Managed by exception             Anticoagulation Care Providers       Provider Role Specialty Phone number    Rema Braxton DO Referring Family Medicine [phone]    Jessica Anthony MD Referring Internal Medicine [phone]    Christopher Smith MD Responsible Hematology [phone]

## 2023-11-03 NOTE — TELEPHONE ENCOUNTER
Patient is having eye repair surgery on 12/11/23. She reports they are repairing eye muscles to correct double vision and she needs to hold warfarin for 6 days prior.  Previously patient had hip surgery on 5/30/23 and held 5 days no bridge. Patient would like to avoid bridging if possible. Advised patient that a lot can change in 6 months, 6 days is a longer hold, and the [facility] will review and advise for patient safety. Patient was advised that [facility] will call 10-14 days prior to procedure to discuss hold plan as pharmacist prioritizes dates of procedure scheduled and gets to bridge plans sooner than later first. Patient verbalized understanding and is okay with this.    Routing to [facility] for review.    Ember Amezcua RN

## 2023-11-13 DIAGNOSIS — I82.A11 ACUTE DEEP VEIN THROMBOSIS (DVT) OF AXILLARY VEIN OF RIGHT UPPER EXTREMITY (H): ICD-10-CM

## 2023-11-13 RX ORDER — WARFARIN SODIUM 5 MG/1
TABLET ORAL
Qty: 110 TABLET | Refills: 0 | Status: SHIPPED | OUTPATIENT
Start: 2023-11-13 | End: 2024-02-05

## 2023-11-13 NOTE — PROGRESS NOTES
Manasa French is followed at [facility] for their history of VTE.     They were last evaluated by our team on  with the plan to remain on long term anticoagulation and return to clinic in 1 year.    Prescription updated #110 with no refills.    This message will be routed to  for scheduling.    Cristina Vega, MSN, RN, PHN -Nurse Clinician, [facility] [phone]

## 2023-11-17 ENCOUNTER — DOCUMENTATION ONLY (OUTPATIENT)
Dept: ANTICOAGULATION | Facility: CLINIC | Age: 78
End: 2023-11-17
Payer: MEDICARE

## 2023-11-17 DIAGNOSIS — Z86.718 PERSONAL HISTORY OF DVT (DEEP VEIN THROMBOSIS): ICD-10-CM

## 2023-11-17 DIAGNOSIS — I82.A11 ACUTE DEEP VEIN THROMBOSIS (DVT) OF AXILLARY VEIN OF RIGHT UPPER EXTREMITY (H): Primary | ICD-10-CM

## 2023-11-17 LAB — INR HOME MONITORING: 2 (ref 2–3)

## 2023-11-17 NOTE — PROGRESS NOTES
ANTICOAGULATION  MANAGEMENT-Home Monitor Managed by Exception    Manasa DIEGO French 78 year old female is on warfarin with therapeutic INR result. (Goal INR 2.0-3.0)    Recent labs: (last 7 days)     11/17/23  0000   INR 2.0       Previous INR was Therapeutic  Medication, diet, health changes since last INR:chart reviewed; none identified  Contacted within the last 12 weeks by phone on 11/3/23      AUBREY     Manasa was NOT contacted regarding therapeutic result today per home monitoring policy manage by exception agreement.   Current warfarin dose is to be continued:     Summary  As of 11/17/2023      Full warfarin instructions:  7.5 mg every Mon, Fri; 5 mg all other days   Next INR check:  12/1/2023             ?   Ember Amezcua RN  Anticoagulation Clinic  11/17/2023    _______________________________________________________________________     Anticoagulation Episode Summary       Current INR goal:  2.0-3.0   TTR:  86.1% (1 y)   Target end date:  Indefinite   Send INR reminders to:  [facility]    Indications    Acute deep vein thrombosis (DVT) of axillary vein and internal jugular (H) (Resolved) [I82.A19]  Current use of long term anticoagulation (Resolved) [Z79.01]  Acute deep vein thrombosis (DVT) of axillary vein of right upper extremity (H) [I82.A11]  Personal history of DVT (deep vein thrombosis) [Z86.718]             Comments:  [employer] meter- Managed by exception             Anticoagulation Care Providers       Provider Role Specialty Phone number    Rema Braxton DO Referring Family Medicine [phone]    Jessica Anthony MD Referring Internal Medicine [phone]    Christopher Smith MD Responsible Hematology [phone]

## 2023-11-24 ENCOUNTER — DOCUMENTATION ONLY (OUTPATIENT)
Dept: ANTICOAGULATION | Facility: CLINIC | Age: 78
End: 2023-11-24
Payer: MEDICARE

## 2023-11-24 DIAGNOSIS — I82.A11 ACUTE DEEP VEIN THROMBOSIS (DVT) OF AXILLARY VEIN OF RIGHT UPPER EXTREMITY (H): Primary | ICD-10-CM

## 2023-11-24 DIAGNOSIS — Z86.718 PERSONAL HISTORY OF DVT (DEEP VEIN THROMBOSIS): ICD-10-CM

## 2023-11-24 NOTE — PROGRESS NOTES
ANTICOAGULATION CLINIC REFERRAL RENEWAL REQUEST       An annual renewal order is required for all patients referred to [facility].?  Please review and sign the pended referral order for Manasa French.       ANTICOAGULATION SUMMARY      Warfarin indication(s)   DVT    Mechanical heart valve present?  NO       Current goal range   INR: 2.0-3.0     Goal appropriate for indication? Goal INR 2-3, standard for indication(s) above     Time in Therapeutic Range (TTR)  (Goal > 60%) 86%       Office visit with referring provider's group within last year yes on 8/22/23       Kyleigh Valdez RN  [facility]

## 2023-11-27 ENCOUNTER — OFFICE VISIT (OUTPATIENT)
Dept: FAMILY MEDICINE | Facility: CLINIC | Age: 78
End: 2023-11-27
Payer: MEDICARE

## 2023-11-27 VITALS
BODY MASS INDEX: 25.19 KG/M2 | DIASTOLIC BLOOD PRESSURE: 77 MMHG | RESPIRATION RATE: 12 BRPM | HEIGHT: 65 IN | TEMPERATURE: 96.8 F | SYSTOLIC BLOOD PRESSURE: 142 MMHG | OXYGEN SATURATION: 97 % | WEIGHT: 151.2 LBS | HEART RATE: 82 BPM

## 2023-11-27 DIAGNOSIS — Z01.818 PREOPERATIVE EXAMINATION: Primary | ICD-10-CM

## 2023-11-27 DIAGNOSIS — I82.A11 ACUTE DEEP VEIN THROMBOSIS (DVT) OF AXILLARY VEIN OF RIGHT UPPER EXTREMITY (H): ICD-10-CM

## 2023-11-27 DIAGNOSIS — E89.0 POSTABLATIVE HYPOTHYROIDISM: ICD-10-CM

## 2023-11-27 DIAGNOSIS — H50.9 STRABISMUS: ICD-10-CM

## 2023-11-27 DIAGNOSIS — I10 BENIGN ESSENTIAL HYPERTENSION: ICD-10-CM

## 2023-11-27 DIAGNOSIS — E11.9 TYPE 2 DIABETES MELLITUS WITHOUT COMPLICATION, WITHOUT LONG-TERM CURRENT USE OF INSULIN (H): ICD-10-CM

## 2023-11-27 LAB
HBA1C MFR BLD: 6.5 % (ref 0–5.6)
POTASSIUM SERPL-SCNC: 4 MMOL/L (ref 3.4–5.3)

## 2023-11-27 PROCEDURE — 99214 OFFICE O/P EST MOD 30 MIN: CPT | Performed by: INTERNAL MEDICINE

## 2023-11-27 PROCEDURE — 36415 COLL VENOUS BLD VENIPUNCTURE: CPT | Performed by: INTERNAL MEDICINE

## 2023-11-27 PROCEDURE — 83036 HEMOGLOBIN GLYCOSYLATED A1C: CPT | Performed by: INTERNAL MEDICINE

## 2023-11-27 PROCEDURE — 84132 ASSAY OF SERUM POTASSIUM: CPT | Performed by: INTERNAL MEDICINE

## 2023-11-27 ASSESSMENT — ENCOUNTER SYMPTOMS
DIZZINESS: 0
FEVER: 0
PALPITATIONS: 0
CHEST TIGHTNESS: 0
CHILLS: 0
FATIGUE: 0
SHORTNESS OF BREATH: 0
LIGHT-HEADEDNESS: 0

## 2023-11-27 ASSESSMENT — PAIN SCALES - GENERAL: PAINLEVEL: NO PAIN (0)

## 2023-11-27 NOTE — TELEPHONE ENCOUNTER
Provider instructions given at pre-op today matched [facility] dates, last dose warfarin 12/05, hold starts 12/06    Anthony PeterD BCACP  Anticoagulation Clinical Pharmacist

## 2023-11-27 NOTE — PATIENT INSTRUCTIONS
Hold losartan if blood pressure less than 100 (top number) and lower number less than 60    Avoid aspirin 7 days before the surgery. Avoid nonsteroidal anti-inflammatory pain medication like ibuprofen, Motrin, or Aleve 7 days before the surgery.  Tylenol can be used for pain.  Avoid any over the counter multivitamins or herbal supplement 7 days before surgery   You can resume these medications after surgery    Hold chlorthalidone on the morning of your procedure  Hold Losartan on the evening before procedure  Hold warfarin for 5 days: last dose on 12/5. Resume immediately after the procedure.

## 2023-11-27 NOTE — PROGRESS NOTES
[address], [address]  [facility] [address]  Phone: [phone]  Primary Provider: Jana Giles  Pre-op Performing Provider: JANA GILES      PREOPERATIVE EVALUATION:  Today's date: 11/27/2023    Rhonda is a 78 year old, presenting for the following:  Pre-Op Exam and Recheck Medication      Surgical Information:  Surgery/Procedure: Bilateral eye muscle correction.   Surgery Location: [facility]  Surgeon: Antwan Ardon MD   Surgery Date: 12/11/2023  Time of Surgery: 1:35 PM  Where patient plans to recover: At home with family  Fax number for surgical facility: No need to fax information will shared through epic    Assessment & Plan     The proposed surgical procedure is considered LOW risk.    Preoperative examination  Medically optimized for the procedure.  - Potassium    Strabismus    Type 2 diabetes mellitus without complication, without long-term current use of insulin (H)  Asked her to stop taking metformin with alcohol to avoid feeling of dizziness.   - HEMOGLOBIN A1C    Postablative hypothyroidism  Stable. Continue medication.    Benign essential hypertension  Losartan holding parameters.    Acute deep vein thrombosis (DVT) of axillary vein of right upper extremity (H)  Wararin 5 day hold as mentioned below.      Possible Sleep Apnea: consider bipap/cpap perioperatively.     - No identified additional risk factors other than previously addressed    Antiplatelet or Anticoagulation Medication Instructions:   - warfarin: Thromboembolic risk is low (<4%/year). HOLD warfarin for 5 days without bridging before procedure.     Additional Medication Instructions:  Avoid aspirin 7 days before the surgery. Avoid nonsteroidal anti-inflammatory pain medication like ibuprofen, Motrin, or Aleve 7 days before the surgery.  Tylenol can be used for pain.  Avoid any over the counter multivitamins or herbal supplement 7 days before surgery   You can resume these medications  after surgery    Hold chlorthalidone on the morning of your procedure  Hold Losartan on the evening before procedure  Hold warfarin for 5 days: last dose on 12/5. Resume immediately after the procedure.    RECOMMENDATION:  APPROVAL GIVEN to proceed with proposed procedure, without further diagnostic evaluation.      Subjective       HPI related to upcoming procedure:     Dizziness and lightheaded when she takes metformin with a glass of wine. This happens in the afternoon. Holding parameters given for losartan which she takes in the evening  She stopped taking fosamax - currently on prolia   Per neuro-opthal, hold warfarin 5 days before the procedure.   She has held warfarin before for her hip surgery without bridging.     Patient denies chest pain, shortness of breath, palpitations, headache, lightheadedness, syncope, fever or chills. Patient is able to climb 1 flight of stairs without feeling short of breath or having chest pain.  Patient denies personal or family history of complications with anesthesia. Patient does not have a history of heart failure or TIA/stroke.   Patient does not smoke   She does drink alcohol - < 7 drinks per week - one glass of wine - most days of the week.          11/27/2023    12:29 PM   Preop Questions   1. Have you ever had a heart attack or stroke? No   2. Have you ever had surgery on your heart or blood vessels, such as a stent placement, a coronary artery bypass, or surgery on an artery in your head, neck, heart, or legs? No   3. Do you have chest pain with activity? No   4. Do you have a history of  heart failure? No   5. Do you currently have a cold, bronchitis or symptoms of other infection? NO   6. Do you have a cough, shortness of breath, or wheezing? No   7. Do you or anyone in your family have previous history of blood clots? YES - DVT on AC   8. Do you or does anyone in your family have a serious bleeding problem such as prolonged bleeding following surgeries or cuts? No    9. Have you ever had problems with anemia or been told to take iron pills? No   10. Have you had any abnormal blood loss such as black, tarry or bloody stools, or abnormal vaginal bleeding? No   11. Have you ever had a blood transfusion? No   12. Are you willing to have a blood transfusion if it is medically needed before, during, or after your surgery? Yes   13. Have you or any of your relatives ever had problems with anesthesia? No   14. Do you have sleep apnea, excessive snoring or daytime drowsiness? NO   14a. Do you have a CPAP machine? No   15. Do you have any artifical heart valves or other implanted medical devices like a pacemaker, defibrillator, or continuous glucose monitor? No   16. Do you have artificial joints? YES - hip joint    17. Are you allergic to latex? No     Health Care Directive:  Patient has a Health Care Directive on file      Preoperative Review of :   reviewed - no record of controlled substances prescribed.    Review of Systems   Constitutional:  Negative for chills, fatigue and fever.   Respiratory:  Negative for chest tightness and shortness of breath.    Cardiovascular:  Negative for chest pain and palpitations.   Neurological:  Negative for dizziness, syncope and light-headedness.     Patient Active Problem List    Diagnosis Date Noted    Double vision 09/20/2023     Priority: Medium    Leg swelling 08/22/2023     Priority: Medium    Annual physical exam 08/22/2023     Priority: Medium    Menopause 08/22/2023     Priority: Medium    Anemia, unspecified type 08/22/2023     Priority: Medium    Vitamin B 12 deficiency 08/22/2023     Priority: Medium    S/P total right hip arthroplasty 05/30/2023     Priority: Medium    Diabetes mellitus, type 2 (H) 04/06/2023     Priority: Medium    Primary open angle glaucoma (POAG) of both eyes      Priority: Medium     right eye moderate, left eye mild      Macular degeneration of both eyes      Priority: Medium    Pseudophakia      Priority:  Medium    Osteoporosis      Priority: Medium    History of Graves' disease      Priority: Medium     s/p BARAHONA      Postablative hypothyroidism      Priority: Medium    Benign essential hypertension      Priority: Medium    History of hydrocephalus      Priority: Medium     s/p  shunt placement in 1987      Sensorineural hearing loss, bilateral      Priority: Medium    Acute deep vein thrombosis (DVT) of axillary vein of right upper extremity (H)      Priority: Medium    Personal history of DVT (deep vein thrombosis) 2019     Priority: Medium     RUE; on chronic AC        Past Medical History:   Diagnosis Date    Benign essential hypertension     History of Graves' disease     s/p BARAHONA    History of hydrocephalus     s/p  shunt placement in 1987    Macular degeneration of both eyes     Osteoporosis     Personal history of DVT (deep vein thrombosis) 2019    RUE; on chronic AC    Postablative hypothyroidism     Primary open angle glaucoma (POAG) of both eyes     right eye moderate, left eye mild    Pseudophakia     Sensorineural hearing loss, bilateral      Past Surgical History:   Procedure Laterality Date    ARTHROPLASTY HIP Right 5/30/2023    Procedure: Right Total Hip Arthroplasty;  Surgeon: Vasu Mixon MD;  Location: [facility] OR    ARTHROSCOPY SHOULDER Left     CATARACT IOL, RT/LT Bilateral     DECOMPRESSION, FUSION LUMBAR POSTERIOR ONE LEVEL, COMBINED      L5-S1    EXCISE GANGLION WRIST Right     HERNIA REPAIR, UMBILICAL      LUMPECTOMY BREAST Left     benign     Current Outpatient Medications   Medication Sig Dispense Refill    acetaminophen (TYLENOL) 325 MG tablet Take 2 tablets (650 mg) by mouth every 4 hours as needed for other (mild pain) 100 tablet 0    alendronate (FOSAMAX) 70 MG tablet TAKE 1 TABLET EVERY WEEK AS DIRECTED 12 tablet 1    calcium carbonate-vitamin D (CALTRATE) 600-10 MG-MCG per tablet Take 1 tablet by mouth 2 times daily      carboxymethylcellulose (CELLUVISC/REFRESH LIQUIGEL) 1 %  "ophthalmic solution Place 1 drop into both eyes 3 times daily      chlorthalidone (HYGROTON) 25 MG tablet Take 1 tablet (25 mg) by mouth daily 90 tablet 3    latanoprost (XALATAN) 0.005 % ophthalmic solution Place 1 drop into both eyes At Bedtime 7.5 mL 1    levothyroxine (SYNTHROID/LEVOTHROID) 112 MCG tablet Take 1 tablet (112 mcg) by mouth daily 90 tablet 0    losartan (COZAAR) 100 MG tablet Take 1 tablet (100 mg) by mouth daily TAKE 1 TABLET EVERY DAY 90 tablet 3    metFORMIN (GLUCOPHAGE XR) 500 MG 24 hr tablet Take 1 tablet (500 mg) by mouth daily (with dinner) 90 tablet 3    Multiple Vitamins-Minerals (ICAPS AREDS 2 PO) Take 1 capsule by mouth 2 times daily      timolol maleate (TIMOPTIC) 0.5 % ophthalmic solution Place 1 drop into both eyes every morning 15 mL 1    vitamin B-12 (CYANOCOBALAMIN) 1000 MCG tablet Take 1,000 mcg by mouth daily      Vitamin D3 (CHOLECALCIFEROL) 25 mcg (1000 units) tablet Take 1 tablet by mouth daily      Vitamin K 100 MCG TABS Take 1 tablet by mouth daily 90 tablet 3    warfarin ANTICOAGULANT (COUMADIN) 5 MG tablet TAKE 1 TABLET ON MONDAY, WEDNESDAY AND FRIDAY THEN TAKE 1 AND 1/2 TABLETS DAILY ON ALL OTHER DAYS OR AS DIRECTED BY CLINIC 110 tablet 0       Allergies   Allergen Reactions    Alphagan [Brimonidine] Other (See Comments)     Itchy eyes    Cosopt [Dorzolamide Hcl-Timolol Mal] Other (See Comments)     Itchy eyes        Social History     Tobacco Use    Smoking status: Never    Smokeless tobacco: Never   Substance Use Topics    Alcohol use: Yes     Comment: 4 glasses wine per week     History   Drug Use No         Objective     BP (!) 142/77 (BP Location: Left arm, Patient Position: Sitting, Cuff Size: Adult Regular)   Pulse 82   Temp 96.8  F (36  C) (Oral)   Resp 12   Ht 1.638 m (5' 4.5\")   Wt 68.6 kg (151 lb 3.2 oz)   LMP  (LMP Unknown)   SpO2 97%   BMI 25.55 kg/m      Physical Exam  Vitals reviewed.   Constitutional:       Appearance: Normal appearance.   HENT:    "   Mouth/Throat:      Mouth: Mucous membranes are moist.      Pharynx: Oropharynx is clear. No oropharyngeal exudate or posterior oropharyngeal erythema.   Cardiovascular:      Rate and Rhythm: Normal rate and regular rhythm.      Heart sounds: Normal heart sounds. No murmur heard.     No gallop.   Pulmonary:      Effort: Pulmonary effort is normal. No respiratory distress.      Breath sounds: Normal breath sounds. No stridor. No wheezing, rhonchi or rales.   Musculoskeletal:         General: Normal range of motion.      Right lower leg: No edema.      Left lower leg: No edema.   Skin:     General: Skin is warm and dry.   Neurological:      Mental Status: She is alert.   Psychiatric:         Mood and Affect: Mood normal.         Recent Labs   Lab Test 11/17/23  0000 11/03/23  0000 09/22/23  0000 09/08/23  0828 08/25/23  0000 08/22/23  1524 06/02/23  0000 05/31/23  0819 05/19/23  0000 05/15/23  0948 06/17/22  0000 06/08/22  1438   HGB  --   --   --   --   --  11.0*  --  9.7*  --  10.7*  --  11.4*   PLT  --   --   --   --   --  262  --   --   --  303  --  235   INR 2.0 2.3   < >  --    < >  --    < > 1.35*   < >  --    < >  --    NA  --   --   --  139  --  142  --   --   --  141  --  141   POTASSIUM  --   --   --  3.9  --  4.4  --   --    < > 4.6  --  4.1   CR  --   --   --  0.93  --  0.99*  --   --    < > 0.79  --  0.87   A1C  --   --   --   --   --   --   --   --   --  6.6*  --  6.6*    < > = values in this interval not displayed.        Diagnostics:  No labs were ordered during this visit.   No EKG required for low risk surgery (cataract, skin procedure, breast biopsy, etc).    Revised Cardiac Risk Index (RCRI):  The patient has the following serious cardiovascular risks for perioperative complications:   - No serious cardiac risks = 0 points     RCRI Interpretation: 0 points: Class I (very low risk - 0.4% complication rate)         Signed Electronically by: Jana Giles MD  Copy of this evaluation report is  provided to requesting physician.

## 2023-11-27 NOTE — TELEPHONE ENCOUNTER
"BETH-PROCEDURAL ANTICOAGULATION  MANAGEMENT    ASSESSMENT     Warfarin interruption plan for eye surgery on 2023.      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    RECOMMENDATION     Pre-Procedure:  Hold warfarin for 5 days, until after procedure startin2023   No Bridge    Post-Procedure:  Resume warfarin dose if okay with provider doing procedure on night of procedure, 2023 PM: 10mg & 2023 7.5mg  Recheck INR ~ 7 days after resuming warfarin     Plan routed to referring provider for approval  ?   Jesi Castro, [facility]    SUBJECTIVE/OBJECTIVE     Manasa French, a 78 year old female    Goal INR Range: 2.0-3.0     Patient bridged in past: No    Pertinent History: Placed on DOAC at time of VTE    Wt Readings from Last 3 Encounters:   23 70.7 kg (155 lb 12.8 oz)   23 71.2 kg (156 lb 14.4 oz)   23 70.2 kg (154 lb 11.2 oz)      Ideal body weight: 55.8 kg (123 lb 2 oz)  Adjusted ideal body weight: 61.8 kg (136 lb 3.1 oz)     Estimated body mass index is 26.33 kg/m  as calculated from the following:    Height as of 23: 1.638 m (5' 4.5\").    Weight as of 23: 70.7 kg (155 lb 12.8 oz).    Lab Results   Component Value Date    INR 2.0 2023    INR 2.3 2023    INR 2.1 10/20/2023     Lab Results   Component Value Date    HGB 11.0 (L) 2023    HCT 35.8 2023     2023     Lab Results " Subjective:       Patient ID: Carline Chang is a 48 y.o. female.    Chief Complaint:FOLLOW UP    HPI   48 F who presents to clinic with with a history of colostomy for rectal Crohn's disease as well as a rectovaginal fistula.  She is actually doing very well.  Per her gastroenterologist shE no longer has active Crohn's disease and has taken her off biologic therapy and she is on Imuran only.  She denies passage of anything from her vagina.  Her energy level is good or activity level is good.  Objective:      Physical Exam   Constitutional: She is oriented to person, place, and time. She appears well-developed and well-nourished.   Pulmonary/Chest: Effort normal. No respiratory distress.   Abdominal: Soft. She exhibits no distension. There is tenderness.   Musculoskeletal: Normal range of motion.   Neurological: She is alert and oriented to person, place, and time.   Skin: Skin is warm and dry.   Psychiatric: She has a normal mood and affect. Her behavior is normal.       Assessment:       1. Crohn's disease with other complication    2. Colostomy status        Plan:         Rectal Crohn's disease we'll schedule her for an MRI to assess the fistula and plan on colonoscopy to assess disease extent in both the rectum  and proximal colon       Component Value Date    CR 0.93 09/08/2023    CR 0.99 (H) 08/22/2023    CR 0.91 05/30/2023     Estimated Creatinine Clearance: 47.9 mL/min (based on SCr of 0.93 mg/dL).

## 2023-12-01 ENCOUNTER — TELEPHONE (OUTPATIENT)
Dept: ANTICOAGULATION | Facility: CLINIC | Age: 78
End: 2023-12-01
Payer: MEDICARE

## 2023-12-01 ENCOUNTER — ANTICOAGULATION THERAPY VISIT (OUTPATIENT)
Dept: ANTICOAGULATION | Facility: CLINIC | Age: 78
End: 2023-12-01
Payer: MEDICARE

## 2023-12-01 DIAGNOSIS — Z86.718 PERSONAL HISTORY OF DVT (DEEP VEIN THROMBOSIS): ICD-10-CM

## 2023-12-01 DIAGNOSIS — I82.A11 ACUTE DEEP VEIN THROMBOSIS (DVT) OF AXILLARY VEIN OF RIGHT UPPER EXTREMITY (H): Primary | ICD-10-CM

## 2023-12-01 LAB — INR HOME MONITORING: 2.4 (ref 2–3)

## 2023-12-01 NOTE — PROGRESS NOTES
ANTICOAGULATION MANAGEMENT     Manasa French 78 year old female is on warfarin with therapeutic INR result. (Goal INR 2.0-3.0)    Recent labs: (last 7 days)     23  0000   INR 2.4       ASSESSMENT     Source(s): Chart Review and Patient/Caregiver Call     Warfarin doses taken: Warfarin taken as instructed  Diet: No new diet changes identified  Medication/supplement changes: None noted  New illness, injury, or hospitalization: No  Signs or symptoms of bleeding or clotting: No  Previous result: Therapeutic last 2(+) visits  Additional findings: Upcoming surgery/procedure 23 Bilateral muscle correction- see procedure plan dated 11/3/23       PLAN     Recommended plan for no diet, medication or health factor changes affecting INR     Dosing Instructions: Continue current warfarin dose then follow procedure plan as outlined below:  Pre-Procedure:  Hold warfarin for 5 days, until after procedure startin2023   No Bridge     Post-Procedure:  Resume warfarin dose if okay with provider doing procedure on night of procedure, 2023 PM: 10mg & 2023 7.5mg  Recheck INR ~ 7 days after resuming warfarin .Patient stated that she will check it on 12/15 per her normal day of checking INR's    Summary  As of 2023      Full warfarin instructions:  : Hold; : Hold; : Hold; : Hold; 12/10: Hold; : 10 mg; : 7.5 mg; Otherwise 7.5 mg every Mon, Fri; 5 mg all other days   Next INR check:  2023               Telephone call with Rhonda who verbalizes understanding and agrees to plan and who agrees to plan and repeated back plan correctly    Patient to recheck with home meter    Education provided:   Taking warfarin: Importance of taking warfarin as instructed  Contact [phone]  with any changes, questions or concerns.     Plan made per [facility] anticoagulation protocol    Kyleigh Molina RN  Anticoagulation  Clinic  12/1/2023    _______________________________________________________________________     Anticoagulation Episode Summary       Current INR goal:  2.0-3.0   TTR:  86.1% (1 y)   Target end date:  Indefinite   Send INR reminders to:  [facility]AG HOME MONITORING    Indications    Acute deep vein thrombosis (DVT) of axillary vein and internal jugular (H) (Resolved) [I82.A19]  Current use of long term anticoagulation (Resolved) [Z79.01]  Acute deep vein thrombosis (DVT) of axillary vein of right upper extremity (H) [I82.A11]  Personal history of DVT (deep vein thrombosis) [Z86.718]             Comments:  [employer] [facility] meter- Managed by exception             Anticoagulation Care Providers       Provider Role Specialty Phone number    Rema Braxton DO Referring Family Medicine [phone]    Jessica Anthony MD Referring Internal Medicine [phone]    Jana Giles MD Referring Internal Medicine [phone]    Christopher Smith MD Responsible Hematology [phone]

## 2023-12-01 NOTE — TELEPHONE ENCOUNTER
Please see 12/1/2023 Anticoagulation Therapy encounter for further information    Kyleigh Molina RN [facility]

## 2023-12-08 ENCOUNTER — ANESTHESIA EVENT (OUTPATIENT)
Dept: SURGERY | Facility: AMBULATORY SURGERY CENTER | Age: 78
End: 2023-12-08
Payer: MEDICARE

## 2023-12-08 NOTE — ANESTHESIA PREPROCEDURE EVALUATION
Anesthesia Pre-Procedure Evaluation    Patient: Manasa French   MRN: [id number] : 1945        Procedure : Procedure(s):  Bilateral eye muscle correction.          Past Medical History:   Diagnosis Date     Benign essential hypertension      History of Graves' disease     s/p BARAHONA     History of hydrocephalus     s/p  shunt placement in      Macular degeneration of both eyes      Osteoporosis      Personal history of DVT (deep vein thrombosis) 2019    RUE; on chronic AC     Postablative hypothyroidism      Primary open angle glaucoma (POAG) of both eyes     right eye moderate, left eye mild     Pseudophakia      Sensorineural hearing loss, bilateral       Past Surgical History:   Procedure Laterality Date     ARTHROPLASTY HIP Right 2023    Procedure: Right Total Hip Arthroplasty;  Surgeon: Vasu Mixon MD;  Location: [facility] OR     ARTHROSCOPY SHOULDER Left      CATARACT IOL, RT/LT Bilateral      DECOMPRESSION, FUSION LUMBAR POSTERIOR ONE LEVEL, COMBINED      L5-S1     EXCISE GANGLION WRIST Right      HERNIA REPAIR, UMBILICAL       LUMPECTOMY BREAST Left     benign      Allergies   Allergen Reactions     Alphagan [Brimonidine] Other (See Comments)     Itchy eyes     Cosopt [Dorzolamide Hcl-Timolol Mal] Other (See Comments)     Itchy eyes      Social History     Tobacco Use     Smoking status: Never     Smokeless tobacco: Never   Substance Use Topics     Alcohol use: Yes     Comment: 4 glasses wine per week      Wt Readings from Last 1 Encounters:   23 68.6 kg (151 lb 3.2 oz)        Anesthesia Evaluation   Pt has had prior anesthetic. Type: General and MAC.        ROS/MED HX  ENT/Pulmonary:    (-) sleep apnea   Neurologic: Comment: Hx hydrocephalus - has V-P shunt in place      Cardiovascular:     (+)  hypertension- -   -  - -                                      METS/Exercise Tolerance:     Hematologic:     (+) History of blood clots,               Musculoskeletal: Comment: Multiple back  "surgeries;      GI/Hepatic:    (-) GERD   Renal/Genitourinary:  - neg Renal ROS     Endo:     (+)  type II DM,        thyroid problem, hypothyroidism,           Psychiatric/Substance Use:       Infectious Disease:  - neg infectious disease ROS     Malignancy:       Other:             OUTSIDE LABS:  CBC:   Lab Results   Component Value Date    WBC 6.5 08/22/2023    WBC 5.6 05/15/2023    HGB 11.0 (L) 08/22/2023    HGB 9.7 (L) 05/31/2023    HCT 35.8 08/22/2023    HCT 34.1 (L) 05/15/2023     08/22/2023     05/15/2023     BMP:   Lab Results   Component Value Date     09/08/2023     08/22/2023    POTASSIUM 4.0 11/27/2023    POTASSIUM 3.9 09/08/2023    CHLORIDE 100 09/08/2023    CHLORIDE 105 08/22/2023    CO2 27 09/08/2023    CO2 25 08/22/2023    BUN 21.5 09/08/2023    BUN 20.0 08/22/2023    CR 0.93 09/08/2023    CR 0.99 (H) 08/22/2023     (H) 09/08/2023     (H) 08/22/2023     COAGS:   Lab Results   Component Value Date    PTT 30 08/31/2018    INR 2.4 12/01/2023     POC: No results found for: \"BGM\", \"HCG\", \"HCGS\"  HEPATIC:   Lab Results   Component Value Date    ALBUMIN 3.9 06/08/2022    PROTTOTAL 8.2 06/08/2022    ALT 21 06/08/2022    AST 23 06/08/2022    ALKPHOS 76 06/08/2022    BILITOTAL 0.8 06/08/2022     OTHER:   Lab Results   Component Value Date    A1C 6.5 (H) 11/27/2023    TETE 9.3 09/08/2023    TSH 4.29 (H) 10/24/2023    T4 1.58 (H) 06/08/2022    SED 30 02/26/2019       Anesthesia Plan    ASA Status:  3       Anesthesia Type: General.              Consents            Postoperative Care            Comments:           H&P reviewed: Unable to attach H&P to encounter due to EHR limitations. H&P Update: appropriate H&P reviewed, patient examined. No interval changes since H&P (within 30 days).      Sarita Villela MD    I have reviewed the pertinent notes and labs in the chart from the past 30 days and (re)examined the patient.  Any updates or changes from those notes are " "reflected in this note.            # Coagulation Defect: INR = 2.4 (Ref range: 2.000 - 3.000) and/or PTT = N/A, will monitor for bleeding   # DMII: A1C = 6.5 % (Ref range: 0.0 - 5.6 %) within past 6 months  # Overweight: Estimated body mass index is 25.55 kg/m  as calculated from the following:    Height as of 11/27/23: 1.638 m (5' 4.5\").    Weight as of 11/27/23: 68.6 kg (151 lb 3.2 oz).      "

## 2023-12-11 ENCOUNTER — HOSPITAL ENCOUNTER (OUTPATIENT)
Facility: AMBULATORY SURGERY CENTER | Age: 78
Discharge: HOME | End: 2023-12-11
Attending: OPHTHALMOLOGY
Payer: MEDICARE

## 2023-12-11 ENCOUNTER — ANESTHESIA (OUTPATIENT)
Dept: SURGERY | Facility: AMBULATORY SURGERY CENTER | Age: 78
End: 2023-12-11
Payer: MEDICARE

## 2023-12-11 VITALS
HEART RATE: 74 BPM | TEMPERATURE: 97.3 F | HEIGHT: 65 IN | OXYGEN SATURATION: 100 % | SYSTOLIC BLOOD PRESSURE: 149 MMHG | WEIGHT: 148 LBS | RESPIRATION RATE: 20 BRPM | DIASTOLIC BLOOD PRESSURE: 79 MMHG | BODY MASS INDEX: 24.66 KG/M2

## 2023-12-11 DIAGNOSIS — Z98.890 POSTOPERATIVE EYE STATE: Primary | ICD-10-CM

## 2023-12-11 LAB — GLUCOSE BLDC GLUCOMTR-MCNC: 110 MG/DL (ref 70–99)

## 2023-12-11 PROCEDURE — 67311 REVISE EYE MUSCLE: CPT | Mod: RT

## 2023-12-11 PROCEDURE — 67311 REVISE EYE MUSCLE: CPT | Mod: 50 | Performed by: OPHTHALMOLOGY

## 2023-12-11 PROCEDURE — 67314 REVISE EYE MUSCLE: CPT | Mod: LT | Performed by: OPHTHALMOLOGY

## 2023-12-11 PROCEDURE — 67314 REVISE EYE MUSCLE: CPT | Mod: LT

## 2023-12-11 PROCEDURE — 82962 GLUCOSE BLOOD TEST: CPT | Performed by: PATHOLOGY

## 2023-12-11 RX ORDER — ONDANSETRON 4 MG/1
4 TABLET, ORALLY DISINTEGRATING ORAL EVERY 30 MIN PRN
Status: DISCONTINUED | OUTPATIENT
Start: 2023-12-11 | End: 2023-12-12 | Stop reason: HOSPADM

## 2023-12-11 RX ORDER — ONDANSETRON 4 MG/1
4 TABLET, ORALLY DISINTEGRATING ORAL EVERY 30 MIN PRN
Status: DISCONTINUED | OUTPATIENT
Start: 2023-12-11 | End: 2023-12-11 | Stop reason: HOSPADM

## 2023-12-11 RX ORDER — ONDANSETRON 2 MG/ML
4 INJECTION INTRAMUSCULAR; INTRAVENOUS EVERY 30 MIN PRN
Status: DISCONTINUED | OUTPATIENT
Start: 2023-12-11 | End: 2023-12-12 | Stop reason: HOSPADM

## 2023-12-11 RX ORDER — LIDOCAINE HYDROCHLORIDE 20 MG/ML
INJECTION, SOLUTION INFILTRATION; PERINEURAL PRN
Status: DISCONTINUED | OUTPATIENT
Start: 2023-12-11 | End: 2023-12-11

## 2023-12-11 RX ORDER — FENTANYL CITRATE 50 UG/ML
50 INJECTION, SOLUTION INTRAMUSCULAR; INTRAVENOUS EVERY 5 MIN PRN
Status: DISCONTINUED | OUTPATIENT
Start: 2023-12-11 | End: 2023-12-11 | Stop reason: HOSPADM

## 2023-12-11 RX ORDER — DEXAMETHASONE SODIUM PHOSPHATE 4 MG/ML
INJECTION, SOLUTION INTRA-ARTICULAR; INTRALESIONAL; INTRAMUSCULAR; INTRAVENOUS; SOFT TISSUE PRN
Status: DISCONTINUED | OUTPATIENT
Start: 2023-12-11 | End: 2023-12-11

## 2023-12-11 RX ORDER — SODIUM CHLORIDE, SODIUM LACTATE, POTASSIUM CHLORIDE, CALCIUM CHLORIDE 600; 310; 30; 20 MG/100ML; MG/100ML; MG/100ML; MG/100ML
INJECTION, SOLUTION INTRAVENOUS CONTINUOUS
Status: DISCONTINUED | OUTPATIENT
Start: 2023-12-11 | End: 2023-12-11 | Stop reason: HOSPADM

## 2023-12-11 RX ORDER — HYDROMORPHONE HYDROCHLORIDE 1 MG/ML
0.2 INJECTION, SOLUTION INTRAMUSCULAR; INTRAVENOUS; SUBCUTANEOUS EVERY 5 MIN PRN
Status: DISCONTINUED | OUTPATIENT
Start: 2023-12-11 | End: 2023-12-11 | Stop reason: HOSPADM

## 2023-12-11 RX ORDER — LABETALOL HYDROCHLORIDE 5 MG/ML
10 INJECTION, SOLUTION INTRAVENOUS
Status: DISCONTINUED | OUTPATIENT
Start: 2023-12-11 | End: 2023-12-11 | Stop reason: HOSPADM

## 2023-12-11 RX ORDER — PREDNISOLONE ACETATE 10 MG/ML
SUSPENSION/ DROPS OPHTHALMIC
Qty: 10 ML | Refills: 0 | Status: SHIPPED | OUTPATIENT
Start: 2023-12-11 | End: 2024-01-23

## 2023-12-11 RX ORDER — FENTANYL CITRATE 50 UG/ML
25 INJECTION, SOLUTION INTRAMUSCULAR; INTRAVENOUS EVERY 5 MIN PRN
Status: DISCONTINUED | OUTPATIENT
Start: 2023-12-11 | End: 2023-12-11 | Stop reason: HOSPADM

## 2023-12-11 RX ORDER — HYDROMORPHONE HYDROCHLORIDE 1 MG/ML
0.4 INJECTION, SOLUTION INTRAMUSCULAR; INTRAVENOUS; SUBCUTANEOUS EVERY 5 MIN PRN
Status: DISCONTINUED | OUTPATIENT
Start: 2023-12-11 | End: 2023-12-11 | Stop reason: HOSPADM

## 2023-12-11 RX ORDER — OXYMETAZOLINE HYDROCHLORIDE 0.05 G/100ML
SPRAY NASAL PRN
Status: DISCONTINUED | OUTPATIENT
Start: 2023-12-11 | End: 2023-12-11 | Stop reason: HOSPADM

## 2023-12-11 RX ORDER — PROPOFOL 10 MG/ML
INJECTION, EMULSION INTRAVENOUS PRN
Status: DISCONTINUED | OUTPATIENT
Start: 2023-12-11 | End: 2023-12-11

## 2023-12-11 RX ORDER — ONDANSETRON 2 MG/ML
4 INJECTION INTRAMUSCULAR; INTRAVENOUS EVERY 30 MIN PRN
Status: DISCONTINUED | OUTPATIENT
Start: 2023-12-11 | End: 2023-12-11 | Stop reason: HOSPADM

## 2023-12-11 RX ORDER — BALANCED SALT SOLUTION 6.4; .75; .48; .3; 3.9; 1.7 MG/ML; MG/ML; MG/ML; MG/ML; MG/ML; MG/ML
SOLUTION OPHTHALMIC PRN
Status: DISCONTINUED | OUTPATIENT
Start: 2023-12-11 | End: 2023-12-11 | Stop reason: HOSPADM

## 2023-12-11 RX ORDER — FENTANYL CITRATE 50 UG/ML
INJECTION, SOLUTION INTRAMUSCULAR; INTRAVENOUS PRN
Status: DISCONTINUED | OUTPATIENT
Start: 2023-12-11 | End: 2023-12-11

## 2023-12-11 RX ORDER — OXYCODONE HYDROCHLORIDE 5 MG/1
5 TABLET ORAL
Status: DISCONTINUED | OUTPATIENT
Start: 2023-12-11 | End: 2023-12-12 | Stop reason: HOSPADM

## 2023-12-11 RX ORDER — GLYCOPYRROLATE 0.2 MG/ML
INJECTION, SOLUTION INTRAMUSCULAR; INTRAVENOUS PRN
Status: DISCONTINUED | OUTPATIENT
Start: 2023-12-11 | End: 2023-12-11

## 2023-12-11 RX ORDER — OXYCODONE HYDROCHLORIDE 5 MG/1
10 TABLET ORAL
Status: DISCONTINUED | OUTPATIENT
Start: 2023-12-11 | End: 2023-12-12 | Stop reason: HOSPADM

## 2023-12-11 RX ORDER — ACETAMINOPHEN 325 MG/1
975 TABLET ORAL ONCE
Status: COMPLETED | OUTPATIENT
Start: 2023-12-11 | End: 2023-12-11

## 2023-12-11 RX ORDER — KETOROLAC TROMETHAMINE 30 MG/ML
INJECTION, SOLUTION INTRAMUSCULAR; INTRAVENOUS PRN
Status: DISCONTINUED | OUTPATIENT
Start: 2023-12-11 | End: 2023-12-11

## 2023-12-11 RX ORDER — ONDANSETRON 2 MG/ML
INJECTION INTRAMUSCULAR; INTRAVENOUS PRN
Status: DISCONTINUED | OUTPATIENT
Start: 2023-12-11 | End: 2023-12-11

## 2023-12-11 RX ORDER — PROPOFOL 10 MG/ML
INJECTION, EMULSION INTRAVENOUS CONTINUOUS PRN
Status: DISCONTINUED | OUTPATIENT
Start: 2023-12-11 | End: 2023-12-11

## 2023-12-11 RX ORDER — LIDOCAINE 40 MG/G
CREAM TOPICAL
Status: DISCONTINUED | OUTPATIENT
Start: 2023-12-11 | End: 2023-12-11 | Stop reason: HOSPADM

## 2023-12-11 RX ADMIN — ACETAMINOPHEN 975 MG: 325 TABLET ORAL at 10:54

## 2023-12-11 RX ADMIN — FENTANYL CITRATE 25 MCG: 50 INJECTION, SOLUTION INTRAMUSCULAR; INTRAVENOUS at 12:02

## 2023-12-11 RX ADMIN — SODIUM CHLORIDE, SODIUM LACTATE, POTASSIUM CHLORIDE, CALCIUM CHLORIDE: 600; 310; 30; 20 INJECTION, SOLUTION INTRAVENOUS at 11:38

## 2023-12-11 RX ADMIN — GLYCOPYRROLATE 0.2 MG: 0.2 INJECTION, SOLUTION INTRAMUSCULAR; INTRAVENOUS at 11:45

## 2023-12-11 RX ADMIN — LIDOCAINE HYDROCHLORIDE 100 MG: 20 INJECTION, SOLUTION INFILTRATION; PERINEURAL at 11:46

## 2023-12-11 RX ADMIN — FENTANYL CITRATE 25 MCG: 50 INJECTION, SOLUTION INTRAMUSCULAR; INTRAVENOUS at 12:31

## 2023-12-11 RX ADMIN — DEXAMETHASONE SODIUM PHOSPHATE 4 MG: 4 INJECTION, SOLUTION INTRA-ARTICULAR; INTRALESIONAL; INTRAMUSCULAR; INTRAVENOUS; SOFT TISSUE at 11:55

## 2023-12-11 RX ADMIN — ONDANSETRON 4 MG: 2 INJECTION INTRAMUSCULAR; INTRAVENOUS at 11:58

## 2023-12-11 RX ADMIN — PROPOFOL 150 MCG/KG/MIN: 10 INJECTION, EMULSION INTRAVENOUS at 11:46

## 2023-12-11 RX ADMIN — FENTANYL CITRATE 25 MCG: 50 INJECTION, SOLUTION INTRAMUSCULAR; INTRAVENOUS at 11:45

## 2023-12-11 RX ADMIN — KETOROLAC TROMETHAMINE 15 MG: 30 INJECTION, SOLUTION INTRAMUSCULAR; INTRAVENOUS at 12:32

## 2023-12-11 RX ADMIN — PROPOFOL 200 MG: 10 INJECTION, EMULSION INTRAVENOUS at 11:46

## 2023-12-11 NOTE — ANESTHESIA CARE TRANSFER NOTE
Patient: Manasa French    Procedure: Procedure(s):  Bilateral eye muscle correction.       Diagnosis: Double vision [H53.2]  Diagnosis Additional Information: No value filed.    Anesthesia Type:   General     Note:    Oropharynx: oropharynx clear of all foreign objects and spontaneously breathing  Level of Consciousness: awake  Oxygen Supplementation: face mask    Independent Airway: airway patency satisfactory and stable  Dentition: dentition unchanged  Vital Signs Stable: post-procedure vital signs reviewed and stable  Report to RN Given: handoff report given  Patient transferred to: PACU    Handoff Report: Identifed the Patient, Identified the Reponsible Provider, Reviewed the pertinent medical history, Discussed the surgical course, Reviewed Intra-OP anesthesia mangement and issues during anesthesia, Set expectations for post-procedure period and Allowed opportunity for questions and acknowledgement of understanding      Vitals:  Vitals Value Taken Time   /59 12/11/23 1248   Temp 96.7    Pulse 73 12/11/23 1250   Resp 14 12/11/23 1250   SpO2 99 % 12/11/23 1250   Vitals shown include unfiled device data.    Electronically Signed By: SURESH An CRNA  December 11, 2023  12:52 PM

## 2023-12-11 NOTE — OP NOTE
"ATTENDING SURGEON:   Antwan Ardon MD     DATE OF PROCEDURE:   December 11, 2023     FIRST SURGICAL ASSISTANT:   Sheridan Patten MD: Neuro-ophthalmology fellow     ANESTHESIA:   General anesthesia     PREOPERATIVE DIAGNOSIS (ES):   1. Alternating esotropia   2. Left hypertropia     POSTOPERATIVE DIAGNOSIS (ES):   Same     NAME OF OPERATION:   1. Right lateral rectus resection 5.0 mm   2. Left lateral rectus resection 5.0 mm   3. Left inferior rectus resection 2.0 mm     INDICATIONS FOR PROCEDURE:     The patient is a pleasant 78 year old female with a past ocular history of alternating esotropia causing diplopia and increasing prism needs over many years. Her strabismus had progressed beyond the point of tolerable prisms hence she is eager for strabismus surgery to reduce her dependency on prism.     I warned the patient about the risks, benefits, and alternatives of eye muscle surgery including a relatively small risk for severe infection, retinal detachment, and permanent vision loss of the eye.  I also discussed the possibility of postoperative double vision.  I discussed the possibility that due to postoperative double vision or a postoperative recurrent strabismus, the patient may require additional strabismus procedures in the future.  Understanding these risks, the patient decided to proceed with strabismus surgery.      DESCRIPTION OF PROCEDURE:     After the risks, benefits, and alternatives of eye muscle surgery were discussed with the patient and the patient's questions were answered both in clinic and on the day of surgery, written informed consent was obtained and witnessed in the preoperative holding area on the day of surgery.  The word \"yes\" was written over both eyes indicating that the patient consented to eye muscle correction in both eyes.  An intravenous line was placed and light intravenous sedation was given.  The patient was transported to the operating room where after " appropriate monitors were placed, the patient underwent induction of general anesthesia without complication.      Both eyes were prepped and draped in the usual sterile fashion for ophthalmic surgery and a lid speculum was placed in the right eye.  Forced duction testing in this eye revealed no restriction.  A trapezoidal temporal conjunctival flap was created using conjunctival forceps and David scissors.  This was reflected backwards to expose the right lateral rectus muscle which was isolated using a Jamin muscle hook.  The muscle was freed from Tenon's capsule with blunt dissection using a David scissors and cotton swab.  A double armed 6-0 Vicryl suture was then woven across the width of the muscle at a point measured to be 5.0 mm posterior to the insertion and tied to the edges.  A hemostat straight clamp was then clamped perpendicular to the muscle just anterior to the suture and the distal 4.5 mm muscle segment was excised with a David scissors and 0.3 forceps.  Both arms of the 6-0 Vicryl suture were then passed partial thickness through the sclera in a crossing swords technique at the physiologic insertion site.  At this point, the ends of the suture were tied together tightly advancing the muscle and completing a resection effect of 5.0 mm.  The needles were cut off and the ends were cut short.  The conjunctival flap was then re-opposed in the surgical bed and held in place using two 6-0 plain gut sutures.  The lid speculum was removed from the operative eye.     A lid speculum was placed in the left eye.  Forced duction testing in this eye revealed no restriction.  A trapezoidal temporal conjunctival flap was created using conjunctival forceps and David scissors.  This was reflected backwards to expose the left lateral rectus muscle which was isolated using a Jamin muscle hook.  The muscle was freed from Tenon's capsule with blunt dissection using a David scissors and cotton swab.  A  double armed 6-0 Vicryl suture was then woven across the width of the muscle at a point measured to be 5.0 mm posterior to the insertion and tied to the edges.  A hemostat straight clamp was then clamped perpendicular to the muscle just anterior to the suture and the distal 4.5 mm muscle segment was excised with a David scissors and 0.3 forceps.  Both arms of the 6-0 Vicryl suture were then passed partial thickness through the sclera in a crossing swords technique at the physiologic insertion site.  At this point, the ends of the suture were tied together tightly advancing the muscle and completing a resection effect of 5.0 mm.  The needles were cut off and the ends were cut short.     The trapezoidal temporal conjunctival flap was extended inferiorly using conjunctival forceps and David scissors.  This was reflected backwards to expose the left inferior rectus muscle which was isolated using a [place] muscle hook.  The muscle was freed from Tenon's capsule with blunt dissection using a David scissors and cotton swab.  A double armed 6-0 Vicryl suture was then woven across the width of the muscle at a point measured to be 2 mm posterior to the insertion and tied to the edges.  A hemostat straight clamp was then clamped perpendicular to the muscle just anterior to the suture and the distal 1.5 mm muscle segment was excised with a David scissors and 0.3 forceps.  Both arms of the 6-0 Vicryl suture were then passed partial thickness through the sclera in a crossing swords technique at the physiologic insertion site.  At this point, the ends of the suture were tied together tightly advancing the muscle and completing a resection effect of 2.0 mm.  The needles were cut off and the ends were cut short.  The conjunctival flap was then re-opposed in the surgical bed and held in place using two 6-0 plain gut sutures.  The lid speculum was removed from the operative eye.     Maxitrol ointment was placed in both  eyes.  The patient was awakened from general anesthesia without complication and discharged to the recovery room in stable condition.       SPECIMENS REMOVED:   None.      ESTIMATED BLOOD LOSS:   1 mL or less.     INTRAOPERATIVE FLUIDS:   As per anesthesia records.      SPONGE/INSTRUMENT/NEEDLE COUNTS:   All sponge, instrument, and needle counts were correct times two.     CONDITION ON DISCHARGE FROM OPERATING ROOM:   Stable.      COMPLICATIONS:   None.     I was present for the entire procedure

## 2023-12-11 NOTE — DISCHARGE INSTRUCTIONS
Instructions for after your eye muscle surgery:     Instill 1 cm of Maxitrol ophthalmic ointment in the operative eye(s) in 3 times per day until follow-up with Dr. Ardon in about 1-2 weeks. The ointment is expected to blur vision but is necessary. You will also go home with a prescription for a steroid eye drop. Do NOT start this eye drop yet. When you see Dr. Ardon at your post-operative visit he will tell you if and when to start the drops. Avoid all eye pressure or trauma for 7 days. No eye rubbing, straining, swimming, athletics, or outdoor activities in which dirt or foreign objects could enter the eye. ok to take a bath and shower immediately but don't run shower water on face.       Tylenol or ibuprofen over the counter may be used for pain. You were given 975mg of Tylenol at 11:00 am, you may take Tylenol again at 5:00 pm. You were given IV Toradol (similar to ibuprofen) at 12:30, you may take Ibuprofen again at 6:30 pm.  Pain can occasionally be moderate for 1 or 2 days after surgery. If pain is severe or does not improve greatly with over the counter medications call our office. Return for follow-up with Dr. Ardon as already scheduled (generally about 1-2 weeks after surgery). If you do not have an appointment already or you need to change your 1-2 week appointment, please call Jassi Mendez at [phone] or our  at [phone] If you are concerned about a healing problem after surgery, contact my assistant Kody Barry at [phone] or our triage nurse at [phone] during standard business hours. After hours for emergent concerns you may call the [facility] at [phone] and ask to speak with the ophthalmology resident on call.     [facility]  Home Care Following Anesthesia  For 24 hours after surgery:  Get plenty of rest.  A responsible adult must stay with you for at least 24 hours after you leave the  surgery center.  Do not drive or use heavy equipment.  If you have weakness or tingling, don't drive or use heavy equipment until this feeling goes away.   Do not drink alcohol.   Avoid strenuous or risky activities.  Ask for help when climbing stairs.  You may feel lightheaded.  IF so, sit for a few minutes before standing.  Have someone help you get up.   If you have nausea (feel sick to your stomach): Drink only clear liquids such as apple juice, ginger ale, broth or 7-Up.  Rest may also help.  Be sure to drink enough fluids.  Move to a regular diet as you feel able.   You may have a slight fever.  Call the doctor if your fever is over 100 F (37.7 C) (taken under the tongue) or lasts longer than 24 hours.  You may have a dry mouth, a sore throat, muscle aches or trouble sleeping. These should go away after 24 hours.  Do not make important or legal decisions.   It is recommended to avoid smoking.         Tips for taking pain medications  To get the best pain relief possible, remember these points:  Take pain medications as directed, before pain becomes severe.  Pain medication can upset your stomach: taking it with food may help.  Constipation is a common side effect of pain medication. Drink plenty of  fluids.  Eat foods high in fiber. Take a stool softener if recommended by your doctor or pharmacist.  Do not drink alcohol, drive or operate machinery while taking pain medications.  Ask about other ways to control pain, such as with heat, ice or relaxation.    Tylenol/Acetaminophen Consumption    If you feel your pain relief is insufficient, you may take Tylenol/Acetaminophen in addition to your narcotic pain medication.   Be careful not to exceed 4,000 mg of Tylenol/Acetaminophen in a 24 hour period from all sources.  If you are taking extra strength Tylenol/acetaminophen (500 mg), the maximum dose is 8 tablets in 24 hours.  If you are taking regular strength acetaminophen (325 mg), the maximum dose is 12 tablets  in 24 hours.    Call a doctor for any of the following:  Signs of infection (fever, growing tenderness at the surgery site, a large amount of drainage or bleeding, severe pain, foul-smelling drainage, redness, swelling).  It has been over 8 to 10 hours since surgery and you are still not able to urinate (pass water).  Headache for over 24 hours.  Numbness, tingling or weakness the day after surgery (if you had spinal anesthesia).  Signs of Covid-19 infection (temperature over 100 degrees, shortness of breath, cough, loss of taste/smell, generalized body aches, persistent headache, chills, sore throat, nausea/vomiting/diarrhea)  Your doctor is:       Dr. Antwan Ardon, Ophthalmology: [phone]               Or dial [phone] and ask for the resident on call for:  Ophthalmology  For emergency care, call the:  [facility]:  [phone] (TTY for hearing impaired: [phone])

## 2023-12-11 NOTE — ANESTHESIA PROCEDURE NOTES
Airway       Patient location during procedure: OR       Procedure Start/Stop Times: 12/11/2023 11:48 AM  Staff -        CRNA: Bina Abraham APRN CRNA       Performed By: CRNA  Consent for Airway        Urgency: elective  Indications and Patient Condition       Indications for airway management: eduardo-procedural       Induction type:intravenous       Mask difficulty assessment: 1 - vent by mask    Final Airway Details       Final airway type: supraglottic airway    Supraglottic Airway Details        Type: LMA       Brand: I-Gel       LMA size: 4    Post intubation assessment        Placement verified by: capnometry and chest rise        Number of attempts at approach: 1       Secured with: tape       Ease of procedure: easy       Dentition: Intact and Unchanged    Medication(s) Administered   Medication Administration Time: 12/11/2023 11:48 AM

## 2023-12-11 NOTE — BRIEF OP NOTE
[facility] Brief Operative Note    Pre-operative diagnosis: Double vision [H53.2]   Post-operative diagnosis Same   Procedure: Procedure(s):  Bilateral eye muscle correction.   Surgeon: Peterson Ardon MD    Assistants(s): Sheridan aPtten MD      Estimated blood loss: Less than 1 cc    Specimens: None   Findings: See full operative report

## 2023-12-11 NOTE — ANESTHESIA POSTPROCEDURE EVALUATION
Patient: Manasa French    Procedure: Procedure(s):  Bilateral eye muscle correction.       Anesthesia Type:  General    Note:  Disposition: Outpatient   Postop Pain Control: Uneventful            Sign Out: Well controlled pain   PONV: No   Neuro/Psych: Uneventful            Sign Out: Acceptable/Baseline neuro status   Airway/Respiratory: Uneventful            Sign Out: Acceptable/Baseline resp. status   CV/Hemodynamics: Uneventful            Sign Out: Acceptable CV status; No obvious hypovolemia; No obvious fluid overload   Other NRE: NONE   DID A NON-ROUTINE EVENT OCCUR? No       Last vitals:  Vitals Value Taken Time   /69 12/11/23 1300   Temp 36.2  C (97.1  F) 12/11/23 1300   Pulse 74 12/11/23 1304   Resp 15 12/11/23 1304   SpO2 96 % 12/11/23 1306   Vitals shown include unfiled device data.    Electronically Signed By: Sarita Villela MD  December 11, 2023  1:46 PM

## 2023-12-15 ENCOUNTER — ANTICOAGULATION THERAPY VISIT (OUTPATIENT)
Dept: ANTICOAGULATION | Facility: CLINIC | Age: 78
End: 2023-12-15
Payer: MEDICARE

## 2023-12-15 DIAGNOSIS — I82.A11 ACUTE DEEP VEIN THROMBOSIS (DVT) OF AXILLARY VEIN OF RIGHT UPPER EXTREMITY (H): Primary | ICD-10-CM

## 2023-12-15 DIAGNOSIS — Z86.718 PERSONAL HISTORY OF DVT (DEEP VEIN THROMBOSIS): ICD-10-CM

## 2023-12-15 LAB — INR HOME MONITORING: 1.4 (ref 2–3)

## 2023-12-15 NOTE — PROGRESS NOTES
ANTICOAGULATION MANAGEMENT     Manasa French 78 year old female is on warfarin with subtherapeutic INR result. (Goal INR 2.0-3.0)    Recent labs: (last 7 days)     12/15/23  0000   INR 1.4*       ASSESSMENT     Source(s): Chart Review and Patient/Caregiver Call     Warfarin doses taken: Warfarin taken as instructed. Patient was holding warfarin 12/6/23-12/10/23 for eye surgery.  Diet: No new diet changes identified  Medication/supplement changes: None noted  New illness, injury, or hospitalization: Yes: bilateral eye muscle correction 12/11/23  Signs or symptoms of bleeding or clotting: No  Previous result: Therapeutic last 2(+) visits  Additional findings: None       PLAN     Recommended plan for temporary change(s) affecting INR     Dosing Instructions: booster dose then continue your current warfarin dose with next INR in 4 days. [facility] recommended booster dose of 12.5-15 mg today. Patient states she would like to go 12.5 mg, but would like to spread it out over two days. Patient will take 10 mg today, and 7.5 mg tomorrow.       Summary  As of 12/15/2023      Full warfarin instructions:  12/15: 12.5 mg; Otherwise 7.5 mg every Mon, Fri; 5 mg all other days   Next INR check:  12/19/2023               Telephone call with Rhonda who agrees to plan and repeated back plan correctly    Patient to recheck with home meter    Education provided:   Please call back if any changes to your diet, medications or how you've been taking warfarin  Symptom monitoring: monitoring for bleeding signs and symptoms, monitoring for clotting signs and symptoms, monitoring for stroke signs and symptoms, and when to seek medical attention/emergency care  Contact [phone]  with any changes, questions or concerns.     Plan made with [facility] Pharmacist Jesi Valdez, [facility]  Anticoagulation Clinic  12/15/2023    _______________________________________________________________________     Anticoagulation Episode Summary        Current INR goal:  2.0-3.0   TTR:  83.8% (1 y)   Target end date:  Indefinite   Send INR reminders to:  [facility] MONITOR[facility]    Indications    Acute deep vein thrombosis (DVT) of axillary vein and internal jugular (H) (Resolved) [I82.A19]  Current use of long term anticoagulation (Resolved) [Z79.01]  Acute deep vein thrombosis (DVT) of axillary vein of right upper extremity (H) [I82.A11]  Personal history of DVT (deep vein thrombosis) [Z86.718]             Comments:  [employer] meter- Managed by exception             Anticoagulation Care Providers       Provider Role Specialty Phone number    Rema Braxton DO Referring Family Medicine [phone]    Jessica Anthony MD Referring Internal Medicine [phone]    Jana Giles MD Referring Internal Medicine [phone]    Christopher Smith MD Responsible Hematology [phone]

## 2023-12-18 ENCOUNTER — ANTICOAGULATION THERAPY VISIT (OUTPATIENT)
Dept: ANTICOAGULATION | Facility: CLINIC | Age: 78
End: 2023-12-18
Payer: MEDICARE

## 2023-12-18 DIAGNOSIS — Z86.718 PERSONAL HISTORY OF DVT (DEEP VEIN THROMBOSIS): ICD-10-CM

## 2023-12-18 DIAGNOSIS — I82.A11 ACUTE DEEP VEIN THROMBOSIS (DVT) OF AXILLARY VEIN OF RIGHT UPPER EXTREMITY (H): Primary | ICD-10-CM

## 2023-12-18 LAB — INR (EXTERNAL): 1.9 (ref 0.9–1.1)

## 2023-12-18 NOTE — PROGRESS NOTES
ANTICOAGULATION MANAGEMENT     Manasa French 78 year old female is on warfarin with subtherapeutic INR result. (Goal INR 2.0-3.0)    Recent labs: (last 7 days)     12/18/23  1415   INR 1.9*       ASSESSMENT     Source(s): Chart Review and Patient/Caregiver Call     Warfarin doses taken: Held for bilateral eye muscle correction  recently which may be affecting INR  Diet: No new diet changes identified-patient had peas and carrots instead of darker greens  Medication/supplement changes: None noted  New illness, injury, or hospitalization: No  Signs or symptoms of bleeding or clotting: No  Previous result: Subtherapeutic  Additional findings: None       PLAN     Recommended plan for temporary change(s) affecting INR     Dosing Instructions: Continue your current warfarin dose with next INR in 4 days       Summary  As of 12/18/2023      Full warfarin instructions:  7.5 mg every Mon, Fri; 5 mg all other days   Next INR check:  12/22/2023               Telephone call with Rhonda who verbalizes understanding and agrees to plan  Sent [employer] message with dosing and follow up instructions    Patient to recheck with home meter    Education provided:   Please call back if any changes to your diet, medications or how you've been taking warfarin    Plan made per [facility] anticoagulation protocol    Diann Orellana, RN  Anticoagulation Clinic  12/18/2023    _______________________________________________________________________     Anticoagulation Episode Summary       Current INR goal:  2.0-3.0   TTR:  82.9% (1 y)   Target end date:  Indefinite   Send INR reminders to:  [facility] MONIT[facility]    Indications    Acute deep vein thrombosis (DVT) of axillary vein and internal jugular (H) (Resolved) [I82.A19]  Current use of long term anticoagulation (Resolved) [Z79.01]  Acute deep vein thrombosis (DVT) of axillary vein of right upper extremity (H) [I82.A11]  Personal history of DVT (deep vein thrombosis) [Z86.718]              Comments:  [employer] meter- Managed by exception             Anticoagulation Care Providers       Provider Role Specialty Phone number    Rema Braxton DO Referring Family Medicine [phone]    Jessica Anthony MD Referring Internal Medicine [phone]    Jana Giles MD Referring Internal Medicine [phone]    Christopher Smith MD Responsible Hematology [phone]

## 2023-12-21 NOTE — PROGRESS NOTES
1. 1 week post-op status post strabismus surgery:     -Surgery: 12/11/23    PREOPERATIVE DIAGNOSIS (ES):   1. Alternating esotropia   2. Left hypertropia     POSTOPERATIVE DIAGNOSIS (ES):   Same     NAME OF OPERATION:   1. Right lateral rectus resection 5.0 mm   2. Left lateral rectus resection 5.0 mm   3. Left inferior rectus resection 2.0 mm     - Alignment: mild exotropia at distance that's worse at near. Also has right hypertropia   - Diplopia: patient is still having constant double vision.   - Healing up appropriately  - Maxitrol ophthalmic ointment: stop  - Start Predforte 1% four times a day in the operative eye(s) and decrease by one drop daily every week.  Course will complete in 1 month.    Patient given 15 prism diopter tilted Fresnel over right lens today to allow single binocular vision in primary gaze.    Return to clinic in 2-3 weeks. Follow-up in orthoptic only clinic- please fit patient with the minimum prism possible to allow fusion in primary gaze- decrease Fresnel strength as patient as is able at orthoptic visit.  Then follow-up 2-3 weeks later again in Dr. Ardon clinic       Sheridan Patten MD   Fellow, Neuro-Ophthalmology     Complete documentation of historical and exam elements from today's encounter can be found in the full encounter summary report (not reduplicated in this progress note).  I personally obtained the chief complaint(s) and history of present illness.  I confirmed and edited as necessary the review of systems, past medical/surgical history, family history, social history, and examination findings as documented by others; and I examined the patient myself.  I personally reviewed the relevant tests, images, and reports as documented above.  I formulated and edited as necessary the assessment and plan and discussed the findings and management plan with the patient and family     Antwan Ardon MD

## 2023-12-22 ENCOUNTER — ANTICOAGULATION THERAPY VISIT (OUTPATIENT)
Dept: ANTICOAGULATION | Facility: CLINIC | Age: 78
End: 2023-12-22
Payer: MEDICARE

## 2023-12-22 ENCOUNTER — OFFICE VISIT (OUTPATIENT)
Dept: OPHTHALMOLOGY | Facility: CLINIC | Age: 78
End: 2023-12-22
Attending: OPHTHALMOLOGY
Payer: MEDICARE

## 2023-12-22 DIAGNOSIS — Z86.718 PERSONAL HISTORY OF DVT (DEEP VEIN THROMBOSIS): ICD-10-CM

## 2023-12-22 DIAGNOSIS — I82.A11 ACUTE DEEP VEIN THROMBOSIS (DVT) OF AXILLARY VEIN OF RIGHT UPPER EXTREMITY (H): Primary | ICD-10-CM

## 2023-12-22 DIAGNOSIS — H53.2 DOUBLE VISION: Primary | ICD-10-CM

## 2023-12-22 LAB — INR HOME MONITORING: 2.1 (ref 2–3)

## 2023-12-22 PROCEDURE — 99024 POSTOP FOLLOW-UP VISIT: CPT | Mod: GC | Performed by: OPHTHALMOLOGY

## 2023-12-22 PROCEDURE — G0463 HOSPITAL OUTPT CLINIC VISIT: HCPCS | Performed by: OPHTHALMOLOGY

## 2023-12-22 ASSESSMENT — CONF VISUAL FIELD
OS_INFERIOR_NASAL_RESTRICTION: 0
OD_NORMAL: 1
OS_SUPERIOR_TEMPORAL_RESTRICTION: 0
OD_SUPERIOR_NASAL_RESTRICTION: 0
OS_SUPERIOR_NASAL_RESTRICTION: 0
OD_INFERIOR_TEMPORAL_RESTRICTION: 0
OD_SUPERIOR_TEMPORAL_RESTRICTION: 0
OS_INFERIOR_TEMPORAL_RESTRICTION: 0
OS_NORMAL: 1
METHOD: COUNTING FINGERS
OD_INFERIOR_NASAL_RESTRICTION: 0

## 2023-12-22 ASSESSMENT — SLIT LAMP EXAM - LIDS
COMMENTS: NORMAL
COMMENTS: NORMAL

## 2023-12-22 ASSESSMENT — REFRACTION_WEARINGRX
OD_CYLINDER: +1.50
OS_CYLINDER: SPHERE
OD_SPHERE: -1.50
OD_AXIS: 180
OS_AXIS: 180
OD_SPHERE: +3.00
SPECS_TYPE: READERS
OS_CYLINDER: +0.75
OD_CYLINDER: SPHERE
OS_SPHERE: +3.00
OS_SPHERE: -2.00

## 2023-12-22 ASSESSMENT — TONOMETRY
IOP_METHOD: ICARE
OS_IOP_MMHG: 21
OD_IOP_MMHG: 19

## 2023-12-22 ASSESSMENT — EXTERNAL EXAM - LEFT EYE: OS_EXAM: NORMAL

## 2023-12-22 ASSESSMENT — VISUAL ACUITY
OD_CC: 20/30
METHOD: SNELLEN - LINEAR
OS_CC+: -2
CORRECTION_TYPE: GLASSES
OD_CC+: -2
OS_CC: 20/40

## 2023-12-22 ASSESSMENT — EXTERNAL EXAM - RIGHT EYE: OD_EXAM: NORMAL

## 2023-12-22 NOTE — PROGRESS NOTES
ANTICOAGULATION MANAGEMENT     Manasa French 78 year old female is on warfarin with therapeutic INR result. (Goal INR 2.0-3.0)    Recent labs: (last 7 days)     12/22/23  0000   INR 2.1       ASSESSMENT     Source(s): Chart Review and Patient/Caregiver Call     Warfarin doses taken: Warfarin taken as instructed  Diet: No new diet changes identified  Medication/supplement changes: None noted  New illness, injury, or hospitalization: No  Signs or symptoms of bleeding or clotting: No  Previous result: Subtherapeutic  Additional findings: Patient is requesting a call on 12/29/23, then would like to resume MBE after that        PLAN     Recommended plan for no diet, medication or health factor changes affecting INR     Dosing Instructions: Continue your current warfarin dose with next INR in 1 week       Summary  As of 12/22/2023      Full warfarin instructions:  7.5 mg every Mon, Fri; 5 mg all other days   Next INR check:  12/29/2023               Telephone call with Rhonda who verbalizes understanding and agrees to plan    Patient to recheck with home meter    Education provided:   Please call back if any changes to your diet, medications or how you've been taking warfarin  Symptom monitoring: monitoring for bleeding signs and symptoms, monitoring for clotting signs and symptoms, and monitoring for stroke signs and symptoms  Contact [phone]  with any changes, questions or concerns.     Plan made per [facility] anticoagulation protocol    Kyleigh Valdez RN  Anticoagulation Clinic  12/22/2023    _______________________________________________________________________     Anticoagulation Episode Summary       Current INR goal:  2.0-3.0   TTR:  82.4% (1 y)   Target end date:  Indefinite   Send INR reminders to:  [facility] MONITOR[facility]    Indications    Acute deep vein thrombosis (DVT) of axillary vein and internal jugular (H) (Resolved) [I82.A19]  Current use of long term anticoagulation (Resolved) [Z79.01]  Acute deep  vein thrombosis (DVT) of axillary vein of right upper extremity (H) [I82.A11]  Personal history of DVT (deep vein thrombosis) [Z86.718]             Comments:  [employer] meter- Managed by exception             Anticoagulation Care Providers       Provider Role Specialty Phone number    Rema Braxton DO Referring Family Medicine [phone]    Jessica Anthony MD Referring Internal Medicine [phone]    Jana Giles MD Referring Internal Medicine [phone]    Christopher Smith MD Responsible Hematology [phone]

## 2023-12-29 ENCOUNTER — TELEPHONE (OUTPATIENT)
Dept: FAMILY MEDICINE | Facility: CLINIC | Age: 78
End: 2023-12-29
Payer: MEDICARE

## 2023-12-29 ENCOUNTER — ANTICOAGULATION THERAPY VISIT (OUTPATIENT)
Dept: ANTICOAGULATION | Facility: CLINIC | Age: 78
End: 2023-12-29
Payer: MEDICARE

## 2023-12-29 DIAGNOSIS — I82.A11 ACUTE DEEP VEIN THROMBOSIS (DVT) OF AXILLARY VEIN OF RIGHT UPPER EXTREMITY (H): Primary | ICD-10-CM

## 2023-12-29 DIAGNOSIS — Z86.718 PERSONAL HISTORY OF DVT (DEEP VEIN THROMBOSIS): ICD-10-CM

## 2023-12-29 LAB — INR (EXTERNAL): 2.2 (ref 0.9–1.1)

## 2023-12-29 NOTE — PROGRESS NOTES
ANTICOAGULATION MANAGEMENT     Manasa French 78 year old female is on warfarin with therapeutic INR result. (Goal INR 2.0-3.0)    Recent labs: (last 7 days)     12/29/23  0849   INR 2.2*       ASSESSMENT     Source(s): Chart Review and Patient/Caregiver Call     Warfarin doses taken: Warfarin taken as instructed  Diet: No new diet changes identified  Medication/supplement changes: None noted  New illness, injury, or hospitalization: No  Signs or symptoms of bleeding or clotting: No  Previous result: Therapeutic last visit; previously outside of goal range  Additional findings: None       PLAN     Recommended plan for no diet, medication or health factor changes affecting INR     Dosing Instructions: Continue your current warfarin dose with next INR in 2 weeks       Summary  As of 12/29/2023      Full warfarin instructions:  7.5 mg every Mon, Fri; 5 mg all other days   Next INR check:  1/12/2024               Telephone call with Rhonda who verbalizes understanding and agrees to plan    Patient to recheck with home meter    Education provided:   Please call back if any changes to your diet, medications or how you've been taking warfarin    Plan made per [facility] anticoagulation protocol    Ember Amezcua RN  Anticoagulation Clinic  12/29/2023    _______________________________________________________________________     Anticoagulation Episode Summary       Current INR goal:  2.0-3.0   TTR:  82.4% (1 y)   Target end date:  Indefinite   Send INR reminders to:  [facility] HOME MONITORING    Indications    Acute deep vein thrombosis (DVT) of axillary vein and internal jugular (H) (Resolved) [I82.A19]  Current use of long term anticoagulation (Resolved) [Z79.01]  Acute deep vein thrombosis (DVT) of axillary vein of right upper extremity (H) [I82.A11]  Personal history of DVT (deep vein thrombosis) [Z86.718]             Comments:  [employer] meter- Managed by exception             Anticoagulation Care Providers        Provider Role Specialty Phone number    Rema Braxton DO Referring Family Medicine [phone]    Jessica Anthony MD Referring Internal Medicine [phone]    Jana Giles MD Referring Internal Medicine [phone]    Christopher Smith MD Responsible Hematology [phone]

## 2023-12-29 NOTE — TELEPHONE ENCOUNTER
Patient Returning Call    Reason for call:  Pt calling in with result of 2.2. Will wait for call back about dosing.    Information relayed to patient:  Will leave TE for call back    Patient has additional questions:  No      Could we send this information to you in [employer] or would you prefer to receive a phone call?:   Patient would prefer a phone call   Okay to leave a detailed message?: Yes at Home number on file [phone] (home)

## 2024-01-03 ENCOUNTER — TELEPHONE (OUTPATIENT)
Dept: OPHTHALMOLOGY | Facility: CLINIC | Age: 79
End: 2024-01-03
Payer: MEDICARE

## 2024-01-03 NOTE — TELEPHONE ENCOUNTER
Called and spoke to patient.  We rescheduled her orthoptic appointment on 01/12/24 from 12:30 pm to 8:00 am - same day, different time.  Confirmed date, time, and location.    ROSETTE Tesfaye 1/3/2024 12:39 PM

## 2024-01-12 ENCOUNTER — ANTICOAGULATION THERAPY VISIT (OUTPATIENT)
Dept: ANTICOAGULATION | Facility: CLINIC | Age: 79
End: 2024-01-12

## 2024-01-12 ENCOUNTER — OFFICE VISIT (OUTPATIENT)
Dept: OPHTHALMOLOGY | Facility: CLINIC | Age: 79
End: 2024-01-12
Attending: OPHTHALMOLOGY
Payer: MEDICARE

## 2024-01-12 DIAGNOSIS — Z86.718 PERSONAL HISTORY OF DVT (DEEP VEIN THROMBOSIS): ICD-10-CM

## 2024-01-12 DIAGNOSIS — H50.10 CONSECUTIVE EXOTROPIA: Primary | ICD-10-CM

## 2024-01-12 DIAGNOSIS — I82.A11 ACUTE DEEP VEIN THROMBOSIS (DVT) OF AXILLARY VEIN OF RIGHT UPPER EXTREMITY (H): Primary | ICD-10-CM

## 2024-01-12 LAB — INR HOME MONITORING: 2 (ref 2–3)

## 2024-01-12 PROCEDURE — 92060 SENSORIMOTOR EXAMINATION: CPT

## 2024-01-12 PROCEDURE — V2718 FRESNELL PRISM PRESS-ON LENS: HCPCS

## 2024-01-12 ASSESSMENT — REFRACTION_WEARINGRX
OD_AXIS: 180
OS_CYLINDER: +0.75
OS_AXIS: 180
OS_SPHERE: -2.00
OD_CYLINDER: +1.50
OD_SPHERE: -1.50

## 2024-01-12 ASSESSMENT — CONF VISUAL FIELD
OD_SUPERIOR_TEMPORAL_RESTRICTION: 0
OS_SUPERIOR_NASAL_RESTRICTION: 0
OS_SUPERIOR_TEMPORAL_RESTRICTION: 0
OD_INFERIOR_TEMPORAL_RESTRICTION: 0
OS_INFERIOR_TEMPORAL_RESTRICTION: 0
OD_INFERIOR_NASAL_RESTRICTION: 0
OS_NORMAL: 1
METHOD: COUNTING FINGERS
OD_NORMAL: 1
OS_INFERIOR_NASAL_RESTRICTION: 0
OD_SUPERIOR_NASAL_RESTRICTION: 0

## 2024-01-12 ASSESSMENT — VISUAL ACUITY
METHOD: SNELLEN - LINEAR
OD_PH_CC: 20/30
CORRECTION_TYPE: GLASSES
OS_PH_CC+: +1
OD_PH_CC+: -2
OD_CC: 20/40
OS_CC: 20/60
OS_CC+: -2
OS_PH_CC: 20/50

## 2024-01-12 NOTE — PROGRESS NOTES
Chief Complaint(s) & History of Present Illness  Chief Complaint(s) and History of Present Illness(es)       Diplopia Follow Up              Comments: S/p  RLRx 5mm, LLRx 5mm, LIRx 2mm (12/11/2023). Pt was last here for POW#1 - we placed 15 BI tilted fresnel on right lens.   Pt feels eye alignment has gotten better because current fresnel is making her feel sick. No eye pain. Using pred forte once per day.                    Assessment and Plan:      Manasa French is a 78 year old female who presents with:     Secondary exotropia - Both Eyes  Patient came in today for alignment check following strabismus surgery on 12/11/2023. Today's sensorimotor exam showed decreased in deviation from exotropia of 16 pd & right hypertropia of 7 pd to exotropia of 10 pd and right hypertropia of 3 pd. Today we placed a updated tilted fresnel of 10 PD, base in tilted down on RIGHT lens of glasses.        PLAN:  Keep Post-op appointment in 3 weeks (1/31/2024) with Dr. Ardon. Will reassess alignment at that visit.     ROSETTE Becker 1/12/2024 8:42 AM     Reviewed the exam as well as plan from Ayala's orthoptic visit.  I agree and will re-evaluate this patient again in 3 weeks as she drifts back towards orthophoria.      Antwan Ardon MD  , Neuro-Ophthalmology and Adult Strabismus Surgery  [employer] and Moraima Griffin Chair in Neuro-Ophthalmology  Department of Ophthalmology and Visual Neurosciences  [facility]

## 2024-01-12 NOTE — PROGRESS NOTES
ANTICOAGULATION MANAGEMENT     aMnasa French 78 year old female is on warfarin with therapeutic INR result. (Goal INR 2.0-3.0)    Recent labs: (last 7 days)     01/12/24  0000   INR 2.0       ASSESSMENT     Source(s): Chart Review and Patient/Caregiver Call     Warfarin doses taken: Warfarin taken as instructed  Diet: No new diet changes identified  Medication/supplement changes: None noted  New illness, injury, or hospitalization: No  Signs or symptoms of bleeding or clotting: No  Previous result: Therapeutic last 2(+) visits  Additional findings: None       PLAN     Recommended plan for no diet, medication or health factor changes affecting INR     Dosing Instructions: Continue your current warfarin dose with next INR in 2 weeks       Summary  As of 1/12/2024      Full warfarin instructions:  7.5 mg every Mon, Fri; 5 mg all other days   Next INR check:  1/26/2024               Telephone call with Rhonda who verbalizes understanding and agrees to plan    Patient to recheck with home meter    Education provided:   Please call back if any changes to your diet, medications or how you've been taking warfarin  Resume manage by exception with home monitor. Continue to submit INR results to home monitor company.You will only be called when your result is out of range. Please call and notify [facility] if new medication started, dose missed, signs or symptoms of bleeding or clotting, or a surgery/procedure is scheduled.    Plan made per [facility] anticoagulation protocol    Diann Orellana RN  Anticoagulation Clinic  1/12/2024    _______________________________________________________________________     Anticoagulation Episode Summary       Current INR goal:  2.0-3.0   TTR:  84.4% (1 y)   Target end date:  Indefinite   Send INR reminders to:  [facility] HOME MONITORING    Indications    Acute deep vein thrombosis (DVT) of axillary vein and internal jugular (H) (Resolved) [I82.A19]  Current use of long term anticoagulation  (Resolved) [Z79.01]  Acute deep vein thrombosis (DVT) of axillary vein of right upper extremity (H) [I82.A11]  Personal history of DVT (deep vein thrombosis) [Z86.718]             Comments:  [employer] meter- Managed by exception             Anticoagulation Care Providers       Provider Role Specialty Phone number    Rema Braxton DO Referring Family Medicine [phone]    Jessica Anthony MD Referring Internal Medicine [phone]    Jana Giles MD Referring Internal Medicine [phone]    Christopher Smith MD Responsible Hematology [phone]

## 2024-01-14 NOTE — ADDENDUM NOTE
Addended by: MARIO ANDERSON on: 1/14/2024 04:06 PM     Modules accepted: Orders, Level of Service

## 2024-01-18 ENCOUNTER — VIRTUAL VISIT (OUTPATIENT)
Dept: HEMATOLOGY | Facility: CLINIC | Age: 79
End: 2024-01-18
Attending: INTERNAL MEDICINE
Payer: MEDICARE

## 2024-01-18 DIAGNOSIS — I82.A11 ACUTE DEEP VEIN THROMBOSIS (DVT) OF AXILLARY VEIN OF RIGHT UPPER EXTREMITY (H): Primary | ICD-10-CM

## 2024-01-18 DIAGNOSIS — H40.1132 PRIMARY OPEN ANGLE GLAUCOMA OF BOTH EYES, MODERATE STAGE: Primary | ICD-10-CM

## 2024-01-18 PROCEDURE — 99213 OFFICE O/P EST LOW 20 MIN: CPT | Mod: 93 | Performed by: INTERNAL MEDICINE

## 2024-01-18 NOTE — PROGRESS NOTES
[facility]  [address] [place] [address]  Phone: [phone], Fax: [phone]      Outpatient Visit Note:    Patient: Manasa French  MRN: [id number]  : 1945  JENNIFER: 2024    Reason for Visit:  Annual follow up for indefinite anticoagluation    Assessment:  In summary, Manasa French is a 78 year old woman with history of an unprovoked right upper extremity deep vein thrombosis, having excluded the presence of thoracic outlet syndrome.  Given that this was an unprovoked DVT, her risk of recurrence is sufficiently high to recommend indefinite anticoagulation for secondary prophylaxis.     She has done well on warfarin with TTR >80% which is outstanding.  Bleed risk appears very low currently.  We will continue anticoagulation with warfarin, goal INR 2-3.    Plan:  1.  At this point we will continue warfarin indefinitely given that we think her risk for thrombosis is likely high given the unprovoked nature of the thrombus.   2. Continue warfarin, goal INR 2-3  3. Continue vitamin K supplementation for INR stability  4. She will call with questions or concerns and plan to follow up in 1 year.     The patient is given our center's contact information and is instructed to call if she should have any further questions or concerns.  Otherwise, we will plan on seeing her back in 1 year.       Christopher Smith MD   of Medicine  [facility] [employer]      Total telephone time: 10 minutes  20 minutes spent on the date of the encounter doing chart review, review of test results, interpretation of tests, patient visit and documentation        --------------------------------  Forward History:  2019 right arm swelling, found to have RIJ, Subclav, Axillary and brachial vein, unprovoked, started on apixaban but was expensive and changed to warfarin  May 2019 venous ultrasound performed for thoracic outlet syndrome and  there was no obstructive physiology appreciated.    Interval History: Manasa French is a 78 year old woman with a history of right upper extremity DVT who returns for routine follow up.  She reports no bleeding issues since I saw her last year.  Tolerating anticoagulation well.  No residual or new arm swelling or pain.  She is comfortable with continuing anticoagulation.  She has no concerns today    Medications were reviewed.  Medical history is essentially unchanged.    Objective:  By phone she was in no distress, with clear and linear speech.  Affect was normal.  No shortness of breath or cough.     Labs:  Reviewed INRs from last year    Imaging:  Reviewed her May 2020 thoracic outlet syndrome study which is negative.

## 2024-01-23 ENCOUNTER — OFFICE VISIT (OUTPATIENT)
Dept: OPHTHALMOLOGY | Facility: CLINIC | Age: 79
End: 2024-01-23
Attending: OPHTHALMOLOGY
Payer: MEDICARE

## 2024-01-23 DIAGNOSIS — H40.1132 PRIMARY OPEN ANGLE GLAUCOMA OF BOTH EYES, MODERATE STAGE: Primary | ICD-10-CM

## 2024-01-23 DIAGNOSIS — H35.3132 INTERMEDIATE STAGE NONEXUDATIVE AGE-RELATED MACULAR DEGENERATION OF BOTH EYES: ICD-10-CM

## 2024-01-23 DIAGNOSIS — Z96.1 PSEUDOPHAKIA OF BOTH EYES: ICD-10-CM

## 2024-01-23 DIAGNOSIS — H50.15 ALTERNATING EXOTROPIA: ICD-10-CM

## 2024-01-23 PROCEDURE — 92133 CPTRZD OPH DX IMG PST SGM ON: CPT | Performed by: OPHTHALMOLOGY

## 2024-01-23 PROCEDURE — 92083 EXTENDED VISUAL FIELD XM: CPT | Performed by: OPHTHALMOLOGY

## 2024-01-23 PROCEDURE — 99213 OFFICE O/P EST LOW 20 MIN: CPT | Mod: 24 | Performed by: OPHTHALMOLOGY

## 2024-01-23 PROCEDURE — G0463 HOSPITAL OUTPT CLINIC VISIT: HCPCS | Performed by: OPHTHALMOLOGY

## 2024-01-23 ASSESSMENT — VISUAL ACUITY
OD_CC+: -2
METHOD: SNELLEN - LINEAR
OD_CC: 20/40
OS_CC+: -2
OS_CC: 20/50
CORRECTION_TYPE: GLASSES

## 2024-01-23 ASSESSMENT — EXTERNAL EXAM - RIGHT EYE: OD_EXAM: NORMAL

## 2024-01-23 ASSESSMENT — EXTERNAL EXAM - LEFT EYE: OS_EXAM: NORMAL

## 2024-01-23 ASSESSMENT — TONOMETRY
IOP_METHOD: APPLANATION
OD_IOP_MMHG: 17
OD_IOP_MMHG: 19
OS_IOP_MMHG: 19
OS_IOP_MMHG: 19
IOP_METHOD: TONOPEN

## 2024-01-23 ASSESSMENT — REFRACTION_WEARINGRX
OS_CYLINDER: +0.75
OD_SPHERE: -1.50
OS_AXIS: 180
OD_CYLINDER: +1.50
OD_AXIS: 180
OS_SPHERE: -2.00

## 2024-01-23 ASSESSMENT — CUP TO DISC RATIO
OS_RATIO: 0.55
OD_RATIO: 0.85

## 2024-01-23 ASSESSMENT — SLIT LAMP EXAM - LIDS
COMMENTS: NORMAL
COMMENTS: NORMAL

## 2024-01-23 NOTE — NURSING NOTE
Chief Complaints and History of Present Illnesses   Patient presents with    Follow Up     Primary open angle glaucoma of both eyes     Chief Complaint(s) and History of Present Illness(es)       Follow Up              Laterality: both eyes    Associated symptoms: double vision and dryness.  Negative for eye pain and swelling    Treatments tried: eye drops and artificial tears    Pain scale: 0/10    Comments: Primary open angle glaucoma of both eyes              Comments    Since her eye muscle surgery, her vision has been variable.  She currently is struggling with reading.  Initially when she starts reading it is okay then after 20 minutes the print looks faded and she is no longer able to read it.      She is using:  latanoprost at night in both eyes   timolol daily both eyes    Cristina Diop, COT 7:50 AM  January 23, 2024

## 2024-01-23 NOTE — PROGRESS NOTES
HPI       Follow Up    In both eyes.  Associated symptoms include double vision and dryness.  Negative for eye pain and swelling.  Treatments tried include eye drops and artificial tears.  Pain was noted as 0/10. Additional comments: Primary open angle glaucoma of both eyes             Comments    Since her eye muscle surgery, her vision has been variable.  She currently is struggling with reading.  Initially when she starts reading it is okay then after 20 minutes the print looks faded and she is no longer able to read it.      She is using:  latanoprost at night in both eyes   timolol daily both eyes    BILL Oh 7:50 AM  January 23, 2024               Last edited by Cristina Diop COT on 1/23/2024  7:52 AM.             Since last visit with Dr. Leon, has seen Dr. Dooley for workup and treatment of divergence insufficiency - underwent right lateral rectus resection 5.0 mm, left lateral rectus resection 5.0 mm, and left inferior rectus resection 2.0 mm. Patient is doing well, and is very happy with the improvements in her alignment.     For glaucoma, is using latanoprost at bedtime and timolol qAM with excellent compliance. Denies pain or changes in vision.      Review of systems for the eyes was negative other than the pertinent positives/negatives listed in the HPI.          S/P repeat selected laser trabeculoplasty (SLT) right eye 4/7/22  Previous right eye 9/16/2020 (previously done paul/ Sami 2/2018)    Assessment & Plan      Manasa French is a 77 year old female with the following diagnoses:   1. Primary open angle glaucoma of both eyes, moderate stage    2. Intermediate stage nonexudative age-related macular degeneration of both eyes    3. Pseudophakia of both eyes    4. Alternating exotropia         Here for glaucoma recheck  Doing well s/p strabismus surgery   Recommend increasing artificial tears and warm compresses   Home humidifier    Intraocular pressure remains borderline elevated in  both eyes today, goal is mid-teens.   Stable OCT Nerve fiber layer and stable visual field with LVC today in both eyes        Plan:              - Continue latanoprost qHS both eyes               - Continue timolol daily both eyes (Vyzulta not on formulary, doubt she will tolerate Rhopressa, Intolerant to cosopt and alphagan)   - Consider SLT at the next visit given borderline elevated pressures. She has had good response to SLT in the right eye in the past.    - Continue to follow with Dr Ardon regarding strabismus post operative care.        Patient disposition:   Return in about 6 months (around 7/23/2024) for VT only, LVC VF, OCT NFL.           Attending Physician Attestation:  Complete documentation of historical and exam elements from today's encounter can be found in the full encounter summary report (not reduplicated in this progress note).  I personally obtained the chief complaint(s) and history of present illness.  I confirmed and edited as necessary the review of systems, past medical/surgical history, family history, social history, and examination findings as documented by others; and I examined the patient myself.  I personally reviewed the relevant tests, images, and reports as documented above.  I formulated and edited as necessary the assessment and plan and discussed the findings and management plan with the patient and family. . - Jarred Leon MD

## 2024-01-29 ENCOUNTER — DOCUMENTATION ONLY (OUTPATIENT)
Dept: ANTICOAGULATION | Facility: CLINIC | Age: 79
End: 2024-01-29
Payer: MEDICARE

## 2024-01-29 DIAGNOSIS — I82.A11 ACUTE DEEP VEIN THROMBOSIS (DVT) OF AXILLARY VEIN OF RIGHT UPPER EXTREMITY (H): Primary | ICD-10-CM

## 2024-01-29 DIAGNOSIS — Z86.718 PERSONAL HISTORY OF DVT (DEEP VEIN THROMBOSIS): ICD-10-CM

## 2024-01-29 LAB — INR HOME MONITORING: 2.2 (ref 2–3)

## 2024-01-29 NOTE — PROGRESS NOTES
ANTICOAGULATION  MANAGEMENT-Home Monitor Managed by Exception    Manasa DIEGO Manolo 78 year old female is on warfarin with therapeutic INR result. (Goal INR 2.0-3.0)    Recent labs: (last 7 days)     01/29/24  0000   INR 2.2       Previous INR was Therapeutic  Medication, diet, health changes since last INR:chart reviewed; none identified  Contacted within the last 12 weeks by phone on 1/12/24  Last [facility] referral date: 11/26/2023      AUBREY     Maansa was NOT contacted regarding therapeutic result today per home monitoring policy manage by exception agreement.   Current warfarin dose is to be continued:     Summary  As of 1/29/2024      Full warfarin instructions:  7.5 mg every Mon, Fri; 5 mg all other days   Next INR check:  2/12/2024             ?   Ember Amezcua RN  Anticoagulation Clinic  1/29/2024    _______________________________________________________________________     Anticoagulation Episode Summary       Current INR goal:  2.0-3.0   TTR:  86.0% (1 y)   Target end date:  Indefinite   Send INR reminders to:  [facility]    Indications    Acute deep vein thrombosis (DVT) of axillary vein and internal jugular (H) (Resolved) [I82.A19]  Current use of long term anticoagulation (Resolved) [Z79.01]  Acute deep vein thrombosis (DVT) of axillary vein of right upper extremity (H) [I82.A11]  Personal history of DVT (deep vein thrombosis) [Z86.718]             Comments:  [facility] [facility] meter- Managed by exception             Anticoagulation Care Providers       Provider Role Specialty Phone number    Rema Braxton DO Referring Family Medicine [phone]    Jessica Anthony MD Referring Internal Medicine [phone]    Jana Giles MD Referring Internal Medicine [phone]    Christopher Smith MD Responsible Hematology [phone]

## 2024-01-31 ENCOUNTER — OFFICE VISIT (OUTPATIENT)
Dept: OPHTHALMOLOGY | Facility: CLINIC | Age: 79
End: 2024-01-31
Attending: OPHTHALMOLOGY
Payer: MEDICARE

## 2024-01-31 DIAGNOSIS — H50.34 INTERMITTENT EXOTROPIA, ALTERNATING: ICD-10-CM

## 2024-01-31 DIAGNOSIS — H53.10 SUBJECTIVE VISUAL DISTURBANCE: ICD-10-CM

## 2024-01-31 DIAGNOSIS — H53.2 DOUBLE VISION: Primary | ICD-10-CM

## 2024-01-31 PROCEDURE — G0463 HOSPITAL OUTPT CLINIC VISIT: HCPCS | Performed by: OPHTHALMOLOGY

## 2024-01-31 PROCEDURE — 92060 SENSORIMOTOR EXAMINATION: CPT

## 2024-01-31 PROCEDURE — 99024 POSTOP FOLLOW-UP VISIT: CPT | Mod: GC | Performed by: OPHTHALMOLOGY

## 2024-01-31 ASSESSMENT — REFRACTION_WEARINGRX
OD_AXIS: 180
OD_SPHERE: -1.50
OS_CYLINDER: +0.75
OD_CYLINDER: +1.50
OS_SPHERE: -2.00
OS_AXIS: 180

## 2024-01-31 ASSESSMENT — VISUAL ACUITY
OD_CC+: -1
METHOD: SNELLEN - LINEAR
OD_CC: 20/30
OS_CC: 20/50
OD_PH_CC+: +2
OD_PH_CC: 20/30
OS_CC+: +2

## 2024-01-31 ASSESSMENT — EXTERNAL EXAM - LEFT EYE: OS_EXAM: NORMAL

## 2024-01-31 ASSESSMENT — TONOMETRY
OD_IOP_MMHG: 18
IOP_METHOD: ICARE
OS_IOP_MMHG: 15

## 2024-01-31 ASSESSMENT — SLIT LAMP EXAM - LIDS
COMMENTS: NORMAL
COMMENTS: NORMAL

## 2024-01-31 ASSESSMENT — EXTERNAL EXAM - RIGHT EYE: OD_EXAM: NORMAL

## 2024-01-31 NOTE — PROGRESS NOTES
"   1. Post-opening pressure status post strabismus surgery for:     Longstanding progressive moderate angle concomitant pattern (with distance fixation) divergence insufficiency pattern esotropia and left hypertropia: ocularmotor dysfunction including saccadic smooth pursuit and mix of downbeat nystagmus / gaze evoked nystagmus strongly suggest this is a central esotropia related to brainstem versus cerebellar dysfunction related strabismus.      -Surgery: 12/11/23     PREOPERATIVE DIAGNOSIS (ES):   1. Alternating esotropia   2. Left hypertropia     POSTOPERATIVE DIAGNOSIS (ES):   Same     NAME OF OPERATION:   1. Right lateral rectus resection 5.0 mm   2. Left lateral rectus resection 5.0 mm   3. Left inferior rectus resection 2.0 mm      Patient had orthoptist check 1/12/24, at which time she had a decreased in deviation from exotropia of 16 pd & right hypertropia of 7 pd to exotropia of 10 pd and right hypertropia of 3 pd, so placed a updated tilted fresnel of 10 PD, base in tilted down on RIGHT lens of glasses.      Today, patient reports no pain. No diplopia after the fresnel was removed from the glasses today. Noticing a little bit of monocular \"shadowing\" of letters with left eye.     - Alignment: ortho in primary  - Diplopia: None without fresnel in primary   - Healing appropriately  - No prism needed currently. Patient was given a 15 prism diopter tilted Fresnel over right lens 1/12/24 to allow single binocular vision in primary gaze, but now has fusion without need for prism.     Follow-up in 3 months for recheck with strabismus exam.      Complete documentation of historical and exam elements from today's encounter can be found in the full encounter summary report (not reduplicated in this progress note).  I personally obtained the chief complaint(s) and history of present illness.  I confirmed and edited as necessary the review of systems, past medical/surgical history, family history, social history, and " examination findings as documented by others; and I examined the patient myself.  I personally reviewed the relevant tests, images, and reports as documented above.  I formulated and edited as necessary the assessment and plan and discussed the findings and management plan with the patient and family     MD Temo Suaoz MD  Ophthalmology, PGY-4

## 2024-01-31 NOTE — NURSING NOTE
Chief Complaint(s) and History of Present Illness(es)       Post Op (Ophthalmology) Both Eyes    In both eyes.  Associated symptoms include double vision.  Negative for eye pain.  Pain was noted as 0/10.             Comments    Manasa French is a 78 year old female here for a 1.5 month post-op follow-up following strabismus surgery for alternating ET and left hypertropia (12/11/23).  Patient was given a Fresnel prism 15pd tilted RE at her 1 week post-op visit.  She was seen in orthoptic clinic 01/12/24 and the Fresnel was decreased from 15pd to 10pd tilted in and down over her right eye.   She feels like the Fresnel prism was working well for a week and now not so much anymore.  Rhonda says she has been having difficult with reading - will usually read with an eye closed and have noticed that images will go from black and fade out after about 10 minutes.    ROSETTE Tesfaye 1/31/2024 12:47 PM

## 2024-02-03 DIAGNOSIS — E03.9 ACQUIRED HYPOTHYROIDISM: ICD-10-CM

## 2024-02-05 DIAGNOSIS — I82.A11 ACUTE DEEP VEIN THROMBOSIS (DVT) OF AXILLARY VEIN OF RIGHT UPPER EXTREMITY (H): ICD-10-CM

## 2024-02-05 RX ORDER — WARFARIN SODIUM 5 MG/1
TABLET ORAL
Qty: 110 TABLET | Refills: 0 | Status: SHIPPED | OUTPATIENT
Start: 2024-02-05 | End: 2024-05-08

## 2024-02-05 RX ORDER — LEVOTHYROXINE SODIUM 112 UG/1
112 TABLET ORAL DAILY
Qty: 90 TABLET | Refills: 3 | Status: SHIPPED | OUTPATIENT
Start: 2024-02-05

## 2024-02-05 NOTE — PROGRESS NOTES
Faxed refill request received from [facility]. Patient last seen 1/2024 with instruction to remain on warfarin and return in 1 year.    Refill granted.    Nevaeh ALEXANDERN, RN, PHN   [facility]   Office: [phone]  Fax: [phone]

## 2024-02-09 ENCOUNTER — ANTICOAGULATION THERAPY VISIT (OUTPATIENT)
Dept: ANTICOAGULATION | Facility: CLINIC | Age: 79
End: 2024-02-09
Payer: MEDICARE

## 2024-02-09 DIAGNOSIS — I82.A11 ACUTE DEEP VEIN THROMBOSIS (DVT) OF AXILLARY VEIN OF RIGHT UPPER EXTREMITY (H): Primary | ICD-10-CM

## 2024-02-09 DIAGNOSIS — Z86.718 PERSONAL HISTORY OF DVT (DEEP VEIN THROMBOSIS): ICD-10-CM

## 2024-02-09 LAB — INR HOME MONITORING: 1.9 (ref 2–3)

## 2024-02-09 NOTE — PROGRESS NOTES
ANTICOAGULATION MANAGEMENT     Manasa French 78 year old female is on warfarin with subtherapeutic INR result. (Goal INR 2.0-3.0)    Recent labs: (last 7 days)     02/09/24  0000   INR 1.9*       ASSESSMENT     Source(s): Chart Review and Patient/Caregiver Call     Warfarin doses taken: Warfarin taken as instructed  Diet: No new diet changes identified  Medication/supplement changes: None noted  New illness, injury, or hospitalization: Yes: fell on 2/7 during bathroom in the night, fell on butt denies head trauma, noted has new hip, no bruises, possible pulled muscles   Signs or symptoms of bleeding or clotting: No  Previous result: Therapeutic last 2(+) visits  Additional findings:  continues to exercise like normal with hip and feels great        PLAN     Recommended plan for no diet, medication or health factor changes affecting INR     Dosing Instructions: Increase your warfarin dose (6.2% change) with next INR in 2 weeks       Summary  As of 2/9/2024      Full warfarin instructions:  7.5 mg every Mon, Wed, Fri; 5 mg all other days   Next INR check:  2/23/2024               Telephone call with Rhonda who verbalizes understanding and agrees to plan and who agrees to plan and repeated back plan correctly    Patient to recheck with home meter    Education provided:   Contact [phone]  with any changes, questions or concerns.   Watch for bruising, and when to seek ER    Plan made per [facility] anticoagulation protocol    Aixa Steven, RN  Anticoagulation Clinic  2/9/2024    _______________________________________________________________________     Anticoagulation Episode Summary       Current INR goal:  2.0-3.0   TTR:  85.0% (1 y)   Target end date:  Indefinite   Send INR reminders to:  [facility]    Indications    Acute deep vein thrombosis (DVT) of axillary vein and internal jugular (H) (Resolved) [I82.A19]  Current use of long term anticoagulation (Resolved) [Z79.01]  Acute deep vein thrombosis  (DVT) of axillary vein of right upper extremity (H) [I82.A11]  Personal history of DVT (deep vein thrombosis) [Z86.718]             Comments:  [employer] [facility] meter- Managed by exception             Anticoagulation Care Providers       Provider Role Specialty Phone number    Rema Braxton DO Referring Family Medicine [phone]    Jessica Anthony MD Referring Internal Medicine [phone]    Jana Giles MD Referring Internal Medicine [phone]    Christopher Smith MD Responsible Hematology [phone]

## 2024-02-21 ENCOUNTER — OFFICE VISIT (OUTPATIENT)
Dept: INTERNAL MEDICINE | Facility: CLINIC | Age: 79
End: 2024-02-21
Payer: MEDICARE

## 2024-02-21 ENCOUNTER — NURSE TRIAGE (OUTPATIENT)
Dept: NURSING | Facility: CLINIC | Age: 79
End: 2024-02-21
Payer: MEDICARE

## 2024-02-21 VITALS
SYSTOLIC BLOOD PRESSURE: 132 MMHG | HEART RATE: 88 BPM | TEMPERATURE: 98 F | DIASTOLIC BLOOD PRESSURE: 72 MMHG | BODY MASS INDEX: 25.55 KG/M2 | OXYGEN SATURATION: 98 % | WEIGHT: 151.2 LBS

## 2024-02-21 DIAGNOSIS — E11.9 TYPE 2 DIABETES MELLITUS WITHOUT COMPLICATION, WITHOUT LONG-TERM CURRENT USE OF INSULIN (H): ICD-10-CM

## 2024-02-21 DIAGNOSIS — R09.82 POSTNASAL DRIP: Primary | ICD-10-CM

## 2024-02-21 PROCEDURE — 99213 OFFICE O/P EST LOW 20 MIN: CPT | Mod: 24 | Performed by: INTERNAL MEDICINE

## 2024-02-21 RX ORDER — FLUTICASONE PROPIONATE 50 MCG
1 SPRAY, SUSPENSION (ML) NASAL 2 TIMES DAILY
Qty: 16 G | Refills: 1 | Status: SHIPPED | OUTPATIENT
Start: 2024-02-21 | End: 2024-03-19

## 2024-02-21 RX ORDER — LORATADINE 10 MG/1
10 TABLET ORAL AT BEDTIME
Qty: 30 TABLET | Refills: 0 | Status: SHIPPED | OUTPATIENT
Start: 2024-02-21 | End: 2024-03-19

## 2024-02-21 NOTE — PROGRESS NOTES
"  Assessment & Plan     Postnasal drip  Trial of Flonase and Claritin  Continue [place] pot  Return if worse    Diabetes mellitus, type 2 (H)  Continue metformin              BMI  Estimated body mass index is 25.55 kg/m  as calculated from the following:    Height as of 12/11/23: 1.638 m (5' 4.5\").    Weight as of this encounter: 68.6 kg (151 lb 3.2 oz).             Genaro Ellis is a 78 year old, presenting for the following health issues:  Sinus Problem  Has a history of getting frequent sinus infections   Mucus is a grey green- every once in a while  Headaches   Raspy throat   Pain to swallow    INR on Friday- will it mess up   HPI   Note from Nurse Triage today   Patient calling with concerns of a sinus infection and is asking for a Z-pack. Symptoms started on Sunday or Monday. Sinuses are draining and patient will at times will cough due to post nasal drainage that will produce the same   Patient has a headache and sinus pain in her forehead and down to her nose. Patient cough is not constant or frequent.   Denies fever. Pain rating 5-6/10. Patient has been treating with a hot pack on her forehead, fluids, and humidifier.    Voice change started today.    Symptoms of sinus pressure have been going on for just 3 days, has occasional gray-green discharge.        Review of Systems  Constitutional, neuro, ENT, endocrine, pulmonary, cardiac, gastrointestinal, genitourinary, musculoskeletal, integument and psychiatric systems are negative, except as otherwise noted.      Objective    /72   Pulse 88   Temp 98  F (36.7  C) (Temporal)   Wt 68.6 kg (151 lb 3.2 oz)   LMP  (LMP Unknown)   SpO2 98%   BMI 25.55 kg/m    Body mass index is 25.55 kg/m .  Physical Exam   GENERAL: alert and no distress  NECK: no adenopathy, no asymmetry, masses, or scars  RESP: lungs clear to auscultation - no rales, rhonchi or wheezes  CV: regular rate and rhythm, normal S1 S2, no S3 or S4, no murmur, click or rub, no peripheral " edema  ABDOMEN: soft, nontender, no hepatosplenomegaly, no masses and bowel sounds normal  MS: no gross musculoskeletal defects noted, no edema            Signed Electronically by: Katerin Vo MD

## 2024-02-21 NOTE — TELEPHONE ENCOUNTER
Patient calling with concerns of a sinus infection and is asking for a Z-pack. Symptoms started on Sunday or Monday. Sinuses are draining and patient will at times will cough due to post nasal drainage that will produce the same   Patient has a headache and sinus pain in her forehead and down to her nose. Patient cough is not constant or frequent.   Denies fever. Pain rating 5-6/10. Patient has been treating with a hot pack on her forehead, fluids, and humidifier.  Protocol recommends see today or tomorrow in office  Care advice given  Transferred to scheduling.   Lucy Cuadra RN   02/21/24 7:54 AM  [facility] Nurse Advisor      Reason for Disposition   Patient wants to be seen    Additional Information   Negative: Sounds like a life-threatening emergency to the triager   Negative: Difficulty breathing, and not from stuffy nose (e.g., not relieved by cleaning out the nose)   Negative: SEVERE headache and has fever   Negative: Patient sounds very sick or weak to the triager   Negative: SEVERE sinus pain   Negative: SEVERE headache   Negative: Redness or swelling on the cheek, forehead, or around the eye   Negative: Fever > 103 F (39.4 C)   Negative: Fever > 101 F (38.3 C) and over 60 years of age   Negative: Fever > 100.0 F (37.8 C) and has diabetes mellitus or a weak immune system (e.g., HIV positive, cancer chemotherapy, organ transplant, splenectomy, chronic steroids)   Negative: Fever > 100.0 F (37.8 C) and bedridden (e.g., CVA, chronic illness, recovering from surgery)   Negative: Fever present > 3 days (72 hours)   Negative: Fever returns after gone for over 24 hours and symptoms worse or not improved   Negative: Sinus pain (not just congestion) and fever   Negative: Earache   Negative: Sinus congestion (pressure, fullness) present > 10 days   Negative: Nasal discharge present > 10 days   Negative: Using nasal washes and pain medicine > 24 hours and sinus pain (lower forehead, cheekbone, or eye)  persists   Negative: Lots of coughing    Protocols used: Sinus Pain or Congestion-A-[place]

## 2024-02-23 ENCOUNTER — TELEPHONE (OUTPATIENT)
Dept: FAMILY MEDICINE | Facility: CLINIC | Age: 79
End: 2024-02-23
Payer: MEDICARE

## 2024-02-23 ENCOUNTER — ANTICOAGULATION THERAPY VISIT (OUTPATIENT)
Dept: ANTICOAGULATION | Facility: CLINIC | Age: 79
End: 2024-02-23
Payer: MEDICARE

## 2024-02-23 DIAGNOSIS — J20.9 ACUTE BRONCHITIS, UNSPECIFIED ORGANISM: Primary | ICD-10-CM

## 2024-02-23 DIAGNOSIS — Z86.718 PERSONAL HISTORY OF DVT (DEEP VEIN THROMBOSIS): ICD-10-CM

## 2024-02-23 DIAGNOSIS — I82.A11 ACUTE DEEP VEIN THROMBOSIS (DVT) OF AXILLARY VEIN OF RIGHT UPPER EXTREMITY (H): Primary | ICD-10-CM

## 2024-02-23 LAB — INR HOME MONITORING: 2.2 (ref 2–3)

## 2024-02-23 RX ORDER — AZITHROMYCIN 250 MG/1
TABLET, FILM COATED ORAL
Qty: 6 TABLET | Refills: 0 | Status: SHIPPED | OUTPATIENT
Start: 2024-02-23 | End: 2024-02-28

## 2024-02-23 NOTE — TELEPHONE ENCOUNTER
Zithromax prescription approved.  
"Received a call from the patient stating she saw Dr. Vo on 2/21/24. Patient was advised to call the clinic back at the end of the week if she was not improving. Patient states her symptoms have not improved, in fact, the coughing has gotten worse. Patient states she \"feels crappy\" and she feels like she has a sinus infection and needs an antibiotic. Patient states she has taken a Z-pack multiple times in the past and it has helped to relieve her symptoms.     Medication pended (unsure about dosing) and will route to Dr. Vo for review.     Can we leave a detailed message on this number? YES  Phone number patient can be reached at: Home number on file [phone] (home)    Natalia Medrano RN  [facility] Triage      "
Called [facility] pharmacy. Verified azithromycin rx on file. Will fill asap.     [place] [place], affecting multiple pharmacies and insurance companies. Taking longer than expected to complete refills.     Called and updated pt. Pt verbalizes understanding and agrees to plan of care. Pt will follow up with pharmacy as needed.    Home care reinforced. Pt to contact clinic for new or worsening symptoms.   
Pt returned call to clinic. Not able to  rx at [facility] pharm - [facility] pharmacy computers have been down since Wednesday.     Pt requesting clinic to call [facility] and give verbal order or send antibiotic rx [facility]     Routing to [facility].     Pt requesting callback.   
No

## 2024-02-23 NOTE — PROGRESS NOTES
ANTICOAGULATION MANAGEMENT     Manasa French 78 year old female is on warfarin with therapeutic INR result. (Goal INR 2.0-3.0)    Recent labs: (last 7 days)     02/23/24  0000   INR 2.2       ASSESSMENT     Source(s): Chart Review and Patient/Caregiver Call     Warfarin doses taken: Warfarin taken as instructed  Diet: No new diet changes identified  Medication/supplement changes:  Azithromycin 5 day course (dates: 2/23-2/28) subsequent INRs may be increased. Closer INR monitoring recommended.  New illness, injury, or hospitalization: Yes: Acute bronchitis  Signs or symptoms of bleeding or clotting: No  Previous result: Subtherapeutic  Additional findings:  Made aware that she will get a call from [facility] with her next INR result.       PLAN     Recommended plan for temporary change(s) affecting INR     Dosing Instructions: Continue your current warfarin dose with next INR in 4 days       Summary  As of 2/23/2024      Full warfarin instructions:  7.5 mg every Mon, Wed, Fri; 5 mg all other days   Next INR check:  2/27/2024               Telephone call with Rhonda who verbalizes understanding and agrees to plan and who agrees to plan and repeated back plan correctly    Patient to recheck with home meter    Education provided:   Interaction IS anticipated between warfarin and Azithromycin  Contact [phone]  with any changes, questions or concerns.     Plan made per [facility] anticoagulation protocol    Kyleigh Molina RN  Anticoagulation Clinic  2/23/2024    _______________________________________________________________________     Anticoagulation Episode Summary       Current INR goal:  2.0-3.0   TTR:  83.7% (1 y)   Target end date:  Indefinite   Send INR reminders to:  [facility]IT[facility]    Indications    Acute deep vein thrombosis (DVT) of axillary vein and internal jugular (H) (Resolved) [I82.A19]  Current use of long term anticoagulation (Resolved) [Z79.01]  Acute deep vein thrombosis (DVT) of axillary vein of  right upper extremity (H) [I82.A11]  Personal history of DVT (deep vein thrombosis) [Z86.718]             Comments:  [employer] [facility] meter- Managed by exception             Anticoagulation Care Providers       Provider Role Specialty Phone number    Rema Braxton DO Referring Family Medicine [phone]    Jessica Anthony MD Referring Internal Medicine [phone]    Jana Giles MD Referring Internal Medicine [phone]    Christopher Smith MD Responsible Hematology [phone]

## 2024-02-27 ENCOUNTER — ANTICOAGULATION THERAPY VISIT (OUTPATIENT)
Dept: ANTICOAGULATION | Facility: CLINIC | Age: 79
End: 2024-02-27
Payer: MEDICARE

## 2024-02-27 DIAGNOSIS — I82.A11 ACUTE DEEP VEIN THROMBOSIS (DVT) OF AXILLARY VEIN OF RIGHT UPPER EXTREMITY (H): Primary | ICD-10-CM

## 2024-02-27 DIAGNOSIS — Z86.718 PERSONAL HISTORY OF DVT (DEEP VEIN THROMBOSIS): ICD-10-CM

## 2024-02-27 LAB — INR HOME MONITORING: 1.9 (ref 2–3)

## 2024-02-27 NOTE — PROGRESS NOTES
ANTICOAGULATION MANAGEMENT     Manasa French 78 year old female is on warfarin with subtherapeutic INR result. (Goal INR 2.0-3.0)    Recent labs: (last 7 days)     02/27/24  0000   INR 1.9*       ASSESSMENT     Source(s): Chart Review and Patient/Caregiver Call     Warfarin doses taken: Warfarin taken as instructed  Diet: No new diet changes identified. Patient feels that she got her normal amount of greens despite feeling ill.  Medication/supplement changes: Z-pack through tomorrow  New illness, injury, or hospitalization: Yes: currently being treated for a sinus infection. Patient reports symptoms have improved.  Signs or symptoms of bleeding or clotting: No  Previous result: Therapeutic last visit; previously outside of goal range  Additional findings: If INR remains low next week, will need to increase maintenance dose       PLAN     Recommended plan for temporary change(s) affecting INR     Dosing Instructions: Continue your current warfarin dose with next INR in 1 week       Summary  As of 2/27/2024      Full warfarin instructions:  7.5 mg every Mon, Wed, Fri; 5 mg all other days   Next INR check:                 Telephone call with Rhonda who agrees to plan and repeated back plan correctly    Patient to recheck with home meter    Education provided:   Please call back if any changes to your diet, medications or how you've been taking warfarin  Symptom monitoring: monitoring for bleeding signs and symptoms, monitoring for clotting signs and symptoms, and monitoring for stroke signs and symptoms  Contact [phone]  with any changes, questions or concerns.     Plan made with [facility] Pharmacist Jesi Valdez RN  Anticoagulation Clinic  2/27/2024    _______________________________________________________________________     Anticoagulation Episode Summary       Current INR goal:  2.0-3.0   TTR:  83.3% (1 y)   Target end date:  Indefinite   Send INR reminders to:  [facility]     Indications    Acute deep vein thrombosis (DVT) of axillary vein and internal jugular (H) (Resolved) [I82.A19]  Current use of long term anticoagulation (Resolved) [Z79.01]  Acute deep vein thrombosis (DVT) of axillary vein of right upper extremity (H) [I82.A11]  Personal history of DVT (deep vein thrombosis) [Z86.718]             Comments:  [employer] [facility] meter- Managed by exception             Anticoagulation Care Providers       Provider Role Specialty Phone number    Rema Braxton DO Referring Family Medicine [phone]    Jessica Anthony MD Referring Internal Medicine [phone]    Jana Giles MD Referring Internal Medicine [phone]    Christopher Smith MD Responsible Hematology [phone]

## 2024-03-05 ENCOUNTER — ANTICOAGULATION THERAPY VISIT (OUTPATIENT)
Dept: ANTICOAGULATION | Facility: CLINIC | Age: 79
End: 2024-03-05
Payer: MEDICARE

## 2024-03-05 DIAGNOSIS — Z86.718 PERSONAL HISTORY OF DVT (DEEP VEIN THROMBOSIS): ICD-10-CM

## 2024-03-05 DIAGNOSIS — I82.A11 ACUTE DEEP VEIN THROMBOSIS (DVT) OF AXILLARY VEIN OF RIGHT UPPER EXTREMITY (H): Primary | ICD-10-CM

## 2024-03-05 LAB — INR HOME MONITORING: 2.4 (ref 2–3)

## 2024-03-05 NOTE — PROGRESS NOTES
ANTICOAGULATION MANAGEMENT     Manasa French 78 year old female is on warfarin with therapeutic INR result. (Goal INR 2.0-3.0)    Recent labs: (last 7 days)     03/05/24  0000   INR 2.4       ASSESSMENT     Source(s): Chart Review and Patient/Caregiver Call     Warfarin doses taken: Warfarin taken as instructed  Diet: No new diet changes identified  Medication/supplement changes: Completed z-pack on 2/27/24  New illness, injury, or hospitalization: No  Signs or symptoms of bleeding or clotting: No  Previous result: Subtherapeutic  Additional findings: patient would like a call on 3/22/24       PLAN     Recommended plan for no diet, medication or health factor changes affecting INR     Dosing Instructions: Continue your current warfarin dose with next INR in 2 weeks- patient reports she will recheck INR on 3/22/24       Summary  As of 3/5/2024      Full warfarin instructions:  7.5 mg every Mon, Wed, Fri; 5 mg all other days   Next INR check:  3/22/2024               Telephone call with Rhonda who agrees to plan and repeated back plan correctly    Patient to recheck with home meter    Education provided:   Please call back if any changes to your diet, medications or how you've been taking warfarin  Symptom monitoring: monitoring for bleeding signs and symptoms, monitoring for clotting signs and symptoms, and monitoring for stroke signs and symptoms  Contact [phone]  with any changes, questions or concerns.     Plan made per [facility] anticoagulation protocol    Kyleigh Valdez, RN  Anticoagulation Clinic  3/5/2024    _______________________________________________________________________     Anticoagulation Episode Summary       Current INR goal:  2.0-3.0   TTR:  82.9% (1 y)   Target end date:  Indefinite   Send INR reminders to:  [facility] HOME MONITORING    Indications    Acute deep vein thrombosis (DVT) of axillary vein and internal jugular (H) (Resolved) [I82.A19]  Current use of long term anticoagulation (Resolved)  [Z79.01]  Acute deep vein thrombosis (DVT) of axillary vein of right upper extremity (H) [I82.A11]  Personal history of DVT (deep vein thrombosis) [Z86.718]             Comments:  [employer] [facility] meter- Managed by exception             Anticoagulation Care Providers       Provider Role Specialty Phone number    Rema Braxton DO Referring Family Medicine [phone]    Jessica Anthony MD Referring Internal Medicine [phone]    Jana Giles MD Referring Internal Medicine [phone]    Christopher Smith MD Responsible Hematology [phone]

## 2024-03-19 ENCOUNTER — OFFICE VISIT (OUTPATIENT)
Dept: FAMILY MEDICINE | Facility: CLINIC | Age: 79
End: 2024-03-19
Payer: MEDICARE

## 2024-03-19 ENCOUNTER — NURSE TRIAGE (OUTPATIENT)
Dept: FAMILY MEDICINE | Facility: CLINIC | Age: 79
End: 2024-03-19

## 2024-03-19 VITALS
OXYGEN SATURATION: 97 % | DIASTOLIC BLOOD PRESSURE: 73 MMHG | HEIGHT: 65 IN | TEMPERATURE: 98.9 F | HEART RATE: 99 BPM | BODY MASS INDEX: 24.66 KG/M2 | RESPIRATION RATE: 16 BRPM | SYSTOLIC BLOOD PRESSURE: 133 MMHG | WEIGHT: 148 LBS

## 2024-03-19 DIAGNOSIS — Z86.69 HISTORY OF HYDROCEPHALUS: ICD-10-CM

## 2024-03-19 DIAGNOSIS — R42 DIZZINESS: Primary | ICD-10-CM

## 2024-03-19 DIAGNOSIS — Z86.39 HISTORY OF GRAVES' DISEASE: ICD-10-CM

## 2024-03-19 DIAGNOSIS — H43.393 VITREOUS SYNERESIS OF BOTH EYES: Primary | ICD-10-CM

## 2024-03-19 LAB
ERYTHROCYTE [DISTWIDTH] IN BLOOD BY AUTOMATED COUNT: 14.3 % (ref 10–15)
HCT VFR BLD AUTO: 36.6 % (ref 35–47)
HGB BLD-MCNC: 11.2 G/DL (ref 11.7–15.7)
MCH RBC QN AUTO: 30.5 PG (ref 26.5–33)
MCHC RBC AUTO-ENTMCNC: 30.6 G/DL (ref 31.5–36.5)
MCV RBC AUTO: 100 FL (ref 78–100)
PLATELET # BLD AUTO: 242 10E3/UL (ref 150–450)
RBC # BLD AUTO: 3.67 10E6/UL (ref 3.8–5.2)
WBC # BLD AUTO: 7.2 10E3/UL (ref 4–11)

## 2024-03-19 PROCEDURE — 99215 OFFICE O/P EST HI 40 MIN: CPT | Performed by: PHYSICIAN ASSISTANT

## 2024-03-19 PROCEDURE — 85027 COMPLETE CBC AUTOMATED: CPT | Performed by: PHYSICIAN ASSISTANT

## 2024-03-19 PROCEDURE — 80048 BASIC METABOLIC PNL TOTAL CA: CPT | Performed by: PHYSICIAN ASSISTANT

## 2024-03-19 PROCEDURE — 87635 SARS-COV-2 COVID-19 AMP PRB: CPT | Performed by: PHYSICIAN ASSISTANT

## 2024-03-19 PROCEDURE — 36415 COLL VENOUS BLD VENIPUNCTURE: CPT | Performed by: PHYSICIAN ASSISTANT

## 2024-03-19 PROCEDURE — 84443 ASSAY THYROID STIM HORMONE: CPT | Performed by: PHYSICIAN ASSISTANT

## 2024-03-19 RX ORDER — RESPIRATORY SYNCYTIAL VIRUS VACCINE 120MCG/0.5
0.5 KIT INTRAMUSCULAR ONCE
Qty: 1 EACH | Refills: 0 | Status: CANCELLED | OUTPATIENT
Start: 2024-03-19 | End: 2024-03-19

## 2024-03-19 RX ORDER — AMOXICILLIN 500 MG/1
TABLET, FILM COATED ORAL
COMMUNITY
Start: 2023-08-14

## 2024-03-19 ASSESSMENT — PAIN SCALES - GENERAL: PAINLEVEL: MODERATE PAIN (4)

## 2024-03-19 NOTE — PROGRESS NOTES
"Assessment and Plan:     (R42) Dizziness  (primary encounter diagnosis)  Comment: has been having episodic spells, onset a few days, adamantly denies vertigo, symptoms are often brought on by position change, denies presyncope or lightheadedness and has difficulty describing sensation, feels headache, nausea and \"brain fog\", episodic and usually last about 30 seconds, feels like a previous concussion but no recent head trauma, neuro exam is reassuring  Plan: MR Brain w/o & w Contrast, CBC with platelets,         Basic metabolic panel  (Ca, Cl, CO2, Creat,         Gluc, K, Na, BUN), Symptomatic COVID-19 Virus         (Coronavirus) by PCR Nose        Discussed reasons to be seen promptly    (Z86.69) History of hydrocephalus  Comment: hasn't seen neurology for years, last brain imaging was 2020  Plan: MR Brain w/o & w Contrast    (Z86.39) History of Graves' disease  Comment: due for TSH  Plan: TSH with free T4 reflex            [facility] Same Day Provider   45 minutes on the day of the encounter doing chart review, history and exam, documentation and further activities as noted above.        Subjective   Rhonda is a 78 year old, presenting for the following health issues:  Nausea (After eating), Brain Fog, Dizziness, and Headache    HPI     Rhonda is here for symptoms that started two days ago  She was watching TV with her  when she could not get out of her chair because of lack of balance and fogginess  She also noticed when she was lying down to go to bed that night she had an episode of headache, brain fog and nausea which lasted about 30 seconds then resolved  Since then she has noticed dizzy symptoms with position change, symptoms last seconds then resolve  The symptoms occasionally accompanied by nausea and headache  She had a concussion in 2020 and symptoms felt similar  She has history of vertigo and denies that this is vertigo  She had a sinus congestion about 3 weeks ago and was " "treated with z pack    She has history of hydrocephalus and has a shunt  She hasn't seen a neurologist for years  She denies recent change in gait but has been using her walker more at home now due to balance issues   She is here with spouse and he hasn't noticed any confusion or garbled speech    She denies tinnitus, hearing loss, fever/chills, vomiting, stiff neck, chest pain, sob, palpitations, abdominal pain or urinary complaints           Objective    /73 (BP Location: Right arm, Patient Position: Sitting, Cuff Size: Adult Regular)   Pulse 99   Temp 98.9  F (37.2  C) (Oral)   Resp 16   Ht 1.638 m (5' 4.5\")   Wt 67.1 kg (148 lb)   LMP  (LMP Unknown)   SpO2 97%   Breastfeeding No   BMI 25.01 kg/m    Body mass index is 25.01 kg/m .    Physical Exam     EXAM:  GENERAL APPEARANCE: healthy, alert and no distress  EYES: Eyes grossly normal to inspection, PERRL, conjunctivae and sclerae normal  HENT: ear canals and TM's normal and nose and mouth without ulcers or lesions, oropharynx is clear without exudate or erythema, no tonsillar swelling  NECK: no adenopathy, no asymmetry, masses, no bruits  RESP: lungs clear to auscultation - no rales, rhonchi or wheezes  CV: regular rates and rhythm, normal S1 S2, no S3 or S4 and no murmur, click or rub  ABD: soft, NT  EXT: no edema bilat lower ext  NEUROLOGICAL EXAM:  Normal speech, tongue is midline, gag reflex intact, AZIZA, EOMI, left beating nystagmus   strength equal/intact  Elbow flexion and extension is equal/intact  Negative rapid alternating movements  Negative heel/shin test  Gait is steady, no ataxia            Signed Electronically by: Clara Hemphill PA-C    "

## 2024-03-19 NOTE — TELEPHONE ENCOUNTER
"Nurse Triage SBAR    Is this a 2nd Level Triage? YES, LICENSED PRACTITIONER REVIEW IS REQUIRED    Situation: Patient was transferred due to feeling \"wonky, off balance and brain fog.\"  She states this worse when she changes position and moves her head too fast.  Started on Sunday.      Background: Patient has a history of hydrocephalus and a shunt from 1988.  This current symptoms feels like when she had a concussion a couple of years ago.  Is on Warfarin.          Assessment: Patient notes she gets an upset stomach with eating, she just doesn't feel good.  She states she doesn't feel like she is spinning or the room is spinning.  She states she has had issues in the past with blood pressure issues but states that this doesn't feel like that now.  Denies any vision, numbness, tingling, weakness now or at the time of the symptom onset.  No issues with speech either.  Heart rate was 104 bpm when taken on the phone with writer and she stated the rate was regular.  Denies any head injury.  Walking with walker since Sunday, normally walks without.        Protocol Recommended Disposition:   Discuss With PCP And Callback By Nurse Today    Recommendation: Please assess if the patient needs to be seen today or if okay to wait.      ADS potential referral as well.         Routed to provider and Clinic's Nursing pool    Does the patient meet one of the following criteria for ADS visit consideration? 16+ years old, with an [facility] PCP     TIP  Providers, please consider if this condition is appropriate for management at one of our Acute and Diagnostic Services sites.     If patient is a good candidate, please use dotphrase <dot>triageresponse and select Refer to ADS to document.      Reason for Disposition   Taking a medicine that could cause dizziness (e.g., blood pressure medications, diuretics)    Additional Information   Negative: Fainted, and still feels dizzy afterwards   Negative: Difficult to awaken or acting confused " (e.g., disoriented, slurred speech)   Negative: SEVERE difficulty breathing (e.g., struggling for each breath, speaks in single words)   Negative: Shock suspected (e.g., cold/pale/clammy skin, too weak to stand, low BP, rapid pulse)   Negative: Overdose (accidental or intentional) of medications   Negative: Heart beating < 50 beats per minute OR > 140 beats per minute   Negative: New neurologic deficit that is present now: * Weakness of the face, arm, or leg on one side of the body * Numbness of the face, arm, or leg on one side of the body * Loss of speech or garbled speech   Negative: Sounds like a life-threatening emergency to the triager   Negative: Chest pain   Negative: Rectal bleeding, bloody stool, or tarry-black stool   Negative: Vomiting is main symptom   Negative: Diarrhea is main symptom   Negative: Headache is main symptom   Negative: Heat exhaustion suspected (i.e., dehydration from heat exposure)   Negative: Patient states that they are having an anxiety or panic attack   Negative: Dizziness from low blood sugar (i.e., < 60 mg/dl or 3.5 mmol/l)   Negative: SEVERE dizziness (e.g., unable to stand, requires support to walk, feels like passing out now)   Negative: SEVERE headache or neck pain   Negative: Spinning or tilting sensation (vertigo) present now and one or more stroke risk factors (i.e., hypertension, diabetes mellitus, prior stroke/TIA, heart attack, age over 60) (Exception: Prior physician evaluation for this AND no different/worse than usual.)   Negative: Neurologic deficit that was brief (now gone), ANY of the following:* Weakness of the face, arm, or leg on one side of the body* Numbness of the face, arm, or leg on one side of the body* Loss of speech or garbled speech   Negative: Loss of vision or double vision  (Exception: Similar to previous migraines.)   Negative: Extra heartbeats, irregular heart beating, or heart is beating very fast (i.e., 'palpitations')   Negative: Difficulty  "breathing   Negative: Drinking very little and dehydration suspected (e.g., no urine > 12 hours, very dry mouth, very lightheaded)   Negative: Follows bleeding (e.g., stomach, rectum, vagina)  (Exception: Became dizzy from sight of small amount blood.)   Negative: Patient sounds very sick or weak to the triager   Negative: Lightheadedness (dizziness) present now, after 2 hours of rest and fluids   Negative: Spinning or tilting sensation (vertigo) present now   Negative: Fever > 103 F (39.4 C)   Negative: Fever > 100.0 F (37.8 C) and has diabetes mellitus or a weak immune system (e.g., HIV positive, cancer chemotherapy, organ transplant, splenectomy, chronic steroids)   Negative: MODERATE dizziness (e.g., interferes with normal activities)  (Exception: Dizziness caused by heat exposure, sudden standing, or poor fluid intake.)   Negative: Vomiting occurs with dizziness    Answer Assessment - Initial Assessment Questions  1. DESCRIPTION: \"Describe your dizziness.\"      When getting up, feels off balance, Quick movements causes more dizziness  2. LIGHTHEADED: \"Do you feel lightheaded?\" (e.g., somewhat faint, woozy, weak upon standing)      Feel lightheaded, weak on Sunday-couldn't stand   3. VERTIGO: \"Do you feel like either you or the room is spinning or tilting?\" (i.e. vertigo)      No spinning  4. SEVERITY: \"How bad is it?\"  \"Do you feel like you are going to faint?\" \"Can you stand and walk?\"    - MILD: Feels slightly dizzy, but walking normally.    - MODERATE: Feels unsteady when walking, but not falling; interferes with normal activities (e.g., school, work).    - SEVERE: Unable to walk without falling, or requires assistance to walk without falling; feels like passing out now.       Mild with walker   5. ONSET:  \"When did the dizziness begin?\"      Sunday night  6. AGGRAVATING FACTORS: \"Does anything make it worse?\" (e.g., standing, change in head position)      Quick movements of the head, changing positions  7. " "HEART RATE: \"Can you tell me your heart rate?\" \"How many beats in 15 seconds?\"  (Note: not all patients can do this)        104 bpm, regular  8. CAUSE: \"What do you think is causing the dizziness?\"      Not sure: Hearing aids, Taking Tylenol, shunt in brain/hydrocephalus  9. RECURRENT SYMPTOM: \"Have you had dizziness before?\" If Yes, ask: \"When was the last time?\" \"What happened that time?\"      Concussion symptoms in the past, Fell and hit head at that time.  Denies hitting of head  10. OTHER SYMPTOMS: \"Do you have any other symptoms?\" (e.g., fever, chest pain, vomiting, diarrhea, bleeding)        No  11. PREGNANCY: \"Is there any chance you are pregnant?\" \"When was your last menstrual period?\"        NA    Protocols used: Dizziness-A-OH    "

## 2024-03-19 NOTE — RESULT ENCOUNTER NOTE
Glen Ellis,     Here are my comments about the recent results: blood counts show stable anemia.     Please let us know if you have any questions or concerns.    Regards,  Clara Hemphill PA-C

## 2024-03-20 DIAGNOSIS — N17.9 AKI (ACUTE KIDNEY INJURY) (H): Primary | ICD-10-CM

## 2024-03-20 LAB
ANION GAP SERPL CALCULATED.3IONS-SCNC: 11 MMOL/L (ref 7–15)
BUN SERPL-MCNC: 29.9 MG/DL (ref 8–23)
CALCIUM SERPL-MCNC: 10.2 MG/DL (ref 8.8–10.2)
CHLORIDE SERPL-SCNC: 104 MMOL/L (ref 98–107)
CREAT SERPL-MCNC: 1.22 MG/DL (ref 0.51–0.95)
DEPRECATED HCO3 PLAS-SCNC: 29 MMOL/L (ref 22–29)
EGFRCR SERPLBLD CKD-EPI 2021: 45 ML/MIN/1.73M2
GLUCOSE SERPL-MCNC: 115 MG/DL (ref 70–99)
POTASSIUM SERPL-SCNC: 4.4 MMOL/L (ref 3.4–5.3)
SARS-COV-2 RNA RESP QL NAA+PROBE: NEGATIVE
SODIUM SERPL-SCNC: 144 MMOL/L (ref 135–145)
TSH SERPL DL<=0.005 MIU/L-ACNC: 2.54 UIU/ML (ref 0.3–4.2)

## 2024-03-20 NOTE — RESULT ENCOUNTER NOTE
Just an FYI, is on low dose metformin     Hi Rhonda,     Here are your basic metabolic panel results which show a slight dip in your kidney function.  Please be sure to stay hydrated by drinking 6-8 glasses of water per day.    We should recheck this value in about 6 weeks.  I've placed an order in your chart, you'll just need to schedule a lab visit.       Please let us know if you have any questions or concerns.    Regards,  Clara Hemphill PA-C

## 2024-03-20 NOTE — RESULT ENCOUNTER NOTE
Glen Ellis,     Your covid test was negative.     Please let us know if you have any questions or concerns.    Regards,  Clara Hemphill PA-C

## 2024-03-22 ENCOUNTER — ANTICOAGULATION THERAPY VISIT (OUTPATIENT)
Dept: ANTICOAGULATION | Facility: CLINIC | Age: 79
End: 2024-03-22
Payer: MEDICARE

## 2024-03-22 DIAGNOSIS — Z86.718 PERSONAL HISTORY OF DVT (DEEP VEIN THROMBOSIS): ICD-10-CM

## 2024-03-22 DIAGNOSIS — I82.A11 ACUTE DEEP VEIN THROMBOSIS (DVT) OF AXILLARY VEIN OF RIGHT UPPER EXTREMITY (H): Primary | ICD-10-CM

## 2024-03-22 LAB — INR HOME MONITORING: 2.5 (ref 2–3)

## 2024-03-22 NOTE — PROGRESS NOTES
ANTICOAGULATION MANAGEMENT     Manasa French 78 year old female is on warfarin with therapeutic INR result. (Goal INR 2.0-3.0)    Recent labs: (last 7 days)     03/22/24  0000   INR 2.5       ASSESSMENT     Source(s): Chart Review and Patient/Caregiver Call     Warfarin doses taken: Warfarin taken as instructed  Diet: No new diet changes identified  Medication/supplement changes: None noted  New illness, injury, or hospitalization: No  Signs or symptoms of bleeding or clotting: No  Previous result: Therapeutic last visit; previously outside of goal range  Additional findings:  Patient is okay to resume MBE if next INR is within range       PLAN     Recommended plan for no diet, medication or health factor changes affecting INR     Dosing Instructions: Continue your current warfarin dose with next INR in 2 weeks       Summary  As of 3/22/2024      Full warfarin instructions:  7.5 mg every Mon, Wed, Fri; 5 mg all other days   Next INR check:  4/5/2024               Telephone call with Rhonda who verbalizes understanding and agrees to plan    Patient to recheck with home meter    Education provided:   Resume manage by exception with home monitor. Continue to submit INR results to home monitor company.You will only be called when your result is out of range. Please call and notify [facility] if new medication started, dose missed, signs or symptoms of bleeding or clotting, or a surgery/procedure is scheduled. Due for next call no later than: 6/20/24.    Contact [phone]  with any changes, questions or concerns.     Plan made per [facility] anticoagulation protocol    Kyleigh Molina RN  Anticoagulation Clinic  3/22/2024    _______________________________________________________________________     Anticoagulation Episode Summary       Current INR goal:  2.0-3.0   TTR:  82.9% (1 y)   Target end date:  Indefinite   Send INR reminders to:  [facility] HOME MONITORING    Indications    Acute deep vein thrombosis (DVT) of axillary vein  and internal jugular (H) (Resolved) [I82.A19]  Current use of long term anticoagulation (Resolved) [Z79.01]  Acute deep vein thrombosis (DVT) of axillary vein of right upper extremity (H) [I82.A11]  Personal history of DVT (deep vein thrombosis) [Z86.718]             Comments:  [employer] meter- Managed by exception             Anticoagulation Care Providers       Provider Role Specialty Phone number    Rema Braxton DO Referring Family Medicine [phone]    Jessica Anthony MD Referring Internal Medicine [phone]    Jana Giles MD Referring Internal Medicine [phone]    Christopher Smith MD Responsible Hematology [phone]

## 2024-03-23 ENCOUNTER — HEALTH MAINTENANCE LETTER (OUTPATIENT)
Age: 79
End: 2024-03-23

## 2024-04-01 ENCOUNTER — OFFICE VISIT (OUTPATIENT)
Dept: OPHTHALMOLOGY | Facility: CLINIC | Age: 79
End: 2024-04-01
Payer: MEDICARE

## 2024-04-01 DIAGNOSIS — H35.30 AMD (AGE RELATED MACULAR DEGENERATION): Primary | ICD-10-CM

## 2024-04-01 DIAGNOSIS — H43.393 VITREOUS SYNERESIS OF BOTH EYES: ICD-10-CM

## 2024-04-01 PROCEDURE — G0463 HOSPITAL OUTPT CLINIC VISIT: HCPCS

## 2024-04-01 PROCEDURE — 99213 OFFICE O/P EST LOW 20 MIN: CPT

## 2024-04-01 PROCEDURE — 99207 FUNDUS AUTOFLUORESCENCE IMAGE (FAF) OU (BOTH EYES): CPT | Mod: 26

## 2024-04-01 PROCEDURE — 92250 FUNDUS PHOTOGRAPHY W/I&R: CPT | Mod: 59

## 2024-04-01 PROCEDURE — 92134 CPTRZ OPH DX IMG PST SGM RTA: CPT

## 2024-04-01 ASSESSMENT — TONOMETRY
OD_IOP_MMHG: 19
OS_IOP_MMHG: 18
IOP_METHOD: TONOPEN

## 2024-04-01 ASSESSMENT — REFRACTION_WEARINGRX
OD_AXIS: 180
OS_AXIS: 180
OD_SPHERE: -1.50
OD_CYLINDER: +1.50
OS_CYLINDER: +0.75
OS_SPHERE: -2.00

## 2024-04-01 ASSESSMENT — VISUAL ACUITY
OD_CC+: -2
OS_CC+: -2
CORRECTION_TYPE: GLASSES
OS_CC: 20/30
METHOD: SNELLEN - LINEAR
OD_CC: 20/30

## 2024-04-01 ASSESSMENT — EXTERNAL EXAM - LEFT EYE: OS_EXAM: NORMAL

## 2024-04-01 ASSESSMENT — CONF VISUAL FIELD
OS_SUPERIOR_TEMPORAL_RESTRICTION: 0
OS_INFERIOR_TEMPORAL_RESTRICTION: 0
OD_INFERIOR_NASAL_RESTRICTION: 0
OS_SUPERIOR_NASAL_RESTRICTION: 0
OD_NORMAL: 1
OD_SUPERIOR_NASAL_RESTRICTION: 0
OS_NORMAL: 1
OD_SUPERIOR_TEMPORAL_RESTRICTION: 0
OD_INFERIOR_TEMPORAL_RESTRICTION: 0
OS_INFERIOR_NASAL_RESTRICTION: 0

## 2024-04-01 ASSESSMENT — CUP TO DISC RATIO
OS_RATIO: 0.55
OD_RATIO: 0.7

## 2024-04-01 ASSESSMENT — EXTERNAL EXAM - RIGHT EYE: OD_EXAM: NORMAL

## 2024-04-01 ASSESSMENT — SLIT LAMP EXAM - LIDS
COMMENTS: NORMAL
COMMENTS: NORMAL

## 2024-04-01 NOTE — NURSING NOTE
Chief Complaints and History of Present Illnesses   Patient presents with    Macular Degeneration Follow Up     Chief Complaint(s) and History of Present Illness(es)       Macular Degeneration Follow Up              Laterality: both eyes    Onset: 1 year ago              Comments    Pt. States that she is doing great. Double vision has improved significantly since having strab surgery. No pain BE. No flashes or floaters BE.  Layla Gale COT 1:12 PM April 1, 2024                       Yes

## 2024-04-01 NOTE — PROGRESS NOTES
CC: Age related macular degeneration     HPI: Ms French is being seen today for retina evaluation    PMHx:   Arnold Chiari malformation s/p  shunt  DM type II  HTN  Hx DVT (right upper arm) on Coumadin  Post ablative Hypothyroidism   Vit B 12 deficiency     MR BRAIN W/O CONTRAST 10/5/2015 8:32 AM  Impression:  1. Stable position of the right frontal approach ventricle peritoneal  shunt catheter. No ventriculomegaly.  2. Slight progression of nonspecific white matter T2 hyperintensities  likely related to chronic small vessel disease.     Imaging:    OCT: 04/01/2024  Right eye: drusen, no IRf/SRF  Left eye:  drusen, no IRf/SRF    Retina Laser procedures:  none    Intravitreal injections:  none    Assessment/ Plan: 04/01/2024       # Dry Age related macular degeneration both eyes  -Vision is 20/20 OU   - OCT shows drusen, no Irf/SRF  - continue observation   - yearly follow-up with OCT macula both eyes, PRN if any visual complaints.  General Recommendations:  For the present level of age-related macular degeneration, we recommend a good healthy diet, green leafy vegetables, fresh fruit, fish, eggs (occasionally), nuts, and avoidance of processed or high-fat foods.  Avoidance of smoking or exposure to second hand smoke is advised.  Wearing sunglasses or a hat for protection of the eyes from sunlight may be beneficial.  Regular physical activities and/or exercise is helpful (based upon physician advised levels of exercise).      Self-testing:  We recommend testing your vision monocularly (one eye at time) at least one time per week (i.e. When reading the Sunday paper).  Simply cover each eye, and test the vision one eye at a time, while reading or looking at an [employer] grid.  Please contact the office if a new dark spot,increasing distortion, or sudden changes occur in either eye as further evaluation may be warranted    The AREDS-2 (Age-Related Eye Disease Study #2) supplement contains vitamin C, vitamin E, Zinc,  Copper, lutein and zeaxanthin. A multivitamin (i.e. Centrum) may also be used along with AREDS-2.  Extra vitamin E, beyond that present in this amount) is strongly discouraged.  Also, the use of fish oil supplements (tablets) have been shown to be ineffective (not helpful) at slowing progression of age-related macular degeneration.    # Hx Posterior vitreous detachment OU     # POAG both eyes    Followed by Dr Bryan    #  Esotropia, Hypertropia  likely Arnold Chiari malformation associated.   S/p strabismus surgery by Dr Ardon 12/11/23   1. Right lateral rectus resection 5.0 mm   2. Left lateral rectus resection 5.0 mm   3. Left inferior rectus resection 2.0 mm   Patient is very happy, she is doing great, denies any double vision  Follows with Dr Antwan Ardon      Follow up yearly OCT macula both eyes     Fredy Alvarez MD     Medical Retina  [facility]     Attending Physician Attestation:  Complete documentation of historical and exam elements from today's encounter can be found in the full encounter summary report (not reduplicated in this progress note).  I personally obtained the chief complaint(s) and history of present illness.  I confirmed and edited as necessary the review of systems, past medical/surgical history, family history, social history, and examination findings as documented by others; and I examined the patient myself.  I personally reviewed the relevant tests, images, and reports as documented above.  I formulated and edited as necessary the assessment and plan and discussed the findings and management plan with the patient and family. Fredy Alvarez MD

## 2024-04-03 ENCOUNTER — HOSPITAL ENCOUNTER (OUTPATIENT)
Dept: MRI IMAGING | Facility: CLINIC | Age: 79
Discharge: HOME | End: 2024-04-03
Attending: PHYSICIAN ASSISTANT | Admitting: PHYSICIAN ASSISTANT
Payer: MEDICARE

## 2024-04-03 DIAGNOSIS — Z86.69 HISTORY OF HYDROCEPHALUS: ICD-10-CM

## 2024-04-03 DIAGNOSIS — R42 DIZZINESS: ICD-10-CM

## 2024-04-03 PROCEDURE — 255N000002 HC RX 255 OP 636: Performed by: PHYSICIAN ASSISTANT

## 2024-04-03 PROCEDURE — 70553 MRI BRAIN STEM W/O & W/DYE: CPT | Mod: MF

## 2024-04-03 PROCEDURE — A9585 GADOBUTROL INJECTION: HCPCS | Performed by: PHYSICIAN ASSISTANT

## 2024-04-03 RX ORDER — GADOBUTROL 604.72 MG/ML
6 INJECTION INTRAVENOUS ONCE
Status: COMPLETED | OUTPATIENT
Start: 2024-04-03 | End: 2024-04-03

## 2024-04-03 RX ADMIN — GADOBUTROL 6 ML: 604.72 INJECTION INTRAVENOUS at 15:05

## 2024-04-04 NOTE — RESULT ENCOUNTER NOTE
Team - please call patient with results.  MRI does not show anything acute, however, there is possible overdrainage by  shunt which can cause headaches  I placed a neurology referral last time I saw her but I don't see any future neurology appointments, could you give her the # to call to set up the appt?  Thank you

## 2024-04-05 ENCOUNTER — DOCUMENTATION ONLY (OUTPATIENT)
Dept: ANTICOAGULATION | Facility: CLINIC | Age: 79
End: 2024-04-05
Payer: MEDICARE

## 2024-04-05 DIAGNOSIS — Z86.718 PERSONAL HISTORY OF DVT (DEEP VEIN THROMBOSIS): ICD-10-CM

## 2024-04-05 DIAGNOSIS — I82.A11 ACUTE DEEP VEIN THROMBOSIS (DVT) OF AXILLARY VEIN OF RIGHT UPPER EXTREMITY (H): Primary | ICD-10-CM

## 2024-04-05 LAB — INR HOME MONITORING: 2.5 (ref 2–3)

## 2024-04-05 NOTE — PROGRESS NOTES
ANTICOAGULATION  MANAGEMENT-Home Monitor Managed by Exception    Manasa French 78 year old female is on warfarin with therapeutic INR result. (Goal INR 2.0-3.0)    Recent labs: (last 7 days)     04/05/24  0000   INR 2.5       Previous INR was Therapeutic  Medication, diet, health changes since last INR:chart reviewed; none identified  Contacted within the last 12 weeks by phone on 3/22/24   Due for next call no later than: 6/20/2   Last [facility] referral date: 11/26/2023      AUBREY     Manasa was NOT contacted regarding therapeutic result today per home monitoring policy manage by exception agreement.   Current warfarin dose is to be continued:     Summary  As of 4/5/2024      Full warfarin instructions:  7.5 mg every Mon, Wed, Fri; 5 mg all other days   Next INR check:  4/19/2024             ?   Ember Amezcua RN  Anticoagulation Clinic  4/5/2024    _______________________________________________________________________     Anticoagulation Episode Summary       Current INR goal:  2.0-3.0   TTR:  82.9% (1 y)   Target end date:  Indefinite   Send INR reminders to:  [employer] [facility] MONITOR[facility]    Indications    Acute deep vein thrombosis (DVT) of axillary vein and internal jugular (H) (Resolved) [I82.A19]  Current use of long term anticoagulation (Resolved) [Z79.01]  Acute deep vein thrombosis (DVT) of axillary vein of right upper extremity (H) [I82.A11]  Personal history of DVT (deep vein thrombosis) [Z86.718]             Comments:  [employer] meter- Managed by exception             Anticoagulation Care Providers       Provider Role Specialty Phone number    Rema Braxton DO Referring Family Medicine [phone]    Jessica Anthony MD Referring Internal Medicine [phone]    Jana Giles MD Referring Internal Medicine [phone]    Christopher Smith MD Responsible Hematology [phone]

## 2024-04-19 ENCOUNTER — ANTICOAGULATION THERAPY VISIT (OUTPATIENT)
Dept: ANTICOAGULATION | Facility: CLINIC | Age: 79
End: 2024-04-19
Payer: MEDICARE

## 2024-04-19 DIAGNOSIS — Z86.718 PERSONAL HISTORY OF DVT (DEEP VEIN THROMBOSIS): ICD-10-CM

## 2024-04-19 DIAGNOSIS — I82.A11 ACUTE DEEP VEIN THROMBOSIS (DVT) OF AXILLARY VEIN OF RIGHT UPPER EXTREMITY (H): Primary | ICD-10-CM

## 2024-04-19 LAB — INR HOME MONITORING: 3 (ref 2–3)

## 2024-04-19 NOTE — PROGRESS NOTES
ANTICOAGULATION MANAGEMENT     Manasa French 78 year old female is on warfarin with therapeutic INR result. (Goal INR 2.0-3.0)    Recent labs: (last 7 days)     04/19/24  0000   INR 3.0       ASSESSMENT     Source(s): Chart Review and Patient/Caregiver Call     Warfarin doses taken: Less warfarin taken than planned which may be affecting INR  - patient reported taking less warfarin on weeks when she thinks she doesn't eat enough greens. Writer advised against self-dosing and advised to recheck INR sooner to verify if INR continues to remain on high end of range, if so, dose reduction can be considered at that time. Of note, patient was previously running subtherapeutic on 40 mg weekly in February 2024.  Diet: Decreased greens/vitamin K in diet; plans to resume previous intake  Medication/supplement changes: None noted  New illness, injury, or hospitalization: Yes: low-grade HA & intermittent lightheadedness with syncopal episodes over the last year ~ MRI completed on 4/3/24, has Neurology consult scheduled for 8/28/24  Signs or symptoms of bleeding or clotting: No  Previous result: Therapeutic last 2(+) visits  Additional findings: Patient would prefer a call to discuss next INR instead of MBE giving notes above       PLAN     Recommended plan for temporary change(s) and ongoing change(s) affecting INR     Dosing Instructions: Continue your current warfarin dose with next INR in 1 week       Summary  As of 4/19/2024      Full warfarin instructions:  7.5 mg every Mon, Wed, Fri; 5 mg all other days   Next INR check:  4/26/2024               Telephone call with Rhonda who verbalizes understanding and agrees to plan    Patient to recheck with home meter    Education provided:   Please call back if any changes to your diet, medications or how you've been taking warfarin  Symptom monitoring: monitoring for bleeding signs and symptoms and monitoring for clotting signs and symptoms    Plan made per [facility] anticoagulation  protocol    Brandi Arriola, RN  Anticoagulation Clinic  4/19/2024    _______________________________________________________________________     Anticoagulation Episode Summary       Current INR goal:  2.0-3.0   TTR:  84.8% (1 y)   Target end date:  Indefinite   Send INR reminders to:  [facility] HOME MONITOR[facility]    Indications    Acute deep vein thrombosis (DVT) of axillary vein and internal jugular (H) (Resolved) [I82.A19]  Current use of long term anticoagulation (Resolved) [Z79.01]  Acute deep vein thrombosis (DVT) of axillary vein of right upper extremity (H) [I82.A11]  Personal history of DVT (deep vein thrombosis) [Z86.718]             Comments:  [employer] meter- Managed by exception             Anticoagulation Care Providers       Provider Role Specialty Phone number    Rema Braxton DO Referring Family Medicine [phone]    Jessica Anthony MD Referring Internal Medicine [phone]    Jana Giles MD Referring Internal Medicine [phone]    Christopher Smith MD Responsible Hematology [phone]

## 2024-04-23 ENCOUNTER — ALLIED HEALTH/NURSE VISIT (OUTPATIENT)
Dept: ENDOCRINOLOGY | Facility: CLINIC | Age: 79
End: 2024-04-23
Payer: MEDICARE

## 2024-04-23 DIAGNOSIS — M81.0 OSTEOPOROSIS, UNSPECIFIED OSTEOPOROSIS TYPE, UNSPECIFIED PATHOLOGICAL FRACTURE PRESENCE: ICD-10-CM

## 2024-04-23 PROCEDURE — 99207 PR NO CHARGE NURSE ONLY: CPT

## 2024-04-23 PROCEDURE — 96372 THER/PROPH/DIAG INJ SC/IM: CPT | Performed by: INTERNAL MEDICINE

## 2024-04-23 NOTE — PATIENT INSTRUCTIONS
You received your Prolia injection today  Your next injection is due in 6 months, scheduled 10/23/24  If you plan on having any dental work done within the next 6 months, please let your dentist know that you are on this medication.   Make sure you do not have any dental work completed involving the jaw bone within 1 month prior to your scheduled injection.   Provided Prolia VIS Sheet to patient.     Silas Dong RN

## 2024-04-23 NOTE — PROGRESS NOTES
Clinic Administered Medication Documentation      Prolia Documentation    Indication: Prolia  (denosumab) is a prescription medicine used to treat osteoporosis in patients who:   Are at high risk for fracture, meaning patients who have had a fracture related to osteoporosis, or who have multiple risk factors for fracture.  Cannot use another osteoporosis medicine or other osteoporosis medicines did not work well.  The timeline for early/late injections would be 4 weeks early and any time after the 6 month hemal. If a patient receives their injection late, then the subsequent injection would be 6 months from the date that they actually received the injection.    When was the last injection?  10/23/24  Was the last injection at least 6 months ago? Yes  Has the prior authorization been completed?  Yes  Is there an active order (written within the past 365 days, with administrations remaining, not ) in the chart?  Yes  Patient denies any dental work involving the bone (e.g. tooth extraction or dental implants) in the past 4 weeks?  Yes  Patient denies plans for any dental work involving the bone (e.g. tooth extraction or dental implants) in the next 4 weeks? Yes    The following steps were completed to comply with the REMS program for Prolia:  Reviewed information in the Medication Guide and Patient Counseling Chart, including the serious risks of Prolia  and the symptoms of each risk.  Advised patient to seek prompt medical attention if they have signs or symptoms of any of the serious risks.  Provided each patient a copy of the Medication Guide and Patient Brochure.    Prior to injection, verified patient identity using patient's name and date of birth. Medication was administered. Please see MAR and medication order for additional information. Patient instructed to remain in clinic for 15 minutes and report any adverse reaction to staff immediately.    Vial/Syringe: Syringe  Was this medication supplied by the  patient? No  Verified that the patient has refills remaining in their prescription.     Manasa DIEGO French comes into clinic today at the request of Dr Hernandez Ordering Provider for Med Injection only Prolia .    This service provided today was under the supervising provider of the day Dr Pate, who was available if needed.    Silas Dong RN

## 2024-04-29 ENCOUNTER — LAB (OUTPATIENT)
Dept: LAB | Facility: CLINIC | Age: 79
End: 2024-04-29
Payer: MEDICARE

## 2024-04-29 DIAGNOSIS — E11.9 DIABETES MELLITUS, TYPE 2 (H): Primary | ICD-10-CM

## 2024-04-29 DIAGNOSIS — N17.9 AKI (ACUTE KIDNEY INJURY) (H): ICD-10-CM

## 2024-04-29 LAB
ANION GAP SERPL CALCULATED.3IONS-SCNC: 11 MMOL/L (ref 7–15)
BUN SERPL-MCNC: 37.4 MG/DL (ref 8–23)
CALCIUM SERPL-MCNC: 9.8 MG/DL (ref 8.8–10.2)
CHLORIDE SERPL-SCNC: 104 MMOL/L (ref 98–107)
CREAT SERPL-MCNC: 1.27 MG/DL (ref 0.51–0.95)
CREAT UR-MCNC: 100 MG/DL
DEPRECATED HCO3 PLAS-SCNC: 28 MMOL/L (ref 22–29)
EGFRCR SERPLBLD CKD-EPI 2021: 43 ML/MIN/1.73M2
GLUCOSE SERPL-MCNC: 131 MG/DL (ref 70–99)
HBA1C MFR BLD: 6.6 % (ref 0–5.6)
MICROALBUMIN UR-MCNC: <12 MG/L
MICROALBUMIN/CREAT UR: NORMAL MG/G{CREAT}
POTASSIUM SERPL-SCNC: 4 MMOL/L (ref 3.4–5.3)
SODIUM SERPL-SCNC: 143 MMOL/L (ref 135–145)

## 2024-04-29 PROCEDURE — 82043 UR ALBUMIN QUANTITATIVE: CPT

## 2024-04-29 PROCEDURE — 80048 BASIC METABOLIC PNL TOTAL CA: CPT

## 2024-04-29 PROCEDURE — 36415 COLL VENOUS BLD VENIPUNCTURE: CPT

## 2024-04-29 PROCEDURE — 83036 HEMOGLOBIN GLYCOSYLATED A1C: CPT

## 2024-04-29 PROCEDURE — 82570 ASSAY OF URINE CREATININE: CPT

## 2024-04-29 NOTE — RESULT ENCOUNTER NOTE
Team - please call patient with results.  Her kidney function is about the same, reduced but stable.  I think she should recheck when she sees her pcp in the fall.  Her electrolytes are stable.

## 2024-05-03 ENCOUNTER — ANTICOAGULATION THERAPY VISIT (OUTPATIENT)
Dept: ANTICOAGULATION | Facility: CLINIC | Age: 79
End: 2024-05-03
Payer: MEDICARE

## 2024-05-03 DIAGNOSIS — I82.A11 ACUTE DEEP VEIN THROMBOSIS (DVT) OF AXILLARY VEIN OF RIGHT UPPER EXTREMITY (H): Primary | ICD-10-CM

## 2024-05-03 DIAGNOSIS — Z86.718 PERSONAL HISTORY OF DVT (DEEP VEIN THROMBOSIS): ICD-10-CM

## 2024-05-03 LAB — INR HOME MONITORING: 2.3 (ref 2–3)

## 2024-05-03 NOTE — PROGRESS NOTES
ANTICOAGULATION MANAGEMENT     Manasa French 78 year old female is on warfarin with therapeutic INR result. (Goal INR 2.0-3.0)    Recent labs: (last 7 days)     05/03/24  0000   INR 2.3       ASSESSMENT     Source(s): Chart Review and Patient/Caregiver Call     Warfarin doses taken: Warfarin taken as instructed  Diet: No new diet changes identified  Medication/supplement changes: None noted  New illness, injury, or hospitalization: No  Signs or symptoms of bleeding or clotting: No  Previous result: Therapeutic last 2(+) visits  Additional findings: None     PLAN     Recommended plan for no diet, medication or health factor changes affecting INR     Dosing Instructions: Continue your current warfarin dose with next INR in 2 weeks       Summary  As of 5/3/2024      Full warfarin instructions:  7.5 mg every Mon, Wed, Fri; 5 mg all other days   Next INR check:  5/17/2024               Telephone call with Rhonda who verbalizes understanding and agrees to plan    Patient to recheck with home meter    Education provided:   Please call back if any changes to your diet, medications or how you've been taking warfarin  Resume manage by exception with home monitor. Continue to submit INR results to home monitor company.You will only be called when your result is out of range. Please call and notify [facility] if new medication started, dose missed, signs or symptoms of bleeding or clotting, or a surgery/procedure is scheduled. Due for next call no later than: 8/1/24.    Plan made per [facility] anticoagulation protocol    Stephenie Carlton RN  Anticoagulation Clinic  5/3/2024    _______________________________________________________________________     Anticoagulation Episode Summary       Current INR goal:  2.0-3.0   TTR:  86.5% (1 y)   Target end date:  Indefinite   Send INR reminders to:  [facility] HOME MONITOR[facility]    Indications    Acute deep vein thrombosis (DVT) of axillary vein and internal jugular (H) (Resolved) [I82.A19]  Current use  of long term anticoagulation (Resolved) [Z79.01]  Acute deep vein thrombosis (DVT) of axillary vein of right upper extremity (H) [I82.A11]  Personal history of DVT (deep vein thrombosis) [Z86.718]             Comments:  [employer] meter- Managed by exception             Anticoagulation Care Providers       Provider Role Specialty Phone number    Rema Braxton DO Referring Family Medicine [phone]    Jessica Anthony MD Referring Internal Medicine [phone]    Jana Giles MD Referring Internal Medicine [phone]    Christopher Smith MD Responsible Hematology [phone]

## 2024-05-04 DIAGNOSIS — H40.1132 PRIMARY OPEN ANGLE GLAUCOMA OF BOTH EYES, MODERATE STAGE: ICD-10-CM

## 2024-05-05 RX ORDER — LATANOPROST 50 UG/ML
1 SOLUTION/ DROPS OPHTHALMIC AT BEDTIME
Qty: 7.5 ML | Refills: 3 | Status: SHIPPED | OUTPATIENT
Start: 2024-05-05

## 2024-05-05 NOTE — TELEPHONE ENCOUNTER
latanoprost (XALATAN) 0.005 % ophthalmic solution   Disp-7.5 mL, R-1,   Start: 07/18/2023 1/23/2024  [facility]    Jarred Leon MD  Ophthalmology  Nv 7/23/24    Jarred Leon    Continue latanoprost qHS both eyes

## 2024-05-08 DIAGNOSIS — I82.A11 ACUTE DEEP VEIN THROMBOSIS (DVT) OF AXILLARY VEIN OF RIGHT UPPER EXTREMITY (H): ICD-10-CM

## 2024-05-08 RX ORDER — WARFARIN SODIUM 5 MG/1
TABLET ORAL
Qty: 120 TABLET | Refills: 1 | Status: ON HOLD | OUTPATIENT
Start: 2024-05-08 | End: 2024-05-21

## 2024-05-08 NOTE — TELEPHONE ENCOUNTER
A refill request was received for warfarin from [facility].  Manasa French last saw their provider on 1/18/24.      Please send a refill.     Thank you so much and feel free to call us if you have further questions.     [facility] Nursing staff     [facility]   [address] [place] [address], [address], [place] [address]   Main: [phone], Fax: [phone]

## 2024-05-08 NOTE — TELEPHONE ENCOUNTER
ANTICOAGULATION MANAGEMENT:  Medication Refill    Anticoagulation Summary  As of 5/3/2024      Warfarin maintenance plan:  7.5 mg (5 mg x 1.5) every Mon, Wed, Fri; 5 mg (5 mg x 1) all other days   Next INR check:  5/17/2024   Target end date:  Indefinite    Indications    Acute deep vein thrombosis (DVT) of axillary vein and internal jugular (H) (Resolved) [I82.A19]  Current use of long term anticoagulation (Resolved) [Z79.01]  Acute deep vein thrombosis (DVT) of axillary vein of right upper extremity (H) [I82.A11]  Personal history of DVT (deep vein thrombosis) [Z86.718]                 Anticoagulation Care Providers       Provider Role Specialty Phone number    Rema Braxton DO Referring Family Medicine [phone]    Jessica Anthony MD Referring Internal Medicine [phone]    Jana Giles MD Referring Internal Medicine [phone]    Christopher Smith MD Responsible Hematology [phone]            Refill Criteria    Visit with referring provider/group: Meets criteria: office visit within referring provider group in the last 1 year on 3/19/24    [facility] referral last signed: 11/26/2023; within last year: Yes    Lab monitoring not exceeding 2 weeks overdue: Yes    Manasa meets all criteria for refill. Rx instructions and quantity supplied updated to match patient's current dosing plan. Warfarin 90 day supply with 1 refill granted per [facility] protocol     Manju uMeller RN  Anticoagulation Clinic

## 2024-05-17 ENCOUNTER — OFFICE VISIT (OUTPATIENT)
Dept: OPHTHALMOLOGY | Facility: CLINIC | Age: 79
End: 2024-05-17
Payer: MEDICARE

## 2024-05-17 ENCOUNTER — DOCUMENTATION ONLY (OUTPATIENT)
Dept: ANTICOAGULATION | Facility: CLINIC | Age: 79
End: 2024-05-17
Payer: MEDICARE

## 2024-05-17 DIAGNOSIS — H50.21 HYPERTROPIA OF RIGHT EYE: ICD-10-CM

## 2024-05-17 DIAGNOSIS — I82.A11 ACUTE DEEP VEIN THROMBOSIS (DVT) OF AXILLARY VEIN OF RIGHT UPPER EXTREMITY (H): Primary | ICD-10-CM

## 2024-05-17 DIAGNOSIS — H53.2 DOUBLE VISION: Primary | ICD-10-CM

## 2024-05-17 DIAGNOSIS — H50.22 HYPERTROPIA OF LEFT EYE: ICD-10-CM

## 2024-05-17 DIAGNOSIS — Z86.718 PERSONAL HISTORY OF DVT (DEEP VEIN THROMBOSIS): ICD-10-CM

## 2024-05-17 LAB — INR HOME MONITORING: 2.6 (ref 2–3)

## 2024-05-17 PROCEDURE — 92060 SENSORIMOTOR EXAMINATION: CPT | Mod: 26 | Performed by: OPHTHALMOLOGY

## 2024-05-17 PROCEDURE — G0463 HOSPITAL OUTPT CLINIC VISIT: HCPCS | Performed by: OPHTHALMOLOGY

## 2024-05-17 PROCEDURE — 92060 SENSORIMOTOR EXAMINATION: CPT | Performed by: OPHTHALMOLOGY

## 2024-05-17 PROCEDURE — 92014 COMPRE OPH EXAM EST PT 1/>: CPT | Performed by: OPHTHALMOLOGY

## 2024-05-17 PROCEDURE — 92060 SENSORIMOTOR EXAMINATION: CPT

## 2024-05-17 ASSESSMENT — CONF VISUAL FIELD
OD_SUPERIOR_NASAL_RESTRICTION: 0
OD_INFERIOR_TEMPORAL_RESTRICTION: 0
OS_SUPERIOR_NASAL_RESTRICTION: 0
OD_NORMAL: 1
OD_SUPERIOR_TEMPORAL_RESTRICTION: 0
OS_INFERIOR_TEMPORAL_RESTRICTION: 0
METHOD: COUNTING FINGERS
OS_NORMAL: 1
OS_INFERIOR_NASAL_RESTRICTION: 0
OS_SUPERIOR_TEMPORAL_RESTRICTION: 0
OD_INFERIOR_NASAL_RESTRICTION: 0

## 2024-05-17 ASSESSMENT — EXTERNAL EXAM - LEFT EYE: OS_EXAM: NORMAL

## 2024-05-17 ASSESSMENT — REFRACTION_WEARINGRX
OD_SPHERE: -1.25
OD_AXIS: 001
OS_CYLINDER: +0.75
OD_CYLINDER: +1.50
OS_AXIS: 005
OS_SPHERE: -2.00

## 2024-05-17 ASSESSMENT — VISUAL ACUITY
OS_PH_CC+: -1
OS_CC+: +2
OS_PH_CC: 20/40
METHOD: SNELLEN - LINEAR
OS_CC: 20/50
OD_CC+: -1
OD_CC: 20/30
CORRECTION_TYPE: GLASSES

## 2024-05-17 ASSESSMENT — TONOMETRY
IOP_METHOD: ICARE
OD_IOP_MMHG: 16
OS_IOP_MMHG: 16

## 2024-05-17 ASSESSMENT — SLIT LAMP EXAM - LIDS
COMMENTS: NORMAL
COMMENTS: NORMAL

## 2024-05-17 ASSESSMENT — EXTERNAL EXAM - RIGHT EYE: OD_EXAM: NORMAL

## 2024-05-17 NOTE — NURSING NOTE
Chief Complaint(s) and History of Present Illness(es)       Post Op (Ophthalmology) Both Eyes    In both eyes.  Since onset it is stable.  Associated symptoms include double vision.  Negative for eye pain. Additional comments: Follow-up for straemma sx 12/11/23. Rhonda reports she has been seeing very well.  She is only noticing double vision when she is really tired.  No eye pain.  Feels like she has been losing color - usually feels woozy and her blood pressure will usually drop.  ROSETTE Tesfaye 5/17/2024 9:05 AM

## 2024-05-17 NOTE — PROGRESS NOTES
1. Longstanding progressive moderate angle concomitant pattern (with distance fixation) divergence insufficiency pattern esotropia and left hypertropia: ocular motor dysfunction including saccadic smooth pursuit and mix of downbeat nystagmus / gaze evoked nystagmus strongly suggest this is a central esotropia related to brainstem versus cerebellar dysfunction related strabismus.     Given the downbeat nystagmus and my review of her prior neuro-imaging (CT and MRI) showing prominent Arnold Chiari malformation with significant crowding of posterior fossa this is highly likely Arnold Chiari malformation associated.  Her progression of symptoms however has been very slow with documentation of nystagmus on eye exam dating back to at least 2014 in [facility] notes (earliest notes available in EPIC).  Arnold Chiari malformation is well established as a cause of hydrocephalus and may have been a contributing factor to that diagnosis as well however without reviewing neuro-imaging from 40 years ago when she was diagnosed and shunted, it would be difficult to be certain about this.     We discussed that surgical intervention for Arnold Chiari malformation (neurosurgical posterior fossa decompression) in the setting of such long duration and very slow progression would not make sense at 78 years of age as this surgery carries significant morbidity risk.   We did discuss however downstream surgical intervention to help with her strabismus considering her prism requirements to see single under binocular conditions are exceptionally high.    Patient is now status post strabismus surgery on 12/11/23 and doing very well.  Seeing single today with a large window of single binocular vision around primary gaze without prism    2. Primary open angle glaucoma, both eyes- moderate stage- follows with Dr. Leon. Last visit 1/23/24- next visit July 23. Latanoprost at bedtime in both eyes and timolol daily in both  eyes.    3. 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    Follow-up as needed with neuro-ophthalmology. May need ground in prism glasses again in the future if there is progression of her cerebellar esotropia.      PREOPERATIVE DIAGNOSIS (ES):   1. Alternating esotropia   2. Left hypertropia     POSTOPERATIVE DIAGNOSIS (ES):   Same     NAME OF OPERATION:   1. Right lateral rectus resection 5.0 mm   2. Left lateral rectus resection 5.0 mm   3. Left inferior rectus resection 2.0 mm      Complete documentation of historical and exam elements from today's encounter can be found in the full encounter summary report (not reduplicated in this progress note).  I personally obtained the chief complaint(s) and history of present illness.  I confirmed and edited as necessary the review of systems, past medical/surgical history, family history, social history, and examination findings as documented by others; and I examined the patient myself.  I personally reviewed the relevant tests, images, and reports as documented above.  I formulated and edited as necessary the assessment and plan and discussed the findings and management plan with the patient and family     Antwan Ardon MD

## 2024-05-17 NOTE — CONFIDENTIAL NOTE
ANTICOAGULATION  MANAGEMENT-Home Monitor Managed by Exception    Manasa DIEGO Manolo 78 year old female is on warfarin with therapeutic INR result. (Goal INR 2.0-3.0)    Recent labs: (last 7 days)     05/17/24  0000   INR 2.6       Previous INR was Therapeutic  Medication, diet, health changes since last INR:chart reviewed; none identified  Contacted within the last 12 weeks by phone on 5/3/24  Last [facility] referral date: 11/26/2023      AUBREY     Manasa was NOT contacted regarding therapeutic result today per home monitoring policy manage by exception agreement.   Current warfarin dose is to be continued:     Summary  As of 5/17/2024      Full warfarin instructions:  7.5 mg every Mon, Wed, Fri; 5 mg all other days   Next INR check:  5/31/2024             ?   Stephenie Carlton RN  Anticoagulation Clinic  5/17/2024    _______________________________________________________________________     Anticoagulation Episode Summary       Current INR goal:  2.0-3.0   TTR:  86.5% (1 y)   Target end date:  Indefinite   Send INR reminders to:  [facility]IT[facility]    Indications    Acute deep vein thrombosis (DVT) of axillary vein and internal jugular (H) (Resolved) [I82.A19]  Current use of long term anticoagulation (Resolved) [Z79.01]  Acute deep vein thrombosis (DVT) of axillary vein of right upper extremity (H) [I82.A11]  Personal history of DVT (deep vein thrombosis) [Z86.718]             Comments:  [employer] meter- Managed by exception             Anticoagulation Care Providers       Provider Role Specialty Phone number    Rema Braxton DO Referring Family Medicine [phone]    Jessica Anthony MD Referring Internal Medicine [phone]    Jana Giles MD Referring Internal Medicine [phone]    Christopher Smith MD Responsible Hematology [phone]

## 2024-05-20 ENCOUNTER — APPOINTMENT (OUTPATIENT)
Dept: ULTRASOUND IMAGING | Facility: CLINIC | Age: 79
End: 2024-05-20
Attending: INTERNAL MEDICINE
Payer: MEDICARE

## 2024-05-20 ENCOUNTER — NURSE TRIAGE (OUTPATIENT)
Dept: FAMILY MEDICINE | Facility: CLINIC | Age: 79
End: 2024-05-20
Payer: MEDICARE

## 2024-05-20 ENCOUNTER — APPOINTMENT (OUTPATIENT)
Dept: CT IMAGING | Facility: CLINIC | Age: 79
End: 2024-05-20
Attending: EMERGENCY MEDICINE
Payer: MEDICARE

## 2024-05-20 ENCOUNTER — HOSPITAL ENCOUNTER (OUTPATIENT)
Facility: CLINIC | Age: 79
Setting detail: OBSERVATION
Discharge: HOME | End: 2024-05-21
Attending: EMERGENCY MEDICINE | Admitting: HOSPITALIST
Payer: MEDICARE

## 2024-05-20 DIAGNOSIS — Z98.2 VP (VENTRICULOPERITONEAL) SHUNT STATUS: Primary | ICD-10-CM

## 2024-05-20 DIAGNOSIS — S06.5XAA SUBDURAL HEMATOMA (H): ICD-10-CM

## 2024-05-20 DIAGNOSIS — Z79.01 LONG TERM CURRENT USE OF ANTICOAGULANT THERAPY: ICD-10-CM

## 2024-05-20 DIAGNOSIS — R55 SYNCOPE, UNSPECIFIED SYNCOPE TYPE: ICD-10-CM

## 2024-05-20 DIAGNOSIS — I82.A11 ACUTE DEEP VEIN THROMBOSIS (DVT) OF AXILLARY VEIN OF RIGHT UPPER EXTREMITY (H): ICD-10-CM

## 2024-05-20 PROBLEM — I82.A19: Status: RESOLVED | Noted: 2019-03-08 | Resolved: 2021-05-27

## 2024-05-20 PROBLEM — Z98.1 S/P LUMBAR SPINAL FUSION: Status: ACTIVE | Noted: 2019-07-26

## 2024-05-20 PROBLEM — G91.8 OTHER HYDROCEPHALUS (H): Status: ACTIVE | Noted: 2024-05-20
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
## 2024-05-20 PROBLEM — Z98.1 S/P LUMBAR SPINAL FUSION: Status: RESOLVED | Noted: 2019-07-26 | Resolved: 2021-05-27

## 2024-05-20 LAB
ALBUMIN SERPL BCG-MCNC: 4.2 G/DL (ref 3.5–5.2)
ALP SERPL-CCNC: 64 U/L (ref 40–150)
ALT SERPL W P-5'-P-CCNC: 21 U/L (ref 0–50)
ANION GAP SERPL CALCULATED.3IONS-SCNC: 11 MMOL/L (ref 7–15)
AST SERPL W P-5'-P-CCNC: 17 U/L (ref 0–45)
ATRIAL RATE - MUSE: 73 BPM
BASOPHILS # BLD AUTO: 0 10E3/UL (ref 0–0.2)
BASOPHILS NFR BLD AUTO: 1 %
BILIRUB SERPL-MCNC: 0.6 MG/DL
BUN SERPL-MCNC: 36.1 MG/DL (ref 8–23)
CALCIUM SERPL-MCNC: 9.8 MG/DL (ref 8.8–10.2)
CHLORIDE SERPL-SCNC: 100 MMOL/L (ref 98–107)
CREAT SERPL-MCNC: 1.36 MG/DL (ref 0.51–0.95)
DEPRECATED HCO3 PLAS-SCNC: 27 MMOL/L (ref 22–29)
DIASTOLIC BLOOD PRESSURE - MUSE: NORMAL MMHG
EGFRCR SERPLBLD CKD-EPI 2021: 39 ML/MIN/1.73M2
EOSINOPHIL # BLD AUTO: 0.1 10E3/UL (ref 0–0.7)
EOSINOPHIL NFR BLD AUTO: 2 %
ERYTHROCYTE [DISTWIDTH] IN BLOOD BY AUTOMATED COUNT: 14.1 % (ref 10–15)
GLUCOSE BLDC GLUCOMTR-MCNC: 159 MG/DL (ref 70–99)
GLUCOSE SERPL-MCNC: 124 MG/DL (ref 70–99)
HCT VFR BLD AUTO: 30.8 % (ref 35–47)
HGB BLD-MCNC: 9.9 G/DL (ref 11.7–15.7)
HOLD SPECIMEN: NORMAL
IMM GRANULOCYTES # BLD: 0 10E3/UL
IMM GRANULOCYTES NFR BLD: 0 %
INR PPP: 2.45 (ref 0.85–1.15)
INTERPRETATION ECG - MUSE: NORMAL
LYMPHOCYTES # BLD AUTO: 1.7 10E3/UL (ref 0.8–5.3)
LYMPHOCYTES NFR BLD AUTO: 22 %
MAGNESIUM SERPL-MCNC: 2.3 MG/DL (ref 1.7–2.3)
MCH RBC QN AUTO: 30.7 PG (ref 26.5–33)
MCHC RBC AUTO-ENTMCNC: 32.1 G/DL (ref 31.5–36.5)
MCV RBC AUTO: 96 FL (ref 78–100)
MONOCYTES # BLD AUTO: 0.5 10E3/UL (ref 0–1.3)
MONOCYTES NFR BLD AUTO: 7 %
NEUTROPHILS # BLD AUTO: 5.2 10E3/UL (ref 1.6–8.3)
NEUTROPHILS NFR BLD AUTO: 68 %
NRBC # BLD AUTO: 0 10E3/UL
NRBC BLD AUTO-RTO: 0 /100
P AXIS - MUSE: 67 DEGREES
PHOSPHATE SERPL-MCNC: 3.4 MG/DL (ref 2.5–4.5)
PLATELET # BLD AUTO: 226 10E3/UL (ref 150–450)
POTASSIUM SERPL-SCNC: 4.1 MMOL/L (ref 3.4–5.3)
PR INTERVAL - MUSE: 208 MS
PROT SERPL-MCNC: 6.9 G/DL (ref 6.4–8.3)
QRS DURATION - MUSE: 88 MS
QT - MUSE: 420 MS
QTC - MUSE: 462 MS
R AXIS - MUSE: -13 DEGREES
RBC # BLD AUTO: 3.22 10E6/UL (ref 3.8–5.2)
SODIUM SERPL-SCNC: 138 MMOL/L (ref 135–145)
SYSTOLIC BLOOD PRESSURE - MUSE: NORMAL MMHG
T AXIS - MUSE: 64 DEGREES
TROPONIN T SERPL HS-MCNC: 26 NG/L
TROPONIN T SERPL HS-MCNC: 28 NG/L
VENTRICULAR RATE- MUSE: 73 BPM
WBC # BLD AUTO: 7.6 10E3/UL (ref 4–11)

## 2024-05-20 PROCEDURE — 84100 ASSAY OF PHOSPHORUS: CPT | Performed by: INTERNAL MEDICINE

## 2024-05-20 PROCEDURE — 36415 COLL VENOUS BLD VENIPUNCTURE: CPT | Performed by: STUDENT IN AN ORGANIZED HEALTH CARE EDUCATION/TRAINING PROGRAM

## 2024-05-20 PROCEDURE — 85025 COMPLETE CBC W/AUTO DIFF WBC: CPT | Performed by: STUDENT IN AN ORGANIZED HEALTH CARE EDUCATION/TRAINING PROGRAM

## 2024-05-20 PROCEDURE — 36415 COLL VENOUS BLD VENIPUNCTURE: CPT | Performed by: EMERGENCY MEDICINE

## 2024-05-20 PROCEDURE — 250N000013 HC RX MED GY IP 250 OP 250 PS 637: Performed by: INTERNAL MEDICINE

## 2024-05-20 PROCEDURE — 99285 EMERGENCY DEPT VISIT HI MDM: CPT | Mod: 25

## 2024-05-20 PROCEDURE — 120N000001 HC R&B MED SURG/OB

## 2024-05-20 PROCEDURE — 82040 ASSAY OF SERUM ALBUMIN: CPT | Performed by: EMERGENCY MEDICINE

## 2024-05-20 PROCEDURE — 99223 1ST HOSP IP/OBS HIGH 75: CPT | Mod: AI | Performed by: INTERNAL MEDICINE

## 2024-05-20 PROCEDURE — 82374 ASSAY BLOOD CARBON DIOXIDE: CPT | Performed by: EMERGENCY MEDICINE

## 2024-05-20 PROCEDURE — 83735 ASSAY OF MAGNESIUM: CPT | Performed by: INTERNAL MEDICINE

## 2024-05-20 PROCEDURE — 76770 US EXAM ABDO BACK WALL COMP: CPT

## 2024-05-20 PROCEDURE — 85025 COMPLETE CBC W/AUTO DIFF WBC: CPT | Performed by: EMERGENCY MEDICINE

## 2024-05-20 PROCEDURE — 93005 ELECTROCARDIOGRAM TRACING: CPT

## 2024-05-20 PROCEDURE — 93880 EXTRACRANIAL BILAT STUDY: CPT

## 2024-05-20 PROCEDURE — 84484 ASSAY OF TROPONIN QUANT: CPT | Performed by: EMERGENCY MEDICINE

## 2024-05-20 PROCEDURE — 85610 PROTHROMBIN TIME: CPT | Performed by: EMERGENCY MEDICINE

## 2024-05-20 PROCEDURE — 70450 CT HEAD/BRAIN W/O DYE: CPT | Mod: MA

## 2024-05-20 RX ORDER — CALCIUM CARBONATE 500 MG/1
1000 TABLET, CHEWABLE ORAL 4 TIMES DAILY PRN
Status: DISCONTINUED | OUTPATIENT
Start: 2024-05-20 | End: 2024-05-21 | Stop reason: HOSPADM

## 2024-05-20 RX ORDER — AMOXICILLIN 250 MG
1 CAPSULE ORAL 2 TIMES DAILY PRN
Status: DISCONTINUED | OUTPATIENT
Start: 2024-05-20 | End: 2024-05-21 | Stop reason: HOSPADM

## 2024-05-20 RX ORDER — LIDOCAINE 40 MG/G
CREAM TOPICAL
Status: DISCONTINUED | OUTPATIENT
Start: 2024-05-20 | End: 2024-05-21 | Stop reason: HOSPADM

## 2024-05-20 RX ORDER — LOSARTAN POTASSIUM 100 MG/1
100 TABLET ORAL DAILY
Status: DISCONTINUED | OUTPATIENT
Start: 2024-05-20 | End: 2024-05-21 | Stop reason: HOSPADM

## 2024-05-20 RX ORDER — CARBOXYMETHYLCELLULOSE SODIUM 5 MG/ML
1 SOLUTION/ DROPS OPHTHALMIC 3 TIMES DAILY
Status: DISCONTINUED | OUTPATIENT
Start: 2024-05-20 | End: 2024-05-21 | Stop reason: HOSPADM

## 2024-05-20 RX ORDER — LEVOTHYROXINE SODIUM 112 UG/1
112 TABLET ORAL DAILY
Status: DISCONTINUED | OUTPATIENT
Start: 2024-05-21 | End: 2024-05-21 | Stop reason: HOSPADM

## 2024-05-20 RX ORDER — ACETAMINOPHEN 325 MG/1
650 TABLET ORAL EVERY 6 HOURS PRN
Status: DISCONTINUED | OUTPATIENT
Start: 2024-05-20 | End: 2024-05-21 | Stop reason: HOSPADM

## 2024-05-20 RX ORDER — LATANOPROST 50 UG/ML
1 SOLUTION/ DROPS OPHTHALMIC AT BEDTIME
Status: DISCONTINUED | OUTPATIENT
Start: 2024-05-20 | End: 2024-05-21 | Stop reason: HOSPADM

## 2024-05-20 RX ORDER — CHLORTHALIDONE 25 MG/1
25 TABLET ORAL DAILY
Status: DISCONTINUED | OUTPATIENT
Start: 2024-05-21 | End: 2024-05-21 | Stop reason: HOSPADM

## 2024-05-20 RX ORDER — AMOXICILLIN 250 MG
2 CAPSULE ORAL 2 TIMES DAILY PRN
Status: DISCONTINUED | OUTPATIENT
Start: 2024-05-20 | End: 2024-05-21 | Stop reason: HOSPADM

## 2024-05-20 RX ORDER — TIMOLOL MALEATE 5 MG/ML
1 SOLUTION/ DROPS OPHTHALMIC EVERY MORNING
Status: DISCONTINUED | OUTPATIENT
Start: 2024-05-21 | End: 2024-05-21 | Stop reason: HOSPADM

## 2024-05-20 RX ADMIN — LATANOPROST 1 DROP: 50 SOLUTION OPHTHALMIC at 22:37

## 2024-05-20 RX ADMIN — LOSARTAN POTASSIUM 100 MG: 100 TABLET, FILM COATED ORAL at 22:54

## 2024-05-20 RX ADMIN — ACETAMINOPHEN 650 MG: 325 TABLET, FILM COATED ORAL at 22:33

## 2024-05-20 ASSESSMENT — ACTIVITIES OF DAILY LIVING (ADL)
ADLS_ACUITY_SCORE: 38
ADLS_ACUITY_SCORE: 26
ADLS_ACUITY_SCORE: 38

## 2024-05-20 ASSESSMENT — COLUMBIA-SUICIDE SEVERITY RATING SCALE - C-SSRS
2. HAVE YOU ACTUALLY HAD ANY THOUGHTS OF KILLING YOURSELF IN THE PAST MONTH?: NO
1. IN THE PAST MONTH, HAVE YOU WISHED YOU WERE DEAD OR WISHED YOU COULD GO TO SLEEP AND NOT WAKE UP?: NO
6. HAVE YOU EVER DONE ANYTHING, STARTED TO DO ANYTHING, OR PREPARED TO DO ANYTHING TO END YOUR LIFE?: NO

## 2024-05-20 NOTE — TELEPHONE ENCOUNTER
Patient called and reports has a  shunt in place since 1987. Patient has had a recent MRI for headache, bleeding risk, dizziness, and hx of hydrocephalus. Patient reports she will have incidents of vision going and can only see swirls of black and gray. Patient will sit down and it can take a few minutes-20 minutes to have vision return to normal. Patient also reports can feel lightheaded or dizzy. Reports these incidents are not often, and happen 3x a year. Patient is scheduled with neurology, but not until August 2024.    Last Wednesday, Saturday and today had these episodes. Patient was making muffins, and was sitting out on the porch. Patient reports lost color in vision and could only see gray and black swirls. Patient had sat for about 20 minutes. After the timer went off, patient went to get up and blacked out, fell, and must have hit her head. Patient reports no bleeding, but awoke laying on the ground.  is with her now. Patient is unsure of how long she was unconscious for. She pulled the muffins out, and were not burned, so patient does not think she was unconscious for long.    Disposition: CALL  NOW, or encouraged to go to the ED now. Patient will have  drive her to [facility]. Patient is not currently dizzy. Encouraged patient to call 911 if feeling faint, having vision changes, chest pain, shortness of breath.    Reason for Disposition   Fainted, and still feels dizzy afterwards    Additional Information   Negative: Difficult to awaken or acting confused (e.g., disoriented, slurred speech)   Negative: SEVERE difficulty breathing (e.g., struggling for each breath, speaks in single words)   Negative: Shock suspected (e.g., cold/pale/clammy skin, too weak to stand, low BP, rapid pulse)    Protocols used: Dizziness-A-OH      Yesenia Cabrera RN

## 2024-05-20 NOTE — ED TRIAGE NOTES
Patient with 3 episodes(last 6 days) of color changes in her vision, becomes black/gray. Today patient ended up blacking out and falling to the ground. Patient has scab to left temple area.

## 2024-05-20 NOTE — ED PROVIDER NOTES
Emergency Department Note      History of Present Illness     Chief Complaint  Visual Disturbance, Dizziness, and Syncope    HPI  Manasa French is a 78 year old female who presents for fainting.  Patient reports that she has episodes where her vision blurs, becomes black and white like looking through feel negative.  She has had 8 episodes on and off for 15+ years.  Today she had an episode where she went to sit down and when she got back up to do this she still felt that swirling sensation and actually blacked out.  She does not know how long she was out for but she did hit her head.  No nausea or vomiting.  No chest pain or shortness of breath.  No weakness numbness or tingling.  She has talked her primary about this but has not had episodes while in the primary doctor's office.  She reports that she has had 3 episodes since Thursday.  In the last 3 months this has become more frequent.  She is due to follow-up with neurology for concern for over drainage of her  shunt but is not scheduled to see them till August.    Independent Historian  None    Review of External Notes  Reviewed note from 5/7/2024 regarding patient's double vision, vision changes, discussion about possible surgical management.  Additionally reviewed patient's: Note from today 5/20/2024.    MRI BRAIN 4/3/24  MPRESSION:    1. Right frontal ventriculostomy catheter with unchanged ventricular  caliber and morphology.  2. Compared to 10/5/2015, increased extent of diffuse dural  thickening/enhancement, which can be seen in the setting of over  drainage.  3. No acute intracranial abnormality.    Tetanus 2020  Past Medical History   Medical History and Problem List  Past Medical History:   Diagnosis Date    Benign essential hypertension     History of Graves' disease     History of hydrocephalus     Macular degeneration of both eyes     Osteoporosis     Personal history of DVT (deep vein thrombosis) 2019    Postablative hypothyroidism      Primary open angle glaucoma (POAG) of both eyes     Pseudophakia     Sensorineural hearing loss, bilateral        Medications  acetaminophen (TYLENOL) 325 MG tablet  amoxicillin (AMOXIL) 500 MG tablet  calcium carbonate-vitamin D (CALTRATE) 600-10 MG-MCG per tablet  carboxymethylcellulose (CELLUVISC/REFRESH LIQUIGEL) 1 % ophthalmic solution  chlorthalidone (HYGROTON) 25 MG tablet  latanoprost (XALATAN) 0.005 % ophthalmic solution  levothyroxine (SYNTHROID/LEVOTHROID) 112 MCG tablet  losartan (COZAAR) 100 MG tablet  metFORMIN (GLUCOPHAGE XR) 500 MG 24 hr tablet  Multiple Vitamins-Minerals (ICAPS AREDS 2 PO)  timolol maleate (TIMOPTIC) 0.5 % ophthalmic solution  vitamin B-12 (CYANOCOBALAMIN) 1000 MCG tablet  Vitamin D3 (CHOLECALCIFEROL) 25 mcg (1000 units) tablet  Vitamin K 100 MCG TABS  warfarin ANTICOAGULANT (COUMADIN) 5 MG tablet        Surgical History   Past Surgical History:   Procedure Laterality Date    ARTHROPLASTY HIP Right 5/30/2023    Procedure: Right Total Hip Arthroplasty;  Surgeon: Vasu Mixon MD;  Location:  OR    ARTHROSCOPY SHOULDER Left     CATARACT IOL, RT/LT Bilateral     DECOMPRESSION, FUSION LUMBAR POSTERIOR ONE LEVEL, COMBINED      L5-S1    EXCISE GANGLION WRIST Right     HERNIA REPAIR, UMBILICAL      LUMPECTOMY BREAST Left     benign    RECESSION RESECTION (REPAIR STRABISMUS) BILATERAL Bilateral 12/11/2023    Procedure: Bilateral eye muscle correction.;  Surgeon: Antwan Ardon MD;  Location: [facility] OR     Physical Exam   Patient Vitals for the past 24 hrs:   BP Temp Temp src Pulse Resp SpO2   05/20/24 2000 (!) 158/73 -- -- -- -- --   05/20/24 1630 -- -- -- 77 13 --   05/20/24 1534 (!) 142/80 -- -- 71 13 --   05/20/24 1322 (!) 159/59 97.2  F (36.2  C) Temporal 78 18 100 %     Physical Exam  General: Resting on the bed.  Head: No obvious trauma to head.  Ears, Nose, Throat:  External ears normal.  Nose normal.  No pharyngeal erythema, swelling or exudate.  Midline uvula.   Abrasion above left eyebrow.  Clear TMs.  Eyes:  Conjunctivae clear.  Pupils are equal, round, and reactive.   Neck: Normal range of motion.  Neck supple.  Nontender C-spine.  CV: Regular rate and rhythm.  No murmurs.      Respiratory: Effort normal and breath sounds normal.  No wheezing or crackles.   Gastrointestinal: Soft.  No distension. There is no tenderness.    Musculoskeletal: Normal range of motion.  Non tender extremities to palpations.  Nontender thoracic, lumbar spine without step-off or deformity.  Neuro: Alert. Moving all extremities appropriately.  Normal speech.  CN II-XII grossly intact, no pronator drift, normal finger-nose-finger, visual fields intact.  Gross muscle strength intact of the proximal and distal bilateral upper and lower extremities.  Sensation intact to light touch in all 4 extremities.    Skin: Skin is warm and dry.  No rash noted.     Diagnostics   Lab Results   Labs Ordered and Resulted from Time of ED Arrival to Time of ED Departure   TROPONIN T, HIGH SENSITIVITY - Abnormal       Result Value    Troponin T, High Sensitivity 28 (*)    COMPREHENSIVE METABOLIC PANEL - Abnormal    Sodium 138      Potassium 4.1      Carbon Dioxide (CO2) 27      Anion Gap 11      Urea Nitrogen 36.1 (*)     Creatinine 1.36 (*)     GFR Estimate 39 (*)     Calcium 9.8      Chloride 100      Glucose 124 (*)     Alkaline Phosphatase 64      AST 17      ALT 21      Protein Total 6.9      Albumin 4.2      Bilirubin Total 0.6     CBC WITH PLATELETS AND DIFFERENTIAL - Abnormal    WBC Count 7.6      RBC Count 3.22 (*)     Hemoglobin 9.9 (*)     Hematocrit 30.8 (*)     MCV 96      MCH 30.7      MCHC 32.1      RDW 14.1      Platelet Count 226      % Neutrophils 68      % Lymphocytes 22      % Monocytes 7      % Eosinophils 2      % Basophils 1      % Immature Granulocytes 0      NRBCs per 100 WBC 0      Absolute Neutrophils 5.2      Absolute Lymphocytes 1.7      Absolute Monocytes 0.5      Absolute Eosinophils 0.1       Absolute Basophils 0.0      Absolute Immature Granulocytes 0.0      Absolute NRBCs 0.0     INR - Abnormal    INR 2.45 (*)    TROPONIN T, HIGH SENSITIVITY - Abnormal    Troponin T, High Sensitivity 26 (*)    MAGNESIUM - Normal    Magnesium 2.3     PHOSPHORUS - Normal    Phosphorus 3.4     INFLUENZA A/B, RSV, & SARS-COV2 PCR       Imaging  CT Head w/o Contrast   Final Result   IMPRESSION:   1.  No acute intracranial abnormality.   2.  Holohemispheric subdural fluid collections bilaterally are similar in size compared to the prior MRI.   3.  Unchanged ventricular caliber. Unchanged right frontal approach ventricular shunt catheter.      Echocardiogram Complete    (Results Pending)   US Carotid Bilateral    (Results Pending)   US Renal Complete Non-Vascular    (Results Pending)       EKG     ECG results from 05/20/24   EKG 12-lead, tracing only     Value    Systolic Blood Pressure     Diastolic Blood Pressure     Ventricular Rate 73    Atrial Rate 73    CO Interval 208    QRS Duration 88        QTc 462    P Axis 67    R AXIS -13    T Axis 64    Interpretation ECG      Sinus rhythm  Possible Left atrial enlargement  Minimal voltage criteria for LVH, may be normal variant ( Sokolow-Wagner )  Possible Anterior infarct , age undetermined  Abnormal ECG  When compared with ECG of 20-NOV-2020 12:54,  Premature atrial complexes are no longer Present  Questionable change in QRS axis  Confirmed by GENERATED REPORT, COMPUTER ([id number]),  Gavi Castrejon ([id number]) on 5/20/2024 2:04:24 PM           Independent Interpretation  CT head shows subdurals noted   ED Course    Medications Administered  Medications   lidocaine 1 % 0.1-1 mL (has no administration in time range)   lidocaine (LMX4) cream (has no administration in time range)   sodium chloride (PF) 0.9% PF flush 3 mL ( Intracatheter Canceled Entry 5/20/24 2049)   sodium chloride (PF) 0.9% PF flush 3 mL (has no administration in time range)   senna-docusate  (SENOKOT-S/PERICOLACE) 8.6-50 MG per tablet 1 tablet (has no administration in time range)     Or   senna-docusate (SENOKOT-S/PERICOLACE) 8.6-50 MG per tablet 2 tablet (has no administration in time range)   calcium carbonate (TUMS) chewable tablet 1,000 mg (has no administration in time range)   carboxymethylcellulose PF (REFRESH PLUS) 0.5 % ophthalmic solution 1 drop (has no administration in time range)   chlorthalidone (HYGROTON) tablet 25 mg (has no administration in time range)   latanoprost (XALATAN) 0.005 % ophthalmic solution 1 drop (has no administration in time range)   levothyroxine (SYNTHROID/LEVOTHROID) tablet 112 mcg (has no administration in time range)   losartan (COZAAR) tablet 100 mg (has no administration in time range)   timolol maleate (TIMOPTIC) 0.5 % ophthalmic solution 1 drop (has no administration in time range)       Procedures  Procedures     Discussion of Management  Admitting Hospitalist, Dr Magana  Neurosurgery, Ember Brooks    Social Determinants of Health adding to complexity of care  None    ED Course  ED Course as of 05/20/24 2106   Mon May 20, 2024   1845 I spoke with Ember Brooks from neurosurgery.  Recommends admission.  Repeat CT scan the morning.  She plans to review chart.  Hold anticoagulant for now.  No indication for reversal at this time.   1938 I spoke with Dr MAGANA for admission      Medical Decision Making / Diagnosis   CMS Diagnoses: None    MIPS     None    [facility]  Manasa French is a 78 year old female who presents emergency department with syncopal episode.  Vital signs are reassuring.  Broad differential was pursued include not limited to ACS, arrhythmia, electrolyte, metabolic, renal dysfunction, stroke, tumor, intracranial hemorrhage, etc.  CBC with chronic anemia but no leukocytosis.  BMP with chronic kidney disease but no electrolyte or metabolic abnormalities.  Magnesium level normal.  INR therapeutic at 2.45, history of DVT.  EKG showing sinus  rhythm.  No acute ischemic change.  Normal intervals.  No signs of arrhythmia.  Troponin minimally elevated 28, downtrending to 26.  No chest pain or shortness of breath.  Patient being appropriately anticoagulated makes PE unlikely.  Given that she did fall and hit her head CT scan was obtained.  CT shows subacute/chronic subdurals.  Neurosurgery consulted.  Recommend holding anticoagulation.  No indication for reversal at this time.  No emergent neurosurgical procedures are planned but repeat head CT in the morning.  Patient observed on telemetry without any arrhythmia.  Unclear etiology of syncope.  Given concern for subdural and syncopal episode plan for admission.  Discussed with hospitalist who graciously admitted.    Disposition  The patient was admitted to the hospital.     ICD-10 Codes:    ICD-10-CM    1. Syncope, unspecified syncope type  R55       2. Subdural hematoma (H)  S06.5XAA            Discharge Medications  New Prescriptions    No medications on file         MD Honorio Velasquez Jennifer L, MD  05/20/24 [id number]

## 2024-05-20 NOTE — ED NOTES
[facility]  ED Nurse Handoff Report    ED Chief complaint: Visual Disturbance, Dizziness, and Syncope      ED Diagnosis:   Final diagnoses:   None       Code Status: not addressed at this time    Allergies:   Allergies   Allergen Reactions    Alphagan [Brimonidine] Other (See Comments)     Itchy eyes    Cosopt [Dorzolamide Hcl-Timolol Mal] Other (See Comments)     Itchy eyes       Patient Story: Patient with 3 episodes(last 6 days) of color changes in her vision, becomes black/gray. Today patient ended up blacking out and falling to the ground. Patient has scab to left temple area.     Focused Assessment:  Patient has scab to left side of head. States that she has had these vision changes for the last few years, but they have been happening more frequently. Today when patient tried to stand to get muffins out of the oven had LOC. Patient thinks she may have hit her head, but doesn't think she was down long since her muffins didn't burn.     Treatments and/or interventions provided: IV, labs, CT scan  Patient's response to treatments and/or interventions: No change    To be done/followed up on inpatient unit:  Continue to monitor     Does this patient have any cognitive concerns?:  A/Ox4    Activity level - Baseline/Home:  Walker  Activity Level - Current:   Walker    Patient's Preferred language: English   Needed?: No    Isolation: None  Infection: Not Applicable  Patient tested for COVID 19 prior to admission: NO  Bariatric?: No    Vital Signs:   Vitals:    05/20/24 1322 05/20/24 1534   BP: (!) 159/59 (!) 142/80   Pulse: 78 71   Resp: 18 13   Temp: 97.2  F (36.2  C)    TempSrc: Temporal    SpO2: 100%        Cardiac Rhythm:     Was the PSS-3 completed:   Yes  What interventions are required if any?               Family Comments:  at bedside   OBS brochure/video discussed/provided to patient/family: N/A              Name of person given brochure if not patient: NA               Relationship to patient: NA    For the majority of the shift this patient's behavior was Green.   Behavioral interventions performed were None.    ED NURSE PHONE NUMBER: [phone]

## 2024-05-20 NOTE — PROGRESS NOTES
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     Head CT shows small bilateral chronic subdural hematoma's    Head CT 5/20/24    IMPRESSION:  1.  No acute intracranial abnormality.  2.  Holohemispheric subdural fluid collections bilaterally are similar in size compared to the prior MRI.  3.  Unchanged ventricular caliber. Unchanged right frontal approach ventricular shunt catheter.    Brain MRI 4/30/24    IMPRESSION:    1. Right frontal ventriculostomy catheter with unchanged ventricular  caliber and morphology.  2. Compared to 10/5/2015, increased extent of diffuse dural  thickening/enhancement, which can be seen in the setting of over  drainage.  3. No acute intracranial abnormality.    RECOMMENDATIONS:  Admit for observation.  Hold Coumadin.  Repeat head CT tomorrow at 6 AM  Every 4 hour neuro checks.  BP less than 150 systolic.    CLAUDIA Hemphill  [facility]  [address] [address]    Tel [phone]  Pager [phone]

## 2024-05-21 ENCOUNTER — APPOINTMENT (OUTPATIENT)
Dept: CARDIOLOGY | Facility: CLINIC | Age: 79
End: 2024-05-21
Attending: HOSPITALIST
Payer: MEDICARE

## 2024-05-21 ENCOUNTER — APPOINTMENT (OUTPATIENT)
Dept: CARDIOLOGY | Facility: CLINIC | Age: 79
End: 2024-05-21
Attending: INTERNAL MEDICINE
Payer: MEDICARE

## 2024-05-21 ENCOUNTER — APPOINTMENT (OUTPATIENT)
Dept: CT IMAGING | Facility: CLINIC | Age: 79
End: 2024-05-21
Attending: INTERNAL MEDICINE
Payer: MEDICARE

## 2024-05-21 ENCOUNTER — DOCUMENTATION ONLY (OUTPATIENT)
Dept: ANTICOAGULATION | Facility: CLINIC | Age: 79
End: 2024-05-21

## 2024-05-21 VITALS
SYSTOLIC BLOOD PRESSURE: 138 MMHG | OXYGEN SATURATION: 97 % | RESPIRATION RATE: 14 BRPM | HEART RATE: 84 BPM | DIASTOLIC BLOOD PRESSURE: 69 MMHG | TEMPERATURE: 97.8 F

## 2024-05-21 DIAGNOSIS — Z86.718 PERSONAL HISTORY OF DVT (DEEP VEIN THROMBOSIS): ICD-10-CM

## 2024-05-21 DIAGNOSIS — I82.A11 ACUTE DEEP VEIN THROMBOSIS (DVT) OF AXILLARY VEIN OF RIGHT UPPER EXTREMITY (H): Primary | ICD-10-CM

## 2024-05-21 PROBLEM — R55 SYNCOPE, UNSPECIFIED SYNCOPE TYPE: Status: ACTIVE | Noted: 2024-05-21

## 2024-05-21 LAB
ANION GAP SERPL CALCULATED.3IONS-SCNC: 12 MMOL/L (ref 7–15)
BUN SERPL-MCNC: 31.6 MG/DL (ref 8–23)
CALCIUM SERPL-MCNC: 9.5 MG/DL (ref 8.8–10.2)
CHLORIDE SERPL-SCNC: 97 MMOL/L (ref 98–107)
CREAT SERPL-MCNC: 1.25 MG/DL (ref 0.51–0.95)
DEPRECATED HCO3 PLAS-SCNC: 26 MMOL/L (ref 22–29)
EGFRCR SERPLBLD CKD-EPI 2021: 44 ML/MIN/1.73M2
ERYTHROCYTE [DISTWIDTH] IN BLOOD BY AUTOMATED COUNT: 14.2 % (ref 10–15)
GLUCOSE BLDC GLUCOMTR-MCNC: 110 MG/DL (ref 70–99)
GLUCOSE BLDC GLUCOMTR-MCNC: 182 MG/DL (ref 70–99)
GLUCOSE SERPL-MCNC: 135 MG/DL (ref 70–99)
HCT VFR BLD AUTO: 32.3 % (ref 35–47)
HGB BLD-MCNC: 10.5 G/DL (ref 11.7–15.7)
INR PPP: 2.17 (ref 0.85–1.15)
LVEF ECHO: NORMAL
MAGNESIUM SERPL-MCNC: 2.1 MG/DL (ref 1.7–2.3)
MCH RBC QN AUTO: 30.8 PG (ref 26.5–33)
MCHC RBC AUTO-ENTMCNC: 32.5 G/DL (ref 31.5–36.5)
MCV RBC AUTO: 95 FL (ref 78–100)
PHOSPHATE SERPL-MCNC: 3.3 MG/DL (ref 2.5–4.5)
PLATELET # BLD AUTO: 250 10E3/UL (ref 150–450)
POTASSIUM SERPL-SCNC: 4.1 MMOL/L (ref 3.4–5.3)
RBC # BLD AUTO: 3.41 10E6/UL (ref 3.8–5.2)
SODIUM SERPL-SCNC: 135 MMOL/L (ref 135–145)
WBC # BLD AUTO: 6.5 10E3/UL (ref 4–11)

## 2024-05-21 PROCEDURE — 85014 HEMATOCRIT: CPT | Performed by: INTERNAL MEDICINE

## 2024-05-21 PROCEDURE — 99239 HOSP IP/OBS DSCHRG MGMT >30: CPT | Performed by: HOSPITALIST

## 2024-05-21 PROCEDURE — 93306 TTE W/DOPPLER COMPLETE: CPT | Mod: 26 | Performed by: INTERNAL MEDICINE

## 2024-05-21 PROCEDURE — G0378 HOSPITAL OBSERVATION PER HR: HCPCS

## 2024-05-21 PROCEDURE — 85610 PROTHROMBIN TIME: CPT | Performed by: INTERNAL MEDICINE

## 2024-05-21 PROCEDURE — 70450 CT HEAD/BRAIN W/O DYE: CPT | Mod: MG

## 2024-05-21 PROCEDURE — 93306 TTE W/DOPPLER COMPLETE: CPT

## 2024-05-21 PROCEDURE — 80048 BASIC METABOLIC PNL TOTAL CA: CPT | Performed by: INTERNAL MEDICINE

## 2024-05-21 PROCEDURE — 84100 ASSAY OF PHOSPHORUS: CPT | Performed by: INTERNAL MEDICINE

## 2024-05-21 PROCEDURE — 99203 OFFICE O/P NEW LOW 30 MIN: CPT | Mod: FS | Performed by: NEUROLOGICAL SURGERY

## 2024-05-21 PROCEDURE — 250N000013 HC RX MED GY IP 250 OP 250 PS 637: Performed by: INTERNAL MEDICINE

## 2024-05-21 PROCEDURE — 93272 ECG/REVIEW INTERPRET ONLY: CPT | Performed by: INTERNAL MEDICINE

## 2024-05-21 PROCEDURE — 83735 ASSAY OF MAGNESIUM: CPT | Performed by: INTERNAL MEDICINE

## 2024-05-21 PROCEDURE — 93270 REMOTE 30 DAY ECG REV/REPORT: CPT

## 2024-05-21 PROCEDURE — 250N000009 HC RX 250: Performed by: INTERNAL MEDICINE

## 2024-05-21 PROCEDURE — 36415 COLL VENOUS BLD VENIPUNCTURE: CPT | Performed by: INTERNAL MEDICINE

## 2024-05-21 RX ORDER — DEXTROSE MONOHYDRATE 25 G/50ML
25-50 INJECTION, SOLUTION INTRAVENOUS
Status: DISCONTINUED | OUTPATIENT
Start: 2024-05-21 | End: 2024-05-21 | Stop reason: HOSPADM

## 2024-05-21 RX ORDER — DEXTROSE MONOHYDRATE 25 G/50ML
25-50 INJECTION, SOLUTION INTRAVENOUS
Status: DISCONTINUED | OUTPATIENT
Start: 2024-05-21 | End: 2024-05-21

## 2024-05-21 RX ORDER — NICOTINE POLACRILEX 4 MG
15-30 LOZENGE BUCCAL
Status: DISCONTINUED | OUTPATIENT
Start: 2024-05-21 | End: 2024-05-21

## 2024-05-21 RX ORDER — WARFARIN SODIUM 5 MG/1
TABLET ORAL
Status: SHIPPED
Start: 2024-05-28 | End: 2024-08-08

## 2024-05-21 RX ORDER — NICOTINE POLACRILEX 4 MG
15-30 LOZENGE BUCCAL
Status: DISCONTINUED | OUTPATIENT
Start: 2024-05-21 | End: 2024-05-21 | Stop reason: HOSPADM

## 2024-05-21 RX ADMIN — TIMOLOL MALEATE 1 DROP: 5 SOLUTION/ DROPS OPHTHALMIC at 08:02

## 2024-05-21 RX ADMIN — LEVOTHYROXINE SODIUM 112 MCG: 112 TABLET ORAL at 06:43

## 2024-05-21 RX ADMIN — CARBOXYMETHYLCELLULOSE SODIUM 1 DROP: 5 SOLUTION/ DROPS OPHTHALMIC at 14:03

## 2024-05-21 RX ADMIN — CHLORTHALIDONE 25 MG: 25 TABLET ORAL at 08:01

## 2024-05-21 ASSESSMENT — ACTIVITIES OF DAILY LIVING (ADL)
ADLS_ACUITY_SCORE: 26

## 2024-05-21 NOTE — DISCHARGE SUMMARY
[facility]    Discharge Summary  Hospitalist    Date of Admission:  5/20/2024  Date of Discharge:  5/21/2024    Discharge Diagnoses      Syncope, unspecified syncope type  Subdural hematoma (H)   (ventriculoperitoneal) shunt status  Acute deep vein thrombosis (DVT) of axillary vein of right upper extremity (H)  Long term current use of anticoagulant therapy    History of Present Illness   Manasa French is an 78 year old female who presented with syncope     Hospital Course   Manasa French was admitted on 5/20/2024.  The following problems were addressed during her hospitalization:    Manasa French is a 78 year old female admitted on 5/20/2024. She has a history of  shunt 1987 (hydrocephalus), osteoporosis, strabismus, glaucoma, Arnold Chiari Malformation, HTN, DMII (Metformin)   She presents after a syncopal episode , unwitnessed  She had struck her left temple area.      Head CT in ED shows no acute chanes, she does have chronic sudural fluid collections unchanged since 4/2024 MRI.   catheter unchanged      ED reviewed with Neurosurgery: they would like coumadin held and repeat Head CT in the morning     Other labs basically unremarkable.      See HPI for details about her h/o spells involving visual changes..         Syncope with prodrome   Head injury (due to above)   Chronic subdural fluid collections on imaging (5/20 CT and 4/3 MRI)  Spells  involving visual changes.   Arnold Chiari Malformation   Ddx = seizure, atypical migraine, cardiac, Arnold Chiari malformation     -- See detailed HPI re: details about her spells---      - tele  - Echo done.  Read pending.  - Neurology consult (defer to NEurology if EEG appropriate)  -  Bilateral carotid ultrasound done 05/20/24, < 50% stenosis bilaterally  - Neurosurgery involved: Coumadin held.  Head CT done this morning.  Shows no new changes.  Recommend holding Coumadin for a week and restart after.  Plan on a repeat head CT  in 1 month and reevaluate in neurosurgery clinic.  -Patient had an MRI on April 30 that showed increased extent of diffuse dural thickening/enhancement which can be seen in the setting of full drainage.  Discussed with neurosurgery regarding this.  They plan to follow-up as outpatient and reimage.  Her shunt is nonadjustable.  Patient will need a revision surgery if she continues to have excessive drainage.  - orthostatic not significantly positive,   - pt was evaluated by neurology and cleared for discharge with recommendation for out pt follow up with neurophthalmology. 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   - could be ARB & Thiazide side effect   -Recheck creatinine at 1.25 on 5/21/2024     HTN   Continue pta losartan, chlorthalidone     DMII   A1C 6.6 on 4/29/24  Metformin monotherapy:  Hold during admission  Use SSi only if BS persistently above 160     Iatragenic hypothryoid (s/p ablation for graves)  TSH 2.54 on   3/19/24  Continue pta levothyroid  Noted.         Diet: Combination Diet Regular Diet Adult    DVT Prophylaxis: Pneumatic Compression Devices  (will likely resume coumadin once cleared by neurosurgery)  Cole Catheter: Not present  Lines: None     Cardiac Monitoring: None  Code Status: Full Code    Disposition Plan        Clinically Significant Risk Factors Present on Admission               # Drug Induced Coagulation Defect: home medication list includes an anticoagulant medication    # Hypertension: Noted on problem list     # DMII: A1C = 6.6 % (Ref range: 0.0 - 5.6 %) within past 6 months                Ashlyn Ugalde MD, MD        Code Status   Full Code       [facility]    Physical Exam                      There were no vitals filed for this visit.  Vital Signs with Ranges     No intake/output data recorded.    Physical Exam  Constitutional:       Appearance: Normal appearance.   Cardiovascular:      Rate and Rhythm: Normal rate and regular rhythm.      Pulses: Normal pulses.      Heart sounds: Normal heart sounds.   Pulmonary:      Effort: Pulmonary effort is normal. No respiratory distress.      Breath sounds: Normal breath sounds.   Abdominal:      General: Abdomen is flat. Bowel sounds are normal. There is no distension.      Tenderness: There is no abdominal tenderness. There is no guarding.   Skin:     General: Skin is warm and dry.   Neurological:      General: No focal deficit present.      Comments: Generalised weakness            Discharge Disposition   Discharged to home  Condition at discharge: Stable    Consultations This Hospital Stay   NEUROLOGY IP CONSULT  NEUROSURGERY IP CONSULT    Time Spent on this Encounter   Ashlyn BORRERO MD, personally saw the patient today and spent greater than 30 minutes discharging this patient.    Discharge Orders      CT Head w/o contrast*     Reason for your hospital stay    syncope     Follow-up and recommended labs and tests      Follow up with primary care provider, Jana Giles, within 7 days for hospital follow- up.  The following labs/tests are recommended: cbc, bmp in 1 week .  Follow upw ith neurosurgery in 4 weeks   Hold coumadin for 1 week     Activity    Your activity upon discharge: activity as tolerated     Cardiac Event Monitor Adult/Pediatric     Diet    Follow this diet upon discharge: Orders Placed This Encounter      Combination Diet Regular Diet Adult     Discharge Medications   Discharge Medication List as of 5/21/2024  3:46 PM        CONTINUE these medications which have CHANGED    Details   Vitamin K 100 MCG TABS Take 1 tablet by mouth daily, No Print Out      warfarin ANTICOAGULANT (COUMADIN) 5 MG tablet Take 1-1/2 tablets (7.5 mg) by mouth every Mon, Wed, Fri; and Take 1 tablet (5 mg) all other days of the week or as directed by your ACC team based on INR results., No Print OutHold for a week           CONTINUE these medications which have NOT CHANGED    Details   acetaminophen (TYLENOL) 325 MG tablet Take 2 tablets (650 mg) by mouth every 4 hours as needed for other (mild pain), Disp-100 tablet, R-0, E-Prescribe      amoxicillin (AMOXIL) 500 MG tablet 4 tablets by mouth one hour prior to dental procedure*, Historical      calcium carbonate-vitamin D (CALTRATE) 600-10 MG-MCG per tablet Take 1 tablet by mouth 2 times daily, Historical      carboxymethylcellulose (CELLUVISC/REFRESH LIQUIGEL) 1 % ophthalmic solution Place 1 drop into both eyes 3 times daily, Historical      chlorthalidone (HYGROTON) 25 MG tablet Take 1 tablet (25 mg) by mouth daily, Disp-90 tablet, R-3, E-Prescribe      latanoprost (XALATAN) 0.005 % ophthalmic solution INSTILL 1 DROP INTO BOTH EYES AT BEDTIME, Disp-7.5 mL, R-3, E-Prescribe      levothyroxine (SYNTHROID/LEVOTHROID) 112 MCG tablet TAKE 1 TABLET EVERY DAY, Disp-90 tablet, R-3, E-Prescribe      losartan (COZAAR) 100 MG tablet Take 1 tablet (100 mg) by mouth daily TAKE 1 TABLET EVERY DAY,  Disp-90 tablet, R-3, E-Prescribe      metFORMIN (GLUCOPHAGE XR) 500 MG 24 hr tablet Take 1 tablet (500 mg) by mouth daily (with dinner), Disp-90 tablet, R-3, E-Prescribe      Multiple Vitamins-Minerals (ICAPS AREDS 2 PO) Take 1 capsule by mouth 2 times daily, Historical      timolol maleate (TIMOPTIC) 0.5 % ophthalmic solution Place 1 drop into both eyes every morning, Disp-15 mL, R-1, E-Prescribe      vitamin B-12 (CYANOCOBALAMIN) 1000 MCG tablet Take 1,000 mcg by mouth daily, Historical      Vitamin D3 (CHOLECALCIFEROL) 25 mcg (1000 units) tablet Take 1 tablet by mouth daily, Historical           Allergies   Allergies   Allergen Reactions    Alphagan [Brimonidine] Other (See Comments)     Itchy eyes    Cosopt [Dorzolamide Hcl-Timolol Mal] Other (See Comments)     Itchy eyes     Data   Recent Labs   Lab Test 05/21/24  0811 05/20/24  1427 05/17/24  0000 03/22/24  0000 03/19/24  1702   WBC 6.5 7.6  --   --  7.2   HGB 10.5* 9.9*  --   --  11.2*   MCV 95 96  --   --  100    226  --   --  242   INR 2.17* 2.45* 2.6   < >  --     < > = values in this interval not displayed.      Recent Labs   Lab Test 05/21/24  1415 05/21/24  1134 05/21/24  0811 05/20/24  2150 05/20/24  1427 04/29/24  0836   NA  --   --  135  --  138 143   POTASSIUM  --   --  4.1  --  4.1 4.0   CHLORIDE  --   --  97*  --  100 104   CO2  --   --  26  --  27 28   BUN  --   --  31.6*  --  36.1* 37.4*   CR  --   --  1.25*  --  1.36* 1.27*   ANIONGAP  --   --  12  --  11 11   TETE  --   --  9.5  --  9.8 9.8   * 182* 135*   < > 124* 131*    < > = values in this interval not displayed.         Results for orders placed or performed during the hospital encounter of 05/20/24   CT Head w/o Contrast    Narrative    EXAM: CT HEAD W/O CONTRAST  LOCATION: [facility]  DATE: 5/20/2024    INDICATION: fall positive LOC on warfarin  COMPARISON: 4/3/2024. 11/27/2020.  TECHNIQUE: Routine CT Head without IV contrast. Multiplanar  reformats. Dose reduction techniques were used.    FINDINGS:  INTRACRANIAL CONTENTS: Intermediate density subdural fluid collection around the right cerebral convexity measures 4 mm in thickness. Intermediate density subdural fluid collection overlying the left cerebral convexity measures 3 mm in thickness. Small   falcine subdural fluid collections. This is similar compared to the prior CT No midline shift or herniation. Right frontal approach ventricular shunt catheters in place terminating in the frontal left lateral ventricle. Unchanged ventricular caliber.   Right lateral ventricle is nearly completely effaced partially effaced third ventricle. No additional intracranial hemorrhage or abnormal extra axial fluid collection.      VISUALIZED ORBITS/SINUSES/MASTOIDS: No intraorbital abnormality. No paranasal sinus mucosal disease. No middle ear or mastoid effusion.    BONES/SOFT TISSUES: No acute abnormality.      Impression    IMPRESSION:  1.  No acute intracranial abnormality.  2.  Holohemispheric subdural fluid collections bilaterally are similar in size compared to the prior MRI.  3.  Unchanged ventricular caliber. Unchanged right frontal approach ventricular shunt catheter.   US Carotid Bilateral    Narrative    EXAM: US CAROTID BILATERAL  LOCATION: [facility]  DATE: 5/20/2024    INDICATION: syncope with prodrome  COMPARISON: None.  TECHNIQUE: Duplex exam performed utilizing 2D gray-scale imaging, Doppler interrogation with color-flow and spectral waveform analysis. The percent diameter stenosis is determined using NASCET criteria and Society of Radiologists in Ultrasound Consensus   Criteria.    FINDINGS:    RIGHT: Mild plaque at the bifurcation. The peak systolic velocity in the ICA is less than 125 cm/sec, consistent with less than 50% stenosis. Normal velocities in the ECA. Antegrade flow within the vertebral artery.     LEFT: Mild plaque at the bifurcation. The peak systolic  velocity in the ICA is less than 125 cm/sec, consistent with less than 50% stenosis. Normal velocities in the ECA. Antegrade flow within the vertebral artery.    VELOCITY CHART:  CCA   Right: 87/12 cm/s   Left: 92/20 cm/s  ICA   Right: 114/33 cm/s   Left: 116/34 cm/s  ECA   Right: 110/13 cm/s   Left: 83/9 cm/s  ICA/CCA PSV Ratio   Right: 1.32   Left: 1.35      Impression    IMPRESSION:  1.  Mild plaque formation, velocities consistent with less than 50% stenosis in the right internal carotid artery.  2.  Mild plaque formation, velocities consistent with less than 50% stenosis in the left internal carotid artery.  3.  Flow within the vertebral arteries is antegrade.   US Renal Complete Non-Vascular    Narrative    EXAM: US RENAL COMPLETE NON-VASCULAR  LOCATION: [facility]  DATE: 5/20/2024    INDICATION: ckd 3a  COMPARISON: None.  TECHNIQUE: Routine Bilateral Renal and Bladder Ultrasound.    FINDINGS:    RIGHT KIDNEY: 9.0 cm. Normal without hydronephrosis or masses.     LEFT KIDNEY: 9.3 cm. Normal without hydronephrosis or masses.     BLADDER: Completely empty and not visualized.      Impression    IMPRESSION:  Normal kidney ultrasound.   CT Head w/o Contrast    Narrative    EXAM: CT HEAD W/O CONTRAST  LOCATION: [facility]  DATE: 5/21/2024    INDICATION: Recheck. Compare with 5 20 24 Head CT. Syncope.  COMPARISON: 5/20/2024.  TECHNIQUE: Routine CT Head without IV contrast. Multiplanar reformats. Dose reduction techniques were used.    FINDINGS:  INTRACRANIAL CONTENTS: Stable right transfrontal shunt catheter. Stable ventricular caliber. Again seen is a stable intermediate density subdural fluid collection along the right cerebral convexity. Additionally there is a stable thin intermediate   density subdural fluid collection along the left lateral stable convexity, not significantly changed since prior exam. There is no significant midline shift. Stable thin  parafalcine subdural fluid collections. No new subdural fluid collection. No acute   hemorrhage. No acute infarct. Stable brain parenchymal attenuation. Stable brain parenchymal volume. No hydrocephalus.     VISUALIZED ORBITS/SINUSES/MASTOIDS: Prior bilateral cataract surgery. Visualized portions of the orbits are otherwise unremarkable. No paranasal sinus mucosal disease. No middle ear or mastoid effusion.    BONES/SOFT TISSUES: No acute abnormality.      Impression    IMPRESSION:  1.  No significant interval change. Grossly stable exam.   Echocardiogram Complete     Value    LVEF  60-65%    [facility]    [id number]  [id number]  [id number]  [id number]^MINDY^SADI^FROILAN     [facility]  Echocardiography Laboratory  [address] [place] [address]     Name: NIC SALOMON  MRN: [id number]  : 1945  Study Date: 2024 01:36 PM  Age: 78 yrs  Gender: Female  Patient Location: [facility]  Reason For Study: Syncope and Collapse  Ordering Physician: SADI GUILLEN  Referring Physician: Jana Giles  Performed By: Irene Castro     BSA: 1.7 m2  Height: 64 in  Weight: 148 lb  HR: 82  BP: 142/80 mmHg  ______________________________________________________________________________  Procedure  Complete Portable Echo Adult.  ______________________________________________________________________________  Interpretation Summary     There is mild concentric left ventricular hypertrophy.  The visual ejection fraction is 60-65%.  The right ventricle is normal in structure, function and size.  The left atrium is mildly dilated.  There is mild mitral stenosis.  There is mild (1+) tricuspid regurgitation.  There is mild (1+) mitral regurgitation.  There is mild (1+) aortic regurgitation.  ______________________________________________________________________________  Left Ventricle  The left ventricle is normal in structure, function and size. There is mild  concentric left ventricular hypertrophy. The  visual ejection fraction is 60-  65%. No regional wall motion abnormalities noted.     Right Ventricle  The right ventricle is normal in structure, function and size.     Atria  The left atrium is mildly dilated. Right atrial size is normal. There is no  color Doppler evidence of an atrial shunt.     Mitral Valve  There is mild mitral annular calcification. There is mild (1+) mitral  regurgitation. The mean mitral valve gradient is 5.3 mmHg. There is mild  mitral stenosis.     Tricuspid Valve  The tricuspid valve is normal in structure and function. There is mild (1+)  tricuspid regurgitation. The right ventricular systolic pressure is  approximated at 38.7 mmHg plus the right atrial pressure. Mild (35-45mmHg)  pulmonary hypertension is present.     Aortic Valve  There is mild trileaflet aortic sclerosis. There is mild (1+) aortic  regurgitation.     Pulmonic Valve  The pulmonic valve is not well seen, but is grossly normal.     Vessels  Normal size aorta. Normal size ascending aorta.     Pericardium  There is no pericardial effusion.     Rhythm  Sinus rhythm was noted.  ______________________________________________________________________________  MMode/2D Measurements & Calculations  IVSd: 1.1 cm     LVIDd: 3.3 cm  LVIDs: 2.5 cm  LVPWd: 1.3 cm  FS: 23.7 %  LV mass(C)d: 129.1 grams  LV mass(C)dI: 75.0 grams/m2  Ao root diam: 3.5 cm  LA dimension: 2.5 cm  asc Aorta Diam: 3.7 cm  LA/Ao: 0.72  LVOT diam: 1.9 cm  LVOT area: 2.8 cm2  Ao root diam index Ht(cm/m): 2.2  Ao root diam index BSA (cm/m2): 2.1  Asc Ao diam index BSA (cm/m2): 2.2  Asc Ao diam index Ht(cm/m): 2.3  LA Volume (BP): 36.8 ml     LA Volume Index (BP): 21.4 ml/m2  RWT: 0.79     Doppler Measurements & Calculations  MV E max catherine: 82.7 cm/sec  MV A max catherine: 123.7 cm/sec  MV E/A: 0.67  MV max P.4 mmHg  MV mean P.3 mmHg  MV V2 VTI: 35.8 cm  MVA(VTI): 2.2 cm2  MV dec slope: 326.2 cm/sec2  MV dec time: 0.25 sec  LV V1 max P.8 mmHg  LV V1 max:  109.7 cm/sec  LV V1 VTI: 27.8 cm  SV(LVOT): 79.0 ml  SI(LVOT): 45.9 ml/m2  PA acc time: 0.15 sec  TR max omid: 299.9 cm/sec  TR max P.7 mmHg  E/E' av.2  Lateral E/e': 11.3  Medial E/e': 13.1  RV S Omid: 16.0 cm/sec     ______________________________________________________________________________  Report approved by: Jet Benjamin 2024 02:45 PM

## 2024-05-21 NOTE — H&P
[facility]    History and Physical - Hospitalist Service       Date of Admission:  5/20/2024    Assessment & Plan      Manasa French is a 78 year old female admitted on 5/20/2024. She has a history of  shunt 1987 (hydrocephalus), osteoporosis, strabismus, glaucoma, Arnold Chiari Malformation, HTN, DMII (Metformin)     She presents after a syncopal episode today, unwitnessed  She had struck her left temple area.     Head CT in ED shows no acute chanes, she does have chronic sudural fluid collections unchanged since 4/2024 MRI.   catheter unchanged     ED reviewed with Neurosurgery: they would like coumadin held and repeat Head CT in the morning    Other labs basically unremarkable.     See HPI for details about her h/o spells involving visual changes..       Syncope with prodrome   Head injury (due to above)   Chronic subdural fluid collections on imaging (5/20 CT and 4/3 MRI)  Spells  involving visual changes.   Arnold Chiari Malformation   Ddx = seizure, atypical migraine, cardiac, Arnold Chiari malformation    -- See detailed HPI re: details about her spells---     - tele  - Echo ordered (8/2023 Echo LVH, otherwise normal)  - Neurology consult (defer to NEurology if EEG appropriate)  -  Bilateral carotid ultrasound done 05/20/24, < 50% stenosis bilaterally  - Neurosurgery involved:   hold coumadin, repeat Head CT in am.      H/p  shut for hydrocephalus (1987)    - Per MRI imaging 4/3/24, shunt may be overdraining  -defer to neurosurgery    H/o multiple eye issues  - strabismus (s/p surgery)  - mag degeneration  - gaze evoked nystagmus  (thought to be associated with her Arnold Chiari malformation, per Ophthalmology notes)   - glaucoma  >> this is all noted, doubt these are contributing to her visual spells.     H/o unprovoked RUE DVT,  2019.  on lifelong coumadin  - holding coumadin, per NSG  - she takes vitamin K supplements with the coumadin for INR stability  - check INR in  am    - (both the coumadin and Vit K supplements on hold)     Elevated Troponin    28 ==> 26   (Flat)  Echo ordered as above  Low threshhold to Consult Cardiology  At baseline very active, denies cp, alexander    CKD 3a   Review of serial creatinines show the values increasing  1.36 (5/20/24) << 1.2x past 2 months << 0.9x 2023  - 05/20/24 Renal US  is normal.   - could be ARB & Thiazide side effect   - recheck BMP in am    HTN   Continue pta losartan, chlorthalidone    DMII   A1C 6.6 on 4/29/24  Metformin monotherapy:  Hold during admission  Use SSi only if BS persistently above 160    Iatragenic hypothryoid (s/p ablation for graves)  TSH 2.54 on  3/19/24  Continue pta levothyroid  Noted.             Diet: Combination Diet Regular Diet Adult    DVT Prophylaxis: Pneumatic Compression Devices  (will likely resume coumadin once cleared by neurosurgery)  Cole Catheter: Not present  Lines: None     Cardiac Monitoring: None  Code Status: Full Code      Clinically Significant Risk Factors Present on Admission               # Drug Induced Coagulation Defect: home medication list includes an anticoagulant medication    # Hypertension: Noted on problem list     # DMII: A1C = 6.6 % (Ref range: 0.0 - 5.6 %) within past 6 months               Disposition Plan     Medically Ready for Discharge: Anticipated in 2-4 Days           Shanta Magana MD  Hospitalist Service  [facility]  Securely message with VCharge (more info)  Text page via [employer] Paging/Directory     ______________________________________________________________________    Chief Complaint    Syncope, unwitnessed.     History is obtained from the patient, Epic review, ER staff        History of Present Illness   Manasa French is a 78 year old female who had LOC today    She describes the following:   > past 15 years has had visual episodes associated with a presyncopal feeling.   These would occur about 3X per year, and she would just sit  "and rest until it resolved.  The visions would look like grey and black swirls.  Last 15-20\"   No headache or other associated symptoms.   > 11/2020 she had one of these spells and lost consciousness while shopping at [employer]. This was witnessed and when she awoke \"there were EMTs and people all around me\"    She did not have incontinence nor confusion upon awakening.  She was brought to ER and diagnosed with COVID.   Then sent home.      TODAY, 5/20/24, she was making muffins.  While they were baking, she sat on her porch.  Started to notice one of her visual spells come on.  But she become concerned about her muffins, so she got up to go check on them.   She had not entered the kitchen yet, and was suddenly down.   Unwitnessed. Does not know how much time she lost.  Struck her L temple.   No incontinence nor confusion upon coming to.    Of note, she reports having checked her BP during a few of these spells in the past, and her BP had been \"low\".           Past Medical History    Past Medical History:   Diagnosis Date    Benign essential hypertension     History of Graves' disease     s/p BARAHONA    History of hydrocephalus     s/p  shunt placement in 1987    Macular degeneration of both eyes     Osteoporosis     Personal history of DVT (deep vein thrombosis) 2019    RUE; on chronic AC    Postablative hypothyroidism     Primary open angle glaucoma (POAG) of both eyes     right eye moderate, left eye mild    Pseudophakia     Sensorineural hearing loss, bilateral        Past Surgical History   Past Surgical History:   Procedure Laterality Date    ARTHROPLASTY HIP Right 5/30/2023    Procedure: Right Total Hip Arthroplasty;  Surgeon: Vasu Mixon MD;  Location: [facility] OR    ARTHROSCOPY SHOULDER Left     CATARACT IOL, RT/LT Bilateral     DECOMPRESSION, FUSION LUMBAR POSTERIOR ONE LEVEL, COMBINED      L5-S1    EXCISE GANGLION WRIST Right     HERNIA REPAIR, UMBILICAL      LUMPECTOMY BREAST Left     benign    RECESSION " RESECTION (REPAIR STRABISMUS) BILATERAL Bilateral 2023    Procedure: Bilateral eye muscle correction.;  Surgeon: Antwan Ardon MD;  Location: [facility] OR       Prior to Admission Medications   Prior to Admission Medications   Prescriptions Last Dose Informant Patient Reported? Taking?   Multiple Vitamins-Minerals (ICAPS AREDS 2 PO)  Self Yes No   Sig: Take 1 capsule by mouth 2 times daily   Vitamin D3 (CHOLECALCIFEROL) 25 mcg (1000 units) tablet  Self Yes No   Sig: Take 1 tablet by mouth daily   Vitamin K 100 MCG TABS  Self No No   Sig: Take 1 tablet by mouth daily   acetaminophen (TYLENOL) 325 MG tablet   No No   Sig: Take 2 tablets (650 mg) by mouth every 4 hours as needed for other (mild pain)   amoxicillin (AMOXIL) 500 MG tablet   Yes No   Si tablets by mouth one hour prior to dental procedure*   calcium carbonate-vitamin D (CALTRATE) 600-10 MG-MCG per tablet  Self Yes No   Sig: Take 1 tablet by mouth 2 times daily   carboxymethylcellulose (CELLUVISC/REFRESH LIQUIGEL) 1 % ophthalmic solution  Self Yes No   Sig: Place 1 drop into both eyes 3 times daily   chlorthalidone (HYGROTON) 25 MG tablet   No No   Sig: Take 1 tablet (25 mg) by mouth daily   latanoprost (XALATAN) 0.005 % ophthalmic solution   No No   Sig: INSTILL 1 DROP INTO BOTH EYES AT BEDTIME   levothyroxine (SYNTHROID/LEVOTHROID) 112 MCG tablet   No No   Sig: TAKE 1 TABLET EVERY DAY   losartan (COZAAR) 100 MG tablet   No No   Sig: Take 1 tablet (100 mg) by mouth daily TAKE 1 TABLET EVERY DAY   metFORMIN (GLUCOPHAGE XR) 500 MG 24 hr tablet   No No   Sig: Take 1 tablet (500 mg) by mouth daily (with dinner)   timolol maleate (TIMOPTIC) 0.5 % ophthalmic solution   No No   Sig: Place 1 drop into both eyes every morning   vitamin B-12 (CYANOCOBALAMIN) 1000 MCG tablet  Self Yes No   Sig: Take 1,000 mcg by mouth daily   warfarin ANTICOAGULANT (COUMADIN) 5 MG tablet   No No   Sig: Take 1-1/2 tablets (7.5 mg) by mouth every Mon, Wed, Fri; and  Take 1 tablet (5 mg) all other days of the week or as directed by your ACC team based on INR results.      Facility-Administered Medications Last Administration Doses Remaining   denosumab (PROLIA) injection 60 mg None recorded 2           Review of Systems    The 10 point Review of Systems is negative other than noted in the HPI or here.       Physical Exam   Vital Signs: Temp: 97.2  F (36.2  C) Temp src: Temporal BP: (!) 142/80 Pulse: 71   Resp: 13 SpO2: 100 % O2 Device: None (Room air)    Weight: 0 lbs 0 oz    Constitutional: awake, alert, cooperative, no apparent distress, and appears stated age  ENT: shunt outline can be palpated along right neck.   Bruising at L temple/forhead  Respiratory: No increased work of breathing, good air exchange, clear to auscultation bilaterally, no crackles or wheezing  Cardiovascular: Normal apical impulse, regular rate and rhythm, normal S1 and S2, no S3 or S4, and no murmur noted  Musculoskeletal: no lower extremity pitting edema present  Neurologic: Awake, alert, oriented to name, place and time.  Cranial nerves II-XII are grossly intact.  Motor is 5 out of 5 bilaterally.   and gait is normal.  Neuropsychiatric: General: normal, calm, and normal eye contact  Affect: normal, pleasant, and full  Memory and insight: normal, memory for past and recent events intact, and thought process normal    Medical Decision Making       85 MINUTES SPENT BY ME on the date of service doing chart review, history, exam, documentation & further activities per the note.      Data     I have personally reviewed the following data over the past 24 hrs:    7.6  \   9.9 (L)   / 226     138 100 36.1 (H) /  159 (H)   4.1 27 1.36 (H) \     ALT: 21 AST: 17 AP: 64 TBILI: 0.6   ALB: 4.2 TOT PROTEIN: 6.9 LIPASE: N/A     Trop: 26 (H) BNP: N/A     INR:  2.45 (H) PTT:  N/A   D-dimer:  N/A Fibrinogen:  N/A       Imaging results reviewed over the past 24 hrs:   Recent Results (from the past 24 hour(s))   CT Head  w/o Contrast    Narrative    EXAM: CT HEAD W/O CONTRAST  LOCATION: [facility]  DATE: 5/20/2024    INDICATION: fall positive LOC on warfarin  COMPARISON: 4/3/2024. 11/27/2020.  TECHNIQUE: Routine CT Head without IV contrast. Multiplanar reformats. Dose reduction techniques were used.    FINDINGS:  INTRACRANIAL CONTENTS: Intermediate density subdural fluid collection around the right cerebral convexity measures 4 mm in thickness. Intermediate density subdural fluid collection overlying the left cerebral convexity measures 3 mm in thickness. Small   falcine subdural fluid collections. This is similar compared to the prior CT No midline shift or herniation. Right frontal approach ventricular shunt catheters in place terminating in the frontal left lateral ventricle. Unchanged ventricular caliber.   Right lateral ventricle is nearly completely effaced partially effaced third ventricle. No additional intracranial hemorrhage or abnormal extra axial fluid collection.      VISUALIZED ORBITS/SINUSES/MASTOIDS: No intraorbital abnormality. No paranasal sinus mucosal disease. No middle ear or mastoid effusion.    BONES/SOFT TISSUES: No acute abnormality.      Impression    IMPRESSION:  1.  No acute intracranial abnormality.  2.  Holohemispheric subdural fluid collections bilaterally are similar in size compared to the prior MRI.  3.  Unchanged ventricular caliber. Unchanged right frontal approach ventricular shunt catheter.   US Renal Complete Non-Vascular    Narrative    EXAM: US RENAL COMPLETE NON-VASCULAR  LOCATION: [facility]  DATE: 5/20/2024    INDICATION: ckd 3a  COMPARISON: None.  TECHNIQUE: Routine Bilateral Renal and Bladder Ultrasound.    FINDINGS:    RIGHT KIDNEY: 9.0 cm. Normal without hydronephrosis or masses.     LEFT KIDNEY: 9.3 cm. Normal without hydronephrosis or masses.     BLADDER: Completely empty and not visualized.      Impression    IMPRESSION:  Normal  kidney ultrasound.   US Carotid Bilateral    Narrative    EXAM: US CAROTID BILATERAL  LOCATION: [facility]  DATE: 5/20/2024    INDICATION: syncope with prodrome  COMPARISON: None.  TECHNIQUE: Duplex exam performed utilizing 2D gray-scale imaging, Doppler interrogation with color-flow and spectral waveform analysis. The percent diameter stenosis is determined using NASCET criteria and Society of Radiologists in Ultrasound Consensus   Criteria.    FINDINGS:    RIGHT: Mild plaque at the bifurcation. The peak systolic velocity in the ICA is less than 125 cm/sec, consistent with less than 50% stenosis. Normal velocities in the ECA. Antegrade flow within the vertebral artery.     LEFT: Mild plaque at the bifurcation. The peak systolic velocity in the ICA is less than 125 cm/sec, consistent with less than 50% stenosis. Normal velocities in the ECA. Antegrade flow within the vertebral artery.    VELOCITY CHART:  CCA   Right: 87/12 cm/s   Left: 92/20 cm/s  ICA   Right: 114/33 cm/s   Left: 116/34 cm/s  ECA   Right: 110/13 cm/s   Left: 83/9 cm/s  ICA/CCA PSV Ratio   Right: 1.32   Left: 1.35      Impression    IMPRESSION:  1.  Mild plaque formation, velocities consistent with less than 50% stenosis in the right internal carotid artery.  2.  Mild plaque formation, velocities consistent with less than 50% stenosis in the left internal carotid artery.  3.  Flow within the vertebral arteries is antegrade.

## 2024-05-21 NOTE — PLAN OF CARE
Pt here with syncopal episode, chronic BL SDH. A&Ox4, forgetful. Neuros stable, [employer], RLE with limited mobility d/t hip replacement. VSS on RA, keep SBP <150. Tele NSR. Regular diet, thin liquids. Takes pills whole. Up with SBA/W. Continent. PIV SL. Mild headache managed well with PRN tylenol. Pt scoring green on the Aggression Stop Light Tool. NSG and General Neurology consulted, repeat head CT completed, plan for echo. Discharge pending.

## 2024-05-21 NOTE — UTILIZATION REVIEW
"Admission Status; Secondary Review Determination     Admission Date: 5/20/2024  1:20 PM       Under the authority of the Utilization Management Committee, the utilization review process indicated a secondary review on the above patient.  The review outcome is based on review of the medical records, discussions with staff, and applying clinical experience noted on the date of the review.          (x) Observation Status Appropriate - This patient does not meet hospital inpatient criteria and is placed in observation status. If this patient's primary payer is Medicare and was admitted as an inpatient, Condition Code 44 should be used and patient status changed to \"observation\".       RATIONALE FOR DETERMINATION      Brief clinical presentation, information copied from the chart, abbreviated and edited for relevant content:     Messaged team to change to OBS.      Ms. French is a 78-year-old female with a history of a right sided nonprogrammable  shunt placed in 1987.  She is on anticoagulation with Coumadin for an unprovoked right upper extremity DVT in 2019.  Yesterday she had a syncopal episode after she stood up from a seated position and fell, had some loss of consciousness, likely brief.  She has a few episodes a year where she gets visual symptoms and dizziness and usually it goes away after she sits down and rests with her eyes closed.  The symptoms have been going on for more than 15 years.  Head CT scan from 5/20/2024 demonstrated No acute intracranial abnormality, Holohemispheric subdural fluid collections bilaterally are similar in size compared to the prior MRI,  Unchanged ventricular caliber. Unchanged right frontal approach ventricular shunt catheter. Repeat head CT scan on 5/21/2024, stable compared to prior. Neurosurgery has seen patient and signed off with recommendations to stay off anticoagulation for 1 week.          The severity of illness, intensity of cares provided, risk for adverse outcome, " and expected LOS make the care appropriate for observation.       The information on this document is developed by the utilization review team in order for the business office to ensure compliance.  This only denotes the appropriateness of proper admission status and does not reflect the quality of care rendered.         The definitions of Inpatient Status and Observation Status used in making the determination above are those provided in the CMS Coverage Manual, Chapter 1 and Chapter 6, section 70.4.      Sincerely,     Jaimie Lynn MD   Utilization Review/ Case Management  [facility].

## 2024-05-21 NOTE — PHARMACY-ADMISSION MEDICATION HISTORY
Pharmacist Admission Medication History    Admission medication history is complete. The information provided in this note is only as accurate as the sources available at the time of the update.    Information Source(s): Patient and CareEverywhere/SureScripts via in-person    Pertinent Information: Warfarin:   Patient is taking warfarin for the indication of hx of DVT with an INR goal of 2-3.   Current dosing regimen is 7.5 mg MWF, 5 mg ROW.   Last dose (5 mg) was taken 5/19 at PM.    Changes made to PTA medication list:  Added: None  Deleted: None  Changed: metformin with dinner --> with breakfast    Allergies reviewed with patient and updates made in EHR: yes    Medication History Completed By: Anna Gonzalez [facility] 5/20/2024 8:40 PM    Current Facility-Administered Medications for the 5/20/24 encounter (Hospital Encounter)   Medication    denosumab (PROLIA) injection 60 mg     PTA Med List   Medication Sig Note Last Dose    acetaminophen (TYLENOL) 325 MG tablet Take 2 tablets (650 mg) by mouth every 4 hours as needed for other (mild pain)  Unknown    amoxicillin (AMOXIL) 500 MG tablet 4 tablets by mouth one hour prior to dental procedure*  Unknown    calcium carbonate-vitamin D (CALTRATE) 600-10 MG-MCG per tablet Take 1 tablet by mouth 2 times daily  5/20/2024    carboxymethylcellulose (CELLUVISC/REFRESH LIQUIGEL) 1 % ophthalmic solution Place 1 drop into both eyes 3 times daily  5/20/2024    chlorthalidone (HYGROTON) 25 MG tablet Take 1 tablet (25 mg) by mouth daily  5/20/2024    latanoprost (XALATAN) 0.005 % ophthalmic solution INSTILL 1 DROP INTO BOTH EYES AT BEDTIME  5/19/2024    levothyroxine (SYNTHROID/LEVOTHROID) 112 MCG tablet TAKE 1 TABLET EVERY DAY  5/20/2024    losartan (COZAAR) 100 MG tablet Take 1 tablet (100 mg) by mouth daily TAKE 1 TABLET EVERY DAY  5/20/2024    metFORMIN (GLUCOPHAGE XR) 500 MG 24 hr tablet Take 1 tablet (500 mg) by mouth daily (with dinner) (Patient taking differently: Take 500 mg by  mouth daily (with breakfast))  5/20/2024    Multiple Vitamins-Minerals (ICAPS AREDS 2 PO) Take 1 capsule by mouth 2 times daily  5/20/2024 at am    timolol maleate (TIMOPTIC) 0.5 % ophthalmic solution Place 1 drop into both eyes every morning  5/20/2024    vitamin B-12 (CYANOCOBALAMIN) 1000 MCG tablet Take 1,000 mcg by mouth daily  5/20/2024    Vitamin D3 (CHOLECALCIFEROL) 25 mcg (1000 units) tablet Take 1 tablet by mouth daily  5/20/2024    Vitamin K 100 MCG TABS Take 1 tablet by mouth daily 5/20/2024: Takes with warfarin in evening 5/19/2024 at PM    warfarin ANTICOAGULANT (COUMADIN) 5 MG tablet Take 1-1/2 tablets (7.5 mg) by mouth every Mon, Wed, Fri; and Take 1 tablet (5 mg) all other days of the week or as directed by your ACC team based on INR results.  5/19/2024 at PM

## 2024-05-21 NOTE — CONSULTS
Neurosurgery Consult    HPI    Ms. French is a 78-year-old female with a history of a right sided nonprogrammable  shunt placed in 1987.  She is on anticoagulation with Coumadin for an unprovoked right upper extremity DVT in 2019.    She states yesterday she had a syncopal episode after she stood up from a seated position and fell, had some loss of consciousness, but probably not for very long because she was going to check on her muffins in the oven, and they did not burn by the time she was able to get there after fall.    She states she usually has a few episodes a year where she gets visual symptoms and dizziness and usually it goes away after she sits down and rests with her eyes closed.  The symptoms have been going on for more than 15 years.    She underwent a brain MRI on 4/30/2024, which showed possible signs of over shunting.    Today she reports no headache.    Medical history   osteoporosis, strabismus, glaucoma, Arnold Chiari Malformation, HTN, DMII (Metformin)        B/P: 150/72, T: 97.7, P: 75, R: 14       Exam    Alert and oriented no acute distress  Moving all extremities  Finger-nose slow and accurate  Negative pronator drift  Extraocular movements intact  Pupils equal and reactive    Imaging    Brain MRI from 4/3/2024 demonstrated    IMPRESSION:    1. Right frontal ventriculostomy catheter with unchanged ventricular  caliber and morphology.  2. Compared to 10/5/2015, increased extent of diffuse dural  thickening/enhancement, which can be seen in the setting of over  drainage.  3. No acute intracranial abnormality.    Head CT scan from 5/20/2024 demonstrated    IMPRESSION:  1.  No acute intracranial abnormality.  2.  Holohemispheric subdural fluid collections bilaterally are similar in size compared to the prior MRI.  3.  Unchanged ventricular caliber. Unchanged right frontal approach ventricular shunt catheter.    Repeat head CT scan on 5/21/2024, stable compared to  prior.    Assessment    Bilateral subdural fluid collections  History of  shunt  History of presyncopal symptoms for many years  Status post fall  Anticoagulated on Coumadin, lifelong, for unprovoked right upper extremity DVT    Plan:      From neurosurgery perspective not recommending any urgent surgical intervention regarding the patient's shunt.  We would recommend she remain off of Coumadin for 1 week, then okay to resume.    Will follow-up in 1 month with a repeat head CT scan as an outpatient in neurosurgery clinic.  Our clinic will arrange follow-up.    Neurosurgery will sign off    Reviewed with Dr. Anthony

## 2024-05-21 NOTE — PROGRESS NOTES
RECEIVING UNIT ED HANDOFF REVIEW    ED Nurse Handoff Report was reviewed by: Juliana Pena RN on May 20, 2024 at 9:29 PM

## 2024-05-21 NOTE — PROGRESS NOTES
[facility]    Hospitalist Progress Note    Interval History   Patient is awake and alert.  No acute events overnight.  Quite upset about having to stay in the hospital for today and await further workup.  Denies any further episodes of syncope.  Patient has been having these episodes on and off for years and usually she knows to just sit down when these episodes come on.  Her episodes almost sound like aura where she loses color and eventually passes out if she is standing up.  Did have a fall and head trauma with this particular syncopal episode.    -Data reviewed today: I reviewed all new labs and imaging results over the last 24 hours. I personally reviewed the head CT image(s) showing holohemispheric subdural fluid collections bilaterally similar in size compared to prior MRI.  Unchanged ventricular caliber.  Unchanged right frontal approach ventricular shunt catheter. .    Physical Exam   Temp: 98  F (36.7  C) Temp src: Oral BP: 125/59 Pulse: 79   Resp: 14 SpO2: 97 % O2 Device: None (Room air)    There were no vitals filed for this visit.  Vital Signs with Ranges  Temp:  [97.7  F (36.5  C)-98.1  F (36.7  C)] 98  F (36.7  C)  Pulse:  [71-89] 79  Resp:  [13-16] 14  BP: (116-158)/(58-82) 125/59  SpO2:  [97 %-98 %] 97 %  No intake/output data recorded.    Physical Exam  Constitutional:       Appearance: Normal appearance.   Cardiovascular:      Rate and Rhythm: Normal rate and regular rhythm.      Pulses: Normal pulses.      Heart sounds: Murmur heard.   Pulmonary:      Effort: Pulmonary effort is normal. No respiratory distress.      Breath sounds: Normal breath sounds.   Abdominal:      General: Abdomen is flat. Bowel sounds are normal. There is no distension.      Tenderness: There is no abdominal tenderness. There is no guarding.   Skin:     General: Skin is warm and dry.   Neurological:      General: No focal deficit present.           Medications   Current Facility-Administered  Medications   Medication Dose Route Frequency Provider Last Rate Last Admin     Current Facility-Administered Medications   Medication Dose Route Frequency Provider Last Rate Last Admin    carboxymethylcellulose PF (REFRESH PLUS) 0.5 % ophthalmic solution 1 drop  1 drop Both Eyes TID Shanta Magana MD   1 drop at 05/21/24 1403    chlorthalidone (HYGROTON) tablet 25 mg  25 mg Oral Daily Shanta Magana MD   25 mg at 05/21/24 0801    insulin aspart (NovoLOG) injection (RAPID ACTING)  1-3 Units Subcutaneous TID AC Ashlyn Ugalde MD        insulin aspart (NovoLOG) injection (RAPID ACTING)  1-3 Units Subcutaneous At Bedtime Ashlyn Ugalde MD        latanoprost (XALATAN) 0.005 % ophthalmic solution 1 drop  1 drop Both Eyes At Bedtime Shanta Magana MD   1 drop at 05/20/24 2237    levothyroxine (SYNTHROID/LEVOTHROID) tablet 112 mcg  112 mcg Oral Daily Shanta Magana MD   112 mcg at 05/21/24 0643    losartan (COZAAR) tablet 100 mg  100 mg Oral Daily Shanta Magana MD   100 mg at 05/20/24 2254    sodium chloride (PF) 0.9% PF flush 3 mL  3 mL Intracatheter Q8H Shanta Magana MD   3 mL at 05/21/24 1145    timolol maleate (TIMOPTIC) 0.5 % ophthalmic solution 1 drop  1 drop Both Eyes QAM Shanta Magana MD   1 drop at 05/21/24 0802       Data   Recent Labs   Lab 05/21/24  1415 05/21/24  1134 05/21/24  0811 05/20/24  2150 05/20/24  1427 05/17/24  0000   0000   WBC  --   --  6.5  --  7.6  --   --    HGB  --   --  10.5*  --  9.9*  --   --    MCV  --   --  95  --  96  --   --    PLT  --   --  250  --  226  --   --    INR  --   --  2.17*  --  2.45* 2.6  --    NA  --   --  135  --  138  --   --    POTASSIUM  --   --  4.1  --  4.1  --   --    CHLORIDE  --   --  97*  --  100  --   --    CO2  --   --  26  --  27  --   --    BUN  --   --  31.6*  --  36.1*  --   --    CR  --   --  1.25*  --  1.36*  --   --    ANIONGAP  --   --  12  --  11  --   --    TETE  --   --  9.5  --  9.8  --   --    GLC  110* 182* 135*   < > 124*  --    < >   ALBUMIN  --   --   --   --  4.2  --   --    PROTTOTAL  --   --   --   --  6.9  --   --    BILITOTAL  --   --   --   --  0.6  --   --    ALKPHOS  --   --   --   --  64  --   --    ALT  --   --   --   --  21  --   --    AST  --   --   --   --  17  --   --     < > = values in this interval not displayed.       Recent Results (from the past 24 hour(s))   CT Head w/o Contrast    Narrative    EXAM: CT HEAD W/O CONTRAST  LOCATION: [facility]  DATE: 5/20/2024    INDICATION: fall positive LOC on warfarin  COMPARISON: 4/3/2024. 11/27/2020.  TECHNIQUE: Routine CT Head without IV contrast. Multiplanar reformats. Dose reduction techniques were used.    FINDINGS:  INTRACRANIAL CONTENTS: Intermediate density subdural fluid collection around the right cerebral convexity measures 4 mm in thickness. Intermediate density subdural fluid collection overlying the left cerebral convexity measures 3 mm in thickness. Small   falcine subdural fluid collections. This is similar compared to the prior CT No midline shift or herniation. Right frontal approach ventricular shunt catheters in place terminating in the frontal left lateral ventricle. Unchanged ventricular caliber.   Right lateral ventricle is nearly completely effaced partially effaced third ventricle. No additional intracranial hemorrhage or abnormal extra axial fluid collection.      VISUALIZED ORBITS/SINUSES/MASTOIDS: No intraorbital abnormality. No paranasal sinus mucosal disease. No middle ear or mastoid effusion.    BONES/SOFT TISSUES: No acute abnormality.      Impression    IMPRESSION:  1.  No acute intracranial abnormality.  2.  Holohemispheric subdural fluid collections bilaterally are similar in size compared to the prior MRI.  3.  Unchanged ventricular caliber. Unchanged right frontal approach ventricular shunt catheter.   US Renal Complete Non-Vascular    Narrative    EXAM: US RENAL COMPLETE  NON-VASCULAR  LOCATION: [facility]  DATE: 5/20/2024    INDICATION: ckd 3a  COMPARISON: None.  TECHNIQUE: Routine Bilateral Renal and Bladder Ultrasound.    FINDINGS:    RIGHT KIDNEY: 9.0 cm. Normal without hydronephrosis or masses.     LEFT KIDNEY: 9.3 cm. Normal without hydronephrosis or masses.     BLADDER: Completely empty and not visualized.      Impression    IMPRESSION:  Normal kidney ultrasound.   US Carotid Bilateral    Narrative    EXAM: US CAROTID BILATERAL  LOCATION: [facility]  DATE: 5/20/2024    INDICATION: syncope with prodrome  COMPARISON: None.  TECHNIQUE: Duplex exam performed utilizing 2D gray-scale imaging, Doppler interrogation with color-flow and spectral waveform analysis. The percent diameter stenosis is determined using NASCET criteria and Society of Radiologists in Ultrasound Consensus   Criteria.    FINDINGS:    RIGHT: Mild plaque at the bifurcation. The peak systolic velocity in the ICA is less than 125 cm/sec, consistent with less than 50% stenosis. Normal velocities in the ECA. Antegrade flow within the vertebral artery.     LEFT: Mild plaque at the bifurcation. The peak systolic velocity in the ICA is less than 125 cm/sec, consistent with less than 50% stenosis. Normal velocities in the ECA. Antegrade flow within the vertebral artery.    VELOCITY CHART:  CCA   Right: 87/12 cm/s   Left: 92/20 cm/s  ICA   Right: 114/33 cm/s   Left: 116/34 cm/s  ECA   Right: 110/13 cm/s   Left: 83/9 cm/s  ICA/CCA PSV Ratio   Right: 1.32   Left: 1.35      Impression    IMPRESSION:  1.  Mild plaque formation, velocities consistent with less than 50% stenosis in the right internal carotid artery.  2.  Mild plaque formation, velocities consistent with less than 50% stenosis in the left internal carotid artery.  3.  Flow within the vertebral arteries is antegrade.   CT Head w/o Contrast    Narrative    EXAM: CT HEAD W/O CONTRAST  LOCATION: [facility]  DATE: 5/21/2024    INDICATION: Recheck. Compare with 5 20 24 Head CT. Syncope.  COMPARISON: 5/20/2024.  TECHNIQUE: Routine CT Head without IV contrast. Multiplanar reformats. Dose reduction techniques were used.    FINDINGS:  INTRACRANIAL CONTENTS: Stable right transfrontal shunt catheter. Stable ventricular caliber. Again seen is a stable intermediate density subdural fluid collection along the right cerebral convexity. Additionally there is a stable thin intermediate   density subdural fluid collection along the left lateral stable convexity, not significantly changed since prior exam. There is no significant midline shift. Stable thin parafalcine subdural fluid collections. No new subdural fluid collection. No acute   hemorrhage. No acute infarct. Stable brain parenchymal attenuation. Stable brain parenchymal volume. No hydrocephalus.     VISUALIZED ORBITS/SINUSES/MASTOIDS: Prior bilateral cataract surgery. Visualized portions of the orbits are otherwise unremarkable. No paranasal sinus mucosal disease. No middle ear or mastoid effusion.    BONES/SOFT TISSUES: No acute abnormality.      Impression    IMPRESSION:  1.  No significant interval change. Grossly stable exam.         Assessment & Plan      Manasa French is a 78 year old female admitted on 5/20/2024. She has a history of  shunt 1987 (hydrocephalus), osteoporosis, strabismus, glaucoma, Arnold Chiari Malformation, HTN, DMII (Metformin)   She presents after a syncopal episode , unwitnessed  She had struck her left temple area.      Head CT in ED shows no acute chanes, she does have chronic sudural fluid collections unchanged since 4/2024 MRI.   catheter unchanged      ED reviewed with Neurosurgery: they would like coumadin held and repeat Head CT in the morning     Other labs basically unremarkable.      See HPI for details about her h/o spells involving visual changes..         Syncope with prodrome   Head injury (due to above)    Chronic subdural fluid collections on imaging (5/20 CT and 4/3 MRI)  Spells  involving visual changes.   Arnold Chiari Malformation   Ddx = seizure, atypical migraine, cardiac, Arnold Chiari malformation     -- See detailed HPI re: details about her spells---      - tele  - Echo done.  Read pending.  - Neurology consult (defer to NEurology if EEG appropriate)  -  Bilateral carotid ultrasound done 05/20/24, < 50% stenosis bilaterally  - Neurosurgery involved: Coumadin held.  Head CT done this morning.  Shows no new changes.  Recommend holding Coumadin for a week and restart after.  Plan on a repeat head CT in 1 month and reevaluate in neurosurgery clinic.  -Patient had an MRI on April 30 that showed increased extent of diffuse dural thickening/enhancement which can be seen in the setting of full drainage.  Discussed with neurosurgery regarding this.  They plan to follow-up as outpatient and reimage.  Her shunt is nonadjustable.  Patient will need a revision surgery if she continues to have excessive drainage.     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   - could be ARB &  Thiazide side effect   -Recheck creatinine at 1.25 on 5/21/2024     HTN   Continue pta losartan, chlorthalidone     DMII   A1C 6.6 on 4/29/24  Metformin monotherapy:  Hold during admission  Use SSi only if BS persistently above 160     Iatragenic hypothryoid (s/p ablation for graves)  TSH 2.54 on  3/19/24  Continue pta levothyroid  Noted.         Diet: Combination Diet Regular Diet Adult    DVT Prophylaxis: Pneumatic Compression Devices  (will likely resume coumadin once cleared by neurosurgery)  Cole Catheter: Not present  Lines: None     Cardiac Monitoring: None  Code Status: Full Code    Disposition Plan   Clinically Significant Risk Factors Present on Admission               # Drug Induced Coagulation Defect: home medication list includes an anticoagulant medication    # Hypertension: Noted on problem list     # DMII: A1C = 6.6 % (Ref range: 0.0 - 5.6 %) within past 6 months               Medically Ready for Discharge: Anticipated Today if echo is normal.  Will discharge on a 30-day event monitor.        Ashlyn Ugalde MD, MD  [phone](p)

## 2024-05-21 NOTE — PROGRESS NOTES
ANTICOAGULATION  MANAGEMENT: Discharge Review    Manasa French chart reviewed for anticoagulation continuity of care    Emergency room visit on 5/20-5/21 for syncope.    Discharge disposition: Home    Results:    Recent labs: (last 7 days)     05/17/24  0000 05/20/24  1427 05/21/24  0811   INR 2.6 2.45* 2.17*     Anticoagulation inpatient management:     Held 5/20 for 5/21 CT    Anticoagulation discharge instructions:     Warfarin dosing:  Hold warfarin for 1 week and then restart once cleared by neurosurgery     H/o unprovoked [place]IFF[place] DVT,  2019.  on lifelong coumadin  - holding coumadin, per NSG  - she takes vitamin K supplements with the coumadin for INR stability  -INR 2.17  -Continue to hold Coumadin for a week     Bridging: No   INR goal change: No      Medication changes affecting anticoagulation: No    Additional factors affecting anticoagulation: Recent falls     PLAN     Agree with discharge plan for follow up on 1 week with OK to restart from Neurosurgery     Left a detailed message for Rhonda to call back with any questions about her dosing on discharge. [facility] message also sent.    Anticoagulation Calendar updated    Aixa Steven RN

## 2024-05-21 NOTE — CONSULTS
[facility]    Neurology Consultation    Manasa French MRN# [id number]   YOB: 1945 Age: 78 year old    Code Status:Full Code   Date of Admission: 5/20/2024  Date of Consult:05/21/2024                                                                                       Assessment and Plan:                                         #.  Syncope  Suspect related to vascular given the association with position changes.  No concerns for seizures based on the history.  -Continue cardiac workup per primary team  -Recommend orthostatic Bps     #  shunt, concern for over shunting with subdurals and dural thickening noted.   notably, patient has  shunt for hydrocephalus and is found to have chronic subdural fluid collections.  There has been question if her shunt may be over draining.  She does report daily headaches may worsen with sitting up and so I do suspect that she is having over draining resulting in her symptoms.  --Recommends neurosurgery address likely over-shunting    # Loss of color episodes   Episodes are about 20 minutes in duration and generally improved with her sitting down.  Unclear if this is ocular migraine though she does not report headache with it but does have a chronic headaches.  This has been ongoing for years with a recent ocular exam showing alternating esotropia.  She reports neuro-ophthalmology thought this was related to her Arnold-Chiari malformation, but notes indicate their concern is with the nystagmus.    -Continue following with neuro-ophthalmology    ----------------------------------------------------------------------------------  ----------------------------------------------------------------------------------  Reason for consult: as above       Chief Complaint:   Syncope            History of Present Illness:   This patient is a 78 year old female who presents with an episode of syncope.  She has history of a  shunt in 1987 due to  hydrocephalus, Arnold-Chiari malformation, hypertension, type 2 diabetes, and glaucoma.    On 5/20 patient had an episode of loss of consciousness that was unwitnessed.  Per the patient, she has had episodes where her vision becomes gray/white and she has to usually sit down and waited out.  Eventually after about 15 to 20 minutes her color will return to her vision.  She states has been ongoing for the last 15 years but has become more frequent.  Yesterday she developed this gray/white vision again and had to quickly get up to get some muffins on the oven but then lost consciousness and fell.  She hit and struck the left temple.  She states when she woke up the muffins had not burned so thinks that she was out for only a couple of seconds.  Head CT in the ER did not show any acute changes but has chronic subdural fluid collections unchanged from 4/2024.  MRI at that time also noted dural enhancement that can be seen in the setting of over shunting.   catheter unchanged.  Neurosurgery was consulted and recommend holding Coumadin for a week.  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  With her Arnold-Chiari malformation and crowding of the posterior fossa neuro-ophthalmology thought this was associated      MEDICAL DOCUMENTATION REVIEWED:            Past Medical History:     Past Medical History:   Diagnosis Date    Benign essential hypertension     History of Graves' disease     s/p BARAHONA    History of hydrocephalus     s/p  shunt placement in 1987    Macular degeneration of both eyes     Osteoporosis     Personal history of DVT (deep vein thrombosis) 2019    RUE; on chronic AC    Postablative hypothyroidism     Primary open angle glaucoma (POAG) of both eyes     right eye moderate, left eye mild    Pseudophakia     Sensorineural hearing loss,  bilateral          Past Surgical History:     Past Surgical History:   Procedure Laterality Date    ARTHROPLASTY HIP Right 5/30/2023    Procedure: Right Total Hip Arthroplasty;  Surgeon: Vasu Mixon MD;  Location: [facility] OR    ARTHROSCOPY SHOULDER Left     CATARACT IOL, RT/LT Bilateral     DECOMPRESSION, FUSION LUMBAR POSTERIOR ONE LEVEL, COMBINED      L5-S1    EXCISE GANGLION WRIST Right     HERNIA REPAIR, UMBILICAL      LUMPECTOMY BREAST Left     benign    RECESSION RESECTION (REPAIR STRABISMUS) BILATERAL Bilateral 12/11/2023    Procedure: Bilateral eye muscle correction.;  Surgeon: Antwan Ardon MD;  Location: [facility] OR          Social History:     Social History     Socioeconomic History    Marital status:    Occupational History    Occupation: Retired - wedding    Tobacco Use    Smoking status: Never    Smokeless tobacco: Never   Vaping Use    Vaping status: Never Used   Substance and Sexual Activity    Alcohol use: Yes     Comment: 4 glasses wine per week    Drug use: No    Sexual activity: Yes     Partners: Male   Other Topics Concern    Parent/sibling w/ CABG, MI or angioplasty before 65F 55M? No   Social History Narrative    .    Two sons.    3 grandchildren.    Swims 3x/week x 1 hour; walks 4x/week x 1 hour.      Social Determinants of Health     Financial Resource Strain: Low Risk  (11/27/2023)    Financial Resource Strain     Within the past 12 months, have you or your family members you live with been unable to get utilities (heat, electricity) when it was really needed?: No   Food Insecurity: Low Risk  (11/27/2023)    Food Insecurity     Within the past 12 months, did you worry that your food would run out before you got money to buy more?: No     Within the past 12 months, did the food you bought just not last and you didn t have money to get more?: No   Transportation Needs: Low Risk  (11/27/2023)    Transportation Needs     Within the past 12 months, has  lack of transportation kept you from medical appointments, getting your medicines, non-medical meetings or appointments, work, or from getting things that you need?: No   Interpersonal Safety: Low Risk  (3/19/2024)    Interpersonal Safety     Do you feel physically and emotionally safe where you currently live?: Yes     Within the past 12 months, have you been hit, slapped, kicked or otherwise physically hurt by someone?: No     Within the past 12 months, have you been humiliated or emotionally abused in other ways by your partner or ex-partner?: No   Housing Stability: Low Risk  (2023)    Housing Stability     Do you have housing? : Yes     Are you worried about losing your housing?: No           Family History:     Family History   Problem Relation Age of Onset    Colon Cancer Mother     Hypertension Mother     Breast Cancer Mother     Glaucoma Mother     Leukemia Father     Diabetes Type 2  Father     Hypertension Sister     Macular Degeneration Sister     Diabetes Type 2  Brother     Breast Cancer Maternal Aunt         multiple aunts    Myocardial Infarction No family hx of     Cerebrovascular Disease No family hx of     Coronary Artery Disease Early Onset No family hx of     Ovarian Cancer No family hx of      Reviewed and not felt to be contributory.        Home Medications:     Prior to Admission Medications   Prescriptions Last Dose Informant Patient Reported? Taking?   Multiple Vitamins-Minerals (ICAPS AREDS 2 PO) 2024 at am Self Yes Yes   Sig: Take 1 capsule by mouth 2 times daily   Vitamin D3 (CHOLECALCIFEROL) 25 mcg (1000 units) tablet 2024 Self Yes Yes   Sig: Take 1 tablet by mouth daily   Vitamin K 100 MCG TABS 2024 at PM Self No Yes   Sig: Take 1 tablet by mouth daily   acetaminophen (TYLENOL) 325 MG tablet Unknown  No Yes   Sig: Take 2 tablets (650 mg) by mouth every 4 hours as needed for other (mild pain)   amoxicillin (AMOXIL) 500 MG tablet Unknown  Yes Yes   Si tablets by  mouth one hour prior to dental procedure*   calcium carbonate-vitamin D (CALTRATE) 600-10 MG-MCG per tablet 5/20/2024 Self Yes Yes   Sig: Take 1 tablet by mouth 2 times daily   carboxymethylcellulose (CELLUVISC/REFRESH LIQUIGEL) 1 % ophthalmic solution 5/20/2024 Self Yes Yes   Sig: Place 1 drop into both eyes 3 times daily   chlorthalidone (HYGROTON) 25 MG tablet 5/20/2024  No Yes   Sig: Take 1 tablet (25 mg) by mouth daily   latanoprost (XALATAN) 0.005 % ophthalmic solution 5/19/2024  No Yes   Sig: INSTILL 1 DROP INTO BOTH EYES AT BEDTIME   levothyroxine (SYNTHROID/LEVOTHROID) 112 MCG tablet 5/20/2024  No Yes   Sig: TAKE 1 TABLET EVERY DAY   losartan (COZAAR) 100 MG tablet 5/20/2024  No Yes   Sig: Take 1 tablet (100 mg) by mouth daily TAKE 1 TABLET EVERY DAY   metFORMIN (GLUCOPHAGE XR) 500 MG 24 hr tablet 5/20/2024  No Yes   Sig: Take 1 tablet (500 mg) by mouth daily (with dinner)   Patient taking differently: Take 500 mg by mouth daily (with breakfast)   timolol maleate (TIMOPTIC) 0.5 % ophthalmic solution 5/20/2024  No Yes   Sig: Place 1 drop into both eyes every morning   vitamin B-12 (CYANOCOBALAMIN) 1000 MCG tablet 5/20/2024 Self Yes Yes   Sig: Take 1,000 mcg by mouth daily   warfarin ANTICOAGULANT (COUMADIN) 5 MG tablet 5/19/2024 at PM  No Yes   Sig: Take 1-1/2 tablets (7.5 mg) by mouth every Mon, Wed, Fri; and Take 1 tablet (5 mg) all other days of the week or as directed by your ACC team based on INR results.      Facility-Administered Medications Last Administration Doses Remaining   denosumab (PROLIA) injection 60 mg None recorded 2             Allergy:     Allergies   Allergen Reactions    Alphagan [Brimonidine] Other (See Comments)     Itchy eyes    Cosopt [Dorzolamide Hcl-Timolol Mal] Other (See Comments)     Itchy eyes          Inpatient Medications:   Scheduled Meds:  Current Facility-Administered Medications   Medication Dose Route Frequency Provider Last Rate Last Admin    carboxymethylcellulose PF  (REFRESH PLUS) 0.5 % ophthalmic solution 1 drop  1 drop Both Eyes TID Shanta Magana MD        chlorthalidone (HYGROTON) tablet 25 mg  25 mg Oral Daily Shanta Magana MD   25 mg at 05/21/24 0801    insulin aspart (NovoLOG) injection (RAPID ACTING)  1-3 Units Subcutaneous TID AC Ashlyn Ugalde MD        insulin aspart (NovoLOG) injection (RAPID ACTING)  1-3 Units Subcutaneous At Bedtime Ashlyn Ugalde MD        latanoprost (XALATAN) 0.005 % ophthalmic solution 1 drop  1 drop Both Eyes At Bedtime Shanta Magana MD   1 drop at 05/20/24 2237    levothyroxine (SYNTHROID/LEVOTHROID) tablet 112 mcg  112 mcg Oral Daily Shanta Magana MD   112 mcg at 05/21/24 0643    losartan (COZAAR) tablet 100 mg  100 mg Oral Daily Shanta Magana MD   100 mg at 05/20/24 2254    sodium chloride (PF) 0.9% PF flush 3 mL  3 mL Intracatheter Q8H Shanta Magana MD        timolol maleate (TIMOPTIC) 0.5 % ophthalmic solution 1 drop  1 drop Both Eyes QAM Shanta Magana MD   1 drop at 05/21/24 0802     PRN Meds:   Current Facility-Administered Medications   Medication Dose Route Frequency Provider Last Rate Last Admin    acetaminophen (TYLENOL) tablet 650 mg  650 mg Oral Q6H PRN Shanta Magana MD   650 mg at 05/20/24 2233    calcium carbonate (TUMS) chewable tablet 1,000 mg  1,000 mg Oral 4x Daily PRN Shanta Magana MD        glucose gel 15-30 g  15-30 g Oral Q15 Min PRN Ashlyn Ugalde MD        Or    dextrose 50 % injection 25-50 mL  25-50 mL Intravenous Q15 Min PRAshlyn Kelley MD        Or    glucagon injection 1 mg  1 mg Subcutaneous Q15 Min PRN Ashlyn Ugalde MD        lidocaine (LMX4) cream   Topical Q1H PRN Shanta Magana MD        lidocaine 1 % 0.1-1 mL  0.1-1 mL Other Q1H PRN Shanta Magana MD senna-docusate (SENOKOT-S/PERICOLACE) 8.6-50 MG per tablet 1 tablet  1 tablet Oral BID PRN Shanta Magana MD        Or    levon  (SENOKOT-S/PERICOLACE) 8.6-50 MG per tablet 2 tablet  2 tablet Oral BID PRN Shanta Magana MD        sodium chloride (PF) 0.9% PF flush 3 mL  3 mL Intracatheter q1 min prn Shanta Magana MD                 Physical Exam:   Physical Exam   Vitals:  Height:Data Unavailable  Weight:0 lbs 0 oz   Temp: 97.7  F (36.5  C) Temp src: Oral BP: (!) 150/72 Pulse: 75   Resp: 14 SpO2: 97 % O2 Device: None (Room air)    General Appearance: No acute distress, normal body habitus  Neuro Exam:  Mental Status Exam: Alert and oriented. Language and speech intact. Fund of knowledge normal.  Cranial Nerves: Vision/visual fields intact to finger counting. Pupils are equal and reactive to light. Extraocular movements intact but she has end gaze nystagmus as well as nystagmus with upgaze. Facial strength and sensation is normal. No jaw or tongue deviation.  Motor: Motor tone and bulk are intact in extremities.  Full strength in the upper and lower extremities  Reflexes: Symmetrically intact in the upper extremities, difficulty getting lower extremity reflexes. Plantar signs normal.  Sensory: Vibration and cold intact in all 4 extremities.  Coordination: Coordination intact to finger-nose-finger  Gait: Stable, minimally wide-based and she uses a walker  Extremities: No clubbing, no cyanosis, no edema          Data:   ROUTINE IP LABS   CBC RESULTS:     Recent Labs   Lab 05/21/24  0811 05/20/24  1427   WBC 6.5 7.6   RBC 3.41* 3.22*   HGB 10.5* 9.9*   HCT 32.3* 30.8*    226     Basic Metabolic Panel:   Recent Labs   Lab Test 05/21/24  0811 05/20/24  2150 05/20/24  1427 04/29/24  0836     --  138 143   POTASSIUM 4.1  --  4.1 4.0   CHLORIDE 97*  --  100 104   CO2 26  --  27 28   BUN 31.6*  --  36.1* 37.4*   CR 1.25*  --  1.36* 1.27*   * 159* 124* 131*   TETE 9.5  --  9.8 9.8     Liver panel:  Recent Labs   Lab Test 05/20/24  1427 06/08/22  1438   PROTTOTAL 6.9 8.2   ALBUMIN 4.2 3.9   BILITOTAL 0.6 0.8   ALKPHOS 64  76   AST 17 23   ALT 21 21     Lipid Profile:  Recent Labs   Lab Test 08/22/23  1524 07/20/22  0742 06/08/22  1438 08/31/18  0950   CHOL 225* 202* 224* 203*   HDL 83 83 75 83   LDL 94 91 97 93   TRIG 238* 138 260* 134     Thyroid Panel:  Recent Labs   Lab Test 03/19/24  1702 10/24/23  1400 08/22/23  1524 07/20/22  0742 06/08/22  1438 11/10/20  1137 07/10/20  0746 04/23/20  1447 02/07/20  1111 04/15/19  0747 02/13/19  0752   TSH 2.54 4.29* 28.40* 3.48 0.18*   < > 0.29* 0.78 0.12*   < > 8.29*   T4  --   --   --   --  1.58*  --  1.83* 1.49* 1.65*  --  1.43    < > = values in this interval not displayed.      Vitamin B12:   Recent Labs   Lab Test 05/15/23  0948   B12 203*           IMAGING:   Independent interpretation of the following studies by myself as part of today's encounter.     CT Head 5/21/2024  FINDINGS:  INTRACRANIAL CONTENTS: Stable right transfrontal shunt catheter. Stable ventricular caliber. Again seen is a stable intermediate density subdural fluid collection along the right cerebral convexity. Additionally there is a stable thin intermediate   density subdural fluid collection along the left lateral stable convexity, not significantly changed since prior exam. There is no significant midline shift. Stable thin parafalcine subdural fluid collections. No new subdural fluid collection. No acute   hemorrhage. No acute infarct. Stable brain parenchymal attenuation. Stable brain parenchymal volume. No hydrocephalus.      VISUALIZED ORBITS/SINUSES/MASTOIDS: Prior bilateral cataract surgery. Visualized portions of the orbits are otherwise unremarkable. No paranasal sinus mucosal disease. No middle ear or mastoid effusion.     BONES/SOFT TISSUES: No acute abnormality.    MRI brain 4/3/2024:   --FINDINGS: Redemonstrated right frontal approach ventriculostomy  catheter with tip terminating in the third ventricle. Ventricular  caliber and morphology is not significantly changed. Diffuse dural  thickening and  enhancement is increased compared to prior imaging. For  example, this measures 5 mm along the frontal lobes, previously 2 mm  on MRI when measured similarly. Dural thickening involves the falx and  tentorial leaflets, as well as both cerebral convexities and the  posterior fossa. No mass effect or midline shift. No acute infarction.  No intracranial hemorrhage, mass, or herniation. Mild diffuse  parenchymal volume loss. Mild patchy deep and subcortical white matter  T2 hyperintensities which are nonspecific, but likely related to  chronic microvascular ischemic disease. Ventricular size within normal  limits without hydrocephalus.      Prior bilateral cataract extractions. No significant paranasal sinus  inflammatory changes. No mastoid or middle ear effusion. The major  arterial T2 flow voids at the base of the brain appear patent.      Prominent pannus posterior to C1-C2 with at least some degree of canal  stenosis.                                                             IMPRESSION:    1. Right frontal ventriculostomy catheter with unchanged ventricular  caliber and morphology.  2. Compared to 10/5/2015, increased extent of diffuse dural  thickening/enhancement, which can be seen in the setting of over  drainage.  3. No acute intracranial abnormality.    Time:  80 minutes evaluation and management.     Adina Davis M.D.  [facility]  Office [phone]

## 2024-05-21 NOTE — PLAN OF CARE
Goal Outcome Evaluation:       Reason for Admission: syncope episode    Cognitive/Mentation: A/Ox 4, can be forgetful. Yuhaaviatam.  Neuros/CMS: Intact ex RLE limited mobility d/t hip replacement.   VS: VSS on RA. Keep BP < 150.  Tele: NSR.  /GI: Continent. Last BM 5/20/24.   Pulmonary: LS clear all fields.   Pain: Occasional headaches controlled well with PRN tylenol.     Drains/Lines: PIV-SL.  Skin: Intact  Activity: SBA with walker.  Diet: Regular with thin liquids. Takes pills whole.     Discharge: Discharged to home 5/21.    Aggression Stoplight Tool: green    End of shift summary: Neurosurgery follow up in 1 month with repeat head CT scan at clinic. Home cardiac monitoring for 1 month.

## 2024-05-22 ENCOUNTER — TELEPHONE (OUTPATIENT)
Dept: FAMILY MEDICINE | Facility: CLINIC | Age: 79
End: 2024-05-22
Payer: MEDICARE

## 2024-05-22 NOTE — TELEPHONE ENCOUNTER
Transitions of Care Outreach  No chief complaint on file.      Most Recent Admission Date: 5/20/2024   Most Recent Admission Diagnosis: Subdural hematoma (H) - S06.5XAA  Syncope, unspecified syncope type - R55     Most Recent Discharge Date: 5/21/2024   Most Recent Discharge Diagnosis: Syncope, unspecified syncope type - R55  Subdural hematoma (H) - S06.5XAA   (ventriculoperitoneal) shunt status - Z98.2  Acute deep vein thrombosis (DVT) of axillary vein of right upper extremity (H) - I82.A11  Long term current use of anticoagulant therapy - Z79.01     Transitions of Care Assessment    Discharge Assessment  How are you doing now that you are home?: feeling good  How are your symptoms? (Red Flag symptoms escalate to triage hotline per guidelines): Improved  Do you know how to contact your clinic care team if you have future questions or changes to your health status? : Yes  Does the patient have their discharge instructions? : Yes  Does the patient have questions regarding their discharge instructions? : Yes (see comment)  Were you started on any new medications or were there changes to any of your previous medications? : Yes  Does the patient have all of their medications?: Yes  Do you have questions regarding any of your medications? : No  Do you have all of your needed medical supplies or equipment (DME)?  (i.e. oxygen tank, CPAP, cane, etc.): Yes    Follow up Plan     Discharge Follow-Up  Discharge follow up appointment scheduled in alignment with recommended follow up timeframe or Transitions of Risk Category? (Low = within 30 days; Moderate= within 14 days; High= within 7 days): Yes  Discharge Follow Up Appointment Date: 05/24/24  Discharge Follow Up Appointment Scheduled with?: Primary Care Provider    Future Appointments   Date Time Provider Department Center   5/24/2024  3:30 PM Clara George PA-C [facility]    6/20/2024  9:00 AM Kevin Hernandez MD [facility]    6/20/2024 11:00 AM TEETEE  SAHARA HARDING [facility]   6/21/2024 10:30 AM Celestine Felipe, CNP [facility]    7/18/2024  7:35 AM [id number]   7/23/2024  2:15 PM Jarred Leon MD [facility]   8/28/2024  2:00 PM Augustus Christie MD [facility]    9/4/2024  2:30 PM Jana Giles MD [facility]    10/23/2024  2:00 PM CS NURSE [facility]    4/3/2025  8:30 AM Fredy Zavaleta MD [facility]       Outpatient Plan as outlined on AVS reviewed with patient.    For any urgent concerns, please contact our 24 hour nurse triage line: [phone] ([phone])       Tai Brown RN

## 2024-05-23 ENCOUNTER — TELEPHONE (OUTPATIENT)
Dept: NEUROLOGY | Facility: CLINIC | Age: 79
End: 2024-05-23
Payer: MEDICARE

## 2024-05-23 ENCOUNTER — PATIENT OUTREACH (OUTPATIENT)
Dept: CARE COORDINATION | Facility: CLINIC | Age: 79
End: 2024-05-23
Payer: MEDICARE

## 2024-05-23 NOTE — PROGRESS NOTES
[facility] Center: [facility]    Background: Transitional Care Management program identified per system criteria and reviewed by [facility] team for possible outreach.    Assessment: Upon chart review, [facility] Team member will not proceed with patient outreach related to this episode of Transitional Care Management program due to reason below:    Patient has active communication with a nurse, provider or care team for reason of post-hospital follow up plan.  Outreach call by [facility] team not indicated to minimize duplicative efforts.     [facility] made 1st attempt on 5/22/24 to reach patient for Hospital Follow-up and left message at that time.      Plan: Transitional Care Management episode addressed appropriately per reason noted above.      Noemy Tolbert RN  [facility]    *Connected Care Resource Team does NOT follow patient ongoing. Referrals are identified based on internal discharge reports and the outreach is to ensure patient has an understanding of their discharge instructions.

## 2024-05-24 ENCOUNTER — OFFICE VISIT (OUTPATIENT)
Dept: FAMILY MEDICINE | Facility: CLINIC | Age: 79
End: 2024-05-24
Payer: MEDICARE

## 2024-05-24 VITALS
BODY MASS INDEX: 24.61 KG/M2 | OXYGEN SATURATION: 98 % | HEIGHT: 65 IN | DIASTOLIC BLOOD PRESSURE: 58 MMHG | WEIGHT: 147.7 LBS | RESPIRATION RATE: 16 BRPM | SYSTOLIC BLOOD PRESSURE: 95 MMHG | HEART RATE: 105 BPM | TEMPERATURE: 97.7 F

## 2024-05-24 DIAGNOSIS — Z86.69 HISTORY OF HYDROCEPHALUS: ICD-10-CM

## 2024-05-24 DIAGNOSIS — R55 RECURRENT SYNCOPE: ICD-10-CM

## 2024-05-24 DIAGNOSIS — Z86.718 PERSONAL HISTORY OF DVT (DEEP VEIN THROMBOSIS): ICD-10-CM

## 2024-05-24 DIAGNOSIS — D51.9 ANEMIA DUE TO VITAMIN B12 DEFICIENCY, UNSPECIFIED B12 DEFICIENCY TYPE: ICD-10-CM

## 2024-05-24 DIAGNOSIS — Z09 HOSPITAL DISCHARGE FOLLOW-UP: Primary | ICD-10-CM

## 2024-05-24 DIAGNOSIS — N18.31 STAGE 3A CHRONIC KIDNEY DISEASE (H): ICD-10-CM

## 2024-05-24 PROCEDURE — 99495 TRANSJ CARE MGMT MOD F2F 14D: CPT | Performed by: PHYSICIAN ASSISTANT

## 2024-05-24 RX ORDER — RESPIRATORY SYNCYTIAL VIRUS VACCINE 120MCG/0.5
0.5 KIT INTRAMUSCULAR ONCE
Qty: 1 EACH | Refills: 0 | Status: CANCELLED | OUTPATIENT
Start: 2024-05-24 | End: 2024-05-24

## 2024-05-24 ASSESSMENT — PAIN SCALES - GENERAL: PAINLEVEL: MILD PAIN (2)

## 2024-05-24 NOTE — PATIENT INSTRUCTIONS
Wear compression stockings when up    Salt your food liberally    Stop chlorthalidone for now    Check blood pressure once per day and record, see me in one month with readings     Keep follow-up appointments    Labs in one week

## 2024-05-24 NOTE — PROGRESS NOTES
"Assessment and Plan:     (Z09) Hospital discharge follow-up  (primary encounter diagnosis)  Comment: see full discharge summary below, presented to ED after syncope and fall on floor, on coumadin, no acute intracranial finding  Plan: CBC with platelets, Basic metabolic panel  (Ca,        Cl, CO2, Creat, Gluc, K, Na, BUN)        See below    (R55) Recurrent syncope  Comment: sounds vasovagal, vision goes dark, at times associated nausea, low bp, has had issues with syncope/near syncope for \"15 years\" seems to be increasing in frequency, notes that she does not drink much water  Plan: hold chlorthalidone  Start wearing compression stockings daily  Salt food  Monitor bp at home  See me in one month with bp readings  Could consider trial of fludrocortisone though would need to watch for fluid retention with CKD and may be more appropriate conversation w/pcp    (Z86.17) History of hydrocephalus  Comment: recent imaging suggestive of possible over drainage, with recent head injury warfarin on hold for a week, repeat CT and NSG follow-up in a month, both scheduled already  Plan: above    (Z86.118) Personal history of DVT (deep vein thrombosis)  Comment: on warfarin which is being held  Plan: cont plan    (D51.9) Anemia due to vitamin B12 deficiency, unspecified B12 deficiency type  Comment: stable  Plan: cbc in one week    (N18.31) Stage 3a chronic kidney disease (H)  Comment: GFR hovers 40-50  Plan: BMP in one week      [facility] Same Day Provider   50 minutes on the day of the encounter doing chart review, history and exam, documentation and further activities as noted above.        Genaro Ellis is a 78 year old, presenting for the following health issues:  Hospital F/U    HPI        Hospital Follow-up Visit:    Hospital/Nursing Home/IP Rehab Facility: [facility]  Date of Admission: 5-  Date of Discharge: 5-  Reason(s) for Admission: Syncope  Was the " patient in the ICU or did the patient experience delirium during hospitalization?  No      Problems taking medications regularly:  None  Medication changes since discharge: None  Problems adhering to non-medication therapy:  None    Summary of hospitalization:  [facility] discharge summary reviewed  Diagnostic Tests/Treatments reviewed.  Follow up needed: see below  Other Healthcare Providers Involved in Patient s Care:          see below  Update since discharge: stable.         Plan of care communicated with patient           Rhonda is here for ED follow-up  The following is from her discharge summary from recent hospitalization:      Manasa French was admitted on 5/20/2024-5/21/24.  The following problems were addressed during her hospitalization:     Manasa French is a 78 year old female admitted on 5/20/2024. She has a history of  shunt 1987 (hydrocephalus), osteoporosis, strabismus, glaucoma, Arnold Chiari Malformation, HTN, DMII (Metformin)   She presents after a syncopal episode , unwitnessed  She had struck her left temple area.      Head CT in ED shows no acute chanes, she does have chronic sudural fluid collections unchanged since 4/2024 MRI.   catheter unchanged      ED reviewed with Neurosurgery: they would like coumadin held and repeat Head CT in the morning     Other labs basically unremarkable.      See HPI for details about her h/o spells involving visual changes..         Syncope with prodrome   Head injury (due to above)   Chronic subdural fluid collections on imaging (5/20 CT and 4/3 MRI)  Spells  involving visual changes.   Arnold Chiari Malformation   Ddx = seizure, atypical migraine, cardiac, Arnold Chiari malformation     -- See detailed HPI re: details about her spells---      - tele  - Echo done.  Read pending.  - Neurology consult (defer to NEurology if EEG appropriate)  -  Bilateral carotid ultrasound done 05/20/24, < 50% stenosis bilaterally  - Neurosurgery  involved: Coumadin held.  Head CT done this morning.  Shows no new changes.  Recommend holding Coumadin for a week and restart after.  Plan on a repeat head CT in 1 month and reevaluate in neurosurgery clinic.  -Patient had an MRI on April 30 that showed increased extent of diffuse dural thickening/enhancement which can be seen in the setting of full drainage.  Discussed with neurosurgery regarding this.  They plan to follow-up as outpatient and reimage.  Her shunt is nonadjustable.  Patient will need a revision surgery if she continues to have excessive drainage.  - orthostatic not significantly positive,   - pt was evaluated by neurology and cleared for discharge with recommendation for out pt follow up with neurophthalmology. 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   - could be ARB & Thiazide side effect   -Recheck creatinine at 1.25 on 5/21/2024     HTN   Continue pta losartan, chlorthalidone     DMII   A1C 6.6 on  "4/29/24  Metformin monotherapy:  Hold during admission  Use SSi only if BS persistently above 160     Iatragenic hypothryoid (s/p ablation for graves)  TSH 2.54 on  3/19/24  Continue pta levothyroid  Noted.     She had another spell yesterday  She was in the car and has vision changes  Usually helps for her to shut her eyes  She has been having these episodes for at least 15 years but seem to be increasing in frequency  During spells vision goes dark, at times feels nauseated, blood pressure drops  If she lies flat w/feet up and eyes closed symptoms usually resolve  She admits to not drinking much water  She does not like wearing compression stockings but has them  She denies associated chest pain, palpitations, sob      Objective    LMP  (LMP Unknown)   There is no height or weight on file to calculate BMI.    BP 95/58 (BP Location: Left arm, Patient Position: Sitting, Cuff Size: Adult Large)   Pulse 105   Temp 97.7  F (36.5  C) (Tympanic)   Resp 16   Ht 1.638 m (5' 4.5\")   Wt 67 kg (147 lb 11.2 oz)   LMP  (LMP Unknown)   SpO2 98%   BMI 24.96 kg/m      Physical Exam     GENERAL: healthy, alert and no distress  RESP: lungs clear to auscultation - no rales, no rhonchi, no wheezes  CV: regular rates and rhythm, normal S1 S2, no S3 or S4 and no murmur, no click or rub   MS: extremities- no gross deformities noted, no edema  NEURO: alert and awake, answers questions appropriately, gait is normal       Signed Electronically by: Clara Hemphill PA-C    "

## 2024-05-31 ENCOUNTER — TELEPHONE (OUTPATIENT)
Dept: FAMILY MEDICINE | Facility: CLINIC | Age: 79
End: 2024-05-31

## 2024-05-31 ENCOUNTER — LAB (OUTPATIENT)
Dept: LAB | Facility: CLINIC | Age: 79
End: 2024-05-31
Payer: MEDICARE

## 2024-05-31 DIAGNOSIS — Z09 HOSPITAL DISCHARGE FOLLOW-UP: ICD-10-CM

## 2024-05-31 LAB
ERYTHROCYTE [DISTWIDTH] IN BLOOD BY AUTOMATED COUNT: 14.1 % (ref 10–15)
HCT VFR BLD AUTO: 31.5 % (ref 35–47)
HGB BLD-MCNC: 9.8 G/DL (ref 11.7–15.7)
MCH RBC QN AUTO: 31 PG (ref 26.5–33)
MCHC RBC AUTO-ENTMCNC: 31.1 G/DL (ref 31.5–36.5)
MCV RBC AUTO: 100 FL (ref 78–100)
PLATELET # BLD AUTO: 208 10E3/UL (ref 150–450)
RBC # BLD AUTO: 3.16 10E6/UL (ref 3.8–5.2)
WBC # BLD AUTO: 7.4 10E3/UL (ref 4–11)

## 2024-05-31 PROCEDURE — 36415 COLL VENOUS BLD VENIPUNCTURE: CPT

## 2024-05-31 PROCEDURE — 85027 COMPLETE CBC AUTOMATED: CPT | Mod: GZ

## 2024-05-31 PROCEDURE — 80048 BASIC METABOLIC PNL TOTAL CA: CPT

## 2024-05-31 NOTE — TELEPHONE ENCOUNTER
Caroline Hemphill, [facility] Nurse Pool - Primary Care  Team - please call patient with results.  Her anemia has worsened a bit  Could she pls schedule a VV with me to discuss in next 1-2 weeks?  thanks

## 2024-05-31 NOTE — TELEPHONE ENCOUNTER
Called patient regarding PCP message below. She verbalized understanding. Appt scheduled.     Appointments in Next Year        Jun 05, 2024  5:00 PM  Provider Visit with Clara Hemphill PA-C  [facility] ([facility] ) [phone]

## 2024-05-31 NOTE — RESULT ENCOUNTER NOTE
Team - please call patient with results.  Her anemia has worsened a bit  Could she pls schedule a VV with me to discuss in next 1-2 weeks?  thanks

## 2024-06-02 LAB
ANION GAP SERPL CALCULATED.3IONS-SCNC: 8 MMOL/L (ref 7–15)
BUN SERPL-MCNC: 26.7 MG/DL (ref 8–23)
CALCIUM SERPL-MCNC: 9.8 MG/DL (ref 8.8–10.2)
CHLORIDE SERPL-SCNC: 105 MMOL/L (ref 98–107)
CREAT SERPL-MCNC: 1.29 MG/DL (ref 0.51–0.95)
DEPRECATED HCO3 PLAS-SCNC: 29 MMOL/L (ref 22–29)
EGFRCR SERPLBLD CKD-EPI 2021: 42 ML/MIN/1.73M2
GLUCOSE SERPL-MCNC: 145 MG/DL (ref 70–99)
POTASSIUM SERPL-SCNC: 4.4 MMOL/L (ref 3.4–5.3)
SODIUM SERPL-SCNC: 142 MMOL/L (ref 135–145)

## 2024-06-04 NOTE — RESULT ENCOUNTER NOTE
Glen Ellis,     Your recent labs show decreased but stable kidney function.  Your electrolytes were normal.     Please let us know if you have any questions or concerns.    Regards,  Clara Hemphill PA-C

## 2024-06-05 ENCOUNTER — VIRTUAL VISIT (OUTPATIENT)
Dept: FAMILY MEDICINE | Facility: CLINIC | Age: 79
End: 2024-06-05
Payer: MEDICARE

## 2024-06-05 DIAGNOSIS — D51.9 ANEMIA DUE TO VITAMIN B12 DEFICIENCY, UNSPECIFIED B12 DEFICIENCY TYPE: Primary | ICD-10-CM

## 2024-06-05 PROCEDURE — 99441 PR PHYSICIAN TELEPHONE EVALUATION 5-10 MIN: CPT | Mod: 93 | Performed by: PHYSICIAN ASSISTANT

## 2024-06-05 NOTE — PROGRESS NOTES
Rhonda is a 78 year old who is being evaluated via a billable telephone visit.    What phone number would you like to be contacted at? [phone]   How would you like to obtain your AVS? [email or web address]  Originating Location (pt. Location): Home    Distant Location (provider location):  On-site    Assessment and Plan:     (D51.9) Anemia due to vitamin B12 deficiency, unspecified B12 deficiency type  (primary encounter diagnosis)  Comment: hgb drifting down over last year despite daily oral B12, hasn't had B12 rechecked in over a year, denies any bleeding, last colon exam was 2010  Plan: CBC with platelets, Ferritin, Vitamin B12,         **Iron and iron binding capacity FUTURE 2mo,         Folate, Protein electrophoresis, Fecal         colorectal cancer screen (FIT)        Sees me later this jayda, will obtain above labs  Continue B12    [facility] Same Day Provider         Subjective   Rhonda is a 78 year old, presenting for the following health issues:  Anemia    HPI     Follow-up on anemia   Has had hgb drifting down over the last year  Was found to have B12 deficiency last year, was Rxed IM B12 but didn't take it, has been on oral B12 instead, takes 1000mcg daily  Denies history of bariatric surgery  She denies history of bloody/black stools, vaginal bleeding     She is not on oral iron        Objective           Vitals:  No vitals were obtained today due to virtual visit.    Physical Exam   General: Alert and no distress //Respiratory: No audible wheeze, cough, or shortness of breath // Psychiatric:  Appropriate affect, tone, and pace of words          Phone call duration: 9 minutes  Signed Electronically by: Clara Hemphill PA-C

## 2024-06-07 ENCOUNTER — ANTICOAGULATION THERAPY VISIT (OUTPATIENT)
Dept: ANTICOAGULATION | Facility: CLINIC | Age: 79
End: 2024-06-07
Payer: MEDICARE

## 2024-06-07 DIAGNOSIS — I82.A11 ACUTE DEEP VEIN THROMBOSIS (DVT) OF AXILLARY VEIN OF RIGHT UPPER EXTREMITY (H): Primary | ICD-10-CM

## 2024-06-07 DIAGNOSIS — Z86.718 PERSONAL HISTORY OF DVT (DEEP VEIN THROMBOSIS): ICD-10-CM

## 2024-06-07 LAB — INR HOME MONITORING: 1.9 (ref 2–3)

## 2024-06-07 NOTE — PROGRESS NOTES
ANTICOAGULATION MANAGEMENT     Manasa French 78 year old female is on warfarin with subtherapeutic INR result. (Goal INR 2.0-3.0)    Recent labs: (last 7 days)     06/07/24  0000   INR 1.9*       ASSESSMENT     Source(s): Chart Review and Patient/Caregiver Call     Warfarin doses taken: Warfarin taken as instructed  Diet: No new diet changes identified  Medication/supplement changes: None noted  New illness, injury, or hospitalization: No  Signs or symptoms of bleeding or clotting: No  Previous result: Therapeutic last 2(+) visits  Additional findings:  no falls since home from hospital        PLAN     Recommended plan for temporary change(s) affecting INR     Dosing Instructions: Continue your current warfarin dose with next INR in 1 week       Summary  As of 6/7/2024      Full warfarin instructions:  7.5 mg every Mon, Wed, Fri; 5 mg all other days   Next INR check:  6/14/2024               Telephone call with Rhonda who verbalizes understanding and agrees to plan and who agrees to plan and repeated back plan correctly    Patient to recheck with home meter    Education provided:   Symptom monitoring: when to seek medical attention/emergency care    Plan made per [facility] anticoagulation protocol    Aixa Steven RN  Anticoagulation Clinic  6/7/2024    _______________________________________________________________________     Anticoagulation Episode Summary       Current INR goal:  2.0-3.0   TTR:  87.5% (1 y)   Target end date:  Indefinite   Send INR reminders to:  ANTICOAG HOME MONITORING    Indications    Acute deep vein thrombosis (DVT) of axillary vein and internal jugular (H) (Resolved) [I82.A19]  Current use of long term anticoagulation (Resolved) [Z79.01]  Acute deep vein thrombosis (DVT) of axillary vein of right upper extremity (H) [I82.A11]  Personal history of DVT (deep vein thrombosis) [Z86.718]             Comments:  [employer] [facility] meter- Managed by [facility]             Anticoagulation Care Providers        Provider Role Specialty Phone number    Rema Braxton DO Referring Family Medicine [phone]    Jessica Anthony MD Referring Internal Medicine [phone]    Jana Giles MD Referring Internal Medicine [phone]    Christopher Smith MD Responsible Hematology [phone]

## 2024-06-10 ENCOUNTER — HOSPITAL ENCOUNTER (EMERGENCY)
Facility: CLINIC | Age: 79
Discharge: HOME | End: 2024-06-10
Attending: EMERGENCY MEDICINE | Admitting: EMERGENCY MEDICINE
Payer: MEDICARE

## 2024-06-10 ENCOUNTER — APPOINTMENT (OUTPATIENT)
Dept: GENERAL RADIOLOGY | Facility: CLINIC | Age: 79
End: 2024-06-10
Attending: EMERGENCY MEDICINE
Payer: MEDICARE

## 2024-06-10 ENCOUNTER — DOCUMENTATION ONLY (OUTPATIENT)
Dept: ANTICOAGULATION | Facility: CLINIC | Age: 79
End: 2024-06-10
Payer: MEDICARE

## 2024-06-10 VITALS
RESPIRATION RATE: 18 BRPM | DIASTOLIC BLOOD PRESSURE: 78 MMHG | SYSTOLIC BLOOD PRESSURE: 179 MMHG | HEART RATE: 77 BPM | TEMPERATURE: 97.6 F | OXYGEN SATURATION: 96 %

## 2024-06-10 DIAGNOSIS — S73.004A HIP DISLOCATION, RIGHT, INITIAL ENCOUNTER (H): ICD-10-CM

## 2024-06-10 PROCEDURE — 999N000157 HC STATISTIC RCP TIME EA 10 MIN

## 2024-06-10 PROCEDURE — 250N000011 HC RX IP 250 OP 636: Performed by: EMERGENCY MEDICINE

## 2024-06-10 PROCEDURE — 96374 THER/PROPH/DIAG INJ IV PUSH: CPT

## 2024-06-10 PROCEDURE — 27265 TREAT HIP DISLOCATION: CPT | Mod: RT

## 2024-06-10 PROCEDURE — 999N000065 XR HIP PORT RIGHT 1 VIEW

## 2024-06-10 PROCEDURE — 73502 X-RAY EXAM HIP UNI 2-3 VIEWS: CPT

## 2024-06-10 PROCEDURE — 96376 TX/PRO/DX INJ SAME DRUG ADON: CPT

## 2024-06-10 PROCEDURE — 99285 EMERGENCY DEPT VISIT HI MDM: CPT | Mod: 25

## 2024-06-10 PROCEDURE — 96375 TX/PRO/DX INJ NEW DRUG ADDON: CPT

## 2024-06-10 RX ORDER — PROPOFOL 10 MG/ML
1 INJECTION, EMULSION INTRAVENOUS ONCE
Status: COMPLETED | OUTPATIENT
Start: 2024-06-10 | End: 2024-06-10

## 2024-06-10 RX ORDER — FENTANYL CITRATE 50 UG/ML
50 INJECTION, SOLUTION INTRAMUSCULAR; INTRAVENOUS
Status: COMPLETED | OUTPATIENT
Start: 2024-06-10 | End: 2024-06-10

## 2024-06-10 RX ORDER — FENTANYL CITRATE 50 UG/ML
50 INJECTION, SOLUTION INTRAMUSCULAR; INTRAVENOUS ONCE
Status: COMPLETED | OUTPATIENT
Start: 2024-06-10 | End: 2024-06-10

## 2024-06-10 RX ORDER — FLUMAZENIL 0.1 MG/ML
0.2 INJECTION, SOLUTION INTRAVENOUS
Status: DISCONTINUED | OUTPATIENT
Start: 2024-06-10 | End: 2024-06-10 | Stop reason: HOSPADM

## 2024-06-10 RX ADMIN — FENTANYL CITRATE 50 MCG: 50 INJECTION, SOLUTION INTRAMUSCULAR; INTRAVENOUS at 09:40

## 2024-06-10 RX ADMIN — PROPOFOL 60 MG: 10 INJECTION, EMULSION INTRAVENOUS at 09:43

## 2024-06-10 RX ADMIN — FENTANYL CITRATE 50 MCG: 50 INJECTION INTRAMUSCULAR; INTRAVENOUS at 09:05

## 2024-06-10 RX ADMIN — FENTANYL CITRATE 50 MCG: 50 INJECTION, SOLUTION INTRAMUSCULAR; INTRAVENOUS at 08:43

## 2024-06-10 ASSESSMENT — ACTIVITIES OF DAILY LIVING (ADL)
ADLS_ACUITY_SCORE: 38

## 2024-06-10 ASSESSMENT — COLUMBIA-SUICIDE SEVERITY RATING SCALE - C-SSRS
1. IN THE PAST MONTH, HAVE YOU WISHED YOU WERE DEAD OR WISHED YOU COULD GO TO SLEEP AND NOT WAKE UP?: NO
2. HAVE YOU ACTUALLY HAD ANY THOUGHTS OF KILLING YOURSELF IN THE PAST MONTH?: NO
6. HAVE YOU EVER DONE ANYTHING, STARTED TO DO ANYTHING, OR PREPARED TO DO ANYTHING TO END YOUR LIFE?: NO

## 2024-06-10 NOTE — DISCHARGE INSTRUCTIONS
Please follow-up with your orthopedist.  We have reduced your hip.  If you feel like pain or discomfort continues follow-up with them for reassessment.  Please avoid deep hip bends and use knee brace in the short-term to help prevent recurrent dislocation of the hip.  Thanks for your patience and glad you are feeling better.

## 2024-06-10 NOTE — ED NOTES
Bed: ED24  Expected date:   Expected time:   Means of arrival:   Comments:  HEMS 78F fall, hip dislocation ETA [id number]

## 2024-06-10 NOTE — ED NOTES
Passed road test with no issues. She went down the hallway and used the bathroom before returning to her room.

## 2024-06-10 NOTE — ED TRIAGE NOTES
BIBA from home, pt was reaching to grab a cord as she was sitting on the edge of the bed. Pt dislocated her right hip. Hip replacement was May 2023     Triage Assessment (Adult)       Row Name 06/10/24 0875          Triage Assessment    Airway WDL WDL        Respiratory WDL    Respiratory WDL WDL        Skin Circulation/Temperature WDL    Skin Circulation/Temperature WDL WDL        Cardiac WDL    Cardiac WDL WDL        Peripheral/Neurovascular WDL    Peripheral Neurovascular WDL WDL        Cognitive/Neuro/Behavioral WDL    Cognitive/Neuro/Behavioral WDL WDL

## 2024-06-10 NOTE — ED PROVIDER NOTES
"  Emergency Department Note      History of Present Illness     Chief Complaint  Hip Pain    HPI  Manasa French is a 78 year old female on Coumadin s/p arthroplasty hip who presents to the ED with hip. Patient reports that she was sitting on the edge of her bed and bent over to  the cord to her heart monitor when she felt her hip \"come out.\" She slid off the bed following but denies any pain from that. No history of previous hip dislocations.     Independent Historian  None    Review of External Notes  None  Past Medical History   Medical History and Problem List  Hypertension   Grave's disease  Hydrocephalus   Osteoporosis  DVT   Postablative hypothyroidism   Pseudophakia     Medications  chlorthalidone (HYGROTON) 25 MG tablet  levothyroxine (SYNTHROID/LEVOTHROID) 112 MCG tablet  losartan (COZAAR) 100 MG tablet  metFORMIN (GLUCOPHAGE XR) 500 MG 24 hr tablet  warfarin ANTICOAGULANT (COUMADIN) 5 MG tablet    Surgical History   Arthroplasty hip   Arthroscopy shoulder  Cataract IOL  Decombression, fusion lumbar posterior one  Excise ganglion wrist   Hernia repair   Lumpectomy breast benign  Recession resection bilateral    Physical Exam   Patient Vitals for the past 24 hrs:   BP Temp Temp src Pulse Resp SpO2   06/10/24 1100 (!) 179/78 -- -- 77 -- --   06/10/24 1030 (!) 158/73 -- -- 75 -- 96 %   06/10/24 1015 (!) 149/66 -- -- 72 -- 96 %   06/10/24 0952 (!) 148/71 -- -- 75 -- 98 %   06/10/24 0946 (!) 175/81 -- -- 76 -- 91 %   06/10/24 0943 -- -- -- -- -- 100 %   06/10/24 0940 (!) 185/81 -- -- 75 -- 100 %   06/10/24 0930 -- -- -- -- -- 100 %   06/10/24 0930 (!) 181/76 -- -- 73 -- --   06/10/24 0901 -- -- -- -- -- 95 %   06/10/24 0900 -- -- -- -- -- 95 %   06/10/24 0859 -- -- -- -- -- 96 %   06/10/24 0858 -- -- -- -- -- 95 %   06/10/24 0857 -- -- -- -- -- 95 %   06/10/24 0856 -- -- -- -- -- 94 %   06/10/24 0838 (!) 190/85 97.6  F (36.4  C) Oral 82 18 97 %     Physical Exam  Vitals reviewed.   HENT:      Head: " Normocephalic.   Cardiovascular:      Rate and Rhythm: Normal rate and regular rhythm.   Pulmonary:      Effort: Pulmonary effort is normal.   Abdominal:      General: Abdomen is flat.   Musculoskeletal:      Comments: Right hip internally rotated and slightly flexed consistent with dislocation.  No open wound.  Normal neurovascular exam.   Neurological:      General: No focal deficit present.      Mental Status: She is alert and oriented to person, place, and time.   Psychiatric:         Mood and Affect: Mood normal.         Diagnostics   Imaging  Hip XR, right 1 vw PORTABLE   Final Result   IMPRESSION:       The previously seen right hip arthroplasty dislocation has been   successfully reduced.      HUMA MORALES MD            SYSTEM ID:  [id number]      XR Pelvis w Hip Right 1 View   Final Result   IMPRESSION:       Status post right total hip arthroplasty. There is superior and   lateral dislocation of the femoral head component relative to the   acetabular component.       There is diffuse osseous demineralization. No displaced fracture.   Mild-to-moderate left hip osteoarthritis. There are partially   visualized postoperative changes from lumbosacral fusion. There are   degenerative changes at the sacroiliac joints. Vascular   atherosclerotic calcifications are present.      NOTE: ABNORMAL REPORT      THE DICTATION ABOVE DESCRIBES AN ABNORMAL REPORT FOR WHICH FOLLOW-UP   IS NEEDED.      HUMA MORALES MD            SYSTEM ID:  [id number]        Independent Interpretation    ED Course    Medications Administered  Medications   flumazenil (ROMAZICON) injection 0.2 mg (has no administration in time range)   fentaNYL (PF) (SUBLIMAZE) injection 50 mcg (50 mcg Intravenous $Given 6/10/24 0843)   fentaNYL (PF) (SUBLIMAZE) injection 50 mcg (50 mcg Intravenous $Given 6/10/24 0905)   propofol (DIPRIVAN) injection 10 mg/mL vial (60 mg Intravenous $Given 6/10/24 0943)   fentaNYL (PF) (SUBLIMAZE) injection 50 mcg (50 mcg  Intravenous $Given 6/10/24 0940)     Procedures  Procedures     Sedation     Procedure: Procedural Sedation    Sedation Level: Deep    Indication: Joint reduction    Consent: Written from Patient     Universal protocol: Universal protocol was followed and time out conducted just prior to starting procedure, confirming patient identity, site/side, procedure, patient position, and availability of correct equipment and implants.     Last PO Intake: Emergent procedure    ASA Class: Class 2 - A patient with mild systemic disease     Exam:  Mallampati:  Grade 2 - Soft palate and major part of uvula visible   Lungs: Clear   Heart: Regular rate and rhythm     Medication: Propofol  Dose: 80 mg     Monitoring:  Monitoring consisted of heart rate, cardiac, continuous pulse oximetry, frequent blood pressure checks.   There was constant attendance by RN until patient recovered and constant attendance by physician until patient stable.   Intubation and emergency airway equipment available.     Response: Vital signs stable, oxygen saturations greater than 92%       Patient Status: Post procedure patient was alert.     Total Physician Drug Administration / Monitoring Time: 20 minutes.     Patient was monitored during recovery and returned to pre-procedure baseline.       Dislocation Reduction   Procedure: Dislocation Reduction  Consent: Written from Patient  Risks Discussed: Pain, need for repeat attempts, fracture, neurovascular injury, unsuccessful attempts, and need to go to OR  Universal Protocol: Universal protocol was followed and time out conducted just prior to starting procedure, confirming patient identity, site/side, procedure, patient position, and availability of correct equipment and implants.    Indication: Dislocated Right Hip   Location: Right Hip  Anesthesia/Sedation: Sedation: The patient was sedated, see separate procedure note for details.   Procedure Detail: I manipulated the joint including Traction-counter  traction    Immobilized with Knee immobilizer   Post procedure assessment:  Gross deformity resolved , Neurovascular intact , and ROM improved   Patient Status: The patient tolerated the procedure well: Yes. There were no complications.    Discussion of Management  None    Social Determinants of Health adding to complexity of care  None    ED Course     0835 I obtained history and examined the patient as noted above.   0900 I preformed a hip reduction. See procedure note above.  1040 I rechecked and updated the patient.   Medical Decision Making / Diagnosis   CMS Diagnoses: None    MIPS     None    MDM  Manasa French is a 78 year old female who was sitting in a chair and stooped forward and dislocated her right hip.  No clear trauma x-rays confirm dislocation without fracture.  Procedural sedation and closed reduction was performed by me.  Patient tolerated this well he was placed in a knee immobilizer to assist in prevention of recurrent dislocation and was discharged home with  in stable condition.    Disposition  The patient was discharged.     ICD-10 Codes:    ICD-10-CM    1. Hip dislocation, right, initial encounter (H)  S73.004A            Discharge Medications  Discharge Medication List as of 6/10/2024 11:32 AM        Scribe Disclosure:  IHenrique, am serving as a scribe at 8:43 AM on 6/10/2024 to document services personally performed by David Borden MD based on my observations and the provider's statements to me.        David Borden MD  06/11/24 2028

## 2024-06-10 NOTE — PROGRESS NOTES
ANTICOAGULATION  MANAGEMENT: Discharge Review    Manasa French chart reviewed for anticoagulation continuity of care    Emergency room visit on 6/10/24 for Right Hip Dislocation.  Hip XR, right 1 vw PORTABLE   Final Result   IMPRESSION:        The previously seen right hip arthroplasty dislocation has been   successfully reduced.           Discharge disposition: Home    Results:    Recent labs: (last 7 days)     06/07/24  0000   INR 1.9*     Anticoagulation inpatient management:     not applicable     Anticoagulation discharge instructions:     Warfarin dosing: home regimen continued   Bridging: No   INR goal change: No      Medication changes affecting anticoagulation: No    Additional factors affecting anticoagulation: No     PLAN     No adjustment to anticoagulation plan needed    Patient not contacted    No adjustment to Anticoagulation Calendar was required    Kyleigh Molina RN

## 2024-06-14 ENCOUNTER — ANTICOAGULATION THERAPY VISIT (OUTPATIENT)
Dept: ANTICOAGULATION | Facility: CLINIC | Age: 79
End: 2024-06-14
Payer: MEDICARE

## 2024-06-14 DIAGNOSIS — I82.A11 ACUTE DEEP VEIN THROMBOSIS (DVT) OF AXILLARY VEIN OF RIGHT UPPER EXTREMITY (H): Primary | ICD-10-CM

## 2024-06-14 DIAGNOSIS — Z86.718 PERSONAL HISTORY OF DVT (DEEP VEIN THROMBOSIS): ICD-10-CM

## 2024-06-14 LAB — INR HOME MONITORING: 2.7 (ref 2–3)

## 2024-06-14 NOTE — PROGRESS NOTES
ANTICOAGULATION MANAGEMENT     Manasa French 78 year old female is on warfarin with therapeutic INR result. (Goal INR 2.0-3.0)    Recent labs: (last 7 days)     06/14/24  0000   INR 2.7       ASSESSMENT     Source(s): Chart Review and Patient/Caregiver Call       New illness, injury, or hospitalization: Yes: ED on 6/10/24 for hip dislocation         PLAN     Recommended plan for temporary change(s) affecting INR     Dosing Instructions: Continue your current warfarin dose with next INR in 1 week       Summary  As of 6/14/2024      Full warfarin instructions:  7.5 mg every Mon, Wed, Fri; 5 mg all other days   Next INR check:  6/21/2024               Detailed voice message left for Rhonda with dosing instructions and follow up date.     Patient to recheck with home meter    Education provided:   Please call back if any changes to your diet, medications or how you've been taking warfarin  Resume manage by exception with home monitor. Continue to submit INR results to home monitor company.You will only be called when your result is out of range. Please call and notify [facility] if new medication started, dose missed, signs or symptoms of bleeding or clotting, or a surgery/procedure is scheduled. Due for next call no later than: 9/12/24.    Plan made per [facility] anticoagulation protocol    Ember Amezcua RN  Anticoagulation Clinic  6/14/2024    _______________________________________________________________________     Anticoagulation Episode Summary       Current INR goal:  2.0-3.0   TTR:  87.2% (1 y)   Target end date:  Indefinite   Send INR reminders to:  [facility] HOME MONITORING    Indications    Acute deep vein thrombosis (DVT) of axillary vein and internal jugular (H) (Resolved) [I82.A19]  Current use of long term anticoagulation (Resolved) [Z79.01]  Acute deep vein thrombosis (DVT) of axillary vein of right upper extremity (H) [I82.A11]  Personal history of DVT (deep vein thrombosis) [Z86.358]             Comments:   [employer] meter- Managed by exception             Anticoagulation Care Providers       Provider Role Specialty Phone number    Rema Braxton DO Referring Family Medicine [phone]    Jessica Anthony MD Referring Internal Medicine [phone]    Jana Giles MD Referring Internal Medicine [phone]    Christopher Smith MD Responsible Hematology [phone]

## 2024-06-20 ENCOUNTER — OFFICE VISIT (OUTPATIENT)
Dept: ENDOCRINOLOGY | Facility: CLINIC | Age: 79
End: 2024-06-20
Payer: MEDICARE

## 2024-06-20 ENCOUNTER — HOSPITAL ENCOUNTER (OUTPATIENT)
Dept: CT IMAGING | Facility: CLINIC | Age: 79
Discharge: HOME | End: 2024-06-20
Attending: PHYSICIAN ASSISTANT | Admitting: PHYSICIAN ASSISTANT
Payer: MEDICARE

## 2024-06-20 VITALS
HEART RATE: 87 BPM | WEIGHT: 152 LBS | DIASTOLIC BLOOD PRESSURE: 68 MMHG | SYSTOLIC BLOOD PRESSURE: 142 MMHG | BODY MASS INDEX: 25.69 KG/M2

## 2024-06-20 DIAGNOSIS — Z98.2 VP (VENTRICULOPERITONEAL) SHUNT STATUS: ICD-10-CM

## 2024-06-20 DIAGNOSIS — M80.08XD AGE-RELATED OSTEOPOR WITH CURR PATHOL FX OF VERTEBRA WITH ROUTINE HEAL: Primary | ICD-10-CM

## 2024-06-20 DIAGNOSIS — Z92.29 HISTORY OF BISPHOSPHONATE THERAPY: ICD-10-CM

## 2024-06-20 PROCEDURE — G2211 COMPLEX E/M VISIT ADD ON: HCPCS | Performed by: INTERNAL MEDICINE

## 2024-06-20 PROCEDURE — 99214 OFFICE O/P EST MOD 30 MIN: CPT | Performed by: INTERNAL MEDICINE

## 2024-06-20 PROCEDURE — 70450 CT HEAD/BRAIN W/O DYE: CPT | Mod: MG

## 2024-06-20 NOTE — PROGRESS NOTES
Recent issues:  Osteoporosis follow-up evaluation  She had right hip replacement 5/30/23, then recent right hip dislocation (then relocation)  Sees a neurosurgeon soon to review her CNS shunt management  Has changed from alendronate to Prolia in 2023...           History of chronic back pain  Medical evaluation, then surgical evaluation  2006. Spine surgery with 2 vertebral disc replacements at [facility] with Dr. Welsh     2016. Back pain while pulling hose backwards, fell and had vertebral fracture lumbar spine, back brace treatment  Medical evaluation  MRI lumbar spine:                Mild compression fracture superior endplate L4 vertebrae     ~2/2018. Another fall 2018 falling from treadmill, back pain and vertebrae fracture  Medical evaluation  2/28/18 MRI lumbar spine:              T12 and L2 superior endplate compression fractures, new since 8/2016              Previous compression fracture along the superior endplate of L3 has mature                       High-grade central canal stenosis persists at both L2-3 and L3-4              Previous combined interbody and instrumented bilateral posterolateral fusion from L4/S1.                            Solid bony bridging across the operated levels, appeared unchanged  Patient reports she wore back brace x 3 mo     7/2/18 Medical evaluation with Dr. MOON Box/ Endocrinology  Review of health history, osteoporotic vertebral compression fractures, lab testing  Previous DEXA scans include 5/17/12, 9/23/14,  7/9/18 DEXA:               Left Femoral Neck            T-score:  -1.8               Right Femoral Neck          T-score:  -1.6               Forearm (radius 33%)       T-score:  -0.4     Subsequent surgical evaluation with Dr. Christensen  9/11/18, Back surgery with ORIF and pin placement L1 to S1  9/2018. Plan to start alendronate, began alendronate 70 mg weekly dose (Sundays)     Health history includes:  Bone fractures:                       Left leg spiral  fracture '14                                                  Vertebral fracture s/p fall from horse '16, fall injury '18                                    Vitamin D deficiency:              None                  Kidney stones:                        None  Thyroid disease:                     Graves' disease hyperthyroidism                                                  Radioiodine ablation 2003, then postablative hypothyroidism                                                  Treatment with levothyroxine  Fam Hx Osteoporosis:            No  Osteoporosis med use:           Alendronate  Previous [facility] labs include:   Lab Test 07/26/19  1348   07/02/18  1610   TETE 8.4*   < > 9.5   PTHI  --   --  45   VITDT  --   --  43         10/30/19. Initial osteoporosis evaluation with me at [place]  Reviewed health history and osteoporosis management  Decided to continue alendronate 70 mg weekly dose plan  2/16/21 DEXA:   Left fem neck:  T-score: -1.0   Right fem neck: T-score: -1.3   Left Radius UD  T-score: -1.8    5/30/23 Right SANTHOSH surgery at [facility] with Dr. Vasu Mixon  7/17/23 DEXA:  Left fem neck:  T-score: -0.7.  Left Radius 33%:  T-score: -0.2.     2023. Stopped alendronate, changed to Prolia plan  Supplements:   Calcium-D 1-tablet BID   Vit D 1000U 1-capsule daily  Recent [facility] labs include:  Lab Results   Component Value Date     05/31/2024    POTASSIUM 4.4 05/31/2024    CHLORIDE 105 05/31/2024    CO2 29 05/31/2024    ANIONGAP 8 05/31/2024     (H) 05/31/2024    BUN 26.7 (H) 05/31/2024    CR 1.29 (H) 05/31/2024    GFRESTIMATED 42 (L) 05/31/2024    GFRESTBLACK 79 11/20/2020    TETE 9.8 05/31/2024    TSH 2.54 03/19/2024    VITDT 48 08/22/2023    PTHI 45 07/02/2018     Prolia doses:  10/23/23, 4/23/24       Lives in [place]  Sees Dr. Jana Giles/[facility] for general medicine evaluations.       PMH/PSH:  Past Medical History:   Diagnosis Date    Benign essential hypertension     Dislocation of right hip  (H) 05/2024    History of Graves' disease     s/p BARAHONA    History of hydrocephalus     s/p  shunt placement in 1987    Macular degeneration of both eyes     Osteoporosis     Personal history of DVT (deep vein thrombosis) 2019    RUE; on chronic AC    Postablative hypothyroidism     Primary open angle glaucoma (POAG) of both eyes     right eye moderate, left eye mild    Pseudophakia     Sensorineural hearing loss, bilateral      Past Surgical History:   Procedure Laterality Date    ARTHROPLASTY HIP Right 5/30/2023    Procedure: Right Total Hip Arthroplasty;  Surgeon: Vasu Mixon MD;  Location: [facility] OR    ARTHROSCOPY SHOULDER Left     CATARACT IOL, RT/LT Bilateral     DECOMPRESSION, FUSION LUMBAR POSTERIOR ONE LEVEL, COMBINED      L5-S1    EXCISE GANGLION WRIST Right     HERNIA REPAIR, UMBILICAL      LUMPECTOMY BREAST Left     benign    RECESSION RESECTION (REPAIR STRABISMUS) BILATERAL Bilateral 12/11/2023    Procedure: Bilateral eye muscle correction.;  Surgeon: Antwan Ardon MD;  [place] [facility]       Family Hx:  Family History   Problem Relation Age of Onset    Colon Cancer Mother     Hypertension Mother     Breast Cancer Mother     Glaucoma Mother     Leukemia Father     Diabetes Type 2  Father     Hypertension Sister     Macular Degeneration Sister     Diabetes Type 2  Brother     Breast Cancer Maternal Aunt         multiple aunts    Myocardial Infarction No family hx of     Cerebrovascular Disease No family hx of     Coronary Artery Disease Early Onset No family hx of     Ovarian Cancer No family hx of          Social Hx:  Social History     Socioeconomic History    Marital status:      Spouse name: Not on file    Number of children: Not on file    Years of education: Not on file    Highest education level: Not on file   Occupational History    Occupation: Retired - wedding    Tobacco Use    Smoking status: Never    Smokeless tobacco: Never   Vaping Use    Vaping  status: Never Used   Substance and Sexual Activity    Alcohol use: Yes     Comment: rare    Drug use: No    Sexual activity: Yes     Partners: Male   Other Topics Concern    Parent/sibling w/ CABG, MI or angioplasty before 65F 55M? No   Social History Narrative    .    Two sons.    3 grandchildren.    Swims 3x/week x 1 hour; walks 4x/week x 1 hour.      Social Determinants of Health     Financial Resource Strain: Low Risk  (11/27/2023)    Financial Resource Strain     Within the past 12 months, have you or your family members you live with been unable to get utilities (heat, electricity) when it was really needed?: No   Food Insecurity: Low Risk  (11/27/2023)    Food Insecurity     Within the past 12 months, did you worry that your food would run out before you got money to buy more?: No     Within the past 12 months, did the food you bought just not last and you didn t have money to get more?: No   Transportation Needs: Low Risk  (11/27/2023)    Transportation Needs     Within the past 12 months, has lack of transportation kept you from medical appointments, getting your medicines, non-medical meetings or appointments, work, or from getting things that you need?: No   Physical Activity: Not on file   Stress: Not on file   Social Connections: Not on file   Interpersonal Safety: Low Risk  (3/19/2024)    Interpersonal Safety     Do you feel physically and emotionally safe where you currently live?: Yes     Within the past 12 months, have you been hit, slapped, kicked or otherwise physically hurt by someone?: No     Within the past 12 months, have you been humiliated or emotionally abused in other ways by your partner or ex-partner?: No   Housing Stability: Low Risk  (11/27/2023)    Housing Stability     Do you have housing? : Yes     Are you worried about losing your housing?: No          MEDICATIONS:  has a current medication list which includes the following prescription(s): acetaminophen, calcium  carbonate-vitamin d, carboxymethylcellulose, latanoprost, levothyroxine, losartan, metformin, multiple vitamins-minerals, timolol maleate, vitamin b-12, vitamin d3, vitamin k, warfarin anticoagulant, amoxicillin, and chlorthalidone, and the following Facility-Administered Medications: denosumab.      ROS: 10 point ROS neg other than the symptoms noted above in the HPI.     GENERAL: some fatigue, wt stable; denies fevers, chills, night sweats.    HEENT: no dysphagia, odonophagia, diplopia, neck pain  THYROID:  no apparent hyper or hypothyroid symptoms  CV: no chest pain, pressure, palpitations  LUNGS: no SOB, ALLEN, cough, wheezing   ABDOMEN: no diarrhea, constipation, abdominal pain  EXTREMITIES: no rashes, ulcers, edema  NEUROLOGY: balance problems; no headaches, denies changes in vision, tingling, extremitiy numbness   MSK: right hip pain, low back stiffness; denies muscle weakness  SKIN: no rashes or lesions  : no menses, denies hot flashes  PSYCH:  stable mood, no significant anxiety or depression  ENDOCRINE: no heat or cold intolerance    Physical Exam   VS: BP (!) 142/68   Pulse 87   Wt 68.9 kg (152 lb)   LMP  (LMP Unknown)   BMI 25.69 kg/m    GENERAL: AXOX3, NAD, well dressed, answering questions appropriately, appears stated age.  ENT: no nose swelling or nasal discharge, mouth redness or gum changes.  EYES: eyes grossly normal to inspection, conjunctivae and sclerae normal, no exophthalmos or proptosis  THYROID:  no apparent nodules or goiter  LUNGS: no audible wheeze, cough or visible cyanosis, or increased work of breathing  ABDOMEN: abdomen normal size  EXTREMITIES: no edema noted  NEUROLOGY: walks slowly with rolling walker; CN grossly intact, no tremors  MSK: grossly intact  SKIN:  no apparent skin lesions, rash, or edema with visualized skin appearance  PSYCH: mentation appears normal, affect normal/bright, judgement and insight intact,   normal speech and appearance well groomed    LABS:     All  pertinent notes, labs, and images personally reviewed by me.        A/P:  Encounter Diagnoses   Name Primary?    Age-related osteopor with curr pathol fx of vertebra with routine heal Yes    History of bisphosphonate therapy      Comments:  Reviewed health history and osteoporosis issues  While osteopenia-range BMD at hips, vertebral compression fractures define osteoporosis.  Reviewed and interpreted tests that I previously ordered.   Ordered appropriate tests for the endocrinology disease management.    Management options discussed and implemented after shared medical decision making with the patient.  Osteoporosis problem is chronic-stable    Plan:  Discussed general issues with the osteoporosis diagnosis and management  Reviewed concept of using the DEXA scan imaging to assess bone mineral density (BMD)  We have reviewed treatment options with oral bisphosphonate, SERM, IV Boniva vs Reclast, SQ Prolia vs Forteo     Recommend:  Reviewed the Prolia injection medication every 6-months   Next Prolia dose in 10/2024  Continue calcium and vitamin D supplement use  Avoid heavy lifting and fall injuries  Repeat lab tests in 10/2023   Lab orders placed  Repeat spine Xray and/or MRI if new acute back pain  Consider a repeat DEXA scan in 7/2025   Scan at [facility]    Addressed patient questions today     The longitudinal plan of care for the endocrine problem(s) were addressed during this visit.  Due to added complexity of care,   we will continue to support the patient and the subsequent management of this condition with ongoing continuity of care.    There are no Patient Instructions on file for this visit.    Future labs ordered today:   Orders Placed This Encounter   Procedures    Basic metabolic panel    Vitamin D Deficiency     Radiology/Consults ordered today: None    Total time spent on day of encounter:  23 min    Follow-up:  6/2025, Return    ROBERT Hernandez MD, MS  Endocrinology  [facility]  [place]    CC:  Care Team

## 2024-06-21 ENCOUNTER — DOCUMENTATION ONLY (OUTPATIENT)
Dept: ANTICOAGULATION | Facility: CLINIC | Age: 79
End: 2024-06-21

## 2024-06-21 ENCOUNTER — OFFICE VISIT (OUTPATIENT)
Dept: NEUROSURGERY | Facility: CLINIC | Age: 79
End: 2024-06-21
Payer: MEDICARE

## 2024-06-21 ENCOUNTER — TELEPHONE (OUTPATIENT)
Dept: NEUROSURGERY | Facility: CLINIC | Age: 79
End: 2024-06-21

## 2024-06-21 VITALS — DIASTOLIC BLOOD PRESSURE: 73 MMHG | SYSTOLIC BLOOD PRESSURE: 148 MMHG

## 2024-06-21 DIAGNOSIS — G93.89 SUBDURAL FLUID COLLECTION: ICD-10-CM

## 2024-06-21 DIAGNOSIS — I82.A11 ACUTE DEEP VEIN THROMBOSIS (DVT) OF AXILLARY VEIN OF RIGHT UPPER EXTREMITY (H): Primary | ICD-10-CM

## 2024-06-21 DIAGNOSIS — S06.5XAA SDH (SUBDURAL HEMATOMA) (H): Primary | ICD-10-CM

## 2024-06-21 DIAGNOSIS — Z86.718 PERSONAL HISTORY OF DVT (DEEP VEIN THROMBOSIS): ICD-10-CM

## 2024-06-21 LAB — INR HOME MONITORING: 2.5 (ref 2–3)

## 2024-06-21 PROCEDURE — 99207 PR APP CREDIT; MD BILLING SHARED VISIT: CPT | Performed by: NURSE PRACTITIONER

## 2024-06-21 ASSESSMENT — PAIN SCALES - GENERAL: PAINLEVEL: MILD PAIN (3)

## 2024-06-21 NOTE — PROGRESS NOTES
ANTICOAGULATION  MANAGEMENT-Home Monitor Managed by Exception    Manasa DIEGO Manolo 78 year old female is on warfarin with therapeutic INR result. (Goal INR 2.0-3.0)    Recent labs: (last 7 days)     06/21/24  0000   INR 2.5       Previous INR was Therapeutic  Medication, diet, health changes since last INR:chart reviewed; none identified  Contacted within the last 12 weeks by phone on 6/14/24  Last [facility] referral date: 11/26/2023      AUBREY     Manasa was NOT contacted regarding therapeutic result today per home monitoring policy manage by exception agreement.   Current warfarin dose is to be continued:     Summary  As of 6/21/2024      Full warfarin instructions:  7.5 mg every Mon, Wed, Fri; 5 mg all other days   Next INR check:  6/28/2024             ?   Kyleigh Valdez RN  Anticoagulation Clinic  6/21/2024    _______________________________________________________________________     Anticoagulation Episode Summary       Current INR goal:  2.0-3.0   TTR:  87.2% (1 y)   Target end date:  Indefinite   Send INR reminders to:  [facility] MONITORING    Indications    Acute deep vein thrombosis (DVT) of axillary vein and internal jugular (H) (Resolved) [I82.A19]  Current use of long term anticoagulation (Resolved) [Z79.01]  Acute deep vein thrombosis (DVT) of axillary vein of right upper extremity (H) [I82.A11]  Personal history of DVT (deep vein thrombosis) [Z86.718]             Comments:  [employer] Home meter- Managed by exception             Anticoagulation Care Providers       Provider Role Specialty Phone number    Rema Braxton DO Referring Family Medicine [phone]    Jessica Anthony MD Referring Internal Medicine [phone]    Jana Giles MD Referring Internal Medicine [phone]    Christopher Smith MD Responsible Hematology [phone]

## 2024-06-21 NOTE — LETTER
6/21/2024      Manasa French  [address]  [place] [facility] [address]      Dear Colleague,    Thank you for referring your patient, Manasa French, to the [facility] - [place]. Please see a copy of my visit note below.    Neurosurgery Clinic 4 weeks follow up-[place]    Assessment:  Rhonda presents to clinic today for 4-week follow-up post hospital discharge for concern for over shunting.  Rhonda has history of right-sided nonprogrammable  shunt placed in 1987 and she had also been on Coumadin for unprovoked right upper extremity DVT in 2019.  Resumed Coumadin 1 week post hospital discharge.  Back in May Rhonda had a syncopal episode after she stood up from a seated position making muffins leading to her fall and hitting her head.  Demonstrating subdural fluid collections bilaterally and diffuse dural thickening enhancement which could possibly be as a result of over drainage.     Patient has a long history of headaches for 15+ years.  She states that her headaches are worse in the morning and improve after she gets up and goes about her day.  Patient states that approximately 4 times a year she will develop tunnel vision, loss of visual color, become hypotensive verified on her home machine and if she does not sit immediately she will pass out.  She was unable to sit down this time because of making muffins.  Patient had cardiac monitor on 1 month post hospital discharge with no findings.  Patient only has episodes 4 times a year and no episodes since discharge.  It is possible that she has a cardiac abnormality that just went undetected while being monitored.    Neurology evaluated patient while hospitalized sitting low suspicion for seizures and possibly can be explained by ocular migraines.  Patient has appointment with neurologist on August 28.  I will be curious to see if they have any additional thoughts on patient's symptoms.    Exam:  Patient is currently neurologically  intact with no focal deficits.  She uses the assist of a purple walker with ambulation.  She is alert and oriented with no acute distress.  She is moving all extremities with equal strength.  No pronator drift.  EOMs intact pupils equal and reactive.    Plan:  -Repeat CT scan in 2 months with follow-up appointment after visit with a neurologist.    Patient is hopeful that her shunt does not need to be revised and would like further workup into her symptoms.  While subdural collection certainly could be caused by over shunting patient's symptoms do not necessarily correlate.  I would expect if patient had an issue with over draining that her symptoms would be improved while lying down sleeping and best in the morning and would worsen over the course of the day.  However her symptoms are the exact opposite of that.  I think additional neurologic workup or cardiac workup if symptoms are ongoing be beneficial to rule out other pathologies.  Ideally it would be wonderful if patient had episode while being on a cardiac monitor so we could put possible cardiac involvement to rest.    Celestine Felipe DNP  [address] [address]    Tel [phone]    Dragon Disclosure   This was created at least in part with a voice recognition software. Mistakes/typos may be present.       Case was initially reviewed with Dr. Anthony in the inpatient setting      Again, thank you for allowing me to participate in the care of your patient.        Sincerely,        Celestine Felipe, CNP

## 2024-06-21 NOTE — PROGRESS NOTES
Neurosurgery Clinic 4 weeks follow up-[place]    Assessment:  Rhonda presents to clinic today for 4-week follow-up post hospital discharge for concern for over shunting.  Rhonda has history of right-sided nonprogrammable  shunt placed in 1987 and she had also been on Coumadin for unprovoked right upper extremity DVT in 2019.  Resumed Coumadin 1 week post hospital discharge.  Back in May Rhonda had a syncopal episode after she stood up from a seated position making muffins leading to her fall and hitting her head.  Demonstrating subdural fluid collections bilaterally and diffuse dural thickening enhancement which could possibly be as a result of over drainage.     Patient has a long history of headaches for 15+ years.  She states that her headaches are worse in the morning and improve after she gets up and goes about her day.  Patient states that approximately 4 times a year she will develop tunnel vision, loss of visual color, become hypotensive verified on her home machine and if she does not sit immediately she will pass out.  She was unable to sit down this time because of making muffins.  Patient had cardiac monitor on 1 month post hospital discharge with no findings.  Patient only has episodes 4 times a year and no episodes since discharge.  It is possible that she has a cardiac abnormality that just went undetected while being monitored.    Neurology evaluated patient while hospitalized sitting low suspicion for seizures and possibly can be explained by ocular migraines.  Patient has appointment with neurologist on August 28.  I will be curious to see if they have any additional thoughts on patient's symptoms.    Exam:  Patient is currently neurologically intact with no focal deficits.  She uses the assist of a purple walker with ambulation.  She is alert and oriented with no acute distress.  She is moving all extremities with equal strength.  No pronator drift.  EOMs intact pupils equal and  reactive.    Plan:  -Repeat CT scan in 2 months with follow-up appointment after visit with a neurologist.    Patient is hopeful that her shunt does not need to be revised and would like further workup into her symptoms.  While subdural collection certainly could be caused by over shunting patient's symptoms do not necessarily correlate.  I would expect if patient had an issue with over draining that her symptoms would be improved while lying down sleeping and best in the morning and would worsen over the course of the day.  However her symptoms are the exact opposite of that.  I think additional neurologic workup or cardiac workup if symptoms are ongoing be beneficial to rule out other pathologies.  Ideally it would be wonderful if patient had episode while being on a cardiac monitor so we could put possible cardiac involvement to rest.    Celestine Felipe, BEATRIZ  [address] [address]    Tel [phone]    Dragon Disclosure   This was created at least in part with a voice recognition software. Mistakes/typos may be present.       Case was initially reviewed with Dr. Anthony in the inpatient setting

## 2024-06-21 NOTE — TELEPHONE ENCOUNTER
CT scheduled on 8/28. Nicolet needs follow up with Celestine Felipe CNP after that. Template not available now.

## 2024-06-21 NOTE — NURSING NOTE
"Manasa French is a 78 year old female who presents for:  Chief Complaint   Patient presents with    RECHECK     Headaches 8/10 pain fluctuating throughout the week   \"Low grade headache all the time\"        Initial Vitals:  BP (!) 148/73 (BP Location: Left arm, Patient Position: Sitting, Cuff Size: Adult Regular)   LMP  (LMP Unknown)  Estimated body mass index is 25.69 kg/m  as calculated from the following:    Height as of 5/24/24: 5' 4.5\" (1.638 m).    Weight as of 6/20/24: 152 lb (68.9 kg).. There is no height or weight on file to calculate BSA. BP completed using cuff size: regular  Mild Pain (3)    Eris Cardenas    "

## 2024-06-28 ENCOUNTER — DOCUMENTATION ONLY (OUTPATIENT)
Dept: ANTICOAGULATION | Facility: CLINIC | Age: 79
End: 2024-06-28

## 2024-06-28 ENCOUNTER — OFFICE VISIT (OUTPATIENT)
Dept: FAMILY MEDICINE | Facility: CLINIC | Age: 79
End: 2024-06-28
Payer: MEDICARE

## 2024-06-28 VITALS
HEART RATE: 76 BPM | BODY MASS INDEX: 24.94 KG/M2 | WEIGHT: 149.7 LBS | TEMPERATURE: 97.3 F | SYSTOLIC BLOOD PRESSURE: 144 MMHG | OXYGEN SATURATION: 98 % | DIASTOLIC BLOOD PRESSURE: 70 MMHG | RESPIRATION RATE: 19 BRPM | HEIGHT: 65 IN

## 2024-06-28 DIAGNOSIS — D51.9 ANEMIA DUE TO VITAMIN B12 DEFICIENCY, UNSPECIFIED B12 DEFICIENCY TYPE: ICD-10-CM

## 2024-06-28 DIAGNOSIS — I82.A11 ACUTE DEEP VEIN THROMBOSIS (DVT) OF AXILLARY VEIN OF RIGHT UPPER EXTREMITY (H): Primary | ICD-10-CM

## 2024-06-28 DIAGNOSIS — I10 BENIGN ESSENTIAL HYPERTENSION: Primary | ICD-10-CM

## 2024-06-28 DIAGNOSIS — Z86.718 PERSONAL HISTORY OF DVT (DEEP VEIN THROMBOSIS): ICD-10-CM

## 2024-06-28 LAB
ERYTHROCYTE [DISTWIDTH] IN BLOOD BY AUTOMATED COUNT: 14.4 % (ref 10–15)
FERRITIN SERPL-MCNC: 71 NG/ML (ref 11–328)
FOLATE SERPL-MCNC: 16.3 NG/ML (ref 4.6–34.8)
HCT VFR BLD AUTO: 32.6 % (ref 35–47)
HGB BLD-MCNC: 10.2 G/DL (ref 11.7–15.7)
INR HOME MONITORING: 2.3 (ref 2–3)
IRON BINDING CAPACITY (ROCHE): 288 UG/DL (ref 240–430)
IRON SATN MFR SERPL: 22 % (ref 15–46)
IRON SERPL-MCNC: 64 UG/DL (ref 37–145)
MCH RBC QN AUTO: 30.9 PG (ref 26.5–33)
MCHC RBC AUTO-ENTMCNC: 31.3 G/DL (ref 31.5–36.5)
MCV RBC AUTO: 99 FL (ref 78–100)
PLATELET # BLD AUTO: 290 10E3/UL (ref 150–450)
RBC # BLD AUTO: 3.3 10E6/UL (ref 3.8–5.2)
TOTAL PROTEIN SERUM FOR ELP: 7 G/DL (ref 6.4–8.3)
VIT B12 SERPL-MCNC: 2594 PG/ML (ref 232–1245)
WBC # BLD AUTO: 6.3 10E3/UL (ref 4–11)

## 2024-06-28 PROCEDURE — 36415 COLL VENOUS BLD VENIPUNCTURE: CPT | Performed by: PHYSICIAN ASSISTANT

## 2024-06-28 PROCEDURE — 83550 IRON BINDING TEST: CPT | Performed by: PHYSICIAN ASSISTANT

## 2024-06-28 PROCEDURE — 82607 VITAMIN B-12: CPT | Performed by: PHYSICIAN ASSISTANT

## 2024-06-28 PROCEDURE — 84155 ASSAY OF PROTEIN SERUM: CPT | Performed by: PHYSICIAN ASSISTANT

## 2024-06-28 PROCEDURE — 82728 ASSAY OF FERRITIN: CPT | Performed by: PHYSICIAN ASSISTANT

## 2024-06-28 PROCEDURE — 82746 ASSAY OF FOLIC ACID SERUM: CPT | Performed by: PHYSICIAN ASSISTANT

## 2024-06-28 PROCEDURE — 82274 ASSAY TEST FOR BLOOD FECAL: CPT | Performed by: PHYSICIAN ASSISTANT

## 2024-06-28 PROCEDURE — 99213 OFFICE O/P EST LOW 20 MIN: CPT | Performed by: PHYSICIAN ASSISTANT

## 2024-06-28 PROCEDURE — 84165 PROTEIN E-PHORESIS SERUM: CPT | Performed by: STUDENT IN AN ORGANIZED HEALTH CARE EDUCATION/TRAINING PROGRAM

## 2024-06-28 PROCEDURE — 83540 ASSAY OF IRON: CPT | Performed by: PHYSICIAN ASSISTANT

## 2024-06-28 PROCEDURE — 85027 COMPLETE CBC AUTOMATED: CPT | Performed by: PHYSICIAN ASSISTANT

## 2024-06-28 RX ORDER — RESPIRATORY SYNCYTIAL VIRUS VACCINE 120MCG/0.5
0.5 KIT INTRAMUSCULAR ONCE
Qty: 1 EACH | Refills: 0 | Status: CANCELLED | OUTPATIENT
Start: 2024-06-28 | End: 2024-06-28

## 2024-06-28 ASSESSMENT — PAIN SCALES - GENERAL: PAINLEVEL: MODERATE PAIN (4)

## 2024-06-28 NOTE — PATIENT INSTRUCTIONS
Labs today    Continue current medications    Continue to check bp 2-3 times per week    Keep follow-up appointments

## 2024-06-28 NOTE — PROGRESS NOTES
ANTICOAGULATION  MANAGEMENT-Home Monitor Managed by Exception    Manasa French 78 year old female is on warfarin with therapeutic INR result. (Goal INR 2.0-3.0)    Recent labs: (last 7 days)     06/28/24  0000   INR 2.3       Previous INR was Therapeutic  Medication, diet, health changes since last INR:chart reviewed; none identified  Contacted within the last 12 weeks by phone on 6/14/24  Last [facility] referral date: 11/26/2023      AUBREY     Manasa was NOT contacted regarding therapeutic result today per home monitoring policy manage by exception agreement.   Current warfarin dose is to be continued:     Summary  As of 6/28/2024      Next INR check:               ?   Aixa Steven RN  Anticoagulation Clinic  6/28/2024    _______________________________________________________________________     Anticoagulation Episode Summary       Current INR goal:  2.0-3.0   TTR:  87.2% (1 y)   Target end date:  Indefinite   Send INR reminders to:  ANTICOAG HOME MONITORING    Indications    Acute deep vein thrombosis (DVT) of axillary vein and internal jugular (H) (Resolved) [I82.A19]  Current use of long term anticoagulation (Resolved) [Z79.01]  Acute deep vein thrombosis (DVT) of axillary vein of right upper extremity (H) [I82.A11]  Personal history of DVT (deep vein thrombosis) [Z86.718]             Comments:  [employer] Home meter- Managed by exception             Anticoagulation Care Providers       Provider Role Specialty Phone number    Rema Braxton DO Referring Family Medicine [phone]    Jessica Anthony MD Referring Internal Medicine [phone]    Jana Giles MD Referring Internal Medicine [phone]    Christopher Smith MD Responsible Hematology [phone]

## 2024-06-28 NOTE — PROGRESS NOTES
"Assessment and Plan:     (I10) Benign essential hypertension  (primary encounter diagnosis)  Comment: stopped chlorthalidone at last visit due to increase in syncopal events, hasn't had one since stopping it, BP a bit higher but has been averaging 136/66 at home, taking daily, discussed adding back low dose amlodipine but she declined  Plan: continue to monitor at home, if BP increases may need to add low dose of additional agent    (D51.9) Anemia due to vitamin B12 deficiency, unspecified B12 deficiency type  Comment: due for labs today, has been on oral B12 x last year but hgb continues to drift down, denies any bleeding  Plan: folate, iron studies, protein electrophoresis, B12, CBC, FIT      [facility] Same Day Provider         Subjective   Rhonda is a 78 year old, presenting for the following health issues:  Follow Up    History of Present Illness       Hypertension: She presents for follow up of hypertension.  She does check blood pressure  regularly outside of the clinic. Outside blood pressures have been over 140/90. She does not follow a low salt diet.     She eats 2-3 servings of fruits and vegetables daily.She consumes 1 sweetened beverage(s) daily.She exercises with enough effort to increase her heart rate 30 to 60 minutes per day.  She exercises with enough effort to increase her heart rate 7 days per week.   She is taking medications regularly.     Rhonda is here for bp follow-up  We stopped her chlorthalidone at last visit due to increased syncope  She has been checking her pressure daily and averaging 136/66  She hasn't had any syncope since her last visit 30 days ago  She is due for some labs today  Following with neurology for recent head injury, possible shunt issue        Objective    LMP  (LMP Unknown)   There is no height or weight on file to calculate BMI.    BP (!) 144/70   Pulse 76   Temp 97.3  F (36.3  C) (Temporal)   Resp 19   Ht 1.638 m (5' 4.5\")   Wt 67.9 kg " (149 lb 11.2 oz)   LMP  (LMP Unknown)   SpO2 98%   BMI 25.30 kg/m      Physical Exam     GENERAL: healthy, alert and no distress  RESP: lungs clear to auscultation - no rales, no rhonchi, no wheezes  CV: regular rates and rhythm, normal S1 S2, no S3 or S4 and no murmur, no click or rub   MS: extremities- no gross deformities noted, no edema              Signed Electronically by: Clara Hemphill PA-C

## 2024-07-01 LAB
ALBUMIN SERPL ELPH-MCNC: 4.2 G/DL (ref 3.7–5.1)
ALPHA1 GLOB SERPL ELPH-MCNC: 0.3 G/DL (ref 0.2–0.4)
ALPHA2 GLOB SERPL ELPH-MCNC: 0.7 G/DL (ref 0.5–0.9)
B-GLOBULIN SERPL ELPH-MCNC: 0.8 G/DL (ref 0.6–1)
GAMMA GLOB SERPL ELPH-MCNC: 0.9 G/DL (ref 0.7–1.6)
M PROTEIN SERPL ELPH-MCNC: 0 G/DL
PATH REPORT.COMMENTS IMP SPEC: NORMAL
PROT PATTERN SERPL ELPH-IMP: NORMAL

## 2024-07-02 NOTE — RESULT ENCOUNTER NOTE
Glen Ellis,     Here are your recent results: your vitamin B12 level is elevated, I think you can start taking your supplement every other day.  Your blood counts are stable.  Your iron stores are normal.  Your folic acid was normal.  Your protein electrophoresis was normal.    Please let us know if you have any questions or concerns.    Regards,  Clara Hemphill PA-C

## 2024-07-05 ENCOUNTER — ANCILLARY PROCEDURE (OUTPATIENT)
Dept: GENERAL RADIOLOGY | Facility: CLINIC | Age: 79
End: 2024-07-05
Attending: PHYSICIAN ASSISTANT
Payer: MEDICARE

## 2024-07-05 ENCOUNTER — OFFICE VISIT (OUTPATIENT)
Dept: URGENT CARE | Facility: URGENT CARE | Age: 79
End: 2024-07-05
Payer: MEDICARE

## 2024-07-05 ENCOUNTER — NURSE TRIAGE (OUTPATIENT)
Dept: FAMILY MEDICINE | Facility: CLINIC | Age: 79
End: 2024-07-05

## 2024-07-05 VITALS
OXYGEN SATURATION: 97 % | TEMPERATURE: 97.6 F | RESPIRATION RATE: 19 BRPM | SYSTOLIC BLOOD PRESSURE: 152 MMHG | BODY MASS INDEX: 25.18 KG/M2 | HEART RATE: 94 BPM | WEIGHT: 149 LBS | DIASTOLIC BLOOD PRESSURE: 75 MMHG

## 2024-07-05 DIAGNOSIS — D64.9 ANEMIA, UNSPECIFIED TYPE: Primary | ICD-10-CM

## 2024-07-05 DIAGNOSIS — J20.8 ACUTE BACTERIAL BRONCHITIS: Primary | ICD-10-CM

## 2024-07-05 DIAGNOSIS — B96.89 ACUTE BACTERIAL BRONCHITIS: Primary | ICD-10-CM

## 2024-07-05 DIAGNOSIS — R06.2 WHEEZING: ICD-10-CM

## 2024-07-05 DIAGNOSIS — Z79.01 LONG TERM CURRENT USE OF ANTICOAGULANT THERAPY: ICD-10-CM

## 2024-07-05 DIAGNOSIS — R05.8 PRODUCTIVE COUGH: ICD-10-CM

## 2024-07-05 DIAGNOSIS — B96.89 ACUTE BACTERIAL BRONCHITIS: ICD-10-CM

## 2024-07-05 DIAGNOSIS — E11.9 TYPE 2 DIABETES MELLITUS WITHOUT COMPLICATION, WITHOUT LONG-TERM CURRENT USE OF INSULIN (H): ICD-10-CM

## 2024-07-05 DIAGNOSIS — J20.8 ACUTE BACTERIAL BRONCHITIS: ICD-10-CM

## 2024-07-05 DIAGNOSIS — R05.9 COUGH, UNSPECIFIED TYPE: ICD-10-CM

## 2024-07-05 LAB — HEMOCCULT STL QL IA: NEGATIVE

## 2024-07-05 PROCEDURE — 99214 OFFICE O/P EST MOD 30 MIN: CPT | Performed by: PHYSICIAN ASSISTANT

## 2024-07-05 PROCEDURE — 71046 X-RAY EXAM CHEST 2 VIEWS: CPT | Mod: TC | Performed by: RADIOLOGY

## 2024-07-05 RX ORDER — DOXYCYCLINE HYCLATE 100 MG
100 TABLET ORAL 2 TIMES DAILY
Qty: 20 TABLET | Refills: 0 | Status: SHIPPED | OUTPATIENT
Start: 2024-07-05 | End: 2024-07-15

## 2024-07-05 RX ORDER — CODEINE PHOSPHATE AND GUAIFENESIN 10; 100 MG/5ML; MG/5ML
1-2 SOLUTION ORAL
Qty: 118 ML | Refills: 0 | Status: SHIPPED | OUTPATIENT
Start: 2024-07-05 | End: 2024-09-04

## 2024-07-05 RX ORDER — ALBUTEROL SULFATE 90 UG/1
2 AEROSOL, METERED RESPIRATORY (INHALATION) EVERY 6 HOURS
Qty: 8.5 G | Refills: 0 | Status: SHIPPED | OUTPATIENT
Start: 2024-07-05

## 2024-07-05 RX ORDER — BENZONATATE 200 MG/1
200 CAPSULE ORAL 3 TIMES DAILY PRN
Qty: 30 CAPSULE | Refills: 0 | Status: SHIPPED | OUTPATIENT
Start: 2024-07-05 | End: 2024-07-15

## 2024-07-05 NOTE — TELEPHONE ENCOUNTER
"Nurse Triage SBAR    Is this a 2nd Level Triage? NO    Situation: Pt called the clinic concerned about her cough.     Background: Cough started Monday.Pt has not tested for covid 19.      Pt has used cough drops at night and doubled her tylenol (takes 2 tylenol).     Pt has a hx of blood clots. Pt is on warfarin.     Assessment: Productive cough with green/yellowish secretions. Pt will have severe coughing spells. Pt also reports a runny nose but always has that in the summer.      Pt also reports some wheezing at night when she lays down.      Protocol Recommended Disposition:   See in office today.     Recommendation: No appts available in office today. Recommended pt go to  today. Provided pt with Kika  location and time to opens. Pt stated \"ok\".      Routed to provider    Does the patient meet one of the following criteria for ADS visit consideration? 16+ years old, with an [facility] PCP     TIP  Providers, please consider if this condition is appropriate for management at one of our Acute and Diagnostic Services sites.     If patient is a good candidate, please use dotphrase <dot>triageresponse and select Refer to ADS to document.       1. ONSET: \"When did the cough begin?\"       Monday   2. SEVERITY: \"How bad is the cough today?\"       Not as bad as yesterday   3. SPUTUM: \"Describe the color of your sputum\" (none, dry cough; clear, white, yellow, green)      Greenish/yellow   4. HEMOPTYSIS: \"Are you coughing up any blood?\" If so ask: \"How much?\" (flecks, streaks, tablespoons, etc.)      No  5. DIFFICULTY BREATHING: \"Are you having difficulty breathing?\" If Yes, ask: \"How bad is it?\" (e.g., mild, moderate, severe)     - MILD: No SOB at rest, mild SOB with walking, speaks normally in sentences, can lie down, no retractions, pulse < 100.     - MODERATE: SOB at rest, SOB with minimal exertion and prefers to sit, cannot lie down flat, speaks in phrases, mild retractions, audible wheezing, pulse 100-120.     - " "SEVERE: Very SOB at rest, speaks in single words, struggling to breathe, sitting hunched forward, retractions, pulse > 120       No  6. FEVER: \"Do you have a fever?\" If Yes, ask: \"What is your temperature, how was it measured, and when did it start?\"      No  7. CARDIAC HISTORY: \"Do you have any history of heart disease?\" (e.g., heart attack, congestive heart failure)       No  8. LUNG HISTORY: \"Do you have any history of lung disease?\"  (e.g., pulmonary embolus, asthma, emphysema)      No  9. PE RISK FACTORS: \"Do you have a history of blood clots?\" (or: recent major surgery, recent prolonged travel, bedridden)      Yes, on warfarin   10. OTHER SYMPTOMS: \"Do you have any other symptoms?\" (e.g., runny nose, wheezing, chest pain)        Runny nose (normally has), a little bit of wheezing when she lays down at night.    11. PREGNANCY: \"Is there any chance you are pregnant?\" \"When was your last menstrual period?\"        NA  12. TRAVEL: \"Have you traveled out of the country in the last month?\" (e.g., travel history, exposures)        No  Reason for Disposition   SEVERE coughing spells (e.g., whooping sound after coughing, vomiting after coughing)    Additional Information   Negative: Bluish (or gray) lips or face   Negative: SEVERE difficulty breathing (e.g., struggling for each breath, speaks in single words)   Negative: Rapid onset of cough and has hives   Negative: Coughing started suddenly after medicine, an allergic food or bee sting   Negative: Difficulty breathing after exposure to flames, smoke, or fumes   Negative: Sounds like a life-threatening emergency to the triager   Negative: Previous asthma attacks and this feels like asthma attack   Negative: Dry cough (non-productive; no sputum or minimal clear sputum) and within 14 days of COVID-19 Exposure   Negative: MODERATE difficulty breathing (e.g., speaks in phrases, SOB even at rest, pulse 100-120) and still present when not coughing   Negative: Chest pain " present when not coughing   Negative: Passed out (i.e., fainted, collapsed and was not responding)   Negative: Patient sounds very sick or weak to the triager   Negative: MILD difficulty breathing (e.g., minimal/no SOB at rest, SOB with walking, pulse <100) and still present when not coughing   Negative: Coughed up > 1 tablespoon (15 ml) blood (Exception: Blood-tinged sputum.)   Negative: Fever > 103 F (39.4 C)   Negative: Fever > 101 F (38.3 C) and over 60 years of age   Negative: Fever > 100.0 F (37.8 C) and has diabetes mellitus or a weak immune system (e.g., HIV positive, cancer chemotherapy, organ transplant, splenectomy, chronic steroids)   Negative: Fever > 100.0 F (37.8 C) and bedridden (e.g., CVA, chronic illness, recovering from surgery)   Negative: Increasing ankle swelling   Negative: Wheezing is present    Protocols used: Cough-A-OH

## 2024-07-05 NOTE — PROGRESS NOTES
Assessment & Plan     Acute bacterial bronchitis    Chest xray Negative for acute findings, read by Rashel Guzman PA-C [facility] at time of visit.    Bronchitis is inflammation of the bronchial tubes, which carry air to the lungs. The tubes swell and produce mucus, or phlegm. The mucus and inflamed bronchial tubes make you cough. You may have trouble breathing.  Most cases of bronchitis are caused by viruses but in your case we are more concerned about a bacterial infection. . Antibiotics usually are helpful in this situation to help you clear this bacterial infection.  Bronchitis usually develops rapidly and lasts about 2 to 3 weeks in otherwise healthy people.    - XR Chest 2 Views  - doxycycline hyclate (VIBRA-TABS) 100 MG tablet  Dispense: 20 tablet; Refill: 0    Productive cough    Start on antibiotics  - XR Chest 2 Views  - doxycycline hyclate (VIBRA-TABS) 100 MG tablet  Dispense: 20 tablet; Refill: 0    Wheezing    Use albuterol as needed for wheezing and chest tightness  - albuterol (PROVENTIL HFA) 108 (90 Base) MCG/ACT inhaler  Dispense: 8.5 g; Refill: 0    Type 2 diabetes mellitus without complication, without long-term current use of insulin (H)    Monitor blood sugars, increased blood sugars can lead to worsening infections    Cough, unspecified type    Use for coughing  - benzonatate (TESSALON) 200 MG capsule  Dispense: 30 capsule; Refill: 0  - guaiFENesin-codeine (ROBITUSSIN AC) 100-10 MG/5ML solution  Dispense: 118 mL; Refill: 0    Long term current use of anticoagulant therapy    Recheck INR in 5 days with coumadin clinic         At today's visit with Manasa French , we discussed results, diagnosis, medications and formulated a plan.  We also discussed red flags for immediate return to clinic/ER, as well as indications for follow up with PCP if not improved in 3 days. Patient understood and agreed to plan. Manasa French was discharged with stable vitals and has no further questions.     No  follow-ups on file.    Rashel Guzman, JARETH, PAKAYE  M [facility] ROCIO Ellis is a 78 year old female who presents to clinic today for the following health issues:  Chief Complaint   Patient presents with    Urgent Care    Cough     Persistent cough since Monday afternoon. Coughing up junk.       HPI  Review of Systems  Constitutional, HEENT, cardiovascular, pulmonary, GI, , musculoskeletal, neuro, skin, endocrine and psych systems are negative, except as otherwise noted.      Objective    BP (!) 152/75   Pulse 94   Temp 97.6  F (36.4  C) (Tympanic)   Resp 19   Wt 67.6 kg (149 lb)   LMP  (LMP Unknown)   SpO2 97%   BMI 25.18 kg/m    Physical Exam   GENERAL: alert and no distress  EYES: Eyes grossly normal to inspection, PERRL and conjunctivae and sclerae normal  HENT: ear canals and TM's normal, nose and mouth without ulcers or lesions  NECK: no adenopathy, no asymmetry, masses, or scars  RESP: prolonged expiratory phase  CV: regular rate and rhythm, normal S1 S2, no S3 or S4, no murmur, click or rub, no peripheral edema  MS: no gross musculoskeletal defects noted, no edema  SKIN: no suspicious lesions or rashes  NEURO: Normal strength and tone, mentation intact and speech normal  PSYCH: mentation appears normal, affect normal/bright    Chest xray Negative for acute findings, read by Rashel TEMPLETON at time of visit.

## 2024-07-05 NOTE — RESULT ENCOUNTER NOTE
Glen Ellis,     Here are my comments about the recent results: your iron studies and screen for occult blood in stool were normal.  I would like you to see a blood doctor for your ongoing anemia.  I've placed that referral.  You should hear from a  in the next two days.  If you don't hear please call to schedule at [phone].    Please let us know if you have any questions or concerns.    Regards,  Clara Hemphill PA-C

## 2024-07-08 ENCOUNTER — PATIENT OUTREACH (OUTPATIENT)
Dept: ONCOLOGY | Facility: CLINIC | Age: 79
End: 2024-07-08
Payer: MEDICARE

## 2024-07-08 NOTE — PROGRESS NOTES
New Patient [facility] Nurse Navigator Note     Referring provider:   Referred By    Provider Department Location Phone   Clara George PA-C  Family [facility] [phone]        Referred to (specialty): [facility]      Date Referral Received:   07/08/24     Evaluation for :   persistent anemia, normal iron studies, protein electrophoresis, B12     Clinical History (per Nurse review of records provided):    Patient with chronic history of anemia.     CBC RESULTS:   Recent Labs   Lab Test 06/28/24  1334   WBC 6.3   RBC 3.30*   HGB 10.2*   HCT 32.6*   MCV 99   MCH 30.9   MCHC 31.3*   RDW 14.4             Writer received referral and chart reviewed.    Referral forwarded on to new patient scheduling   with the following plan:    SCHEDULE:  Protocol Tab: Non-Mal Heme    Per protocol diagnosis: Anemia    Location: Per protocol providers and patient preference    Provider: Per protocol options for diagnosis/location    Timeframe: Next available    Additional info: N/A    Kassie Frederick, RN, BSN  Oncology New Patient Nurse Navigator   [facility]  [phone]

## 2024-07-12 ENCOUNTER — TELEPHONE (OUTPATIENT)
Dept: FAMILY MEDICINE | Facility: CLINIC | Age: 79
End: 2024-07-12
Payer: MEDICARE

## 2024-07-12 ENCOUNTER — ANTICOAGULATION THERAPY VISIT (OUTPATIENT)
Dept: ANTICOAGULATION | Facility: CLINIC | Age: 79
End: 2024-07-12
Payer: MEDICARE

## 2024-07-12 DIAGNOSIS — Z86.718 PERSONAL HISTORY OF DVT (DEEP VEIN THROMBOSIS): ICD-10-CM

## 2024-07-12 DIAGNOSIS — I82.A11 ACUTE DEEP VEIN THROMBOSIS (DVT) OF AXILLARY VEIN OF RIGHT UPPER EXTREMITY (H): Primary | ICD-10-CM

## 2024-07-12 LAB — INR HOME MONITORING: 2 (ref 2–3)

## 2024-07-12 NOTE — TELEPHONE ENCOUNTER
Called pt and scheduled     Appointments in Next Year      Jul 15, 2024 9:30 AM  (Arrive by 9:10 AM)  Provider Visit with SURESH Mehta CNP  [facility] ([facility] ) [phone]     Jul 18, 2024 7:35 AM  (Arrive by 7:20 AM)  MA SCREENING BILATERAL W/ GENEVA with SHBCMA4  [facility] [place] ([facility]) [phone]     Jul 23, 2024 2:15 PM  (Arrive by 2:00 PM)  RETURN GENERAL with Jarred Leon MD  [facility] ([facility] ) [phone]     Aug 28, 2024 12:20 PM  (Arrive by 12:05 PM)  CT HEAD W/O CONTRAST with EICCT1  [facility] [place] ([facility]) [phone]     Aug 28, 2024 2:00 PM  (Arrive by 1:45 PM)  New Neurology with Augustus Christie MD  [facility] ([facility] ) [phone]     Sep 04, 2024 2:30 PM  (Arrive by 2:10 PM)  Annual Wellness Visit with Jana Giles MD  [facility] ([facility] ) [phone]     Oct 16, 2024 1:40 PM  Non-Malignant Onc with Libby Escamilla MD  [facility] ([facility] ) [phone]     Oct 23, 2024 2:00 PM  Nurse Only with CS NURSE  [facility] ([facility] ) [phone]     Apr 03, 2025 8:30 AM  (Arrive by 8:15 AM)  RETURN RETINA with Fredy Alvarez MD  [facility] ([facility] ) [phone]     Jun 12, 2025 9:00 AM  RETURN ENDOCRINE with Kevin Hernandez MD  [facility] ([facility] ) [phone]          Sofia DOWNEY Triage RN  [facility]

## 2024-07-12 NOTE — TELEPHONE ENCOUNTER
"Nurse Triage SBAR    Is this a 2nd Level Triage? YES, LICENSED PRACTITIONER REVIEW IS REQUIRED    Situation: Patient calling and was in urgent care for bronchitis. I am still coughing and coughing.\"    Background: Bronchitis dx in UC    Assessment: No fever, \"I have been taking everything the way I'm supposed to. My cough is not going away.\" Patient is on day 7 of abx and wanting to  know what else she can do for cough. Sputum is clear, but thick. \"I generally feel crappy.\" Chest x-rays were completed at [facility]/C. \"I feel like it is getting a little better but I am on day 7 of the antibiotics.\"    Protocol Recommended Disposition:   No disposition on file.    Recommendation: My cough is not going away.\" Patient is on day 7 of abx and wanting to  know what else she can do for cough. Sputum is clear, but thick. \"I generally feel crappy.\" Chest x-rays were completed at [facility]. \"I feel like it is getting a little better but I am on day 7 of the antibiotics.\"    Please advise.       Routed to provider    Does the patient meet one of the following criteria for ADS visit consideration? 16+ years old, with an [facility] PCP     TIP  Providers, please consider if this condition is appropriate for management at one of our Acute and Diagnostic Services sites.     If patient is a good candidate, please use dotphrase <dot>triageresponse and select Refer to ADS to document.   "

## 2024-07-12 NOTE — PROGRESS NOTES
ANTICOAGULATION MANAGEMENT     Manasa French 78 year old female is on warfarin with therapeutic INR result. (Goal INR 2.0-3.0)    Recent labs: (last 7 days)     07/12/24  0000   INR 2.0       ASSESSMENT     Source(s): Chart Review and Patient/Caregiver Call     Warfarin doses taken: Warfarin taken as instructed  Diet: No new diet changes identified  Medication/supplement changes:  doxycycline 7/5-7/15, albuterol 7/5, tessalon tablets, robitussin ac  New illness, injury, or hospitalization: Yes: has bronchitis, still coughing x2 weeks going back to PCP on Monday for further evaluation   Signs or symptoms of bleeding or clotting: No  Previous result: Therapeutic last 2(+) visits  Additional findings: None       PLAN     Recommended plan for temporary change(s) affecting INR     Dosing Instructions: Continue your current warfarin dose with next INR in 1 week       Summary  As of 7/12/2024      Full warfarin instructions:  7.5 mg every Mon, Wed, Fri; 5 mg all other days   Next INR check:  7/26/2024               Telephone call with Rhonda who verbalizes understanding and agrees to plan and who agrees to plan and repeated back plan correctly    Patient to recheck with home meter    Education provided: Interaction IS anticipated between warfarin and doxycycline   Symptom monitoring: when to seek medical attention/emergency care    Plan made per [facility] anticoagulation protocol    Aixa Steven RN  Anticoagulation Clinic  7/12/2024    _______________________________________________________________________     Anticoagulation Episode Summary       Current INR goal:  2.0-3.0   TTR:  89.1% (1 y)   Target end date:  Indefinite   Send INR reminders to:  ANTICO HOME MONITORING    Indications    Acute deep vein thrombosis (DVT) of axillary vein and internal jugular (H) (Resolved) [I82.A19]  Current use of long term anticoagulation (Resolved) [Z79.01]  Acute deep vein thrombosis (DVT) of axillary vein of right upper extremity (H)  [I82.A11]  Personal history of DVT (deep vein thrombosis) [Z86.168]             Comments:  [employer] Home meter- Managed by exception             Anticoagulation Care Providers       Provider Role Specialty Phone number    Rema Braxton DO Referring Family Medicine [phone]    Jessica Anthony MD Referring Internal Medicine [phone]    Jana Giles MD Referring Internal Medicine [phone]    Christopher Smith MD Responsible Hematology [phone]

## 2024-07-14 NOTE — TELEPHONE ENCOUNTER
RECORDS STATUS - ALL OTHER DIAGNOSIS      RECORDS RECEIVED FROM: [facility]   NOTES STATUS DETAILS   OFFICE NOTE from referring provider Epic 06/28/24: Clara Hemphill PA-C   DISCHARGE SUMMARY from hospital [facility] 05/20/24: [facility]   MEDICATION LIST [facility]    LABS     PATHOLOGY REPORTS     ANYTHING RELATED TO DIAGNOSIS Epic Most recent 9/20/24

## 2024-07-15 ENCOUNTER — OFFICE VISIT (OUTPATIENT)
Dept: FAMILY MEDICINE | Facility: CLINIC | Age: 79
End: 2024-07-15
Payer: MEDICARE

## 2024-07-15 VITALS
OXYGEN SATURATION: 98 % | SYSTOLIC BLOOD PRESSURE: 157 MMHG | BODY MASS INDEX: 24.86 KG/M2 | TEMPERATURE: 97.3 F | RESPIRATION RATE: 16 BRPM | HEART RATE: 89 BPM | WEIGHT: 149.2 LBS | DIASTOLIC BLOOD PRESSURE: 67 MMHG | HEIGHT: 65 IN

## 2024-07-15 DIAGNOSIS — R05.9 COUGH, UNSPECIFIED TYPE: ICD-10-CM

## 2024-07-15 PROCEDURE — 99213 OFFICE O/P EST LOW 20 MIN: CPT | Performed by: NURSE PRACTITIONER

## 2024-07-15 RX ORDER — BENZONATATE 200 MG/1
200 CAPSULE ORAL 3 TIMES DAILY PRN
Qty: 30 CAPSULE | Refills: 1 | Status: SHIPPED | OUTPATIENT
Start: 2024-07-15 | End: 2024-09-04

## 2024-07-15 NOTE — PROGRESS NOTES
"  Assessment & Plan     Cough, unspecified type  Suspect viral infection considering perisisten symptoms with appropriate abx. Will continue to watch and wait. Refilled tesssalon. Can continue robitussin AC. Follow-up prn   - benzonatate (TESSALON) 200 MG capsule; Take 1 capsule (200 mg) by mouth 3 times daily as needed for cough        Subjective   Rhonda is a 78 year old, presenting for the following health issues:  Cough    History of Present Illness       Reason for visit:   cough    She eats 2-3 servings of fruits and vegetables daily.She consumes 1 sweetened beverage(s) daily.She exercises with enough effort to increase her heart rate 30 to 60 minutes per day.  She exercises with enough effort to increase her heart rate 7 days per week.   She is taking medications regularly.        On 7/5/24 was seen in urgent care for productive cough with green/yellow sputum. She was treated for bronchitis. Was given 10 days of Doxycycline and Tessalon pearls.    Today she is still complaining of a productive cough that is fairly consistent throughout the day. Of note the color has changed to clear and she states her symptoms have improved mildly. She has since finished her antibiotic course and continues her albuterol, tessalon pearls and Robitussin at night.   Endorses no fevers, chills or other constitutional symptoms. She is not any worse and only slightly improved         Review of Systems  Detailed as above         Objective    BP (!) 157/67 (BP Location: Right arm, Patient Position: Sitting, Cuff Size: Adult Regular)   Pulse 89   Temp 97.3  F (36.3  C)   Resp 16   Ht 1.638 m (5' 4.5\")   Wt 67.7 kg (149 lb 3.2 oz)   LMP  (LMP Unknown)   SpO2 98%   BMI 25.21 kg/m    Body mass index is 25.21 kg/m .  Physical Exam  Constitutional:       General: She is not in acute distress.  HENT:      Head: Normocephalic and atraumatic.   Cardiovascular:      Rate and Rhythm: Normal rate and regular rhythm.      Heart sounds: " Normal heart sounds.   Pulmonary:      Effort: Pulmonary effort is normal.      Breath sounds: Normal breath sounds.      Comments: Moist cough noted  Lymphadenopathy:      Cervical: No cervical adenopathy.   Neurological:      Mental Status: She is alert.                    I saw this patient in collaboration with Henrietta Torres      I was present with the SURESH/PA student who participated in the service and in the documentation of the services provided. I have verified the history and personally performed the physical exam and medical decision making, as documented by the student and edited by me.     SURESH Tobar, CNP    Henrietta Torres NP Student        Signed Electronically by: SURESH Mehta CNP

## 2024-07-18 ENCOUNTER — HOSPITAL ENCOUNTER (OUTPATIENT)
Dept: MAMMOGRAPHY | Facility: CLINIC | Age: 79
Discharge: HOME | End: 2024-07-18
Attending: INTERNAL MEDICINE | Admitting: INTERNAL MEDICINE
Payer: MEDICARE

## 2024-07-18 DIAGNOSIS — Z12.31 ENCOUNTER FOR SCREENING MAMMOGRAM FOR BREAST CANCER: ICD-10-CM

## 2024-07-18 PROCEDURE — 77063 BREAST TOMOSYNTHESIS BI: CPT

## 2024-07-19 ENCOUNTER — ANTICOAGULATION THERAPY VISIT (OUTPATIENT)
Dept: ANTICOAGULATION | Facility: CLINIC | Age: 79
End: 2024-07-19
Payer: MEDICARE

## 2024-07-19 DIAGNOSIS — Z86.718 PERSONAL HISTORY OF DVT (DEEP VEIN THROMBOSIS): ICD-10-CM

## 2024-07-19 DIAGNOSIS — I82.A11 ACUTE DEEP VEIN THROMBOSIS (DVT) OF AXILLARY VEIN OF RIGHT UPPER EXTREMITY (H): Primary | ICD-10-CM

## 2024-07-19 LAB — INR HOME MONITORING: 3.1 (ref 2–3)

## 2024-07-19 NOTE — PROGRESS NOTES
"ANTICOAGULATION MANAGEMENT     Manasa rFench 78 year old female is on warfarin with supratherapeutic INR result. (Goal INR 2.0-3.0)    Recent labs: (last 7 days)     07/19/24  0000   INR 3.1*       ASSESSMENT     Source(s): Chart Review and Patient/Caregiver Call     Warfarin doses taken: Warfarin taken as instructed  Diet: No new diet changes identified  Medication/supplement changes:  Completed 10 days course of  doxycycline on 7/15 may still be affecting inr today  New illness, injury, or hospitalization: OV with PCP on 7/15 for ongoing cough, was on antibiotic for bronchitis  Fell on Monday after OV and sustained a big bruise on right arm - tripped over a laundry basket - landed on her \"butt\" and tipped over her back , denied hitting head.-concern about her hip pain - history of hip replacement Mary 2023 - advised to seek medical attention if hip pain recurs or worsens- per patient no pain at this time.  Still has ongoing cough  stated that it might last about 3 more weeks.  Signs or symptoms of bleeding or clotting: Yes: see above  Previous result: Therapeutic last 2(+) visits  Additional findings: None       PLAN     Recommended plan for temporary change(s) and ongoing change(s) affecting INR     Dosing Instructions: partial hold then continue your current warfarin dose with next INR in 1 week       Summary  As of 7/19/2024      Full warfarin instructions:  7/19: 5 mg; Otherwise 7.5 mg every Mon, Wed, Fri; 5 mg all other days   Next INR check:  7/26/2024               Telephone call with Rhonda who verbalizes understanding and agrees to plan    Patient to recheck with home meter    Education provided: Symptom monitoring: monitoring for bleeding signs and symptoms, when to seek medical attention/emergency care, and if you hit your head or have a bad fall seek emergency care  Contact [phone] with any changes, questions or concerns.     Plan made per [facility] anticoagulation protocol    Kyleigh Molina, " RN  Anticoagulation Clinic  7/19/2024    _______________________________________________________________________     Anticoagulation Episode Summary       Current INR goal:  2.0-3.0   TTR:  90.8% (1 y)   Target end date:  Indefinite   Send INR reminders to:  [facility]    Indications    Acute deep vein thrombosis (DVT) of axillary vein and internal jugular (H) (Resolved) [I82.A19]  Current use of long term anticoagulation (Resolved) [Z79.01]  Acute deep vein thrombosis (DVT) of axillary vein of right upper extremity (H) [I82.A11]  Personal history of DVT (deep vein thrombosis) [Z86.718]             Comments:  [employer] meter- Managed by exception             Anticoagulation Care Providers       Provider Role Specialty Phone number    Rema Braxton DO Referring Family Medicine [phone]    Jessica Anthony MD Referring Internal Medicine [phone]    Jana Giles MD Referring Internal Medicine [phone]    Christopher Smith MD Responsible Hematology [phone]

## 2024-07-23 ENCOUNTER — OFFICE VISIT (OUTPATIENT)
Dept: OPHTHALMOLOGY | Facility: CLINIC | Age: 79
End: 2024-07-23
Attending: OPHTHALMOLOGY
Payer: MEDICARE

## 2024-07-23 DIAGNOSIS — Z96.1 PSEUDOPHAKIA OF BOTH EYES: ICD-10-CM

## 2024-07-23 DIAGNOSIS — H40.1132 PRIMARY OPEN ANGLE GLAUCOMA OF BOTH EYES, MODERATE STAGE: Primary | ICD-10-CM

## 2024-07-23 DIAGNOSIS — H35.3132 INTERMEDIATE STAGE NONEXUDATIVE AGE-RELATED MACULAR DEGENERATION OF BOTH EYES: ICD-10-CM

## 2024-07-23 DIAGNOSIS — H50.10 CONSECUTIVE EXOTROPIA: ICD-10-CM

## 2024-07-23 PROCEDURE — 92133 CPTRZD OPH DX IMG PST SGM ON: CPT | Performed by: OPHTHALMOLOGY

## 2024-07-23 PROCEDURE — 92083 EXTENDED VISUAL FIELD XM: CPT | Performed by: OPHTHALMOLOGY

## 2024-07-23 PROCEDURE — 65855 TRABECULOPLASTY LASER SURG: CPT | Mod: RT | Performed by: OPHTHALMOLOGY

## 2024-07-23 PROCEDURE — G0463 HOSPITAL OUTPT CLINIC VISIT: HCPCS | Mod: 25 | Performed by: OPHTHALMOLOGY

## 2024-07-23 RX ORDER — TIMOLOL MALEATE 5 MG/ML
1 SOLUTION/ DROPS OPHTHALMIC EVERY MORNING
Qty: 15 ML | Refills: 1 | Status: SHIPPED | OUTPATIENT
Start: 2024-07-23

## 2024-07-23 ASSESSMENT — REFRACTION_MANIFEST
OS_SPHERE: -1.50
OS_CYLINDER: +0.75
OD_AXIS: 015
OS_AXIS: 144
OD_ADD: +2.50
OD_SPHERE: -1.00
OD_CYLINDER: +2.00
OS_ADD: +2.50

## 2024-07-23 ASSESSMENT — REFRACTION_WEARINGRX
OS_SPHERE: -2.00
OD_CYLINDER: +1.50
OD_SPHERE: -1.25
OD_AXIS: 001
OS_AXIS: 005
OS_CYLINDER: +0.75

## 2024-07-23 ASSESSMENT — TONOMETRY
OS_IOP_MMHG: 19
IOP_METHOD: TONOPEN
OS_IOP_MMHG: 17
IOP_METHOD: ICARE
OD_IOP_MMHG: 21
OD_IOP_MMHG: 21

## 2024-07-23 ASSESSMENT — EXTERNAL EXAM - LEFT EYE: OS_EXAM: NORMAL

## 2024-07-23 ASSESSMENT — SLIT LAMP EXAM - LIDS
COMMENTS: NORMAL
COMMENTS: NORMAL

## 2024-07-23 ASSESSMENT — VISUAL ACUITY
METHOD: SNELLEN - LINEAR
OD_CC: 20/40
OS_CC: 20/50
OS_CC+: -2
OD_CC+: -2

## 2024-07-23 ASSESSMENT — EXTERNAL EXAM - RIGHT EYE: OD_EXAM: NORMAL

## 2024-07-23 NOTE — NURSING NOTE
Chief Complaints and History of Present Illnesses   Patient presents with    Primary Open Angle Glaucoma Follow Up     Primary open angle glaucoma of both eyes, moderate stage.     Chief Complaint(s) and History of Present Illness(es)       Primary Open Angle Glaucoma Follow Up              Laterality: both eyes    Associated symptoms: dryness.  Negative for double vision, eye pain, tearing, flashes and floaters    Pain scale: 0/10    Comments: Primary open angle glaucoma of both eyes, moderate stage.              Comments    Pt states DVA isn't as good since her diplopia has gotten fixed.  No pain.  No flashes.  No floaters for a while.  Lots of dryness.    latanoprost at night in both eyes   timolol daily both eyes  PFAT's PRN  Juan BE at night.    BILL Davis July 23, 2024 2:11 PM

## 2024-07-23 NOTE — PROGRESS NOTES
HPI       Primary Open Angle Glaucoma Follow Up    In both eyes.  Associated symptoms include dryness.  Negative for double vision, eye pain, tearing, flashes and floaters.  Pain was noted as 0/10. Additional comments: Primary open angle glaucoma of both eyes, moderate stage.             Comments    Pt states DVA isn't as good since her diplopia has gotten fixed.  No pain.  No flashes.  No floaters for a while.  Lots of dryness.    latanoprost at night in both eyes   timolol daily both eyes  PFAT's PRN  Juan BE at night.    BILL Davis July 23, 2024 2:11 PM              Last edited by Cortney Klein COT on 7/23/2024  2:11 PM.          Review of systems for the eyes was negative other than the pertinent positives/negatives listed in the HPI.      Assessment & Plan      Manasa French is a 78 year old female with the following diagnoses:   1. Primary open angle glaucoma of both eyes, moderate stage    2. Intermediate stage nonexudative age-related macular degeneration of both eyes    3. Pseudophakia of both eyes    4. Consecutive exotropia - Both Eyes         Here for glaucoma recheck  Doing well s/p strabismus surgery, very happy with result  Continues with drops as before and excellent compliance  Intraocular pressure remains borderline elevated in both eyes today, goal is mid-teens.   Likely slight progression in the right eye on OCT Nerve fiber layer   Stable visual field with LVC today in both eyes     S/P Selected laser trabeculoplasty (SLT) right eye  2018/2020/2022  RBA to repeat selected laser trabeculoplasty (SLT) right eye discussed, consent obtained, will proceed today.      Plan:              - Continue latanoprost qHS both eyes               - Continue timolol daily both eyes (Vyzulta not on formulary, doubt she will tolerate Rhopressa, Intolerant to cosopt and alphagan)   - Return precautions reviewed     Patient disposition:   Return in about 4 weeks (around 8/20/2024) for VT only.            Attending Physician Attestation:  Complete documentation of historical and exam elements from today's encounter can be found in the full encounter summary report (not reduplicated in this progress note).  I personally obtained the chief complaint(s) and history of present illness.  I confirmed and edited as necessary the review of systems, past medical/surgical history, family history, social history, and examination findings as documented by others; and I examined the patient myself.  I personally reviewed the relevant tests, images, and reports as documented above.  I formulated and edited as necessary the assessment and plan and discussed the findings and management plan with the patient and family. . - Jarred Leon MD

## 2024-07-26 ENCOUNTER — ANTICOAGULATION THERAPY VISIT (OUTPATIENT)
Dept: ANTICOAGULATION | Facility: CLINIC | Age: 79
End: 2024-07-26
Payer: MEDICARE

## 2024-07-26 DIAGNOSIS — Z86.718 PERSONAL HISTORY OF DVT (DEEP VEIN THROMBOSIS): ICD-10-CM

## 2024-07-26 DIAGNOSIS — I82.A11 ACUTE DEEP VEIN THROMBOSIS (DVT) OF AXILLARY VEIN OF RIGHT UPPER EXTREMITY (H): Primary | ICD-10-CM

## 2024-07-26 LAB — INR HOME MONITORING: 2.4 (ref 2–3)

## 2024-07-26 NOTE — PROGRESS NOTES
ANTICOAGULATION MANAGEMENT     Manasa French 78 year old female is on warfarin with therapeutic INR result. (Goal INR 2.0-3.0)    Recent labs: (last 7 days)     07/26/24  0000   INR 2.4       ASSESSMENT     Source(s): Chart Review and Patient/Caregiver Call     Warfarin doses taken: Warfarin taken as instructed  Diet: No new diet changes identified  Medication/supplement changes: None noted  New illness, injury, or hospitalization: No. Patient reports the cough she had last week has resolved.    Signs or symptoms of bleeding or clotting: No new symptoms. The bruise she had reported in 7/19/24 on right arm is improving. Patient will continue to monitor and report any new or concerning symptoms.   Previous result: Supratherapeutic  Additional findings: None       PLAN     Recommended plan for no diet, medication or health factor changes affecting INR     Dosing Instructions: Continue your current warfarin dose with next INR in 2 weeks       Summary  As of 7/26/2024      Full warfarin instructions:  7.5 mg every Mon, Wed, Fri; 5 mg all other days   Next INR check:  8/9/2024               Telephone call with Rhonda who verbalizes understanding and agrees to plan    Patient to recheck with home meter    Education provided: Please call back if any changes to your diet, medications or how you've been taking warfarin  Symptom monitoring: monitoring for bleeding signs and symptoms and monitoring for stroke signs and symptoms  Resume manage by exception with home monitor. Continue to submit INR results to home monitor company.You will only be called when your result is out of range. Please call and notify [facility] if new medication started, dose missed, signs or symptoms of bleeding or clotting, or a surgery/procedure is scheduled. Due for next call no later than: 10/24/24.  Contact [phone] with any changes, questions or concerns.     Plan made per [facility] anticoagulation protocol    Kyleigh Valdez RN  Anticoagulation  Clinic  7/26/2024    _______________________________________________________________________     Anticoagulation Episode Summary       Current INR goal:  2.0-3.0   TTR:  90.8% (1 y)   Target end date:  Indefinite   Send INR reminders to:  [facility]    Indications    Acute deep vein thrombosis (DVT) of axillary vein and internal jugular (H) (Resolved) [I82.A19]  Current use of long term anticoagulation (Resolved) [Z79.01]  Acute deep vein thrombosis (DVT) of axillary vein of right upper extremity (H) [I82.A11]  Personal history of DVT (deep vein thrombosis) [Z86.718]             Comments:  [employer] meter- Managed by exception             Anticoagulation Care Providers       Provider Role Specialty Phone number    Rema Braxton DO Referring Family Medicine [phone]    Jessica nAthony MD Referring Internal Medicine [phone]    Jana Giles MD Referring Internal Medicine [phone]    Christopher Smith MD Responsible Hematology [phone]

## 2024-08-02 ENCOUNTER — DOCUMENTATION ONLY (OUTPATIENT)
Dept: ANTICOAGULATION | Facility: CLINIC | Age: 79
End: 2024-08-02
Payer: MEDICARE

## 2024-08-02 DIAGNOSIS — Z86.718 PERSONAL HISTORY OF DVT (DEEP VEIN THROMBOSIS): ICD-10-CM

## 2024-08-02 DIAGNOSIS — I82.A11 ACUTE DEEP VEIN THROMBOSIS (DVT) OF AXILLARY VEIN OF RIGHT UPPER EXTREMITY (H): Primary | ICD-10-CM

## 2024-08-02 LAB — INR HOME MONITORING: 2.4 (ref 2–3)

## 2024-08-02 NOTE — PROGRESS NOTES
ANTICOAGULATION  MANAGEMENT-Home Monitor Managed by Exception    Manasa DIEGO Manolo 78 year old female is on warfarin with therapeutic INR result. (Goal INR 2.0-3.0)    Recent labs: (last 7 days)     08/02/24  0000   INR 2.4       Previous INR was Therapeutic  Medication, diet, health changes since last INR:chart reviewed; none identified  Contacted within the last 12 weeks by phone on 7/26/24  Last [facility] referral date: 11/26/2023      AUBREY     Manasa was NOT contacted regarding therapeutic result today per home monitoring policy manage by exception agreement.   Current warfarin dose is to be continued:     Summary  As of 8/2/2024      Full warfarin instructions:  7.5 mg every Mon, Wed, Fri; 5 mg all other days   Next INR check:  8/9/2024             ?   Kyleigh Valdez RN  Anticoagulation Clinic  8/2/2024    _______________________________________________________________________     Anticoagulation Episode Summary       Current INR goal:  2.0-3.0   TTR:  90.8% (1 y)   Target end date:  Indefinite   Send INR reminders to:  [facility]    Indications    Acute deep vein thrombosis (DVT) of axillary vein and internal jugular (H) (Resolved) [I82.A19]  Current use of long term anticoagulation (Resolved) [Z79.01]  Acute deep vein thrombosis (DVT) of axillary vein of right upper extremity (H) [I82.A11]  Personal history of DVT (deep vein thrombosis) [Z86.718]             Comments:  [employer] meter- Managed by exception             Anticoagulation Care Providers       Provider Role Specialty Phone number    Rema Braxton DO Referring Family Medicine [phone]    Jessica Anthony MD Referring Internal Medicine [phone]    Jana Giles MD Referring Internal Medicine [phone]    Christopher Smith MD Responsible Hematology [phone]

## 2024-08-08 DIAGNOSIS — I82.A11 ACUTE DEEP VEIN THROMBOSIS (DVT) OF AXILLARY VEIN OF RIGHT UPPER EXTREMITY (H): ICD-10-CM

## 2024-08-09 ENCOUNTER — ANTICOAGULATION THERAPY VISIT (OUTPATIENT)
Dept: ANTICOAGULATION | Facility: CLINIC | Age: 79
End: 2024-08-09
Payer: MEDICARE

## 2024-08-09 DIAGNOSIS — I82.A11 ACUTE DEEP VEIN THROMBOSIS (DVT) OF AXILLARY VEIN OF RIGHT UPPER EXTREMITY (H): Primary | ICD-10-CM

## 2024-08-09 DIAGNOSIS — Z86.718 PERSONAL HISTORY OF DVT (DEEP VEIN THROMBOSIS): ICD-10-CM

## 2024-08-09 LAB — INR HOME MONITORING: 3.3 (ref 2–3)

## 2024-08-09 RX ORDER — WARFARIN SODIUM 5 MG/1
TABLET ORAL
Qty: 110 TABLET | Refills: 1 | Status: SHIPPED | OUTPATIENT
Start: 2024-08-09

## 2024-08-09 NOTE — TELEPHONE ENCOUNTER
ANTICOAGULATION MANAGEMENT:  Medication Refill    Anticoagulation Summary  As of 8/9/2024      Warfarin maintenance plan:  7.5 mg (5 mg x 1.5) every Mon, Wed, Fri; 5 mg (5 mg x 1) all other days   Next INR check:  8/16/2024   Target end date:  Indefinite    Indications    Acute deep vein thrombosis (DVT) of axillary vein and internal jugular (H) (Resolved) [I82.A19]  Current use of long term anticoagulation (Resolved) [Z79.01]  Acute deep vein thrombosis (DVT) of axillary vein of right upper extremity (H) [I82.A11]  Personal history of DVT (deep vein thrombosis) [Z86.718]                 Anticoagulation Care Providers       Provider Role Specialty Phone number    Rema Braxton DO Referring Family Medicine [phone]    Jessica Anthony MD Referring Internal Medicine [phone]    Jana Giles MD Referring Internal Medicine [phone]    Christopher Smith MD Responsible Hematology [phone]            Refill Criteria    Visit with referring provider/group: Meets criteria: visit within referring provider group in the last 15 months on 7/15/24    [facility] referral last signed: 11/26/2023; within last year: Yes    Lab monitoring not exceeding 2 weeks overdue: Yes    Manasa meets all criteria for refill. Rx instructions and quantity match patient's current dosing plan. Warfarin 90 day supply with 1 refill granted per [facility] protocol     Kyleigh Molina RN  Anticoagulation Clinic

## 2024-08-09 NOTE — PROGRESS NOTES
ANTICOAGULATION MANAGEMENT     Manasa French 79 year old female is on warfarin with supratherapeutic INR result. (Goal INR 2.0-3.0)    Recent labs: (last 7 days)     08/09/24  0000   INR 3.3*       ASSESSMENT     Source(s): Chart Review and Patient/Caregiver Call     Warfarin doses taken: Warfarin taken as instructed  Diet: Decreased greens/vitamin K in diet; plans to resume previous intake  Medication/supplement changes: None noted  New illness, injury, or hospitalization: Yes: banged her shoulder against the wall about 2 days ago no pain or any bruising per patient  Signs or symptoms of bleeding or clotting: No  Previous result: Therapeutic last 2(+) visits  Additional findings: None       PLAN     Recommended plan for temporary change(s) affecting INR     Dosing Instructions: Continue your current warfarin dose with next INR in 1 week       Summary  As of 8/9/2024      Full warfarin instructions:  7.5 mg every Mon, Wed, Fri; 5 mg all other days   Next INR check:  8/16/2024               Telephone call with Rhonda who verbalizes understanding and agrees to plan    Patient to recheck with home meter    Education provided: Dietary considerations: impact of vitamin K foods on INR and vitamin K content of foods  Contact [phone] with any changes, questions or concerns.     Plan made per [facility] anticoagulation protocol    Kyleigh Molina RN  Anticoagulation Clinic  8/9/2024    _______________________________________________________________________     Anticoagulation Episode Summary       Current INR goal:  2.0-3.0   TTR:  90.2% (1 y)   Target end date:  Indefinite   Send INR reminders to:  [facility] MONITOR[facility]    Indications    Acute deep vein thrombosis (DVT) of axillary vein and internal jugular (H) (Resolved) [I82.A19]  Current use of long term anticoagulation (Resolved) [Z79.01]  Acute deep vein thrombosis (DVT) of axillary vein of right upper extremity (H) [I82.A11]  Personal history of DVT (deep vein  thrombosis) [Z86.601]             Comments:  [employer] meter- Managed by exception             Anticoagulation Care Providers       Provider Role Specialty Phone number    Rema Braxton DO Referring Family Medicine [phone]    Jessica Anthony MD Referring Internal Medicine [phone]    Jana Giles MD Referring Internal Medicine [phone]    Christopher Smith MD Responsible Hematology [phone]

## 2024-08-10 ENCOUNTER — HEALTH MAINTENANCE LETTER (OUTPATIENT)
Age: 79
End: 2024-08-10

## 2024-08-12 DIAGNOSIS — E11.69 TYPE 2 DIABETES MELLITUS WITH OTHER SPECIFIED COMPLICATION, WITHOUT LONG-TERM CURRENT USE OF INSULIN (H): ICD-10-CM

## 2024-08-12 DIAGNOSIS — I10 BENIGN ESSENTIAL HYPERTENSION: ICD-10-CM

## 2024-08-14 RX ORDER — LOSARTAN POTASSIUM 100 MG/1
100 TABLET ORAL DAILY
Qty: 90 TABLET | Refills: 3 | Status: SHIPPED | OUTPATIENT
Start: 2024-08-14

## 2024-08-14 RX ORDER — METFORMIN HCL 500 MG
TABLET, EXTENDED RELEASE 24 HR ORAL
Qty: 90 TABLET | Refills: 3 | Status: SHIPPED | OUTPATIENT
Start: 2024-08-14

## 2024-08-19 ENCOUNTER — ANTICOAGULATION THERAPY VISIT (OUTPATIENT)
Dept: ANTICOAGULATION | Facility: CLINIC | Age: 79
End: 2024-08-19
Payer: MEDICARE

## 2024-08-19 DIAGNOSIS — Z86.718 PERSONAL HISTORY OF DVT (DEEP VEIN THROMBOSIS): ICD-10-CM

## 2024-08-19 DIAGNOSIS — I82.A11 ACUTE DEEP VEIN THROMBOSIS (DVT) OF AXILLARY VEIN OF RIGHT UPPER EXTREMITY (H): Primary | ICD-10-CM

## 2024-08-19 LAB — INR HOME MONITORING: 2.7 (ref 2–3)

## 2024-08-19 NOTE — PROGRESS NOTES
ANTICOAGULATION MANAGEMENT     Manasa French 79 year old female is on warfarin with therapeutic INR result. (Goal INR 2.0-3.0)    Recent labs: (last 7 days)     08/19/24  0000   INR 2.7       ASSESSMENT     Source(s): Chart Review and Patient/Caregiver Call     Warfarin doses taken: Warfarin taken as instructed  Diet: No new diet changes identified  Medication/supplement changes: None noted  New illness, injury, or hospitalization: No  Signs or symptoms of bleeding or clotting: No  Previous result: Supratherapeutic  Additional findings: None       PLAN     Recommended plan for no diet, medication or health factor changes affecting INR     Dosing Instructions: Continue your current warfarin dose with next INR in 1 week       Summary  As of 8/19/2024      Full warfarin instructions:  7.5 mg every Mon, Wed, Fri; 5 mg all other days   Next INR check:  8/26/2024               Telephone call with Rhonda who verbalizes understanding and agrees to plan    Patient to recheck with home meter    Education provided: Please call back if any changes to your diet, medications or how you've been taking warfarin  Resume manage by exception with home monitor. Continue to submit INR results to home monitor company.You will only be called when your result is out of range. Please call and notify [facility] if new medication started, dose missed, signs or symptoms of bleeding or clotting, or a surgery/procedure is scheduled. Due for next call no later than: 11/17/24.    Plan made per [facility] anticoagulation protocol    Betina Morgan RN  Anticoagulation Clinic  8/19/2024    _______________________________________________________________________     Anticoagulation Episode Summary       Current INR goal:  2.0-3.0   TTR:  88.8% (1 y)   Target end date:  Indefinite   Send INR reminders to:  [facility] MONITORING    Indications    Acute deep vein thrombosis (DVT) of axillary vein and internal jugular (H) (Resolved) [I82.A19]  Current use of long  term anticoagulation (Resolved) [Z79.01]  Acute deep vein thrombosis (DVT) of axillary vein of right upper extremity (H) [I82.A11]  Personal history of DVT (deep vein thrombosis) [Z86.718]             Comments:  [employer] meter- Managed by exception             Anticoagulation Care Providers       Provider Role Specialty Phone number    Rema Braxton DO Referring Family Medicine [phone]    Jessica Anthony MD Referring Internal Medicine [phone]    Jana Giles MD Referring Internal Medicine [phone]    Christopher Smith MD Responsible Hematology [phone]

## 2024-08-21 ENCOUNTER — OFFICE VISIT (OUTPATIENT)
Dept: OPHTHALMOLOGY | Facility: CLINIC | Age: 79
End: 2024-08-21
Attending: OPHTHALMOLOGY
Payer: MEDICARE

## 2024-08-21 DIAGNOSIS — H40.1132 PRIMARY OPEN ANGLE GLAUCOMA OF BOTH EYES, MODERATE STAGE: Primary | ICD-10-CM

## 2024-08-21 PROCEDURE — G0463 HOSPITAL OUTPT CLINIC VISIT: HCPCS | Performed by: OPHTHALMOLOGY

## 2024-08-21 PROCEDURE — 92012 INTRM OPH EXAM EST PATIENT: CPT | Mod: GC | Performed by: OPHTHALMOLOGY

## 2024-08-21 ASSESSMENT — REFRACTION_WEARINGRX
OD_AXIS: 001
OD_SPHERE: -1.25
OS_SPHERE: -2.00
OS_CYLINDER: +0.75
OS_AXIS: 005
OD_CYLINDER: +1.50

## 2024-08-21 ASSESSMENT — VISUAL ACUITY
METHOD: SNELLEN - LINEAR
OD_CC: 20/40-2
OS_CC: 20/40-2
CORRECTION_TYPE: GLASSES

## 2024-08-21 ASSESSMENT — SLIT LAMP EXAM - LIDS
COMMENTS: NORMAL
COMMENTS: NORMAL

## 2024-08-21 ASSESSMENT — TONOMETRY
OS_IOP_MMHG: 13
OD_IOP_MMHG: 16
OD_IOP_MMHG: 15
IOP_METHOD: APPLANATION
OS_IOP_MMHG: 15
IOP_METHOD: TONOPEN

## 2024-08-21 ASSESSMENT — EXTERNAL EXAM - RIGHT EYE: OD_EXAM: NORMAL

## 2024-08-21 ASSESSMENT — EXTERNAL EXAM - LEFT EYE: OS_EXAM: NORMAL

## 2024-08-21 ASSESSMENT — CUP TO DISC RATIO
OD_RATIO: 0.85
OS_RATIO: 0.55

## 2024-08-21 NOTE — PROGRESS NOTES
HPI       Post Op (Ophthalmology) Right Eye    In right eye.  Pain was noted as 0/10.             Comments    S/p SLT right eye 7/23/24  She denies changes or concerns since LV here, denies eye pain.      Oc meds:  latanoprost qHS both eyes - LD @ 9:30pm yesterday  timolol QAM both eyes - LD @ 5:00am today     BILL Mascorro 1:26 PM 08/21/2024                  Last edited by Manju Piedra on 8/21/2024  1:26 PM.          Review of systems for the eyes was negative other than the pertinent positives/negatives listed in the HPI.      Assessment & Plan      Manasa French is a 78 year old female with the following diagnoses:   1. Primary open angle glaucoma of both eyes, moderate stage           Here for pressure check POW4 s/p SLT 7/23/24  Doing well s/p strabismus surgery, very happy with result  Continues with drops as before and excellent compliance  Intraocular pressure remains borderline elevated in both eyes today, goal is mid-teens.   Likely slight progression in the right eye on OCT Nerve fiber layer   Stable visual field with LVC today in both eyes     S/P Selected laser trabeculoplasty (SLT) right eye  2018/2020/2022; POW4 s/p SLT right eye 7/23/24  IOP improved to 16, was previously in high teens to low 20s  Doing well     Plan:              - Continue latanoprost qHS both eyes               - Continue timolol daily both eyes (Vyzulta not on formulary, doubt she will tolerate Rhopressa, Intolerant to cosopt and alphagan)   - Return precautions reviewed     Patient disposition:   Return in about 5 months (around 1/21/2025) for Carolyn.           Attending Physician Attestation:  Complete documentation of historical and exam elements from today's encounter can be found in the full encounter summary report (not reduplicated in this progress note).  I personally obtained the chief complaint(s) and history of present illness.  I confirmed and edited as necessary the review of systems, past  medical/surgical history, family history, social history, and examination findings as documented by others; and I examined the patient myself.  I personally reviewed the relevant tests, images, and reports as documented above.  I formulated and edited as necessary the assessment and plan and discussed the findings and management plan with the patient and family. . - Jarred Leon MD

## 2024-08-23 ENCOUNTER — ANTICOAGULATION THERAPY VISIT (OUTPATIENT)
Dept: ANTICOAGULATION | Facility: CLINIC | Age: 79
End: 2024-08-23
Payer: MEDICARE

## 2024-08-23 DIAGNOSIS — I82.A11 ACUTE DEEP VEIN THROMBOSIS (DVT) OF AXILLARY VEIN OF RIGHT UPPER EXTREMITY (H): Primary | ICD-10-CM

## 2024-08-23 DIAGNOSIS — Z86.718 PERSONAL HISTORY OF DVT (DEEP VEIN THROMBOSIS): ICD-10-CM

## 2024-08-23 LAB — INR HOME MONITORING: 2.7 (ref 2–3)

## 2024-08-23 NOTE — PROGRESS NOTES
ANTICOAGULATION MANAGEMENT     Manasa French 79 year old female is on warfarin with therapeutic INR result. (Goal INR 2.0-3.0)    Recent labs: (last 7 days)     08/23/24  0000   INR 2.7       ASSESSMENT     Source(s): Chart Review and Patient/Caregiver Call     Warfarin doses taken: Warfarin taken as instructed  Diet: No new diet changes identified  Medication/supplement changes: None noted  New illness, injury, or hospitalization: No  Signs or symptoms of bleeding or clotting: No. The bruising that patient reported on her shoulder on 8/9/24 has resolved.   Previous result: Therapeutic last visit; previously outside of goal range  Additional findings: None       PLAN     Recommended plan for no diet, medication or health factor changes affecting INR     Dosing Instructions: Continue your current warfarin dose with next INR in 2 weeks       Summary  As of 8/23/2024      Full warfarin instructions:  7.5 mg every Mon, Wed, Fri; 5 mg all other days   Next INR check:  9/6/2024               Telephone call with Rhonda who verbalizes understanding and agrees to plan    Patient to recheck with home meter    Education provided: Please call back if any changes to your diet, medications or how you've been taking warfarin  Resume manage by exception with home monitor. Continue to submit INR results to home monitor company.You will only be called when your result is out of range. Please call and notify [facility] if new medication started, dose missed, signs or symptoms of bleeding or clotting, or a surgery/procedure is scheduled. Due for next call no later than: 11/21/24.  Contact [phone] with any changes, questions or concerns.     Plan made per [facility] anticoagulation protocol    Kyleigh Valdez RN  Anticoagulation Clinic  8/23/2024    _______________________________________________________________________     Anticoagulation Episode Summary       Current INR goal:  2.0-3.0   TTR:  88.8% (1 y)   Target end date:  Indefinite   Send  INR reminders to:  [facility]IT[facility]    Indications    Acute deep vein thrombosis (DVT) of axillary vein and internal jugular (H) (Resolved) [I82.A19]  Current use of long term anticoagulation (Resolved) [Z79.01]  Acute deep vein thrombosis (DVT) of axillary vein of right upper extremity (H) [I82.A11]  Personal history of DVT (deep vein thrombosis) [Z86.718]             Comments:  [employer] meter- Managed by exception             Anticoagulation Care Providers       Provider Role Specialty Phone number    Rema Braxton DO Referring Family Medicine [phone]    Jessica Anthony MD Referring Internal Medicine [phone]    Jana Giles MD Referring Internal Medicine [phone]    Christopher Smith MD Responsible Hematology [phone]

## 2024-08-23 NOTE — TELEPHONE ENCOUNTER
SPINE PATIENTS - NEW PROTOCOL PREVISIT    RECORDS RECEIVED FROM: Ref by SUJATA MENDIOLA   REASON FOR VISIT: Dizziness, History of hydrocephalus   PROVIDER: Peña   DATE OF APPT: 08/28/2024   NOTES (FOR ALL VISITS) STATUS DETAILS   OFFICE NOTE from referring provider Internal Referral and notes in chart   NO WORK-UP  No imaging/no prev work-up In process CT head SCHEDULED 08/28/2024, 06/20/2024

## 2024-08-28 ENCOUNTER — PRE VISIT (OUTPATIENT)
Dept: NEUROLOGY | Facility: CLINIC | Age: 79
End: 2024-08-28

## 2024-08-28 ENCOUNTER — ANCILLARY PROCEDURE (OUTPATIENT)
Dept: CT IMAGING | Facility: CLINIC | Age: 79
End: 2024-08-28
Attending: NURSE PRACTITIONER
Payer: MEDICARE

## 2024-08-28 ENCOUNTER — OFFICE VISIT (OUTPATIENT)
Dept: NEUROLOGY | Facility: CLINIC | Age: 79
End: 2024-08-28
Attending: PHYSICIAN ASSISTANT
Payer: MEDICARE

## 2024-08-28 VITALS — HEART RATE: 87 BPM | SYSTOLIC BLOOD PRESSURE: 155 MMHG | DIASTOLIC BLOOD PRESSURE: 75 MMHG | OXYGEN SATURATION: 96 %

## 2024-08-28 DIAGNOSIS — S06.5XAA SDH (SUBDURAL HEMATOMA) (H): ICD-10-CM

## 2024-08-28 DIAGNOSIS — Z86.69 HISTORY OF HYDROCEPHALUS: ICD-10-CM

## 2024-08-28 DIAGNOSIS — H81.12 BPPV (BENIGN PAROXYSMAL POSITIONAL VERTIGO), LEFT: Primary | ICD-10-CM

## 2024-08-28 DIAGNOSIS — R29.818 DIFFICULTY BALANCING: ICD-10-CM

## 2024-08-28 PROCEDURE — 70450 CT HEAD/BRAIN W/O DYE: CPT | Mod: MF

## 2024-08-28 PROCEDURE — 99205 OFFICE O/P NEW HI 60 MIN: CPT | Performed by: PSYCHIATRY & NEUROLOGY

## 2024-08-28 PROCEDURE — 99417 PROLNG OP E/M EACH 15 MIN: CPT | Performed by: PSYCHIATRY & NEUROLOGY

## 2024-08-28 NOTE — PROGRESS NOTES
"Manasa French is a 79 year old female who presents for:  Chief Complaint   Patient presents with    Consult     Dizziness- history of dizziness         Initial Vitals:  BP (!) 147/79 (BP Location: Left arm, Patient Position: Supine, Cuff Size: Adult Regular)   Pulse 87   LMP  (LMP Unknown)   SpO2 96%  Estimated body mass index is 25.21 kg/m  as calculated from the following:    Height as of 7/15/24: 1.638 m (5' 4.5\").    Weight as of 7/15/24: 67.7 kg (149 lb 3.2 oz).. There is no height or weight on file to calculate BSA. BP completed using cuff size: regular    Orthostatic Today     Elva Engle   "

## 2024-08-28 NOTE — PROGRESS NOTES
INITIAL NEUROLOGY CONSULTATION    DATE OF VISIT: 8/28/2024  CLINIC LOCATION: [facility]  MRN: [id number]  PATIENT NAME: Manasa French  YOB: 1945    REASON FOR VISIT:   Chief Complaint   Patient presents with    Consult     Dizziness- history of dizziness      HISTORY OF PRESENT ILLNESS:                                                    Ms. Manasa French is 79 year old right handed female patient with past medical history of hydrocephalus, status post  shunt placement (1987, nonprogrammable), lumbar spinal fusion, DVT of axillary vein of the right upper extremity, diabetes mellitus type 2, B12 deficiency, hypothyroidism, and syncope, who was seen today for dizziness.  Accompanied by her .    Per patient's report, she has intermittent dizziness spells for the last 10 years, which could be triggered in upright position and with certain movements.  It occurs several times per year.  When it happens usually she experiences darkening of her vision and blurry vision before passing out, but it could be prevented if she sits down.  She was hospitalized in May 2024 after syncopal episode.  At that time head CT demonstrated chronic subdural fluid collections in she follow-up with neurosurgery regarding possibility of over draining from her  shunt, which is nonprogrammable.    At the present time, she reports continued intermittent balance difficulty/dizziness and mild bitemporal pressure headaches at 2-3 to 10 level that could go up to 8/10 occasionally.  She takes Tylenol every day in the evening and occasionally during the day.  She denies any additional neurologic symptoms.  She admits that her daily hydration is not always adequate (up to 4 cups of fluids/day).    Brain MRI with and without contrast from 4/3/2024 demonstrated right frontal ventriculostomy catheter with unchanged ventricular caliber and morphology, but increased extent of diffuse dural  thickening/enhancement, which could be seen in setting of over drainage.  Head CT from 5/20/2024 demonstrated bi-hemispheric subdural fluid collections, similar in size compared to prior MRI, along with postoperative changes related to ventriculostomy placement.  Head CT without contrast from 6/20/2024 demonstrated stable position of shunt catheter with unchanged ventricular size and slightly decreased subdural collections over bilateral cerebral hemispheres.  Head CT from 8/28/2024 not officially read, but appears to be not significantly changed from May and June 2024.  All images were personally reviewed and independently interpreted.    No additional useful information is available in Care Everywhere, which was reviewed.  PAST MEDICAL/SURGICAL HISTORY:                                                    I personally reviewed patient's past medical and surgical history with the patient at today's visit.  MEDICATIONS:                                                    I personally reviewed patient's medications and allergies with the patient at today's visit.  ALLERGIES:                                                      Allergies   Allergen Reactions    Alphagan [Brimonidine] Other (See Comments)     Itchy eyes    Cosopt [Dorzolamide Hcl-Timolol Mal] Other (See Comments)     Itchy eyes     EXAM:                                                    VITAL SIGNS:   BP (!) 155/75 (BP Location: Left arm, Patient Position: Standing, Cuff Size: Adult Regular)   Pulse 87   LMP  (LMP Unknown)   SpO2 96%   Orthostatic vital signs:  Vitals:    08/28/24 1424 08/28/24 1430   BP: (!) 147/79 (!) 155/75   BP Location: Left arm Left arm   Patient Position: Supine Standing   Cuff Size: Adult Regular Adult Regular   Pulse: 87 87   SpO2: 96% 96%     Mini-Cog Assessment:  Mini Cog Assessment  Clock Draw Score: 2 Normal  3 Item Recall: 3 objects recalled  Mini Cog Total Score: 5  Administered by: : Elva MANCIA    General: pt is in NAD,  cooperative.  Skin: normal turgor, moist mucous membranes, no lesions/rashes noticed.  HEENT: ATNC, EOMI, PERRL, white sclera, normal conjunctiva, no nystagmus or ptosis. No carotid bruits bilaterally.  Respiratory: lung sounds clear to auscultation bilaterally, no crackles, wheezes, rhonchi. Symmetric lung excursion, no accessory respiratory muscle use.  Cardiovascular: normal S1/S2, no murmurs/rubs/gallops.   Abdomen: Not distended.  : deferred.    Neurological:  Mental: alert, follows commands, Mini Cog Total Score: 5/5 with 3/3 on memory recall, no aphasia or dysarthria. Fund of knowledge is appropriate for age.  Cranial Nerves:  CN II: visual acuity - able to accurately count fingers with each eye. Visual fields intact, fundi: discs sharp, no papilledema and normal vessels bilaterally.  CN III, IV, VI: EOM intact, pupils equal and reactive  CN V: facial sensation nl  CN VII: face symmetric, no facial droop  CN VIII: hearing bilaterally reduced.  [place]ana-Hallpike maneuver is positive on the left and negative on the right.  CN IX: palate elevation symmetric, uvula at midline  CN XI SCM normal, shoulder shrug nl  CN XII: tongue midline  Motor: Strength: 5/5 in all major groups of all extremities. Normal tone. No abnormal movements. No pronator drift b/l.  Reflexes: Triceps, biceps, brachioradialis, and patellar reflexes normal and symmetric, achilles reflexes are absent bilaterally. No clonus noted. Toes are down-going b/l.   Sensory: light touch, pinprick, and vibration reduced in both feet. Romberg: negative.  Coordination: FNF and heel-shin tests intact b/l.   Gait: Cautious gait, unable to tandem.  DATA:   LABS/EEG/IMAGING/OTHER STUDIES: I reviewed pertinent medical records, as detailed in the history of present illness.  ASSESSMENT AND PLAN:      ASSESSMENT: Manasa French is a 79 year old female patient with listed above past medical history, who presents with dizzy spells along with associated balance  difficulty and chronic headaches.    We had a detailed discussion with the patient and her  regarding her presenting complaints.  The neurological exam today is supportive of known peripheral polyneuropathy and left-sided peripheral dysfunction (positive Jeff-Hallpike maneuver on the left).  We discussed that some of her symptoms might be due to BPPV, which could be treated with vestibular physical therapy, and some balance difficulty might be due to peripheral neuropathy.    Regarding her chronic headaches, they might be due to overdraining of her shunt, though the imaging appears to be stable over the last several months.  The ventricles are slightly larger when I looked at the older imaging from 2020.  She will discuss this issue with neurosurgery providers.  Additional component might be due to medication overuse headaches due to reported daily Tylenol use.  We discussed this as well.    DIAGNOSES:    ICD-10-CM    1. BPPV (benign paroxysmal positional vertigo), left  H81.12 Adult Neurology  Referral     Physical Therapy  Referral      2. History of hydrocephalus  Z86.69 Adult Neurology  Referral      3. Difficulty balancing  R29.818 Physical Therapy  Referral        PLAN: At today's visit we thoroughly discussed current symptoms, suspected diagnosis, available treatment options, and the plan, which includes:  Orders Placed This Encounter   Procedures    Physical Therapy  Referral     No new medications, but we discussed that part of headaches might be related to medication overuse.  I advised the patient to consider reducing the number of days that she takes Tylenol.      Next follow-up appointment is on as needed basis.    Total Time: 76 minutes spent on the date of the encounter doing chart review, history and exam, documentation and further activities per the note.    Augustus Christie MD  [facility] Neurology  (Chart documentation was completed  in part with Dragon voice-recognition software. Even though reviewed, some grammatical, spelling, and word errors may remain.)

## 2024-08-28 NOTE — LETTER
8/28/2024      Manasa French  [address]  [facility] [address]      Dear Colleague,    Thank you for referring your patient, Manasa French, to the [facility] NEUROLOG[facility]. Please see a copy of my visit note below.    INITIAL NEUROLOGY CONSULTATION    DATE OF VISIT: 8/28/2024  CLINIC LOCATION: [facility]  MRN: [id number]  PATIENT NAME: Manasa French  YOB: 1945    REASON FOR VISIT:   Chief Complaint   Patient presents with     Consult     Dizziness- history of dizziness      HISTORY OF PRESENT ILLNESS:                                                    Ms. Manasa French is 79 year old right handed female patient with past medical history of hydrocephalus, status post  shunt placement (1987, nonprogrammable), lumbar spinal fusion, DVT of axillary vein of the right upper extremity, diabetes mellitus type 2, B12 deficiency, hypothyroidism, and syncope, who was seen today for dizziness.  Accompanied by her .    Per patient's report, she has intermittent dizziness spells for the last 10 years, which could be triggered in upright position and with certain movements.  It occurs several times per year.  When it happens usually she experiences darkening of her vision and blurry vision before passing out, but it could be prevented if she sits down.  She was hospitalized in May 2024 after syncopal episode.  At that time head CT demonstrated chronic subdural fluid collections in she follow-up with neurosurgery regarding possibility of over draining from her  shunt, which is nonprogrammable.    At the present time, she reports continued intermittent balance difficulty/dizziness and mild bitemporal pressure headaches at 2-3 to 10 level that could go up to 8/10 occasionally.  She takes Tylenol every day in the evening and occasionally during the day.  She denies any additional neurologic symptoms.  She admits that her daily hydration is  not always adequate (up to 4 cups of fluids/day).    Brain MRI with and without contrast from 4/3/2024 demonstrated right frontal ventriculostomy catheter with unchanged ventricular caliber and morphology, but increased extent of diffuse dural thickening/enhancement, which could be seen in setting of over drainage.  Head CT from 5/20/2024 demonstrated bi-hemispheric subdural fluid collections, similar in size compared to prior MRI, along with postoperative changes related to ventriculostomy placement.  Head CT without contrast from 6/20/2024 demonstrated stable position of shunt catheter with unchanged ventricular size and slightly decreased subdural collections over bilateral cerebral hemispheres.  Head CT from 8/28/2024 not officially read, but appears to be not significantly changed from May and June 2024.  All images were personally reviewed and independently interpreted.    No additional useful information is available in Care Everywhere, which was reviewed.  PAST MEDICAL/SURGICAL HISTORY:                                                    I personally reviewed patient's past medical and surgical history with the patient at today's visit.  MEDICATIONS:                                                    I personally reviewed patient's medications and allergies with the patient at today's visit.  ALLERGIES:                                                      Allergies   Allergen Reactions     Alphagan [Brimonidine] Other (See Comments)     Itchy eyes     Cosopt [Dorzolamide Hcl-Timolol Mal] Other (See Comments)     Itchy eyes     EXAM:                                                    VITAL SIGNS:   BP (!) 155/75 (BP Location: Left arm, Patient Position: Standing, Cuff Size: Adult Regular)   Pulse 87   LMP  (LMP Unknown)   SpO2 96%   Orthostatic vital signs:  Vitals:    08/28/24 1424 08/28/24 1430   BP: (!) 147/79 (!) 155/75   BP Location: Left arm Left arm   Patient Position: Supine Standing   Cuff Size:  Adult Regular Adult Regular   Pulse: 87 87   SpO2: 96% 96%     Mini-Cog Assessment:  Mini Cog Assessment  Clock Draw Score: 2 Normal  3 Item Recall: 3 objects recalled  Mini Cog Total Score: 5  Administered by: : Elva MANCIA    General: pt is in NAD, cooperative.  Skin: normal turgor, moist mucous membranes, no lesions/rashes noticed.  HEENT: ATNC, EOMI, PERRL, white sclera, normal conjunctiva, no nystagmus or ptosis. No carotid bruits bilaterally.  Respiratory: lung sounds clear to auscultation bilaterally, no crackles, wheezes, rhonchi. Symmetric lung excursion, no accessory respiratory muscle use.  Cardiovascular: normal S1/S2, no murmurs/rubs/gallops.   Abdomen: Not distended.  : deferred.    Neurological:  Mental: alert, follows commands, Mini Cog Total Score: 5/5 with 3/3 on memory recall, no aphasia or dysarthria. Fund of knowledge is appropriate for age.  Cranial Nerves:  CN II: visual acuity - able to accurately count fingers with each eye. Visual fields intact, fundi: discs sharp, no papilledema and normal vessels bilaterally.  CN III, IV, VI: EOM intact, pupils equal and reactive  CN V: facial sensation nl  CN VII: face symmetric, no facial droop  CN VIII: hearing bilaterally reduced.  Jeff-Hallpike maneuver is positive on the left and negative on the right.  CN IX: palate elevation symmetric, uvula at midline  CN XI SCM normal, shoulder shrug nl  CN XII: tongue midline  Motor: Strength: 5/5 in all major groups of all extremities. Normal tone. No abnormal movements. No pronator drift b/l.  Reflexes: Triceps, biceps, brachioradialis, and patellar reflexes normal and symmetric, achilles reflexes are absent bilaterally. No clonus noted. Toes are down-going b/l.   Sensory: light touch, pinprick, and vibration reduced in both feet. Romberg: negative.  Coordination: FNF and heel-shin tests intact b/l.   Gait: Cautious gait, unable to tandem.  DATA:   LABS/EEG/IMAGING/OTHER STUDIES: I reviewed pertinent medical  records, as detailed in the history of present illness.  ASSESSMENT AND PLAN:      ASSESSMENT: Manasa French is a 79 year old female patient with listed above past medical history, who presents with dizzy spells along with associated balance difficulty and chronic headaches.    We had a detailed discussion with the patient and her  regarding her presenting complaints.  The neurological exam today is supportive of known peripheral polyneuropathy and left-sided peripheral dysfunction (positive [place]-Hallpike maneuver on the left).  We discussed that some of her symptoms might be due to BPPV, which could be treated with vestibular physical therapy, and some balance difficulty might be due to peripheral neuropathy.    Regarding her chronic headaches, they might be due to overdraining of her shunt, though the imaging appears to be stable over the last several months.  The ventricles are slightly larger when I looked at the older imaging from 2020.  She will discuss this issue with neurosurgery providers.  Additional component might be due to medication overuse headaches due to reported daily Tylenol use.  We discussed this as well.    DIAGNOSES:    ICD-10-CM    1. BPPV (benign paroxysmal positional vertigo), left  H81.12 Adult Neurology  Referral     Physical Therapy  Referral      2. History of hydrocephalus  Z86.69 Adult Neurology  Referral      3. Difficulty balancing  R29.818 Physical Therapy  Referral        PLAN: At today's visit we thoroughly discussed current symptoms, suspected diagnosis, available treatment options, and the plan, which includes:  Orders Placed This Encounter   Procedures     Physical Therapy  Referral     No new medications, but we discussed that part of headaches might be related to medication overuse.  I advised the patient to consider reducing the number of days that she takes Tylenol.      Next follow-up appointment is on as needed  "basis.    Total Time: 76 minutes spent on the date of the encounter doing chart review, history and exam, documentation and further activities per the note.    Augustus Christie MD  [facility] Neurology  (Chart documentation was completed in part with Dragon voice-recognition software. Even though reviewed, some grammatical, spelling, and word errors may remain.)            Manasa French is a 79 year old female who presents for:  Chief Complaint   Patient presents with     Consult     Dizziness- history of dizziness         Initial Vitals:  BP (!) 147/79 (BP Location: Left arm, Patient Position: Supine, Cuff Size: Adult Regular)   Pulse 87   LMP  (LMP Unknown)   SpO2 96%  Estimated body mass index is 25.21 kg/m  as calculated from the following:    Height as of 7/15/24: 1.638 m (5' 4.5\").    Weight as of 7/15/24: 67.7 kg (149 lb 3.2 oz).. There is no height or weight on file to calculate BSA. BP completed using cuff size: regular    Orthostatic Today     Elva Engle       Again, thank you for allowing me to participate in the care of your patient.        Sincerely,        Augustus Christie MD  "

## 2024-08-28 NOTE — PROGRESS NOTES
INITIAL NEUROLOGY CONSULTATION    DATE OF VISIT: 8/28/2024  CLINIC LOCATION: [facility]  MRN: [id number]  PATIENT NAME: Manasa French  YOB: 1945    REASON FOR VISIT: No chief complaint on file.    HISTORY OF PRESENT ILLNESS:                                                    Ms. Manasa French is 79 year old right handed female patient with past medical history of hydrocephalus, status post  shunt placement (1987, nonprogrammable), lumbar spinal fusion, DVT of axillary vein of the right upper extremity, diabetes mellitus type 2, B12 deficiency, hypothyroidism, and syncope, who was seen today for dizziness.    Per patient's report, in March 2024 she developed balance difficulty and fogginess while watching TV.  Also experienced headache while lying down along with brain fog and nausea for brief period of time.  Was referred by primary care provider for further evaluation.    Hospitalized in May 2024 after syncopal episode.  At that time head CT demonstrated chronic subdural fluid collections.    Brain MRI with and without contrast from 4/3/2024 demonstrated right frontal ventriculostomy catheter with unchanged ventricular caliber and morphology, but increased extent of diffuse dural thickening/enhancement, which could be seen in setting of over drainage.  Head CT from 5/20/2024 demonstrated bi-hemispheric subdural fluid collections, similar in size compared to prior MRI, along with postoperative changes related to ventriculostomy placement.  Head CT without contrast from 6/20/2024 demonstrated stable position of shunt catheter with unchanged ventricular size and slightly decreased subdural collections over bilateral cerebral hemispheres.  Head CT from 8/28/2024 ***.  All images were personally reviewed and independently interpreted.    No additional useful information is available in Care Everywhere, which was reviewed.  PAST MEDICAL/SURGICAL HISTORY:                                                     I personally reviewed patient's past medical and surgical history with the patient at today's visit.  MEDICATIONS:                                                    I personally reviewed patient's medications and allergies with the patient at today's visit.  ALLERGIES:                                                      Allergies   Allergen Reactions    Alphagan [Brimonidine] Other (See Comments)     Itchy eyes    Cosopt [Dorzolamide Hcl-Timolol Mal] Other (See Comments)     Itchy eyes     EXAM:                                                    VITAL SIGNS:   LMP  (LMP Unknown)   Orthostatic vital signs:  There were no vitals filed for this visit.  Mini-Cog Assessment:       General: pt is in NAD, cooperative.  Skin: normal turgor, moist mucous membranes, no lesions/rashes noticed.  HEENT: ATNC, EOMI, PERRL, white sclera, normal conjunctiva, no nystagmus or ptosis. No carotid bruits bilaterally.  Respiratory: lung sounds clear to auscultation bilaterally, no crackles, wheezes, rhonchi. Symmetric lung excursion, no accessory respiratory muscle use.  Cardiovascular: normal S1/S2, no murmurs/rubs/gallops.   Abdomen: Not distended.  : deferred.    Neurological:  Mental: alert, follows commands,  /5 with ***/3 on memory recall, no aphasia or dysarthria. Fund of knowledge is {MYAPPROPRIATE:090409}  Cranial Nerves:  CN II: visual acuity - able to accurately count fingers with each eye. Visual fields intact, fundi: discs sharp, no papilledema and normal vessels bilaterally.  CN III, IV, VI: EOM intact, pupils equal and reactive  CN V: facial sensation nl  CN VII: face symmetric, no facial droop  CN VIII: hearing bilaterally reduced  CN IX: palate elevation symmetric, uvula at midline  CN XI SCM normal, shoulder shrug nl  CN XII: tongue midline  Motor: Strength: 5/5 in all major groups of all extremities. Normal tone. No abnormal movements. No pronator drift b/l.  Reflexes: Triceps, biceps,  brachioradialis, patellar, and achilles reflexes normal and symmetric. No clonus noted. Toes are down-going b/l.   Sensory: light touch, pinprick, and vibration intact. Romberg: negative.  Coordination: FNF and heel-shin tests intact b/l.   Gait:  Normal, able to tandem walk *** without difficulty.  DATA:   LABS/EEG/IMAGING/OTHER STUDIES: I reviewed pertinent medical records, as detailed in the history of present illness.  ASSESSMENT AND PLAN:      ASSESSMENT: Manasa French is a 79 year old female patient with listed above past medical history, who presents with ***.    We had a detailed discussion with the patient regarding her presenting complaints.  The neurological exam today is ***.    DIAGNOSES:  No diagnosis found.  PLAN: There are no Patient Instructions on file for this visit.    Total Time: *** minutes spent on the date of the encounter doing chart review, history and exam, documentation and further activities per the note.    Augustus Christie MD  [facility] Neurology  (Chart documentation was completed in part with Dragon voice-recognition software. Even though reviewed, some grammatical, spelling, and word errors may remain.)

## 2024-08-28 NOTE — PATIENT INSTRUCTIONS
AFTER VISIT SUMMARY (AVS):    At today's visit we thoroughly discussed current symptoms, suspected diagnosis, available treatment options, and the plan, which includes:  Orders Placed This Encounter   Procedures    Physical Therapy  Referral     No new medications, but we discussed that part of your headaches might be related to medication overuse.  Please consider reducing the number of days that you use Tylenol.  Part of the issue could be from your shunt.    Next follow-up appointment is on as needed basis.    Please do not hesitate to call me with any questions or concerns.    Thanks.

## 2024-08-31 ENCOUNTER — APPOINTMENT (OUTPATIENT)
Dept: GENERAL RADIOLOGY | Facility: CLINIC | Age: 79
End: 2024-08-31
Attending: EMERGENCY MEDICINE
Payer: MEDICARE

## 2024-08-31 ENCOUNTER — HOSPITAL ENCOUNTER (EMERGENCY)
Facility: CLINIC | Age: 79
Discharge: HOME | End: 2024-08-31
Attending: EMERGENCY MEDICINE | Admitting: EMERGENCY MEDICINE
Payer: MEDICARE

## 2024-08-31 ENCOUNTER — APPOINTMENT (OUTPATIENT)
Dept: CT IMAGING | Facility: CLINIC | Age: 79
End: 2024-08-31
Attending: EMERGENCY MEDICINE
Payer: MEDICARE

## 2024-08-31 VITALS
SYSTOLIC BLOOD PRESSURE: 173 MMHG | HEIGHT: 64 IN | OXYGEN SATURATION: 99 % | WEIGHT: 150 LBS | HEART RATE: 79 BPM | BODY MASS INDEX: 25.61 KG/M2 | RESPIRATION RATE: 16 BRPM | TEMPERATURE: 97.9 F | DIASTOLIC BLOOD PRESSURE: 80 MMHG
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
## 2024-08-31 DIAGNOSIS — M25.552 HIP PAIN, LEFT: ICD-10-CM

## 2024-08-31 DIAGNOSIS — S00.03XA CONTUSION OF SCALP, INITIAL ENCOUNTER: ICD-10-CM

## 2024-08-31 LAB — INR PPP: 2.68 (ref 0.85–1.15)

## 2024-08-31 PROCEDURE — 250N000013 HC RX MED GY IP 250 OP 250 PS 637: Performed by: EMERGENCY MEDICINE

## 2024-08-31 PROCEDURE — 73502 X-RAY EXAM HIP UNI 2-3 VIEWS: CPT

## 2024-08-31 PROCEDURE — 70450 CT HEAD/BRAIN W/O DYE: CPT | Mod: MG

## 2024-08-31 PROCEDURE — 36415 COLL VENOUS BLD VENIPUNCTURE: CPT | Performed by: EMERGENCY MEDICINE

## 2024-08-31 PROCEDURE — 85610 PROTHROMBIN TIME: CPT | Performed by: EMERGENCY MEDICINE

## 2024-08-31 PROCEDURE — 99285 EMERGENCY DEPT VISIT HI MDM: CPT | Mod: 25

## 2024-08-31 RX ORDER — ACETAMINOPHEN 500 MG
1000 TABLET ORAL ONCE
Status: COMPLETED | OUTPATIENT
Start: 2024-08-31 | End: 2024-08-31

## 2024-08-31 RX ADMIN — ACETAMINOPHEN 1000 MG: 500 TABLET, FILM COATED ORAL at 08:47

## 2024-08-31 ASSESSMENT — ACTIVITIES OF DAILY LIVING (ADL)
ADLS_ACUITY_SCORE: 38
ADLS_ACUITY_SCORE: 38
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
## 2024-08-31 NOTE — ED NOTES
Bed: ED10  Expected date:   Expected time:   Means of arrival:   Comments:  Felipe [age]f fall eta 5
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
## 2024-09-03 ENCOUNTER — VIRTUAL VISIT (OUTPATIENT)
Dept: NEUROSURGERY | Facility: CLINIC | Age: 79
End: 2024-09-03
Attending: PHYSICIAN ASSISTANT
Payer: MEDICARE

## 2024-09-03 ENCOUNTER — DOCUMENTATION ONLY (OUTPATIENT)
Dept: ANTICOAGULATION | Facility: CLINIC | Age: 79
End: 2024-09-03
Payer: MEDICARE

## 2024-09-03 DIAGNOSIS — I82.A11 ACUTE DEEP VEIN THROMBOSIS (DVT) OF AXILLARY VEIN OF RIGHT UPPER EXTREMITY (H): Primary | ICD-10-CM

## 2024-09-03 DIAGNOSIS — Z86.718 PERSONAL HISTORY OF DVT (DEEP VEIN THROMBOSIS): ICD-10-CM

## 2024-09-03 DIAGNOSIS — S06.5XAA SDH (SUBDURAL HEMATOMA) (H): Primary | ICD-10-CM

## 2024-09-03 PROCEDURE — 99443 PR PHYSICIAN TELEPHONE EVALUATION 21-30 MIN: CPT | Mod: 93 | Performed by: PHYSICIAN ASSISTANT

## 2024-09-03 ASSESSMENT — PAIN SCALES - GENERAL: PAINLEVEL: NO PAIN (1)

## 2024-09-03 NOTE — PROGRESS NOTES
"NEUROSURGERY PHONE PROGRESS NOTE    DATE OF VISIT: 9/3/2024    HPI:     Manasa French is a pleasant 79 year old female who I spoke to on the phone today for a consultation after a [place] on 08[place]31/2024. This fall resulted in a low density subdural collections over the bilateral cerebral convexities. Follow-up CT did not exhibit any acute intracranial abnormality. Imaging also showed re demonstrated a right frontal approach ventriculostomy catheter with tip terminating near the foramen of Monro. She was told that her \"shunt is working too well, is over draining, resulting in a Chiari malformation.\"      Today, the patient reports that really is not having any concerns, but was simply told to follow-up with us. She does admit that she has headaches ranging from 1-8. These headaches are not positional and have been present for a couple of years. She has also been evaluated by Neurology who feel this may be more vertiginous than anything. She is scheduled for therapy.     Current Outpatient Medications   Medication Sig Dispense Refill    acetaminophen (TYLENOL) 325 MG tablet Take 2 tablets (650 mg) by mouth every 4 hours as needed for other (mild pain) 100 tablet 0    albuterol (PROVENTIL HFA) 108 (90 Base) MCG/ACT inhaler Inhale 2 puffs into the lungs every 6 hours 8.5 g 0    amoxicillin (AMOXIL) 500 MG tablet 4 tablets by mouth one hour prior to dental procedure*      benzonatate (TESSALON) 200 MG capsule Take 1 capsule (200 mg) by mouth 3 times daily as needed for cough 30 capsule 1    calcium carbonate-vitamin D (CALTRATE) 600-10 MG-MCG per tablet Take 1 tablet by mouth 2 times daily      carboxymethylcellulose (CELLUVISC/REFRESH LIQUIGEL) 1 % ophthalmic solution Place 1 drop into both eyes 3 times daily      guaiFENesin-codeine (ROBITUSSIN AC) 100-10 MG/5ML solution Take 5-10 mLs by mouth nightly as needed for cough (Patient not taking: Reported on 8/28/2024) 118 mL 0    latanoprost (XALATAN) " 0.005 % ophthalmic solution INSTILL 1 DROP INTO BOTH EYES AT BEDTIME 7.5 mL 3    levothyroxine (SYNTHROID/LEVOTHROID) 112 MCG tablet TAKE 1 TABLET EVERY DAY 90 tablet 3    losartan (COZAAR) 100 MG tablet TAKE 1 TABLET EVERY DAY 90 tablet 3    metFORMIN (GLUCOPHAGE XR) 500 MG 24 hr tablet TAKE 1 TABLET EVERY DAY WITH DINNER 90 tablet 3    Multiple Vitamins-Minerals (ICAPS AREDS 2 PO) Take 1 capsule by mouth 2 times daily      timolol maleate (TIMOPTIC) 0.5 % ophthalmic solution Place 1 drop into both eyes every morning 15 mL 1    vitamin B-12 (CYANOCOBALAMIN) 1000 MCG tablet Take 1,000 mcg by mouth daily      Vitamin D3 (CHOLECALCIFEROL) 25 mcg (1000 units) tablet Take 1 tablet by mouth daily      Vitamin K 100 MCG TABS Take 1 tablet by mouth daily      warfarin ANTICOAGULANT (COUMADIN) 5 MG tablet Take 1-1/2 tablets (7.5 mg) by mouth every Mon, Wed, Fri; and Take 1 tablet (5 mg) all other days of the week or as directed by your ACC team based on INR results. 110 tablet 1     Current Facility-Administered Medications   Medication Dose Route Frequency Provider Last Rate Last Admin    denosumab (PROLIA) injection 60 mg  60 mg Subcutaneous Q6 Months Kevin Hernandez MD           Allergies   Allergen Reactions    Alphagan [Brimonidine] Other (See Comments)     Itchy eyes    Cosopt [Dorzolamide Hcl-Timolol Mal] Other (See Comments)     Itchy eyes       Past Medical History:   Diagnosis Date    Benign essential hypertension     Dislocation of right hip (H) 05/2024    History of Graves' disease     s/p BARAHONA    History of hydrocephalus     s/p  shunt placement in 1987    Macular degeneration of both eyes     Osteoporosis     Personal history of DVT (deep vein thrombosis) 2019    RUE; on chronic AC    Postablative hypothyroidism     Primary open angle glaucoma (POAG) of both eyes     right eye moderate, left eye mild    Pseudophakia     Sensorineural hearing loss, bilateral        Review Of Systems    Skin: negative  Eyes:  negative  Ears/Nose/Throat: negative  Respiratory: No shortness of breath, dyspnea on exertion, cough, or hemoptysis  Cardiovascular: negative  Gastrointestinal: negative  Musculoskeletal: negative  Neurologic: negative  Psychiatric: negative  Hematologic/Lymphatic/Immunologic: negative  Endocrine: negative    OBJECTIVE:    LMP  (LMP Unknown)     Imaging:    CT HEAD W/O CONTRAST  LOCATION: [facility]  DATE/TIME: 8/31/2024 8:35 AM CDT    INDICATION: Headache after trauma  COMPARISON: CT head dated 8/28/2024  TECHNIQUE: Routine CT Head without IV contrast. Multiplanar reformats. Dose reduction techniques were used.    FINDINGS:   INTRACRANIAL CONTENTS: Redemonstrated right frontal approach ventriculostomy catheter with tip terminating near the foramen of Monro and unchanged morphology of the ventricular system since at least 8/28/2024. No acute intracranial hemorrhage. No CT   evidence of acute infarct. Sequelae of mild chronic microangiopathy. Mild cerebral volume loss without hydrocephalus. No extra-axial fluid collections. Patent basal cisterns.     VISUALIZED ORBITS/SINUSES/MASTOIDS: Postoperative changes of bilateral lenses, otherwise the orbits are unremarkable. The visualized paranasal sinuses and temporal bone structures are well-aerated.     BONES/SOFT TISSUES: Right frontal humble hole, otherwise the calvarium and skull base are unremarkable.     IMPRESSION:     1. No acute intracranial abnormality.    2. Redemonstrated right frontal approach ventriculostomy catheter with tip terminating near the foramen of Monro and unchanged morphology of the ventricular system since at least 8/28/2024.    Radiographic Findings: Full radiological report in chart. I personally reviewed the images with the patient today.      ASSESSMENT:    BPPV    PLAN:    After reviewing the case with Dr. Mcguire, we do not feel that her symptoms are related to her Hakim valve and agree with Neurology that this is most  likely BPPV. She does appear to have low lying tonsils but her symptoms do not necessarily correlate with this pathology. Ms. French is not interested in surgical intervention. We have agreed that, for now, we will simply follow along and see how she responds to vestibular therapy. If she does not appreciate any alleviation, she is aware that she should present to clinic for shunt series x-rays and to confirm her shunts position.     The patient gave verbal understanding and is in agreement with the above plan. She will call or return to the clinic for any worsening or changes in symptoms.      Respectfully,     CLAUDIA Cunningham PA-C    Start time: 12:48  End time after chartin:03     Patient location: Home  Provider location: Clinic located at [address], [address], [place]. [address]

## 2024-09-03 NOTE — PROGRESS NOTES
ANTICOAGULATION  MANAGEMENT: Discharge Review    Manasa French chart reviewed for anticoagulation continuity of care    Emergency room visit on 8/31/24 for fall, contusion of scalp.    Of note, this is patient's second documented fall since 7/19/24 (see ACC encounter from that date). Will also route to primary care provider as an FYI.    Discharge disposition: Home    Results:    Recent labs: (last 7 days)     08/31/24  0855   INR 2.68*     Anticoagulation inpatient management:     not applicable     Anticoagulation discharge instructions:     Warfarin dosing: home regimen continued   Bridging: No   INR goal change: No      Medication changes affecting anticoagulation: Yes: Tylenol which may increase INR    Additional factors affecting anticoagulation: No     PLAN     Recommend to check INR on 9/6/24    Spoke with Rhonda    Anticoagulation Calendar updated    Kyleigh Valdez RN

## 2024-09-03 NOTE — LETTER
"9/3/2024      Manasa French  [address]  [facility] [address]      Dear Colleague,    Thank you for referring your patient, Manasa French, to the [facility] [place]. Please see a copy of my visit note below.    NEUROSURGERY PHONE PROGRESS NOTE    DATE OF VISIT: 9/3/2024    HPI:     Manasa French is a pleasant 79 year old female who I spoke to on the phone today for a consultation after a [place]2024. This fall resulted in a low density subdural collections over the bilateral cerebral convexities. Follow-up CT did not exhibit any acute intracranial abnormality. Imaging also showed re demonstrated a right frontal approach ventriculostomy catheter with tip terminating near the foramen of Monro. She was told that her \"shunt is working too well, is over draining, resulting in a Chiari malformation.\"      Today, the patient reports that really is not having any concerns, but was simply told to follow-up with us. She does admit that she has headaches ranging from 1-8. These headaches are not positional and have been present for a couple of years. She has also been evaluated by Neurology who feel this may be more vertiginous than anything. She is scheduled for therapy.     Current Outpatient Medications   Medication Sig Dispense Refill     acetaminophen (TYLENOL) 325 MG tablet Take 2 tablets (650 mg) by mouth every 4 hours as needed for other (mild pain) 100 tablet 0     albuterol (PROVENTIL HFA) 108 (90 Base) MCG/ACT inhaler Inhale 2 puffs into the lungs every 6 hours 8.5 g 0     amoxicillin (AMOXIL) 500 MG tablet 4 tablets by mouth one hour prior to dental procedure*       benzonatate (TESSALON) 200 MG capsule Take 1 capsule (200 mg) by mouth 3 times daily as needed for cough 30 capsule 1     calcium carbonate-vitamin D (CALTRATE) 600-10 MG-MCG per tablet Take 1 tablet by mouth 2 times daily       carboxymethylcellulose " (CELLUVISC/REFRESH LIQUIGEL) 1 % ophthalmic solution Place 1 drop into both eyes 3 times daily       guaiFENesin-codeine (ROBITUSSIN AC) 100-10 MG/5ML solution Take 5-10 mLs by mouth nightly as needed for cough (Patient not taking: Reported on 8/28/2024) 118 mL 0     latanoprost (XALATAN) 0.005 % ophthalmic solution INSTILL 1 DROP INTO BOTH EYES AT BEDTIME 7.5 mL 3     levothyroxine (SYNTHROID/LEVOTHROID) 112 MCG tablet TAKE 1 TABLET EVERY DAY 90 tablet 3     losartan (COZAAR) 100 MG tablet TAKE 1 TABLET EVERY DAY 90 tablet 3     metFORMIN (GLUCOPHAGE XR) 500 MG 24 hr tablet TAKE 1 TABLET EVERY DAY WITH DINNER 90 tablet 3     Multiple Vitamins-Minerals (ICAPS AREDS 2 PO) Take 1 capsule by mouth 2 times daily       timolol maleate (TIMOPTIC) 0.5 % ophthalmic solution Place 1 drop into both eyes every morning 15 mL 1     vitamin B-12 (CYANOCOBALAMIN) 1000 MCG tablet Take 1,000 mcg by mouth daily       Vitamin D3 (CHOLECALCIFEROL) 25 mcg (1000 units) tablet Take 1 tablet by mouth daily       Vitamin K 100 MCG TABS Take 1 tablet by mouth daily       warfarin ANTICOAGULANT (COUMADIN) 5 MG tablet Take 1-1/2 tablets (7.5 mg) by mouth every Mon, Wed, Fri; and Take 1 tablet (5 mg) all other days of the week or as directed by your ACC team based on INR results. 110 tablet 1     Current Facility-Administered Medications   Medication Dose Route Frequency Provider Last Rate Last Admin     denosumab (PROLIA) injection 60 mg  60 mg Subcutaneous Q6 Months Kevin Hernandez MD           Allergies   Allergen Reactions     Alphagan [Brimonidine] Other (See Comments)     Itchy eyes     Cosopt [Dorzolamide Hcl-Timolol Mal] Other (See Comments)     Itchy eyes       Past Medical History:   Diagnosis Date     Benign essential hypertension      Dislocation of right hip (H) 05/2024     History of Graves' disease     s/p BARAHONA     History of hydrocephalus     s/p  shunt placement in 1987     Macular degeneration of both eyes       Osteoporosis      Personal history of DVT (deep vein thrombosis) 2019    RUE; on chronic AC     Postablative hypothyroidism      Primary open angle glaucoma (POAG) of both eyes     right eye moderate, left eye mild     Pseudophakia      Sensorineural hearing loss, bilateral        Review Of Systems    Skin: negative  Eyes: negative  Ears/Nose/Throat: negative  Respiratory: No shortness of breath, dyspnea on exertion, cough, or hemoptysis  Cardiovascular: negative  Gastrointestinal: negative  Musculoskeletal: negative  Neurologic: negative  Psychiatric: negative  Hematologic/Lymphatic/Immunologic: negative  Endocrine: negative    OBJECTIVE:    LMP  (LMP Unknown)     Imaging:    CT HEAD W/O CONTRAST  LOCATION: [facility]  DATE/TIME: 8/31/2024 8:35 AM CDT    INDICATION: Headache after trauma  COMPARISON: CT head dated 8/28/2024  TECHNIQUE: Routine CT Head without IV contrast. Multiplanar reformats. Dose reduction techniques were used.    FINDINGS:   INTRACRANIAL CONTENTS: Redemonstrated right frontal approach ventriculostomy catheter with tip terminating near the foramen of Monro and unchanged morphology of the ventricular system since at least 8/28/2024. No acute intracranial hemorrhage. No CT   evidence of acute infarct. Sequelae of mild chronic microangiopathy. Mild cerebral volume loss without hydrocephalus. No extra-axial fluid collections. Patent basal cisterns.     VISUALIZED ORBITS/SINUSES/MASTOIDS: Postoperative changes of bilateral lenses, otherwise the orbits are unremarkable. The visualized paranasal sinuses and temporal bone structures are well-aerated.     BONES/SOFT TISSUES: Right frontal humble hole, otherwise the calvarium and skull base are unremarkable.     IMPRESSION:     1. No acute intracranial abnormality.    2. Redemonstrated right frontal approach ventriculostomy catheter with tip terminating near the foramen of Monro and unchanged morphology of the ventricular system  since at least 2024.    Radiographic Findings: Full radiological report in chart. I personally reviewed the images with the patient today.      ASSESSMENT:    BPPV    PLAN:    After reviewing the case with Dr. Mcguire, we do not feel that her symptoms are related to her Hakim valve and agree with Neurology that this is most likely BPPV. She does appear to have low lying tonsils but her symptoms do not necessarily correlate with this pathology. Ms. French is not interested in surgical intervention. We have agreed that, for now, we will simply follow along and see how she responds to vestibular therapy. If she does not appreciate any alleviation, she is aware that she should present to clinic for shunt series x-rays and to confirm her shunts position.     The patient gave verbal understanding and is in agreement with the above plan. She will call or return to the clinic for any worsening or changes in symptoms.      Respectfully,     CLAUDIA Cunningham PA-C    Start time: 12:48  End time after chartin:03     Patient location: Home  Provider location: Clinic located at [address] [place] [address], [address] [address]      Again, thank you for allowing me to participate in the care of your patient.        Sincerely,        Jose Escobar PA-C

## 2024-09-04 ENCOUNTER — OFFICE VISIT (OUTPATIENT)
Dept: FAMILY MEDICINE | Facility: CLINIC | Age: 79
End: 2024-09-04
Payer: MEDICARE

## 2024-09-04 VITALS
HEIGHT: 64 IN | BODY MASS INDEX: 25.78 KG/M2 | TEMPERATURE: 97.1 F | WEIGHT: 151 LBS | HEART RATE: 100 BPM | DIASTOLIC BLOOD PRESSURE: 79 MMHG | RESPIRATION RATE: 16 BRPM | SYSTOLIC BLOOD PRESSURE: 155 MMHG | OXYGEN SATURATION: 100 %

## 2024-09-04 DIAGNOSIS — E11.69 TYPE 2 DIABETES MELLITUS WITH OTHER SPECIFIED COMPLICATION, WITHOUT LONG-TERM CURRENT USE OF INSULIN (H): ICD-10-CM

## 2024-09-04 DIAGNOSIS — E89.0 POSTABLATIVE HYPOTHYROIDISM: ICD-10-CM

## 2024-09-04 DIAGNOSIS — I10 BENIGN ESSENTIAL HYPERTENSION: ICD-10-CM

## 2024-09-04 DIAGNOSIS — I82.A11 ACUTE DEEP VEIN THROMBOSIS (DVT) OF AXILLARY VEIN OF RIGHT UPPER EXTREMITY (H): ICD-10-CM

## 2024-09-04 DIAGNOSIS — Z00.00 ANNUAL PHYSICAL EXAM: Primary | ICD-10-CM

## 2024-09-04 DIAGNOSIS — D64.9 ANEMIA, UNSPECIFIED TYPE: ICD-10-CM

## 2024-09-04 DIAGNOSIS — G91.8 OTHER HYDROCEPHALUS (H): ICD-10-CM

## 2024-09-04 DIAGNOSIS — R55 SYNCOPE, UNSPECIFIED SYNCOPE TYPE: ICD-10-CM

## 2024-09-04 LAB — HBA1C MFR BLD: 6.3 % (ref 0–5.6)

## 2024-09-04 PROCEDURE — 80061 LIPID PANEL: CPT | Performed by: INTERNAL MEDICINE

## 2024-09-04 PROCEDURE — 99214 OFFICE O/P EST MOD 30 MIN: CPT | Mod: 25 | Performed by: INTERNAL MEDICINE

## 2024-09-04 PROCEDURE — G0439 PPPS, SUBSEQ VISIT: HCPCS | Performed by: INTERNAL MEDICINE

## 2024-09-04 PROCEDURE — 83036 HEMOGLOBIN GLYCOSYLATED A1C: CPT | Performed by: INTERNAL MEDICINE

## 2024-09-04 PROCEDURE — 36415 COLL VENOUS BLD VENIPUNCTURE: CPT | Performed by: INTERNAL MEDICINE

## 2024-09-04 ASSESSMENT — PAIN SCALES - GENERAL: PAINLEVEL: EXTREME PAIN (8)
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
## 2024-09-05 LAB
CHOLEST SERPL-MCNC: 188 MG/DL
FASTING STATUS PATIENT QL REPORTED: NO
HDLC SERPL-MCNC: 93 MG/DL
LDLC SERPL CALC-MCNC: 72 MG/DL
NONHDLC SERPL-MCNC: 95 MG/DL
TRIGL SERPL-MCNC: 116 MG/DL

## 2024-09-06 ENCOUNTER — NURSE TRIAGE (OUTPATIENT)
Dept: FAMILY MEDICINE | Facility: CLINIC | Age: 79
End: 2024-09-06
Payer: MEDICARE

## 2024-09-06 ENCOUNTER — DOCUMENTATION ONLY (OUTPATIENT)
Dept: ANTICOAGULATION | Facility: CLINIC | Age: 79
End: 2024-09-06
Payer: MEDICARE

## 2024-09-06 DIAGNOSIS — I82.A11 ACUTE DEEP VEIN THROMBOSIS (DVT) OF AXILLARY VEIN OF RIGHT UPPER EXTREMITY (H): Primary | ICD-10-CM

## 2024-09-06 DIAGNOSIS — Z86.718 PERSONAL HISTORY OF DVT (DEEP VEIN THROMBOSIS): ICD-10-CM

## 2024-09-06 LAB — INR HOME MONITORING: 2.4 (ref 2–3)

## 2024-09-06 NOTE — TELEPHONE ENCOUNTER
[address]    Your healthcare provider and/or public health staff have evaluated you and have determined that you do not need to remain in the hospital at this time  At this time you can be isolated at home where you will be monitored by staff from your local or state health department  You should carefully follow the prevention and isolation steps below until a healthcare provider or local or state health department says that you can return to your normal activities  Stay home except to get medical care    People who are mildly ill with COVID-19 are able to isolate at home during their illness  You should restrict activities outside your home, except for getting medical care  Do not go to work, school, or public areas  Avoid using public transportation, ride-sharing, or taxis  Separate yourself from other people and animals in your home    People: As much as possible, you should stay in a specific room and away from other people in your home  Also, you should use a separate bathroom, if available  Animals: You should restrict contact with pets and other animals while you are sick with COVID-19, just like you would around other people  Although there have not been reports of pets or other animals becoming sick with COVID-19, it is still recommended that people sick with COVID-19 limit contact with animals until more information is known about the virus  When possible, have another member of your household care for your animals while you are sick  If you are sick with COVID-19, avoid contact with your pet, including petting, snuggling, being kissed or licked, and sharing food  If you must care for your pet or be around animals while you are sick, wash your hands before and after you interact with pets and wear a facemask  See COVID-19 and Animals for more information      Call ahead before visiting your doctor    If you have a medical appointment, call the healthcare provider and tell them Patient Contact    Attempt # 1    Was call answered?  No.  Left message on voicemail with information to call back.     Irene Mejia RN       that you have or may have COVID-19  This will help the healthcare providers office take steps to keep other people from getting infected or exposed  Wear a facemask    You should wear a facemask when you are around other people (e g , sharing a room or vehicle) or pets and before you enter a healthcare providers office  If you are not able to wear a facemask (for example, because it causes trouble breathing), then people who live with you should not stay in the same room with you, or they should wear a facemask if they enter your room  Cover your coughs and sneezes    Cover your mouth and nose with a tissue when you cough or sneeze  Throw used tissues in a lined trash can  Immediately wash your hands with soap and water for at least 20 seconds or, if soap and water are not available, clean your hands with an alcohol-based hand  that contains at least 60% alcohol  Clean your hands often    Wash your hands often with soap and water for at least 20 seconds, especially after blowing your nose, coughing, or sneezing; going to the bathroom; and before eating or preparing food  If soap and water are not readily available, use an alcohol-based hand  with at least 60% alcohol, covering all surfaces of your hands and rubbing them together until they feel dry  Soap and water are the best option if hands are visibly dirty  Avoid touching your eyes, nose, and mouth with unwashed hands  Avoid sharing personal household items    You should not share dishes, drinking glasses, cups, eating utensils, towels, or bedding with other people or pets in your home  After using these items, they should be washed thoroughly with soap and water  Clean all high-touch surfaces everyday    High touch surfaces include counters, tabletops, doorknobs, bathroom fixtures, toilets, phones, keyboards, tablets, and bedside tables  Also, clean any surfaces that may have blood, stool, or body fluids on them   Use a household cleaning spray or wipe, according to the label instructions  Labels contain instructions for safe and effective use of the cleaning product including precautions you should take when applying the product, such as wearing gloves and making sure you have good ventilation during use of the product  Monitor your symptoms    Seek prompt medical attention if your illness is worsening (e g , difficulty breathing)  Before seeking care, call your healthcare provider and tell them that you have, or are being evaluated for, COVID-19  Put on a facemask before you enter the facility  These steps will help the healthcare providers office to keep other people in the office or waiting room from getting infected or exposed  Ask your healthcare provider to call the local or [facility] health department  Persons who are placed under active monitoring or facilitated self-monitoring should follow instructions provided by their local health department or occupational health professionals, as appropriate  If you have a medical emergency and need to call 911, notify the dispatch personnel that you have, or are being evaluated for COVID-19  If possible, put on a facemask before emergency medical services arrive      Discontinuing home isolation    Patients with confirmed COVID-19 should remain under home isolation precautions until the following conditions are met:   - They have had no fever for at least 24 hours (that is one full day of no fever without the use medicine that reduces fevers)  AND  - other symptoms have improved (for example, when their cough or shortness of breath have improved)  AND  - If had mild or moderate illness, at least 10 days have passed since their symptoms first appeared or if severe illness (needed oxygen) or immunosuppressed, at least 20 days have passed since symptoms first appeared  Patients with confirmed COVID-19 should also notify close contacts (including their workplace) and ask that they self-quarantine  Currently, close contact is defined as being within 6 feet for 15 minutes or more from the period 24 hours starting 48 hours before symptom onset to the time at which the patient went into isolation  Close contacts of patients diagnosed with COVID-19 should be instructed by the patient to self-quarantine for 14 days from the last time of their last contact with the patient  Source: [employer]  Viral Syndrome   WHAT YOU NEED TO KNOW:   Viral syndrome is a term used for symptoms of an infection caused by a virus  Viruses are spread easily from person to person through the air and on shared items  DISCHARGE INSTRUCTIONS:   Call your local emergency number (911 in the [address] [place] [address],[address]) or have someone else call if:   · You have a seizure  · You cannot be woken  · You have chest pain or trouble breathing  Return to the emergency department if:   · You have a stiff neck, a bad headache, and sensitivity to light  · You feel weak, dizzy, or confused  · You stop urinating or urinate a lot less than usual     · You cough up blood or thick yellow or green mucus  · You have severe abdominal pain or your abdomen is larger than usual     Call your doctor if:   · Your symptoms do not get better with treatment or get worse after 3 days  · You have a rash or ear pain  · You have burning when you urinate  · You have questions or concerns about your condition or care  Medicines: You may  need any of the following:  · Acetaminophen  decreases pain and fever  It is available without a doctor's order  Ask how much to take and how often to take it  Follow directions  Read the labels of all other medicines you are using to see if they also contain acetaminophen, or ask your doctor or pharmacist  Acetaminophen can cause liver damage if not taken correctly  Do not use more than 4 grams (4,000 milligrams) total of acetaminophen in one day  · NSAIDs , such as ibuprofen, help decrease swelling, pain, and fever  NSAIDs can cause stomach bleeding or kidney problems in certain people  If you take blood thinner medicine, always ask your healthcare provider if NSAIDs are safe for you  Always read the medicine label and follow directions  · Cold medicine  helps decrease swelling, control a cough, and relieve chest or nasal congestion  · Saline nasal spray  helps decrease nasal congestion  · Take your medicine as directed  Contact your healthcare provider if you think your medicine is not helping or if you have side effects  Tell him of her if you are allergic to any medicine  Keep a list of the medicines, vitamins, and herbs you take  Include the amounts, and when and why you take them  Bring the list or the pill bottles to follow-up visits  Carry your medicine list with you in case of an emergency  Manage your symptoms:   · Drink liquids as directed to prevent dehydration  Ask how much liquid to drink each day and which liquids are best for you  Ask if you should drink an oral rehydration solution (ORS)  An ORS has the right amounts of water, salts, and sugar you need to replace body fluids  This may help prevent dehydration caused by vomiting or diarrhea  Do not drink liquids with caffeine  Liquids with caffeine can make dehydration worse  · Get plenty of rest to help your body heal   Take naps throughout the day  Ask your healthcare provider when you can return to work and your normal activities  · Use a cool mist humidifier to help you breathe easier  Ask your healthcare provider how to use a cool mist humidifier  · Eat honey or use cough drops for a sore throat  Cough drops are available without a doctor's order  Follow directions for taking cough drops  · Do not smoke or be close to anyone who is smoking  Nicotine and other chemicals in cigarettes and cigars can cause lung damage  Smoking can also delay healing   Ask your healthcare provider for information if you currently smoke and need help to quit  E-cigarettes or smokeless tobacco still contain nicotine  Talk to your healthcare provider before you use these products  Prevent the spread of germs:       · Wash your hands often  Wash your hands several times each day  Wash after you use the bathroom, change a child's diaper, and before you prepare or eat food  Use soap and water every time  Rub your soapy hands together, lacing your fingers  Wash the front and back of your hands, and in between your fingers  Use the fingers of one hand to scrub under the fingernails of the other hand  Wash for at least 20 seconds  Rinse with warm, running water for several seconds  Then dry your hands with a clean towel or paper towel  Use hand  that contains alcohol if soap and water are not available  Do not touch your eyes, nose, or mouth without washing your hands first          · Cover a sneeze or cough  Use a tissue that covers your mouth and nose  Throw the tissue away in a trash can right away  Use the bend of your arm if a tissue is not available  Wash your hands well with soap and water or use a hand   · Stay away from others while you are sick  Avoid crowds as much as possible  · Ask about vaccines you may need  Talk to your healthcare provider about your vaccine history  He or she will tell you which vaccines you need, and when to get them  ? Get the influenza (flu) vaccine as soon as recommended each year  The flu vaccine is available starting in September or October  Flu viruses change, so it is important to get a flu vaccine every year  ? Get the pneumonia vaccine if recommended  This vaccine is usually recommended every 5 years  Your provider will tell you when to get this vaccine, if needed  Follow up with your doctor as directed:  Write down your questions so you remember to ask them during your visits    © Copyright [employer] 2021 Information is for End User's use only and may not be sold, redistributed or otherwise used for commercial purposes  All illustrations and images included in CareNotes® are the copyrighted property of A D A M , Inc  or Negrita Remy  The above information is an  only  It is not intended as medical advice for individual conditions or treatments  Talk to your doctor, nurse or pharmacist before following any medical regimen to see if it is safe and effective for you  Shingles   WHAT YOU NEED TO KNOW:   Shingles is a painful rash  Shingles is caused by the same virus that causes chickenpox (varicella-zoster)  After you get chickenpox, the virus stays in your body for several years without causing any symptoms  Shingles occurs when the virus becomes active again  The active virus travels along a nerve to your skin and causes a rash  DISCHARGE INSTRUCTIONS:   Call your local emergency number (911 in the [address] [place] [address],[address]) if:   · You have trouble moving your arms, legs, or face  · You become confused, or have difficulty speaking  · You have a seizure  Return to the emergency department if:   · You have weakness in an arm or leg  · You have dizziness, a severe headache, or hearing or vision loss  · You have painful, red, warm skin around the blisters, or the blisters drain pus  · Your neck is stiff or you have trouble moving it  Call your doctor if:   · You feel weak or have a headache  · You have a cough, chills, or a fever  · You have abdominal pain or nausea, or you are vomiting  · Your rash becomes more itchy or painful  · Your rash spreads to other parts of your body  · Your pain worsens and does not go away even after you take medicine  · You have questions or concerns about your condition or care  Medicines: You may need any of the following:  · Antiviral medicine  helps decrease symptoms and healing time   They may also decrease your risk of developing nerve pain  You will need to start taking them within 3 days of the start of symptoms to prevent nerve pain  · Prescription pain medicine  may be given  Ask your healthcare provider how to take this medicine safely  Some prescription pain medicines contain acetaminophen  Do not take other medicines that contain acetaminophen without talking to your healthcare provider  Too much acetaminophen may cause liver damage  Prescription pain medicine may cause constipation  Ask your healthcare provider how to prevent or treat constipation  · Acetaminophen  decreases pain and fever  It is available without a doctor's order  Ask how much to take and how often to take it  Follow directions  Read the labels of all other medicines you are using to see if they also contain acetaminophen, or ask your doctor or pharmacist  Acetaminophen can cause liver damage if not taken correctly  Do not use more than 4 grams (4,000 milligrams) total of acetaminophen in one day  · NSAIDs , such as ibuprofen, help decrease swelling, pain, and fever  This medicine is available with or without a doctor's order  NSAIDs can cause stomach bleeding or kidney problems in certain people  If you take blood thinner medicine, always ask if NSAIDs are safe for you  Always read the medicine label and follow directions  Do not give these medicines to children under 10months of age without direction from your child's healthcare provider  · Topical anesthetics  are used to numb the skin and decrease pain  They can be a cream, gel, spray, or patch  · Anticonvulsants  decrease nerve pain and may help you sleep at night  · Antidepressants  may be used to decrease nerve pain  · Take your medicine as directed  Contact your healthcare provider if you think your medicine is not helping or if you have side effects  Tell him of her if you are allergic to any medicine  Keep a list of the medicines, vitamins, and herbs you take   Include the amounts, and when and why you take them  Bring the list or the pill bottles to follow-up visits  Carry your medicine list with you in case of an emergency  Self-care:  Keep your rash clean and dry  Cover your rash with a bandage or clothing  Do not use bandages that stick to your skin  The sticky part may irritate your skin and make your rash last longer  Prevent the spread of germs:       · Wash your hands often  Wash your hands several times each day  Wash after you use the bathroom, change a child's diaper, and before you prepare or eat food  Use soap and water every time  Rub your soapy hands together, lacing your fingers  Wash the front and back of your hands, and in between your fingers  Use the fingers of one hand to scrub under the fingernails of the other hand  Wash for at least 20 seconds  Rinse with warm, running water for several seconds  Then dry your hands with a clean towel or paper towel  Use hand  that contains alcohol if soap and water are not available  Do not touch your eyes, nose, or mouth without washing your hands first          · Cover a sneeze or cough  Use a tissue that covers your mouth and nose  Throw the tissue away in a trash can right away  Use the bend of your arm if a tissue is not available  Wash your hands well with soap and water or use a hand   · Stay away from others while you are sick  Avoid crowds as much as possible  · Ask about vaccines you may need  Talk to your healthcare provider about your vaccine history  He or she will tell you which vaccines you need, and when to get them  Prevent shingles or another shingles outbreak:  A vaccine may be given to help prevent shingles  You can get the vaccine even if you already had shingles  The vaccine can help prevent a future outbreak  If you do get shingles again, the vaccine can keep it from becoming severe  The vaccine comes in 2 forms  Your healthcare provider will tell you which form is right for you   The decision is based on your age and any medical conditions you have  A 2-dose vaccine is usually given to adults 48 years or older  A 1-dose vaccine may be given to adults 61 years or older  Follow up with your doctor as directed:  Write down your questions so you remember to ask them during your visits  For more information:   · Centers for Disease Control and Prevention  [address] [place] , [address]  Phone: 5- 044 - [phone]  Phone: 3- [phone] - [phone]  Web Address: [employer]     © [address] [place] [address] 2021 Information is for End User's use only and may not be sold, redistributed or otherwise used for commercial purposes  All illustrations and images included in CareNotes® are the copyrighted property of A D A M , Inc  or [facility][note 3] Chris Javier   The above information is an  only  It is not intended as medical advice for individual conditions or treatments  Talk to your doctor, nurse or pharmacist before following any medical regimen to see if it is safe and effective for you

## 2024-09-06 NOTE — PROGRESS NOTES
ANTICOAGULATION  MANAGEMENT-Home Monitor Managed by Exception    Manasa French 79 year old female is on warfarin with therapeutic INR result. (Goal INR 2.0-3.0)    Recent labs: (last 7 days)     09/06/24  0000   INR 2.4       Previous INR was Therapeutic  Medication, diet, health changes since last INR:chart reviewed; none identified  Of note patient was seen in ED on 8/31/24 for fall, she was given tylenol for pain. No effect on INR 7 days out.  Contacted within the last 12 weeks by phone on 8/23/24  Due for next call no later than: 11/21/24.   Last [facility] referral date: 11/26/2023      PLAN     Manasa was NOT contacted regarding therapeutic result today per home monitoring policy manage by exception agreement.   Current warfarin dose is to be continued:     Summary  As of 9/6/2024      Full warfarin instructions:  7.5 mg every Mon, Wed, Fri; 5 mg all other days   Next INR check:  9/13/2024             ?   Ember Amezcua RN  Anticoagulation Clinic  9/6/2024    _______________________________________________________________________     Anticoagulation Episode Summary       Current INR goal:  2.0-3.0   TTR:  88.8% (1 y)   Target end date:  Indefinite   Send INR reminders to:  [facility] MONITOR[facility]    Indications    Acute deep vein thrombosis (DVT) of axillary vein and internal jugular (H) (Resolved) [I82.A19]  Current use of long term anticoagulation (Resolved) [Z79.01]  Acute deep vein thrombosis (DVT) of axillary vein of right upper extremity (H) [I82.A11]  Personal history of DVT (deep vein thrombosis) [Z86.718]             Comments:  [employer] [facility] meter- Managed by exception             Anticoagulation Care Providers       Provider Role Specialty Phone number    Rema Braxton DO Referring Family Medicine [phone]    Jessica Anthony MD Referring Internal Medicine [phone]    Jana Giles MD Referring Internal Medicine [phone]    Christopher Smith MD Responsible Hematology  [phone]

## 2024-09-06 NOTE — TELEPHONE ENCOUNTER
Jana Giles MD P [facility]  A1c is within good range for a type 2 diabetic on medication  Lipids are also normal.

## 2024-09-09 ENCOUNTER — HOSPITAL ENCOUNTER (EMERGENCY)
Facility: CLINIC | Age: 79
Discharge: HOME | End: 2024-09-09
Attending: EMERGENCY MEDICINE | Admitting: EMERGENCY MEDICINE
Payer: MEDICARE

## 2024-09-09 ENCOUNTER — DOCUMENTATION ONLY (OUTPATIENT)
Dept: ANTICOAGULATION | Facility: CLINIC | Age: 79
End: 2024-09-09

## 2024-09-09 VITALS
DIASTOLIC BLOOD PRESSURE: 60 MMHG | TEMPERATURE: 97.9 F | HEART RATE: 79 BPM | OXYGEN SATURATION: 100 % | RESPIRATION RATE: 18 BRPM | SYSTOLIC BLOOD PRESSURE: 155 MMHG

## 2024-09-09 DIAGNOSIS — T14.8XXA HEMATOMA OF SKIN: ICD-10-CM
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
## 2024-09-09 LAB
BASOPHILS # BLD AUTO: 0 10E3/UL (ref 0–0.2)
BASOPHILS NFR BLD AUTO: 0 %
EOSINOPHIL # BLD AUTO: 0.2 10E3/UL (ref 0–0.7)
EOSINOPHIL NFR BLD AUTO: 3 %
ERYTHROCYTE [DISTWIDTH] IN BLOOD BY AUTOMATED COUNT: 15 % (ref 10–15)
HCT VFR BLD AUTO: 28.7 % (ref 35–47)
HGB BLD-MCNC: 9 G/DL (ref 11.7–15.7)
HOLD SPECIMEN: NORMAL
HOLD SPECIMEN: NORMAL
IMM GRANULOCYTES # BLD: 0 10E3/UL
IMM GRANULOCYTES NFR BLD: 0 %
LYMPHOCYTES # BLD AUTO: 1.6 10E3/UL (ref 0.8–5.3)
LYMPHOCYTES NFR BLD AUTO: 25 %
MCH RBC QN AUTO: 30.2 PG (ref 26.5–33)
MCHC RBC AUTO-ENTMCNC: 31.4 G/DL (ref 31.5–36.5)
MCV RBC AUTO: 96 FL (ref 78–100)
MONOCYTES # BLD AUTO: 0.7 10E3/UL (ref 0–1.3)
MONOCYTES NFR BLD AUTO: 11 %
NEUTROPHILS # BLD AUTO: 3.9 10E3/UL (ref 1.6–8.3)
NEUTROPHILS NFR BLD AUTO: 61 %
NRBC # BLD AUTO: 0 10E3/UL
NRBC BLD AUTO-RTO: 0 /100
PLATELET # BLD AUTO: 241 10E3/UL (ref 150–450)
RBC # BLD AUTO: 2.98 10E6/UL (ref 3.8–5.2)
WBC # BLD AUTO: 6.4 10E3/UL (ref 4–11)

## 2024-09-09 PROCEDURE — 85025 COMPLETE CBC W/AUTO DIFF WBC: CPT | Performed by: EMERGENCY MEDICINE

## 2024-09-09 PROCEDURE — 99283 EMERGENCY DEPT VISIT LOW MDM: CPT

## 2024-09-09 PROCEDURE — 36415 COLL VENOUS BLD VENIPUNCTURE: CPT | Performed by: EMERGENCY MEDICINE

## 2024-09-09 ASSESSMENT — ACTIVITIES OF DAILY LIVING (ADL)
ADLS_ACUITY_SCORE: 38

## 2024-09-09 NOTE — ED TRIAGE NOTES
[place] [employer] pt fell onto [place], hit head, and Right elbow during the fall. Pt was assessed here right after incident. Pt is still having pain in bottom, and is having a hard time sitting.

## 2024-09-09 NOTE — ED PROVIDER NOTES
Emergency Department Note      History of Present Illness     Chief Complaint   Fall      HPI   Manasa French is a 79 year old female who presents to the emergency department for left buttock wound.  Patient reports that she had a fall about a week and a half ago.  She had x-rays of her buttocks, and head CT.  These were unremarkable.  She notes a very small bruise initially and this has grown and is spread down her leg.  She denies any fevers, chills, chest pain, shortness of breath.  She is concerned as she feels that the area feels more firm.  The bruise does appear to be lightening.  She checks her INR as they have been 2-2.4.  She is on warfarin daily.  Denies any loss of bowel or bladder function, pain etc.    Independent Historian   None    Review of External Notes   Reviewed primary care note from 9/4/2024; reviewed ER visit as well as imaging from 8/31/2024    Past Medical History     Medical History and Problem List   Past Medical History:   Diagnosis Date    Benign essential hypertension     Dislocation of right hip (H) 05/2024    History of Graves' disease     History of hydrocephalus     Macular degeneration of both eyes     Osteoporosis     Personal history of DVT (deep vein thrombosis) 2019    Postablative hypothyroidism     Primary open angle glaucoma (POAG) of both eyes     Pseudophakia     Sensorineural hearing loss, bilateral        Medications   acetaminophen (TYLENOL) 325 MG tablet  albuterol (PROVENTIL HFA) 108 (90 Base) MCG/ACT inhaler  amoxicillin (AMOXIL) 500 MG tablet  calcium carbonate-vitamin D (CALTRATE) 600-10 MG-MCG per tablet  carboxymethylcellulose (CELLUVISC/REFRESH LIQUIGEL) 1 % ophthalmic solution  latanoprost (XALATAN) 0.005 % ophthalmic solution  levothyroxine (SYNTHROID/LEVOTHROID) 112 MCG tablet  losartan (COZAAR) 100 MG tablet  metFORMIN (GLUCOPHAGE XR) 500 MG 24 hr tablet  Multiple Vitamins-Minerals (ICAPS AREDS 2 PO)  timolol maleate (TIMOPTIC) 0.5 % ophthalmic  solution  vitamin B-12 (CYANOCOBALAMIN) 1000 MCG tablet  Vitamin D3 (CHOLECALCIFEROL) 25 mcg (1000 units) tablet  Vitamin K 100 MCG TABS  warfarin ANTICOAGULANT (COUMADIN) 5 MG tablet        Surgical History   Past Surgical History:   Procedure Laterality Date    ARTHROPLASTY HIP Right 5/30/2023    Procedure: Right Total Hip Arthroplasty;  Surgeon: Vasu Mixon MD;  Location:  OR    ARTHROSCOPY SHOULDER Left     CATARACT IOL, RT/LT Bilateral     DECOMPRESSION, FUSION LUMBAR POSTERIOR ONE LEVEL, COMBINED      L5-S1    EXCISE GANGLION WRIST Right     HERNIA REPAIR, UMBILICAL      LUMPECTOMY BREAST Left     benign    RECESSION RESECTION (REPAIR STRABISMUS) BILATERAL Bilateral 12/11/2023    Procedure: Bilateral eye muscle correction.;  Surgeon: Antwan Ardon MD;  Location: [facility] OR       Physical Exam     Patient Vitals for the past 24 hrs:   BP Temp Temp src Pulse Resp SpO2   09/09/24 1325 (!) 155/60 97.9  F (36.6  C) Temporal 79 18 100 %     Physical Exam  General: Resting on the bed.  Head: No obvious trauma to head.  Ears, Nose, Throat:  External ears normal.  Nose normal.    Eyes:  Conjunctivae clear.  Pupils are equal, round, and reactive.   Neck: Normal range of motion.  Neck supple.   CV: Regular rate and rhythm.  No murmurs.      Respiratory: Effort normal and breath sounds normal.  No wheezing or crackles.   Gastrointestinal: Soft.  No distension. There is no tenderness  Musculoskeletal: No bony tenderness to palpation, stable pelvis.  Dorsi and plantarflexion 5 out of 5.  2+ DP pulses.  Sensation intact to light touch.  Neuro: Alert. Moving all extremities appropriately.  Normal speech.    Skin: Skin is warm and dry.  Large hematoma noted to the left buttock extending down the left posterior thigh.    Diagnostics     Lab Results   Labs Ordered and Resulted from Time of ED Arrival to Time of ED Departure   CBC WITH PLATELETS AND DIFFERENTIAL - Abnormal       Result Value    WBC Count 6.4       RBC Count 2.98 (*)     Hemoglobin 9.0 (*)     Hematocrit 28.7 (*)     MCV 96      MCH 30.2      MCHC 31.4 (*)     RDW 15.0      Platelet Count 241      % Neutrophils 61      % Lymphocytes 25      % Monocytes 11      % Eosinophils 3      % Basophils 0      % Immature Granulocytes 0      NRBCs per 100 WBC 0      Absolute Neutrophils 3.9      Absolute Lymphocytes 1.6      Absolute Monocytes 0.7      Absolute Eosinophils 0.2      Absolute Basophils 0.0      Absolute Immature Granulocytes 0.0      Absolute NRBCs 0.0         Imaging   No orders to display       Independent Interpretation   None    ED Course      Medications Administered   Medications - No data to display    Procedures   Procedures     Discussion of Management   None    ED Course   ED Course as of 09/09/24 2257   Mon Sep 09, 2024   1803 Called patient to review labs.       Additional Documentation  None    Medical Decision Making / Diagnosis     CMS Diagnoses: None    MIPS       None    MDM   Manasa French is a 79 year old female who presents with the emergency department for left buttock pain.  Vital signs are reassuring.  Broad differential was considered including not limited to active bleed, hematoma, cellulitis, abscess, fracture, dislocation, sprain strain, neurovascular injury, etc.  Patient is well-appearing nontoxic.  Neurovascular intact distal.  Able to ambulate without difficulty.  My suspicion for occult fracture not visualized on recent x-ray is low.  I do not think that additional boning imaging is necessary but rather that this is likely a soft tissue injury or contusion or hematoma.  On examination there is a large hematoma on the left posterior buttocks as well as a left posterior thigh.  Patient is anticoagulated reports therapeutic INRs recently.  My suspicion for DVT is quite low with therapeutic INRs.  There is no evidence of cellulitis or abscess on exam, no fevers or chills, no other infectious signs or symptoms.  This has  been a slow process over the last week or so.  No significant change in size or discomfort but gradual worsening.  My suspicion for an active arterial bleed is quite low but I have offered CT imaging.  At this point patient does not wish to pursue additional imaging but feels comfortable watching this closely at home.  I have offered a CBC and patient is agreeable.  Hemoglobin is 9 which is fairly stable from more recent checks.  No indication for INR reversal or transfusion at this time.  Rather supportive care is indicated.  I have called patient with results as she does not wish to stay for the CBC.  Her  voiced understanding of the CBC result.  We have given return precaution including worsening pain, fevers or chills, unable to walk etc.  Patient is agreeable and was discharged home.    Disposition   The patient was discharged.     Diagnosis     ICD-10-CM    1. Fall, initial encounter  W19.XXXA       2. Hematoma of skin  T14.8XXA            Discharge Medications   Discharge Medication List as of 9/9/2024  4:03 PM            MD Honorio Velasquez Jennifer L, MD  09/09/24 1315

## 2024-09-09 NOTE — TELEPHONE ENCOUNTER
Per protocol, pt needs to be seen by a PCP or team member today. There was no appt available so RN referred pt to urgent care. Pt verbalized understanding and has no further questions or concerns    Tony Bazan RN  [facility]
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
## 2024-09-09 NOTE — PROGRESS NOTES
ANTICOAGULATION  MANAGEMENT: Discharge Review    Manasa CORTEZ French chart reviewed for anticoagulation continuity of care    Emergency room visit on 09/09/2024 for fall, hematoma of skin.    Discharge disposition: Home    Results:    Recent labs: (last 7 days)     09/06/24  0000   INR 2.4     Anticoagulation inpatient management:     not applicable     Anticoagulation discharge instructions:     Warfarin dosing: home regimen continued   Bridging: No   INR goal change: No      Medication changes affecting anticoagulation: No    Additional factors affecting anticoagulation: No     PLAN     No adjustment to anticoagulation plan needed    Patient not contacted    No adjustment to Anticoagulation Calendar was required    Shelby Kate RN  9/9/2024  Anticoagulation Clinic  [facility] for routing messages: [facility]  [facility] patient phone line: [phone]

## 2024-09-09 NOTE — DISCHARGE INSTRUCTIONS
Please ice your buttocks as well as elevate your leg.  It is imperative that you follow-up with your primary doctor in the next several days to ensure this wound is improving.  As we discussed in the ER we will look out for your CBC you can also check this on your MyChart.  If your hemoglobin is low you will be called back to the ER for further evaluation.

## 2024-09-10 ENCOUNTER — PATIENT OUTREACH (OUTPATIENT)
Dept: CARE COORDINATION | Facility: CLINIC | Age: 79
End: 2024-09-10
Payer: MEDICARE

## 2024-09-10 ENCOUNTER — TELEPHONE (OUTPATIENT)
Dept: FAMILY MEDICINE | Facility: CLINIC | Age: 79
End: 2024-09-10
Payer: MEDICARE

## 2024-09-10 NOTE — PROGRESS NOTES
Clinic Care Coordination Contact  Community Health Worker Initial Outreach    CHW Initial Information Gathering:  Referral Source: ED Follow-Up  Current living arrangement:: Not Assessed  CHW Additional Questions  If ED/Hospital discharge, follow-up appointment scheduled as recommended?: N/A (Pt requested support with scheduling an follow up appoinment with PCP. CHW transfered Pt via Back Door Scheduling Line (Hospital Discharge Appointment))  Medication changes made following ED/Hospital discharge?: No  MyChart active?: No  Patient agreeable to assistance with activating MyChart?: No    Patient accepts CC: No, Patient expressed no additional support is needed at this time. Unable to send care coordination introduction letter since MyChart is not active.  Clinic Care Coordination services remain available via referral if needed.    Mary Jane Scott  Community Health Worker    Connected Care Resources   [facility]     *Connected Care Resources Team does NOT   follow patient ongoing. Referrals are identified   based on internal discharge report and the outreach is to ensure   Patient has understanding of their discharge instructions.
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
## 2024-09-10 NOTE — TELEPHONE ENCOUNTER
Has appt with Jaqueline tomorrow  11th for ER followup 8- and  9-9-2024.      Did not discuss at 9-4-24 due to split billing (I roomed pt).       Sakshi FRANK MA

## 2024-09-11 ENCOUNTER — OFFICE VISIT (OUTPATIENT)
Dept: FAMILY MEDICINE | Facility: CLINIC | Age: 79
End: 2024-09-11
Payer: MEDICARE

## 2024-09-11 ENCOUNTER — TELEPHONE (OUTPATIENT)
Dept: FAMILY MEDICINE | Facility: CLINIC | Age: 79
End: 2024-09-11

## 2024-09-11 VITALS
WEIGHT: 151 LBS | HEIGHT: 64 IN | TEMPERATURE: 96.9 F | DIASTOLIC BLOOD PRESSURE: 73 MMHG | OXYGEN SATURATION: 99 % | BODY MASS INDEX: 25.78 KG/M2 | SYSTOLIC BLOOD PRESSURE: 144 MMHG | RESPIRATION RATE: 18 BRPM | HEART RATE: 74 BPM
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
## 2024-09-11 DIAGNOSIS — D64.9 ANEMIA, UNSPECIFIED TYPE: ICD-10-CM

## 2024-09-11 DIAGNOSIS — T14.8XXA HEMATOMA OF SKIN: Primary | ICD-10-CM

## 2024-09-11 LAB — HGB BLD-MCNC: 9.5 G/DL (ref 11.7–15.7)

## 2024-09-11 PROCEDURE — 85018 HEMOGLOBIN: CPT | Performed by: PHYSICIAN ASSISTANT

## 2024-09-11 PROCEDURE — 99214 OFFICE O/P EST MOD 30 MIN: CPT | Performed by: PHYSICIAN ASSISTANT

## 2024-09-11 PROCEDURE — 36415 COLL VENOUS BLD VENIPUNCTURE: CPT | Performed by: PHYSICIAN ASSISTANT

## 2024-09-11 ASSESSMENT — PAIN SCALES - GENERAL: PAINLEVEL: SEVERE PAIN (6)
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
## 2024-09-11 NOTE — RESULT ENCOUNTER NOTE
Please call pt and let her know her hemoglobin has improved to 9.5 so is moving in the right direction

## 2024-09-11 NOTE — TELEPHONE ENCOUNTER
Writer contacted patient and relayed provider message below~  Patient expressed understanding.    Reference message:

## 2024-09-11 NOTE — NURSING NOTE
"BP:  BP (!) 144/73   Pulse 74   Temp 96.9  F (36.1  C) (Temporal)   Resp 18   Ht 1.626 m (5' 4\")   Wt 68.5 kg (151 lb)   LMP  (LMP Unknown)   SpO2 99%   BMI 25.92 kg/m       74  Disposition: provider notified while patient in the clinic   "

## 2024-09-19 ENCOUNTER — THERAPY VISIT (OUTPATIENT)
Dept: PHYSICAL THERAPY | Facility: CLINIC | Age: 79
End: 2024-09-19
Attending: PSYCHIATRY & NEUROLOGY
Payer: MEDICARE

## 2024-09-19 DIAGNOSIS — R29.818 DIFFICULTY BALANCING: ICD-10-CM

## 2024-09-19 DIAGNOSIS — R26.89 BALANCE PROBLEMS: Primary | ICD-10-CM

## 2024-09-19 DIAGNOSIS — H81.12 BPPV (BENIGN PAROXYSMAL POSITIONAL VERTIGO), LEFT: ICD-10-CM

## 2024-09-19 PROCEDURE — 97162 PT EVAL MOD COMPLEX 30 MIN: CPT | Mod: GP | Performed by: PHYSICAL THERAPIST

## 2024-09-19 PROCEDURE — 95992 CANALITH REPOSITIONING PROC: CPT | Mod: GP | Performed by: PHYSICAL THERAPIST

## 2024-09-19 NOTE — PROGRESS NOTES
PHYSICAL THERAPY EVALUATION  Type of Visit: Evaluation              Subjective       Presenting condition or subjective complaint:  Per chart review: pt is 79 year old right handed female patient with past medical history of hydrocephalus, status post  shunt placement (1987, nonprogrammable), lumbar spinal fusion, DVT of axillary vein of the right upper extremity, diabetes mellitus type 2, B12 deficiency, hypothyroidism, and syncope. Presented to neurologist with history of bouts of dizziness and instability. On exam, MD noted positive L DH.   Date of onset: 08/28/24    Relevant medical history:     Past Medical History:   Diagnosis Date    Benign essential hypertension     Dislocation of right hip (H) 05/2024    History of Graves' disease     s/p BARAHONA    History of hydrocephalus     s/p  shunt placement in 1987    Macular degeneration of both eyes     Osteoporosis     Personal history of DVT (deep vein thrombosis) 2019    RUE; on chronic AC    Postablative hypothyroidism     Primary open angle glaucoma (POAG) of both eyes     right eye moderate, left eye mild    Pseudophakia     Sensorineural hearing loss, bilateral        Dates & types of surgery:      Prior diagnostic imaging/testing results: CT scan     Prior therapy history for the same diagnosis, illness or injury:        Prior Level of Function  Transfers: Assistive equipment  Ambulation: Assistive equipment      Living Environment  Social support: With a significant other or spouse   Type of home: Apartment/condo   Stairs to enter the home:         Ramp: No   Stairs inside the home:         Help at home: None  Equipment owned: Straight Cane; Walker; Walker with wheels; Grab bars; Raised toilet seat     Employment:      Hobbies/Interests:      Patient goals for therapy:  Have dizziness assessed    Pain assessment: R hip discomfort, L thumb pain     Objective   Cognitive Status Examination  Orientation: Oriented to person, place and time   Level of  Consciousness: Alert    OBSERVATION: presents with spouse and 4ww  BED MOBILITY: Independent    TRANSFERS: Independent    GAIT:   Gait Description: ambulates with 4ww    BALANCE: Pt wanting to focus on MD results today. Not interested in balance interventions at this time.      VESTIBULAR  Cervicogenic Screen    Neck ROM Limited extension and L rotation     Oculomotor Screen    Ocular ROM Abnormal   Smooth Pursuit Abnormal   Saccades Abnormal   VOR Abnormal   VOR Cancellation Abnormal           Central signs present during evaluation     Infrared Goggle Exam Vestibular Suppressant in Last 24 Hours? No  Exam Completed With: Infrared goggles   Spontaneous Nystagmus Downbeating   Gaze Evoked Nystagmus Downbeating            Left Right   [place] Upbeating R torsional lasting 5 seconds, mild dizziness Downbeating   HSCC Supine Roll Test Downbeating Downbeating           BPPV Canal(s): L Posterior  BPPV Type: Canalithasis          Assessment & Plan   CLINICAL IMPRESSIONS  Medical Diagnosis: BPPV (benign paroxysmal positional vertigo), left  Difficulty balancing    Treatment Diagnosis: Instability   Impression/Assessment: Patient is a 79 year old female with instability and mild dizziness complaints.  The following significant findings have been identified: Pain, Decreased strength, Impaired balance, Impaired gait, Instability, and Dizziness. These impairments interfere with their ability to perform self care tasks, household mobility, and community mobility as compared to previous level of function.     Clinical Decision Making (Complexity):  Clinical Presentation: Evolving/Changing  Clinical Presentation Rationale: based on medical and personal factors listed in PT evaluation  Clinical Decision Making (Complexity): Moderate complexity    PLAN OF CARE  Treatment Interventions:  Interventions: Gait Training, Neuromuscular Re-education, Therapeutic Activity, Therapeutic Exercise, Canalith Repositioning    Long Term  Goals     PT Goal 1  Goal Identifier: Positional testing  Goal Description: The pt will test negative with positional testing indicating a reduction in dizziness and fall risk  Target Date: 11/17/24      Frequency of Treatment: 1x/week  Duration of Treatment: 60 days      Education Assessment:   Learner/Method: Patient  Education Comments: PT role, POC    Risks and benefits of evaluation/treatment have been explained.   Patient/Family/caregiver agrees with Plan of Care.     Evaluation Time:     PT Eval, Moderate Complexity Minutes (12483): 20       Signing Clinician: Michelle Hernandez, VERONICA        [facility]                                                                                   OUTPATIENT PHYSICAL THERAPY      PLAN OF TREATMENT FOR OUTPATIENT REHABILITATION   Patient's Last Name, First Name, BRIANManasa Fernandez YOB: 1945   Provider's Name   [facility]   Medical Record No.  [id number]     Onset Date: 08/28/24  Start of Care Date: 09/19/24     Medical Diagnosis:  BPPV (benign paroxysmal positional vertigo), left  Difficulty balancing      PT Treatment Diagnosis:  Instability Plan of Treatment  Frequency/Duration: 1x/week/ 60 days    Certification date from 09/19/24 to 11/17/24         See note for plan of treatment details and functional goals     Michelle Hernandez, PT                         I CERTIFY THE NEED FOR THESE SERVICES FURNISHED UNDER        THIS PLAN OF TREATMENT AND WHILE UNDER MY CARE     (Physician attestation of this document indicates review and certification of the therapy plan).              Referring Provider:  Augustus Christie    Initial Assessment  See Epic Evaluation- Start of Care Date: 09/19/24

## 2024-09-20 ENCOUNTER — ANTICOAGULATION THERAPY VISIT (OUTPATIENT)
Dept: ANTICOAGULATION | Facility: CLINIC | Age: 79
End: 2024-09-20
Payer: MEDICARE

## 2024-09-20 ENCOUNTER — TELEPHONE (OUTPATIENT)
Dept: ANTICOAGULATION | Facility: CLINIC | Age: 79
End: 2024-09-20
Payer: MEDICARE

## 2024-09-20 DIAGNOSIS — Z86.718 PERSONAL HISTORY OF DVT (DEEP VEIN THROMBOSIS): ICD-10-CM

## 2024-09-20 DIAGNOSIS — I82.A11 ACUTE DEEP VEIN THROMBOSIS (DVT) OF AXILLARY VEIN OF RIGHT UPPER EXTREMITY (H): Primary | ICD-10-CM

## 2024-09-20 LAB — INR HOME MONITORING: 2.3 (ref 2–3)

## 2024-09-20 NOTE — TELEPHONE ENCOUNTER
ANTICOAGULATION     Manasa French is overdue for an INR check.     Spoke with Rhonda instructed to test INR with home meter and call results to home monitoring company as soon as possible.    See Anticoagulation clinic encounter.    Ember Amezcua RN  9/20/2024  Anticoagulation Clinic  [facility] for routing messages: [facility] [employer]  [facility] patient phone line: [phone]

## 2024-09-20 NOTE — PROGRESS NOTES
ANTICOAGULATION MANAGEMENT     Manasa French 79 year old female is on warfarin with therapeutic INR result. (Goal INR 2.0-3.0)    Recent labs: (last 7 days)     09/20/24  0000   INR 2.3       ASSESSMENT     Source(s): Chart Review and Patient/Caregiver Call     Warfarin doses taken: Warfarin taken as instructed  Diet: No new diet changes identified  Medication/supplement changes:  Takes tylenol at bedtime  New illness, injury, or hospitalization: Yes: Ed on 9/9/24 for fall, CT scan and bruising on bottom, patient reports this is yellowing.  Signs or symptoms of bleeding or clotting: No  Previous result: Therapeutic last 2(+) visits  Additional findings: None       PLAN     Recommended plan for temporary change(s) affecting INR     Dosing Instructions: Continue your current warfarin dose with next INR in 2 weeks       Summary  As of 9/20/2024      Full warfarin instructions:  7.5 mg every Mon, Wed, Fri; 5 mg all other days   Next INR check:  10/4/2024               Telephone call with Rhonda who verbalizes understanding and agrees to plan    Patient to recheck with home meter    Education provided: Please call back if any changes to your diet, medications or how you've been taking warfarin  Resume manage by exception with home monitor. Continue to submit INR results to home monitor company.You will only be called when your result is out of range. Please call and notify [facility] if new medication started, dose missed, signs or symptoms of bleeding or clotting, or a surgery/procedure is scheduled. Due for next call no later than: 12/19/24.    Plan made per [facility] anticoagulation protocol    Ember Amezcua RN  9/20/2024  Anticoagulation Clinic  [facility] for routing messages: [facility] [employer]  [facility] patient phone line: [phone]        _______________________________________________________________________     Anticoagulation Episode Summary       Current INR goal:  2.0-3.0   TTR:  88.8% (1 y)   Target end date:   Indefinite   Send INR reminders to:  [facility]IT[facility]    Indications    Acute deep vein thrombosis (DVT) of axillary vein and internal jugular (H) (Resolved) [I82.A19]  Current use of long term anticoagulation (Resolved) [Z79.01]  Acute deep vein thrombosis (DVT) of axillary vein of right upper extremity (H) [I82.A11]  Personal history of DVT (deep vein thrombosis) [Z86.718]             Comments:  [employer] meter- Managed by exception             Anticoagulation Care Providers       Provider Role Specialty Phone number    Rema Braxton DO Referring Family Medicine [phone]    Jessica Anthony MD Referring Internal Medicine [phone]    Jana Giles MD Referring Internal Medicine [phone]    Christopher Smith MD Responsible Hematology [phone]

## 2024-10-04 ENCOUNTER — DOCUMENTATION ONLY (OUTPATIENT)
Dept: ANTICOAGULATION | Facility: CLINIC | Age: 79
End: 2024-10-04
Payer: MEDICARE

## 2024-10-04 DIAGNOSIS — Z86.718 PERSONAL HISTORY OF DVT (DEEP VEIN THROMBOSIS): ICD-10-CM

## 2024-10-04 DIAGNOSIS — I82.A11 ACUTE DEEP VEIN THROMBOSIS (DVT) OF AXILLARY VEIN OF RIGHT UPPER EXTREMITY (H): Primary | ICD-10-CM

## 2024-10-04 LAB — INR HOME MONITORING: 2.6 (ref 2–3)

## 2024-10-04 NOTE — PROGRESS NOTES
ANTICOAGULATION  MANAGEMENT-Home Monitor Managed by Exception    Manasa DIEGO Manolo 79 year old female is on warfarin with therapeutic INR result. (Goal INR 2.0-3.0)    Recent labs: (last 7 days)     10/04/24  0000   INR 2.6       Previous INR was Therapeutic  Medication, diet, health changes since last INR:chart reviewed; none identified  Contacted within the last 12 weeks by phone on 9/20/24   Due for next call no later than: 12/19/24.     Last [facility] referral date: 11/26/2023      AUBREY     Manasa was NOT contacted regarding therapeutic result today per home monitoring policy manage by exception agreement.   Current warfarin dose is to be continued:     Summary  As of 10/4/2024      Full warfarin instructions:  7.5 mg every Mon, Wed, Fri; 5 mg all other days   Next INR check:  10/18/2024             ?   Ember Amezcua RN  Anticoagulation Clinic  10/4/2024    _______________________________________________________________________     Anticoagulation Episode Summary       Current INR goal:  2.0-3.0   TTR:  88.8% (1 y)   Target end date:  Indefinite   Send INR reminders to:  [employer] [facility]    Indications    Acute deep vein thrombosis (DVT) of axillary vein and internal jugular (H) (Resolved) [I82.A19]  Current use of long term anticoagulation (Resolved) [Z79.01]  Acute deep vein thrombosis (DVT) of axillary vein of right upper extremity (H) [I82.A11]  Personal history of DVT (deep vein thrombosis) [Z86.718]             Comments:  [employer] meter- Managed by exception             Anticoagulation Care Providers       Provider Role Specialty Phone number    Rema Braxton DO Referring Family Medicine [phone]    Jessica Anthony MD Referring Internal Medicine [phone]    Jana Giles MD Referring Internal Medicine [phone]    Christopher Smith MD Responsible Hematology [phone]

## 2024-10-08 ENCOUNTER — TELEPHONE (OUTPATIENT)
Dept: ENDOCRINOLOGY | Facility: CLINIC | Age: 79
End: 2024-10-08
Payer: MEDICARE

## 2024-10-08 DIAGNOSIS — M80.08XD AGE-RELATED OSTEOPOR WITH CURR PATHOL FX OF VERTEBRA WITH ROUTINE HEAL: Primary | ICD-10-CM

## 2024-10-08 DIAGNOSIS — Z87.81 HISTORY OF VERTEBRAL FRACTURE: ICD-10-CM

## 2024-10-08 DIAGNOSIS — Z92.29 HISTORY OF BISPHOSPHONATE THERAPY: ICD-10-CM

## 2024-10-08 DIAGNOSIS — M80.00XD AGE-RELATED OSTEOPOROSIS WITH CURRENT PATHOLOGICAL FRACTURE WITH ROUTINE HEALING, SUBSEQUENT ENCOUNTER: ICD-10-CM

## 2024-10-08 NOTE — TELEPHONE ENCOUNTER
Patient has upcoming Prolia injection on 10/23/24. Patient needs updated labs prior to appointment. Orders for BMP and Vit D noted in chart from Dr Hernandez. Labs should be completed at least 48 hours prior to injection to allow time for results.     LVM for patient with the above information.    Silas Dong RN

## 2024-10-16 ENCOUNTER — ONCOLOGY VISIT (OUTPATIENT)
Dept: ONCOLOGY | Facility: CLINIC | Age: 79
End: 2024-10-16
Attending: PHYSICIAN ASSISTANT
Payer: MEDICARE

## 2024-10-16 ENCOUNTER — PRE VISIT (OUTPATIENT)
Dept: ONCOLOGY | Facility: CLINIC | Age: 79
End: 2024-10-16
Payer: MEDICARE

## 2024-10-16 VITALS
DIASTOLIC BLOOD PRESSURE: 72 MMHG | OXYGEN SATURATION: 99 % | RESPIRATION RATE: 16 BRPM | HEART RATE: 89 BPM | SYSTOLIC BLOOD PRESSURE: 165 MMHG | BODY MASS INDEX: 25.23 KG/M2 | WEIGHT: 147 LBS

## 2024-10-16 DIAGNOSIS — D64.9 ANEMIA, UNSPECIFIED TYPE: ICD-10-CM

## 2024-10-16 PROCEDURE — G0463 HOSPITAL OUTPT CLINIC VISIT: HCPCS | Performed by: INTERNAL MEDICINE

## 2024-10-16 PROCEDURE — 99204 OFFICE O/P NEW MOD 45 MIN: CPT | Performed by: INTERNAL MEDICINE

## 2024-10-16 PROCEDURE — G2211 COMPLEX E/M VISIT ADD ON: HCPCS | Performed by: INTERNAL MEDICINE

## 2024-10-16 NOTE — PATIENT INSTRUCTIONS
-Labs next week. (When she has other labs drawn)  -Schedule bone marrow biopsy. Patient on warfarin. OK to hold warfarin.  -See me after bone marrow biopsy.  -Reduce vitamin B12 to 500 mcg a day.

## 2024-10-16 NOTE — TELEPHONE ENCOUNTER
LVM attempt 2 for patient to schedule labs prior to Prolia appointment. If labs cannot be completed prior to Prolia then injection will need to be rescheduled.    Silas Dong RN

## 2024-10-16 NOTE — LETTER
10/16/2024      Manasa French  [address]  [facility] [place] [address]      Dear Colleague,    Thank you for referring your patient, Manasa French, to the [facility]. Please see a copy of my visit note below.    This consult has been requested by Clara Hemphill PA-C for anemia.    Mrs. French is an 79-year-old female with chronic anemia.  Anemia recently has gotten worse.  Investigations are reviewed and summarized below.  1.  On 08/31/2019, hemoglobin of 11.7.  -On 02/26/2019, hemoglobin of 11.5 with hematocrit of 36.5.  2.  On 05/20/2024:  -WBC of 7.6, hemoglobin of 9.9, platelet of 226.  MCV normal at 96.  -CMP normal except elevated creatinine of 1.36 and elevated urea nitrogen of 36.1.  3.  On 06/28/2024:  -Normal iron of 64.  -Normal ferritin of 71.  -Normal folate  -Elevated vitamin B12 of 2594.  -SPEP is normal    Patient denies bleeding from any site.  She is on warfarin.  She gets some easy bruising.  No black stool.    Patient has been getting more tired lately.  She is an active person.  She goes swimming few times a week.  She goes for walks.  She gets tired easily doing activities including swimming.  She has been sleeping more.    She gets occasional headache.  She gets occasional lightheadedness/dizziness.  No passing out.  No chest pain.  No shortness of breath at rest.  No abdominal pain.  No nausea or vomiting.  Appetite is good.  No urinary complaints.  No bowel problem.  No bleeding.  No fever or chills.    Allergies: Reviewed    Medications: Reviewed    Past medical history:  -DVT of right upper extremity in 2019.  Unprovoked.  Ultrasound on 02/26/2019 revealed DVT involving the right internal jugular vein along with the subclavian vein, axillary vein and central most portion of the brachial vein.  Patient was seen by Dr. Smith in hematology.  Indefinite anticoagulation was recommended.  She was on Eliquis.  Changed to warfarin because  of the cost.  -Hypertension  -Graves' disease  -Macular degeneration  -Osteoporosis  -Hypothyroidism  -Glaucoma  -Sensorineural hearing loss  -Umbilical hernia repair  -Lumbar back surgery  -Cataract surgery  -Arthroscopic shoulder surgery  -Chronic kidney disease.    Social history:  -No smoking  -No alcohol use.    Exam:  She is alert and oriented x 3.  Not in any distress.  Vitals: Reviewed  Rest of system not examined.    Labs: Reviewed.    Assessment:  1.  An 79-year-old female with chronic normocytic anemia.  Anemia is multifactorial from renal disease, anemia of chronic disease and likely myelodysplastic syndrome (given her age).  2.  Right upper extremity DVT in 02/2019.  Unprovoked.  3.  Other medical problems including hypertension, hypothyroidism and chronic kidney disease.    Recommendation:  -Labs next week.  -Schedule bone marrow biopsy. Patient on warfarin. OK to hold warfarin.  -See me after bone marrow biopsy.  -Reduce vitamin B12 to 500 mcg a day.    Discussion:  1.  Investigations were reviewed with the patient and her .  Patient has chronic normocytic anemia.  Recently it got worse.  Different causes of normocytic anemia reviewed.  Normocytic anemia can be seen with anemia of chronic disease, renal disease, blood loss or developing myelodysplastic syndrome.  Patient does have chronic kidney disease which is causing anemia.  She also has multiple medical problem which is causing anemia of chronic disease/inflammation.  Given her age she may have myelodysplastic syndrome.  Patient denies bleeding from any site.  Also her iron studies are normal.    Discussed with her regarding myelodysplastic syndrome.  Given her age, we need to rule that out.  Discussed regarding getting a bone marrow biopsy.  She is agreeable for it.  Procedure was explained.  That will be scheduled.    Complication of anemia reviewed.  Patient in fact is doing very well for her age.  She is pretty active and goes swimming  and for walks few times a week.  She does get tired more easily but is still able to do these things.  Worsening anemia can cause other problem including dizziness and shortness of breath.    2.  Patient had unprovoked right upper extremity DVT in 2019.  She was evaluated in hematology department.  She was recommended long-term anticoagulation.  She is on warfarin.  She is tolerating it well.  She will continue on it.  Okay to hold warfarin for bone marrow procedure.    3.  Patient is on vitamin B12 tablet.  Her vitamin B12 level is highly elevated.  Advised her to reduce it to 500 mcg a day.    4.  She and her  had few questions which were all answered.  I will see her after the bone marrow biopsy.    Thanks for the consult.    Total visit time of 45 minutes.  Time spent in today's visit, review of chart/investigations today and documentation today.      Again, thank you for allowing me to participate in the care of your patient.        Sincerely,        Libby Escamilla MD

## 2024-10-16 NOTE — LETTER
10/16/2024         RE: Manasa rFench  [address]  [facility] [address]      This consult has been requested by Clara Hemphlil PA-C for anemia.    Mrs. French is an 79-year-old female with chronic anemia.  Anemia recently has gotten worse.  Investigations are reviewed and summarized below.  1.  On 08/31/2019, hemoglobin of 11.7.  -On 02/26/2019, hemoglobin of 11.5 with hematocrit of 36.5.  2.  On 05/20/2024:  -WBC of 7.6, hemoglobin of 9.9, platelet of 226.  MCV normal at 96.  -CMP normal except elevated creatinine of 1.36 and elevated urea nitrogen of 36.1.  3.  On 06/28/2024:  -Normal iron of 64.  -Normal ferritin of 71.  -Normal folate  -Elevated vitamin B12 of 2594.  -SPEP is normal    Patient denies bleeding from any site.  She is on warfarin.  She gets some easy bruising.  No black stool.    Patient has been getting more tired lately.  She is an active person.  She goes swimming few times a week.  She goes for walks.  She gets tired easily doing activities including swimming.  She has been sleeping more.    She gets occasional headache.  She gets occasional lightheadedness/dizziness.  No passing out.  No chest pain.  No shortness of breath at rest.  No abdominal pain.  No nausea or vomiting.  Appetite is good.  No urinary complaints.  No bowel problem.  No bleeding.  No fever or chills.    Allergies: Reviewed    Medications: Reviewed    Past medical history:  -DVT of right upper extremity in 2019.  Unprovoked.  Ultrasound on 02/26/2019 revealed DVT involving the right internal jugular vein along with the subclavian vein, axillary vein and central most portion of the brachial vein.  Patient was seen by Dr. Smith in hematology.  Indefinite anticoagulation was recommended.  She was on Eliquis.  Changed to warfarin because of the cost.  -Hypertension  -Graves' disease  -Macular degeneration  -Osteoporosis  -Hypothyroidism  -Glaucoma  -Sensorineural hearing loss  -Umbilical  hernia repair  -Lumbar back surgery  -Cataract surgery  -Arthroscopic shoulder surgery  -Chronic kidney disease.    Social history:  -No smoking  -No alcohol use.    Exam:  She is alert and oriented x 3.  Not in any distress.  Vitals: Reviewed  Rest of system not examined.    Labs: Reviewed.    Assessment:  1.  An 79-year-old female with chronic normocytic anemia.  Anemia is multifactorial from renal disease, anemia of chronic disease and likely myelodysplastic syndrome (given her age).  2.  Right upper extremity DVT in 02/2019.  Unprovoked.  3.  Other medical problems including hypertension, hypothyroidism and chronic kidney disease.    Recommendation:  -Labs next week.  -Schedule bone marrow biopsy. Patient on warfarin. OK to hold warfarin.  -See me after bone marrow biopsy.  -Reduce vitamin B12 to 500 mcg a day.    Discussion:  1.  Investigations were reviewed with the patient and her .  Patient has chronic normocytic anemia.  Recently it got worse.  Different causes of normocytic anemia reviewed.  Normocytic anemia can be seen with anemia of chronic disease, renal disease, blood loss or developing myelodysplastic syndrome.  Patient does have chronic kidney disease which is causing anemia.  She also has multiple medical problem which is causing anemia of chronic disease/inflammation.  Given her age she may have myelodysplastic syndrome.  Patient denies bleeding from any site.  Also her iron studies are normal.    Discussed with her regarding myelodysplastic syndrome.  Given her age, we need to rule that out.  Discussed regarding getting a bone marrow biopsy.  She is agreeable for it.  Procedure was explained.  That will be scheduled.    Complication of anemia reviewed.  Patient in fact is doing very well for her age.  She is pretty active and goes swimming and for walks few times a week.  She does get tired more easily but is still able to do these things.  Worsening anemia can cause other problem  including dizziness and shortness of breath.    2.  Patient had unprovoked right upper extremity DVT in 2019.  She was evaluated in hematology department.  She was recommended long-term anticoagulation.  She is on warfarin.  She is tolerating it well.  She will continue on it.  Okay to hold warfarin for bone marrow procedure.    3.  Patient is on vitamin B12 tablet.  Her vitamin B12 level is highly elevated.  Advised her to reduce it to 500 mcg a day.    4.  She and her  had few questions which were all answered.  I will see her after the bone marrow biopsy.    Thanks for the consult.    Total visit time of 45 minutes.  Time spent in today's visit, review of chart/investigations today and documentation today.          Libby Escamilla MD

## 2024-10-16 NOTE — PROGRESS NOTES
This consult has been requested by Clara Hemphill PA-C for anemia.    Mrs. French is an 79-year-old female with chronic anemia.  Anemia recently has gotten worse.  Investigations are reviewed and summarized below.  1.  On 08/31/2019, hemoglobin of 11.7.  -On 02/26/2019, hemoglobin of 11.5 with hematocrit of 36.5.  2.  On 05/20/2024:  -WBC of 7.6, hemoglobin of 9.9, platelet of 226.  MCV normal at 96.  -CMP normal except elevated creatinine of 1.36 and elevated urea nitrogen of 36.1.  3.  On 06/28/2024:  -Normal iron of 64.  -Normal ferritin of 71.  -Normal folate  -Elevated vitamin B12 of 2594.  -SPEP is normal    Patient denies bleeding from any site.  She is on warfarin.  She gets some easy bruising.  No black stool.    Patient has been getting more tired lately.  She is an active person.  She goes swimming few times a week.  She goes for walks.  She gets tired easily doing activities including swimming.  She has been sleeping more.    She gets occasional headache.  She gets occasional lightheadedness/dizziness.  No passing out.  No chest pain.  No shortness of breath at rest.  No abdominal pain.  No nausea or vomiting.  Appetite is good.  No urinary complaints.  No bowel problem.  No bleeding.  No fever or chills.    Allergies: Reviewed    Medications: Reviewed    Past medical history:  -DVT of right upper extremity in 2019.  Unprovoked.  Ultrasound on 02/26/2019 revealed DVT involving the right internal jugular vein along with the subclavian vein, axillary vein and central most portion of the brachial vein.  Patient was seen by Dr. Smith in hematology.  Indefinite anticoagulation was recommended.  She was on Eliquis.  Changed to warfarin because of the cost.  -Hypertension  -Graves' disease  -Macular degeneration  -Osteoporosis  -Hypothyroidism  -Glaucoma  -Sensorineural hearing loss  -Umbilical hernia repair  -Lumbar back surgery  -Cataract surgery  -Arthroscopic shoulder surgery  -Chronic  kidney disease.    Social history:  -No smoking  -No alcohol use.    Exam:  She is alert and oriented x 3.  Not in any distress.  Vitals: Reviewed  Rest of system not examined.    Labs: Reviewed.    Assessment:  1.  An 79-year-old female with chronic normocytic anemia.  Anemia is multifactorial from renal disease, anemia of chronic disease and likely myelodysplastic syndrome (given her age).  2.  Right upper extremity DVT in 02/2019.  Unprovoked.  3.  Other medical problems including hypertension, hypothyroidism and chronic kidney disease.    Recommendation:  -Labs next week.  -Schedule bone marrow biopsy. Patient on warfarin. OK to hold warfarin.  -See me after bone marrow biopsy.  -Reduce vitamin B12 to 500 mcg a day.    Discussion:  1.  Investigations were reviewed with the patient and her .  Patient has chronic normocytic anemia.  Recently it got worse.  Different causes of normocytic anemia reviewed.  Normocytic anemia can be seen with anemia of chronic disease, renal disease, blood loss or developing myelodysplastic syndrome.  Patient does have chronic kidney disease which is causing anemia.  She also has multiple medical problem which is causing anemia of chronic disease/inflammation.  Given her age she may have myelodysplastic syndrome.  Patient denies bleeding from any site.  Also her iron studies are normal.    Discussed with her regarding myelodysplastic syndrome.  Given her age, we need to rule that out.  Discussed regarding getting a bone marrow biopsy.  She is agreeable for it.  Procedure was explained.  That will be scheduled.    Complication of anemia reviewed.  Patient in fact is doing very well for her age.  She is pretty active and goes swimming and for walks few times a week.  She does get tired more easily but is still able to do these things.  Worsening anemia can cause other problem including dizziness and shortness of breath.    2.  Patient had unprovoked right upper extremity DVT in  2019.  She was evaluated in hematology department.  She was recommended long-term anticoagulation.  She is on warfarin.  She is tolerating it well.  She will continue on it.  Okay to hold warfarin for bone marrow procedure.    3.  Patient is on vitamin B12 tablet.  Her vitamin B12 level is highly elevated.  Advised her to reduce it to 500 mcg a day.    4.  She and her  had few questions which were all answered.  I will see her after the bone marrow biopsy.    Thanks for the consult.    Total visit time of 45 minutes.  Time spent in today's visit, review of chart/investigations today and documentation today.

## 2024-10-18 ENCOUNTER — DOCUMENTATION ONLY (OUTPATIENT)
Dept: ANTICOAGULATION | Facility: CLINIC | Age: 79
End: 2024-10-18
Payer: MEDICARE

## 2024-10-18 DIAGNOSIS — I82.A11 ACUTE DEEP VEIN THROMBOSIS (DVT) OF AXILLARY VEIN OF RIGHT UPPER EXTREMITY (H): Primary | ICD-10-CM

## 2024-10-18 DIAGNOSIS — Z86.718 PERSONAL HISTORY OF DVT (DEEP VEIN THROMBOSIS): ICD-10-CM

## 2024-10-18 LAB — INR HOME MONITORING: 2.5 (ref 2–3)

## 2024-10-18 NOTE — PROGRESS NOTES
ANTICOAGULATION  MANAGEMENT-Home Monitor Managed by Exception    Manasa DIEGO Manolo 79 year old female is on warfarin with therapeutic INR result. (Goal INR 2.0-3.0)    Recent labs: (last 7 days)     10/18/24  0000   INR 2.5       Previous INR was Therapeutic  Medication, diet, health changes since last INR:chart reviewed; none identified  Contacted within the last 12 weeks by phone on 9/20/24.   Due for next call no later than: 12/19/24.   Last [facility] referral date: 11/26/2023      AUBREY     Manasa was NOT contacted regarding therapeutic result today per home monitoring policy manage by exception agreement.   Current warfarin dose is to be continued:     Summary  As of 10/18/2024      Full warfarin instructions:  7.5 mg every Mon, Wed, Fri; 5 mg all other days   Next INR check:  11/1/2024             ?   Kyleigh Molina RN  Anticoagulation Clinic  10/18/2024    _______________________________________________________________________     Anticoagulation Episode Summary       Current INR goal:  2.0-3.0   TTR:  88.8% (1 y)   Target end date:  Indefinite   Send INR reminders to:  [facility]    Indications    Acute deep vein thrombosis (DVT) of axillary vein and internal jugular (H) (Resolved) [I82.A19]  Current use of long term anticoagulation (Resolved) [Z79.01]  Acute deep vein thrombosis (DVT) of axillary vein of right upper extremity (H) [I82.A11]  Personal history of DVT (deep vein thrombosis) [Z86.718]             Comments:  [employer] meter- Managed by exception             Anticoagulation Care Providers       Provider Role Specialty Phone number    Rema Braxton DO Referring Family Medicine [phone]    Jessica Anthony MD Referring Internal Medicine [phone]    Jana Giles MD Referring Internal Medicine [phone]    Christopher Smith MD Responsible Hematology [phone]

## 2024-10-21 ENCOUNTER — LAB (OUTPATIENT)
Dept: LAB | Facility: CLINIC | Age: 79
End: 2024-10-21
Payer: MEDICARE

## 2024-10-21 DIAGNOSIS — M80.08XD AGE-RELATED OSTEOPOR WITH CURR PATHOL FX OF VERTEBRA WITH ROUTINE HEAL: ICD-10-CM

## 2024-10-21 DIAGNOSIS — D64.9 ANEMIA, UNSPECIFIED TYPE: ICD-10-CM

## 2024-10-21 LAB
ANION GAP SERPL CALCULATED.3IONS-SCNC: 13 MMOL/L (ref 7–15)
BUN SERPL-MCNC: 25.4 MG/DL (ref 8–23)
CALCIUM SERPL-MCNC: 10.1 MG/DL (ref 8.8–10.4)
CHLORIDE SERPL-SCNC: 102 MMOL/L (ref 98–107)
CREAT SERPL-MCNC: 1.08 MG/DL (ref 0.51–0.95)
EGFRCR SERPLBLD CKD-EPI 2021: 52 ML/MIN/1.73M2
GLUCOSE SERPL-MCNC: 98 MG/DL (ref 70–99)
HAPTOGLOB SERPL-MCNC: 144 MG/DL (ref 30–200)
HCO3 SERPL-SCNC: 25 MMOL/L (ref 22–29)
LDH SERPL L TO P-CCNC: 180 U/L (ref 0–250)
POTASSIUM SERPL-SCNC: 4.4 MMOL/L (ref 3.4–5.3)
SODIUM SERPL-SCNC: 140 MMOL/L (ref 135–145)
VIT D+METAB SERPL-MCNC: 63 NG/ML (ref 20–50)

## 2024-10-21 PROCEDURE — 80048 BASIC METABOLIC PNL TOTAL CA: CPT

## 2024-10-21 PROCEDURE — 83615 LACTATE (LD) (LDH) ENZYME: CPT

## 2024-10-21 PROCEDURE — 99000 SPECIMEN HANDLING OFFICE-LAB: CPT

## 2024-10-21 PROCEDURE — 82668 ASSAY OF ERYTHROPOIETIN: CPT | Mod: 90

## 2024-10-21 PROCEDURE — 36415 COLL VENOUS BLD VENIPUNCTURE: CPT

## 2024-10-21 PROCEDURE — 82306 VITAMIN D 25 HYDROXY: CPT

## 2024-10-21 PROCEDURE — 83010 ASSAY OF HAPTOGLOBIN QUANT: CPT

## 2024-10-22 LAB — EPO SERPL-ACNC: 18 MU/ML

## 2024-10-23 ENCOUNTER — ALLIED HEALTH/NURSE VISIT (OUTPATIENT)
Dept: ENDOCRINOLOGY | Facility: CLINIC | Age: 79
End: 2024-10-23
Payer: MEDICARE

## 2024-10-23 DIAGNOSIS — M80.08XD AGE-RELATED OSTEOPOR WITH CURR PATHOL FX OF VERTEBRA WITH ROUTINE HEAL: Primary | ICD-10-CM

## 2024-10-23 DIAGNOSIS — Z92.29 HISTORY OF BISPHOSPHONATE THERAPY: ICD-10-CM

## 2024-10-23 PROCEDURE — 96372 THER/PROPH/DIAG INJ SC/IM: CPT | Performed by: INTERNAL MEDICINE

## 2024-10-23 PROCEDURE — 99207 PR NO CHARGE NURSE ONLY: CPT | Performed by: INTERNAL MEDICINE

## 2024-10-23 NOTE — PROGRESS NOTES
Clinic Administered Medication Documentation      Prolia Documentation    Indication: Prolia  (denosumab) is a prescription medicine used to treat osteoporosis in patients who:   Are at high risk for fracture, meaning patients who have had a fracture related to osteoporosis, or who have multiple risk factors for fracture.  Cannot use another osteoporosis medicine or other osteoporosis medicines did not work well.  The timeline for early/late injections would be 4 weeks early and any time after the 6 month hemal. If a patient receives their injection late, then the subsequent injection would be 6 months from the date that they actually received the injection.    When was the last injection?  24  Was the last injection at least 6 months ago? Yes  Has the prior authorization been completed?  Yes  Is there an active order (written within the past 365 days, with administrations remaining, not ) in the chart?  Yes   GFR Estimate   Date Value Ref Range Status   10/21/2024 52 (L) >60 mL/min/1.73m2 Final     Comment:     eGFR calculated using  CKD-EPI equation.   2020 68 >60 mL/min/[1.73_m2] Final     Comment:     Non  GFR Calc  Starting 2018, serum creatinine based estimated GFR (eGFR) will be   calculated using the Chronic Kidney Disease Epidemiology Collaboration   (CKD-EPI) equation.       Has patient had a GFR within the last 12 months? Yes   Is GFR under 30, or patient has a diagnosis of CKD4 or CKD5? No   Patient denies gastric bypass or parathyroid surgery in past 6 months? Yes - patient denies.   Patient denies dental work in the past two months involving drilling into the bone, such as implants/extractions, oral surgery or a tooth extraction that has not healed yet?  Yes  Patient denies plans for an emergency tooth extraction within the next week? Yes    The following steps were completed to comply with the REMS program for Prolia:  Reviewed information in the Medication  Guide, including the serious risks of Prolia  and the symptoms of each risk.  Advised patient to seek prompt medical attention if they have signs or symptoms of any of the serious risks.  Provided each patient a copy of the Medication Guide and Patient Guide.    Prior to injection, verified patient identity using patient's name and date of birth. Medication was administered. Please see MAR and medication order for additional information. Patient instructed to remain in clinic for 15 minutes and report any adverse reaction to staff immediately.    Vial/Syringe: Syringe  Was this medication supplied by the patient? No  Verified that the patient has refills remaining in their prescription.     Manasa French comes into clinic today at the request of Dr Hernandez Ordering Provider for Med Injection only Prolia .    This service provided today was under the supervising provider of the day Dr Hayden, who was available if needed.    Silas Dong RN

## 2024-10-23 NOTE — PATIENT INSTRUCTIONS
You received your Prolia injection today  Your next injection is due in 6 months, scheduled 4/23/25  If you plan on having any dental work done within the next 6 months, please let your dentist know that you are on this medication.   Make sure you do not have any dental work completed involving the jaw bone within 1 month prior to your scheduled injection.   Provided Prolia VIS Sheet to patient.     Silas Dong RN

## 2024-10-27 RX ORDER — MEPERIDINE HYDROCHLORIDE 25 MG/ML
25 INJECTION INTRAMUSCULAR; INTRAVENOUS; SUBCUTANEOUS
OUTPATIENT
Start: 2024-10-27

## 2024-10-27 RX ORDER — DIPHENHYDRAMINE HYDROCHLORIDE 50 MG/ML
50 INJECTION INTRAMUSCULAR; INTRAVENOUS
Start: 2024-10-27

## 2024-10-27 RX ORDER — DIPHENHYDRAMINE HYDROCHLORIDE 50 MG/ML
25 INJECTION INTRAMUSCULAR; INTRAVENOUS
Start: 2024-10-27

## 2024-10-27 RX ORDER — ALBUTEROL SULFATE 90 UG/1
1-2 INHALANT RESPIRATORY (INHALATION)
Start: 2024-10-27

## 2024-10-27 RX ORDER — EPINEPHRINE 1 MG/ML
0.3 INJECTION, SOLUTION, CONCENTRATE INTRAVENOUS EVERY 5 MIN PRN
OUTPATIENT
Start: 2024-10-27

## 2024-10-27 RX ORDER — ALBUTEROL SULFATE 0.83 MG/ML
2.5 SOLUTION RESPIRATORY (INHALATION)
OUTPATIENT
Start: 2024-10-27

## 2024-10-27 RX ORDER — METHYLPREDNISOLONE SODIUM SUCCINATE 40 MG/ML
40 INJECTION INTRAMUSCULAR; INTRAVENOUS
Start: 2024-10-27

## 2024-10-30 ENCOUNTER — TELEPHONE (OUTPATIENT)
Dept: ANTICOAGULATION | Facility: CLINIC | Age: 79
End: 2024-10-30
Payer: MEDICARE

## 2024-10-30 DIAGNOSIS — Z86.718 PERSONAL HISTORY OF DVT (DEEP VEIN THROMBOSIS): ICD-10-CM

## 2024-10-30 DIAGNOSIS — I82.A11 ACUTE DEEP VEIN THROMBOSIS (DVT) OF AXILLARY VEIN OF RIGHT UPPER EXTREMITY (H): Primary | ICD-10-CM

## 2024-10-30 NOTE — TELEPHONE ENCOUNTER
BETH-PROCEDURAL ANTICOAGULATION  MANAGEMENT    Staff message received:  Ana Murguia RN Isum, NING nAn,    I wasn't sure if you are able to help in this situation or guide me to the right pool.  Patient has a BMB and will need to hold Coumadin for 5 days.    Please guide me or provide any further instructions patient will need regarding holding her coumadin.    Thank you,  Ema Escamilla  requesting pre-procedure hold orders for warfarin and review for bridging      Procedure date: 11/8/24       Procedure:  Bone marrow biopsy      Procedure location and phone number (if external):  [facility]     Number of warfarin hold days requested and/or target INR: 5 days    Pre-op date: not yet scheduled      Routing to Anticoagulation Pharmacist for review.     [facility] pool: p [facility] HOME MONITORING     Ember Amezcua RN

## 2024-10-30 NOTE — TELEPHONE ENCOUNTER
Jana Giles MD  You28 minutes ago (12:36 PM)     TR  Agree with plan     Anthony PeterD BCACP  Anticoagulation Clinical Pharmacist

## 2024-10-30 NOTE — TELEPHONE ENCOUNTER
"BETH-PROCEDURAL ANTICOAGULATION  MANAGEMENT    ASSESSMENT     Warfarin interruption plan for Bone marrow biopsy on 2024.    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    RECOMMENDATION     Pre-Procedure:  Hold warfarin for 5 days, until after procedure startin2024   No Bridge    Post-Procedure:  Resume warfarin dose if okay with provider doing procedure on night of procedure, 2024 PM: 15mg & 2024 10mg  Recheck INR ~ 7 days after resuming warfarin     Plan routed to referring provider for approval  ?   Jesi Castro [facility]    SUBJECTIVE/OBJECTIVE     Manasa French, a 79 year old female    Goal INR Range: 2.0-3.0     Patient bridged in past: No    Pertinent History: Started DOAC at time of VTE    Wt Readings from Last 3 Encounters:   10/16/24 66.7 kg (147 lb)   24 68.5 kg (151 lb)   24 68.5 kg (151 lb)      Ideal body weight: 54.7 kg (120 lb 9.5 oz)  Adjusted ideal body weight: 59.5 kg (131 lb 2.5 oz)     Estimated body mass index is 25.23 kg/m  as calculated from the following:    Height as of 24: 1.626 m (5' 4\").    Weight as of 10/16/24: 66.7 kg (147 lb).    Lab Results   Component Value Date    INR 2.5 10/18/2024    INR 2.6 10/04/2024    INR 2.3 2024     Lab Results   Component Value Date    HGB 9.5 (L) 2024    HCT 28.7 (L) 2024     2024     Lab Results "   Component Value Date    CR 1.08 (H) 10/21/2024    CR 1.29 (H) 05/31/2024    CR 1.25 (H) 05/21/2024     Estimated Creatinine Clearance: 39 mL/min (A) (based on SCr of 1.08 mg/dL (H)).

## 2024-10-30 NOTE — TELEPHONE ENCOUNTER
Called and spoke with patient regarding plan below. She will recheck INR as planned on 11/1/24 and advised any questions can be answered at that time, if she has any need to go over warfarin hold plan again we can do it then.  Patient went over plan and added to her calendar. She verbalized understanding.    Ember Amezcua RN

## 2024-11-01 ENCOUNTER — ANTICOAGULATION THERAPY VISIT (OUTPATIENT)
Dept: ANTICOAGULATION | Facility: CLINIC | Age: 79
End: 2024-11-01
Payer: MEDICARE

## 2024-11-01 DIAGNOSIS — Z86.718 PERSONAL HISTORY OF DVT (DEEP VEIN THROMBOSIS): ICD-10-CM

## 2024-11-01 DIAGNOSIS — I82.A11 ACUTE DEEP VEIN THROMBOSIS (DVT) OF AXILLARY VEIN OF RIGHT UPPER EXTREMITY (H): Primary | ICD-10-CM

## 2024-11-01 LAB — INR HOME MONITORING: 2.1 (ref 2–3)

## 2024-11-01 NOTE — PROGRESS NOTES
ANTICOAGULATION MANAGEMENT     Manasa French 79 year old female is on warfarin with therapeutic INR result. (Goal INR 2.0-3.0)    Recent labs: (last 7 days)     11/01/24  0000   INR 2.1       ASSESSMENT     Source(s): Chart Review and Patient/Caregiver Call     Warfarin doses taken: Warfarin taken as instructed  Diet: No new diet changes identified  Medication/supplement changes: None noted  New illness, injury, or hospitalization: No  Signs or symptoms of bleeding or clotting: No  Previous result: Therapeutic last 2(+) visits  Additional findings: Upcoming surgery/procedure 11/8/24 bone marrow biopsy, hold 5 days, no bridge        PLAN     Recommended plan for no diet, medication or health factor changes affecting INR     Dosing Instructions:  Continue dosing until 5 day hold starts on 11/3.  Resume Warfarin on 11/8 with boost x 2 days  then continue normal dosing schedule and recheck INR 1 week later.       Summary  As of 11/1/2024      Full warfarin instructions:  11/3: Hold; 11/4: Hold; 11/5: Hold; 11/6: Hold; 11/7: Hold; 11/8: 15 mg; 11/9: 10 mg; Otherwise 7.5 mg every Mon, Wed, Fri; 5 mg all other days   Next INR check:  11/15/2024               Telephone call with Rhonda who verbalizes understanding and agrees to plan.  Procedure plan reviewed with patient on 10/30 and again today.      Patient to recheck with home meter    Education provided: Goal range and lab monitoring: goal range and significance of current result  Contact [phone] with any changes, questions or concerns.     Plan made per [facility] anticoagulation protocol    Selena Senior RN  11/1/2024  Anticoagulation Clinic  [employer] for routing messages: [facility]  [facility] patient phone line: [phone]        _______________________________________________________________________     Anticoagulation Episode Summary       Current INR goal:  2.0-3.0   TTR:  88.8% (1 y)   Target end date:  Indefinite   Send INR reminders to:   [facility]    Indications    Acute deep vein thrombosis (DVT) of axillary vein and internal jugular (H) (Resolved) [I82.A19]  Current use of long term anticoagulation (Resolved) [Z79.01]  Acute deep vein thrombosis (DVT) of axillary vein of right upper extremity (H) [I82.A11]  Personal history of DVT (deep vein thrombosis) [Z86.718]             Comments:  [employer] meter- Managed by exception             Anticoagulation Care Providers       Provider Role Specialty Phone number    Rema Braxton DO Referring Family Medicine [phone]    Jessica Anthony MD Referring Internal Medicine [phone]    Jana Giles MD Referring Internal Medicine [phone]    Christopher Smith MD Responsible Hematology [phone]             71

## 2024-11-05 ENCOUNTER — PATIENT OUTREACH (OUTPATIENT)
Dept: ONCOLOGY | Facility: CLINIC | Age: 79
End: 2024-11-05
Payer: MEDICARE

## 2024-11-05 DIAGNOSIS — D64.9 ANEMIA, UNSPECIFIED TYPE: Primary | ICD-10-CM

## 2024-11-05 NOTE — PROGRESS NOTES
Writer called and spoke with patient's spouse, King.  Writer updated that she was out and would return around 3p this afternoon.     Writer will call again this afternoon to reach patient .    Ana Murguia RN

## 2024-11-08 ENCOUNTER — PROCEDURE ONLY VISIT (OUTPATIENT)
Dept: ONCOLOGY | Facility: CLINIC | Age: 79
End: 2024-11-08
Attending: INTERNAL MEDICINE
Payer: MEDICARE

## 2024-11-08 ENCOUNTER — INFUSION THERAPY VISIT (OUTPATIENT)
Dept: INFUSION THERAPY | Facility: CLINIC | Age: 79
End: 2024-11-08
Attending: INTERNAL MEDICINE
Payer: MEDICARE

## 2024-11-08 VITALS
DIASTOLIC BLOOD PRESSURE: 67 MMHG | HEART RATE: 78 BPM | RESPIRATION RATE: 16 BRPM | OXYGEN SATURATION: 97 % | SYSTOLIC BLOOD PRESSURE: 145 MMHG | TEMPERATURE: 97.4 F

## 2024-11-08 DIAGNOSIS — D64.9 ANEMIA, UNSPECIFIED TYPE: ICD-10-CM

## 2024-11-08 LAB
BASOPHILS # BLD AUTO: 0 10E3/UL (ref 0–0.2)
BASOPHILS NFR BLD AUTO: 1 %
EOSINOPHIL # BLD AUTO: 0.2 10E3/UL (ref 0–0.7)
EOSINOPHIL NFR BLD AUTO: 3 %
ERYTHROCYTE [DISTWIDTH] IN BLOOD BY AUTOMATED COUNT: 15 % (ref 10–15)
HCT VFR BLD AUTO: 34.3 % (ref 35–47)
HGB BLD-MCNC: 11.2 G/DL (ref 11.7–15.7)
IMM GRANULOCYTES # BLD: 0 10E3/UL
IMM GRANULOCYTES NFR BLD: 0 %
LYMPHOCYTES # BLD AUTO: 1.7 10E3/UL (ref 0.8–5.3)
LYMPHOCYTES NFR BLD AUTO: 22 %
MCH RBC QN AUTO: 30.9 PG (ref 26.5–33)
MCHC RBC AUTO-ENTMCNC: 32.7 G/DL (ref 31.5–36.5)
MCV RBC AUTO: 95 FL (ref 78–100)
MONOCYTES # BLD AUTO: 0.7 10E3/UL (ref 0–1.3)
MONOCYTES NFR BLD AUTO: 9 %
NEUTROPHILS # BLD AUTO: 5.1 10E3/UL (ref 1.6–8.3)
NEUTROPHILS NFR BLD AUTO: 66 %
NRBC # BLD AUTO: 0 10E3/UL
NRBC BLD AUTO-RTO: 0 /100
PLATELET # BLD AUTO: 245 10E3/UL (ref 150–450)
RBC # BLD AUTO: 3.63 10E6/UL (ref 3.8–5.2)
RETICS # AUTO: 0.05 10E6/UL (ref 0.03–0.1)
RETICS/RBC NFR AUTO: 1.3 % (ref 0.5–2)
WBC # BLD AUTO: 7.7 10E3/UL (ref 4–11)

## 2024-11-08 PROCEDURE — 250N000011 HC RX IP 250 OP 636: Performed by: NURSE PRACTITIONER

## 2024-11-08 PROCEDURE — 38222 DX BONE MARROW BX & ASPIR: CPT | Performed by: NURSE PRACTITIONER

## 2024-11-08 PROCEDURE — 85004 AUTOMATED DIFF WBC COUNT: CPT | Performed by: NURSE PRACTITIONER

## 2024-11-08 PROCEDURE — 85045 AUTOMATED RETICULOCYTE COUNT: CPT | Performed by: NURSE PRACTITIONER

## 2024-11-08 PROCEDURE — 88185 FLOWCYTOMETRY/TC ADD-ON: CPT | Performed by: NURSE PRACTITIONER

## 2024-11-08 PROCEDURE — 88189 FLOWCYTOMETRY/READ 16 & >: CPT | Mod: GC | Performed by: STUDENT IN AN ORGANIZED HEALTH CARE EDUCATION/TRAINING PROGRAM

## 2024-11-08 PROCEDURE — 96374 THER/PROPH/DIAG INJ IV PUSH: CPT | Mod: XU

## 2024-11-08 PROCEDURE — 88184 FLOWCYTOMETRY/ TC 1 MARKER: CPT | Performed by: STUDENT IN AN ORGANIZED HEALTH CARE EDUCATION/TRAINING PROGRAM

## 2024-11-08 PROCEDURE — 88305 TISSUE EXAM BY PATHOLOGIST: CPT | Mod: TC | Performed by: INTERNAL MEDICINE

## 2024-11-08 PROCEDURE — 88237 TISSUE CULTURE BONE MARROW: CPT | Performed by: INTERNAL MEDICINE

## 2024-11-08 PROCEDURE — 36415 COLL VENOUS BLD VENIPUNCTURE: CPT

## 2024-11-08 RX ORDER — HYDROMORPHONE HYDROCHLORIDE 1 MG/ML
0.2 INJECTION, SOLUTION INTRAMUSCULAR; INTRAVENOUS; SUBCUTANEOUS ONCE
Status: COMPLETED | OUTPATIENT
Start: 2024-11-08 | End: 2024-11-08

## 2024-11-08 RX ADMIN — HYDROMORPHONE HYDROCHLORIDE 0.2 MG: 1 INJECTION, SOLUTION INTRAMUSCULAR; INTRAVENOUS; SUBCUTANEOUS at 10:29

## 2024-11-08 ASSESSMENT — PAIN SCALES - GENERAL: PAINLEVEL_OUTOF10: MILD PAIN (2)

## 2024-11-08 NOTE — PROGRESS NOTES
Infusion Nursing Note:  Manasa French presents today for BMBX.    Patient seen by provider today: Yes: David Agrawal NP   present during visit today: Not Applicable.    Note: N/A.      Intravenous Access:  Peripheral IV placed.    Treatment Conditions:  Lab Results   Component Value Date    HGB 11.2 (L) 11/08/2024    WBC 7.7 11/08/2024    ANEU 1.9 11/20/2020    ANEUTAUTO 5.1 11/08/2024     11/08/2024        Results reviewed, labs MET treatment parameters, ok to proceed with treatment.      Post Infusion Assessment:  Patient tolerated BMBX without incident.  Patient laid supine for 45 minutes post procedure.  BMBX dressing checked prior to discharge and was noted to the C/D/I.   Vital signs were stable throughout patient's appointment.   No evidence of extravasations.  Access discontinued per protocol.       Discharge Plan:   Patient declined prescription refills.  Discharge instructions reviewed with: Patient.  Patient and/or family verbalized understanding of discharge instructions and all questions answered.  AVS to patient via [employer].  Patient will return 11/25/24 for next appointment.   Patient discharged in stable condition accompanied by: self.  Departure Mode: Ambulatory.      Diann Cason RN

## 2024-11-08 NOTE — PROGRESS NOTES
ON[facility] Adult Bone Marrow Biopsy Procedure Note  November 8, 2024    DIAGNOSIS: Anemia.       PROCEDURE: Bone marrow biopsy and aspiration    LOCATION: [facility]    Patient s identification was positively verified by verbal identification and invasive procedure safety checklist was completed. Informed consent was obtained. Following the administration of  dilaudid 0.2 mg x 1   as pre-medication, patient was placed in the prone position and prepped and draped in a sterile manner. Approximately 10 cc of 1% Lidocaine was used over the left posterior iliac spine. Following this a 3 mm incision was made. Trephine bone marrow core(s) was (were) obtained from the [facility]. Bone marrow aspirates were obtained from the [facility]. Aspirates were sent for morphology, immunophenotyping, and cytogenetics. A total of approximately   20 ml of marrow was aspirated. Following this procedure a sterile dressing was applied to the bone marrow biopsy site(s). The patient was placed in the supine position to maintain pressure on the biopsy site. Post-procedure wound care instructions were given.     Complications: NO    Pre-procedural pain: 0 out of 10 on the numeric pain rating scale.     Procedural pain: 0 out of 10 on the numeric pain rating scale.     Post-procedural pain assessment: 0 out of 10 on the numeric pain rating scale.     Interventions: NO    Length of procedure:20 minutes or less      Procedure performed by: SURESH Britton CNP

## 2024-11-12 LAB
PATH REPORT.COMMENTS IMP SPEC: ABNORMAL
PATH REPORT.COMMENTS IMP SPEC: NORMAL
PATH REPORT.COMMENTS IMP SPEC: NORMAL
PATH REPORT.COMMENTS IMP SPEC: YES
PATH REPORT.FINAL DX SPEC: ABNORMAL
PATH REPORT.FINAL DX SPEC: NORMAL
PATH REPORT.MICROSCOPIC SPEC OTHER STN: ABNORMAL
PATH REPORT.MICROSCOPIC SPEC OTHER STN: NORMAL
PATH REPORT.RELEVANT HX SPEC: ABNORMAL
PATH REPORT.RELEVANT HX SPEC: NORMAL

## 2024-11-12 PROCEDURE — 88311 DECALCIFY TISSUE: CPT | Mod: 26 | Performed by: PATHOLOGY

## 2024-11-12 PROCEDURE — 85097 BONE MARROW INTERPRETATION: CPT | Performed by: PATHOLOGY

## 2024-11-12 PROCEDURE — 88313 SPECIAL STAINS GROUP 2: CPT | Mod: 26 | Performed by: PATHOLOGY

## 2024-11-12 PROCEDURE — 88305 TISSUE EXAM BY PATHOLOGIST: CPT | Mod: 26 | Performed by: PATHOLOGY

## 2024-11-15 ENCOUNTER — DOCUMENTATION ONLY (OUTPATIENT)
Dept: ANTICOAGULATION | Facility: CLINIC | Age: 79
End: 2024-11-15
Payer: MEDICARE

## 2024-11-15 ENCOUNTER — ANTICOAGULATION THERAPY VISIT (OUTPATIENT)
Dept: ANTICOAGULATION | Facility: CLINIC | Age: 79
End: 2024-11-15
Payer: MEDICARE

## 2024-11-15 DIAGNOSIS — I82.A11 ACUTE DEEP VEIN THROMBOSIS (DVT) OF AXILLARY VEIN OF RIGHT UPPER EXTREMITY (H): Primary | ICD-10-CM

## 2024-11-15 LAB — INR HOME MONITORING: 2.1 (ref 2–3)

## 2024-11-15 NOTE — PROGRESS NOTES
ANTICOAGULATION CLINIC REFERRAL RENEWAL REQUEST       An annual renewal order is required for all patients referred to [facility].?  Please review and sign the pended referral order for Manasa French.       ANTICOAGULATION SUMMARY      Warfarin indication(s)   Acute deep vein thrombosis (DVT) of axillary vein and internal jugular (H) (Resolved) [I82.A19]  Current use of long term anticoagulation (Resolved) [Z79.01]  Acute deep vein thrombosis (DVT) of axillary vein of right upper extremity (H) [I82.A11]  Personal history of DVT (deep vein thrombosis) [Z86.718]                    Current goal range   INR: 2.0-3.0     Goal appropriate for indication? Goal INR 2-3, standard for indication(s) above     Time in Therapeutic Range (TTR)  (Goal > 60%) 88.8%       Office visit with referring provider's group within last year yes on 9/11/24       Geraldine Pham RN  [facility]

## 2024-11-15 NOTE — PROGRESS NOTES
ANTICOAGULATION MANAGEMENT     Manasa French 79 year old female is on warfarin with therapeutic INR result. (Goal INR 2.0-3.0)    Recent labs: (last 7 days)     11/15/24  0000   INR 2.1       ASSESSMENT     Source(s): Chart Review and Patient/Caregiver Call     Warfarin doses taken: Reviewed in chart Pt held warfarin X 6 days with no bridge. Pt resumed on 11/9/24 per provider direction.  Diet: No new diet changes identified  Medication/supplement changes: None noted  New illness, injury, or hospitalization: Yes: recent bone marrow biopsy on 11/8/24. Pt reports that she feels back to baseline now. Pt states that she will gets results and plan on 11/25/24.  Signs or symptoms of bleeding or clotting: No  Previous result: Therapeutic last 2(+) visits  Additional findings: Pt reports that she was fatigued for a couple days after the procedure but feels back to normal now.  If next INR is therapeutic, then may resume MBE.       PLAN       Dosing Instructions: Continue your current warfarin dose with next INR in 2 weeks       Summary  As of 11/15/2024      Full warfarin instructions:  7.5 mg every Mon, Wed, Fri; 5 mg all other days   Next INR check:  11/29/2024               Telephone call with Rhonda who verbalizes understanding and agrees to plan    Patient to recheck with home meter    Education provided: Resume manage by exception with home monitor. Continue to submit INR results to home monitor company.You will only be called when your result is out of range. Please call and notify [facility] if new medication started, dose missed, signs or symptoms of bleeding or clotting, or a surgery/procedure is scheduled. Due for next call no later than: 2/13/25.  Contact [phone] with any changes, questions or concerns.     Plan made per [facility] anticoagulation protocol    Geraldine Pham, RN  11/15/2024  Anticoagulation Clinic  [employer] for routing messages: [facility]ela [facility] [employer]  [facility] patient phone line:  [phone]        _______________________________________________________________________     Anticoagulation Episode Summary       Current INR goal:  2.0-3.0   TTR:  88.8% (1 y)   Target end date:  Indefinite   Send INR reminders to:  [facility]AG HOME MONITOR[facility]    Indications    Acute deep vein thrombosis (DVT) of axillary vein and internal jugular (H) (Resolved) [I82.A19]  Current use of long term anticoagulation (Resolved) [Z79.01]  Acute deep vein thrombosis (DVT) of axillary vein of right upper extremity (H) [I82.A11]  Personal history of DVT (deep vein thrombosis) [Z86.718]             Comments:  [employer] meter- Managed by exception             Anticoagulation Care Providers       Provider Role Specialty Phone number    Rema Braxton DO Referring Family Medicine [phone]    Jessica Anthony MD Referring Internal Medicine [phone]    Jana Giles MD Referring Internal Medicine [phone]    Christopher Smith MD Responsible Hematology [phone]

## 2024-11-19 LAB
CULTURE HARVEST COMPLETE DATE: NORMAL
INTERPRETATION: NORMAL

## 2024-11-20 ENCOUNTER — OFFICE VISIT (OUTPATIENT)
Dept: FAMILY MEDICINE | Facility: CLINIC | Age: 79
End: 2024-11-20
Payer: MEDICARE

## 2024-11-20 VITALS
RESPIRATION RATE: 20 BRPM | DIASTOLIC BLOOD PRESSURE: 60 MMHG | SYSTOLIC BLOOD PRESSURE: 120 MMHG | WEIGHT: 147.6 LBS | BODY MASS INDEX: 25.2 KG/M2 | HEIGHT: 64 IN | TEMPERATURE: 97.3 F | HEART RATE: 92 BPM | OXYGEN SATURATION: 95 %
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
## 2024-11-20 DIAGNOSIS — I10 BENIGN ESSENTIAL HYPERTENSION: ICD-10-CM

## 2024-11-20 DIAGNOSIS — E11.40 TYPE 2 DIABETES MELLITUS WITH DIABETIC NEUROPATHY, WITHOUT LONG-TERM CURRENT USE OF INSULIN (H): Primary | ICD-10-CM

## 2024-11-20 DIAGNOSIS — B35.1 ONYCHOMYCOSIS: ICD-10-CM

## 2024-11-20 DIAGNOSIS — R42 DIZZINESS: ICD-10-CM

## 2024-11-20 PROCEDURE — 99214 OFFICE O/P EST MOD 30 MIN: CPT | Performed by: INTERNAL MEDICINE

## 2024-11-20 PROCEDURE — 36415 COLL VENOUS BLD VENIPUNCTURE: CPT | Performed by: INTERNAL MEDICINE

## 2024-11-20 RX ORDER — HYDROCHLOROTHIAZIDE 12.5 MG/1
12.5 TABLET ORAL DAILY
Qty: 90 TABLET | Refills: 1 | Status: SHIPPED | OUTPATIENT
Start: 2024-11-20

## 2024-11-20 ASSESSMENT — PAIN SCALES - GENERAL: PAINLEVEL_OUTOF10: MILD PAIN (3)

## 2024-11-20 NOTE — PROGRESS NOTES
Assessment & Plan     Type 2 diabetes mellitus with diabetic neuropathy, without long-term current use of insulin (H)  A1c at goal.  Neuropathy. No symptoms. This could be contributing to her balance problems.     Benign essential hypertension  Resume hydrochlorothiazide every other day  Check BMP  Check BP readings. She can stop the medication if dizziness or hypotension.  - hydroCHLOROthiazide 12.5 MG tablet; Take 1 tablet (12.5 mg) by mouth daily.  - Basic metabolic panel  (Ca, Cl, CO2, Creat, Gluc, K, Na, BUN); Future  - Basic metabolic panel  (Ca, Cl, CO2, Creat, Gluc, K, Na, BUN)    Dizziness  - hydroCHLOROthiazide 12.5 MG tablet; Take 1 tablet (12.5 mg) by mouth daily.  - Basic metabolic panel  (Ca, Cl, CO2, Creat, Gluc, K, Na, BUN); Future  - Basic metabolic panel  (Ca, Cl, CO2, Creat, Gluc, K, Na, BUN)    Fall, subsequent encounter  She is now using a walker.   - hydroCHLOROthiazide 12.5 MG tablet; Take 1 tablet (12.5 mg) by mouth daily.  - Basic metabolic panel  (Ca, Cl, CO2, Creat, Gluc, K, Na, BUN); Future  - Basic metabolic panel  (Ca, Cl, CO2, Creat, Gluc, K, Na, BUN)    Onychomycosis  She has fungal nail disease.   She gets a pedicure monthly.    See Patient Instructions    Subjective   Rhonda is a 79 year old, presenting for the following health issues:  Follow Up (Patient is here for a 2-3 month follow up with provider.)        11/20/2024     2:54 PM   Additional Questions   Roomed by Mateo MCGOVERN MA   Accompanied by King      History of Present Illness       Hypertension: She presents for follow up of hypertension.  She does check blood pressure  regularly outside of the clinic. Outside blood pressures have been over 140/90. She does not follow a low salt diet.     She eats 2-3 servings of fruits and vegetables daily.She consumes 1 sweetened beverage(s) daily.She exercises with enough effort to increase her heart rate 30 to 60 minutes per day.  She exercises with enough effort to increase her  "heart rate 7 days per week.   She is taking medications regularly.     Manasa French is here for follow up.    Interim hx: she has hx of loss of balance and a  shunt which was placed 40 + years ago, she follows with neurosurgery regularly. 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   Her BP used to drop low causing dizziness and falls. We discontinue hydrochlorothiazide in September due to this reason.    Patient is here for follow up.  No falls, only has been off balance, no dizziness.  Her BP readings at home were higher since then (in 150's). She started taking hydrochlorothiazide 12.5 mg every other day. BP readings have normalized and dizziness has improved as well.     Some neuropathy on foot exam. She denies numbness and tingling. For DM she is on metformin 500 gm daily her A1c is at goal.         Objective    /60 (BP Location: Left arm, Patient Position: Sitting, Cuff Size: Adult Regular)   Pulse 92   Temp 97.3  F (36.3  C) (Temporal)   Resp 20   Ht 1.626 m (5' 4\")   Wt 67 kg (147 lb 9.6 oz)   LMP  (LMP Unknown)   SpO2 95%   BMI 25.34 kg/m    Body mass index is 25.34 kg/m .  Physical Exam  Cardiovascular:      Pulses:           Dorsalis pedis pulses are 2+ on the right side and 2+ on the left side.   Feet:      Right foot:      Protective Sensation: 10 sites tested.  8 sites sensed.      Skin integrity: Skin integrity normal.      Toenail Condition: Fungal disease present.     Left foot:      Protective Sensation: 10 sites tested.  9 sites sensed.      Skin integrity: Skin integrity normal.      Toenail Condition: Fungal disease present.               Signed Electronically by: Jana Giles MD    "

## 2024-11-21 LAB
ANION GAP SERPL CALCULATED.3IONS-SCNC: 11 MMOL/L (ref 7–15)
BUN SERPL-MCNC: 30.9 MG/DL (ref 8–23)
CALCIUM SERPL-MCNC: 9.6 MG/DL (ref 8.8–10.4)
CHLORIDE SERPL-SCNC: 107 MMOL/L (ref 98–107)
CREAT SERPL-MCNC: 1.33 MG/DL (ref 0.51–0.95)
EGFRCR SERPLBLD CKD-EPI 2021: 40 ML/MIN/1.73M2
GLUCOSE SERPL-MCNC: 159 MG/DL (ref 70–99)
HCO3 SERPL-SCNC: 27 MMOL/L (ref 22–29)
POTASSIUM SERPL-SCNC: 4.5 MMOL/L (ref 3.4–5.3)
SODIUM SERPL-SCNC: 145 MMOL/L (ref 135–145)

## 2024-11-25 ENCOUNTER — VIRTUAL VISIT (OUTPATIENT)
Dept: ONCOLOGY | Facility: CLINIC | Age: 79
End: 2024-11-25
Attending: INTERNAL MEDICINE
Payer: MEDICARE

## 2024-11-25 VITALS — WEIGHT: 148 LBS | BODY MASS INDEX: 25.27 KG/M2 | HEIGHT: 64 IN

## 2024-11-25 DIAGNOSIS — D64.9 ANEMIA, UNSPECIFIED TYPE: Primary | ICD-10-CM

## 2024-11-25 PROCEDURE — 99441 PR PHYSICIAN TELEPHONE EVALUATION 5-10 MIN: CPT | Mod: 93 | Performed by: INTERNAL MEDICINE

## 2024-11-25 RX ORDER — UREA 10 %
45 LOTION (ML) TOPICAL DAILY
COMMUNITY
Start: 2024-11-25

## 2024-11-25 ASSESSMENT — PAIN SCALES - GENERAL: PAINLEVEL_OUTOF10: MILD PAIN (2)

## 2024-11-25 NOTE — LETTER
11/25/2024         RE: Manasa French  [address]  [facility] [address]        Is the patient currently in the state of [place]? YES    Visit mode:TELEPHONE    If the visit is dropped, the patient can be reconnected by:VIDEO VISIT: Send to e-mail at: [email or web address]    Will anyone else be joining the visit? NO  (If patient encounters technical issues they should call [phone]    Are changes needed to the allergy or medication list? No    Are refills needed on medications prescribed by this physician? NO    Rooming Documentation:  Questionnaire(s) completed    Reason for visit: Video Visit (Follow Up )    Devorah Keene VVF       Virtual Visit Details    Type of service:  Telephone Visit   Phone call duration: 5 minutes   Originating Location (pt. Location): Home    Distant Location (provider location):  On-site    HEMATOLOGY HISTORY: Mrs. French is an female with chronic normocytic anemia.  Anemia secondary to renal disease and anemia of chronic disease.      1.  On 08/31/2019, hemoglobin of 11.7.  -On 02/26/2019, hemoglobin of 11.5 with hematocrit of 36.5.  2.  On 05/20/2024:  -WBC of 7.6, hemoglobin of 9.9, platelet of 226.  MCV normal at 96.  -CMP normal except elevated creatinine of 1.36 and elevated urea nitrogen of 36.1.  3.  On 06/28/2024:  -Normal iron of 64.  -Normal ferritin of 71.  -Normal folate  -Elevated vitamin B12 of 2594.  -SPEP is normal  4.  On 10/21/2024:  -Erythropoietin normal at 18.  -Creatinine of 1.08.  -Normal LDH  -Elevated vitamin D  5.  Bone marrow biopsy on 10/08/2024 reveals normocellular bone marrow without morphological evidence of myelodysplasia.  Reduced marrow storage iron.  Flow cytometry revealed 7% kappa monotypic B cells.  Significance of this is not clear.    Subjective:  Mrs. French is an 79-year-old female with normocytic anemia.  For further evaluation, multiple investigations done.  Bone marrow biopsy is essentially normal.  There is reduced  storage iron.  Flow cytometry of the bone marrow reveals 7% kappa monotypic B cells.  Significance of this is not clear.    Patient is on warfarin.  She denies bleeding from any site.  She does get easy bruising.    CBC done on 11/08/2024 reveals hemoglobin better at 11.2.  Normal WBC, platelet and MCV.    Overall she is doing good.  She has mild generalized weakness.  She is still a very active person and goes swimming few times a week.    Exam:  She is alert and oriented x 3.  Not in any distress.  Rest of system not examined as this is a telephone visit.     Labs: Reviewed.     Assessment:  1.  An 79-year-old female with chronic normocytic anemia.  Anemia is from renal disease and anemia of chronic disease.  Worsening of anemia was likely due to some undiagnosed GI bleed.  Patient on warfarin.    2.  Right upper extremity DVT in 02/2019.  Unprovoked.  3.  Other medical problems including hypertension, hypothyroidism and chronic kidney disease.  4.  Reduced storage iron on bone marrow biopsy  5.  Flow cytometry on bone marrow revealing 7% kappa monotypic B cells.  No morphologic evidence of malignancy.    Plan:  -Take over-the-counter oral iron pill once a day.  -Follow-up with PCP and have labs rechecked in 6 months.  -Follow-up in hematology/oncology clinic as needed.     Discussion:  1.  Investigations were reviewed with the patient.    Explained to the patient that CBC done on 11/08/2024 revealed improvement in anemia.  Hemoglobin was 11.2.    Bone marrow biopsy was reviewed with her.  No evidence of any malignancy.  Flow cytometry reveals kappa monotypic B cells.  Significance of this is not clear.  No morphological evidence of malignancy.    Discussed regarding anemia.  Her anemia is due to renal disease and anemia of chronic disease.    Patient had worsening of anemia.  She likely had undiagnosed GI bleed.  Other possibility is some subcutaneous bleeding.  Patient has get easy bruising from bumping into  things.  She is on warfarin.    Explained to the patient that at this time no other workup needed for anemia.    2.  Bone marrow reveals reduced iron stores.  Her iron level is normal.  Advised her to take over-the-counter oral iron once a day.    3.  Discussed regarding follow-up.  Advised her to follow-up with her PCP and have labs including CBC rechecked in 6 months.  Advised her to return to hematology/oncology clinic if there is worsening of anemia or development of other cytopenia.  She had few questions which were all answered.    Total telephone visit time of 5 minutes.  Time spent in today's visit, review of chart/investigations today and documentation today.        Libby Escamilla MD

## 2024-11-25 NOTE — LETTER
11/25/2024      Manasa French  [address]  [facility] [address]      Dear Colleague,    Thank you for referring your patient, Manasa French, to the [facility] [place]. Please see a copy of my visit note below.      Is the patient currently in the state of [place]? YES    Visit mode:TELEPHONE    If the visit is dropped, the patient can be reconnected by:VIDEO VISIT: Send to e-mail at: [email or web address]    Will anyone else be joining the visit? NO  (If patient encounters technical issues they should call [phone])    Are changes needed to the allergy or medication list? No    Are refills needed on medications prescribed by this physician? NO    Rooming Documentation:  Questionnaire(s) completed    Reason for visit: Video Visit (Follow Up )    Devorah Keene VVRICHARDSON       Virtual Visit Details    Type of service:  Telephone Visit   Phone call duration: 5 minutes   Originating Location (pt. Location): Home    Distant Location (provider location):  On-site    HEMATOLOGY HISTORY: Mrs. French is an female with chronic normocytic anemia.  Anemia secondary to renal disease and anemia of chronic disease.      1.  On 08/31/2019, hemoglobin of 11.7.  -On 02/26/2019, hemoglobin of 11.5 with hematocrit of 36.5.  2.  On 05/20/2024:  -WBC of 7.6, hemoglobin of 9.9, platelet of 226.  MCV normal at 96.  -CMP normal except elevated creatinine of 1.36 and elevated urea nitrogen of 36.1.  3.  On 06/28/2024:  -Normal iron of 64.  -Normal ferritin of 71.  -Normal folate  -Elevated vitamin B12 of 2594.  -SPEP is normal  4.  On 10/21/2024:  -Erythropoietin normal at 18.  -Creatinine of 1.08.  -Normal LDH  -Elevated vitamin D  5.  Bone marrow biopsy on 10/08/2024 reveals normocellular bone marrow without morphological evidence of myelodysplasia.  Reduced marrow storage iron.  Flow cytometry revealed 7% kappa monotypic B cells.  Significance of this is not clear.    Subjective:  Mrs. French is an  79-year-old female with normocytic anemia.  For further evaluation, multiple investigations done.  Bone marrow biopsy is essentially normal.  There is reduced storage iron.  Flow cytometry of the bone marrow reveals 7% kappa monotypic B cells.  Significance of this is not clear.    Patient is on warfarin.  She denies bleeding from any site.  She does get easy bruising.    CBC done on 11/08/2024 reveals hemoglobin better at 11.2.  Normal WBC, platelet and MCV.    Overall she is doing good.  She has mild generalized weakness.  She is still a very active person and goes swimming few times a week.    Exam:  She is alert and oriented x 3.  Not in any distress.  Rest of system not examined as this is a telephone visit.     Labs: Reviewed.     Assessment:  1.  An 79-year-old female with chronic normocytic anemia.  Anemia is from renal disease and anemia of chronic disease.  Worsening of anemia was likely due to some undiagnosed GI bleed.  Patient on warfarin.    2.  Right upper extremity DVT in 02/2019.  Unprovoked.  3.  Other medical problems including hypertension, hypothyroidism and chronic kidney disease.  4.  Reduced storage iron on bone marrow biopsy  5.  Flow cytometry on bone marrow revealing 7% kappa monotypic B cells.  No morphologic evidence of malignancy.    Plan:  -Take over-the-counter oral iron pill once a day.  -Follow-up with PCP and have labs rechecked in 6 months.  -Follow-up in hematology/oncology clinic as needed.     Discussion:  1.  Investigations were reviewed with the patient.    Explained to the patient that CBC done on 11/08/2024 revealed improvement in anemia.  Hemoglobin was 11.2.    Bone marrow biopsy was reviewed with her.  No evidence of any malignancy.  Flow cytometry reveals kappa monotypic B cells.  Significance of this is not clear.  No morphological evidence of malignancy.    Discussed regarding anemia.  Her anemia is due to renal disease and anemia of chronic disease.    Patient had  worsening of anemia.  She likely had undiagnosed GI bleed.  Other possibility is some subcutaneous bleeding.  Patient has get easy bruising from bumping into things.  She is on warfarin.    Explained to the patient that at this time no other workup needed for anemia.    2.  Bone marrow reveals reduced iron stores.  Her iron level is normal.  Advised her to take over-the-counter oral iron once a day.    3.  Discussed regarding follow-up.  Advised her to follow-up with her PCP and have labs including CBC rechecked in 6 months.  Advised her to return to hematology/oncology clinic if there is worsening of anemia or development of other cytopenia.  She had few questions which were all answered.    Total telephone visit time of 5 minutes.  Time spent in today's visit, review of chart/investigations today and documentation today.      Again, thank you for allowing me to participate in the care of your patient.        Sincerely,        Libby Escamilla MD

## 2024-11-25 NOTE — PATIENT INSTRUCTIONS
-Take over-the-counter oral iron pill once a day.  -Follow-up with PCP and have labs rechecked in 6 months.  -Follow-up in hematology/oncology clinic as needed.

## 2024-11-25 NOTE — LETTER
11/25/2024      Manasa French  [address]  [facility] [id number]      Dear Colleague,    Thank you for referring your patient, Manasa French, to the [facility] [place]. Please see a copy of my visit note below.      Is the patient currently in the state of [place]? YES    Visit mode:TELEPHONE    If the visit is dropped, the patient can be reconnected by:VIDEO VISIT: Send to e-mail at: [email or web address]    Will anyone else be joining the visit? NO  (If patient encounters technical issues they should call [phone]    Are changes needed to the allergy or medication list? No    Are refills needed on medications prescribed by this physician? NO    Rooming Documentation:  Questionnaire(s) completed    Reason for visit: Video Visit (Follow Up )    Devorah Keene VVRICHARDSON       Virtual Visit Details    Type of service:  Telephone Visit   Phone call duration: 5 minutes   Originating Location (pt. Location): Home    Distant Location (provider location):  On-site    HEMATOLOGY HISTORY: Mrs. French is an female with chronic normocytic anemia.  Anemia secondary to renal disease and anemia of chronic disease.      1.  On 08/31/2019, hemoglobin of 11.7.  -On 02/26/2019, hemoglobin of 11.5 with hematocrit of 36.5.  2.  On 05/20/2024:  -WBC of 7.6, hemoglobin of 9.9, platelet of 226.  MCV normal at 96.  -CMP normal except elevated creatinine of 1.36 and elevated urea nitrogen of 36.1.  3.  On 06/28/2024:  -Normal iron of 64.  -Normal ferritin of 71.  -Normal folate  -Elevated vitamin B12 of 2594.  -SPEP is normal  4.  On 10/21/2024:  -Erythropoietin normal at 18.  -Creatinine of 1.08.  -Normal LDH  -Elevated vitamin D  5.  Bone marrow biopsy on 10/08/2024 reveals normocellular bone marrow without morphological evidence of myelodysplasia.  Reduced marrow storage iron.  Flow cytometry revealed 7% kappa monotypic B cells.  Significance of this is not clear.    Subjective:  Mrs. French is an  79-year-old female with normocytic anemia.  For further evaluation, multiple investigations done.  Bone marrow biopsy is essentially normal.  There is reduced storage iron.  Flow cytometry of the bone marrow reveals 7% kappa monotypic B cells.  Significance of this is not clear.    Patient is on warfarin.  She denies bleeding from any site.  She does get easy bruising.    CBC done on 11/08/2024 reveals hemoglobin better at 11.2.  Normal WBC, platelet and MCV.    Overall she is doing good.  She has mild generalized weakness.  She is still a very active person and goes swimming few times a week.    Exam:  She is alert and oriented x 3.  Not in any distress.  Rest of system not examined as this is a telephone visit.     Labs: Reviewed.     Assessment:  1.  An 79-year-old female with chronic normocytic anemia.  Anemia is from renal disease and anemia of chronic disease.  Worsening of anemia was likely due to some undiagnosed GI bleed.  Patient on warfarin.    2.  Right upper extremity DVT in 02/2019.  Unprovoked.  3.  Other medical problems including hypertension, hypothyroidism and chronic kidney disease.  4.  Reduced storage iron on bone marrow biopsy  5.  Flow cytometry on bone marrow revealing 7% kappa monotypic B cells.  No morphologic evidence of malignancy.    Plan:  -Take over-the-counter oral iron pill once a day.  -Follow-up with PCP and have labs rechecked in 6 months.  -Follow-up in hematology/oncology clinic as needed.     Discussion:  1.  Investigations were reviewed with the patient.    Explained to the patient that CBC done on 11/08/2024 revealed improvement in anemia.  Hemoglobin was 11.2.    Bone marrow biopsy was reviewed with her.  No evidence of any malignancy.  Flow cytometry reveals kappa monotypic B cells.  Significance of this is not clear.  No morphological evidence of malignancy.    Discussed regarding anemia.  Her anemia is due to renal disease and anemia of chronic disease.    Patient had  worsening of anemia.  She likely had undiagnosed GI bleed.  Other possibility is some subcutaneous bleeding.  Patient has get easy bruising from bumping into things.  She is on warfarin.    Explained to the patient that at this time no other workup needed for anemia.    2.  Bone marrow reveals reduced iron stores.  Her iron level is normal.  Advised her to take over-the-counter oral iron once a day.    3.  Discussed regarding follow-up.  Advised her to follow-up with her PCP and have labs including CBC rechecked in 6 months.  Advised her to return to hematology/oncology clinic if there is worsening of anemia or development of other cytopenia.  She had few questions which were all answered.    Total telephone visit time of 5 minutes.  Time spent in today's visit, review of chart/investigations today and documentation today.      Again, thank you for allowing me to participate in the care of your patient.        Sincerely,        Libby Escamilla MD

## 2024-11-25 NOTE — PROGRESS NOTES
Virtual Visit Details    Type of service:  Telephone Visit   Phone call duration: 5 minutes   Originating Location (pt. Location): Home    Distant Location (provider location):  On-site    HEMATOLOGY HISTORY: Mrs. French is an female with chronic normocytic anemia.  Anemia secondary to renal disease and anemia of chronic disease.      1.  On 08/31/2019, hemoglobin of 11.7.  -On 02/26/2019, hemoglobin of 11.5 with hematocrit of 36.5.  2.  On 05/20/2024:  -WBC of 7.6, hemoglobin of 9.9, platelet of 226.  MCV normal at 96.  -CMP normal except elevated creatinine of 1.36 and elevated urea nitrogen of 36.1.  3.  On 06/28/2024:  -Normal iron of 64.  -Normal ferritin of 71.  -Normal folate  -Elevated vitamin B12 of 2594.  -SPEP is normal  4.  On 10/21/2024:  -Erythropoietin normal at 18.  -Creatinine of 1.08.  -Normal LDH  -Elevated vitamin D  5.  Bone marrow biopsy on 10/08/2024 reveals normocellular bone marrow without morphological evidence of myelodysplasia.  Reduced marrow storage iron.  Flow cytometry revealed 7% kappa monotypic B cells.  Significance of this is not clear.    Subjective:  Mrs. French is an 79-year-old female with normocytic anemia.  For further evaluation, multiple investigations done.  Bone marrow biopsy is essentially normal.  There is reduced storage iron.  Flow cytometry of the bone marrow reveals 7% kappa monotypic B cells.  Significance of this is not clear.    Patient is on warfarin.  She denies bleeding from any site.  She does get easy bruising.    CBC done on 11/08/2024 reveals hemoglobin better at 11.2.  Normal WBC, platelet and MCV.    Overall she is doing good.  She has mild generalized weakness.  She is still a very active person and goes swimming few times a week.    Exam:  She is alert and oriented x 3.  Not in any distress.  Rest of system not examined as this is a telephone visit.     Labs: Reviewed.     Assessment:  1.  An 79-year-old female with chronic normocytic anemia.  Anemia  is from renal disease and anemia of chronic disease.  Worsening of anemia was likely due to some undiagnosed GI bleed.  Patient on warfarin.    2.  Right upper extremity DVT in 02/2019.  Unprovoked.  3.  Other medical problems including hypertension, hypothyroidism and chronic kidney disease.  4.  Reduced storage iron on bone marrow biopsy  5.  Flow cytometry on bone marrow revealing 7% kappa monotypic B cells.  No morphologic evidence of malignancy.    Plan:  -Take over-the-counter oral iron pill once a day.  -Follow-up with PCP and have labs rechecked in 6 months.  -Follow-up in hematology/oncology clinic as needed.     Discussion:  1.  Investigations were reviewed with the patient.    Explained to the patient that CBC done on 11/08/2024 revealed improvement in anemia.  Hemoglobin was 11.2.    Bone marrow biopsy was reviewed with her.  No evidence of any malignancy.  Flow cytometry reveals kappa monotypic B cells.  Significance of this is not clear.  No morphological evidence of malignancy.    Discussed regarding anemia.  Her anemia is due to renal disease and anemia of chronic disease.    Patient had worsening of anemia.  She likely had undiagnosed GI bleed.  Other possibility is some subcutaneous bleeding.  Patient has get easy bruising from bumping into things.  She is on warfarin.    Explained to the patient that at this time no other workup needed for anemia.    2.  Bone marrow reveals reduced iron stores.  Her iron level is normal.  Advised her to take over-the-counter oral iron once a day.    3.  Discussed regarding follow-up.  Advised her to follow-up with her PCP and have labs including CBC rechecked in 6 months.  Advised her to return to hematology/oncology clinic if there is worsening of anemia or development of other cytopenia.  She had few questions which were all answered.    Total telephone visit time of 5 minutes.  Time spent in today's visit, review of chart/investigations today and documentation  today.

## 2024-11-25 NOTE — PROGRESS NOTES
Is the patient currently in the state of [place]? YES    Visit mode:TELEPHONE    If the visit is dropped, the patient can be reconnected by:VIDEO VISIT: Send to e-mail at: [email or web address]    Will anyone else be joining the visit? NO  (If patient encounters technical issues they should call [phone]    Are changes needed to the allergy or medication list? No    Are refills needed on medications prescribed by this physician? NO    Rooming Documentation:  Questionnaire(s) completed    Reason for visit: Video Visit (Follow Up )    Devorah PARDO

## 2024-11-26 ENCOUNTER — TELEPHONE (OUTPATIENT)
Dept: ONCOLOGY | Facility: CLINIC | Age: 79
End: 2024-11-26
Payer: MEDICARE

## 2024-11-26 NOTE — TELEPHONE ENCOUNTER
----- Message from Libby Escamilla sent at 11/25/2024 11:26 AM CST -----  Can you,please, mail her a copy of my notes when done.    bk

## 2024-11-30 LAB
CULTURE HARVEST COMPLETE DATE: NORMAL
INR HOME MONITORING: 2.4 (ref 2–3)

## 2024-12-02 ENCOUNTER — DOCUMENTATION ONLY (OUTPATIENT)
Dept: ANTICOAGULATION | Facility: CLINIC | Age: 79
End: 2024-12-02
Payer: MEDICARE

## 2024-12-02 DIAGNOSIS — I82.A11 ACUTE DEEP VEIN THROMBOSIS (DVT) OF AXILLARY VEIN OF RIGHT UPPER EXTREMITY (H): Primary | ICD-10-CM

## 2024-12-02 DIAGNOSIS — Z86.718 PERSONAL HISTORY OF DVT (DEEP VEIN THROMBOSIS): ICD-10-CM

## 2024-12-02 NOTE — PROGRESS NOTES
ANTICOAGULATION  MANAGEMENT-Home Monitor Managed by Exception    Manasa DIEGO Manolo 79 year old female is on warfarin with therapeutic INR result. (Goal INR 2.0-3.0)    Recent labs: (last 7 days)     11/30/24  0000   INR 2.4       Previous INR was Therapeutic  Medication, diet, health changes since last INR:chart reviewed; none identified  Contacted within the last 12 weeks by phone on 11/15/24  Due for next call no later than: 2/13/25.   Last [facility] referral date: 11/15/2024      AUBREY     Manasa was NOT contacted regarding therapeutic result today per home monitoring policy manage by exception agreement.   Current warfarin dose is to be continued:     Summary  As of 12/2/2024      Full warfarin instructions:  7.5 mg every Mon, Wed, Fri; 5 mg all other days   Next INR check:  12/13/2024             ?   Kyleigh Molina RN  Anticoagulation Clinic  12/2/2024    _______________________________________________________________________     Anticoagulation Episode Summary       Current INR goal:  2.0-3.0   TTR:  88.8% (1 y)   Target end date:  Indefinite   Send INR reminders to:  [facility]    Indications    Acute deep vein thrombosis (DVT) of axillary vein and internal jugular (H) (Resolved) [I82.A19]  Current use of long term anticoagulation (Resolved) [Z79.01]  Acute deep vein thrombosis (DVT) of axillary vein of right upper extremity (H) [I82.A11]  Personal history of DVT (deep vein thrombosis) [Z86.718]             Comments:  [employer] Home meter- Managed by exception             Anticoagulation Care Providers       Provider Role Specialty Phone number    Rema Braxton DO Referring Family Medicine [phone]    Jessica Anthony MD Referring Internal Medicine [phone]    Jana Giles MD Referring Internal Medicine [phone]    Christopher Smith MD Responsible Hematology [phone]

## 2024-12-04 LAB — INTERPRETATION SERPL IEP-IMP: NORMAL

## 2024-12-09 LAB
INTERPRETATION: NORMAL
ISCN: NORMAL
METHODS: NORMAL

## 2025-01-22 ENCOUNTER — OFFICE VISIT (OUTPATIENT)
Dept: OPHTHALMOLOGY | Facility: CLINIC | Age: 80
End: 2025-01-22
Attending: OPHTHALMOLOGY
Payer: MEDICARE

## 2025-01-22 DIAGNOSIS — H35.3132 INTERMEDIATE STAGE NONEXUDATIVE AGE-RELATED MACULAR DEGENERATION OF BOTH EYES: ICD-10-CM

## 2025-01-22 DIAGNOSIS — Z96.1 PSEUDOPHAKIA OF BOTH EYES: ICD-10-CM

## 2025-01-22 DIAGNOSIS — H40.1132 PRIMARY OPEN ANGLE GLAUCOMA OF BOTH EYES, MODERATE STAGE: Primary | ICD-10-CM

## 2025-01-22 DIAGNOSIS — H53.2 DOUBLE VISION: ICD-10-CM

## 2025-01-22 PROCEDURE — 92133 CPTRZD OPH DX IMG PST SGM ON: CPT | Performed by: OPHTHALMOLOGY

## 2025-01-22 PROCEDURE — G0463 HOSPITAL OUTPT CLINIC VISIT: HCPCS | Performed by: OPHTHALMOLOGY

## 2025-01-22 PROCEDURE — 92083 EXTENDED VISUAL FIELD XM: CPT | Performed by: OPHTHALMOLOGY

## 2025-01-22 ASSESSMENT — REFRACTION_WEARINGRX
OS_SPHERE: -2.00
OS_AXIS: 005
OD_SPHERE: -1.25
OS_CYLINDER: +0.75
OD_CYLINDER: +1.50
OD_AXIS: 001

## 2025-01-22 ASSESSMENT — CUP TO DISC RATIO
OD_RATIO: 0.85
OS_RATIO: 0.55

## 2025-01-22 ASSESSMENT — TONOMETRY
IOP_METHOD: TONOPEN
OD_IOP_MMHG: 18
OS_IOP_MMHG: 14

## 2025-01-22 ASSESSMENT — VISUAL ACUITY
OS_CC: 20/40
OD_CC: 20/30
METHOD: SNELLEN - LINEAR
OS_CC+: +1
OD_CC+: -1
CORRECTION_TYPE: GLASSES

## 2025-01-22 ASSESSMENT — SLIT LAMP EXAM - LIDS
COMMENTS: NORMAL
COMMENTS: NORMAL

## 2025-01-22 ASSESSMENT — EXTERNAL EXAM - LEFT EYE: OS_EXAM: NORMAL

## 2025-01-22 ASSESSMENT — EXTERNAL EXAM - RIGHT EYE: OD_EXAM: NORMAL

## 2025-01-22 NOTE — PROGRESS NOTES
HPI       Glaucoma Follow-Up     Additional comments: 5 month follow up Primary open angle glaucoma of both eyes, moderate stage          Last edited by Alexandro Vanessa on 1/22/2025 12:04 PM.          Review of systems for the eyes was negative other than the pertinent positives/negatives listed in the HPI.      Assessment & Plan      Manasa French is a 79 year old female with the following diagnoses:   1. Primary open angle glaucoma of both eyes, moderate stage    2. Intermediate stage nonexudative age-related macular degeneration of both eyes    3. Pseudophakia of both eyes    4. Double vision       Here for glaucoma recheck   s/p Selected laser trabeculoplasty (SLT) repeat right eye 7/23/24 (forth treatment)  Intraocular pressure remains borderline in the right eye  Goal is mid-teens both eyes     OCT Nerve fiber layer of poor reliability today due to nystagmus  Stable to improved visual field with LVC today in both eyes      Starting to notice more difficulty with news print.  Offered eval with Dr. Brooks.  She will consider and call to schedule as needed       Plan:              - Continue latanoprost qHS both eyes               - Continue timolol daily both eyes               - Return precautions reviewed          Patient disposition:   Return in about 6 months (around 7/22/2025) for DFE, LVC VF, OCT NFL.           Attending Physician Attestation:  Complete documentation of historical and exam elements from today's encounter can be found in the full encounter summary report (not reduplicated in this progress note).  I personally obtained the chief complaint(s) and history of present illness.  I confirmed and edited as necessary the review of systems, past medical/surgical history, family history, social history, and examination findings as documented by others; and I examined the patient myself.  I personally reviewed the relevant tests, images, and reports as documented above.  I formulated and edited as  necessary the assessment and plan and discussed the findings and management plan with the patient and family. The longitudinal plan of care for the diagnosis(es)/condition(s) as documented were addressed during this visit. Due to the added complexity in care, I will continue to support Manasa French in the subsequent management and with the ongoing continuity of care . - Jarred Leon MD

## 2025-02-08 DIAGNOSIS — E03.9 ACQUIRED HYPOTHYROIDISM: ICD-10-CM

## 2025-02-08 DIAGNOSIS — I82.A11 ACUTE DEEP VEIN THROMBOSIS (DVT) OF AXILLARY VEIN OF RIGHT UPPER EXTREMITY (H): ICD-10-CM

## 2025-02-10 RX ORDER — WARFARIN SODIUM 5 MG/1
TABLET ORAL
Qty: 110 TABLET | Refills: 3 | Status: SHIPPED | OUTPATIENT
Start: 2025-02-10

## 2025-02-10 RX ORDER — LEVOTHYROXINE SODIUM 112 UG/1
112 TABLET ORAL DAILY
Qty: 90 TABLET | Refills: 0 | Status: SHIPPED | OUTPATIENT
Start: 2025-02-10

## 2025-03-10 ENCOUNTER — TELEPHONE (OUTPATIENT)
Dept: FAMILY MEDICINE | Facility: CLINIC | Age: 80
End: 2025-03-10
Payer: MEDICARE

## 2025-03-10 DIAGNOSIS — Z78.0 ASYMPTOMATIC POSTMENOPAUSAL STATUS: ICD-10-CM

## 2025-03-10 DIAGNOSIS — M80.00XD AGE-RELATED OSTEOPOROSIS WITH CURRENT PATHOLOGICAL FRACTURE WITH ROUTINE HEALING, SUBSEQUENT ENCOUNTER: Primary | ICD-10-CM

## 2025-03-10 NOTE — TELEPHONE ENCOUNTER
Order/Referral Request    Who is requesting: Tisha Bates    Orders being requested: order    Reason service is needed/diagnosis: dexa scan    When are orders needed by: ASAP    Has this been discussed with Provider: Yes    Does patient have a preference on a Group/Provider/Facility? [facility]    Does patient have an appointment scheduled?: No    Where to send orders: Place orders within Epic    Okay to leave a detailed message?: Yes at Cell number on file:    Telephone Information:   Mobile [phone]

## 2025-03-10 NOTE — TELEPHONE ENCOUNTER
Routing to RN's, patient requesting orders for DEXA scan.      Yumiko CORTEZ MA on 3/10/2025 at 9:40 AM

## 2025-03-11 NOTE — TELEPHONE ENCOUNTER
2nd message left for patient to return our call back - please see below.      Yumiko CORTEZ MA on 3/11/2025 at 3:59 PM

## 2025-03-11 NOTE — TELEPHONE ENCOUNTER
LM for patient to return our call back - please let pt know that DEXA scan was ordered by PCP and she should receive a call from that dept shortly to schedule. Otherwise, she can also call [phone] to schedule.       Yumkio CORTEZ MA on 3/11/2025 at 12:30 PM

## 2025-03-20 ENCOUNTER — HOSPITAL ENCOUNTER (OUTPATIENT)
Dept: BONE DENSITY | Facility: CLINIC | Age: 80
Discharge: HOME | End: 2025-03-20
Attending: INTERNAL MEDICINE
Payer: MEDICARE

## 2025-03-20 DIAGNOSIS — Z78.0 ASYMPTOMATIC POSTMENOPAUSAL STATUS: ICD-10-CM

## 2025-03-20 PROCEDURE — 77080 DXA BONE DENSITY AXIAL: CPT

## 2025-04-07 ENCOUNTER — ANTICOAGULATION THERAPY VISIT (OUTPATIENT)
Dept: ANTICOAGULATION | Facility: CLINIC | Age: 80
End: 2025-04-07
Payer: MEDICARE

## 2025-04-07 DIAGNOSIS — Z86.718 PERSONAL HISTORY OF DVT (DEEP VEIN THROMBOSIS): ICD-10-CM

## 2025-04-07 DIAGNOSIS — I82.A11 ACUTE DEEP VEIN THROMBOSIS (DVT) OF AXILLARY VEIN OF RIGHT UPPER EXTREMITY (H): Primary | ICD-10-CM

## 2025-04-07 LAB — INR HOME MONITORING: 2.2 (ref 2–3)

## 2025-04-07 NOTE — PROGRESS NOTES
ANTICOAGULATION MANAGEMENT     Manasa French 79 year old female is on warfarin with therapeutic INR result. (Goal INR 2.0-3.0)    Recent labs: (last 7 days)     04/07/25  0000   INR 2.2       ASSESSMENT     Source(s): Chart Review and Patient/Caregiver Call     Warfarin doses taken: Warfarin taken as instructed  Diet: No new diet changes identified  Medication/supplement changes: None noted  New illness, injury, or hospitalization: No  Signs or symptoms of bleeding or clotting: No  Previous result: Therapeutic last 2(+) visits  Additional findings: Prefers to test on Fridays       PLAN     Recommended plan for no diet, medication or health factor changes affecting INR     Dosing Instructions: Continue your current warfarin dose with next INR in 2 weeks       Summary  As of 4/7/2025      Full warfarin instructions:  7.5 mg every Mon, Wed, Fri; 5 mg all other days   Next INR check:  4/18/2025               Telephone call with Rhonda who verbalizes understanding and agrees to plan    Patient to recheck with home meter    Education provided: Please call back if any changes to your diet, medications or how you've been taking warfarin  Symptom monitoring: monitoring for bleeding signs and symptoms and monitoring for clotting signs and symptoms    Plan made per [facility] anticoagulation protocol    Brandi Arriola, RN  4/7/2025  Anticoagulation Clinic  meets for routing messages: p [facility]ITOR[facility]  [facility] patient phone line: [phone]        _______________________________________________________________________     Anticoagulation Episode Summary       Current INR goal:  2.0-3.0   TTR:  88.7% (1 y)   Target end date:  Indefinite   Send INR reminders to:  [facility]    Indications    Acute deep vein thrombosis (DVT) of axillary vein and internal jugular (H) (Resolved) [I82.A19]  Current use of long term anticoagulation (Resolved) [Z79.01]  Acute deep vein thrombosis (DVT) of axillary vein of  right upper extremity (H) [I82.A11]  Personal history of DVT (deep vein thrombosis) [Z86.718]             Comments:  [employer] [facility] meter- Managed by exception             Anticoagulation Care Providers       Provider Role Specialty Phone number    Rema Braxton DO Referring Family Medicine [phone]    Jessica Anthony MD Referring Internal Medicine [phone]    Jana Giles MD Referring Internal Medicine [phone]    Christopher Smith MD Responsible Hematology [phone]

## 2025-04-22 ENCOUNTER — TELEPHONE (OUTPATIENT)
Dept: OPHTHALMOLOGY | Facility: CLINIC | Age: 80
End: 2025-04-22
Payer: MEDICARE

## 2025-04-22 DIAGNOSIS — E03.9 ACQUIRED HYPOTHYROIDISM: ICD-10-CM

## 2025-04-22 DIAGNOSIS — H40.1132 PRIMARY OPEN ANGLE GLAUCOMA OF BOTH EYES, MODERATE STAGE: ICD-10-CM

## 2025-04-22 RX ORDER — LEVOTHYROXINE SODIUM 112 UG/1
112 TABLET ORAL DAILY
Qty: 90 TABLET | Refills: 0 | Status: SHIPPED | OUTPATIENT
Start: 2025-04-22

## 2025-04-22 RX ORDER — LATANOPROST 50 UG/ML
1 SOLUTION/ DROPS OPHTHALMIC AT BEDTIME
Qty: 7.5 ML | Refills: 3 | Status: SHIPPED | OUTPATIENT
Start: 2025-04-22

## 2025-04-22 NOTE — TELEPHONE ENCOUNTER
"  latanoprost (XALATAN) 0.005 % ophthalmic solution   Start: 05/05/2024   Disp 7.5 ml   R  3   INSTILL 1 DROP INTO BOTH EYES AT BEDTIME     Jarred Leon MD  Ophthalmology  Lv 1/22/25  Nv  7/23/25    \"- Continue latanoprost qHS both eyes \"    Refill decision: Medication refilled per  Medication Refill in Ambulatory Care  policy.per eye clinic .    Request from pharmacy:  Requested Prescriptions   Signed Prescriptions Disp Refills    latanoprost (XALATAN) 0.005 % ophthalmic solution 7.5 mL 3     Sig: Place 1 drop into both eyes at bedtime.       There is no refill protocol information for this order                        "

## 2025-04-22 NOTE — TELEPHONE ENCOUNTER
[facility] Call Center    Phone Message    May a detailed message be left on voicemail: yes     Reason for Call: Medication Refill Request    Has the patient contacted the pharmacy for the refill? Yes     Name of medication being requested: latanoprost (XALATAN) 0.005 % ophthalmic solution     Provider who prescribed the medication: Dr. Leon    Pharmacy:   [facility] - [place], [place] - [address]  [address] [address]  Phone: [phone] Fax: [phone]       Date medication is needed: ASAP     Action Taken: Other: eye    Travel Screening: Not Applicable

## 2025-04-23 ENCOUNTER — ALLIED HEALTH/NURSE VISIT (OUTPATIENT)
Dept: ENDOCRINOLOGY | Facility: CLINIC | Age: 80
End: 2025-04-23
Payer: MEDICARE

## 2025-04-23 DIAGNOSIS — M80.08XD AGE-RELATED OSTEOPOR WITH CURR PATHOL FX OF VERTEBRA WITH ROUTINE HEAL: Primary | ICD-10-CM

## 2025-04-23 DIAGNOSIS — Z92.29 HISTORY OF BISPHOSPHONATE THERAPY: ICD-10-CM

## 2025-04-23 PROCEDURE — 96372 THER/PROPH/DIAG INJ SC/IM: CPT | Performed by: INTERNAL MEDICINE

## 2025-04-23 PROCEDURE — 99207 PR NO CHARGE NURSE ONLY: CPT | Performed by: INTERNAL MEDICINE

## 2025-04-23 NOTE — PROGRESS NOTES
Clinic Administered Medication Documentation      Prolia Documentation    Indication: Prolia  (denosumab) is a prescription medicine used to treat osteoporosis in patients who:   Are at high risk for fracture, meaning patients who have had a fracture related to osteoporosis, or who have multiple risk factors for fracture.  Cannot use another osteoporosis medicine or other osteoporosis medicines did not work well.  The timeline for early/late injections would be 4 weeks early and any time after the 6 month hemal. If a patient receives their injection late, then the subsequent injection would be 6 months from the date that they actually received the injection.    When was the last injection?  10/23/24  Was the last injection at least 6 months ago? Yes  Has the prior authorization been completed?  Yes  Is there an active order (written within the past 365 days, with administrations remaining, not ) in the chart?  Yes   GFR Estimate   Date Value Ref Range Status   2024 40 (L) >60 mL/min/1.73m2 Final     Comment:     eGFR calculated using  CKD-EPI equation.   2020 68 >60 mL/min/[1.73_m2] Final     Comment:     Non  GFR Calc  Starting 2018, serum creatinine based estimated GFR (eGFR) will be   calculated using the Chronic Kidney Disease Epidemiology Collaboration   (CKD-EPI) equation.       Has patient had a GFR within the last 12 months? Yes   Is GFR under 30, or patient has a diagnosis of CKD4 or CKD5? No   Patient denies gastric bypass or parathyroid surgery in past 6 months? Yes - patient denies.   Patient denies undergoing any dental procedures involving drilling into the bone, such as implants, extractions, or oral surgery, within the past two months that have not yet healed?  Yes - patient denies  Patient denies plans for an emergency tooth extraction within the next week? Yes    The following steps were completed to comply with the REMS program for Prolia:  Reviewed  information in the Medication Guide, including the serious risks of Prolia  and the symptoms of each risk.  Advised patient to seek prompt medical attention if they have signs or symptoms of any of the serious risks.  Provided each patient a copy of the Medication Guide and Patient Guide.    Prior to injection, verified patient identity using patient's name and date of birth. Medication was administered. Please see MAR and medication order for additional information. Patient instructed to remain in clinic for 15 minutes and report any adverse reaction to staff immediately.    Vial/Syringe: Syringe  Was this medication supplied by the patient? No  Verified that the patient has administrations remaining in their prescription.     Manasa French comes into clinic today at the request of Dr Hernandez Ordering Provider for Med Injection only Prolia .    This service provided today was under the supervising provider of the day Dr Tran, who was available if needed.    Silas Dong RN

## 2025-04-23 NOTE — PATIENT INSTRUCTIONS
You received your Prolia injection today  Your next injection is due in 6 months, scheduled 10/22/25  If you plan on having any dental work done within the next 6 months, please let your dentist know that you are on this medication.   Make sure you do not have any dental work completed involving the jaw bone within 1 month prior to your scheduled injection.   Provided Prolia VIS Sheet to patient.     Silas Dong RN
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
## 2025-05-09 DIAGNOSIS — R42 DIZZINESS: ICD-10-CM

## 2025-05-09 DIAGNOSIS — I10 BENIGN ESSENTIAL HYPERTENSION: ICD-10-CM

## 2025-05-13 RX ORDER — HYDROCHLOROTHIAZIDE 12.5 MG/1
12.5 TABLET ORAL
Qty: 90 TABLET | Refills: 3 | Status: SHIPPED | OUTPATIENT
Start: 2025-05-13

## 2025-05-16 ENCOUNTER — RESULTS FOLLOW-UP (OUTPATIENT)
Dept: NURSING | Facility: CLINIC | Age: 80
End: 2025-05-16

## 2025-05-21 ENCOUNTER — OFFICE VISIT (OUTPATIENT)
Dept: FAMILY MEDICINE | Facility: CLINIC | Age: 80
End: 2025-05-21
Payer: MEDICARE

## 2025-05-21 VITALS
RESPIRATION RATE: 18 BRPM | TEMPERATURE: 97.2 F | SYSTOLIC BLOOD PRESSURE: 164 MMHG | BODY MASS INDEX: 25.39 KG/M2 | OXYGEN SATURATION: 99 % | WEIGHT: 148.7 LBS | HEART RATE: 72 BPM | DIASTOLIC BLOOD PRESSURE: 71 MMHG | HEIGHT: 64 IN

## 2025-05-21 DIAGNOSIS — D64.9 ANEMIA, UNSPECIFIED TYPE: ICD-10-CM

## 2025-05-21 DIAGNOSIS — E11.40 TYPE 2 DIABETES MELLITUS WITH DIABETIC NEUROPATHY, WITHOUT LONG-TERM CURRENT USE OF INSULIN (H): Primary | ICD-10-CM

## 2025-05-21 DIAGNOSIS — N18.32 CHRONIC KIDNEY DISEASE, STAGE 3B (H): ICD-10-CM

## 2025-05-21 DIAGNOSIS — I10 BENIGN ESSENTIAL HYPERTENSION: ICD-10-CM

## 2025-05-21 DIAGNOSIS — G91.8 OTHER HYDROCEPHALUS (H): ICD-10-CM

## 2025-05-21 DIAGNOSIS — E89.0 POSTABLATIVE HYPOTHYROIDISM: ICD-10-CM

## 2025-05-21 LAB
ERYTHROCYTE [DISTWIDTH] IN BLOOD BY AUTOMATED COUNT: 14.1 % (ref 10–15)
EST. AVERAGE GLUCOSE BLD GHB EST-MCNC: 140 MG/DL
HBA1C MFR BLD: 6.5 % (ref 0–5.6)
HCT VFR BLD AUTO: 35.5 % (ref 35–47)
HGB BLD-MCNC: 11.2 G/DL (ref 11.7–15.7)
MCH RBC QN AUTO: 30.7 PG (ref 26.5–33)
MCHC RBC AUTO-ENTMCNC: 31.5 G/DL (ref 31.5–36.5)
MCV RBC AUTO: 97 FL (ref 78–100)
PLATELET # BLD AUTO: 214 10E3/UL (ref 150–450)
RBC # BLD AUTO: 3.65 10E6/UL (ref 3.8–5.2)
WBC # BLD AUTO: 7.1 10E3/UL (ref 4–11)

## 2025-05-21 PROCEDURE — 1126F AMNT PAIN NOTED NONE PRSNT: CPT | Performed by: INTERNAL MEDICINE

## 2025-05-21 PROCEDURE — 82570 ASSAY OF URINE CREATININE: CPT | Performed by: INTERNAL MEDICINE

## 2025-05-21 PROCEDURE — 84439 ASSAY OF FREE THYROXINE: CPT | Performed by: INTERNAL MEDICINE

## 2025-05-21 PROCEDURE — 3078F DIAST BP <80 MM HG: CPT | Performed by: INTERNAL MEDICINE

## 2025-05-21 PROCEDURE — 84443 ASSAY THYROID STIM HORMONE: CPT | Performed by: INTERNAL MEDICINE

## 2025-05-21 PROCEDURE — 83540 ASSAY OF IRON: CPT | Performed by: INTERNAL MEDICINE

## 2025-05-21 PROCEDURE — 83036 HEMOGLOBIN GLYCOSYLATED A1C: CPT | Performed by: INTERNAL MEDICINE

## 2025-05-21 PROCEDURE — 82043 UR ALBUMIN QUANTITATIVE: CPT | Performed by: INTERNAL MEDICINE

## 2025-05-21 PROCEDURE — 99214 OFFICE O/P EST MOD 30 MIN: CPT | Performed by: INTERNAL MEDICINE

## 2025-05-21 PROCEDURE — 36415 COLL VENOUS BLD VENIPUNCTURE: CPT | Performed by: INTERNAL MEDICINE

## 2025-05-21 PROCEDURE — 83550 IRON BINDING TEST: CPT | Performed by: INTERNAL MEDICINE

## 2025-05-21 PROCEDURE — 85027 COMPLETE CBC AUTOMATED: CPT | Performed by: INTERNAL MEDICINE

## 2025-05-21 PROCEDURE — 3044F HG A1C LEVEL LT 7.0%: CPT | Performed by: INTERNAL MEDICINE

## 2025-05-21 PROCEDURE — 3077F SYST BP >= 140 MM HG: CPT | Performed by: INTERNAL MEDICINE

## 2025-05-21 PROCEDURE — 82728 ASSAY OF FERRITIN: CPT | Performed by: INTERNAL MEDICINE

## 2025-05-21 RX ORDER — LOSARTAN POTASSIUM 100 MG/1
100 TABLET ORAL DAILY
Qty: 90 TABLET | Refills: 3 | Status: SHIPPED | OUTPATIENT
Start: 2025-05-21

## 2025-05-21 ASSESSMENT — PAIN SCALES - GENERAL: PAINLEVEL_OUTOF10: NO PAIN (0)

## 2025-05-21 NOTE — PROGRESS NOTES
"  Assessment & Plan     Type 2 diabetes mellitus with diabetic neuropathy, without long-term current use of insulin (H)  - HEMOGLOBIN A1C  - Albumin Random Urine Quantitative with Creat Ratio    Chronic kidney disease, stage 3b (H)  Stable.    Other hydrocephalus (H)  Stable.    Anemia, unspecified type  She is currently taking iron supplements prescribed by hematology.  Patient reports frequent stools and dark tarry stools.  Discussed that the color of stools is due to iron.  Will check levels and if within normal range, should be able to discontinue the supplement.  Advised that she should eat an iron rich diet.  - IRON AND IRON BINDING CAPACITY  - FERRITIN  - CBC with Platelets (Today)    Postablative hypothyroidism  - TSH with free T4 reflex    Benign essential hypertension  BP readings: mostly within goal. Some are higher than 130 systolic only.  Patient has struggled with symptoms of lightheadedness and dizziness in the past.  Currently she does not have any of the symptoms mentioned above.  She is on losartan 100 mg daily and hydrochlorothiazide 12.5 mg daily.  This regimen is working very well for her.  Slight elevation in blood pressure is permissible as this prevents the symptoms of lightheadedness and dizziness which can be detrimental if leading to a fall.  Patient agrees with the plan.  - losartan (COZAAR) 100 MG tablet; Take 1 tablet (100 mg) by mouth daily.          BMI  Estimated body mass index is 25.52 kg/m  as calculated from the following:    Height as of this encounter: 1.626 m (5' 4\").    Weight as of this encounter: 67.4 kg (148 lb 11.2 oz).         Follow-up  No follow-ups on file.    Genaro Ellis is a 79 year old, presenting for the following health issues:  Follow Up      5/21/2025     1:15 PM   Additional Questions   Roomed by Dorita BUI MA   Accompanied by      History of Present Illness       Hypertension: She presents for follow up of hypertension.  She does check blood " "pressure  regularly outside of the clinic. Outside blood pressures have been over 140/90. She does not follow a low salt diet.     She eats 2-3 servings of fruits and vegetables daily.She consumes 1 sweetened beverage(s) daily.She exercises with enough effort to increase her heart rate 30 to 60 minutes per day.  She exercises with enough effort to increase her heart rate 7 days per week.   She is taking medications regularly.        Manasa French is here for follow up.  She is accompanied by her .        Objective    BP (!) 162/80 (BP Location: Right arm, Patient Position: Sitting, Cuff Size: Adult Regular)   Pulse 72   Temp 97.2  F (36.2  C) (Temporal)   Resp 18   Ht 1.626 m (5' 4\")   Wt 67.4 kg (148 lb 11.2 oz)   LMP  (LMP Unknown)   SpO2 99%   BMI 25.52 kg/m    Body mass index is 25.52 kg/m .  Physical Exam           Signed Electronically by: Jana Giles MD    "

## 2025-05-22 LAB
CREAT UR-MCNC: 73.8 MG/DL
FERRITIN SERPL-MCNC: 69 NG/ML (ref 11–328)
IRON BINDING CAPACITY (ROCHE): 281 UG/DL (ref 240–430)
IRON SATN MFR SERPL: 25 % (ref 15–46)
IRON SERPL-MCNC: 70 UG/DL (ref 37–145)
MICROALBUMIN UR-MCNC: <12 MG/L
MICROALBUMIN/CREAT UR: NORMAL MG/G{CREAT}
T4 FREE SERPL-MCNC: 1.45 NG/DL (ref 0.9–1.7)
TSH SERPL DL<=0.005 MIU/L-ACNC: 9.37 UIU/ML (ref 0.3–4.2)

## 2025-05-26 ENCOUNTER — RESULTS FOLLOW-UP (OUTPATIENT)
Dept: FAMILY MEDICINE | Facility: CLINIC | Age: 80
End: 2025-05-26

## 2025-05-26 DIAGNOSIS — E89.0 POSTABLATIVE HYPOTHYROIDISM: Primary | ICD-10-CM

## 2025-05-26 RX ORDER — LEVOTHYROXINE SODIUM 125 UG/1
125 TABLET ORAL
Qty: 90 TABLET | Refills: 0 | Status: SHIPPED | OUTPATIENT
Start: 2025-05-26

## 2025-05-29 DIAGNOSIS — H35.3132 INTERMEDIATE STAGE NONEXUDATIVE AGE-RELATED MACULAR DEGENERATION OF BOTH EYES: Primary | ICD-10-CM

## 2025-05-30 ENCOUNTER — RESULTS FOLLOW-UP (OUTPATIENT)
Dept: NURSING | Facility: CLINIC | Age: 80
End: 2025-05-30

## 2025-06-05 ENCOUNTER — OFFICE VISIT (OUTPATIENT)
Dept: FAMILY MEDICINE | Facility: CLINIC | Age: 80
End: 2025-06-05
Payer: MEDICARE

## 2025-06-05 ENCOUNTER — NURSE TRIAGE (OUTPATIENT)
Dept: FAMILY MEDICINE | Facility: CLINIC | Age: 80
End: 2025-06-05

## 2025-06-05 ENCOUNTER — ANCILLARY PROCEDURE (OUTPATIENT)
Dept: GENERAL RADIOLOGY | Facility: CLINIC | Age: 80
End: 2025-06-05
Attending: INTERNAL MEDICINE
Payer: MEDICARE

## 2025-06-05 VITALS
BODY MASS INDEX: 24.69 KG/M2 | RESPIRATION RATE: 20 BRPM | OXYGEN SATURATION: 96 % | SYSTOLIC BLOOD PRESSURE: 138 MMHG | HEIGHT: 64 IN | HEART RATE: 84 BPM | TEMPERATURE: 98.8 F | DIASTOLIC BLOOD PRESSURE: 72 MMHG | WEIGHT: 144.6 LBS

## 2025-06-05 DIAGNOSIS — R05.1 ACUTE COUGH: ICD-10-CM

## 2025-06-05 DIAGNOSIS — R05.1 ACUTE COUGH: Primary | ICD-10-CM

## 2025-06-05 LAB
FLUAV RNA SPEC QL NAA+PROBE: NEGATIVE
FLUBV RNA RESP QL NAA+PROBE: NEGATIVE
RSV RNA SPEC NAA+PROBE: NEGATIVE
SARS-COV-2 RNA RESP QL NAA+PROBE: NEGATIVE

## 2025-06-05 RX ORDER — BENZONATATE 200 MG/1
200 CAPSULE ORAL 3 TIMES DAILY PRN
Qty: 42 CAPSULE | Refills: 1 | Status: SHIPPED | OUTPATIENT
Start: 2025-06-05

## 2025-06-05 RX ORDER — CHLORPHENIRAMINE MALEATE AND DEXTROMETHORPHAN HYDROBROMIDE 4; 30 MG/1; MG/1
1 TABLET, FILM COATED ORAL EVERY 6 HOURS PRN
Qty: 30 TABLET | Refills: 2 | Status: SHIPPED | OUTPATIENT
Start: 2025-06-05

## 2025-06-05 ASSESSMENT — PAIN SCALES - GENERAL: PAINLEVEL_OUTOF10: SEVERE PAIN (8)

## 2025-06-05 NOTE — TELEPHONE ENCOUNTER
"Scheduled per disposition. Informed patient to call back if any symptoms worsen, change, or develop. Informed patient that nurse triage is 24/7.         Reason for Disposition   Lots of coughing    Additional Information   Negative: Sounds like a life-threatening emergency to the triager   Negative: Difficulty breathing, and not from stuffy nose (e.g., not relieved by cleaning out the nose)   Negative: SEVERE headache and has fever   Negative: Patient sounds very sick or weak to the triager   Negative: SEVERE sinus pain (e.g., excruciating)   Negative: Severe headache   Negative: Redness or swelling on the cheek, forehead, or around the eye   Negative: Fever > 103 F (39.4 C)   Negative: Fever > 101 F (38.3 C) and over 60 years of age   Negative: Fever > 100 F (37.8 C) and has diabetes mellitus or a weak immune system (e.g., HIV positive, cancer chemotherapy, organ transplant, splenectomy, chronic steroids)   Negative: Fever > 100 F (37.8 C) and bedridden (e.g., CVA, chronic illness, recovering from surgery)   Negative: Fever present > 3 days (72 hours)   Negative: Fever returns after gone for over 24 hours and symptoms worse or not improved   Negative: Sinus pain (not just congestion) and fever   Negative: Earache   Negative: Using nasal washes and pain medicine > 24 hours and sinus pain (lower forehead, cheekbone, or eye) persists   Negative: Sinus congestion (pressure, fullness) present > 10 days   Negative: Nasal discharge present > 10 days    Answer Assessment - Initial Assessment Questions  1. LOCATION: \"Where does it hurt?\"       Sinus pressure/pain forhead  2. ONSET: \"When did the sinus pain start?\"  (e.g., hours, days)       Symptoms started monday  3. SEVERITY: \"How bad is the pain?\"   (Scale 0-10; or none, mild, moderate or severe)      Moderate  4. RECURRENT SYMPTOM: \"Have you ever had sinus problems before?\" If Yes, ask: \"When was the last time?\" and \"What happened that time?\"       Pt states she gets " "sinus problems often and has allergies  5. NASAL CONGESTION: \"Is the nose blocked?\" If Yes, ask: \"Can you open it or must you breathe through your mouth?\"      Drains constantly pt states.  6. NASAL DISCHARGE: \"Do you have discharge from your nose?\" If so ask, \"What color?\"      Yellow  7. FEVER: \"Do you have a fever?\" If Yes, ask: \"What is it, how was it measured, and when did it start?\"       Denies  8. OTHER SYMPTOMS: \"Do you have any other symptoms?\" (e.g., sore throat, cough, earache, difficulty breathing)      L sided headache, coughing, yellow nasal cough discharge, sore throat  9. PREGNANCY: \"Is there any chance you are pregnant?\" \"When was your last menstrual period?\"      no    Protocols used: Sinus Pain or Congestion-A-[place]  Suzy Sharpe RN    "

## 2025-06-05 NOTE — PROGRESS NOTES
"  Assessment & Plan     Acute cough  I suspect a viral upper respiratory tract infection.  Check for identifiable or treatable viral infections with below nasal swab.  She would still be in the window for treatment if COVID test is positive.  Check chest x-ray to exclude pneumonia.  If workup nonrevealing, use below to help with symptoms in addition to continuing Tylenol  Suspect that symptoms would improve by the beginning of next week if this is a viral illness  I asked her to call if symptoms persist into next week  Consider treatment for sinusitis if that is the case  - Influenza A/B, RSV and SARS-CoV2 PCR (COVID-19); Future  - XR Chest 2 Views; Future  - Chlorpheniramine-DM (CORICIDIN HBP COUGH/COLD) 4-30 MG TABS; Take 1 tablet by mouth every 6 hours as needed (nasal congestion and nasal drainage).  - benzonatate (TESSALON) 200 MG capsule; Take 1 capsule (200 mg) by mouth 3 times daily as needed for cough.  - Influenza A/B, RSV and SARS-CoV2 PCR (COVID-19)            Follow-up  Return for recheck if symptoms persist.  Call or return if symptoms fail to improve after the weekend..  Patient instructed to return to clinic or contact us sooner if symptoms worsen or new symptoms develop.     Genaro Ellis is a 79 year old, presenting for the following health issues:  URI (Patient is here for symptoms of upper respiratory infection.  Patient feels congested, eyes hurt, and has ear pain.)      6/5/2025     1:55 PM   Additional Questions   Roomed by Mateo MOBLEY MA   Accompanied by Self     URI    History of Present Illness       Headaches:   Since the patient's last clinic visit, headaches are: worsened  The patient is getting headaches:  Daily  She is not able to do normal daily activities when she has a migraine.  The patient is taking the following rescue/relief medications:  Tylenol   Patient states \"I get only a small amount of relief\" from the rescue/relief medications.   The patient is taking the following " "medications to prevent migraines:  No medications to prevent migraines  In the past 4 weeks, the patient has gone to an Urgent Care or Emergency Room 0 times times due to headaches.   She is taking medications regularly.            79-year-old female with diabetes, thyroid disease, CKD, hypertension, history of DVT, with ventriculoperitoneal shunt in place    She describes a 3-day history of nasal congestion, rhinorrhea, cough with intermittent headache  She has not measured a fever    Symptoms improved with Tylenol    Cough is sometimes productive of clear sputum with a mild tinge of yellowness    No dyspnea    She did not do any home COVID testing      Objective    /72 (BP Location: Right arm, Patient Position: Sitting, Cuff Size: Adult Regular)   Pulse 84   Temp 98.8  F (37.1  C) (Temporal)   Resp 20   Ht 1.626 m (5' 4\")   Wt 65.6 kg (144 lb 9.6 oz)   LMP  (LMP Unknown)   SpO2 96%   BMI 24.82 kg/m    Body mass index is 24.82 kg/m .  Physical Exam   GENERAL: alert and no distress  EYES: Eyes grossly normal to inspection, PERRL and conjunctivae and sclerae normal  HENT: ear canals and TM's normal, nose and mouth without ulcers or lesions  NECK: no adenopathy, no asymmetry, masses, or scars  RESP: lungs clear to auscultation - no rales, rhonchi or wheezes  CV: Heart with regular rate and rhythm.   NEURO: Normal strength and tone, mentation intact and speech normal  PSYCH: mentation appears normal, affect normal/bright    COVID/flu/RSV test pending.  Chest x-ray pending        Signed Electronically by: Reynaldo Fry MD    "

## 2025-06-06 ENCOUNTER — RESULTS FOLLOW-UP (OUTPATIENT)
Dept: FAMILY MEDICINE | Facility: CLINIC | Age: 80
End: 2025-06-06

## 2025-06-08 ENCOUNTER — HOSPITAL ENCOUNTER (INPATIENT)
Facility: CLINIC | Age: 80
LOS: 4 days | Discharge: HOME | DRG: 178 | End: 2025-06-12
Attending: EMERGENCY MEDICINE | Admitting: HOSPITALIST
Payer: MEDICARE

## 2025-06-08 ENCOUNTER — APPOINTMENT (OUTPATIENT)
Dept: CT IMAGING | Facility: CLINIC | Age: 80
DRG: 178 | End: 2025-06-08
Attending: EMERGENCY MEDICINE
Payer: MEDICARE

## 2025-06-08 ENCOUNTER — NURSE TRIAGE (OUTPATIENT)
Dept: NURSING | Facility: CLINIC | Age: 80
End: 2025-06-08
Payer: MEDICARE

## 2025-06-08 ENCOUNTER — RESULTS ONLY (OUTPATIENT)
Dept: ADMINISTRATIVE | Facility: CLINIC | Age: 80
End: 2025-06-08

## 2025-06-08 DIAGNOSIS — R06.2 WHEEZING: ICD-10-CM

## 2025-06-08 DIAGNOSIS — J93.9 PNEUMOTHORAX ON LEFT: ICD-10-CM

## 2025-06-08 DIAGNOSIS — J85.0 NECROTIZING PNEUMONIA (H): ICD-10-CM

## 2025-06-08 DIAGNOSIS — I10 BENIGN ESSENTIAL HYPERTENSION: ICD-10-CM

## 2025-06-08 DIAGNOSIS — R13.10 DYSPHAGIA, UNSPECIFIED TYPE: Primary | ICD-10-CM

## 2025-06-08 DIAGNOSIS — E87.1 HYPONATREMIA: ICD-10-CM

## 2025-06-08 LAB
ANION GAP SERPL CALCULATED.3IONS-SCNC: 13 MMOL/L (ref 7–15)
ATRIAL RATE - MUSE: 86 BPM
ATRIAL RATE - MUSE: 88 BPM
BASE EXCESS BLDV CALC-SCNC: 1 MMOL/L (ref -3–3)
BASOPHILS # BLD AUTO: 0 10E3/UL (ref 0–0.2)
BASOPHILS NFR BLD AUTO: 0 %
BUN SERPL-MCNC: 14.8 MG/DL (ref 8–23)
CALCIUM SERPL-MCNC: 8.8 MG/DL (ref 8.8–10.4)
CHLORIDE SERPL-SCNC: 84 MMOL/L (ref 98–107)
CREAT SERPL-MCNC: 0.91 MG/DL (ref 0.51–0.95)
DIASTOLIC BLOOD PRESSURE - MUSE: NORMAL MMHG
DIASTOLIC BLOOD PRESSURE - MUSE: NORMAL MMHG
EGFRCR SERPLBLD CKD-EPI 2021: 63 ML/MIN/1.73M2
EOSINOPHIL # BLD AUTO: 0.2 10E3/UL (ref 0–0.7)
EOSINOPHIL NFR BLD AUTO: 2 %
ERYTHROCYTE [DISTWIDTH] IN BLOOD BY AUTOMATED COUNT: 13.3 % (ref 10–15)
FLUAV RNA SPEC QL NAA+PROBE: NEGATIVE
FLUBV RNA RESP QL NAA+PROBE: NEGATIVE
GLUCOSE BLDC GLUCOMTR-MCNC: 117 MG/DL (ref 70–99)
GLUCOSE BLDC GLUCOMTR-MCNC: 126 MG/DL (ref 70–99)
GLUCOSE SERPL-MCNC: 214 MG/DL (ref 70–99)
HCO3 BLDV-SCNC: 26 MMOL/L (ref 21–28)
HCO3 SERPL-SCNC: 24 MMOL/L (ref 22–29)
HCT VFR BLD AUTO: 33 % (ref 35–47)
HGB BLD-MCNC: 11.1 G/DL (ref 11.7–15.7)
IMM GRANULOCYTES # BLD: 0 10E3/UL
IMM GRANULOCYTES NFR BLD: 1 %
INR PPP: 2.73 (ref 0.85–1.15)
INTERPRETATION ECG - MUSE: NORMAL
INTERPRETATION ECG - MUSE: NORMAL
LACTATE BLD-SCNC: 1.6 MMOL/L (ref 0.7–2)
LYMPHOCYTES # BLD AUTO: 1.3 10E3/UL (ref 0.8–5.3)
LYMPHOCYTES NFR BLD AUTO: 18 %
MAGNESIUM SERPL-MCNC: 1.6 MG/DL (ref 1.7–2.3)
MCH RBC QN AUTO: 30.7 PG (ref 26.5–33)
MCHC RBC AUTO-ENTMCNC: 33.6 G/DL (ref 31.5–36.5)
MCV RBC AUTO: 91 FL (ref 78–100)
MONOCYTES # BLD AUTO: 0.7 10E3/UL (ref 0–1.3)
MONOCYTES NFR BLD AUTO: 9 %
MRSA DNA SPEC QL NAA+PROBE: NEGATIVE
NEUTROPHILS # BLD AUTO: 5.4 10E3/UL (ref 1.6–8.3)
NEUTROPHILS NFR BLD AUTO: 71 %
NRBC # BLD AUTO: 0 10E3/UL
NRBC BLD AUTO-RTO: 0 /100
OSMOLALITY SERPL: 264 MMOL/KG (ref 280–301)
OSMOLALITY UR: 478 MMOL/KG (ref 100–1200)
P AXIS - MUSE: 86 DEGREES
P AXIS - MUSE: 89 DEGREES
PCO2 BLDV: 46 MM HG (ref 40–50)
PH BLDV: 7.37 [PH] (ref 7.32–7.43)
PHOSPHATE SERPL-MCNC: 2.4 MG/DL (ref 2.5–4.5)
PLATELET # BLD AUTO: 205 10E3/UL (ref 150–450)
PO2 BLDV: 26 MM HG (ref 25–47)
POTASSIUM SERPL-SCNC: 4.1 MMOL/L (ref 3.4–5.3)
PR INTERVAL - MUSE: 198 MS
PR INTERVAL - MUSE: 216 MS
PROCALCITONIN SERPL IA-MCNC: 0.1 NG/ML
PROTHROMBIN TIME: 27.9 SECONDS (ref 11.8–14.8)
QRS DURATION - MUSE: 144 MS
QRS DURATION - MUSE: 148 MS
QT - MUSE: 424 MS
QT - MUSE: 428 MS
QTC - MUSE: 512 MS
QTC - MUSE: 513 MS
R AXIS - MUSE: 84 DEGREES
R AXIS - MUSE: 85 DEGREES
RBC # BLD AUTO: 3.62 10E6/UL (ref 3.8–5.2)
RSV RNA SPEC NAA+PROBE: NEGATIVE
SA TARGET DNA: NEGATIVE
SAO2 % BLDV: 46 % (ref 70–75)
SARS-COV-2 RNA RESP QL NAA+PROBE: NEGATIVE
SODIUM SERPL-SCNC: 120 MMOL/L (ref 135–145)
SODIUM SERPL-SCNC: 121 MMOL/L (ref 135–145)
SODIUM SERPL-SCNC: 123 MMOL/L (ref 135–145)
SODIUM SERPL-SCNC: 124 MMOL/L (ref 135–145)
SODIUM SERPL-SCNC: 124 MMOL/L (ref 135–145)
SYSTOLIC BLOOD PRESSURE - MUSE: NORMAL MMHG
SYSTOLIC BLOOD PRESSURE - MUSE: NORMAL MMHG
T AXIS - MUSE: 80 DEGREES
T AXIS - MUSE: 95 DEGREES
T4 FREE SERPL-MCNC: 1.27 NG/DL (ref 0.9–1.7)
TROPONIN T SERPL HS-MCNC: 18 NG/L
TROPONIN T SERPL HS-MCNC: 22 NG/L
TSH SERPL DL<=0.005 MIU/L-ACNC: 16.58 UIU/ML (ref 0.3–4.2)
VENTRICULAR RATE- MUSE: 86 BPM
VENTRICULAR RATE- MUSE: 88 BPM
WBC # BLD AUTO: 7.6 10E3/UL (ref 4–11)

## 2025-06-08 PROCEDURE — 250N000009 HC RX 250: Performed by: PHYSICIAN ASSISTANT

## 2025-06-08 PROCEDURE — 258N000003 HC RX IP 258 OP 636: Performed by: EMERGENCY MEDICINE

## 2025-06-08 PROCEDURE — 71275 CT ANGIOGRAPHY CHEST: CPT

## 2025-06-08 PROCEDURE — 93010 ELECTROCARDIOGRAM REPORT: CPT | Mod: XU | Performed by: INTERNAL MEDICINE

## 2025-06-08 PROCEDURE — 999N000157 HC STATISTIC RCP TIME EA 10 MIN

## 2025-06-08 PROCEDURE — 84145 PROCALCITONIN (PCT): CPT | Performed by: PHYSICIAN ASSISTANT

## 2025-06-08 PROCEDURE — 87205 SMEAR GRAM STAIN: CPT | Performed by: PHYSICIAN ASSISTANT

## 2025-06-08 PROCEDURE — 258N000003 HC RX IP 258 OP 636: Performed by: PHYSICIAN ASSISTANT

## 2025-06-08 PROCEDURE — 85610 PROTHROMBIN TIME: CPT | Performed by: EMERGENCY MEDICINE

## 2025-06-08 PROCEDURE — 99223 1ST HOSP IP/OBS HIGH 75: CPT | Performed by: PHYSICIAN ASSISTANT

## 2025-06-08 PROCEDURE — 84443 ASSAY THYROID STIM HORMONE: CPT | Performed by: PHYSICIAN ASSISTANT

## 2025-06-08 PROCEDURE — 250N000011 HC RX IP 250 OP 636: Mod: JZ | Performed by: PHYSICIAN ASSISTANT

## 2025-06-08 PROCEDURE — 36415 COLL VENOUS BLD VENIPUNCTURE: CPT | Performed by: EMERGENCY MEDICINE

## 2025-06-08 PROCEDURE — 96361 HYDRATE IV INFUSION ADD-ON: CPT

## 2025-06-08 PROCEDURE — 84100 ASSAY OF PHOSPHORUS: CPT | Performed by: PHYSICIAN ASSISTANT

## 2025-06-08 PROCEDURE — 85025 COMPLETE CBC W/AUTO DIFF WBC: CPT | Performed by: EMERGENCY MEDICINE

## 2025-06-08 PROCEDURE — 94640 AIRWAY INHALATION TREATMENT: CPT | Mod: 76

## 2025-06-08 PROCEDURE — 87637 SARSCOV2&INF A&B&RSV AMP PRB: CPT | Performed by: EMERGENCY MEDICINE

## 2025-06-08 PROCEDURE — 83935 ASSAY OF URINE OSMOLALITY: CPT | Performed by: PHYSICIAN ASSISTANT

## 2025-06-08 PROCEDURE — 83930 ASSAY OF BLOOD OSMOLALITY: CPT | Performed by: PHYSICIAN ASSISTANT

## 2025-06-08 PROCEDURE — 120N000001 HC R&B MED SURG/OB

## 2025-06-08 PROCEDURE — 80048 BASIC METABOLIC PNL TOTAL CA: CPT | Performed by: EMERGENCY MEDICINE

## 2025-06-08 PROCEDURE — 250N000011 HC RX IP 250 OP 636: Performed by: EMERGENCY MEDICINE

## 2025-06-08 PROCEDURE — 93005 ELECTROCARDIOGRAM TRACING: CPT

## 2025-06-08 PROCEDURE — 83735 ASSAY OF MAGNESIUM: CPT | Performed by: PHYSICIAN ASSISTANT

## 2025-06-08 PROCEDURE — 87641 MR-STAPH DNA AMP PROBE: CPT | Performed by: PHYSICIAN ASSISTANT

## 2025-06-08 PROCEDURE — 250N000013 HC RX MED GY IP 250 OP 250 PS 637: Performed by: PHYSICIAN ASSISTANT

## 2025-06-08 PROCEDURE — 87040 BLOOD CULTURE FOR BACTERIA: CPT | Performed by: EMERGENCY MEDICINE

## 2025-06-08 PROCEDURE — 84295 ASSAY OF SERUM SODIUM: CPT | Performed by: PHYSICIAN ASSISTANT

## 2025-06-08 PROCEDURE — 84439 ASSAY OF FREE THYROXINE: CPT | Performed by: PHYSICIAN ASSISTANT

## 2025-06-08 PROCEDURE — 94640 AIRWAY INHALATION TREATMENT: CPT

## 2025-06-08 PROCEDURE — 84484 ASSAY OF TROPONIN QUANT: CPT | Performed by: EMERGENCY MEDICINE

## 2025-06-08 PROCEDURE — 99285 EMERGENCY DEPT VISIT HI MDM: CPT | Mod: 25

## 2025-06-08 PROCEDURE — 250N000009 HC RX 250: Performed by: EMERGENCY MEDICINE

## 2025-06-08 PROCEDURE — 96360 HYDRATION IV INFUSION INIT: CPT | Mod: 59

## 2025-06-08 PROCEDURE — 87899 AGENT NOS ASSAY W/OPTIC: CPT | Performed by: PHYSICIAN ASSISTANT

## 2025-06-08 PROCEDURE — 82803 BLOOD GASES ANY COMBINATION: CPT

## 2025-06-08 PROCEDURE — 36415 COLL VENOUS BLD VENIPUNCTURE: CPT | Performed by: PHYSICIAN ASSISTANT

## 2025-06-08 PROCEDURE — 87070 CULTURE OTHR SPECIMN AEROBIC: CPT | Performed by: PHYSICIAN ASSISTANT

## 2025-06-08 PROCEDURE — 83605 ASSAY OF LACTIC ACID: CPT

## 2025-06-08 RX ORDER — NALOXONE HYDROCHLORIDE 0.4 MG/ML
0.4 INJECTION, SOLUTION INTRAMUSCULAR; INTRAVENOUS; SUBCUTANEOUS
Status: DISCONTINUED | OUTPATIENT
Start: 2025-06-08 | End: 2025-06-12 | Stop reason: HOSPADM

## 2025-06-08 RX ORDER — LEVOTHYROXINE SODIUM 125 UG/1
125 TABLET ORAL
Status: DISCONTINUED | OUTPATIENT
Start: 2025-06-09 | End: 2025-06-12 | Stop reason: HOSPADM

## 2025-06-08 RX ORDER — ACETAMINOPHEN 325 MG/1
975 TABLET ORAL EVERY 6 HOURS PRN
Status: DISCONTINUED | OUTPATIENT
Start: 2025-06-08 | End: 2025-06-12 | Stop reason: HOSPADM

## 2025-06-08 RX ORDER — VITAMIN B COMPLEX
25 TABLET ORAL
Status: DISCONTINUED | OUTPATIENT
Start: 2025-06-09 | End: 2025-06-12 | Stop reason: HOSPADM

## 2025-06-08 RX ORDER — GUAIFENESIN 200 MG/10ML
200 LIQUID ORAL EVERY 4 HOURS PRN
Status: DISCONTINUED | OUTPATIENT
Start: 2025-06-08 | End: 2025-06-12 | Stop reason: HOSPADM

## 2025-06-08 RX ORDER — OXYCODONE HYDROCHLORIDE 5 MG/1
5 TABLET ORAL EVERY 4 HOURS PRN
Status: DISCONTINUED | OUTPATIENT
Start: 2025-06-08 | End: 2025-06-12 | Stop reason: HOSPADM

## 2025-06-08 RX ORDER — DEXTROSE MONOHYDRATE 25 G/50ML
25-50 INJECTION, SOLUTION INTRAVENOUS
Status: DISCONTINUED | OUTPATIENT
Start: 2025-06-08 | End: 2025-06-12 | Stop reason: HOSPADM

## 2025-06-08 RX ORDER — MAGNESIUM SULFATE HEPTAHYDRATE 40 MG/ML
2 INJECTION, SOLUTION INTRAVENOUS ONCE
Status: COMPLETED | OUTPATIENT
Start: 2025-06-08 | End: 2025-06-08

## 2025-06-08 RX ORDER — NALOXONE HYDROCHLORIDE 0.4 MG/ML
0.2 INJECTION, SOLUTION INTRAMUSCULAR; INTRAVENOUS; SUBCUTANEOUS
Status: DISCONTINUED | OUTPATIENT
Start: 2025-06-08 | End: 2025-06-12 | Stop reason: HOSPADM

## 2025-06-08 RX ORDER — AMOXICILLIN 250 MG
1 CAPSULE ORAL 2 TIMES DAILY
Status: DISCONTINUED | OUTPATIENT
Start: 2025-06-08 | End: 2025-06-12 | Stop reason: HOSPADM

## 2025-06-08 RX ORDER — AMOXICILLIN 250 MG
2 CAPSULE ORAL 2 TIMES DAILY PRN
Status: DISCONTINUED | OUTPATIENT
Start: 2025-06-08 | End: 2025-06-12 | Stop reason: HOSPADM

## 2025-06-08 RX ORDER — CARBOXYMETHYLCELLULOSE SODIUM 10 MG/ML
1 GEL OPHTHALMIC 2 TIMES DAILY
Status: DISCONTINUED | OUTPATIENT
Start: 2025-06-08 | End: 2025-06-12 | Stop reason: HOSPADM

## 2025-06-08 RX ORDER — HYDROMORPHONE HCL IN WATER/PF 6 MG/30 ML
0.2 PATIENT CONTROLLED ANALGESIA SYRINGE INTRAVENOUS
Status: DISCONTINUED | OUTPATIENT
Start: 2025-06-08 | End: 2025-06-12 | Stop reason: HOSPADM

## 2025-06-08 RX ORDER — LANOLIN ALCOHOL/MO/W.PET/CERES
1000 CREAM (GRAM) TOPICAL
Status: DISCONTINUED | OUTPATIENT
Start: 2025-06-10 | End: 2025-06-12 | Stop reason: HOSPADM

## 2025-06-08 RX ORDER — METOCLOPRAMIDE HYDROCHLORIDE 5 MG/ML
10 INJECTION INTRAMUSCULAR; INTRAVENOUS ONCE
Status: COMPLETED | OUTPATIENT
Start: 2025-06-08 | End: 2025-06-08

## 2025-06-08 RX ORDER — HYDRALAZINE HYDROCHLORIDE 10 MG/1
10 TABLET, FILM COATED ORAL EVERY 4 HOURS PRN
Status: DISCONTINUED | OUTPATIENT
Start: 2025-06-08 | End: 2025-06-12 | Stop reason: HOSPADM

## 2025-06-08 RX ORDER — LIDOCAINE 40 MG/G
CREAM TOPICAL
Status: DISCONTINUED | OUTPATIENT
Start: 2025-06-08 | End: 2025-06-12 | Stop reason: HOSPADM

## 2025-06-08 RX ORDER — PIPERACILLIN SODIUM, TAZOBACTAM SODIUM 4; .5 G/20ML; G/20ML
4.5 INJECTION, POWDER, LYOPHILIZED, FOR SOLUTION INTRAVENOUS ONCE
Status: COMPLETED | OUTPATIENT
Start: 2025-06-08 | End: 2025-06-08

## 2025-06-08 RX ORDER — UREA 10 %
45 LOTION (ML) TOPICAL DAILY
Status: DISCONTINUED | OUTPATIENT
Start: 2025-06-09 | End: 2025-06-12 | Stop reason: HOSPADM

## 2025-06-08 RX ORDER — ACETAMINOPHEN 650 MG/1
650 SUPPOSITORY RECTAL EVERY 4 HOURS PRN
Status: DISCONTINUED | OUTPATIENT
Start: 2025-06-08 | End: 2025-06-12 | Stop reason: HOSPADM

## 2025-06-08 RX ORDER — AZITHROMYCIN MONOHYDRATE 500 MG/5ML
500 INJECTION, POWDER, LYOPHILIZED, FOR SOLUTION INTRAVENOUS EVERY 24 HOURS
Status: DISCONTINUED | OUTPATIENT
Start: 2025-06-08 | End: 2025-06-08

## 2025-06-08 RX ORDER — TIMOLOL MALEATE 5 MG/ML
1 SOLUTION/ DROPS OPHTHALMIC EVERY MORNING
Status: DISCONTINUED | OUTPATIENT
Start: 2025-06-09 | End: 2025-06-12 | Stop reason: HOSPADM

## 2025-06-08 RX ORDER — IOPAMIDOL 755 MG/ML
59 INJECTION, SOLUTION INTRAVASCULAR ONCE
Status: COMPLETED | OUTPATIENT
Start: 2025-06-08 | End: 2025-06-08

## 2025-06-08 RX ORDER — SODIUM CHLORIDE 9 MG/ML
INJECTION, SOLUTION INTRAVENOUS CONTINUOUS
Status: DISCONTINUED | OUTPATIENT
Start: 2025-06-08 | End: 2025-06-09

## 2025-06-08 RX ORDER — CALCIUM CARBONATE 500 MG/1
1000 TABLET, CHEWABLE ORAL 4 TIMES DAILY PRN
Status: DISCONTINUED | OUTPATIENT
Start: 2025-06-08 | End: 2025-06-12 | Stop reason: HOSPADM

## 2025-06-08 RX ORDER — GUAIFENESIN 600 MG/1
600 TABLET, EXTENDED RELEASE ORAL 2 TIMES DAILY
Status: DISCONTINUED | OUTPATIENT
Start: 2025-06-08 | End: 2025-06-12 | Stop reason: HOSPADM

## 2025-06-08 RX ORDER — ALBUTEROL SULFATE 0.83 MG/ML
3 SOLUTION RESPIRATORY (INHALATION)
Status: DISCONTINUED | OUTPATIENT
Start: 2025-06-08 | End: 2025-06-12 | Stop reason: HOSPADM

## 2025-06-08 RX ORDER — HYDRALAZINE HYDROCHLORIDE 20 MG/ML
10 INJECTION INTRAMUSCULAR; INTRAVENOUS EVERY 4 HOURS PRN
Status: DISCONTINUED | OUTPATIENT
Start: 2025-06-08 | End: 2025-06-12 | Stop reason: HOSPADM

## 2025-06-08 RX ORDER — SODIUM CHLORIDE 9 MG/ML
INJECTION, SOLUTION INTRAVENOUS CONTINUOUS
Status: DISCONTINUED | OUTPATIENT
Start: 2025-06-08 | End: 2025-06-08

## 2025-06-08 RX ORDER — DIPHENHYDRAMINE HYDROCHLORIDE 50 MG/ML
25 INJECTION, SOLUTION INTRAMUSCULAR; INTRAVENOUS EVERY 6 HOURS PRN
Status: DISCONTINUED | OUTPATIENT
Start: 2025-06-08 | End: 2025-06-12 | Stop reason: HOSPADM

## 2025-06-08 RX ORDER — ACETAMINOPHEN 325 MG/1
650 TABLET ORAL EVERY 4 HOURS PRN
Status: DISCONTINUED | OUTPATIENT
Start: 2025-06-08 | End: 2025-06-08

## 2025-06-08 RX ORDER — ONDANSETRON 4 MG/1
4 TABLET, ORALLY DISINTEGRATING ORAL EVERY 6 HOURS PRN
Status: DISCONTINUED | OUTPATIENT
Start: 2025-06-08 | End: 2025-06-08

## 2025-06-08 RX ORDER — ONDANSETRON 2 MG/ML
4 INJECTION INTRAMUSCULAR; INTRAVENOUS EVERY 6 HOURS PRN
Status: DISCONTINUED | OUTPATIENT
Start: 2025-06-08 | End: 2025-06-08

## 2025-06-08 RX ORDER — LATANOPROST 50 UG/ML
1 SOLUTION/ DROPS OPHTHALMIC AT BEDTIME
Status: DISCONTINUED | OUTPATIENT
Start: 2025-06-08 | End: 2025-06-12 | Stop reason: HOSPADM

## 2025-06-08 RX ORDER — ALBUTEROL SULFATE 0.83 MG/ML
3 SOLUTION RESPIRATORY (INHALATION) 4 TIMES DAILY
Status: DISCONTINUED | OUTPATIENT
Start: 2025-06-08 | End: 2025-06-12 | Stop reason: HOSPADM

## 2025-06-08 RX ORDER — LOSARTAN POTASSIUM 25 MG/1
100 TABLET ORAL EVERY EVENING
Status: DISCONTINUED | OUTPATIENT
Start: 2025-06-08 | End: 2025-06-12 | Stop reason: HOSPADM

## 2025-06-08 RX ORDER — DOXYCYCLINE 100 MG/10ML
100 INJECTION, POWDER, LYOPHILIZED, FOR SOLUTION INTRAVENOUS EVERY 12 HOURS
Status: DISCONTINUED | OUTPATIENT
Start: 2025-06-08 | End: 2025-06-09

## 2025-06-08 RX ORDER — AMOXICILLIN 250 MG
2 CAPSULE ORAL 2 TIMES DAILY
Status: DISCONTINUED | OUTPATIENT
Start: 2025-06-08 | End: 2025-06-12 | Stop reason: HOSPADM

## 2025-06-08 RX ORDER — AMOXICILLIN 250 MG
1 CAPSULE ORAL 2 TIMES DAILY PRN
Status: DISCONTINUED | OUTPATIENT
Start: 2025-06-08 | End: 2025-06-12 | Stop reason: HOSPADM

## 2025-06-08 RX ORDER — NICOTINE POLACRILEX 4 MG
15-30 LOZENGE BUCCAL
Status: DISCONTINUED | OUTPATIENT
Start: 2025-06-08 | End: 2025-06-12 | Stop reason: HOSPADM

## 2025-06-08 RX ORDER — ACETAMINOPHEN 650 MG/1
650 SUPPOSITORY RECTAL EVERY 4 HOURS PRN
Status: DISCONTINUED | OUTPATIENT
Start: 2025-06-08 | End: 2025-06-08

## 2025-06-08 RX ORDER — WARFARIN SODIUM 5 MG/1
5 TABLET ORAL
Status: COMPLETED | OUTPATIENT
Start: 2025-06-08 | End: 2025-06-08

## 2025-06-08 RX ORDER — BENZONATATE 100 MG/1
200 CAPSULE ORAL 3 TIMES DAILY PRN
Status: DISCONTINUED | OUTPATIENT
Start: 2025-06-08 | End: 2025-06-12 | Stop reason: HOSPADM

## 2025-06-08 RX ORDER — HYDROCHLOROTHIAZIDE 12.5 MG/1
12.5 TABLET ORAL DAILY
Status: DISCONTINUED | OUTPATIENT
Start: 2025-06-08 | End: 2025-06-12 | Stop reason: HOSPADM

## 2025-06-08 RX ORDER — PROCHLORPERAZINE MALEATE 5 MG/1
5 TABLET ORAL EVERY 6 HOURS PRN
Status: DISCONTINUED | OUTPATIENT
Start: 2025-06-08 | End: 2025-06-12 | Stop reason: HOSPADM

## 2025-06-08 RX ORDER — POLYETHYLENE GLYCOL 3350 17 G/17G
17 POWDER, FOR SOLUTION ORAL 2 TIMES DAILY PRN
Status: DISCONTINUED | OUTPATIENT
Start: 2025-06-08 | End: 2025-06-12 | Stop reason: HOSPADM

## 2025-06-08 RX ORDER — PIPERACILLIN SODIUM, TAZOBACTAM SODIUM 4; .5 G/20ML; G/20ML
4.5 INJECTION, POWDER, LYOPHILIZED, FOR SOLUTION INTRAVENOUS EVERY 6 HOURS
Status: DISCONTINUED | OUTPATIENT
Start: 2025-06-08 | End: 2025-06-09

## 2025-06-08 RX ADMIN — LATANOPROST 1 DROP: 50 SOLUTION OPHTHALMIC at 21:11

## 2025-06-08 RX ADMIN — METOCLOPRAMIDE 10 MG: 5 INJECTION, SOLUTION INTRAMUSCULAR; INTRAVENOUS at 12:24

## 2025-06-08 RX ADMIN — GUAIFENESIN 600 MG: 600 TABLET, EXTENDED RELEASE ORAL at 20:24

## 2025-06-08 RX ADMIN — HYDROMORPHONE HYDROCHLORIDE 0.2 MG: 0.2 INJECTION, SOLUTION INTRAMUSCULAR; INTRAVENOUS; SUBCUTANEOUS at 15:00

## 2025-06-08 RX ADMIN — DIPHENHYDRAMINE HYDROCHLORIDE 25 MG: 50 INJECTION, SOLUTION INTRAMUSCULAR; INTRAVENOUS at 12:25

## 2025-06-08 RX ADMIN — SODIUM CHLORIDE 85 ML: 9 INJECTION, SOLUTION INTRAVENOUS at 10:22

## 2025-06-08 RX ADMIN — OXYCODONE 5 MG: 5 TABLET ORAL at 20:25

## 2025-06-08 RX ADMIN — PIPERACILLIN AND TAZOBACTAM 4.5 G: 4; .5 INJECTION, POWDER, FOR SOLUTION INTRAVENOUS at 18:25

## 2025-06-08 RX ADMIN — IOPAMIDOL 59 ML: 755 INJECTION, SOLUTION INTRAVENOUS at 10:22

## 2025-06-08 RX ADMIN — VANCOMYCIN HYDROCHLORIDE 1750 MG: 10 INJECTION, POWDER, LYOPHILIZED, FOR SOLUTION INTRAVENOUS at 13:21

## 2025-06-08 RX ADMIN — SODIUM CHLORIDE: 9 INJECTION, SOLUTION INTRAVENOUS at 16:55

## 2025-06-08 RX ADMIN — LOSARTAN POTASSIUM 100 MG: 25 TABLET, FILM COATED ORAL at 20:24

## 2025-06-08 RX ADMIN — SODIUM CHLORIDE 1000 ML: 0.9 INJECTION, SOLUTION INTRAVENOUS at 09:45

## 2025-06-08 RX ADMIN — DOXYCYCLINE 100 MG: 100 INJECTION, POWDER, LYOPHILIZED, FOR SOLUTION INTRAVENOUS at 17:04

## 2025-06-08 RX ADMIN — SODIUM PHOSPHATE, MONOBASIC, MONOHYDRATE AND SODIUM PHOSPHATE, DIBASIC, ANHYDROUS 9 MMOL: 142; 276 INJECTION, SOLUTION INTRAVENOUS at 20:25

## 2025-06-08 RX ADMIN — WARFARIN SODIUM 5 MG: 5 TABLET ORAL at 20:31

## 2025-06-08 RX ADMIN — ALBUTEROL SULFATE 2.5 MG: 2.5 SOLUTION RESPIRATORY (INHALATION) at 20:53

## 2025-06-08 RX ADMIN — ALBUTEROL SULFATE 2.5 MG: 2.5 SOLUTION RESPIRATORY (INHALATION) at 15:37

## 2025-06-08 RX ADMIN — OXYCODONE 5 MG: 5 TABLET ORAL at 15:44

## 2025-06-08 RX ADMIN — CARBOXYMETHYLCELLULOSE SODIUM 1 DROP: 10 GEL OPHTHALMIC at 20:24

## 2025-06-08 RX ADMIN — MAGNESIUM SULFATE HEPTAHYDRATE 2 G: 40 INJECTION, SOLUTION INTRAVENOUS at 15:55

## 2025-06-08 RX ADMIN — ACETAMINOPHEN 650 MG: 325 TABLET, FILM COATED ORAL at 15:44

## 2025-06-08 RX ADMIN — HYDRALAZINE HYDROCHLORIDE 10 MG: 20 INJECTION INTRAMUSCULAR; INTRAVENOUS at 14:31

## 2025-06-08 RX ADMIN — PIPERACILLIN AND TAZOBACTAM 4.5 G: 4; .5 INJECTION, POWDER, FOR SOLUTION INTRAVENOUS at 12:12

## 2025-06-08 ASSESSMENT — ACTIVITIES OF DAILY LIVING (ADL)
ADLS_ACUITY_SCORE: 60
ADLS_ACUITY_SCORE: 60
ADLS_ACUITY_SCORE: 59
ADLS_ACUITY_SCORE: 60
ADLS_ACUITY_SCORE: 60
ADLS_ACUITY_SCORE: 59
ADLS_ACUITY_SCORE: 60
ADLS_ACUITY_SCORE: 59
ADLS_ACUITY_SCORE: 59

## 2025-06-08 ASSESSMENT — COLUMBIA-SUICIDE SEVERITY RATING SCALE - C-SSRS
2. HAVE YOU ACTUALLY HAD ANY THOUGHTS OF KILLING YOURSELF IN THE PAST MONTH?: NO
6. HAVE YOU EVER DONE ANYTHING, STARTED TO DO ANYTHING, OR PREPARED TO DO ANYTHING TO END YOUR LIFE?: NO
1. IN THE PAST MONTH, HAVE YOU WISHED YOU WERE DEAD OR WISHED YOU COULD GO TO SLEEP AND NOT WAKE UP?: NO

## 2025-06-08 NOTE — PHARMACY-VANCOMYCIN DOSING SERVICE
Pharmacy Vancomycin Initial Note  Date of Service 2025  Patient's  1945  79 year old, female    Indication: Necrotizing pneumonia    Current estimated CrCl = Estimated Creatinine Clearance: 46.5 mL/min (based on SCr of 0.91 mg/dL).    Creatinine for last 3 days  2025:  8:58 AM Creatinine 0.91 mg/dL    Recent Vancomycin Level(s) for last 3 days  No results found for requested labs within last 3 days.      Vancomycin IV Administrations (past 72 hours)                     vancomycin (VANCOCIN) 1,750 mg in 0.9% NaCl 500 mL intermittent infusion (mg) 1,750 mg Given 25 1321                    Nephrotoxins and other renal medications (From now, onward)      Start     Dose/Rate Route Frequency Ordered Stop    25 1300  vancomycin (VANCOCIN) 1,250 mg in 0.9% NaCl 250 mL intermittent infusion         1,250 mg  over 90 Minutes Intravenous EVERY 24 HOURS 25 1630      25 1800  piperacillin-tazobactam (ZOSYN) 4.5 g vial to attach to  mL bag         4.5 g  over 30 Minutes Intravenous EVERY 6 HOURS 25 1335 06/15/25 1759            Contrast Orders - past 72 hours (72h ago, onward)      Start     Dose/Rate Route Frequency Stop    25 0955  iopamidol (ISOVUE-370) solution 59 mL         59 mL Intravenous ONCE 25 1022            [employer] Prediction of Planned Initial Vancomycin Regimen  Loading dose: 1750 mg IV once  Regimen: 1250 mg IV every 24 hours.  Start time: 13:21 on 2025  Exposure target: AUC24 (range) 400-600 mg/L.hr   AUC24,ss: 532 mg/L.hr  Probability of AUC24 > 400: 80 %  Ctrough,ss: 15.8 mg/L  Probability of Ctrough,ss > 20: 29 %  Probability of nephrotoxicity (Lodise KAEL ): 11 %        Plan:  Start vancomycin  1250 mg IV q24h.   Vancomycin monitoring method: AUC  Vancomycin therapeutic monitoring goal: 400-600 mg*h/L  Pharmacy will check vancomycin levels as appropriate in 1-3 Days.    Serum creatinine levels will be ordered daily for the first  week of therapy and at least twice weekly for subsequent weeks.      Shana Berry, RPH

## 2025-06-08 NOTE — TELEPHONE ENCOUNTER
Patient has been sick all week and saw Dr. Fry on Thursday and had viral testing and CXR completed. All negative. She is calling now as it hurts to breath in and is coughing frequently. The pain is all on the left side. Cough is productive at times of clear phlegm. Denies wheezing. Patient also having a terrible headache. Patient was prescribed two cough medicines but they are not helping with her cough. Denies fever. Patient unable to take a deep breath due to the pain. Patient also using her albuterol inhaler every 4 hours but does not seem to help.    Protocol recommends ED now  Patient care advice given.  will drive to [facility] ED now  Lucy Cuadra RN   06/08/25 8:09 AM  [facility] Nurse Advisor      Reason for Disposition   [1] MODERATE difficulty breathing (e.g., speaks in phrases, SOB even at rest, pulse 100-120) AND [2] still present when not coughing    Additional Information   Negative: SEVERE difficulty breathing (e.g., struggling for each breath, speaks in single words)   Negative: Bluish (or gray) lips or face now   Negative: [1] Difficulty breathing AND [2] exposure to flames, smoke, or fumes   Negative: [1] Stridor AND [2] difficulty breathing   Negative: Sounds like a life-threatening emergency to the triager   Negative: [1] Previous asthma attacks AND [2] this feels like asthma attack   Negative: Dry cough (non-productive;  no sputum or minimal clear sputum)    Protocols used: Cough - Acute [id number]

## 2025-06-08 NOTE — ED PROVIDER NOTES
"  Emergency Department Note      History of Present Illness     Chief Complaint   Cough and Chest Pain      HPI   Manasa French is a 79 year old female on Warfarin for DVT with history of type 2 diabetes, hypertension who presents for evaluation of a cough and chest pain. The patient states that for the last week she has been dealing with an ongoing cough, chest discomfort, and severe headache to the back left of her head. She was seen 3 days ago for these symptoms and had a COVID, FLU, RSV, and CXR which all returned negative at that time. Her symptoms have continued to worsen since she was seen and she just feels overall malaise to her entire body. Her cough is occasionally productive and she has felt nausea intermittently throughout her illness. She does occasionally feel \"woozy\" when she gets up at night to urinate. She denies any fevers, recent falls, injuries, chills, weakness on one side of her body, slurred speech, confusion, no known sick contacts, no cancer treatments, or severe dizziness.    Independent Historian   None    Review of External Notes   None    Past Medical History     Medical History and Problem List   Hypertension  Graves' disease  Hydrocephalus  Osteoporosis  DVT  POAG  Pseudophakia  Type 2 diabetes  Vitamin B12 deficiency  Subdural hematoma  Asthma  Diverticulitis of colon     Medications   Albuterol  Amoxil  Tessalon  Synthroid  Cozaar  Metformin  Warfarin  Robaxin     Surgical History   Right total hip arthroplasty  Lumbar fusion, L5-S1  Hernia repair  Recession resection, bilateral  CSF ventricular shunt    Physical Exam     Patient Vitals for the past 24 hrs:   BP Temp Temp src Pulse Resp SpO2 Height Weight   06/08/25 1537 -- -- -- -- -- 94 % -- --   06/08/25 1500 (!) 158/76 -- -- 84 20 96 % -- --   06/08/25 1446 -- -- -- -- -- -- 1.626 m (5' 4\") 67.5 kg (148 lb 12.8 oz)   06/08/25 1421 (!) 196/94 98.1  F (36.7  C) Oral 84 20 98 % -- --   06/08/25 1345 (!) 162/80 -- -- 76 21 -- -- " "--   06/08/25 1330 -- -- -- 85 25 -- -- --   06/08/25 1315 (!) 153/75 -- -- 82 27 94 % -- --   06/08/25 1300 (!) 185/81 -- -- 84 24 96 % -- --   06/08/25 1245 (!) 176/84 -- -- 84 20 95 % -- --   06/08/25 1230 -- -- -- 84 23 97 % -- --   06/08/25 1215 (!) 184/86 -- -- 78 19 97 % -- --   06/08/25 1200 (!) 183/80 -- -- 81 26 97 % -- --   06/08/25 1130 -- -- -- 80 27 95 % -- --   06/08/25 1115 (!) 156/76 -- -- 77 21 96 % -- --   06/08/25 1100 -- -- -- 83 24 96 % -- --   06/08/25 1045 (!) 189/80 -- -- 79 23 97 % -- --   06/08/25 1015 -- -- -- 78 19 95 % -- --   06/08/25 1000 (!) 152/75 -- -- 77 21 95 % -- --   06/08/25 0945 (!) 158/89 -- -- 79 24 95 % -- --   06/08/25 0855 (!) 184/74 97.8  F (36.6  C) -- 95 20 96 % 1.626 m (5' 4\") 67.1 kg (148 lb)     Physical Exam  Constitutional: Vital signs reviewed as above  General: Alert, pleasant. Tired appearing.  HEENT: Moist mucous membranes  Eyes: Pupils are equal, round, and reactive to light.   Neck: Normal range of motion  Cardiovascular: normal rate, Regular rhythm and normal heart sounds.  Mo MRG  Pulmonary/Chest: Effort normal. Coarse breath sounds at left base with occasional dry cough. No respiratory distress. Patient has no wheezes. Patient has no rales.   Gastrointestinal: Soft. Positive bowel sounds. No MRG.  Musculoskeletal/Extremities: Full ROM.  Endo: No pitting edema  Neurological: A/O x 3. CN-II-XII intact bilaterally. No pronator drift. Normal strength and sensation throughout all 4 extremities.  Skin: Skin is warm and dry.   Psychiatric: Pleasant    Diagnostics     Lab Results   Labs Ordered and Resulted from Time of ED Arrival to Time of ED Departure   BASIC METABOLIC PANEL - Abnormal       Result Value    Sodium 121 (*)     Potassium 4.1      Chloride 84 (*)     Carbon Dioxide (CO2) 24      Anion Gap 13      Urea Nitrogen 14.8      Creatinine 0.91      GFR Estimate 63      Calcium 8.8      Glucose 214 (*)    TROPONIN T, HIGH SENSITIVITY - Abnormal    " Troponin T, High Sensitivity 22 (*)    CBC WITH PLATELETS AND DIFFERENTIAL - Abnormal    WBC Count 7.6      RBC Count 3.62 (*)     Hemoglobin 11.1 (*)     Hematocrit 33.0 (*)     MCV 91      MCH 30.7      MCHC 33.6      RDW 13.3      Platelet Count 205      % Neutrophils 71      % Lymphocytes 18      % Monocytes 9      % Eosinophils 2      % Basophils 0      % Immature Granulocytes 1      NRBCs per 100 WBC 0      Absolute Neutrophils 5.4      Absolute Lymphocytes 1.3      Absolute Monocytes 0.7      Absolute Eosinophils 0.2      Absolute Basophils 0.0      Absolute Immature Granulocytes 0.0      Absolute NRBCs 0.0     INR - Abnormal    INR 2.73 (*)     PT 27.9 (*)    TROPONIN T, HIGH SENSITIVITY - Abnormal    Troponin T, High Sensitivity 18 (*)    ISTAT GASES LACTATE VENOUS POCT - Abnormal    Lactic Acid POCT 1.6      Bicarbonate Venous POCT 26      O2 Sat, Venous POCT 46 (*)     pCO2 Venous POCT 46      pH Venous POCT 7.37      pO2 Venous POCT 26      Base Excess/Deficit (+/-) POCT 1.0     INFLUENZA A/B, RSV AND SARS-COV2 PCR - Normal    Influenza A PCR Negative      Influenza B PCR Negative      RSV PCR Negative      SARS CoV2 PCR Negative     OSMOLALITY, RANDOM URINE   BLOOD CULTURE   BLOOD CULTURE   LEGIONELLA URINARY ANTIGEN AND STREPTOCOCCUS PNEUMONIAE ANTIGEN   RESPIRATORY AEROBIC BACTERIAL CULTURE   MRSA MSSA PCR, NASAL SWAB     Imaging   CT Chest Pulmonary Embolism w Contrast   Final Result   IMPRESSION:   1.  No pulmonary embolism.   2.  Left lower lobe consolidation containing multiple foci of gas and air-fluid levels, concerning for necrotizing pneumonia. Small left pneumothorax likely secondary to cavitation and rupture into the pleural space. Recommend follow-up CT in 4-6 weeks    to assess for resolution.      Shade Ledezma was contacted by me on 6/8/2025 11:20 AM CDT and verbalized understanding of the result.      Echocardiogram Complete    (Results Pending)     EKG   ECG results from 06/08/25   EKG  12-lead, tracing only     Value    Systolic Blood Pressure     Diastolic Blood Pressure     Ventricular Rate 86    Atrial Rate 86    MN Interval 216    QRS Duration 148        QTc 512    P Axis 86    R AXIS 84    T Axis 80    Interpretation ECG      Sinus rhythm with 1st degree A-V block  Right atrial enlargement  Left bundle branch block  Abnormal ECG  When compared with ECG of 20-May-2024 13:30,  Left bundle branch block is now Present  EKG interpreted by me at 1026       *Note: Due to a large number of results and/or encounters for the requested time period, some results have not been displayed. A complete set of results can be found in Results Review.     Independent Interpretation   None    ED Course      Medications Administered   Medications   diphenhydrAMINE (BENADRYL) injection 25 mg (25 mg Intravenous $Given 6/8/25 1225)   benzonatate (TESSALON) capsule 200 mg (has no administration in time range)   carboxymethylcellulose PF (REFRESH LIQUIGEL) 1 % ophthalmic gel 1 drop (has no administration in time range)   ferrous sulfate CR tablet 45 mg (has no administration in time range)   latanoprost (XALATAN) 0.005 % ophthalmic solution 1 drop (has no administration in time range)   levothyroxine (SYNTHROID/LEVOTHROID) tablet 125 mcg (has no administration in time range)   timolol maleate (TIMOPTIC) 0.5 % ophthalmic solution 1 drop (has no administration in time range)   cyanocobalamin (VITAMIN B-12) tablet 1,000 mcg (has no administration in time range)   Vitamin D3 (CHOLECALCIFEROL) tablet 25 mcg (has no administration in time range)   albuterol (PROVENTIL) neb solution 2.5 mg (has no administration in time range)   albuterol (PROVENTIL) neb solution 2.5 mg (2.5 mg Nebulization $Given 6/8/25 1537)   guaiFENesin (ROBITUSSIN) 20 mg/mL solution 200 mg (has no administration in time range)   guaiFENesin (MUCINEX) 12 hr tablet 600 mg (has no administration in time range)   piperacillin-tazobactam (ZOSYN) 4.5 g  vial to attach to  mL bag (has no administration in time range)   Warfarin Dose Required Daily - Pharmacist Managed (has no administration in time range)   lidocaine 1 % 0.1-1 mL (has no administration in time range)   lidocaine (LMX4) cream (has no administration in time range)   sodium chloride (PF) 0.9% PF flush 3 mL (3 mLs Intracatheter Not Given 6/8/25 1451)   sodium chloride (PF) 0.9% PF flush 3 mL (has no administration in time range)   senna-docusate (SENOKOT-S/PERICOLACE) 8.6-50 MG per tablet 1 tablet (has no administration in time range)     Or   senna-docusate (SENOKOT-S/PERICOLACE) 8.6-50 MG per tablet 2 tablet (has no administration in time range)   calcium carbonate (TUMS) chewable tablet 1,000 mg (has no administration in time range)   glucose gel 15-30 g (has no administration in time range)     Or   dextrose 50 % injection 25-50 mL (has no administration in time range)     Or   glucagon injection 1 mg (has no administration in time range)   Patient is already receiving anticoagulation with heparin, enoxaparin (LOVENOX), warfarin (COUMADIN)  or other anticoagulant medication (has no administration in time range)   hydrALAZINE (APRESOLINE) tablet 10 mg ( Oral See Alternative 6/8/25 1431)     Or   hydrALAZINE (APRESOLINE) injection 10 mg (10 mg Intravenous $Given 6/8/25 1431)   acetaminophen (TYLENOL) tablet 650 mg (650 mg Oral $Given 6/8/25 1544)     Or   acetaminophen (TYLENOL) Suppository 650 mg ( Rectal See Alternative 6/8/25 1544)   oxyCODONE IR (ROXICODONE) half-tab 2.5 mg (has no administration in time range)   oxyCODONE (ROXICODONE) tablet 5 mg (5 mg Oral $Given 6/8/25 1544)   melatonin tablet 1 mg (has no administration in time range)   senna-docusate (SENOKOT-S/PERICOLACE) 8.6-50 MG per tablet 1 tablet (has no administration in time range)     Or   senna-docusate (SENOKOT-S/PERICOLACE) 8.6-50 MG per tablet 2 tablet (has no administration in time range)   polyethylene glycol (MIRALAX)  Packet 17 g (has no administration in time range)   prochlorperazine (COMPAZINE) injection 5 mg (has no administration in time range)     Or   prochlorperazine (COMPAZINE) tablet 5 mg (has no administration in time range)   insulin aspart (NovoLOG) injection (RAPID ACTING) (has no administration in time range)   insulin aspart (NovoLOG) injection (RAPID ACTING) (has no administration in time range)   hydroCHLOROthiazide tablet 12.5 mg ( Oral Automatically Held 6/11/25 0800)   losartan (COZAAR) tablet 100 mg (has no administration in time range)   sodium chloride 0.9 % infusion (has no administration in time range)   naloxone (NARCAN) injection 0.2 mg (has no administration in time range)     Or   naloxone (NARCAN) injection 0.4 mg (has no administration in time range)     Or   naloxone (NARCAN) injection 0.2 mg (has no administration in time range)     Or   naloxone (NARCAN) injection 0.4 mg (has no administration in time range)   magnesium sulfate 2 g in 50 mL sterile water intermittent infusion (2 g Intravenous $New Bag 6/8/25 1555)   HYDROmorphone (DILAUDID) injection 0.2 mg (0.2 mg Intravenous $Given 6/8/25 1500)   doxycycline (VIBRAMYCIN) 100 mg vial to attach to  mL bag (has no administration in time range)   warfarin ANTICOAGULANT (COUMADIN/JANTOVEN) tablet 5 mg (has no administration in time range)   sodium chloride 0.9% BOLUS 1,000 mL (1,000 mLs Intravenous $New Bag 6/8/25 0945)   metoclopramide (REGLAN) injection 10 mg (10 mg Intravenous $Given 6/8/25 1224)   iopamidol (ISOVUE-370) solution 59 mL (59 mLs Intravenous $Given 6/8/25 1022)   sodium chloride 0.9 % bag for CT scan flush (85 mLs As instructed $Given 6/8/25 1022)   piperacillin-tazobactam (ZOSYN) 4.5 g vial to attach to  mL bag (0 g Intravenous Stopped 6/8/25 1311)   vancomycin (VANCOCIN) 1,750 mg in 0.9% NaCl 500 mL intermittent infusion (1,750 mg Intravenous $Given 6/8/25 1321)       Procedures   Procedures     Discussion of  Management   Admitting Hospitalist, Dr. Jeronimo  Radiologist, Dr. Cardenas    ED Course   ED Course as of 06/08/25 1607   Sun Jun 08, 2025   0918 I obtained the history and examined the patient as noted above.      1119 I spoke with Dr. Cardenas from radiology regarding the patient's presentation and plan of care.      1156 I spoke with Yesenia Joshi [facility] for Dr. Jeronimo from the hospitalist service regarding the patient's presentation, findings here in the ED, and plan of care.      Additional Documentation  None    Medical Decision Making / Diagnosis     CMS Diagnoses: IV Antibiotics given and/or elevated Lactate of 1.6 and no sepsis note found - Delete this reminder and enter the sepsis note or '.edcms' before signing chart.>>>None patient does have necrotizing pneumonia but does not show any signs of septic shock or severe sepsis.  We will continue to monitor.    MIPS   CT for PE was ordered because the patient is high risk for pulmonary embolism.         MICHI French is a 79 year old female who presents with chest pain and shortness of breath.  She was seen on 5 June for this and had a negative chest x-ray.  Her cough continues to worsen as is her shortness of breath.  She now has a pleuritic discomfort in her chest as well.  Given her history of DVT and PE I did obtain a PE study.  While there is no evidence of PE, she does have a left lower lobe consolidation with multiple foci of gas and air-fluid concerning for necrotizing pneumonia which certainly fits clinically.  She also has a small left pneumothorax.    Labs showed a normal white count and a slight low hemoglobin 7.1.  Her viral panel of influenza/RSV/COVID were all negative.  She does have hyp EKG was nondiagnostic.  Troponins went onatremia with a sodium of 121 and low chloride 84.  Again she did receive a liter of fluids.  Her troponins went from 22 down to 18 making acute coronary syndrome unlikely.  She has an warfarin and her INR is  2.73.    Blood cultures were obtained and her lactic acid level was normal.  She will be started on Vanco and Zosyn for necrotizing pneumonia.  At this point we will continue to treat the pneumonia and monitor the small pneumothorax without intervention at this time.  I discussed the case with hospice service who is in agreement and she will be brought into their service.    Disposition   The patient was admitted to the hospital.     Diagnosis     ICD-10-CM    1. Necrotizing pneumonia (H)  J85.0       2. Pneumothorax on left  J93.9       3. Hyponatremia  E87.1            Scribe Disclosure:  I, Mor Gonzales, am serving as a scribe at 9:18 AM on 6/8/2025 to document services personally performed by Shade Ledezma MD, based on my observations and the provider's statements to me.        Shade Ledezma MD  06/08/25 [id number]

## 2025-06-08 NOTE — ED NOTES
[facility]  ED Nurse Handoff Report    ED Chief complaint: Cough and Chest Pain      ED Diagnosis:   Final diagnoses:   Necrotizing pneumonia (H)   Pneumothorax on left       Code Status: Full Code    Allergies:   Allergies   Allergen Reactions    Alphagan [Brimonidine] Other (See Comments)     Itchy eyes    Cosopt [Dorzolamide Hcl-Timolol Mal] Other (See Comments)     Itchy eyes       Patient Story: 79 year old female on Warfarin for DVT with history of type 2 diabetes, hypertension who presents for evaluation of a cough and chest pain. The patient states that for the last week she has been dealing with an ongoing cough, chest discomfort, and severe headache to the back left of her head. She was seen 3 days ago for these symptoms and had a COVID, FLU, RSV, and CXR which all returned negative at that time. Her symptoms have continued to worsen since she was seen and she just feels overall malaise to her entire body. Her cough is occasionally productive and she has felt nausea intermittently. CT chestshows necrotizing pneumonia  Focused Assessment:    Results for orders placed or performed during the hospital encounter of 06/08/25   CT Chest Pulmonary Embolism w Contrast     Status: None    Narrative    EXAM: CT CHEST PULMONARY EMBOLISM W CONTRAST  LOCATION: [facility]  DATE: 6/8/2025    INDICATION: Pleuritic chest pain, history of PE  COMPARISON: 4/30/2007  TECHNIQUE: CT chest pulmonary angiogram during arterial phase injection of IV contrast. Multiplanar reformats and MIP reconstructions were performed. Dose reduction techniques were used.   CONTRAST: 59mL Isovue 370    FINDINGS:  ANGIOGRAM CHEST: Pulmonary arteries are normal caliber and negative for pulmonary emboli. Thoracic aorta is negative for dissection. No CT evidence of right heart strain.    LUNGS AND PLEURA: Left lower lobe consolidation containing multiple foci of gas and air-fluid levels. Endobronchial  opacities in the lingula with volume loss and atelectasis. Small left pneumothorax.    MEDIASTINUM/AXILLAE: Normal.    CORONARY ARTERY CALCIFICATION: Mild.    UPPER ABDOMEN: Normal.    MUSCULOSKELETAL: Degenerative changes in the spine. Partially imaged lumbar spine surgical hardware.      Impression    IMPRESSION:  1.  No pulmonary embolism.  2.  Left lower lobe consolidation containing multiple foci of gas and air-fluid levels, concerning for necrotizing pneumonia. Small left pneumothorax likely secondary to cavitation and rupture into the pleural space. Recommend follow-up CT in 4-6 weeks   to assess for resolution.    Shade Ledezma was contacted by me on 6/8/2025 11:20 AM CDT and verbalized understanding of the result.   Basic metabolic panel     Status: Abnormal   Result Value Ref Range    Sodium 121 (L) 135 - 145 mmol/L    Potassium 4.1 3.4 - 5.3 mmol/L    Chloride 84 (L) 98 - 107 mmol/L    Carbon Dioxide (CO2) 24 22 - 29 mmol/L    Anion Gap 13 7 - 15 mmol/L    Urea Nitrogen 14.8 8.0 - 23.0 mg/dL    Creatinine 0.91 0.51 - 0.95 mg/dL    GFR Estimate 63 >60 mL/min/1.73m2    Calcium 8.8 8.8 - 10.4 mg/dL    Glucose 214 (H) 70 - 99 mg/dL   Troponin T, High Sensitivity     Status: Abnormal   Result Value Ref Range    Troponin T, High Sensitivity 22 (H) <=14 ng/L   CBC with platelets and differential     Status: Abnormal   Result Value Ref Range    WBC Count 7.6 4.0 - 11.0 10e3/uL    RBC Count 3.62 (L) 3.80 - 5.20 10e6/uL    Hemoglobin 11.1 (L) 11.7 - 15.7 g/dL    Hematocrit 33.0 (L) 35.0 - 47.0 %    MCV 91 78 - 100 fL    MCH 30.7 26.5 - 33.0 pg    MCHC 33.6 31.5 - 36.5 g/dL    RDW 13.3 10.0 - 15.0 %    Platelet Count 205 150 - 450 10e3/uL    % Neutrophils 71 %    % Lymphocytes 18 %    % Monocytes 9 %    % Eosinophils 2 %    % Basophils 0 %    % Immature Granulocytes 1 %    NRBCs per 100 WBC 0 <1 /100    Absolute Neutrophils 5.4 1.6 - 8.3 10e3/uL    Absolute Lymphocytes 1.3 0.8 - 5.3 10e3/uL    Absolute Monocytes 0.7  0.0 - 1.3 10e3/uL    Absolute Eosinophils 0.2 0.0 - 0.7 10e3/uL    Absolute Basophils 0.0 0.0 - 0.2 10e3/uL    Absolute Immature Granulocytes 0.0 <=0.4 10e3/uL    Absolute NRBCs 0.0 10e3/uL   INR     Status: Abnormal   Result Value Ref Range    INR 2.73 (H) 0.85 - 1.15    PT 27.9 (H) 11.8 - 14.8 Seconds   Influenza A/B, RSV and SARS-CoV2 PCR (COVID-19) Nasopharyngeal     Status: Normal    Specimen: Nasopharyngeal; Swab   Result Value Ref Range    Influenza A PCR Negative Negative    Influenza B PCR Negative Negative    RSV PCR Negative Negative    SARS CoV2 PCR Negative Negative    Narrative    Testing was performed using the Xpert Xpress CoV2/Flu/RSV Assay on the [employer] Instrument. This test should be ordered for the detection of SARS-CoV2, influenza, and RSV viruses in individuals with signs and symptoms of respiratory tract infection. This test is for in vitro diagnostic use under the [place] FDA for laboratories certified under CLIA to perform high or moderate complexity testing. This test has been [place] FDA cleared. A negative result does not rule out the presence of PCR inhibitors in the specimen or target RNA in concentration below the limit of detection for the assay. If only one viral target is positive but coinfection with multiple targets is suspected, the sample should be re-tested with another FDA cleared, approved, or authorized test, if coninfection would change clinical management. This test was validated by the [email or web address]. These laboratories are certified under the Clinical Laboratory Improvement Amendments of 1988 (CLIA-88) as qualified to perfom high complexity laboratory testing.   Troponin T, High Sensitivity     Status: Abnormal   Result Value Ref Range    Troponin T, High Sensitivity 18 (H) <=14 ng/L   iStat Gases (lactate) venous, POCT     Status: Abnormal   Result Value Ref Range    Lactic Acid POCT 1.6 0.7 - 2.0 mmol/L    Bicarbonate Venous POCT 26 21 - 28 mmol/L     O2 Sat, Venous POCT 46 (L) 70 - 75 %    pCO2 Venous POCT 46 40 - 50 mm Hg    pH Venous POCT 7.37 7.32 - 7.43    pO2 Venous POCT 26 25 - 47 mm Hg    Base Excess/Deficit (+/-) POCT 1.0 -3.0 - 3.0 mmol/L   EKG 12-lead, tracing only     Status: None (Preliminary result)   Result Value Ref Range    Systolic Blood Pressure  mmHg    Diastolic Blood Pressure  mmHg    Ventricular Rate 86 BPM    Atrial Rate 86 BPM    MA Interval 216 ms    QRS Duration 148 ms     ms    QTc 512 ms    P Axis 86 degrees    R AXIS 84 degrees    T Axis 80 degrees    Interpretation ECG       Sinus rhythm with 1st degree A-V block  Right atrial enlargement  Left bundle branch block  Abnormal ECG  When compared with ECG of 20-May-2024 13:30,  Left bundle branch block is now Present  Borderline criteria for Anterior infarct are no longer Present     CBC with platelets differential     Status: Abnormal    Narrative    The following orders were created for panel order CBC with platelets differential.  Procedure                               Abnormality         Status                     ---------                               -----------         ------                     CBC with platelets and ...[[id number]]  Abnormal            Final result                 Please view results for these tests on the individual orders.         Treatments and/or interventions provided: abx  Patient's response to treatments and/or interventions:     To be done/followed up on inpatient unit:  monitor    Does this patient have any cognitive concerns?:      Activity level - Baseline/Home:  Walker  Activity Level - Current:   Walker    Patient's Preferred language: English   Needed?: No    Isolation: None  Infection: Not Applicable  Sepsis treatment initiated: No  Patient tested for COVID 19 prior to admission: YES  Bariatric?: No    Vital Signs:   Vitals:    06/08/25 1100 06/08/25 1115 06/08/25 1130 06/08/25 1200   BP:  (!) 156/76  (!) 183/80   Pulse: 83 77  80 81   Resp: 24 21 27 26   Temp:       SpO2: 96% 96% 95% 97%   Weight:       Height:           Cardiac Rhythm:     Was the PSS-3 completed:   Yes  What interventions are required if any?               Family Comments:   OBS brochure/video discussed/provided to patient/family: No              Name of person given brochure if not patient:               Relationship to patient:     For the majority of the shift this patient's behavior was Green.   Behavioral interventions performed were .    ED NURSE PHONE NUMBER: [phone]

## 2025-06-08 NOTE — H&P
[facility]    History and Physical - Hospitalist Service       Date of Admission:  6/8/2025    Assessment & Plan    Manasa French is a 79 year old female with past medical history significant for hypertension, uncontrolled, Graves' disease with post ablative hypothyroidism, history of DVT on warfarin, hydrocephalus status post  shunt in 1987, osteoporosis, among others who was admitted on 06/08/25 with concern for nectrotizing pneumonia with small pneumothorax. Also found to have hyponatremia 120, prolonged QTc, and possibly new LBBB.    Necrotizing pneumonia with small pneumothorax  Presented with 1 week URI symptoms, cough, subjective fevers, myalgias, headache, chills, and pleuritic chest pain. On arrival hypertensive with .  Afebrile.  No hypoxia.  Workup revealed no pulmonary embolism but left lower lobe consolidation containing multiple foci of gas and air-filled levels concerning for necrotizing pneumonia on CT PE scan.  A small left pneumothorax likely secondary to cavitation and rupture into the pleural space  EKG x 2 reveals left bundle branch block is not noted on prior EKGs however during that 2018 stress test does note a transient left bundle during stress.  Troponin mildly elevated 22 and 18, not uptrending and flat.  Labs significant for hypochloremic hyponatremia with sodium 121-120.  Stable renal function.  Mild hypomagnesemia and hypophosphatemia which will be repleted.  Glucose 214.  Normal lactic acid and procalcitonin which is reassuring.  TSH elevated at 16 but normal free T4.  VBG unremarkable.  No leukocytosis.  Hemoglobin 11.1.  INR therapeutic at 2.73.  Negative for COVID, influenza, RSV. Given 1 L normal saline bolus, Zosyn and vancomycin due to concern for necrotizing pneumonia.  -Inpatient status  -Continuous pulseox  -Follow Blood cultures x2  -Zosyn  -Doxycycline instead of azithromycin due to prolonged QTc  -Sputum culture  -MRSA swab, hold further  vancomycin for now, given 1 dose in the ED  -Infectious Disease consult given necrotizing pna  -Thoracic consult, Cash, to review imaging in regards to small PTX, doubt needs chest tube but defer to them  -Supportive care, antitussives, antipyretics, scheduled and PRN nebs, analgesia, RCAT, flutter valve, IS use  -PT/OT/SW/CC for disposition planning  -Recommend follow-up CT in 4-6 weeks to assess for resolution    Headache  Intermittent left side of head/posterior at times since start of URI symptoms. Hx  shunt in 1980s. Neurological status intact, no focal deficits or visual changes. No trauma, falls or injuries. Hyponatremia, hypomagnesemia, and hypophosphatemia noted and may contribute as well. Hypertensive as well. With obvious source of infection, suspect due to acute illness as above and electrolyte derangements, hypertension.  -Monitor closely, APAP PRN, oxy, dilaudid PRN  -Check and replete lytes  -Treat hypertension  -Neuro checks Q 4 hours  -If any confusion or worsening symptoms, consider further workup and head CT, patient is on warfarin however no injury, falls, or trauma, has remote hx  shunt 1980s    Hypochloremic hyponatremia, likely acute, improving  Hypomagnesemia, hypophosphatemia  In the setting of hydrochlorothiazide, pna, poor oral intake since feeling ill. Suspect some hypovolemia contributing. Given 1L NS in the ED. Note she does have a headache. No visual changes or seizure like activity.  -Trend serum sodium  -Hyponatremia workup, osmolality, sodium studies  -Gentle IVF pending repeat Na+ level  -Check and replete lytes, give 2gm Magnesium sulfate IV  -Instructed nursing to please call Hospitalist if serum sodium level is greater than 125 or less than 120**    Prolonged QTc with hyponatremia, hypomagnesemia, hypophosphatemia  QTc >500ms. Lyte disturbances as noted.  -Cardiac monitoring  -Replete lytes, give Mg sulfate IV 2gm, protocol ordered  -Avoid QTc prolonging medications,  switch azithromycin to doxycycline  -Recheck EKG post repletion and in 6/9/25    Hx PE on warfarin  INR therapeutic. Reports PE was several years ago and has been on warfarin since. CT neg for PE  -Warfarin per pharmacy  -Daily INR    Accelerated hypertension, improving  Benign essential hypertension, uncontrolled  On admission, systolic blood pressures 150s-190s.  PTA regimen: hydrochlorothiazide 12.5mg/d, losartan 100mg/d  - Continue PTA ARB with hold parameters  - Hold PTA hydrochlorothiazide in the setting of hyponatremia  - PRN IV/PO hydralazine available for SBP >180  - Monitor BP trend and need for medication adjustments    Possibly new LBBB  Pleuritic chest pain likely secondary to the PNA/PTX. Troponins mildly elevated and flat. Already therapeutic on warfarin. Last ECHO 5/2024 mild cLVH, EF 60-65%, LA mildly dilated, mild MS/TR,MR/AR.   Last stress test 2018 was negative however noted to develop a rate related LBBB during stress at that time. Not noted on subsequent EKGs.  -Cardiac monitoring  -ECHO  -Consider cardiology consult or referral for new LBBB, pending ECHO (note was noted during a prior stress but dont see on previous EKG)    Non-Insulin dependent type 2 diabetes  PTA Regimen: metformin 500mg/d  Last HbA1c 6.5% 5/2025  - Hypoglycemia protocol  - Preprandial and HS fingerstick checks or Q 4 hours while NPO  - Hold PTA oral antiglycemics  - Sliding scale insulin Medium     Recent Labs   Lab 06/08/25  1451 06/08/25  0858   * 214*       Chronic stable diagnoses and other pertinent medical history: Appropriate PTA medications will be resumed.   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  Anemia, iron deficiency: follows with hematology  Macular degeneration  Osteoporosis  Glaucoma          Diet: Combination Diet Regular Diet Adult    DVT Prophylaxis:  "Warfarin  Cole Catheter: Not present  Lines: None     Cardiac Monitoring: ACTIVE order. Indication: new LBBB  Code Status: Full CodeFull code, confirmed on admission    Clinically Significant Risk Factors Present on Admission         # Hyponatremia: Lowest Na = 120 mmol/L in last 2 days, will monitor as appropriate  # Hypochloremia: Lowest Cl = 84 mmol/L in last 2 days, will monitor as appropriate    # Hypomagnesemia: Lowest Mg = 1.6 mg/dL in last 2 days, will replace as needed      # Drug Induced Coagulation Defect: home medication list includes an anticoagulant medication    # Hypertension: Noted on problem list          # DMII: A1C = 6.5 % (Ref range: 0.0 - 5.6 %) within past 6 months    # Overweight: Estimated body mass index is 25.54 kg/m  as calculated from the following:    Height as of this encounter: 1.626 m (5' 4\").    Weight as of this encounter: 67.5 kg (148 lb 12.8 oz).              Disposition Plan     Medically Ready for Discharge: Anticipated in 2-4 Days         The patient's care was discussed with the Attending Physician, Dr. Jeronimo, Bedside Nurse, Patient, Patient's Family, and ED Physician.    Yesenia Joshi PA-C  Hospitalist Service  [facility]  Securely message with [employer] (more info)  Text page via [employer] Paging/Directory     ______________________________________________________________________    Chief Complaint   Cough, chest pain    History is obtained from the patient, electronic health record, and emergency department physician    History of Present Illness   Manasa French is a 79 year old female with past medical history significant for hypertension, uncontrolled, Graves' disease with post ablative hypothyroidism, history of DVT on warfarin, hydrocephalus status post  shunt in 1987, osteoporosis, among others who presented to emergency department with 1 week of URI symptoms, cough, subjective fevers, myalgias, headache, chills, and pleuritic chest " pain.  She was seen by PCP last week but symptoms continued to progress.     In the ED she is hemodynamically stable but hypertensive with systolic blood pressures 150s-190s.  Afebrile.  No hypoxia.  Workup revealed no pulmonary embolism but left lower lobe consolidation containing multiple foci of gas and air-filled levels concerning for necrotizing pneumonia on CT PE scan.  A small left pneumothorax likely secondary to cavitation and rupture into the pleural space.  Recommend follow-up CT in 4 to 6 weeks to assess for resolution.  EKG x 2 reveals left bundle branch block is not noted on prior EKGs however during that 2018 stress test does note a transient left bundle during stress.  Troponin mildly elevated 22 and 18, not uptrending and flat.  Labs significant for hypochloremic hyponatremia with sodium 121-120.  Stable renal function.  Mild hypomagnesemia and hypophosphatemia which will be repleted.  Glucose 214.  Normal lactic acid and procalcitonin which is reassuring.  TSH elevated at 16 but normal free T4.  VBG unremarkable.  No leukocytosis.  Hemoglobin 11.1.  INR therapeutic at 2.73.  Negative for COVID, influenza, RSV.  2 blood cultures drawn on.  MRSA swab pending.  She was given 1 L normal saline bolus, Zosyn and vancomycin due to concern for necrotizing pneumonia.      Past Medical History    Past Medical History:   Diagnosis Date    Benign essential hypertension     Dislocation of right hip (H) 05/2024    History of Graves' disease     s/p BARAHONA    History of hydrocephalus     s/p  shunt placement in 1987    Macular degeneration of both eyes     Osteoporosis     Personal history of DVT (deep vein thrombosis) 2019    RUE; on chronic AC    Postablative hypothyroidism     Primary open angle glaucoma (POAG) of both eyes     right eye moderate, left eye mild    Pseudophakia     Sensorineural hearing loss, bilateral        Past Surgical History   Past Surgical History:   Procedure Laterality Date     ARTHROPLASTY HIP Right 2023    Procedure: Right Total Hip Arthroplasty;  Surgeon: Vasu Mixon MD;  Location: [facility] OR    ARTHROSCOPY SHOULDER Left     CATARACT IOL, RT/LT Bilateral     DECOMPRESSION, FUSION LUMBAR POSTERIOR ONE LEVEL, COMBINED      L5-S1    EXCISE GANGLION WRIST Right     HERNIA REPAIR, UMBILICAL      LUMPECTOMY BREAST Left     benign    RECESSION RESECTION (REPAIR STRABISMUS) BILATERAL Bilateral 2023    Procedure: Bilateral eye muscle correction.;  Surgeon: Antwan Ardon MD;  Location: [facility] OR       Prior to Admission Medications   Prior to Admission Medications   Prescriptions Last Dose Informant Patient Reported? Taking?   Chlorpheniramine-DM (CORICIDIN HBP COUGH/COLD) 4-30 MG TABS   No Yes   Sig: Take 1 tablet by mouth every 6 hours as needed (nasal congestion and nasal drainage).   Multiple Vitamins-Minerals (ICAPS AREDS 2 PO) 2025 Morning Self Yes Yes   Sig: Take 1 capsule by mouth 2 times daily   Vitamin D3 (CHOLECALCIFEROL) 25 mcg (1000 units) tablet 2025 Self Yes Yes   Sig: Take 1 tablet by mouth three times a week. Monday, Wednesday, Friday   Vitamin K 100 MCG TABS 2025 Evening  No Yes   Sig: Take 1 tablet by mouth daily   acetaminophen (TYLENOL) 325 MG tablet   No Yes   Sig: Take 2 tablets (650 mg) by mouth every 4 hours as needed for other (mild pain)   albuterol (PROVENTIL HFA) 108 (90 Base) MCG/ACT inhaler   No Yes   Sig: Inhale 2 puffs into the lungs every 6 hours   Patient taking differently: Inhale 2 puffs into the lungs every 6 hours as needed.   amoxicillin (AMOXIL) 500 MG tablet   Yes Yes   Si tablets by mouth one hour prior to dental procedure*   benzonatate (TESSALON) 200 MG capsule   No Yes   Sig: Take 1 capsule (200 mg) by mouth 3 times daily as needed for cough.   calcium carbonate-vitamin D (CALTRATE) 600-10 MG-MCG per tablet 2025 Morning Self Yes Yes   Sig: Take 1 tablet by mouth 2 times daily   carboxymethylcellulose  (CELLUVISC/REFRESH LIQUIGEL) 1 % ophthalmic solution 6/8/2025 Morning Self Yes Yes   Sig: Place 1 drop into both eyes 2 times daily.   ferrous sulfate 45 MG TBCR CR tablet 6/8/2025 Morning  Yes Yes   Sig: Take 1 tablet (45 mg) by mouth daily.   hydroCHLOROthiazide 12.5 MG tablet 6/8/2025 Morning  No Yes   Sig: TAKE 1 TABLET (12.5 MG) BY MOUTH DAILY.   latanoprost (XALATAN) 0.005 % ophthalmic solution 6/7/2025 Bedtime  No Yes   Sig: Place 1 drop into both eyes at bedtime.   levothyroxine (SYNTHROID/LEVOTHROID) 125 MCG tablet 6/8/2025 Morning  No Yes   Sig: Take 1 tablet (125 mcg) by mouth every morning (before breakfast).   losartan (COZAAR) 100 MG tablet 6/7/2025 Evening  No Yes   Sig: Take 1 tablet (100 mg) by mouth daily.   Patient taking differently: Take 100 mg by mouth every evening.   metFORMIN (GLUCOPHAGE XR) 500 MG 24 hr tablet 6/7/2025 Evening  No Yes   Sig: TAKE 1 TABLET EVERY DAY WITH DINNER   timolol maleate (TIMOPTIC) 0.5 % ophthalmic solution 6/8/2025 Morning  No Yes   Sig: Place 1 drop into both eyes every morning   vitamin B-12 (CYANOCOBALAMIN) 1000 MCG tablet 6/7/2025 Self Yes Yes   Sig: Take 1,000 mcg by mouth three times a week. Tuesday, Thursday, Saturday   warfarin ANTICOAGULANT (COUMADIN) 5 MG tablet 6/7/2025 Evening  No Yes   Sig: TAKE 1 AND 1/2 TABS EVERY MON, WED, FRI AND 1 TAB ALL OTHER DAYS OF THE WEEK OR AS DIRECTED BY ACC TEAM BASED ON INR RESULTS   Patient taking differently: Take 1.5 tabs (7.5mg) Mon, Wed, Fri and 1 tab (5mg) Tues, Thurs, Sat and Sun      Facility-Administered Medications: None        Review of Systems    The 10 point Review of Systems is negative other than noted in the HPI or here.     Social History   I have reviewed this patient's social history and updated it with pertinent information if needed.  Social History     Tobacco Use    Smoking status: Never    Smokeless tobacco: Never   Vaping Use    Vaping status: Never Used   Substance Use Topics    Alcohol use: Yes      Comment: rare    Drug use: No         Allergies   Allergies   Allergen Reactions    Alphagan [Brimonidine] Other (See Comments)     Itchy eyes    Cosopt [Dorzolamide Hcl-Timolol Mal] Other (See Comments)     Itchy eyes        Physical Exam   Vital Signs: Temp: 98.1  F (36.7  C) Temp src: Oral BP: (!) 158/76 Pulse: 84   Resp: 20 SpO2: 94 % O2 Device: None (Room air)    Weight: 148 lbs 12.8 oz    General: Awake, alert, very pleasant woman who appears stated age. Looks comfortable sitting up in bed. No acute distress.  HEENT: Normocephalic, atraumatic. Extraocular movements intact.   Respiratory: Course to bases. No increased WOB. No hypoxia. Intermittent wheezing.  Cardiovascular: Regular rate and rhythm, +S1 and S2, no murmur auscultated. No peripheral edema.   Gastrointestinal: Soft, non-tender, non-distended. Bowel sounds present.  Skin: Warm, dry. No obvious rashes or lesions on exposed skin. Dorsalis pedis pulses palpable bilaterally.  Musculoskeletal: No joint swelling, erythema or tenderness. Moves all extremities equally.  Neurologic: AAO x3. Cranial nerves 2-12 grossly intact, normal strength and sensation.  Psychiatric: Appropriate mood and affect. No obvious anxiety or depression.      Medical Decision Making       >60 MINUTES SPENT BY ME on the date of service doing chart review, history, exam, documentation & further activities per the note.      Data     I have personally reviewed the following data over the past 24 hrs:    7.6  \   11.1 (L)   / 205     123 (L) 84 (L) 14.8 /  117 (H)   4.1 24 0.91 \     Trop: 18 (H) BNP: N/A     TSH: 16.58 (H) T4: 1.27 A1C: N/A     Procal: 0.10 CRP: N/A Lactic Acid: 1.6       INR:  2.73 (H) PTT:  N/A   D-dimer:  N/A Fibrinogen:  N/A       Imaging results reviewed over the past 24 hrs:   Recent Results (from the past 24 hours)   CT Chest Pulmonary Embolism w Contrast    Narrative    EXAM: CT CHEST PULMONARY EMBOLISM W CONTRAST  LOCATION: [facility]OSPITAL  DATE: 6/8/2025    INDICATION: Pleuritic chest pain, history of PE  COMPARISON: 4/30/2007  TECHNIQUE: CT chest pulmonary angiogram during arterial phase injection of IV contrast. Multiplanar reformats and MIP reconstructions were performed. Dose reduction techniques were used.   CONTRAST: 59mL Isovue 370    FINDINGS:  ANGIOGRAM CHEST: Pulmonary arteries are normal caliber and negative for pulmonary emboli. Thoracic aorta is negative for dissection. No CT evidence of right heart strain.    LUNGS AND PLEURA: Left lower lobe consolidation containing multiple foci of gas and air-fluid levels. Endobronchial opacities in the lingula with volume loss and atelectasis. Small left pneumothorax.    MEDIASTINUM/AXILLAE: Normal.    CORONARY ARTERY CALCIFICATION: Mild.    UPPER ABDOMEN: Normal.    MUSCULOSKELETAL: Degenerative changes in the spine. Partially imaged lumbar spine surgical hardware.      Impression    IMPRESSION:  1.  No pulmonary embolism.  2.  Left lower lobe consolidation containing multiple foci of gas and air-fluid levels, concerning for necrotizing pneumonia. Small left pneumothorax likely secondary to cavitation and rupture into the pleural space. Recommend follow-up CT in 4-6 weeks   to assess for resolution.    Shade Ledezma was contacted by me on 6/8/2025 11:20 AM CDT and verbalized understanding of the result.

## 2025-06-08 NOTE — PHARMACY-ADMISSION MEDICATION HISTORY
Pharmacist Admission Medication History    Admission medication history is complete. The information provided in this note is only as accurate as the sources available at the time of the update.    Information Source(s): Patient and CareEverywhere/SureScript[employer] via in-person    Pertinent Information:   - Patient confirms she takes vit K every evening with her warfarin    Changes made to PTA medication list:  Added: None  Deleted:   Duplicate levothyroxine  Changed:   Vit D, B 12 daily --> three times weekly    Allergies reviewed with patient and updates made in EHR: yes    Medication History Completed By: Radha Peraza [facility] 6/8/2025 11:16 AM    PTA Med List   Medication Sig Last Dose/Taking    acetaminophen (TYLENOL) 325 MG tablet Take 2 tablets (650 mg) by mouth every 4 hours as needed for other (mild pain) Taking As Needed    albuterol (PROVENTIL HFA) 108 (90 Base) MCG/ACT inhaler Inhale 2 puffs into the lungs every 6 hours (Patient taking differently: Inhale 2 puffs into the lungs every 6 hours as needed.) Taking Differently    amoxicillin (AMOXIL) 500 MG tablet 4 tablets by mouth one hour prior to dental procedure* Taking    benzonatate (TESSALON) 200 MG capsule Take 1 capsule (200 mg) by mouth 3 times daily as needed for cough. Taking As Needed    calcium carbonate-vitamin D (CALTRATE) 600-10 MG-MCG per tablet Take 1 tablet by mouth 2 times daily 6/8/2025 Morning    carboxymethylcellulose (CELLUVISC/REFRESH LIQUIGEL) 1 % ophthalmic solution Place 1 drop into both eyes 2 times daily. 6/8/2025 Morning    Chlorpheniramine-DM (CORICIDIN HBP COUGH/COLD) 4-30 MG TABS Take 1 tablet by mouth every 6 hours as needed (nasal congestion and nasal drainage). Taking As Needed    ferrous sulfate 45 MG TBCR CR tablet Take 1 tablet (45 mg) by mouth daily. 6/8/2025 Morning    hydroCHLOROthiazide 12.5 MG tablet TAKE 1 TABLET (12.5 MG) BY MOUTH DAILY. 6/8/2025 Morning    latanoprost (XALATAN) 0.005 % ophthalmic solution Place 1  drop into both eyes at bedtime. 6/7/2025 Bedtime    levothyroxine (SYNTHROID/LEVOTHROID) 125 MCG tablet Take 1 tablet (125 mcg) by mouth every morning (before breakfast). 6/8/2025 Morning    losartan (COZAAR) 100 MG tablet Take 1 tablet (100 mg) by mouth daily. (Patient taking differently: Take 100 mg by mouth every evening.) 6/7/2025 Evening    metFORMIN (GLUCOPHAGE XR) 500 MG 24 hr tablet TAKE 1 TABLET EVERY DAY WITH DINNER 6/7/2025 Evening    Multiple Vitamins-Minerals (ICAPS AREDS 2 PO) Take 1 capsule by mouth 2 times daily 6/8/2025 Morning    timolol maleate (TIMOPTIC) 0.5 % ophthalmic solution Place 1 drop into both eyes every morning 6/8/2025 Morning    vitamin B-12 (CYANOCOBALAMIN) 1000 MCG tablet Take 1,000 mcg by mouth three times a week. Tuesday, Thursday, Saturday 6/7/2025    Vitamin D3 (CHOLECALCIFEROL) 25 mcg (1000 units) tablet Take 1 tablet by mouth three times a week. Monday, Wednesday, Friday 6/6/2025    Vitamin K 100 MCG TABS Take 1 tablet by mouth daily 6/7/2025 Evening    warfarin ANTICOAGULANT (COUMADIN) 5 MG tablet TAKE 1 AND 1/2 TABS EVERY MON, WED, FRI AND 1 TAB ALL OTHER DAYS OF THE WEEK OR AS DIRECTED BY ACC TEAM BASED ON INR RESULTS (Patient taking differently: Take 1.5 tabs (7.5mg) Mon, Wed, Fri and 1 tab (5mg) Tues, Thurs, Sat and Sun) 6/7/2025 Evening

## 2025-06-08 NOTE — ED TRIAGE NOTES
Patient states that she has had a cough since Monday and hurts to take a deep breath on left side of chest. Also, having headache.      Triage Assessment (Adult)       Row Name 06/08/25 0856          Triage Assessment    Airway WDL WDL        Respiratory WDL    Respiratory WDL X;rhythm/pattern;cough     Rhythm/Pattern, Respiratory shortness of breath     Cough Frequency frequent     Cough Type productive        Skin Circulation/Temperature WDL    Skin Circulation/Temperature WDL WDL        Cardiac WDL    Cardiac WDL X;chest pain        Peripheral/Neurovascular WDL    Peripheral Neurovascular WDL WDL        Cognitive/Neuro/Behavioral WDL    Cognitive/Neuro/Behavioral WDL WDL

## 2025-06-08 NOTE — PHARMACY-ANTICOAGULATION SERVICE
Clinical Pharmacy - Warfarin Dosing Consult     Pharmacy has been consulted to manage this patient s warfarin therapy.  Indication: DVT/PE Prophylaxis  Therapy Goal: INR 2-3  Provider/Team: [facility]  Warfarin Prior to Admission: Yes  Warfarin PTA Regimen: 7.5 mg Monday, Wednesday, Friday. 5 mg all other days of the week.  Significant drug interactions: Doxycycline, Levothyroxine, Zosyn. Takes warfarin with Vitamin K at home per prior to admission med rec.  Recent documented change in oral intake/nutrition: Unknown    INR   Date Value Ref Range Status   06/08/2025 2.73 (H) 0.85 - 1.15 Final     INR HOME MONITORING   Date Value Ref Range Status   05/30/2025 2.7 2.000 - 3.000 Final       Recommend warfarin 5 mg today.  Pharmacy will monitor Manasa French daily and order warfarin doses to achieve specified goal.      Please contact pharmacy as soon as possible if the warfarin needs to be held for a procedure or if the warfarin goals change.     Eric Sosa [facility]

## 2025-06-08 NOTE — PROGRESS NOTES
RECEIVING UNIT ED HANDOFF REVIEW    ED Nurse Handoff Report was reviewed by: Bernice Robertson RN on June 8, 2025 at 1:23 PM

## 2025-06-08 NOTE — PHARMACY-CONSULT NOTE
Pharmacy Consult: Review medications for causes of Syndrome of Inappropriate Antidiuretic Hormone secretion (SIADH) or Hyponatremia.   Please also review the patient's medications profile for fluids, infusions, and piggyback medication orders that can be compounded in saline base solutions, rather than dextrose base solutions.     Reviewed the prior to admission medication list for potential causes for SIADH or Hyponatremia:  Hydrochlorothiazide may be a potential cause of hyponatremia or syndrome of inappropriate antidiuretic hormone per UpToDate and [employer].    There is a case report that outlines a case of losartan - induced hyponatremia. https://pubmed.ncbi.nlm.nih.gov/65120943/    Eric Sosa RPH

## 2025-06-09 ENCOUNTER — APPOINTMENT (OUTPATIENT)
Dept: PHYSICAL THERAPY | Facility: CLINIC | Age: 80
DRG: 178 | End: 2025-06-09
Attending: PHYSICIAN ASSISTANT
Payer: MEDICARE

## 2025-06-09 ENCOUNTER — APPOINTMENT (OUTPATIENT)
Dept: CT IMAGING | Facility: CLINIC | Age: 80
DRG: 178 | End: 2025-06-09
Attending: HOSPITALIST
Payer: MEDICARE

## 2025-06-09 ENCOUNTER — APPOINTMENT (OUTPATIENT)
Dept: GENERAL RADIOLOGY | Facility: CLINIC | Age: 80
DRG: 178 | End: 2025-06-09
Attending: PHYSICIAN ASSISTANT
Payer: MEDICARE

## 2025-06-09 ENCOUNTER — APPOINTMENT (OUTPATIENT)
Dept: CARDIOLOGY | Facility: CLINIC | Age: 80
DRG: 178 | End: 2025-06-09
Attending: PHYSICIAN ASSISTANT
Payer: MEDICARE

## 2025-06-09 LAB
ALBUMIN SERPL BCG-MCNC: 3.3 G/DL (ref 3.5–5.2)
ALP SERPL-CCNC: 61 U/L (ref 40–150)
ALT SERPL W P-5'-P-CCNC: 19 U/L (ref 0–50)
ANION GAP SERPL CALCULATED.3IONS-SCNC: 12 MMOL/L (ref 7–15)
AST SERPL W P-5'-P-CCNC: 21 U/L (ref 0–45)
BILIRUB SERPL-MCNC: 0.8 MG/DL
BUN SERPL-MCNC: 11.7 MG/DL (ref 8–23)
CALCIUM SERPL-MCNC: 7.7 MG/DL (ref 8.8–10.4)
CHLORIDE SERPL-SCNC: 92 MMOL/L (ref 98–107)
CREAT SERPL-MCNC: 0.87 MG/DL (ref 0.51–0.95)
EGFRCR SERPLBLD CKD-EPI 2021: 67 ML/MIN/1.73M2
ERYTHROCYTE [DISTWIDTH] IN BLOOD BY AUTOMATED COUNT: 13.4 % (ref 10–15)
GLUCOSE BLDC GLUCOMTR-MCNC: 107 MG/DL (ref 70–99)
GLUCOSE BLDC GLUCOMTR-MCNC: 118 MG/DL (ref 70–99)
GLUCOSE BLDC GLUCOMTR-MCNC: 137 MG/DL (ref 70–99)
GLUCOSE BLDC GLUCOMTR-MCNC: 137 MG/DL (ref 70–99)
GLUCOSE SERPL-MCNC: 114 MG/DL (ref 70–99)
HCO3 SERPL-SCNC: 22 MMOL/L (ref 22–29)
HCT VFR BLD AUTO: 29.1 % (ref 35–47)
HGB BLD-MCNC: 10.1 G/DL (ref 11.7–15.7)
INR PPP: 2.99 (ref 0.85–1.15)
L PNEUMO1 AG UR QL IA: NEGATIVE
LVEF ECHO: NORMAL
MAGNESIUM SERPL-MCNC: 2 MG/DL (ref 1.7–2.3)
MCH RBC QN AUTO: 31.2 PG (ref 26.5–33)
MCHC RBC AUTO-ENTMCNC: 34.7 G/DL (ref 31.5–36.5)
MCV RBC AUTO: 90 FL (ref 78–100)
OSMOLALITY SERPL: 273 MMOL/KG (ref 280–301)
OSMOLALITY UR: 502 MMOL/KG (ref 100–1200)
PHOSPHATE SERPL-MCNC: 2.5 MG/DL (ref 2.5–4.5)
PLATELET # BLD AUTO: 173 10E3/UL (ref 150–450)
POTASSIUM SERPL-SCNC: 3.8 MMOL/L (ref 3.4–5.3)
PROT SERPL-MCNC: 6.3 G/DL (ref 6.4–8.3)
PROTHROMBIN TIME: 29.8 SECONDS (ref 11.8–14.8)
RBC # BLD AUTO: 3.24 10E6/UL (ref 3.8–5.2)
S PNEUM AG SPEC QL: NEGATIVE
SODIUM SERPL-SCNC: 123 MMOL/L (ref 135–145)
SODIUM SERPL-SCNC: 123 MMOL/L (ref 135–145)
SODIUM SERPL-SCNC: 124 MMOL/L (ref 135–145)
SODIUM SERPL-SCNC: 125 MMOL/L (ref 135–145)
SODIUM SERPL-SCNC: 126 MMOL/L (ref 135–145)
SODIUM SERPL-SCNC: 126 MMOL/L (ref 135–145)
SODIUM UR-SCNC: 86 MMOL/L
SPECIMEN TYPE: NORMAL
WBC # BLD AUTO: 6.5 10E3/UL (ref 4–11)

## 2025-06-09 PROCEDURE — 70450 CT HEAD/BRAIN W/O DYE: CPT

## 2025-06-09 PROCEDURE — 255N000002 HC RX 255 OP 636: Performed by: PHYSICIAN ASSISTANT

## 2025-06-09 PROCEDURE — 36415 COLL VENOUS BLD VENIPUNCTURE: CPT | Performed by: PHYSICIAN ASSISTANT

## 2025-06-09 PROCEDURE — 97116 GAIT TRAINING THERAPY: CPT | Mod: GP

## 2025-06-09 PROCEDURE — 94640 AIRWAY INHALATION TREATMENT: CPT | Mod: 76

## 2025-06-09 PROCEDURE — 250N000013 HC RX MED GY IP 250 OP 250 PS 637: Performed by: SPECIALIST

## 2025-06-09 PROCEDURE — 84100 ASSAY OF PHOSPHORUS: CPT | Performed by: PHYSICIAN ASSISTANT

## 2025-06-09 PROCEDURE — 97530 THERAPEUTIC ACTIVITIES: CPT | Mod: GP

## 2025-06-09 PROCEDURE — 85018 HEMOGLOBIN: CPT | Performed by: PHYSICIAN ASSISTANT

## 2025-06-09 PROCEDURE — 999N000157 HC STATISTIC RCP TIME EA 10 MIN

## 2025-06-09 PROCEDURE — 83930 ASSAY OF BLOOD OSMOLALITY: CPT | Performed by: HOSPITALIST

## 2025-06-09 PROCEDURE — 83735 ASSAY OF MAGNESIUM: CPT | Performed by: HOSPITALIST

## 2025-06-09 PROCEDURE — 85014 HEMATOCRIT: CPT | Performed by: PHYSICIAN ASSISTANT

## 2025-06-09 PROCEDURE — 71046 X-RAY EXAM CHEST 2 VIEWS: CPT

## 2025-06-09 PROCEDURE — 99223 1ST HOSP IP/OBS HIGH 75: CPT | Performed by: SPECIALIST

## 2025-06-09 PROCEDURE — 250N000009 HC RX 250: Performed by: PHYSICIAN ASSISTANT

## 2025-06-09 PROCEDURE — 99233 SBSQ HOSP IP/OBS HIGH 50: CPT | Performed by: HOSPITALIST

## 2025-06-09 PROCEDURE — 250N000011 HC RX IP 250 OP 636: Performed by: HOSPITALIST

## 2025-06-09 PROCEDURE — 93005 ELECTROCARDIOGRAM TRACING: CPT

## 2025-06-09 PROCEDURE — 250N000013 HC RX MED GY IP 250 OP 250 PS 637: Performed by: PHYSICIAN ASSISTANT

## 2025-06-09 PROCEDURE — 94640 AIRWAY INHALATION TREATMENT: CPT

## 2025-06-09 PROCEDURE — 93010 ELECTROCARDIOGRAM REPORT: CPT | Performed by: INTERNAL MEDICINE

## 2025-06-09 PROCEDURE — 250N000011 HC RX IP 250 OP 636: Performed by: PHYSICIAN ASSISTANT

## 2025-06-09 PROCEDURE — 120N000001 HC R&B MED SURG/OB

## 2025-06-09 PROCEDURE — 250N000013 HC RX MED GY IP 250 OP 250 PS 637: Performed by: HOSPITALIST

## 2025-06-09 PROCEDURE — 85610 PROTHROMBIN TIME: CPT | Performed by: PHYSICIAN ASSISTANT

## 2025-06-09 PROCEDURE — 97162 PT EVAL MOD COMPLEX 30 MIN: CPT | Mod: GP

## 2025-06-09 PROCEDURE — 83935 ASSAY OF URINE OSMOLALITY: CPT | Performed by: HOSPITALIST

## 2025-06-09 PROCEDURE — 84295 ASSAY OF SERUM SODIUM: CPT | Performed by: HOSPITALIST

## 2025-06-09 PROCEDURE — 84300 ASSAY OF URINE SODIUM: CPT | Performed by: HOSPITALIST

## 2025-06-09 PROCEDURE — 84295 ASSAY OF SERUM SODIUM: CPT | Performed by: PHYSICIAN ASSISTANT

## 2025-06-09 PROCEDURE — 93306 TTE W/DOPPLER COMPLETE: CPT | Mod: 26 | Performed by: INTERNAL MEDICINE

## 2025-06-09 PROCEDURE — 36415 COLL VENOUS BLD VENIPUNCTURE: CPT | Performed by: HOSPITALIST

## 2025-06-09 PROCEDURE — 999N000208 ECHOCARDIOGRAM COMPLETE

## 2025-06-09 RX ORDER — AMPICILLIN AND SULBACTAM 2; 1 G/1; G/1
3 INJECTION, POWDER, FOR SOLUTION INTRAMUSCULAR; INTRAVENOUS EVERY 6 HOURS
Status: DISCONTINUED | OUTPATIENT
Start: 2025-06-09 | End: 2025-06-09

## 2025-06-09 RX ORDER — WARFARIN SODIUM 2 MG/1
2 TABLET ORAL
Status: COMPLETED | OUTPATIENT
Start: 2025-06-09 | End: 2025-06-09

## 2025-06-09 RX ORDER — MAGNESIUM OXIDE 400 MG/1
400 TABLET ORAL EVERY 4 HOURS
Status: COMPLETED | OUTPATIENT
Start: 2025-06-09 | End: 2025-06-09

## 2025-06-09 RX ORDER — AMPICILLIN AND SULBACTAM 2; 1 G/1; G/1
3 INJECTION, POWDER, FOR SOLUTION INTRAMUSCULAR; INTRAVENOUS EVERY 6 HOURS
Status: DISCONTINUED | OUTPATIENT
Start: 2025-06-09 | End: 2025-06-12 | Stop reason: HOSPADM

## 2025-06-09 RX ORDER — DOXYCYCLINE 100 MG/1
100 CAPSULE ORAL EVERY 12 HOURS SCHEDULED
Status: DISCONTINUED | OUTPATIENT
Start: 2025-06-09 | End: 2025-06-12 | Stop reason: HOSPADM

## 2025-06-09 RX ORDER — POTASSIUM CHLORIDE 1500 MG/1
20 TABLET, EXTENDED RELEASE ORAL ONCE
Status: COMPLETED | OUTPATIENT
Start: 2025-06-09 | End: 2025-06-09

## 2025-06-09 RX ADMIN — GUAIFENESIN 200 MG: 200 SOLUTION ORAL at 10:32

## 2025-06-09 RX ADMIN — PERFLUTREN 10 ML: 6.52 INJECTION, SUSPENSION INTRAVENOUS at 10:31

## 2025-06-09 RX ADMIN — OXYCODONE 5 MG: 5 TABLET ORAL at 07:47

## 2025-06-09 RX ADMIN — PIPERACILLIN AND TAZOBACTAM 4.5 G: 4; .5 INJECTION, POWDER, FOR SOLUTION INTRAVENOUS at 06:32

## 2025-06-09 RX ADMIN — WARFARIN SODIUM 2 MG: 2 TABLET ORAL at 17:00

## 2025-06-09 RX ADMIN — GUAIFENESIN 600 MG: 600 TABLET, EXTENDED RELEASE ORAL at 08:44

## 2025-06-09 RX ADMIN — CARBOXYMETHYLCELLULOSE SODIUM 1 DROP: 10 GEL OPHTHALMIC at 20:12

## 2025-06-09 RX ADMIN — Medication 400 MG: at 16:59

## 2025-06-09 RX ADMIN — ALBUTEROL SULFATE 2.5 MG: 2.5 SOLUTION RESPIRATORY (INHALATION) at 19:26

## 2025-06-09 RX ADMIN — CARBOXYMETHYLCELLULOSE SODIUM 1 DROP: 10 GEL OPHTHALMIC at 08:44

## 2025-06-09 RX ADMIN — ALBUTEROL SULFATE 2.5 MG: 2.5 SOLUTION RESPIRATORY (INHALATION) at 12:10

## 2025-06-09 RX ADMIN — LATANOPROST 1 DROP: 50 SOLUTION OPHTHALMIC at 20:13

## 2025-06-09 RX ADMIN — GUAIFENESIN 600 MG: 600 TABLET, EXTENDED RELEASE ORAL at 20:13

## 2025-06-09 RX ADMIN — Medication 45 MG: at 12:51

## 2025-06-09 RX ADMIN — Medication 25 MCG: at 08:44

## 2025-06-09 RX ADMIN — Medication 400 MG: at 12:51

## 2025-06-09 RX ADMIN — ALBUTEROL SULFATE 2.5 MG: 2.5 SOLUTION RESPIRATORY (INHALATION) at 08:04

## 2025-06-09 RX ADMIN — TIMOLOL MALEATE 1 DROP: 5 SOLUTION OPHTHALMIC at 08:43

## 2025-06-09 RX ADMIN — GUAIFENESIN 200 MG: 200 SOLUTION ORAL at 05:26

## 2025-06-09 RX ADMIN — POTASSIUM CHLORIDE 20 MEQ: 1500 TABLET, EXTENDED RELEASE ORAL at 12:51

## 2025-06-09 RX ADMIN — AMPICILLIN SODIUM AND SULBACTAM SODIUM 3 G: 2; 1 INJECTION, POWDER, FOR SOLUTION INTRAMUSCULAR; INTRAVENOUS at 18:26

## 2025-06-09 RX ADMIN — AMPICILLIN SODIUM AND SULBACTAM SODIUM 3 G: 2; 1 INJECTION, POWDER, FOR SOLUTION INTRAMUSCULAR; INTRAVENOUS at 12:16

## 2025-06-09 RX ADMIN — LOSARTAN POTASSIUM 100 MG: 25 TABLET, FILM COATED ORAL at 20:12

## 2025-06-09 RX ADMIN — DOXYCYCLINE 100 MG: 100 INJECTION, POWDER, LYOPHILIZED, FOR SOLUTION INTRAVENOUS at 05:20

## 2025-06-09 RX ADMIN — OXYCODONE 5 MG: 5 TABLET ORAL at 16:59

## 2025-06-09 RX ADMIN — DOXYCYCLINE HYCLATE 100 MG: 100 CAPSULE ORAL at 18:27

## 2025-06-09 RX ADMIN — ALBUTEROL SULFATE 2.5 MG: 2.5 SOLUTION RESPIRATORY (INHALATION) at 15:49

## 2025-06-09 RX ADMIN — PIPERACILLIN AND TAZOBACTAM 4.5 G: 4; .5 INJECTION, POWDER, FOR SOLUTION INTRAVENOUS at 01:17

## 2025-06-09 RX ADMIN — OXYCODONE 5 MG: 5 TABLET ORAL at 12:51

## 2025-06-09 RX ADMIN — LEVOTHYROXINE SODIUM 125 MCG: 125 TABLET ORAL at 05:32

## 2025-06-09 ASSESSMENT — ACTIVITIES OF DAILY LIVING (ADL)
ADLS_ACUITY_SCORE: 40
ADLS_ACUITY_SCORE: 40
ADLS_ACUITY_SCORE: 60
ADLS_ACUITY_SCORE: 41
ADLS_ACUITY_SCORE: 41
ADLS_ACUITY_SCORE: 42
ADLS_ACUITY_SCORE: 41
ADLS_ACUITY_SCORE: 40
ADLS_ACUITY_SCORE: 41
ADLS_ACUITY_SCORE: 42
ADLS_ACUITY_SCORE: 60
ADLS_ACUITY_SCORE: 42
ADLS_ACUITY_SCORE: 60
ADLS_ACUITY_SCORE: 60
ADLS_ACUITY_SCORE: 42
ADLS_ACUITY_SCORE: 42
ADLS_ACUITY_SCORE: 60
ADLS_ACUITY_SCORE: 60
ADLS_ACUITY_SCORE: 41
ADLS_ACUITY_SCORE: 40
ADLS_ACUITY_SCORE: 41
FALL_HISTORY_WITHIN_LAST_SIX_MONTHS: NO
ADLS_ACUITY_SCORE: 42
ADLS_ACUITY_SCORE: 41

## 2025-06-09 NOTE — PLAN OF CARE
Goal Outcome Evaluation:reviewed with patient   DATE & TIME: 6/9/2025 7-330    Cognitive Concerns/ Orientation :  alert and oriented x 4   BEHAVIOR & AGGRESSION TOOL COLOR:  green   CIWA SCORE:  na    ABNL VS/O2:  VSS RA   MOBILITY:  up with sba and walker   PAIN MANAGMENT:  complained of headache and medicated twice for pain and using ice packs as well with some relief   DIET:  Regular ate fair   BOWEL/BLADDER:  continent of urine and had a BM today   ABNL LAB/BG:    137  hgb 10.1    123  125 INR 2.99 now on High replacement for K and Mg and were replaced   DRAIN/DEVICES:  IV infusing   TELEMETRY RHYTHM: SR with 1 degree AV block and BBB pattern   SKIN:  scattered bruises   TESTS/PROCEDURES:  Echo done and waiting for Head CT to be done   D/C DATE:  uncertain   Discharge Barriers:    OTHER IMPORTANT INFO:  Pt is alert and oriented x 4 and pleasant. Complains of severe headache and medicated twice and is using cold packs. Some relief after the last dose of pain meds. MD ordered a CT of head. Pts NA levels  126,123, 125 MD aware and urine sodium sent . IV NS infusing. Up to the bathroom and tolerated well. Pt's neuros are intact.

## 2025-06-09 NOTE — CONSULTS
[facility]    Infectious Disease Consultation     Date of Admission:  6/8/2025  Date of Consult (When I saw the patient): 06/09/25    Assessment:  79 year old female with hypertension, Graves' disease with post ablative hypothyroidism, history of DVT, and hydrocephalus status post  shunt among other conditions, who has been admitted since 06/08/25 due to cough, myalgias and pleuritic chest pain, and was found to have left lower lobe nectrotizing pneumonia with small pneumothorax. Likely related to aspiration    -Left lower lobe necrotizing pneumonia, possibly aspiration related  -Pneumothorax related to ongoing infection  -Marked hyponatremia and other electrolyte abnormalities including hypomagnesemia and hypophosphatemia  Prolonged QTc  -Hypothyroidism post ablation for Grave's disease  -Anemia  -Chronic medical conditions - hypertension, history of DVT, hydrocephalus status post  shunt, osteoporosis, diabetes, macular degeneration, CKD, glaucoma      Recommendations:  Obtain sputum cx  Discontinue Vancomycin and zosyn  Treat with Unasyn and PO doxycycline   Swallow evaluation for aspiration  Pulmonary toilet      Crista Jacob MD    Reason for Consult   Reason for consult: I was asked to evaluate this patient for antimicrobial recommendations for left lower lobe necrotizing pneumonia.    Primary Care Physician   Jana Giles    Chief Complaint   Cough and pleuritic chest pain    History is obtained from the patient and medical records    History of Present Illness   Manasa French is a 79 year old female  with hypertension, Graves' disease with post ablative hypothyroidism, history of DVT, and hydrocephalus status post  shunt among other conditions, who has been admitted since 06/08/25 due to cough, myalgias and pleuritic chest pain, and was found to have left lower lobe nectrotizing pneumonia with small pneumothorax.     Patient has been sick for over a month with worsening  fatigue, she has a chronic cough which has been worse over the past week and she developed pleuritic chest pain as well. She has remained afebrile and has no leukocytosis. She has been initiated on vancomycin, zosyn and Doxycycline, and ID has been asked to assist with further management.    Antimicrobial therapy  6/8 Doxycycline, Zosyn, Vancomycin    Past Medical History   I have reviewed this patient's medical history and updated it with pertinent information if needed.   Past Medical History:   Diagnosis Date    Benign essential hypertension     Dislocation of right hip (H) 05/2024    History of Graves' disease     s/p BARAHONA    History of hydrocephalus     s/p  shunt placement in 1987    Macular degeneration of both eyes     Osteoporosis     Personal history of DVT (deep vein thrombosis) 2019    RUE; on chronic AC    Postablative hypothyroidism     Primary open angle glaucoma (POAG) of both eyes     right eye moderate, left eye mild    Pseudophakia     Sensorineural hearing loss, bilateral        Past Surgical History   I have reviewed this patient's surgical history and updated it with pertinent information if needed.  Past Surgical History:   Procedure Laterality Date    ARTHROPLASTY HIP Right 5/30/2023    Procedure: Right Total Hip Arthroplasty;  Surgeon: Vasu Mixon MD;  Location: [facility] OR    ARTHROSCOPY SHOULDER Left     CATARACT IOL, RT/LT Bilateral     DECOMPRESSION, FUSION LUMBAR POSTERIOR ONE LEVEL, COMBINED      L5-S1    EXCISE GANGLION WRIST Right     HERNIA REPAIR, UMBILICAL      LUMPECTOMY BREAST Left     benign    RECESSION RESECTION (REPAIR STRABISMUS) BILATERAL Bilateral 12/11/2023    Procedure: Bilateral eye muscle correction.;  Surgeon: Antwan Ardon MD;  [facility]       Prior to Admission Medications   Prior to Admission Medications   Prescriptions Last Dose Informant Patient Reported? Taking?   Chlorpheniramine-DM (CORICIDIN HBP COUGH/COLD) 4-30 MG TABS   No Yes    Sig: Take 1 tablet by mouth every 6 hours as needed (nasal congestion and nasal drainage).   Multiple Vitamins-Minerals (ICAPS AREDS 2 PO) 2025 Morning Self Yes Yes   Sig: Take 1 capsule by mouth 2 times daily   Vitamin D3 (CHOLECALCIFEROL) 25 mcg (1000 units) tablet 2025 Self Yes Yes   Sig: Take 1 tablet by mouth three times a week. Monday, Wednesday, Friday   Vitamin K 100 MCG TABS 2025 Evening  No Yes   Sig: Take 1 tablet by mouth daily   acetaminophen (TYLENOL) 325 MG tablet   No Yes   Sig: Take 2 tablets (650 mg) by mouth every 4 hours as needed for other (mild pain)   albuterol (PROVENTIL HFA) 108 (90 Base) MCG/ACT inhaler   No Yes   Sig: Inhale 2 puffs into the lungs every 6 hours   Patient taking differently: Inhale 2 puffs into the lungs every 6 hours as needed.   amoxicillin (AMOXIL) 500 MG tablet   Yes Yes   Si tablets by mouth one hour prior to dental procedure*   benzonatate (TESSALON) 200 MG capsule   No Yes   Sig: Take 1 capsule (200 mg) by mouth 3 times daily as needed for cough.   calcium carbonate-vitamin D (CALTRATE) 600-10 MG-MCG per tablet 2025 Morning Self Yes Yes   Sig: Take 1 tablet by mouth 2 times daily   carboxymethylcellulose (CELLUVISC/REFRESH LIQUIGEL) 1 % ophthalmic solution 2025 Morning Self Yes Yes   Sig: Place 1 drop into both eyes 2 times daily.   ferrous sulfate 45 MG TBCR CR tablet 2025 Morning  Yes Yes   Sig: Take 1 tablet (45 mg) by mouth daily.   hydroCHLOROthiazide 12.5 MG tablet 2025 Morning  No Yes   Sig: TAKE 1 TABLET (12.5 MG) BY MOUTH DAILY.   latanoprost (XALATAN) 0.005 % ophthalmic solution 2025 Bedtime  No Yes   Sig: Place 1 drop into both eyes at bedtime.   levothyroxine (SYNTHROID/LEVOTHROID) 125 MCG tablet 2025 Morning  No Yes   Sig: Take 1 tablet (125 mcg) by mouth every morning (before breakfast).   losartan (COZAAR) 100 MG tablet 2025 Evening  No Yes   Sig: Take 1 tablet (100 mg) by mouth daily.   Patient taking  differently: Take 100 mg by mouth every evening.   metFORMIN (GLUCOPHAGE XR) 500 MG 24 hr tablet 6/7/2025 Evening  No Yes   Sig: TAKE 1 TABLET EVERY DAY WITH DINNER   timolol maleate (TIMOPTIC) 0.5 % ophthalmic solution 6/8/2025 Morning  No Yes   Sig: Place 1 drop into both eyes every morning   vitamin B-12 (CYANOCOBALAMIN) 1000 MCG tablet 6/7/2025 Self Yes Yes   Sig: Take 1,000 mcg by mouth three times a week. Tuesday, Thursday, Saturday   warfarin ANTICOAGULANT (COUMADIN) 5 MG tablet 6/7/2025 Evening  No Yes   Sig: TAKE 1 AND 1/2 TABS EVERY MON, WED, FRI AND 1 TAB ALL OTHER DAYS OF THE WEEK OR AS DIRECTED BY ACC TEAM BASED ON INR RESULTS   Patient taking differently: Take 1.5 tabs (7.5mg) Mon, Wed, Fri and 1 tab (5mg) Tues, Thurs, Sat and Sun      Facility-Administered Medications: None     Allergies   Allergies   Allergen Reactions    Alphagan [Brimonidine] Other (See Comments)     Itchy eyes    Cosopt [Dorzolamide Hcl-Timolol Mal] Other (See Comments)     Itchy eyes       Immunization History   Immunization History   Administered Date(s) Administered    COVID-19 12+ (MODERNA) 09/27/2024    COVID-19 12+ (Pfizer) 11/01/2023    COVID-19 Bivalent 12+ (Pfizer) 10/11/2022    COVID-19 MONOVALENT 12+ (Pfizer) 03/03/2021, 03/24/2021, 10/13/2021    COVID-19 Monovalent 12+ (Pfizer 2022) 05/16/2022    Flu 65+ (Fluad) 11/13/2018    Influenza (High Dose) Trivalent,PF (Fluzone) 10/02/2015, 09/21/2016, 11/06/2017, 11/05/2024    Influenza (IIV3) PF 09/12/2012, 09/01/2013, 09/17/2014    Influenza Vaccine 18-64 (Flublok) 10/18/2019, 10/06/2020    Influenza Vaccine 65+ (FLUAD) 10/11/2022    Influenza Vaccine 65+ (Fluzone HD) 10/13/2021, 10/27/2023    Pneumo Conj 13-V (2010&after) 10/02/2015    Pneumococcal 23 valent 04/05/2010, 10/06/2020    TD,PF 7+ (Tenivac) 02/15/2001    TDAP (Adacel,Boostrix) 12/19/2011, 11/20/2020       Social History   I have reviewed this patient's social history and updated it with pertinent information if  needed. Manasa French  reports that she has never smoked. She has never used smokeless tobacco. She reports current alcohol use. She reports that she does not use drugs.    Family History   I have reviewed this patient's family history and updated it with pertinent information if needed.   Family History   Problem Relation Age of Onset    Colon Cancer Mother     Hypertension Mother     Breast Cancer Mother     Glaucoma Mother     Leukemia Father     Diabetes Type 2  Father     Hypertension Sister     Macular Degeneration Sister     Diabetes Type 2  Brother     Breast Cancer Maternal Aunt         multiple aunts    Myocardial Infarction No family hx of     Cerebrovascular Disease No family hx of     Coronary Artery Disease Early Onset No family hx of     Ovarian Cancer No family hx of        Review of Systems   The 10 point Review of Systems is as per HPI    Physical Exam   Temp: 97.9  F (36.6  C) Temp src: Oral BP: 102/69 Pulse: 81   Resp: 18 SpO2: 95 % O2 Device: None (Room air)    Vital Signs with Ranges  Temp:  [97.8  F (36.6  C)-98.1  F (36.7  C)] 97.9  F (36.6  C)  Pulse:  [76-92] 81  Resp:  [18-27] 18  BP: (102-196)/(41-94) 102/69  SpO2:  [93 %-98 %] 95 %  148 lbs 9.44 oz  Body mass index is 25.51 kg/m .    GENERAL APPEARANCE:  awake  EYES: Eyes grossly normal to inspection  NECK: no adenopathy  RESP: left basal crackles, scattered wheeze throughout both lung zones   CV: regular rates and rhythm  ABDOMEN: soft, nontender  MS: extremities normal  SKIN: no suspicious lesions or rashes      Data   All laboratory data reviewed  Component      Latest Ref Rng 6/9/2025  6:45 AM   Sodium      135 - 145 mmol/L 126 (L)    Potassium      3.4 - 5.3 mmol/L 3.8    Carbon Dioxide (CO2)      22 - 29 mmol/L 22    Anion Gap      7 - 15 mmol/L 12    Urea Nitrogen      8.0 - 23.0 mg/dL 11.7    Creatinine      0.51 - 0.95 mg/dL 0.87    GFR Estimate      >60 mL/min/1.73m2 67    Calcium      8.8 - 10.4 mg/dL 7.7 (L)    Chloride       98 - 107 mmol/L 92 (L)    Glucose      70 - 99 mg/dL 114 (H)    Alkaline Phosphatase      40 - 150 U/L 61    AST      0 - 45 U/L 21    ALT      0 - 50 U/L 19    Protein Total      6.4 - 8.3 g/dL 6.3 (L)    Albumin      3.5 - 5.2 g/dL 3.3 (L)    Bilirubin Total      <=1.2 mg/dL 0.8    WBC      4.0 - 11.0 10e3/uL 6.5    RBC Count      3.80 - 5.20 10e6/uL 3.24 (L)    Hemoglobin      11.7 - 15.7 g/dL 10.1 (L)    Hematocrit      35.0 - 47.0 % 29.1 (L)    MCV      78 - 100 fL 90    MCH      26.5 - 33.0 pg 31.2    MCHC      31.5 - 36.5 g/dL 34.7    RDW      10.0 - 15.0 % 13.4    Platelet Count      150 - 450 10e3/uL 173      Component      Latest Ref [facility] 6/8/2025  4:22 PM   MRSA Target DNA      Negative  Negative    SA Target DNA Negative      Component      Latest Ref [facility] 6/8/2025  5:22 PM   L Pneumo Urine Antigen      Negative  Negative    S Pneumoniae Antigen      Negative  Negative    Legionella pneumophila Urinary/Strep pneumoniae Antigen Specimen Type Urine      Component      Latest Ref [facility] 6/8/2025  9:44 AM   Influenza A      Negative  Negative    Influenza B      Negative  Negative    Resp Syncytial Virus      Negative  Negative    SARS CoV2 PCR      Negative  Negative      Component      Latest Ref [facility] 6/8/2025  11:04 AM   Procalcitonin      <0.50 ng/mL 0.10      Microbiology  06/08/2025 2256 06/09/2025 0418 Respiratory Aerobic Bacterial Culture with Gram Stain [[id number]]   Sputum from Expectorate    Preliminary result Component Value   Gram Stain Result <10 Squamous epithelial cells/low power field P    >25 PMNs/low power field P    1+ Mixed thea P        06/08/2025 1206 06/09/2025 0431 Blood Culture Peripheral blood (BC) Arm, Right [[id number]]   Peripheral blood (BC) from Arm, Right    Preliminary result Component Value   Culture No growth after 12 hours P             06/08/2025 1204 06/09/2025 0431 Blood Culture Peripheral blood (BC) Arm, Right [[id number]]   Peripheral blood (BC) from Arm, Right     Preliminary result Component Value   Culture No growth after 12 hours P        Imaging  EXAM: CT CHEST PULMONARY EMBOLISM W CONTRAST  LOCATION: [facility]  DATE: 6/8/2025     INDICATION: Pleuritic chest pain, history of PE  COMPARISON: 4/30/2007  TECHNIQUE: CT chest pulmonary angiogram during arterial phase injection of IV contrast. Multiplanar reformats and MIP reconstructions were performed. Dose reduction techniques were used.   CONTRAST: 59mL Isovue 370     FINDINGS:  ANGIOGRAM CHEST: Pulmonary arteries are normal caliber and negative for pulmonary emboli. Thoracic aorta is negative for dissection. No CT evidence of right heart strain.     LUNGS AND PLEURA: Left lower lobe consolidation containing multiple foci of gas and air-fluid levels. Endobronchial opacities in the lingula with volume loss and atelectasis. Small left pneumothorax.     MEDIASTINUM/AXILLAE: Normal.     CORONARY ARTERY CALCIFICATION: Mild.     UPPER ABDOMEN: Normal.     MUSCULOSKELETAL: Degenerative changes in the spine. Partially imaged lumbar spine surgical hardware.                                                                      IMPRESSION:  1.  No pulmonary embolism.  2.  Left lower lobe consolidation containing multiple foci of gas and air-fluid levels, concerning for necrotizing pneumonia. Small left pneumothorax likely secondary to cavitation and rupture into the pleural space. Recommend follow-up CT in 4-6 weeks   to assess for resolution.

## 2025-06-09 NOTE — PLAN OF CARE
Goal Outcome Evaluation:         Summary: Admitted with concern for nectrotizing pneumonia with small pneumothorax. Also found to have hyponatremia 120, prolonged QTc, and possibly new LBBB.  DATE & TIME: 06/09/25 2913-2067  Cognitive Concerns/ Orientation : A & O x 4. [place], hearing aides in place.    BEHAVIOR & AGGRESSION TOOL COLOR: Green   ABNL VS/O2: VSS on RA   MOBILITY: A x 1 w/ walker. Pt unsteady but can ambulate to BR.   PAIN MANAGMENT: C/O of  L sided head pain / chest pain that comes on in waves she says.  PRN oxycodone x 1 also has prn tylenol and Dilaudid available  DIET: Regular, tolerating  BOWEL/BLADDER: No issues.   ABNL LAB/BG: Sodium 124 Mg 1.6, replace IV. TSH 16.58 and INR 2.73.   DRAIN/DEVICES: 2 PIV one SL and other infusing NS @ 50 mL/hr.   TELEMETRY RHYTHM: SR w/ 1st degree AV block and BBB.   SKIN: Intact. Trace edema to BLE.   TESTS/PROCEDURES: Chest X-ray for tomorrow and Echo ordered.   D/C DAY/GOALS/PLACE: Pending, new admit.   OTHER IMPORTANT INFO: Admission questions done. Frequent cough, robitussin x 1. ALLEN with activity w/ expiratory wheezing. L chest pain when breathing.  Pt, SW, ID and Thoracic consulted. On nebs and antibitics. Nueros intact. CMS intact ex baseline nueropathy to feet and edema BLE. EKG done this AM. Currently down at the chest xray.

## 2025-06-09 NOTE — PROGRESS NOTES
[facility]    Medicine Progress Note - Hospitalist Service    Date of Admission:  6/8/2025    Assessment & Plan   Manasa French is a 79 year old female admitted on 6/8/2025. Past medical history significant for hypertension, uncontrolled, Graves' disease with post ablative hypothyroidism, history of DVT on warfarin, hydrocephalus status post  shunt in 1987, osteoporosis, among others who was admitted on 06/08/25 with concern for nectrotizing pneumonia with small pneumothorax. Also found to have hyponatremia 120, prolonged QTc, and possibly new LBBB.       Left lower lobe necrotizing pneumonia with small left apical pneumothorax  *Presented with 1 week URI symptoms, cough, subjective fevers, myalgias, headache, chills, and pleuritic chest pain.  Afebrile without leukocytosis  *CT chest showing left lower lobe consolidation containing multiple foci of gas and air-filled levels concerning for necrotizing pneumonia and small left pneumothorax likely secondary to cavitation and rupture into the pleural space    *Negative for COVID, influenza, RSV.  MRSA nasal swab negative. Urine legionella and pneumo Ag's negative.  *thoracic surgery consulted 6/9; no indication for any intervention of pneumothorax, follow up with PCP for CT chest in 4-6 weeks to assure resolution    - stop zosyn and start Unasyn, continue doxycycline per ID  - sputum culture in process  - blood cultures ngtd  - pulmonary toilet  - PT recommends home with assist  - Recommend follow-up CT in 4-6 weeks to assess for resolution     Headache  *Intermittent left side of head/posterior at times since start of URI symptoms. Hx  shunt in 1980s. Neurological status intact, no focal deficits or visual changes on admission. No trauma, falls or injuries.   *possibly related to hypertension upon arrival and acute illness above  - Monitor closely, APAP PRN, prn oxy  - Neuro checks Qshift   - will obtain CT head given hx  shunt,  anticoagulation and reporting this is an atypical headache presentation for her     Hypochloremic hyponatremia, likely acute, improving  Hypomagnesemia  Hypophosphatemia  *In the setting of hydrochlorothiazide, pna, poor oral intake since feeling ill. Suspect some hypovolemia contributing.   *improving with IVF  - Na had been correcting appropriately but most recent lab down to 123; will repeat urine studies but will continue NS 50 ml/hr  - Na q4h, notify hospitalist if >131 prior to 0600 on 6/10  - replace lytes per protocol (increase K and mag to high dose protocols 06/09/25)    ADDENDUM:  Na 125 on repeat with urine studies suggesting SIADH.  Stop IVF at this time and start 1500ml fluid restriction and continue to monitor     Prolonged QTc associated with hypomagnesemia  QTc >500ms. Lyte disturbances as noted.  - Cardiac monitoring  - repeat EKG this AM with QTc 523; repeat tomorrow  - Avoid QTc prolonging medications     Hx PE on warfarin  Reports PE was several years ago and has been on warfarin since. Admission CT neg for PE  - Warfarin per pharmacy; INR therapeutic 06/09/25   - Daily INR     Accelerated hypertension, improving  Benign essential hypertension, uncontrolled  On admission, systolic blood pressures 150s-190s.  PTA regimen: hydrochlorothiazide 12.5mg/d, losartan 100mg/d  - Continue PTA ARB with hold parameters  - Hold PTA hydrochlorothiazide in the setting of hyponatremia  - currently normotensive 06/09/25      Possibly new LBBB  *admission EKG x 2 reveals left bundle branch block is not noted on prior EKGs however during that 2018 stress test does note a transient left bundle during stress (otherwise negative).  Troponin mildly elevated 22 and 18, not uptrending and flat.  *Pleuritic chest pain likely secondary to the PNA/PTX. Already therapeutic on warfarin.   - Cardiac monitoring  - TTE 6/9 showing EF 55-60% without WMA  - follow up with cardiology outpatient      Non-Insulin dependent type 2  "diabetes  PTA Regimen: metformin 500mg/d  Last HbA1c 6.5% 5/2025  - Hold PTA oral antiglycemics  - Sliding scale insulin Medium         Chronic stable diagnoses and other pertinent medical history: Appropriate PTA medications will be resumed.   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  Anemia, iron deficiency: follows with hematology  Macular degeneration  Osteoporosis  Glaucoma            Diet: Combination Diet Regular Diet Adult    DVT Prophylaxis: Warfarin  Cole Catheter: Not present  Lines: None     Cardiac Monitoring: ACTIVE order. Indication: new LBBB  Code Status: Full Code      Clinically Significant Risk Factors         # Hyponatremia: Lowest Na = 120 mmol/L in last 2 days, will monitor as appropriate  # Hypochloremia: Lowest Cl = 84 mmol/L in last 2 days, will monitor as appropriate  # Hypocalcemia: Lowest Ca = 7.7 mg/dL in last 2 days, will monitor and replace as appropriate   # Hypomagnesemia: Lowest Mg = 1.6 mg/dL in last 2 days, will replace as needed   # Hypoalbuminemia: Lowest albumin = 3.3 g/dL at 6/9/2025  6:45 AM, will monitor as appropriate     # Hypertension: Noted on problem list           # DMII: A1C = 6.5 % (Ref range: 0.0 - 5.6 %) within past 6 months, PRESENT ON ADMISSION  # Overweight: Estimated body mass index is 25.51 kg/m  as calculated from the following:    Height as of this encounter: 1.626 m (5' 4\").    Weight as of this encounter: 67.4 kg (148 lb 9.4 oz)., PRESENT ON ADMISSION            Social Drivers of Health    Interpersonal Safety: High Risk (6/9/2025)    Interpersonal Safety     Do you feel physically and emotionally safe where you currently live?: No     Within the past 12 months, have you been hit, slapped, kicked or otherwise physically hurt by someone?: No     Within the past 12 months, have you been humiliated or emotionally abused in " other ways by your partner or ex-partner?: No   Social Connections: Unknown (9/4/2024)    Social Connection and Isolation Panel [NHANES]     Frequency of Social Gatherings with Friends and Family: Twice a week          Disposition Plan     Medically Ready for Discharge: Anticipated in 2-4 Days             Venkata Jeronimo MD  Hospitalist Service  [facility]  Securely message with [employer] (more info)  Text page via [employer] Paging/Directory   ______________________________________________________________________    Interval History   Feeling better overall today.  Primary concern is ongoing left parietal headache which she states is unusual for her.  Denies any focal neurologic symptoms, confusion.  Ongoing pleuritic chest pain.  No nausea, diarrhea or rash on antibiotics.  No other complaints.     Physical Exam   Vital Signs: Temp: 97.9  F (36.6  C) Temp src: Oral BP: 102/69 Pulse: 81   Resp: 18 SpO2: 95 % O2 Device: None (Room air)    Weight: 148 lbs 9.44 oz    General Appearance: Well nourished female in NAD  Respiratory: lungs CTAB, no wheezes or crackles  Cardiovascular: RRR, normal s1/s2 without murmur  GI: normal bowel sounds, abd soft   Skin: trace peripheral edema   Other: Awake and alert, CN grossly intact      Medical Decision Making       50 MINUTES SPENT BY ME on the date of service doing chart review, history, exam, documentation & further activities per the note.      Data     I have personally reviewed the following data over the past 24 hrs:    6.5  \   10.1 (L)   / 173     123 (L) 92 (L) 11.7 /  137 (H)   3.8 22 0.87 \     ALT: 19 AST: 21 AP: 61 TBILI: 0.8   ALB: 3.3 (L) TOT PROTEIN: 6.3 (L) LIPASE: N/A     TSH: N/A T4: N/A A1C: N/A     Procal: N/A CRP: N/A Lactic Acid: N/A       INR:  2.99 (H) PTT:  N/A   D-dimer:  N/A Fibrinogen:  N/A       Imaging results reviewed over the past 24 hrs:   Recent Results (from the past 24 hours)   XR Chest 2 Views    Narrative    EXAM: XR CHEST 2  VIEWS  LOCATION: [facility]  DATE: 2025    INDICATION: follow up PTX, PNA  COMPARISON: 2025      Impression    IMPRESSION: A small left apical pneumothorax measures 8 mm, stable to decreased compared to prior CT. Atelectasis in the left upper lobe along the left major fissure is stable. Minimal focal infiltrate in the left lower lobe posteriorly is also similar.   No new findings.   Echocardiogram Complete   Result Value    LVEF  55-60%    Narrative    [id number]  [id number]  [id number]^ОЛЕГ^MATHEUS^STELLA     [facility]  Echocardiography Laboratory  [address] [place] [address]     Name: NIC SALOMON  MRN: [id number]  : 1945  Study Date: 2025 09:44 AM  Age: 79 yrs  Gender: Female  Patient Location: [facility]  Reason For Study: LBBB  Ordering Physician: MATHEUS DENNEY  Performed By: BHARATI Marte     BSA: 1.7 m2  Height: 64 in  Weight: 148 lb  HR: 68  BP: 162/80 mmHg  ______________________________________________________________________________  Procedure  Echocardiogram with two-dimensional, color and spectral Doppler. Definity (NDC  #[id number]) given intravenously. Contrast Definity. Technically difficult  study.  ______________________________________________________________________________  Interpretation Summary     Left ventricular systolic function is normal.  The visual ejection fraction is 55-60%.  Septal motion is consistent with conduction abnormality.  Right ventricular systolic pressure is elevated, consistent with mild  pulmonary hypertension.  The study was technically difficult. Compared to the prior study dated ,  there have been no changes.  ______________________________________________________________________________  Left Ventricle  The left ventricle is normal in size. There is normal left ventricular wall  thickness. Left ventricular systolic function is normal. The visual  ejection  fraction is 55-60%. Left ventricular diastolic function is indeterminate. No  regional wall motion abnormalities noted. Septal motion is consistent with  conduction abnormality.     Right Ventricle  The right ventricle is normal size. The right ventricular systolic function is  normal.     Atria  Normal left atrial size. The right atrium is mildly dilated.     Mitral Valve  There is mild to moderate mitral annular calcification. There is mild (1+)  mitral regurgitation. The mean mitral valve gradient is 4mmHg. (69 bpm).     Tricuspid Valve  There is mild to moderate (1-2+) tricuspid regurgitation. The right  ventricular systolic pressure is approximated at 36.3 mmHg plus the right  atrial pressure. Right ventricular systolic pressure is elevated, consistent  with mild pulmonary hypertension. IVC diameter <2.1 cm collapsing >50% with  sniff suggests a normal RA pressure of 3 mmHg.     Aortic Valve  The aortic valve is not well visualized. Aortic valve sclerosis. There is mild  (1+) aortic regurgitation. No aortic stenosis is present.     Pulmonic Valve  The pulmonic valve is not well visualized.     Vessels  The aortic root is normal size.     Pericardium  There is no pericardial effusion.     Rhythm  The rhythm was sinus with wide QRS.  ______________________________________________________________________________  MMode/2D Measurements & Calculations     IVSd: 0.70 cm  LVIDd: 3.9 cm  LVIDs: 2.5 cm  LVPWd: 0.82 cm  FS: 35.0 %  LV mass(C)d: 82.7 grams  LV mass(C)dI: 48.0 grams/m2  Ao root diam: 3.3 cm  asc Aorta Diam: 3.1 cm  LVOT diam: 2.1 cm  LVOT area: 3.5 cm2  Ao root diam index Ht(cm/m): 2.0  Ao root diam index BSA (cm/m2): 1.9  Asc Ao diam index BSA (cm/m2): 1.8  Asc Ao diam index Ht(cm/m): 1.9  LA Volume (BP): 51.3 ml     LA Volume Index (BP): 29.8 ml/m2  RWT: 0.43  TAPSE: 2.1 cm     Doppler Measurements & Calculations  MV A max catherine: 127.2 cm/sec  MV max P.7 mmHg  MV mean P.2 mmHg  MV V2 VTI:  41.4 cm  MV dec slope: 503.1 cm/sec2  MV dec time: 0.27 sec  AI P1/2t: 514.7 msec  TR max omid: 314.9 cm/sec  TR max P.3 mmHg  RV S Omid: 9.9 cm/sec     ______________________________________________________________________________  Report approved by: Jose Zuluaga MD on 2025 10:56 AM

## 2025-06-09 NOTE — PROGRESS NOTES
"Thoracic Surgery:    Consulted for small left apical PTX in setting of necrotizing PNA    Reviewed history and imaging, notes    /69 (BP Location: Left arm)   Pulse 81   Temp 97.9  F (36.6  C) (Oral)   Resp 18   Ht 1.626 m (5' 4\")   Wt 67.4 kg (148 lb 9.4 oz)   LMP  (LMP Unknown)   SpO2 95%   BMI 25.51 kg/m    On room air    CT chest 6/8 and CXR 6/9: small L PTX, no PE, LLL consolidation with multiple foci of gas and air-fluid levels c/w necrotizing PNA    A/P:  Small L PTX and necrotizing PNA LLL- recommend abx, pulm toilet. No surgical intervention (chest tube, etc) indicated. F/up CT chest in 4-6 weeks with PCP to assure resolution.     Thoracic Surgery will sign off. Call if questions.    Clara Lord PA-C with Dr. Timothy Castrejon  [facility]  Cell [phone]    "

## 2025-06-09 NOTE — PLAN OF CARE
Summary: Admitted with concern for nectrotizing pneumonia with small pneumothorax. Also found to have hyponatremia 120, prolonged QTc, and possibly new LBBB.    DATE & TIME: 06/08/25 [id number]   Cognitive Concerns/ Orientation : A & O x 4. [place], hearing aides in place.    BEHAVIOR & AGGRESSION TOOL COLOR: Green   ABNL VS/O2: VSS on RA except HTN upon arrival from ED. Administered PRN IV Hydralazine x 1. Improved.   MOBILITY: A1 w/ walker. Pt unsteady but can ambulate to BR. Up in recliner x 1.   PAIN MANAGMENT: C/O of intermittent severe L sided head pain mainly posteriorly that comes on in waves she says. PRN IV Dilaudid x 1, not effective. PRN oxycodone and tylenol x 1, appeared to improve for a little bit.   DIET: Regular, tolerate dinner.   BOWEL/BLADDER: No issues.   ABNL LAB/BG: Sodium 120->123. Mg 1.6, replace IV. TSH 16.58 and INR 2.73.   DRAIN/DEVICES: 2 PIV one SL and other infusing NS @ 50 mL/hr.   TELEMETRY RHYTHM: SR w/ 1st degree AV block and BBB.   SKIN: Intact. Trace edema to BLE.   TESTS/PROCEDURES: Chest X-ray for tomorrow and Echo ordered.   D/C DAY/GOALS/PLACE: Pending, new admit.   OTHER IMPORTANT INFO: New ED admission. Pt arrived distressed with head pain and HTN. Paged PA and updated her. ALLEN with activity w/ expiratory wheezing. L chest pain when breathing. Main complaint was the head pain. Neuro was intact. ID and Thoracic consulted.

## 2025-06-09 NOTE — PROGRESS NOTES
06/09/25 1100   Appointment Info   Signing Clinician's Name / Credentials (PT) Denise Leon, PT, DPT   Living Environment   People in Home spouse   Current Living Arrangements apartment   Home Accessibility no concerns   Transportation Anticipated family or friend will provide   Living Environment Comments Patient lives in an apartment with her .  They live on the second floor and there is an elevator to access.  Patient does not drive, spouse assists with transportation.  Patient has a walk-in shower and taller toilet.  She sleeps in a standard bed, uses logroll to exit to left.   Self-Care   Usual Activity Tolerance good   Current Activity Tolerance good   Regular Exercise Yes   Activity/Exercise Type swimming;walking   Exercise Amount/Frequency   (Patient walks regularly and swims 3x/week.)   Equipment Currently Used at Home walker, rolling   Fall history within last six months yes   Number of times patient has fallen within last six months   (Vague on number of falls)   Activity/Exercise/Self-Care Comment Patient is typically independent for dressing, bathing, and toileting.  She shared chores with her .  She cooks and washes laundry and he folds laundry and cleans to floors.  Patient has a 4WW which she uses when ambulating outside of the apartment.   General Information   Onset of Illness/Injury or Date of Surgery 06/08/25   Referring Physician Yesenia Joshi PA-C   Patient/Family Therapy Goals Statement (PT) Patient would like breathing to get better, tired of coughing.   Pertinent History of Current Problem (include personal factors and/or comorbidities that impact the POC) 79 year old female with past medical history significant for hypertension, uncontrolled, Graves' disease with post ablative hypothyroidism, history of DVT on warfarin, hydrocephalus status post  shunt in 1987, osteoporosis, among others who was admitted on 06/08/25 with concern for nectrotizing pneumonia with  small pneumothorax. Also found to have hyponatremia 120, prolonged QTc, and possibly new LBBB.   Existing Precautions/Restrictions fall   Cognition   Affect/Mental Status (Cognition) WFL   Orientation Status (Cognition) oriented x 3   Follows Commands (Cognition) WFL;increased processing time needed  ([place])   Pain Assessment   Patient Currently in Pain Yes, see Vital Sign flowsheet  (Headache and soreness in chest from coughing)   Integumentary/Edema   Integumentary/Edema Comments Age-related skin changes   Posture    Posture Forward head position   Range of Motion (ROM)   Range of Motion ROM is WFL   Strength (Manual Muscle Testing)   Strength (Manual Muscle Testing) Able to perform R SLR;Able to perform L SLR;Deficits observed during functional mobility   Bed Mobility   Comment, (Bed Mobility) SBA supine > sit, HOB elevated and UE support on bed rail.   Transfers   Comment, (Transfers) SBA sit > stand, demonstrates unsafe hand placement pulling on 4WW.   Gait/Stairs (Locomotion)   Comment, (Gait/Stairs) SBA 10 ft ambulation with 4WW.   Balance   Balance Comments Impaired dynamic balance, recommend consistent use of walker with mobility.   Sensory Examination   Sensory Perception Comments Describes intermittent tingling along plantar surface of feet   Clinical Impression   Criteria for Skilled Therapeutic Intervention Yes, treatment indicated   PT Diagnosis (PT) Impaired functional mobility   Influenced by the following impairments Impaired respiratory status, weakness, deconditioning, impaired balance, decreased activity tolerance   Functional limitations due to impairments Impaired independence with bed mobility, transfers, and gait   Clinical Presentation (PT Evaluation Complexity) evolving   Clinical Presentation Rationale Clinical judgement, PMH, social support   Clinical Decision Making (Complexity) moderate complexity   Planned Therapy Interventions (PT) balance training;bed mobility training;gait  training;home exercise program;neuromuscular re-education;patient/family education;strengthening;transfer training;progressive activity/exercise;cryotherapy;postural re-education   Risk & Benefits of therapy have been explained evaluation/treatment results reviewed;care plan/treatment goals reviewed;risks/benefits reviewed;participants voiced agreement with care plan;participants included;patient   PT Total Evaluation Time   PT Eval, Moderate Complexity Minutes (14447) 10   Physical Therapy Goals   PT Frequency Daily   PT Predicted Duration/Target Date for Goal Attainment 06/13/25   PT Goals Bed Mobility;Transfers;Gait   PT: Bed Mobility Independent;Supine to/from sit;Rolling  (Via logroll)   PT: Transfers Modified independent;Sit to/from stand;Bed to/from chair;Assistive device   PT: Gait Supervision/stand-by assist;Greater than 200 feet;Rolling walker   Interventions   Interventions Quick Adds Therapeutic Activity;Gait Training   Therapeutic Activity   Therapeutic Activities: dynamic activities to improve functional performance Minutes (70682) 9   Symptoms Noted During/After Treatment Fatigue   Treatment Detail/Skilled Intervention Patient supine in bed on arrival, brother and LIONEL visiting.  Patient complaints of headache, holding cold pack to head.  Requesting oxy but not due until nearly noon.  Patient agreeable to PT session.  At baseline, patient is quite active with walking and swimming 3 days/week.  Patient IND for sitting balance at EOB.  She completes x3 sit <> stands from various surfaces (elevated bed, bench in bello, low toilet in bathroo) with SBA.  Does requires cues for safe hand placement as she tends to pull with B hands on walker and releases brakes on 4WW midway through sit <> stand transfers.  Patient using bathroom following ambulation in bello, SBA to negotiate bathroom without 4WW (walker doesn't fit in bathroom), heavy furniture/wall walking noted. IND for pericares and patient able to  doff/don pants without assist.  SBA to wash hands a sink.  Patient fatigued, returning to bed at end of visit.  Encouraged ambulation with staff to maintain functional strength and progress activity tolerance.   Gait Training   Gait Training Minutes (24651) 13   Symptoms Noted During/After Treatment (Gait Training) fatigue   Treatment Detail/Skilled Intervention Patient cued to ambulate in bello with 4WW.  Patient ambulates with SBA, slow gait speed but no loss of balance.  Cues for upright posture and pursed lip breathing to facilitate aerobic recovery.  Patient takes x1 seated rest, notes exertional dyspnea but SpO2 remains 93-94% on room air with HR 80.   Distance in Feet 230' + 250'   PT Discharge Planning   PT Plan Progress amb with 4WW (wean as able, pt states was only using walker outside apartment), repeated sit <> stands   PT Discharge Recommendation (DC Rec) home with assist   PT Rationale for DC Rec Patient mobilizing with SBA and 4WW today, limited by coughing and shortness of breath despite SpO2 93-94% on room air.  Patient anticipates discharge back to apartment with  assisting.  Encouraged walking program to maintain strength and increase activity tolerance.  Patient was swimming several days per week, would benefit from resuming with aerobic exercises pending respiratory function.   PT Brief overview of current status Ax1 4WW. Goals of therapy will be to address safe mobility and make recs for d/c to next level of care. Pt and RN will continue to follow all falls risk precautions as documented by RN staff while hospitalized.   PT Total Distance Amb During Session (feet) 490   PT Equipment Needed at Discharge   (Owns 4WW)   Physical Therapy Time and Intention   Timed Code Treatment Minutes 22   Total Session Time (sum of timed and untimed services) 32

## 2025-06-10 ENCOUNTER — APPOINTMENT (OUTPATIENT)
Dept: PHYSICAL THERAPY | Facility: CLINIC | Age: 80
DRG: 178 | End: 2025-06-10
Payer: MEDICARE

## 2025-06-10 LAB
ANION GAP SERPL CALCULATED.3IONS-SCNC: 13 MMOL/L (ref 7–15)
BUN SERPL-MCNC: 10.5 MG/DL (ref 8–23)
CALCIUM SERPL-MCNC: 7.8 MG/DL (ref 8.8–10.4)
CHLORIDE SERPL-SCNC: 94 MMOL/L (ref 98–107)
CREAT SERPL-MCNC: 0.77 MG/DL (ref 0.51–0.95)
EGFRCR SERPLBLD CKD-EPI 2021: 78 ML/MIN/1.73M2
GLUCOSE BLDC GLUCOMTR-MCNC: 107 MG/DL (ref 70–99)
GLUCOSE BLDC GLUCOMTR-MCNC: 138 MG/DL (ref 70–99)
GLUCOSE BLDC GLUCOMTR-MCNC: 183 MG/DL (ref 70–99)
GLUCOSE BLDC GLUCOMTR-MCNC: 186 MG/DL (ref 70–99)
GLUCOSE SERPL-MCNC: 106 MG/DL (ref 70–99)
HCO3 SERPL-SCNC: 23 MMOL/L (ref 22–29)
HOLD SPECIMEN: NORMAL
INR PPP: 2.97 (ref 0.85–1.15)
MAGNESIUM SERPL-MCNC: 2.1 MG/DL (ref 1.7–2.3)
PHOSPHATE SERPL-MCNC: 2 MG/DL (ref 2.5–4.5)
POTASSIUM SERPL-SCNC: 4.1 MMOL/L (ref 3.4–5.3)
PROTHROMBIN TIME: 29.7 SECONDS (ref 11.8–14.8)
SODIUM SERPL-SCNC: 124 MMOL/L (ref 135–145)
SODIUM SERPL-SCNC: 128 MMOL/L (ref 135–145)
SODIUM SERPL-SCNC: 130 MMOL/L (ref 135–145)

## 2025-06-10 PROCEDURE — 94640 AIRWAY INHALATION TREATMENT: CPT | Mod: 76

## 2025-06-10 PROCEDURE — 93010 ELECTROCARDIOGRAM REPORT: CPT | Performed by: INTERNAL MEDICINE

## 2025-06-10 PROCEDURE — 83735 ASSAY OF MAGNESIUM: CPT | Performed by: HOSPITALIST

## 2025-06-10 PROCEDURE — 94640 AIRWAY INHALATION TREATMENT: CPT

## 2025-06-10 PROCEDURE — 999N000157 HC STATISTIC RCP TIME EA 10 MIN

## 2025-06-10 PROCEDURE — 250N000013 HC RX MED GY IP 250 OP 250 PS 637: Performed by: SPECIALIST

## 2025-06-10 PROCEDURE — 80048 BASIC METABOLIC PNL TOTAL CA: CPT | Performed by: HOSPITALIST

## 2025-06-10 PROCEDURE — 99232 SBSQ HOSP IP/OBS MODERATE 35: CPT | Performed by: HOSPITALIST

## 2025-06-10 PROCEDURE — 82310 ASSAY OF CALCIUM: CPT | Performed by: HOSPITALIST

## 2025-06-10 PROCEDURE — 84295 ASSAY OF SERUM SODIUM: CPT | Performed by: HOSPITALIST

## 2025-06-10 PROCEDURE — 87106 FUNGI IDENTIFICATION YEAST: CPT | Performed by: SPECIALIST

## 2025-06-10 PROCEDURE — 85610 PROTHROMBIN TIME: CPT | Performed by: PHYSICIAN ASSISTANT

## 2025-06-10 PROCEDURE — 250N000013 HC RX MED GY IP 250 OP 250 PS 637: Performed by: HOSPITALIST

## 2025-06-10 PROCEDURE — 250N000012 HC RX MED GY IP 250 OP 636 PS 637: Performed by: PHYSICIAN ASSISTANT

## 2025-06-10 PROCEDURE — 120N000001 HC R&B MED SURG/OB

## 2025-06-10 PROCEDURE — G0545 PR INHRENT VISIT TO INPT/OBS W CNFRM/SUSPCT INFCT DIS BY INFCT DIS SPCIALST: HCPCS | Performed by: SPECIALIST

## 2025-06-10 PROCEDURE — 87070 CULTURE OTHR SPECIMN AEROBIC: CPT | Performed by: SPECIALIST

## 2025-06-10 PROCEDURE — 84100 ASSAY OF PHOSPHORUS: CPT | Performed by: HOSPITALIST

## 2025-06-10 PROCEDURE — 93005 ELECTROCARDIOGRAM TRACING: CPT

## 2025-06-10 PROCEDURE — 250N000013 HC RX MED GY IP 250 OP 250 PS 637: Performed by: PHYSICIAN ASSISTANT

## 2025-06-10 PROCEDURE — 97530 THERAPEUTIC ACTIVITIES: CPT | Mod: GP | Performed by: PHYSICAL THERAPIST

## 2025-06-10 PROCEDURE — 250N000011 HC RX IP 250 OP 636: Performed by: HOSPITALIST

## 2025-06-10 PROCEDURE — 36415 COLL VENOUS BLD VENIPUNCTURE: CPT | Performed by: PHYSICIAN ASSISTANT

## 2025-06-10 PROCEDURE — 36415 COLL VENOUS BLD VENIPUNCTURE: CPT | Performed by: HOSPITALIST

## 2025-06-10 PROCEDURE — 250N000009 HC RX 250: Performed by: PHYSICIAN ASSISTANT

## 2025-06-10 PROCEDURE — 97116 GAIT TRAINING THERAPY: CPT | Mod: GP | Performed by: PHYSICAL THERAPIST

## 2025-06-10 PROCEDURE — 99232 SBSQ HOSP IP/OBS MODERATE 35: CPT | Performed by: SPECIALIST

## 2025-06-10 RX ADMIN — CARBOXYMETHYLCELLULOSE SODIUM 1 DROP: 10 GEL OPHTHALMIC at 20:44

## 2025-06-10 RX ADMIN — LATANOPROST 1 DROP: 50 SOLUTION OPHTHALMIC at 20:51

## 2025-06-10 RX ADMIN — AMPICILLIN SODIUM AND SULBACTAM SODIUM 3 G: 2; 1 INJECTION, POWDER, FOR SOLUTION INTRAMUSCULAR; INTRAVENOUS at 18:12

## 2025-06-10 RX ADMIN — GUAIFENESIN 600 MG: 600 TABLET, EXTENDED RELEASE ORAL at 08:50

## 2025-06-10 RX ADMIN — GUAIFENESIN 600 MG: 600 TABLET, EXTENDED RELEASE ORAL at 20:45

## 2025-06-10 RX ADMIN — POTASSIUM & SODIUM PHOSPHATES POWDER PACK 280-160-250 MG 1 PACKET: 280-160-250 PACK at 20:44

## 2025-06-10 RX ADMIN — ALBUTEROL SULFATE 2.5 MG: 2.5 SOLUTION RESPIRATORY (INHALATION) at 07:23

## 2025-06-10 RX ADMIN — Medication 45 MG: at 08:50

## 2025-06-10 RX ADMIN — POTASSIUM & SODIUM PHOSPHATES POWDER PACK 280-160-250 MG 1 PACKET: 280-160-250 PACK at 13:05

## 2025-06-10 RX ADMIN — BENZONATATE 200 MG: 100 CAPSULE ORAL at 20:44

## 2025-06-10 RX ADMIN — METOPROLOL TARTRATE 12.5 MG: 25 TABLET, FILM COATED ORAL at 12:05

## 2025-06-10 RX ADMIN — ACETAMINOPHEN 975 MG: 325 TABLET, FILM COATED ORAL at 00:58

## 2025-06-10 RX ADMIN — LOSARTAN POTASSIUM 100 MG: 25 TABLET, FILM COATED ORAL at 20:47

## 2025-06-10 RX ADMIN — DOXYCYCLINE HYCLATE 100 MG: 100 CAPSULE ORAL at 08:50

## 2025-06-10 RX ADMIN — BENZONATATE 200 MG: 100 CAPSULE ORAL at 00:58

## 2025-06-10 RX ADMIN — AMPICILLIN SODIUM AND SULBACTAM SODIUM 3 G: 2; 1 INJECTION, POWDER, FOR SOLUTION INTRAMUSCULAR; INTRAVENOUS at 12:00

## 2025-06-10 RX ADMIN — CARBOXYMETHYLCELLULOSE SODIUM 1 DROP: 10 GEL OPHTHALMIC at 08:53

## 2025-06-10 RX ADMIN — AMPICILLIN SODIUM AND SULBACTAM SODIUM 3 G: 2; 1 INJECTION, POWDER, FOR SOLUTION INTRAMUSCULAR; INTRAVENOUS at 00:34

## 2025-06-10 RX ADMIN — METOPROLOL TARTRATE 12.5 MG: 25 TABLET, FILM COATED ORAL at 20:45

## 2025-06-10 RX ADMIN — ALBUTEROL SULFATE 2.5 MG: 2.5 SOLUTION RESPIRATORY (INHALATION) at 18:47

## 2025-06-10 RX ADMIN — INSULIN ASPART 1 UNITS: 100 INJECTION, SOLUTION INTRAVENOUS; SUBCUTANEOUS at 13:01

## 2025-06-10 RX ADMIN — ALBUTEROL SULFATE 2.5 MG: 2.5 SOLUTION RESPIRATORY (INHALATION) at 11:36

## 2025-06-10 RX ADMIN — POTASSIUM & SODIUM PHOSPHATES POWDER PACK 280-160-250 MG 1 PACKET: 280-160-250 PACK at 18:12

## 2025-06-10 RX ADMIN — TIMOLOL MALEATE 1 DROP: 5 SOLUTION OPHTHALMIC at 08:52

## 2025-06-10 RX ADMIN — LEVOTHYROXINE SODIUM 125 MCG: 125 TABLET ORAL at 06:21

## 2025-06-10 RX ADMIN — AMPICILLIN SODIUM AND SULBACTAM SODIUM 3 G: 2; 1 INJECTION, POWDER, FOR SOLUTION INTRAMUSCULAR; INTRAVENOUS at 06:21

## 2025-06-10 RX ADMIN — WARFARIN SODIUM 1.5 MG: 3 TABLET ORAL at 18:13

## 2025-06-10 RX ADMIN — CYANOCOBALAMIN TAB 1000 MCG 1000 MCG: 1000 TAB at 08:50

## 2025-06-10 RX ADMIN — ACETAMINOPHEN 975 MG: 325 TABLET, FILM COATED ORAL at 20:44

## 2025-06-10 RX ADMIN — DOXYCYCLINE HYCLATE 100 MG: 100 CAPSULE ORAL at 20:45

## 2025-06-10 RX ADMIN — Medication 5 MG: at 00:58

## 2025-06-10 ASSESSMENT — ACTIVITIES OF DAILY LIVING (ADL)
ADLS_ACUITY_SCORE: 42
ADLS_ACUITY_SCORE: 41
DEPENDENT_IADLS:: INDEPENDENT
ADLS_ACUITY_SCORE: 43
ADLS_ACUITY_SCORE: 46
ADLS_ACUITY_SCORE: 46
ADLS_ACUITY_SCORE: 43
ADLS_ACUITY_SCORE: 41
ADLS_ACUITY_SCORE: 46
ADLS_ACUITY_SCORE: 41
ADLS_ACUITY_SCORE: 44
ADLS_ACUITY_SCORE: 41
ADLS_ACUITY_SCORE: 44
ADLS_ACUITY_SCORE: 42
ADLS_ACUITY_SCORE: 42
ADLS_ACUITY_SCORE: 41
ADLS_ACUITY_SCORE: 44
ADLS_ACUITY_SCORE: 46
ADLS_ACUITY_SCORE: 43
ADLS_ACUITY_SCORE: 41

## 2025-06-10 NOTE — CONSULTS
Care Management Initial Consult    General Information  Assessment completed with: Patient, Spouse or significant other, VM-chart review,    Type of CM/SW Visit: Initial Assessment    Primary Care Provider verified and updated as needed: Yes (Jana Giles)   Readmission within the last 30 days: no previous admission in last 30 days      Reason for Consult: discharge planning, other (see comments) (elevated risk score)  Advance Care Planning: Advance Care Planning Reviewed: no concerns identified, present on chart          Communication Assessment  Patient's communication style: spoken language (English or Bilingual)    Hearing Difficulty or Deaf: yes   Wear Glasses or Blind: yes    Cognitive  Cognitive/Neuro/Behavioral: WDL  Level of Consciousness: alert     Orientation: oriented x 4        Speech: clear, spontaneous    Living Environment:   People in home: spouse     Current living Arrangements: apartment      Able to return to prior arrangements: yes       Family/Social Support:  Care provided by: self  Provides care for: no one  Marital Status:   Support system:           Description of Support System:           Current Resources:   Patient receiving home care services: No        Community Resources: None  Equipment currently used at home: walker, rolling  Supplies currently used at home:      Employment/Financial:  Employment Status: retired        Financial Concerns: none (denies)           Does the patient's insurance plan have a 3 day qualifying hospital stay waiver?  Yes     Which insurance plan 3 day waiver is available? [employer]    Will the waiver be used for post-acute placement? No    Lifestyle & Psychosocial Needs:  Social Drivers of Health     Food Insecurity: Low Risk  (9/4/2024)    Food Insecurity     Within the past 12 months, did you worry that your food would run out before you got money to buy more?: No     Within the past 12 months, did the food you bought just not last and you  didn t have money to get more?: No   Depression: Not at risk (5/21/2025)    PHQ-2     PHQ-2 Score: 0   Housing Stability: Low Risk  (9/4/2024)    Housing Stability     Do you have housing? : Yes     Are you worried about losing your housing?: No   Tobacco Use: Low Risk  (6/5/2025)    Patient History     Smoking Tobacco Use: Never     Smokeless Tobacco Use: Never     Passive Exposure: Not on file   Financial Resource Strain: Low Risk  (9/4/2024)    Financial Resource Strain     Within the past 12 months, have you or your family members you live with been unable to get utilities (heat, electricity) when it was really needed?: No   Alcohol Use: Not At Risk (5/27/2021)    AUDIT-C     Frequency of Alcohol Consumption: 4 or more times a week     Average Number of Drinks: 1 or 2     Frequency of Binge Drinking: Never   Transportation Needs: Low Risk  (9/4/2024)    Transportation Needs     Within the past 12 months, has lack of transportation kept you from medical appointments, getting your medicines, non-medical meetings or appointments, work, or from getting things that you need?: No   Physical Activity: Not on file   Interpersonal Safety: High Risk (6/9/2025)    Interpersonal Safety     Do you feel physically and emotionally safe where you currently live?: No     Within the past 12 months, have you been hit, slapped, kicked or otherwise physically hurt by someone?: No     Within the past 12 months, have you been humiliated or emotionally abused in other ways by your partner or ex-partner?: No   Stress: No Stress Concern Present (9/4/2024)    [employer] [place] [employer] - Occupational Stress Questionnaire     Feeling of Stress : Not at all   Social Connections: Unknown (9/4/2024)    Social Connection and Isolation Panel [NHANES]     Frequency of Communication with Friends and Family: Not on file     Frequency of Social Gatherings with Friends and Family: Twice a week     Attends Faith Services: Not on  file     Active Member of Clubs or Organizations: Not on file     Attends Club or Organization Meetings: Not on file     Marital Status: Not on file   Health Literacy: Not on file       Functional Status:  Prior to admission patient needed assistance:   Dependent ADLs:: Independent, Ambulation-walker  Dependent IADLs:: Independent (shares IADL's with spoues)       Mental Health Status:          Chemical Dependency Status:                Values/Beliefs:  Spiritual, Cultural Beliefs, Sikh Practices, Values that affect care: no (denies)               Discussed  Partnership in Safe Discharge Planning  document with patient/family: No    Additional Information:  Met patient and spouse at bedside; introduced self and explained role in discharge planning.    Patient admitted with complaints of cough and malaise.  She is being treated for pneumonia.    Patient lives with her spouse King in their apartment.  She is very active, swims 3 times a week and frequent walker.  She is independent in her ADL's, uses a rolling waker.  She and her  share the IADL's.      PT has assessed her and recommends home with assist, currently SOB with activity.  Her  is able to assist her if needed.      Patient has no concerns going home when medically ready.      Next Steps: No further care management intervention anticipated at this time.  Please re-consult if further needs arise.  Care management signing off.        Idalia Cantor RN  Inpatient Float Care Coordinator  [facility]  [email or web address]

## 2025-06-10 NOTE — PLAN OF CARE
Summary: Admitted with nectrotizing pneumonia with small L pneumothorax. And new LBBB.  DATE & TIME: 06/09-06/10/25 Night shift  Cognitive Concerns/ Orientation : A & O x 4. [place]    BEHAVIOR & AGGRESSION TOOL COLOR: Green   ABNL VS/O2: VSS on RA   MOBILITY: A x 1 w/ walker. Pt unsteady but can ambulate to BR.   PAIN MANAGMENT: Complained of generalized pain but unable to specify- gave tylenol with relief  DIET: Regular  BOWEL/BLADDER: No issues.   ABNL LAB/BG: Sodium 124; all electrolytes will be rechecked this AM; INR 2.99  DRAIN/DEVICES: IV SL   TELEMETRY RHYTHM: SR w/ 1st degree AV block and BBB.   SKIN: Intact. Trace edema to BLE.   TESTS/PROCEDURES: None this AM  D/C DAY/GOALS/PLACE: Once off IV antibiotics; still need sputum sample; once Na stable  OTHER IMPORTANT INFO: .Denied any headaches; but really wanted to sleep; provided melatonin 5mg, tessalon pearls for cough      Goal Outcome Evaluation:

## 2025-06-10 NOTE — PLAN OF CARE
Goal Outcome Evaluation: reviewed with patient   DATE & TIME: 6/10/2025 7-330    Cognitive Concerns/ Orientation : alert and oriented x 4    BEHAVIOR & AGGRESSION TOOL COLOR:  green   CIWA SCORE:  na    ABNL VS/O2:  BP elevated after PT  improved after a rest period otherwise VSS RA  MOBILITY:  up with sba and walker   PAIN MANAGMENT:  states no pain today   DIET:  Regular on fluid restriction   BOWEL/BLADDER:  continent of urine, has had 2 BM's this shift   ABNL LAB/BG:    183   INR 2.97phos 2.0 replacing   DRAIN/DEVICES: SL   TELEMETRY RHYTHM:  SR with BBB pattern now discontinued   SKIN:  scattered bruises   TESTS/PROCEDURES:  none   D/C DATE:  uncertain   Discharge Barriers:   OTHER IMPORTANT INFO: Pt is pleasant and alert and oriented x 4 up with sba and walker. Pt ate well for breakfast and is compliant with fluid restriction. Pt states that her headache has not returned today so no pain. Pt walked in the halls with PT and did fair. BP elevated after the walk. Pt has a productive cough and sputum sample sent. Pt was very wheezy this am, that has improved.

## 2025-06-10 NOTE — PROGRESS NOTES
[facility]    Infectious Disease Progress Note    Date of Service (when I saw the patient): 06/10/2025     Assessment:  79 year old female with hypertension, Graves' disease with post ablative hypothyroidism, history of DVT, and hydrocephalus status post  shunt among other conditions, who has been admitted since 06/08/25 due to cough, myalgias and pleuritic chest pain, and was found to have left lower lobe nectrotizing pneumonia with small pneumothorax. Likely related to aspiration     -Left lower lobe necrotizing pneumonia, possibly aspiration related  -Pneumothorax related to ongoing infection  -Marked hyponatremia and other electrolyte abnormalities including hypomagnesemia and hypophosphatemia  Prolonged QTc  -Hypothyroidism post ablation for Grave's disease  -Anemia  -Chronic medical conditions - hypertension, history of DVT, hydrocephalus status post  shunt, osteoporosis, diabetes, macular degeneration, CKD, glaucoma        Recommendations:  Continue Unasyn while in hospital with transition to oral Augmentin at discharge, and plan a 4-6 week treatment course with follow up CT chest WO contrast  Oral Doxycycline for 5 days then discontinue  Speech and swallow evaluation for aspiration  Will arrange ID follow up in 4 weeks    Crista Jacob MD    Interval History     Feels better, had a good night, cough is better and breathing more comfortably    Physical Exam   Temp: 98.1  F (36.7  C) Temp src: Oral BP: (!) 158/77 Pulse: 81   Resp: 18 SpO2: 92 % O2 Device: None (Room air)    Vitals:    06/08/25 0855 06/08/25 1446 06/09/25 0516   Weight: 67.1 kg (148 lb) 67.5 kg (148 lb 12.8 oz) 67.4 kg (148 lb 9.4 oz)     Vital Signs with Ranges  Temp:  [98.1  F (36.7  C)-98.9  F (37.2  C)] 98.1  F (36.7  C)  Pulse:  [] 81  Resp:  [16-18] 18  BP: (158-171)/(75-86) 158/77  SpO2:  [92 %-95 %] 92 %    Constitutional: Awake, alert, cooperative, no apparent distress  Lungs: left basal crackles,  wheezing   Cardiovascular: Regular rate and rhythm, normal S1 and S2, and no murmur noted  Abdomen: Normal bowel sounds, soft, non-distended, non-tender  Skin: No rashes, no cyanosis, no edema    Other:    Medications   Current Facility-Administered Medications   Medication Dose Route Frequency Provider Last Rate Last Admin    Patient is already receiving anticoagulation with heparin, enoxaparin (LOVENOX), warfarin (COUMADIN)  or other anticoagulant medication   Does not apply Continuous PRN Yesenia Joshi PA-C         Current Facility-Administered Medications   Medication Dose Route Frequency Provider Last Rate Last Admin    albuterol (PROVENTIL) neb solution 2.5 mg  3 mL Nebulization 4x Daily Yesenia Joshi PA-C   2.5 mg at 06/10/25 0723    ampicillin-sulbactam (UNASYN) 3 g vial to attach to  mL bag  3 g Intravenous Q6H Venkata Jeronimo MD   3 g at 06/10/25 0621    carboxymethylcellulose PF (REFRESH LIQUIGEL) 1 % ophthalmic gel 1 drop  1 drop Both Eyes BID Yesenia Joshi PA-C   1 drop at 06/10/25 0853    cyanocobalamin (VITAMIN B-12) tablet 1,000 mcg  1,000 mcg Oral Once per day on Tuesday Thursday Saturday Yesenia Joshi PA-C   1,000 mcg at 06/10/25 0850    doxycycline hyclate (VIBRAMYCIN) capsule 100 mg  100 mg Oral Q12H [facility] (08/20) Crista Jacob MD   100 mg at 06/10/25 0850    ferrous sulfate CR tablet 45 mg  45 mg Oral Daily Yesenia Joshi PA-C   45 mg at 06/10/25 0850    guaiFENesin (MUCINEX) 12 hr tablet 600 mg  600 mg Oral BID Yesenia Joshi PA-C   600 mg at 06/10/25 0850    [Held by provider] hydroCHLOROthiazide tablet 12.5 mg  12.5 mg Oral Daily Yesenia Joshi PA-C        insulin aspart (NovoLOG) injection (RAPID ACTING)  1-7 Units Subcutaneous TID AC Yesenia Joshi PA-C        insulin aspart (NovoLOG) injection (RAPID ACTING)  1-5 Units Subcutaneous At Bedtime Yesenia Joshi PA-C        latanoprost  (XALATAN) 0.005 % ophthalmic solution 1 drop  1 drop Both Eyes At Bedtime Yesenia Joshi PA-C   1 drop at 06/09/25 2013    levothyroxine (SYNTHROID/LEVOTHROID) tablet 125 mcg  125 mcg Oral QAM AC Yesenia Joshi PA-C   125 mcg at 06/10/25 0621    losartan (COZAAR) tablet 100 mg  100 mg Oral QPM Yesenia Joshi PA-C   100 mg at 06/09/25 2012    senna-docusate (SENOKOT-S/PERICOLACE) 8.6-50 MG per tablet 1 tablet  1 tablet Oral BID Yesenia Joshi PA-C        Or    senna-docusate (SENOKOT-S/PERICOLACE) 8.6-50 MG per tablet 2 tablet  2 tablet Oral BID Yesenia Joshi PA-C        sodium chloride (PF) 0.9% PF flush 3 mL  3 mL Intracatheter Q8H RENY Yesenia Joshi PA-C   3 mL at 06/10/25 0033    timolol maleate (TIMOPTIC) 0.5 % ophthalmic solution 1 drop  1 drop Both Eyes QAM Yesenia Joshi PA-C   1 drop at 06/10/25 0852    Vitamin D3 (CHOLECALCIFEROL) tablet 25 mcg  25 mcg Oral Once per day on Monday Wednesday Friday Yesenia Joshi PA-C   25 mcg at 06/09/25 0844    Warfarin Dose Required Daily - Pharmacist Managed  1 each Oral See Admin Instructions Yesenia Joshi PA-C           Data   All microbiology laboratory data reviewed.  Recent Labs   Lab Test 06/09/25  0645 06/08/25  0858 05/21/25  1359   WBC 6.5 7.6 7.1   HGB 10.1* 11.1* 11.2*   HCT 29.1* 33.0* 35.5   MCV 90 91 97    205 214     Recent Labs   Lab Test 06/10/25  0639 06/09/25  0645 06/08/25  0858   CR 0.77 0.87 0.91     Recent Labs   Lab Test 02/26/19  1410   SED 30

## 2025-06-10 NOTE — PROGRESS NOTES
[facility]    Medicine Progress Note - Hospitalist Service    Date of Admission:  6/8/2025    Assessment & Plan   Manasa French is a 79 year old female admitted on 6/8/2025. Past medical history significant for hypertension, uncontrolled, Graves' disease with post ablative hypothyroidism, history of DVT on warfarin, hydrocephalus status post  shunt in 1987, osteoporosis, among others who was admitted on 06/08/25 with concern for nectrotizing pneumonia with small pneumothorax. Also found to have hyponatremia 120, prolonged QTc, and possibly new LBBB.       Left lower lobe necrotizing pneumonia with small left apical pneumothorax  *Presented with 1 week URI symptoms, cough, subjective fevers, myalgias, headache, chills, and pleuritic chest pain.  Afebrile without leukocytosis  *CT chest showing left lower lobe consolidation containing multiple foci of gas and air-filled levels concerning for necrotizing pneumonia and small left pneumothorax likely secondary to cavitation and rupture into the pleural space    *Negative for COVID, influenza, RSV.  MRSA nasal swab negative. Urine legionella and pneumo Ag's negative.  *thoracic surgery consulted 6/9; no indication for any intervention of pneumothorax, follow up with PCP for CT chest in 4-6 weeks to assure resolution    - continue Unasyn and doxycycline per ID  - sputum culture with mixed thea  - blood cultures ngtd  - pulmonary toilet  - PT recommends home with assist  - Recommend follow-up CT in 4-6 weeks to assess for resolution     Headache  *Intermittent left side of head/posterior at times since start of URI symptoms. Hx  shunt in 1980s. Neurological status intact, no focal deficits or visual changes on admission. No trauma, falls or injuries.   *possibly related to hypertension upon arrival and acute illness above  *CT head 6/9 negative for acute pathology  - Monitor closely, APAP PRN, prn oxy  - Neuro checks Qshift   - no headache  thus far 06/10/25      Hypochloremic hyponatremia, likely acute, improving  Hypomagnesemia  Hypophosphatemia  *In the setting of hydrochlorothiazide, pna, poor oral intake since feeling ill. Suspect some hypovolemia contributing.   *initially improved with IVF but stalled with repeat urine studies consistent with SIADH   - Na improved to 130 on 06/10/25  - continue 1500 ml fluid restriction  - decrease na checks to bid     Prolonged QTc associated with hypomagnesemia  QTc >500ms. Lyte disturbances as noted.  - QTc improved to 504 on repeat 06/10/25; ok to stop tele 06/10/25   - Avoid QTc prolonging medications     Hx PE on warfarin  Reports PE was several years ago and has been on warfarin since. Admission CT neg for PE  - Warfarin per pharmacy; INR therapeutic 06/10/25   - Daily INR     Accelerated hypertension, improving  Benign essential hypertension, uncontrolled  On admission, systolic blood pressures 150s-190s.  PTA regimen: hydrochlorothiazide 12.5mg/d, losartan 100mg/d  - Continue PTA ARB with hold parameters  - Hold PTA hydrochlorothiazide in the setting of hyponatremia  - moderately hypertensive 06/10/25; will start metoprolol 12.5 mg bid as will not plan to resume hydrochlorothiazide for the near term     Possibly new LBBB  *admission EKG x 2 reveals left bundle branch block is not noted on prior EKGs however during that 2018 stress test does note a transient left bundle during stress (otherwise negative).  Troponin mildly elevated 22 and 18, not uptrending and flat.  *Pleuritic chest pain likely secondary to the PNA/PTX. Already therapeutic on warfarin.   *TTE 6/9 showing EF 55-60% without WMA  - follow up with cardiology outpatient      Non-Insulin dependent type 2 diabetes  PTA Regimen: metformin 500mg/d  Last HbA1c 6.5% 5/2025  - Hold PTA oral antiglycemics  - Sliding scale insulin Medium         Chronic stable diagnoses and other pertinent medical history: Appropriate PTA medications will be resumed.  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  Anemia, iron deficiency: follows with hematology  Macular degeneration  Osteoporosis  Glaucoma            Diet: Combination Diet Regular Diet Adult  Fluid restriction 1500 ML FLUID    DVT Prophylaxis: Warfarin  Cole Catheter: Not present  Lines: None     Cardiac Monitoring: ACTIVE order. Indication: new LBBB  Code Status: Full Code      Clinically Significant Risk Factors         # Hyponatremia: Lowest Na = 120 mmol/L in last 2 days, will monitor as appropriate  # Hypochloremia: Lowest Cl = 92 mmol/L in last 2 days, will monitor as appropriate  # Hypocalcemia: Lowest Ca = 7.7 mg/dL in last 2 days, will monitor and replace as appropriate   # Hypomagnesemia: Lowest Mg = 1.6 mg/dL in last 2 days, will replace as needed   # Hypoalbuminemia: Lowest albumin = 3.3 g/dL at 6/9/2025  6:45 AM, will monitor as appropriate     # Hypertension: Noted on problem list           # DMII: A1C = 6.5 % (Ref range: 0.0 - 5.6 %) within past 6 months, PRESENT ON ADMISSION  # Overweight: Estimated body mass index is 25.51 kg/m  as calculated from the following:    Height as of this encounter: 1.626 m (5' 4\").    Weight as of this encounter: 67.4 kg (148 lb 9.4 oz)., PRESENT ON ADMISSION            Social Drivers of Health    Interpersonal Safety: High Risk (6/9/2025)    Interpersonal Safety     Do you feel physically and emotionally safe where you currently live?: No     Within the past 12 months, have you been hit, slapped, kicked or otherwise physically hurt by someone?: No     Within the past 12 months, have you been humiliated or emotionally abused in other ways by your partner or ex-partner?: No   Social Connections: Unknown (9/4/2024)    Social Connection and Isolation Panel [NHANES]     Frequency of Social Gatherings with Friends and Family: Twice a week    " "      Disposition Plan     Medically Ready for Discharge: Anticipated in 2-4 Days             Venkata Jeronimo MD  Hospitalist Service  [facility]  Securely message with Franc (more info)  Text page via [employer] Paging/Directory   ______________________________________________________________________    Interval History   Feeling \"crummy\" when she woke up this morning, in part due to wheezing which has since improved.  No headache thus far today.  Tolerating fluid restriction.  Loose stool, but no diarrhea or abdominal pain.  Discussed initiation of metoprolol and side effects - she already has some orthostatic symptoms so will need to monitor closely.  Had to urgently use the restroom so was not able to formally examine.     Physical Exam   Vital Signs: Temp: 98.1  F (36.7  C) Temp src: Oral BP: (!) 158/77 Pulse: 81   Resp: 18 SpO2: 92 % O2 Device: None (Room air)    Weight: 148 lbs 9.44 oz    General Appearance: Well nourished female in NAD  Respiratory: respirations non-labored on room air  Cardiovascular:   GI:   Skin:   Other: Awake and alert, CN grossly intact, ambulating independently with walker    Medical Decision Making       25 MINUTES SPENT BY ME on the date of service doing chart review, history, exam, documentation & further activities per the note.  MANAGEMENT DISCUSSED with the following over the past 24 hours: bedside RN   Tests ORDERED & REVIEWED in the past 24 hours:  - BMP  - Coags/INR  - urine Na and osmolality, serum osmolality  Medical complexity over the past 24 hours:  - Prescription DRUG MANAGEMENT performed      Data     I have personally reviewed the following data over the past 24 hrs:    N/A  \   N/A   / N/A     130 (L) 94 (L) 10.5 /  107 (H)   4.1 23 0.77 \     INR:  2.97 (H) PTT:  N/A   D-dimer:  N/A Fibrinogen:  N/A       Imaging results reviewed over the past 24 hrs:   Recent Results (from the past 24 hours)   Echocardiogram Complete   Result Value    LVEF  " 55-60%    [facility]    [id number]  [id number]  [id number]  [id number]^ОЛЕГ^MATHEUS^STELLA     [facility]  Echocardiography Laboratory  [address] [place] [address]     Name: NIC SALOMON  MRN: [id number]  : 1945  Study Date: 2025 09:44 AM  Age: 79 yrs  Gender: Female  Patient Location: [facility]  Reason For Study: LBBB  Ordering Physician: MATHEUS DENNEY  Performed By: BHARATI Marte     BSA: 1.7 m2  Height: 64 in  Weight: 148 lb  HR: 68  BP: 162/80 mmHg  ______________________________________________________________________________  Procedure  Echocardiogram with two-dimensional, color and spectral Doppler. Definity (NDC  #27893-459) given intravenously. Contrast Definity. Technically difficult  study.  ______________________________________________________________________________  Interpretation Summary     Left ventricular systolic function is normal.  The visual ejection fraction is 55-60%.  Septal motion is consistent with conduction abnormality.  Right ventricular systolic pressure is elevated, consistent with mild  pulmonary hypertension.  The study was technically difficult. Compared to the prior study dated ,  there have been no changes.  ______________________________________________________________________________  Left Ventricle  The left ventricle is normal in size. There is normal left ventricular wall  thickness. Left ventricular systolic function is normal. The visual ejection  fraction is 55-60%. Left ventricular diastolic function is indeterminate. No  regional wall motion abnormalities noted. Septal motion is consistent with  conduction abnormality.     Right Ventricle  The right ventricle is normal size. The right ventricular systolic function is  normal.     Atria  Normal left atrial size. The right atrium is mildly dilated.     Mitral Valve  There is mild to moderate mitral annular calcification. There is mild (1+)  mitral  regurgitation. The mean mitral valve gradient is 4mmHg. (69 bpm).     Tricuspid Valve  There is mild to moderate (1-2+) tricuspid regurgitation. The right  ventricular systolic pressure is approximated at 36.3 mmHg plus the right  atrial pressure. Right ventricular systolic pressure is elevated, consistent  with mild pulmonary hypertension. IVC diameter <2.1 cm collapsing >50% with  sniff suggests a normal RA pressure of 3 mmHg.     Aortic Valve  The aortic valve is not well visualized. Aortic valve sclerosis. There is mild  (1+) aortic regurgitation. No aortic stenosis is present.     Pulmonic Valve  The pulmonic valve is not well visualized.     Vessels  The aortic root is normal size.     Pericardium  There is no pericardial effusion.     Rhythm  The rhythm was sinus with wide QRS.  ______________________________________________________________________________  MMode/2D Measurements & Calculations     IVSd: 0.70 cm  LVIDd: 3.9 cm  LVIDs: 2.5 cm  LVPWd: 0.82 cm  FS: 35.0 %  LV mass(C)d: 82.7 grams  LV mass(C)dI: 48.0 grams/m2  Ao root diam: 3.3 cm  asc Aorta Diam: 3.1 cm  LVOT diam: 2.1 cm  LVOT area: 3.5 cm2  Ao root diam index Ht(cm/m): 2.0  Ao root diam index BSA (cm/m2): 1.9  Asc Ao diam index BSA (cm/m2): 1.8  Asc Ao diam index Ht(cm/m): 1.9  LA Volume (BP): 51.3 ml     LA Volume Index (BP): 29.8 ml/m2  RWT: 0.43  TAPSE: 2.1 cm     Doppler Measurements & Calculations  MV A max omid: 127.2 cm/sec  MV max P.7 mmHg  MV mean P.2 mmHg  MV V2 VTI: 41.4 cm  MV dec slope: 503.1 cm/sec2  MV dec time: 0.27 sec  AI P1/2t: 514.7 msec  TR max omid: 314.9 cm/sec  TR max P.3 mmHg  RV S Omid: 9.9 cm/sec     ______________________________________________________________________________  Report approved by: Jose Zuluaga MD on 2025 10:56 AM         CT Head w/o Contrast    Narrative    EXAM: CT HEAD W/O CONTRAST  LOCATION: [facility]  DATE: 2025    INDICATION: atypical left parietal  headache, hx  shunt and anticoagulated   assess for possible hemorrhage  COMPARISON: 8/31/2024  TECHNIQUE: Routine CT Head without IV contrast. Multiplanar reformats. Dose reduction techniques were used.    FINDINGS:  INTRACRANIAL CONTENTS: No intracranial hemorrhage, extraaxial collection, or mass effect.  No CT evidence of acute infarct. Mild presumed chronic small vessel ischemic changes. Stable right frontal shunt tube. Stable normal size ventricles.     VISUALIZED ORBITS/SINUSES/MASTOIDS: No intraorbital abnormality. No paranasal sinus mucosal disease. No middle ear or mastoid effusion.    BONES/SOFT TISSUES: No acute abnormality.      Impression    IMPRESSION:  1.  No acute intracranial process.

## 2025-06-10 NOTE — PLAN OF CARE
DATE & TIME: 6/9/25 3pm-11pm    Cognitive Concerns/ Orientation : A&Ox4   BEHAVIOR & AGGRESSION TOOL COLOR: green  ABNL VS/O2: VSS/Room air  MOBILITY: SBA/Walker  PAIN MANAGMENT: prn Oxycodone for headache  DIET: regular, fluid restriction 1500mL  BOWEL/BLADDER: continent  ABNL LAB/BG: Na 123,   DRAIN/DEVICES: R PIV  TELEMETRY RHYTHM: SR with 1st degree AV block and BBB  TESTS/PROCEDURES: CT head this evening  D/C DATE: pending to home

## 2025-06-11 ENCOUNTER — APPOINTMENT (OUTPATIENT)
Dept: SPEECH THERAPY | Facility: CLINIC | Age: 80
DRG: 178 | End: 2025-06-11
Attending: HOSPITALIST
Payer: MEDICARE

## 2025-06-11 ENCOUNTER — APPOINTMENT (OUTPATIENT)
Dept: PHYSICAL THERAPY | Facility: CLINIC | Age: 80
DRG: 178 | End: 2025-06-11
Payer: MEDICARE

## 2025-06-11 LAB
ANION GAP SERPL CALCULATED.3IONS-SCNC: 10 MMOL/L (ref 7–15)
ATRIAL RATE - MUSE: 74 BPM
ATRIAL RATE - MUSE: 92 BPM
BACTERIA SPT CULT: NORMAL
BUN SERPL-MCNC: 10.6 MG/DL (ref 8–23)
CALCIUM SERPL-MCNC: 8.1 MG/DL (ref 8.8–10.4)
CHLORIDE SERPL-SCNC: 96 MMOL/L (ref 98–107)
CREAT SERPL-MCNC: 0.78 MG/DL (ref 0.51–0.95)
DIASTOLIC BLOOD PRESSURE - MUSE: NORMAL MMHG
DIASTOLIC BLOOD PRESSURE - MUSE: NORMAL MMHG
EGFRCR SERPLBLD CKD-EPI 2021: 76 ML/MIN/1.73M2
ERYTHROCYTE [DISTWIDTH] IN BLOOD BY AUTOMATED COUNT: 14.3 % (ref 10–15)
GLUCOSE BLDC GLUCOMTR-MCNC: 109 MG/DL (ref 70–99)
GLUCOSE BLDC GLUCOMTR-MCNC: 124 MG/DL (ref 70–99)
GLUCOSE BLDC GLUCOMTR-MCNC: 129 MG/DL (ref 70–99)
GLUCOSE BLDC GLUCOMTR-MCNC: 279 MG/DL (ref 70–99)
GLUCOSE SERPL-MCNC: 118 MG/DL (ref 70–99)
GRAM STAIN RESULT: NORMAL
HCO3 SERPL-SCNC: 25 MMOL/L (ref 22–29)
HCT VFR BLD AUTO: 27.5 % (ref 35–47)
HGB BLD-MCNC: 9 G/DL (ref 11.7–15.7)
HGB BLD-MCNC: 9.2 G/DL (ref 11.7–15.7)
INR PPP: 2.46 (ref 0.85–1.15)
INTERPRETATION ECG - MUSE: NORMAL
INTERPRETATION ECG - MUSE: NORMAL
MAGNESIUM SERPL-MCNC: 2 MG/DL (ref 1.7–2.3)
MCH RBC QN AUTO: 30.6 PG (ref 26.5–33)
MCHC RBC AUTO-ENTMCNC: 33.5 G/DL (ref 31.5–36.5)
MCV RBC AUTO: 91 FL (ref 78–100)
MCV RBC AUTO: 94 FL (ref 78–100)
P AXIS - MUSE: 68 DEGREES
P AXIS - MUSE: 74 DEGREES
PHOSPHATE SERPL-MCNC: 2.3 MG/DL (ref 2.5–4.5)
PLATELET # BLD AUTO: 210 10E3/UL (ref 150–450)
POTASSIUM SERPL-SCNC: 4.2 MMOL/L (ref 3.4–5.3)
PR INTERVAL - MUSE: 198 MS
PR INTERVAL - MUSE: 220 MS
PROTHROMBIN TIME: 25.7 SECONDS (ref 11.8–14.8)
QRS DURATION - MUSE: 150 MS
QRS DURATION - MUSE: 150 MS
QT - MUSE: 416 MS
QT - MUSE: 472 MS
QTC - MUSE: 514 MS
QTC - MUSE: 523 MS
R AXIS - MUSE: -65 DEGREES
R AXIS - MUSE: 33 DEGREES
RBC # BLD AUTO: 3.01 10E6/UL (ref 3.8–5.2)
SODIUM SERPL-SCNC: 131 MMOL/L (ref 135–145)
SYSTOLIC BLOOD PRESSURE - MUSE: NORMAL MMHG
SYSTOLIC BLOOD PRESSURE - MUSE: NORMAL MMHG
T AXIS - MUSE: 84 DEGREES
T AXIS - MUSE: 88 DEGREES
VENTRICULAR RATE- MUSE: 74 BPM
VENTRICULAR RATE- MUSE: 92 BPM
WBC # BLD AUTO: 7 10E3/UL (ref 4–11)

## 2025-06-11 PROCEDURE — 99232 SBSQ HOSP IP/OBS MODERATE 35: CPT | Performed by: SPECIALIST

## 2025-06-11 PROCEDURE — 999N000157 HC STATISTIC RCP TIME EA 10 MIN

## 2025-06-11 PROCEDURE — 80048 BASIC METABOLIC PNL TOTAL CA: CPT | Performed by: HOSPITALIST

## 2025-06-11 PROCEDURE — 94640 AIRWAY INHALATION TREATMENT: CPT | Mod: 76

## 2025-06-11 PROCEDURE — 92610 EVALUATE SWALLOWING FUNCTION: CPT | Mod: GN

## 2025-06-11 PROCEDURE — G0545 PR INHRENT VISIT TO INPT/OBS W CNFRM/SUSPCT INFCT DIS BY INFCT DIS SPCIALST: HCPCS | Performed by: SPECIALIST

## 2025-06-11 PROCEDURE — 94640 AIRWAY INHALATION TREATMENT: CPT

## 2025-06-11 PROCEDURE — 85018 HEMOGLOBIN: CPT | Performed by: HOSPITALIST

## 2025-06-11 PROCEDURE — 85610 PROTHROMBIN TIME: CPT | Performed by: PHYSICIAN ASSISTANT

## 2025-06-11 PROCEDURE — 97116 GAIT TRAINING THERAPY: CPT | Mod: GP | Performed by: PHYSICAL THERAPIST

## 2025-06-11 PROCEDURE — 120N000001 HC R&B MED SURG/OB

## 2025-06-11 PROCEDURE — 250N000009 HC RX 250: Performed by: PHYSICIAN ASSISTANT

## 2025-06-11 PROCEDURE — 82374 ASSAY BLOOD CARBON DIOXIDE: CPT | Performed by: HOSPITALIST

## 2025-06-11 PROCEDURE — 97530 THERAPEUTIC ACTIVITIES: CPT | Mod: GP | Performed by: PHYSICAL THERAPIST

## 2025-06-11 PROCEDURE — 83735 ASSAY OF MAGNESIUM: CPT | Performed by: HOSPITALIST

## 2025-06-11 PROCEDURE — 84100 ASSAY OF PHOSPHORUS: CPT | Performed by: HOSPITALIST

## 2025-06-11 PROCEDURE — 99232 SBSQ HOSP IP/OBS MODERATE 35: CPT | Performed by: HOSPITALIST

## 2025-06-11 PROCEDURE — 250N000011 HC RX IP 250 OP 636: Performed by: HOSPITALIST

## 2025-06-11 PROCEDURE — 250N000013 HC RX MED GY IP 250 OP 250 PS 637: Performed by: SPECIALIST

## 2025-06-11 PROCEDURE — 36415 COLL VENOUS BLD VENIPUNCTURE: CPT | Performed by: HOSPITALIST

## 2025-06-11 PROCEDURE — 250N000013 HC RX MED GY IP 250 OP 250 PS 637: Performed by: PHYSICIAN ASSISTANT

## 2025-06-11 PROCEDURE — 85014 HEMATOCRIT: CPT | Performed by: HOSPITALIST

## 2025-06-11 PROCEDURE — 250N000013 HC RX MED GY IP 250 OP 250 PS 637: Performed by: HOSPITALIST

## 2025-06-11 RX ORDER — METFORMIN HYDROCHLORIDE 500 MG/1
500 TABLET, EXTENDED RELEASE ORAL
Status: DISCONTINUED | OUTPATIENT
Start: 2025-06-11 | End: 2025-06-12 | Stop reason: HOSPADM

## 2025-06-11 RX ORDER — METOPROLOL TARTRATE 25 MG/1
25 TABLET, FILM COATED ORAL 2 TIMES DAILY
Status: DISCONTINUED | OUTPATIENT
Start: 2025-06-11 | End: 2025-06-12 | Stop reason: HOSPADM

## 2025-06-11 RX ORDER — MAGNESIUM OXIDE 400 MG/1
400 TABLET ORAL EVERY 4 HOURS
Status: COMPLETED | OUTPATIENT
Start: 2025-06-11 | End: 2025-06-11

## 2025-06-11 RX ORDER — WARFARIN SODIUM 2.5 MG/1
2.5 TABLET ORAL
Status: COMPLETED | OUTPATIENT
Start: 2025-06-11 | End: 2025-06-11

## 2025-06-11 RX ADMIN — BENZONATATE 200 MG: 100 CAPSULE ORAL at 18:58

## 2025-06-11 RX ADMIN — CARBOXYMETHYLCELLULOSE SODIUM 1 DROP: 10 GEL OPHTHALMIC at 08:42

## 2025-06-11 RX ADMIN — AMPICILLIN SODIUM AND SULBACTAM SODIUM 3 G: 2; 1 INJECTION, POWDER, FOR SOLUTION INTRAMUSCULAR; INTRAVENOUS at 18:04

## 2025-06-11 RX ADMIN — AMPICILLIN SODIUM AND SULBACTAM SODIUM 3 G: 2; 1 INJECTION, POWDER, FOR SOLUTION INTRAMUSCULAR; INTRAVENOUS at 12:23

## 2025-06-11 RX ADMIN — INSULIN ASPART 3 UNITS: 100 INJECTION, SOLUTION INTRAVENOUS; SUBCUTANEOUS at 12:20

## 2025-06-11 RX ADMIN — ALBUTEROL SULFATE 2.5 MG: 2.5 SOLUTION RESPIRATORY (INHALATION) at 07:05

## 2025-06-11 RX ADMIN — LEVOTHYROXINE SODIUM 125 MCG: 125 TABLET ORAL at 06:42

## 2025-06-11 RX ADMIN — DOXYCYCLINE HYCLATE 100 MG: 100 CAPSULE ORAL at 08:40

## 2025-06-11 RX ADMIN — WARFARIN SODIUM 2.5 MG: 2.5 TABLET ORAL at 18:04

## 2025-06-11 RX ADMIN — ACETAMINOPHEN 975 MG: 325 TABLET, FILM COATED ORAL at 08:40

## 2025-06-11 RX ADMIN — LATANOPROST 1 DROP: 50 SOLUTION OPHTHALMIC at 21:25

## 2025-06-11 RX ADMIN — HYDRALAZINE HYDROCHLORIDE 10 MG: 10 TABLET ORAL at 21:35

## 2025-06-11 RX ADMIN — POTASSIUM & SODIUM PHOSPHATES POWDER PACK 280-160-250 MG 1 PACKET: 280-160-250 PACK at 12:18

## 2025-06-11 RX ADMIN — POTASSIUM & SODIUM PHOSPHATES POWDER PACK 280-160-250 MG 1 PACKET: 280-160-250 PACK at 21:15

## 2025-06-11 RX ADMIN — AMPICILLIN SODIUM AND SULBACTAM SODIUM 3 G: 2; 1 INJECTION, POWDER, FOR SOLUTION INTRAMUSCULAR; INTRAVENOUS at 06:42

## 2025-06-11 RX ADMIN — METOPROLOL TARTRATE 25 MG: 25 TABLET, FILM COATED ORAL at 21:15

## 2025-06-11 RX ADMIN — TIMOLOL MALEATE 1 DROP: 5 SOLUTION OPHTHALMIC at 08:43

## 2025-06-11 RX ADMIN — GUAIFENESIN 600 MG: 600 TABLET, EXTENDED RELEASE ORAL at 21:24

## 2025-06-11 RX ADMIN — METOPROLOL TARTRATE 12.5 MG: 25 TABLET, FILM COATED ORAL at 08:40

## 2025-06-11 RX ADMIN — ALBUTEROL SULFATE 2.5 MG: 2.5 SOLUTION RESPIRATORY (INHALATION) at 10:38

## 2025-06-11 RX ADMIN — Medication 25 MCG: at 08:40

## 2025-06-11 RX ADMIN — CARBOXYMETHYLCELLULOSE SODIUM 1 DROP: 10 GEL OPHTHALMIC at 21:14

## 2025-06-11 RX ADMIN — ALBUTEROL SULFATE 2.5 MG: 2.5 SOLUTION RESPIRATORY (INHALATION) at 14:55

## 2025-06-11 RX ADMIN — Medication 400 MG: at 16:42

## 2025-06-11 RX ADMIN — Medication 45 MG: at 08:41

## 2025-06-11 RX ADMIN — DOXYCYCLINE HYCLATE 100 MG: 100 CAPSULE ORAL at 21:13

## 2025-06-11 RX ADMIN — Medication 400 MG: at 12:19

## 2025-06-11 RX ADMIN — GUAIFENESIN 600 MG: 600 TABLET, EXTENDED RELEASE ORAL at 08:40

## 2025-06-11 RX ADMIN — LOSARTAN POTASSIUM 100 MG: 25 TABLET, FILM COATED ORAL at 21:14

## 2025-06-11 RX ADMIN — POTASSIUM & SODIUM PHOSPHATES POWDER PACK 280-160-250 MG 1 PACKET: 280-160-250 PACK at 16:42

## 2025-06-11 RX ADMIN — METFORMIN ER 500 MG 500 MG: 500 TABLET ORAL at 16:42

## 2025-06-11 RX ADMIN — ALBUTEROL SULFATE 2.5 MG: 2.5 SOLUTION RESPIRATORY (INHALATION) at 19:31

## 2025-06-11 ASSESSMENT — ACTIVITIES OF DAILY LIVING (ADL)
ADLS_ACUITY_SCORE: 45
ADLS_ACUITY_SCORE: 46
ADLS_ACUITY_SCORE: 45
ADLS_ACUITY_SCORE: 46
ADLS_ACUITY_SCORE: 45
ADLS_ACUITY_SCORE: 46
ADLS_ACUITY_SCORE: 46
ADLS_ACUITY_SCORE: 45
ADLS_ACUITY_SCORE: 46
ADLS_ACUITY_SCORE: 45

## 2025-06-11 NOTE — PROGRESS NOTES
[facility]    Medicine Progress Note - Hospitalist Service    Date of Admission:  6/8/2025    Assessment & Plan   Manasa French is a 79 year old female admitted on 6/8/2025. Past medical history significant for hypertension, uncontrolled, Graves' disease with post ablative hypothyroidism, history of DVT on warfarin, hydrocephalus status post  shunt in 1987, osteoporosis, among others who was admitted on 06/08/25 with concern for nectrotizing pneumonia with small pneumothorax. Also found to have hyponatremia 120, prolonged QTc, and possibly new LBBB.       Left lower lobe necrotizing pneumonia with small left apical pneumothorax  *Presented with 1 week URI symptoms, cough, subjective fevers, myalgias, headache, chills, and pleuritic chest pain.  Afebrile without leukocytosis  *CT chest showing left lower lobe consolidation containing multiple foci of gas and air-filled levels concerning for necrotizing pneumonia and small left pneumothorax likely secondary to cavitation and rupture into the pleural space    *Negative for COVID, influenza, RSV.  MRSA nasal swab negative. Urine legionella and pneumo Ag's negative.  *thoracic surgery consulted 6/9; no indication for any intervention of pneumothorax, follow up with PCP for CT chest in 4-6 weeks to assure resolution  *sputum culture with mixed thea    - continue Unasyn (Augmentin at discharge x4-6 weeks) and doxycycline (x5 days) per ID  - follow up with Dr. Jacob in 4 weeks and repeat CT chest in 6 weeks  - blood cultures ngtd  - pulmonary toilet  - PT recommends home with assist  - SLP recommending video swallow study, likely tomorrow    Chronic anemia  *baseline hgb has ranged 9-11 g/dL in recent months but most recent checks ~11 g/dL, stable on admission  - hgb down to 9 g/dL on 06/11/25; no melena or hematochezia per RN or patient (patient reports black stools x3 months following initiation of iron)  - primarily suspect dilutional  effect from 1L NS received at admission - repeat tomorrow     Headache, improved  *Intermittent left side of head/posterior at times since start of URI symptoms. Hx  shunt in 1980s. Neurological status intact, no focal deficits or visual changes on admission. No trauma, falls or injuries.   *possibly related to hypertension upon arrival and acute illness above  *CT head 6/9 negative for acute pathology  - Monitor closely, APAP PRN, prn oxy  - reports a mild headache 06/11/25 which was her typical version, not the severe type she presented with  - stop neuro checks     Hypochloremic hyponatremia, likely acute, improving  Hypomagnesemia  Hypophosphatemia  *In the setting of hydrochlorothiazide, pna, poor oral intake since feeling ill. Suspect some hypovolemia contributing.   *initially improved with IVF but stalled with repeat urine studies consistent with SIADH (likely due to pneumonia above) and placed on fluid restriction  - Na stable about 130 on 06/11/25; monitor daily  - continue 1500 ml fluid restriction     Prolonged QTc associated with hypomagnesemia  QTc >500ms. Lyte disturbances as noted.  - QTc improved to 504 on repeat 06/10/25; ok to stop tele 06/10/25   - Avoid QTc prolonging medications     Hx PE on warfarin  Reports PE was several years ago and has been on warfarin since. Admission CT neg for PE  - Warfarin per pharmacy; INR therapeutic 06/11/25   - Daily INR     Accelerated hypertension, improving  Benign essential hypertension, uncontrolled  On admission, systolic blood pressures 150s-190s.  PTA regimen: hydrochlorothiazide 12.5mg/d, losartan 100mg/d  - Continue PTA ARB with hold parameters  - Hold PTA hydrochlorothiazide in the setting of hyponatremia  - increase metoprolol to 25 mg bid 06/11/25      Possibly new LBBB  *admission EKG x 2 reveals left bundle branch block is not noted on prior EKGs however during that 2018 stress test does note a transient left bundle during stress (otherwise  "negative).  Troponin mildly elevated 22 and 18, not uptrending and flat.  *Pleuritic chest pain likely secondary to the PNA/PTX. Already therapeutic on warfarin.   *TTE 6/9 showing EF 55-60% without WMA  - follow up with cardiology outpatient      Non-Insulin dependent type 2 diabetes  PTA Regimen: metformin 500mg/d  Last HbA1c 6.5% 5/2025  - resume PTA metformin 06/11/25   - Sliding scale insulin Medium         Chronic stable diagnoses and other pertinent medical history: Appropriate PTA medications will be resumed.   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  Anemia, iron deficiency: follows with hematology  Macular degeneration  Osteoporosis  Glaucoma            Diet: Combination Diet Regular Diet Adult  Fluid restriction 1500 ML FLUID    DVT Prophylaxis: Warfarin  Cole Catheter: Not present  Lines: None     Cardiac Monitoring: None  Code Status: Full Code      Clinically Significant Risk Factors         # Hyponatremia: Lowest Na = 123 mmol/L in last 2 days, will monitor as appropriate  # Hypochloremia: Lowest Cl = 94 mmol/L in last 2 days, will monitor as appropriate      # Hypoalbuminemia: Lowest albumin = 3.3 g/dL at 6/9/2025  6:45 AM, will monitor as appropriate     # Hypertension: Noted on problem list           # DMII: A1C = 6.5 % (Ref range: 0.0 - 5.6 %) within past 6 months, PRESENT ON ADMISSION  # Overweight: Estimated body mass index is 25.51 kg/m  as calculated from the following:    Height as of this encounter: 1.626 m (5' 4\").    Weight as of this encounter: 67.4 kg (148 lb 9.4 oz)., PRESENT ON ADMISSION     # Financial/Environmental Concerns: none (denies)         Social Drivers of Health    Interpersonal Safety: High Risk (6/9/2025)    Interpersonal Safety     Do you feel physically and emotionally safe where you currently live?: No     Within the past 12 months, have " "you been hit, slapped, kicked or otherwise physically hurt by someone?: No     Within the past 12 months, have you been humiliated or emotionally abused in other ways by your partner or ex-partner?: No   Social Connections: Unknown (9/4/2024)    Social Connection and Isolation Panel [NHANES]     Frequency of Social Gatherings with Friends and Family: Twice a week          Disposition Plan     Medically Ready for Discharge: Anticipated Tomorrow             Venkata Jeronimo MD  Hospitalist Service  [facility]  Securely message with Venmo (more info)  Text page via [facility] Paging/Directory   ______________________________________________________________________    Interval History   Feeling better today, though feels like she is \"falling apart\" with the need for electrolyte replacement, pneumonia, etc.  Ongoing cough.  Mild headache this morning was her typical headache.  No other complaints.     Physical Exam   Vital Signs: Temp: 98  F (36.7  C) Temp src: Oral BP: (!) 168/81 Pulse: 85   Resp: 17 SpO2: 95 % O2 Device: None (Room air)    Weight: 148 lbs 9.44 oz    General Appearance: Well nourished female in NAD  Respiratory: lungs CTAB, no wheezes or crackles  Cardiovascular: RRR, normal s1/s2 without murmur  GI: normal bowel sounds  Skin: no edema  Other: Awake and alert, CN grossly intact    Medical Decision Making       35 MINUTES SPENT BY ME on the date of service doing chart review, history, exam, documentation & further activities per the note.      Data     I have personally reviewed the following data over the past 24 hrs:    7.0  \   9.0 (L)   / 210     131 (L) 96 (L) 10.6 /  279 (H)   4.2 25 0.78 \     INR:  2.46 (H) PTT:  N/A   D-dimer:  N/A Fibrinogen:  N/A       Imaging results reviewed over the past 24 hrs:   No results found for this or any previous visit (from the past 24 hours).    "

## 2025-06-11 NOTE — PLAN OF CARE
Summary: Admitted with nectrotizing pneumonia with small L pneumothorax. And new LBBB.  DATE & TIME: 06/10-06/11/25 Night shift  Cognitive Concerns/ Orientation : A & O x 4. [place] (hearing aids at beside)  BEHAVIOR & AGGRESSION TOOL COLOR: Green   ABNL VS/O2: VSS on RA   MOBILITY: SBA, steady   PAIN MANAGMENT: Denies pain   DIET: Regular, 1500ml FR  BOWEL/BLADDER: No stools this shift; Purewick for overnight     ABNL LAB/BG: Sodium 128, electrolytes will be rechecked in AM; INR 2.97  DRAIN/DEVICES: IV SL   TELEMETRY RHYTHM: NA   SKIN: Intact. Trace edema to BLE.   TESTS/PROCEDURES: None this AM  D/C DAY/GOALS/PLACE: Possible today; Will need long term PO abx at discharge   OTHER IMPORTANT INFO: tessalon pearls for cough as needed; patient able to sleep all shift comfortable- was very happy about this since she states she hasn't slept well at all except the last 2 nights    Goal Outcome Evaluation:

## 2025-06-11 NOTE — PLAN OF CARE
Summary: Admitted with nectrotizing pneumonia with small L pneumothorax. And new LBBB.  DATE & TIME: 06/10/25 mynor shift  Cognitive Concerns/ Orientation : A & O x 4. [place] (hearing aids)    BEHAVIOR & AGGRESSION TOOL COLOR: Green   ABNL VS/O2: VSS on RA   MOBILITY: SBA, steady this shift. Just feels tired   PAIN MANAGMENT: Denies pain   DIET: Regular, 1500ml FR  BOWEL/BLADDER: Frequent loose stools, urge incontinence. Purewick for overnight     ABNL LAB/BG: Sodium 128, electrolytes will be rechecked in AM; INR 2.99  DRAIN/DEVICES: IV SL   TELEMETRY RHYTHM: NA   SKIN: Intact. Trace edema to BLE.   TESTS/PROCEDURES: None this AM  D/C DAY/GOALS/PLACE: TBD. Will need long term PO abx at discharge   OTHER IMPORTANT INFO: tessalon pearls for cough

## 2025-06-11 NOTE — PLAN OF CARE
Goal Outcome Evaluation:      Summary: Admitted with nectrotizing pneumonia with small L pneumothorax. And new LBBB.  DATE & TIME: 06/11/2025 [id number]  Cognitive Concerns/ Orientation : A&Ox4  BEHAVIOR & AGGRESSION TOOL COLOR: Green   ABNL VS/O2: VSS except HTN at times, Rm Air   MOBILITY: Asstx1/SBA, ambulated in hallway x3, ambulates to BR  PAIN MANAGMENT: c.o headache; PRN tylenol given w/ good affect  DIET: Regular, 1500ml FR  BOWEL/BLADDER: intermittent incontinence, voiding in BR, BM x2  ABNL LAB/BG: hgb stable, phos/mag/K+ protocol; rechecks in a.m.  DRAIN/DEVICES: PIV SL w/ intermittent abx  TELEMETRY RHYTHM: BBB  SKIN: Intact. Trace edema to BLE.   TESTS/PROCEDURES: None this AM  D/C DAY/GOALS/PLACE: TBD. Will need long term PO abx at discharge   OTHER IMPORTANT INFO: scheduled cough suppressant, productive cough, dyspnea w/ exertion improving

## 2025-06-11 NOTE — PROGRESS NOTES
[facility]    Infectious Disease Progress Note    Date of Service (when I saw the patient): 06/11/2025     Assessment:  79 year old female with hypertension, Graves' disease with post ablative hypothyroidism, history of DVT, and hydrocephalus status post  shunt among other conditions, who has been admitted since 06/08/25 due to cough, myalgias and pleuritic chest pain, and was found to have left lower lobe nectrotizing pneumonia with small pneumothorax. Likely related to aspiration     -Left lower lobe necrotizing pneumonia, possibly aspiration related  -Pneumothorax related to ongoing infection  -Marked hyponatremia and other electrolyte abnormalities including hypomagnesemia and hypophosphatemia  Prolonged QTc  -Hypothyroidism post ablation for Grave's disease  -Anemia  -Chronic medical conditions - hypertension, history of DVT, hydrocephalus status post  shunt, osteoporosis, diabetes, macular degeneration, CKD, glaucoma        Recommendations:  Continue Unasyn while in hospital with transition to oral Augmentin at discharge, and plan a 4-6 week treatment course with follow up CT chest WO contrast  Oral Doxycycline for 5 days then discontinue  Video wallow evaluation for aspiration  Will arrange ID follow up in 4 weeks with [facility] ID  Repeat CT chest WO contrast in 6 weeks    ID will sign off, please call us back as needed  Crista Jacob MD    Interval History   Remains stable and afebrile, tolerating antibiotics without side effects.   Still has ongoing cough but overall improved since admission    Physical Exam   Temp: 98  F (36.7  C) Temp src: Oral BP: (!) 168/81 Pulse: 85   Resp: 17 SpO2: 94 % O2 Device: None (Room air)    Vitals:    06/08/25 0855 06/08/25 1446 06/09/25 0516   Weight: 67.1 kg (148 lb) 67.5 kg (148 lb 12.8 oz) 67.4 kg (148 lb 9.4 oz)     Vital Signs with Ranges  Temp:  [97.8  F (36.6  C)-99.4  F (37.4  C)] 98  F (36.7  C)  Pulse:  [75-85] 85  Resp:  [16-18] 17  BP:  (158-177)/(73-83) 168/81  SpO2:  [92 %-94 %] 94 %    Constitutional: Awake, alert, cooperative, no apparent distress  Lungs: left basal crackles, , no  wheezing  Cardiovascular: Regular rate and rhythm, normal S1 and S2, and no murmur noted  Abdomen: Normal bowel sounds, soft, non-distended, non-tender  Skin: No rashes, no cyanosis, no edema  Other:    Medications   Current Facility-Administered Medications   Medication Dose Route Frequency Provider Last Rate Last Admin    Patient is already receiving anticoagulation with heparin, enoxaparin (LOVENOX), warfarin (COUMADIN)  or other anticoagulant medication   Does not apply Continuous PRN Yesenia Joshi PA-C         Current Facility-Administered Medications   Medication Dose Route Frequency Provider Last Rate Last Admin    albuterol (PROVENTIL) neb solution 2.5 mg  3 mL Nebulization 4x Daily Yesenia Joshi PA-C   2.5 mg at 06/11/25 0705    ampicillin-sulbactam (UNASYN) 3 g vial to attach to  mL bag  3 g Intravenous Q6H Venkata Jeronimo MD   3 g at 06/11/25 0642    carboxymethylcellulose PF (REFRESH LIQUIGEL) 1 % ophthalmic gel 1 drop  1 drop Both Eyes BID Yesenia Joshi PA-C   1 drop at 06/11/25 0842    cyanocobalamin (VITAMIN B-12) tablet 1,000 mcg  1,000 mcg Oral Once per day on Tuesday Thursday Saturday Yesenia Joshi PA-C   1,000 mcg at 06/10/25 0850    doxycycline hyclate (VIBRAMYCIN) capsule 100 mg  100 mg Oral Q12H [facility] (08/20) Crista Jacob MD   100 mg at 06/11/25 0840    ferrous sulfate CR tablet 45 mg  45 mg Oral Daily Yesenia Joshi PA-C   45 mg at 06/11/25 0841    guaiFENesin (MUCINEX) 12 hr tablet 600 mg  600 mg Oral BID Yesenia Joshi PA-C   600 mg at 06/11/25 0840    [Held by provider] hydroCHLOROthiazide tablet 12.5 mg  12.5 mg Oral Daily Yesenia Joshi PA-C        insulin aspart (NovoLOG) injection (RAPID ACTING)  1-7 Units Subcutaneous TID AC Adi Yesenia Linda, PA-C    1 Units at 06/10/25 1301    insulin aspart (NovoLOG) injection (RAPID ACTING)  1-5 Units Subcutaneous At Bedtime Yesenia Joshi PA-C        latanoprost (XALATAN) 0.005 % ophthalmic solution 1 drop  1 drop Both Eyes At Bedtime Yesenia Joshi PA-C   1 drop at 06/10/25 2051    levothyroxine (SYNTHROID/LEVOTHROID) tablet 125 mcg  125 mcg Oral QAM AC Yesenia Joshi PA-C   125 mcg at 06/11/25 0642    losartan (COZAAR) tablet 100 mg  100 mg Oral QPM Yesenia Joshi PA-C   100 mg at 06/10/25 2047    metoprolol tartrate (LOPRESSOR) half-tab 12.5 mg  12.5 mg Oral BID Venkata Jeronimo MD   12.5 mg at 06/11/25 0840    senna-docusate (SENOKOT-S/PERICOLACE) 8.6-50 MG per tablet 1 tablet  1 tablet Oral BID Yesenia Joshi PA-C        Or    senna-docusate (SENOKOT-S/PERICOLACE) 8.6-50 MG per tablet 2 tablet  2 tablet Oral BID Yesenia Joshi PA-C        sodium chloride (PF) 0.9% PF flush 3 mL  3 mL Intracatheter Q8H RENY Yesenia Joshi PA-C   3 mL at 06/11/25 0642    timolol maleate (TIMOPTIC) 0.5 % ophthalmic solution 1 drop  1 drop Both Eyes SAMANTHA Yesenia Joshi PA-C   1 drop at 06/11/25 0843    Vitamin D3 (CHOLECALCIFEROL) tablet 25 mcg  25 mcg Oral Once per day on Monday Wednesday Friday Yesenia Joshi PA-C   25 mcg at 06/11/25 0840    warfarin ANTICOAGULANT (COUMADIN/JANTOVEN) tablet 2.5 mg  2.5 mg Oral ONCE at 18:00 Venkata Jeronimo MD        Warfarin Dose Required Daily - Pharmacist Managed  1 each Oral See Admin Instructions Yesenia Joshi PA-C           Data   All microbiology laboratory data reviewed.  Recent Labs   Lab Test 06/11/25  0659 06/09/25  0645 06/08/25  0858   WBC 7.0 6.5 7.6   HGB 9.2* 10.1* 11.1*   HCT 27.5* 29.1* 33.0*   MCV 91 90 91    173 205     Recent Labs   Lab Test 06/11/25  0659 06/10/25  0639 06/09/25  0645   CR 0.78 0.77 0.87     Recent Labs   Lab Test 02/26/19  1410   SED 30      Microbiology  06/10/2025 1016 06/10/2025 1641 Respiratory Aerobic Bacterial Culture [[id number]]   Sputum from Expectorate    Preliminary result Component Value   Gram Stain Result <10 Squamous epithelial cells/low power field P    >25 PMNs/low power field P    2+ Mixed thea P             06/08/2025 2256 06/11/2025 0805 Respiratory Aerobic Bacterial Culture with Gram Stain [[id number]]   Sputum from Expectorate    Final result Component Value   Culture 1+ Normal Thea   Gram Stain Result <10 Squamous epithelial cells/low power field    >25 PMNs/low power field    1+ Mixed thea             06/08/2025 1722 06/09/2025 0348 Legionella Urinary Antigen and Streptococcus pneumoniae antigen [[id number]]    Urine, Voided    Edited Result - FINAL Component Value   Legionella pneumophila serogroup 1 urinary antigen Negative   A negative result does not exclude the possibility of a Legionella infection, as it can be caused by other serogroups and species of Legionella.   Streptococcus pneumoniae antigen Negative   A negative result does not exclude a Streptococcus pneumoniae infection.   Legionella pneumophila Urinary/Strep pneumoniae Antigen Specimen Type Urine          06/08/2025 1622 06/08/2025 1923 MRSA MSSA PCR, Nasal Swab [[id number]]    Swab from Nares, Bilateral    Final result Component Value   MRSA Target DNA Negative   SA Target DNA Negative          06/08/2025 1206 06/10/2025 1631 Blood Culture Peripheral blood (BC) Arm, Right [[id number]]   Peripheral blood (BC) from Arm, Right    Preliminary result Component Value   Culture No growth after 2 days P             06/08/2025 1204 06/10/2025 1631 Blood Culture Peripheral blood (BC) Arm, Right [[id number]]   Peripheral blood (BC) from Arm, Right    Preliminary result Component Value   Culture No growth after 2 days P             06/08/2025 0944 06/08/2025 1030 Influenza A/B, RSV and SARS-CoV2 PCR (COVID-19) Nasopharyngeal [[id number]]    Swab from  Nasopharyngeal    Final result Component Value   Influenza A PCR Negative   Influenza B PCR Negative   RSV PCR Negative   SARS CoV2 PCR Negative   NEGATIVE: SARS-CoV-2 (COVID-19) RNA not detected, presumed negative.          06/05/2025 1441 06/05/2025 2113 Influenza A/B, RSV and SARS-CoV2 PCR (COVID-19) Nasopharyngeal [[id number]]    Swab from Nasopharyngeal    Final result Component Value   Influenza A PCR Negative   Influenza B PCR Negative   RSV PCR Negative   SARS CoV2 PCR Negative        Imaging  EXAM: CT CHEST PULMONARY EMBOLISM W CONTRAST  LOCATION: [facility]  DATE: 6/8/2025     INDICATION: Pleuritic chest pain, history of PE  COMPARISON: 4/30/2007  TECHNIQUE: CT chest pulmonary angiogram during arterial phase injection of IV contrast. Multiplanar reformats and MIP reconstructions were performed. Dose reduction techniques were used.   CONTRAST: 59mL Isovue 370     FINDINGS:  ANGIOGRAM CHEST: Pulmonary arteries are normal caliber and negative for pulmonary emboli. Thoracic aorta is negative for dissection. No CT evidence of right heart strain.     LUNGS AND PLEURA: Left lower lobe consolidation containing multiple foci of gas and air-fluid levels. Endobronchial opacities in the lingula with volume loss and atelectasis. Small left pneumothorax.     MEDIASTINUM/AXILLAE: Normal.     CORONARY ARTERY CALCIFICATION: Mild.     UPPER ABDOMEN: Normal.     MUSCULOSKELETAL: Degenerative changes in the spine. Partially imaged lumbar spine surgical hardware.                                                                      IMPRESSION:  1.  No pulmonary embolism.  2.  Left lower lobe consolidation containing multiple foci of gas and air-fluid levels, concerning for necrotizing pneumonia. Small left pneumothorax likely secondary to cavitation and rupture into the pleural space. Recommend follow-up CT in 4-6 weeks   to assess for resolution.

## 2025-06-11 NOTE — PROGRESS NOTES
"Speech-Language Pathology Clinical Swallow Evaluation            06/11/25 1015   Appointment Info   Signing Clinician's Name / Credentials (SLP) Rema Bobby MS, CCC-SLP   General Information   Onset of Illness/Injury or Date of Surgery 06/08/25   Referring Physician Venkata Jeronimo MD   Pertinent History of Current Problem Per MD note: \"Manasa French is a 79 year old female admitted on 6/8/2025. Past medical history significant for hypertension, uncontrolled, Graves' disease with post ablative hypothyroidism, history of DVT on warfarin, hydrocephalus status post  shunt in 1987, osteoporosis, among others who was admitted on 06/08/25 with concern for nectrotizing pneumonia with small pneumothorax. Also found to have hyponatremia 120, prolonged QTc, and possibly new LBBB.\"   Chest xray 6/9/25:   \"IMPRESSION: A small left apical pneumothorax measures 8 mm, stable to decreased compared to prior CT. Atelectasis in the left upper lobe along the left major fissure is stable. Minimal focal infiltrate in the left lower lobe posteriorly is also similar.     No new findings.\"   General Observations Pt awake/alert, cooperative. She reported tough solids (e.g., certain breads, meats) sticking in ~middle of chest and \"going down slow\" approximately 1x/month. She denied other c/o dysphagia.   Type of Evaluation   Type of Evaluation Swallow Evaluation   Oral Motor   Oral Musculature generally intact   Structural Abnormalities none present   Dentition (Oral Motor)   Dentition (Oral Motor) natural dentition;adequate dentition   Cough/Swallow/Gag Reflex (Oral Motor)   Volitional Throat Clear/Cough (Oral Motor) WNL   Vocal Quality/Secretion Management (Oral Motor)   Vocal Quality (Oral Motor) WNL   Secretion Management (Oral Motor) WNL   General Swallowing Observations   Current Diet/Method of Nutritional Intake (General Swallowing Observations, NIS) regular diet;thin liquids (level 0)   Respiratory Support room air "   Past History of Dysphagia None per pt/chart review   Swallowing Evaluation Clinical swallow evaluation   Clinical Swallow Evaluation   Clinical Swallow Evaluation Textures Trialed thin liquids;pureed;solid foods   Clinical Swallow Eval: Thin Liquid Texture Trial   Mode of Presentation, Thin Liquids cup;straw   Oral Phase of Swallow WFL   Pharyngeal Phase of Swallow intact   Clinical Swallow Evaluation: Puree Solid Texture Trial   Oral Phase, Puree WFL   Pharyngeal Phase, Puree intact   Clinical Swallow Evaluation: Solid Food Texture Trial   Oral Phase WFL   Pharyngeal Phase intact   Esophageal Phase of Swallow   Patient reports or presents with symptoms of esophageal dysphagia Yes   Esophageal comments C/o hard/tough solids sticking/going down slowly ~1x/month   Swallowing Recommendations   Diet Consistency Recommendations regular diet;thin liquids (level 0)   Supervision Level for Intake distant supervision needed   Mode of Delivery Recommendations bolus size, small;slow rate of intake   Swallowing Maneuver Recommendations alternate food and liquid intake   Recommended Feeding/Eating Techniques (Swallow Eval) maintain upright sitting position for eating;set-up and prepare tray   Medication Administration Recommendations, Swallowing (SLP) with liquid   Instrumental Assessment Recommendations VFSS (videofluoroscopic swallowing study)   General Therapy Interventions   Planned Therapy Interventions Dysphagia Treatment   Clinical Impression   Criteria for Skilled Therapeutic Interventions Met (SLP Eval) Yes, treatment indicated   SLP Diagnosis functional oral swallow and possible pharyngeal dysphagia   Clinical Impression Comments Pt currently presents with functional oral swallow and possible pharyngeal dysphagia. + adequate acceptance/containment. Bolus formation/propulsion, lingual coordination, and mastication appeared adequate. Noted timely oral transit with complete clearance. Suspect grossly timely swallow with  adequate hyolaryngeal elevation. + occasional congested cough following PO trials (c/w baseline cough per pt, however not observed prior to PO). No other cough, throat clear, wet vocal quality, eye watering, or increased WOB.   SLP Total Evaluation Time   Eval: oral/pharyngeal swallow function, clinical swallow Minutes ([id number]) 16   SLP Goals   Therapy Frequency (SLP Eval) 5 times/week   SLP Predicted Duration/Target Date for Goal Attainment 06/18/25   SLP Discharge Planning   SLP Plan VFSS   SLP Discharge Recommendation home with outpatient therapy services;home   SLP Rationale for DC Rec Possible need for ongoing SLP pending VFSS results/progress in therapy   SLP Brief overview of current status  Recommend continue regular dit with thin liquid and swallow precautions: sit upright, slow rate, small bites/sips, alternate solid/liquid.   SLP Time and Intention   Total Session Time (sum of timed and untimed services) 16

## 2025-06-12 ENCOUNTER — APPOINTMENT (OUTPATIENT)
Dept: GENERAL RADIOLOGY | Facility: CLINIC | Age: 80
DRG: 178 | End: 2025-06-12
Attending: HOSPITALIST
Payer: MEDICARE

## 2025-06-12 ENCOUNTER — APPOINTMENT (OUTPATIENT)
Dept: SPEECH THERAPY | Facility: CLINIC | Age: 80
DRG: 178 | End: 2025-06-12
Payer: MEDICARE

## 2025-06-12 ENCOUNTER — TELEPHONE (OUTPATIENT)
Dept: ANTICOAGULATION | Facility: CLINIC | Age: 80
End: 2025-06-12

## 2025-06-12 VITALS
HEIGHT: 64 IN | TEMPERATURE: 98.5 F | RESPIRATION RATE: 16 BRPM | OXYGEN SATURATION: 95 % | BODY MASS INDEX: 25.37 KG/M2 | WEIGHT: 148.59 LBS | DIASTOLIC BLOOD PRESSURE: 90 MMHG | SYSTOLIC BLOOD PRESSURE: 144 MMHG | HEART RATE: 77 BPM

## 2025-06-12 DIAGNOSIS — Z86.718 PERSONAL HISTORY OF DVT (DEEP VEIN THROMBOSIS): ICD-10-CM

## 2025-06-12 DIAGNOSIS — I82.A11 ACUTE DEEP VEIN THROMBOSIS (DVT) OF AXILLARY VEIN OF RIGHT UPPER EXTREMITY (H): Primary | ICD-10-CM

## 2025-06-12 LAB
ATRIAL RATE - MUSE: 81 BPM
BACTERIA SPEC CULT: NORMAL
BACTERIA SPEC CULT: NORMAL
BACTERIA SPT CULT: ABNORMAL
BACTERIA SPT CULT: ABNORMAL
DIASTOLIC BLOOD PRESSURE - MUSE: NORMAL MMHG
GLUCOSE BLDC GLUCOMTR-MCNC: 128 MG/DL (ref 70–99)
GRAM STAIN RESULT: ABNORMAL
HGB BLD-MCNC: 9.1 G/DL (ref 11.7–15.7)
INR PPP: 2.15 (ref 0.85–1.15)
INTERPRETATION ECG - MUSE: NORMAL
MAGNESIUM SERPL-MCNC: 2 MG/DL (ref 1.7–2.3)
MCV RBC AUTO: 93 FL (ref 78–100)
P AXIS - MUSE: 60 DEGREES
PHOSPHATE SERPL-MCNC: 2.6 MG/DL (ref 2.5–4.5)
PR INTERVAL - MUSE: 230 MS
PROTHROMBIN TIME: 23.2 SECONDS (ref 11.8–14.8)
QRS DURATION - MUSE: 100 MS
QT - MUSE: 434 MS
QTC - MUSE: 504 MS
R AXIS - MUSE: 11 DEGREES
SODIUM SERPL-SCNC: 131 MMOL/L (ref 135–145)
SYSTOLIC BLOOD PRESSURE - MUSE: NORMAL MMHG
T AXIS - MUSE: 76 DEGREES
VENTRICULAR RATE- MUSE: 81 BPM

## 2025-06-12 PROCEDURE — 250N000013 HC RX MED GY IP 250 OP 250 PS 637: Performed by: SPECIALIST

## 2025-06-12 PROCEDURE — 99239 HOSP IP/OBS DSCHRG MGMT >30: CPT | Performed by: INTERNAL MEDICINE

## 2025-06-12 PROCEDURE — 36415 COLL VENOUS BLD VENIPUNCTURE: CPT | Performed by: HOSPITALIST

## 2025-06-12 PROCEDURE — 83735 ASSAY OF MAGNESIUM: CPT | Performed by: HOSPITALIST

## 2025-06-12 PROCEDURE — 84100 ASSAY OF PHOSPHORUS: CPT | Performed by: HOSPITALIST

## 2025-06-12 PROCEDURE — 85018 HEMOGLOBIN: CPT | Performed by: HOSPITALIST

## 2025-06-12 PROCEDURE — 92526 ORAL FUNCTION THERAPY: CPT | Mod: GN

## 2025-06-12 PROCEDURE — 250N000011 HC RX IP 250 OP 636: Performed by: HOSPITALIST

## 2025-06-12 PROCEDURE — 250N000009 HC RX 250: Performed by: PHYSICIAN ASSISTANT

## 2025-06-12 PROCEDURE — 94640 AIRWAY INHALATION TREATMENT: CPT

## 2025-06-12 PROCEDURE — 92611 MOTION FLUOROSCOPY/SWALLOW: CPT | Mod: GN

## 2025-06-12 PROCEDURE — 94640 AIRWAY INHALATION TREATMENT: CPT | Mod: 76

## 2025-06-12 PROCEDURE — 250N000013 HC RX MED GY IP 250 OP 250 PS 637: Performed by: HOSPITALIST

## 2025-06-12 PROCEDURE — 250N000013 HC RX MED GY IP 250 OP 250 PS 637: Performed by: PHYSICIAN ASSISTANT

## 2025-06-12 PROCEDURE — 74230 X-RAY XM SWLNG FUNCJ C+: CPT

## 2025-06-12 PROCEDURE — 999N000157 HC STATISTIC RCP TIME EA 10 MIN

## 2025-06-12 PROCEDURE — 85610 PROTHROMBIN TIME: CPT | Performed by: PHYSICIAN ASSISTANT

## 2025-06-12 PROCEDURE — 84295 ASSAY OF SERUM SODIUM: CPT | Performed by: HOSPITALIST

## 2025-06-12 RX ORDER — ALBUTEROL SULFATE 90 UG/1
2 INHALANT RESPIRATORY (INHALATION) EVERY 6 HOURS PRN
Status: SHIPPED
Start: 2025-06-12

## 2025-06-12 RX ORDER — BARIUM SULFATE 400 MG/ML
SUSPENSION ORAL ONCE
Status: COMPLETED | OUTPATIENT
Start: 2025-06-12 | End: 2025-06-12

## 2025-06-12 RX ORDER — METOPROLOL TARTRATE 25 MG/1
25 TABLET, FILM COATED ORAL 2 TIMES DAILY
Qty: 60 TABLET | Refills: 0 | Status: SHIPPED | OUTPATIENT
Start: 2025-06-12

## 2025-06-12 RX ORDER — GUAIFENESIN 600 MG/1
600 TABLET, EXTENDED RELEASE ORAL 2 TIMES DAILY
Qty: 30 TABLET | Refills: 0 | Status: SHIPPED | OUTPATIENT
Start: 2025-06-12

## 2025-06-12 RX ORDER — DOXYCYCLINE 100 MG/1
100 CAPSULE ORAL EVERY 12 HOURS
Qty: 2 CAPSULE | Refills: 0 | Status: SHIPPED | OUTPATIENT
Start: 2025-06-12 | End: 2025-06-13

## 2025-06-12 RX ADMIN — AMPICILLIN SODIUM AND SULBACTAM SODIUM 3 G: 2; 1 INJECTION, POWDER, FOR SOLUTION INTRAMUSCULAR; INTRAVENOUS at 05:49

## 2025-06-12 RX ADMIN — SENNOSIDES AND DOCUSATE SODIUM 1 TABLET: 50; 8.6 TABLET ORAL at 08:09

## 2025-06-12 RX ADMIN — GUAIFENESIN 600 MG: 600 TABLET, EXTENDED RELEASE ORAL at 08:09

## 2025-06-12 RX ADMIN — CARBOXYMETHYLCELLULOSE SODIUM 1 DROP: 10 GEL OPHTHALMIC at 08:09

## 2025-06-12 RX ADMIN — BENZONATATE 200 MG: 100 CAPSULE ORAL at 05:52

## 2025-06-12 RX ADMIN — AMPICILLIN SODIUM AND SULBACTAM SODIUM 3 G: 2; 1 INJECTION, POWDER, FOR SOLUTION INTRAMUSCULAR; INTRAVENOUS at 00:19

## 2025-06-12 RX ADMIN — GUAIFENESIN 200 MG: 200 SOLUTION ORAL at 08:09

## 2025-06-12 RX ADMIN — DOXYCYCLINE HYCLATE 100 MG: 100 CAPSULE ORAL at 08:09

## 2025-06-12 RX ADMIN — Medication 45 MG: at 08:09

## 2025-06-12 RX ADMIN — TIMOLOL MALEATE 1 DROP: 5 SOLUTION OPHTHALMIC at 08:10

## 2025-06-12 RX ADMIN — ALBUTEROL SULFATE 2.5 MG: 2.5 SOLUTION RESPIRATORY (INHALATION) at 10:51

## 2025-06-12 RX ADMIN — LEVOTHYROXINE SODIUM 125 MCG: 125 TABLET ORAL at 05:47

## 2025-06-12 RX ADMIN — ALBUTEROL SULFATE 2.5 MG: 2.5 SOLUTION RESPIRATORY (INHALATION) at 07:24

## 2025-06-12 RX ADMIN — ACETAMINOPHEN 975 MG: 325 TABLET, FILM COATED ORAL at 03:50

## 2025-06-12 RX ADMIN — CYANOCOBALAMIN TAB 1000 MCG 1000 MCG: 1000 TAB at 08:09

## 2025-06-12 RX ADMIN — BARIUM SULFATE 20 ML: 400 SUSPENSION ORAL at 09:45

## 2025-06-12 RX ADMIN — METOPROLOL TARTRATE 25 MG: 25 TABLET, FILM COATED ORAL at 08:09

## 2025-06-12 RX ADMIN — GUAIFENESIN 200 MG: 200 SOLUTION ORAL at 03:53

## 2025-06-12 ASSESSMENT — ACTIVITIES OF DAILY LIVING (ADL)
ADLS_ACUITY_SCORE: 45
ADLS_ACUITY_SCORE: 44
ADLS_ACUITY_SCORE: 45
ADLS_ACUITY_SCORE: 44
ADLS_ACUITY_SCORE: 45
ADLS_ACUITY_SCORE: 44

## 2025-06-12 NOTE — PLAN OF CARE
"Speech Language Therapy Discharge Summary    Reason for therapy discharge:    Discharged to home with outpatient therapy.    Progress towards therapy goal(s). See goals on Care Plan in [facility] electronic health record for goal details.  Goals partially met.  Barriers to achieving goals:   discharge from facility.    Therapy recommendation(s):    Continued therapy is recommended.  Rationale/Recommendations:  ongoing dysphagia/OP SLP for education/training re: strategies and free water protocol, swallow exercises. Seen for VFSS today: \"Recommend regular diet (pt to select softer items PRN) with mildly liquid and strict aspiration precautions: NO STRAWS, sit upright, slow rate, small bites/sips, frequent additional swallows, alternate solid/liquid. Also recommend free water protocol -- pt may have thin water/ice between meals in absence of food, medication, and other liquids; oral care after meals and wait 30 minutes prior to water/ice, no straws, frequent additional swallows and frequent cough/reswallow.\"      "

## 2025-06-12 NOTE — PLAN OF CARE
Goal Outcome Evaluation:       .Date/Time 6/11/25 3545-5849     Mental Status:A&Ox4   Activity/dangle Ast x1 GBW   Diet:Mod carb   Pain:denies   Cole/Voiding:BR   Tele/Restraints/Iso:N/A   02/LDA:RA / PIV SL  ABNL Labs: 129 & 109   D/C Date:TBD   Other Info: VSS except elevated Bp  prn given (see Mar), productive cough prn given(see Mar),  trace +1 edema, scheduled nebs,

## 2025-06-12 NOTE — PLAN OF CARE
Goal Outcome Evaluation:  Discharge    Patient discharged to home with   Care plan note  Summary: Admitted with nectrotizing pneumonia with small L pneumothorax. And new LBBB.  DATE & TIME: 06/12/25 9311-6524    Cognitive Concerns/ Orientation : A&Ox4. [place] (hearing aids)  BEHAVIOR & AGGRESSION TOOL COLOR: Green   ABNL VS/O2: VSS on RA   MOBILITY: IND. Refused bed alarm. Educated patient on fall risks.   PAIN MANAGMENT: Denied  DIET: Regular, 1500ml FR  BOWEL/BLADDER: Continent  ABNL LAB/BG: See results  SKIN: Intact. Trace edema to BLE.   TESTS/PROCEDURES: VFSS completed today.   D/C DAY/GOALS/PLACE: Today with .   OTHER IMPORTANT INFO: Frequent prodoctive cough. PRN Robitussin given x1. Speech following. ID signed off.   Discharge medications paperwork explained and given to patient.     Listed belongings gathered and given to patient (including from security/pharmacy). Yes  Care Plan and Patient education resolved: Yes  Prescriptions if needed, hard copies sent with patient  NA  Medication Bin checked and emptied on discharge Yes  SW/care coordinator/charge RN aware of discharge: Yes

## 2025-06-12 NOTE — PLAN OF CARE
Goal Outcome Evaluation:         Summary: Admitted with nectrotizing pneumonia with small L pneumothorax. And new LBBB.  DATE & TIME: 06/11-06/12/25 23:00-07:30    Cognitive Concerns/ Orientation : A & O x 4. [place] (hearing aids at beside)  BEHAVIOR & AGGRESSION TOOL COLOR: Green   ABNL VS/O2: VSS on RA except BP /83  MOBILITY: SBA - not OOB this shift  PAIN MANAGMENT: complained of Headache, PRN Tylenol given  DIET: Regular, 1500ml FR  BOWEL/BLADDER: 1x large LBM this shift; Purewick for overnight     ABNL LAB/BG: Sodium 131, K, Mg, Phos protocol  TELEMETRY RHYTHM: NA   SKIN: Intact. Trace edema to BLE.   TESTS/PROCEDURES: For VFSS   D/C DAY/GOALS/PLACE: Possible today; Will need long term PO abx at discharge   OTHER IMPORTANT INFO: infrequent prod cough. PRN Robitussin given x1 and Tessalon x1. Speech following. ID signed off. On IV Unasyn, PO Doxycycline

## 2025-06-12 NOTE — TELEPHONE ENCOUNTER
ANTICOAGULATION  MANAGEMENT: Discharge Review    Manasa French chart reviewed for anticoagulation continuity of care    Hospital Admission on 6/8-6/12/25 for necrotizing pneumonia.    Discharge disposition: Home    Results:    Recent labs: (last 7 days)     06/08/25  1104 06/09/25  0645 06/10/25  0639 06/11/25  0659 06/12/25  0646   INR 2.73* 2.99* 2.97* 2.46* 2.15*     Anticoagulation inpatient management:     anticoagulation calendar updated with inpatient dosing    Anticoagulation discharge instructions:     Warfarin dosing: home regimen continued   Bridging: No   INR goal change: No      Medication changes affecting anticoagulation: Yes: 28 day course of Augmentin and 2 doses of doxycycline     Additional factors affecting anticoagulation: Yes: necrotizing pneumonia     PLAN     Recommend to adjust dose to 5 mg daily and check on Monday plan made with [facility].    Spoke with Rhonda    Anticoagulation Calendar updated    Lupe Reyna, RN  6/12/2025  Anticoagulation Clinic  [employer] for routing messages: [facility] [employer]  ACC patient phone line: [phone]

## 2025-06-12 NOTE — PLAN OF CARE
Discharge Summary    Reason for therapy discharge:    Discharged to home.    Progress towards therapy goal(s). See goals on Care Plan in [facility] electronic health record for goal details.  Goals partially met.  Barriers to achieving goals:   discharge from facility.    Therapy recommendation(s):    Patient mobilizing with SBA and 4WW today, limited by coughing and shortness of breath despite SpO2 92-94% on room air. Patient anticipates discharge back to apartment with  assisting. Encouraged walking program to maintain strength and increase activity tolerance. Patient was swimming several days per week, would benefit from resuming with aerobic exercises pending respiratory function. Patient declines need for home PT (PT agrees); just needs to mobilize in home setting which pt. is eager to do.

## 2025-06-12 NOTE — PROGRESS NOTES
"Speech-Language Pathology Videofluoroscopic Swallow Study            06/12/25 0995   Appointment Info   Signing Clinician's Name / Credentials (SLP) Rema Bobby MS, CCC-SLP   General Information   Onset of Illness/Injury or Date of Surgery 06/08/25   Referring Physician Venkata Jeronimo MD   Pertinent History of Current Problem Per MD note: \"Manasa French is a 79 year old female admitted on 6/8/2025. Past medical history significant for hypertension, uncontrolled, Graves' disease with post ablative hypothyroidism, history of DVT on warfarin, hydrocephalus status post  shunt in 1987, osteoporosis, among others who was admitted on 06/08/25 with concern for nectrotizing pneumonia with small pneumothorax. Also found to have hyponatremia 120, prolonged QTc, and possibly new LBBB.\"   Chest xray 6/9/25:   \"IMPRESSION: A small left apical pneumothorax measures 8 mm, stable to decreased compared to prior CT. Atelectasis in the left upper lobe along the left major fissure is stable. Minimal focal infiltrate in the left lower lobe posteriorly is also similar.     No new findings.\"     Clinical swallow evaluation completed with recommendation for regular diet with thin liquid and VFSS. Now seen for VFSS.   General Observations Pt awake/alert, cooperative.   Type of Evaluation   Type of Evaluation Swallow Evaluation   Dentition (Oral Motor)   Dentition (Oral Motor) natural dentition;adequate dentition   General Swallowing Observations   Swallowing Evaluation Videofluoroscopic swallow study (VFSS)   VFSS Evaluation   Radiologist Dr. Mcdonough   Views Taken left lateral   Physical Location of Procedure [facility]   VFSS Textures Trialed thin liquids;slightly thick liquids;mildly thick liquids;pureed;solid foods   VFSS Eval: Thin Liquid Texture Trial   Mode of Presentation, Thin Liquid spoon   Preparatory Phase poor bolus control   Oral Phase, Thin Liquid premature pharyngeal entry   Bolus Location When Swallow " Triggered valleculae   Pharyngeal Phase, Thin Liquid impaired hyolaryngeal excursion;impaired epiglottic movement;impaired tongue base retraction;residue in vallecula;residue in pyriform sinus   Rosenbek's Penetration Aspiration Scale: Thin Liquid Trial Results 3 - contrast remains above the vocal cords, visible residue remains (penetration)   Strategies and Compensations chin tuck  (not effective)   VFSS Eval: Slightly Thick Liquids   Mode of Presentation spoon   Preparatory Phase poor bolus control   Oral Phase premature pharyngeal entry   Bolus Location When Swallow Triggered valleculae   Pharyngeal Phase impaired hyolaryngeal excursion;impaired epiglottic movement;impaired tongue base retraction;residue in vallecula;residue in pyriform sinus   Rosenbek's Penetration Aspiration Scale 3 - contrast remains above the vocal cords, visible residue remains (penetration)   Strategies and Compensations chin tuck  (did not eliminate penetration; somewhat difficult visualization of depth)   VFSS Eval: Mildly Thick Liquids   Mode of Presentation spoon;cup;straw   Preparatory Phase poor bolus control   Oral Phase premature pharyngeal entry   Bolus Location When Swallow Triggered valleculae   Pharyngeal Phase impaired hyolaryngeal excursion;impaired epiglottic movement;impaired tongue base retraction;residue in vallecula   Rosenbek's Penetration Aspiration Scale 2 - contrast enters airway, remains above the vocal cords, no residue remains (penetration)   Diagnostic Statement Shallow penetration with ejection for mildly thick liquid via tsp/cup; mild-moderately deep penetration via straw   VFSS Evaluation: Puree Solid Texture Trial   Preparatory Phase WFL   Oral Phase, Puree WFL   Bolus Location When Swallow Triggered valleculae   Pharyngeal Phase, Puree impaired hyolaryngeal excursion;impaired epiglottic movement;impaired tongue base retraction;residue in vallecula   Rosenbek's Penetration Aspiration Scale: Puree Food Trial  Results 1 - no aspiration, contrast does not enter airway   VFSS Evaluation: Solid Food Texture Trial   Preparatory Phase prolonged bolus preparation   Oral Phase, Solid WFL   Bolus Location When Swallow Triggered valleculae   Pharyngeal Phase, Solid impaired hyolaryngeal excursion;impaired epiglottic movement;impaired tongue base retraction;residue in vallecula   Rosenbek's Penetration Aspiration Scale: Solid Food Trial Results 1 - no aspiration, contrast does not enter airway   Swallowing Recommendations   Diet Consistency Recommendations regular diet;mildly thick liquids (level 2)   Supervision Level for Intake close supervision needed   Mode of Delivery Recommendations bolus size, small;no straws;slow rate of intake   Swallowing Maneuver Recommendations alternate food and liquid intake;effortful (hard) swallow;extra swallow   Monitoring/Assistance Required (Eating/Swallowing) stop eating activities when fatigue is present;monitor for cough or change in vocal quality with intake   Recommended Feeding/Eating Techniques (Swallow Eval) maintain upright posture during/after eating for 30 minutes;set-up and prepare tray   Medication Administration Recommendations, Swallowing (SLP) whole in puree   General Therapy Interventions   Planned Therapy Interventions Dysphagia Treatment   Clinical Impression   Criteria for Skilled Therapeutic Interventions Met (SLP Eval) Yes, treatment indicated   SLP Diagnosis minimal-mild oral and mild/mild-moderate pharyngeal dysphagia   Clinical Impression Comments Pt currently presents with minimal-mild oral and mild/mild-moderate pharyngeal dysphagia characterized by reduced bolus formation/propulsion and lingual coordination/control with frequent premature spillage, impaired hyolaryngeal elevation/epiglottic inversion with reduced supraglottic closure, and decreased pharyngeal clearance. + moderately deep penetration of thin and slightly thick liquids via tsp before/during the swallow  with incomplete ejection. Penetrant remained visible and portion eventually progressed to the level of the vocal folds. Chin tuck did not eliminate penetration (?may have reduced depth for slightly thick liquid, but visualization somewhat limited secondary to presence of shunt). + inconsistent shallow penetration of mildly thick liquid via cup and one instance of ~mildly deep penetration via tsp, as well as mild-moderately deep penetration of mildly thick liquid via straw; all with complete ejection. No other penetration/aspiration visualized during this controlled study. Observed inconsistent vallecular and occasional pyriform sinus residue; was most notable with puree (mild vallecular) and solid (mild/mild-moderate vallecular) and required liquid wash and additional swallows to clear.   SLP Total Evaluation Time   Evaluation, videofluoroscopic eval of swallow function Minutes (86530) 19   Interventions   Interventions Quick Adds Swallowing Dysfunction   Swallowing Intervention   Treatment of Swallowing Dysfunction &/or Oral Function for Feeding Minutes (49565) 13   Symptoms Noted During/After Treatment None   Treatment Detail/Skilled Intervention Provided extensive verbal and written education re: dysphagia status, VFSS results/recommendations, aspiration precautions, thickening liquids at home, and swallow exercises. Pt with few questions answered and verbalized comprehension to all information. She demonstrated effortful swallow and was agreeable to attempt completing 3 sets of 10 daily.   SLP Discharge Planning   SLP Plan Diet tolerance, training re: strategies and free water protocol, swallow exercises   SLP Discharge Recommendation home with outpatient therapy services   SLP Rationale for DC Rec Will benefit from ongoing intervention following discharge   SLP Brief overview of current status  Recommend regular diet (pt to select softer items PRN) with mildly liquid and strict aspiration precautions: NO  STRAWS, sit upright, slow rate, small bites/sips, frequent additional swallows, alternate solid/liquid. Also recommend free water protocol -- pt may have thin water/ice between meals in absence of food, medication, and other liquids; oral care after meals and wait 30 minutes prior to water/ice, no straws, frequent additional swallows and frequent cough/reswallow.   SLP Time and Intention   Total Session Time (sum of timed and untimed services) 32

## 2025-06-12 NOTE — DISCHARGE SUMMARY
[facility]    Discharge Summary  Hospitalist    Date of Admission:  6/8/2025  Date of Discharge:  6/12/2025 12:48 PM  Discharging Provider: Fortino Mcclendon MD, MD  Date of Service (when I saw the patient): 06/12/25    Discharge Diagnoses   Please refer below    History of Present Illness   Manasa French is an 79 year old female who presented with shortness of breath    Hospital Course   Manasa French is a 79 year old female admitted on 6/8/2025. Past medical history significant for hypertension, uncontrolled, Graves' disease with post ablative hypothyroidism, history of DVT on warfarin, hydrocephalus status post  shunt in 1987, osteoporosis, among others who was admitted on 06/08/25 with concern for nectrotizing pneumonia with small pneumothorax. Also found to have hyponatremia 120, prolonged QTc, and possibly new LBBB.    Final discharge diagnoses and hospital course        Left lower lobe necrotizing pneumonia with small left apical pneumothorax  *Presented with 1 week URI symptoms, cough, subjective fevers, myalgias, headache, chills, and pleuritic chest pain.  Afebrile without leukocytosis  *CT chest showing left lower lobe consolidation containing multiple foci of gas and air-filled levels concerning for necrotizing pneumonia and small left pneumothorax likely secondary to cavitation and rupture into the pleural space    *Negative for COVID, influenza, RSV.  MRSA nasal swab negative. Urine legionella and pneumo Ag's negative.  *thoracic surgery consulted 6/9; no indication for any intervention of pneumothorax, follow up with PCP for CT chest in 4-6 weeks to assure resolution  *sputum culture with mixed thea     - continue Unasyn (Augmentin at discharge x4-6 weeks) and doxycycline (x5 days) per ID  - follow up with Dr. Jacob in 4 weeks and repeat CT chest in 6 weeks  - blood cultures ngtd  - pulmonary toilet  - PT recommends home with assist  - SLP recommending video swallow  study, today, speech recommended regular diet with mildly thick liquid.    Patient overall doing well at this time wanted to go home.  I will discharge her on oral Augmentin 875/125 mg twice daily for 4 weeks prescription given, doxycycline for 1 day to complete 5-day course.  She will see Dr. Burris or infectious disease within 4 weeks, and will decide if they need to extend treatment for further 2 weeks or not.  Discharged home in stable improved condition     Chronic anemia  *baseline hgb has ranged 9-11 g/dL in recent months but most recent checks ~11 g/dL, stable on admission  - hgb down to 9 g/dL on 06/11/25; no melena or hematochezia per RN or patient (patient reports black stools x3 months following initiation of iron)  - Globin remained stable around 9.1 at this time.     Headache, resolved  *Intermittent left side of head/posterior at times since start of URI symptoms. Hx  shunt in 1980s. Neurological status intact, no focal deficits or visual changes on admission. No trauma, falls or injuries.   *possibly related to hypertension upon arrival and acute illness above  *CT head 6/9 negative for acute pathology  - Monitor closely, APAP PRN, prn oxy  - reports a mild headache 06/11/25 which was her typical version, not the severe type she presented with  - stop neuro checks.  Headache improved at this time.     Hypochloremic hyponatremia, likely acute, improving  Hypomagnesemia  Hypophosphatemia  *In the setting of hydrochlorothiazide, pna, poor oral intake since feeling ill. Suspect some hypovolemia contributing.   *initially improved with IVF but stalled with repeat urine studies consistent with SIADH (likely due to pneumonia above) and placed on fluid restriction  - Na stable about 130 on 06/11/25; monitor daily  - continue 1500 ml fluid restriction  Discontinue hydrochlorothiazide on discharge.     Prolonged QTc associated with hypomagnesemia  QTc >500ms. Lyte disturbances as noted.  - QTc improved to 504  on repeat 06/10/25; ok to stop tele 06/10/25   - Avoid QTc prolonging medications     Hx PE on warfarin  Reports PE was several years ago and has been on warfarin since. Admission CT neg for PE  - Warfarin per pharmacy; INR therapeutic 06/11/25   - Daily INR, INR therapeutic at this time.     Accelerated hypertension, improving  Benign essential hypertension, uncontrolled  On admission, systolic blood pressures 150s-190s.  PTA regimen: hydrochlorothiazide 12.5mg/d, losartan 100mg/d  - Continue PTA ARB with hold parameters  - Hold PTA hydrochlorothiazide in the setting of hyponatremia  - increase metoprolol to 25 mg bid 06/11/25, further adjustment of hypertensive medication as outpatient basis.  HCTZ discontinued on discharge    Possibly new LBBB  *admission EKG x 2 reveals left bundle branch block is not noted on prior EKGs however during that 2018 stress test does note a transient left bundle during stress (otherwise negative).  Troponin mildly elevated 22 and 18, not uptrending and flat.  *Pleuritic chest pain likely secondary to the PNA/PTX. Already therapeutic on warfarin.   *TTE 6/9 showing EF 55-60% without WMA  - follow up with cardiology outpatient      Non-Insulin dependent type 2 diabetes  PTA Regimen: metformin 500mg/d  Last HbA1c 6.5% 5/2025  - resume PTA metformin 06/11/25   - Sliding scale insulin Medium         Chronic stable diagnoses and other pertinent medical history: Appropriate PTA medications will be resumed.   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  Anemia, iron deficiency: follows with hematology  Macular degeneration  Osteoporosis  Glaucoma     Clinically Significant Risk Factors         # Hyponatremia: Lowest Na = 128 mmol/L in last 2 days, will monitor as appropriate  # Hypochloremia: Lowest Cl = 96 mmol/L in last 2 days, will monitor as appropriate      #  "Hypoalbuminemia: Lowest albumin = 3.3 g/dL at 6/9/2025  6:45 AM, will monitor as appropriate     # Hypertension: Noted on problem list           # DMII: A1C = 6.5 % (Ref range: 0.0 - 5.6 %) within past 6 months   # Overweight: Estimated body mass index is 25.51 kg/m  as calculated from the following:    Height as of this encounter: 1.626 m (5' 4\").    Weight as of this encounter: 67.4 kg (148 lb 9.4 oz).      # Financial/Environmental Concerns: none (denies)             Fortino Mcclendon MD, MD    Significant Results and Procedures       Pending Results   These results will be followed up by primary care physician/ID  Unresulted Labs Ordered in the Past 30 Days of this Admission       Date and Time Order Name Status Description    6/8/2025 11:21 AM Blood Culture Peripheral blood (BC) Arm, Right Preliminary     6/8/2025 11:21 AM Blood Culture Peripheral blood (BC) Arm, Right Preliminary             Code Status   Full Code       Primary Care Physician   Jana Giles    Physical Exam   Temp: 98.5  F (36.9  C) Temp src: Oral BP: (!) 144/90 Pulse: 77   Resp: 16 SpO2: 95 % O2 Device: None (Room air)    Vitals:    06/08/25 0855 06/08/25 1446 06/09/25 0516   Weight: 67.1 kg (148 lb) 67.5 kg (148 lb 12.8 oz) 67.4 kg (148 lb 9.4 oz)     Vital Signs with Ranges  Temp:  [98.2  F (36.8  C)-98.5  F (36.9  C)] 98.5  F (36.9  C)  Pulse:  [77-81] 77  Resp:  [16-18] 16  BP: (144-183)/(81-90) 144/90  SpO2:  [94 %-98 %] 95 %  I/O last 3 completed shifts:  In: -   Out: 200 [Urine:200]    Constitutional: awake, alert, cooperative, no apparent distress, and appears stated age  Eyes: Lids and lashes normal, pupils equal, round and reactive to light, extra ocular muscles intact, sclera clear, conjunctiva normal  Respiratory: No increased work of breathing, good air exchange, clear to auscultation bilaterally, no crackles or wheezing  Cardiovascular: Normal apical impulse, regular rate and rhythm, normal S1 and S2, no S3 or S4, and no murmur " noted  GI: No scars, normal bowel sounds, soft, non-distended, non-tender, no masses palpated, no hepatosplenomegally  Musculoskeletal: no lower extremity pitting edema present  Neurologic: No focal deficit    Discharge Disposition   Discharged to home  Condition at discharge: Stable    Consultations This Hospital Stay   PHARMACY TO DOSE VANCO  PHARMACY IP CONSULT  THORACIC SURGERY IP CONSULT  PHARMACY TO DOSE WARFARIN  PHYSICAL THERAPY ADULT IP CONSULT  CARE MANAGEMENT / SOCIAL WORK IP CONSULT  INFECTIOUS DISEASES IP CONSULT  CARE MANAGEMENT / SOCIAL WORK IP CONSULT  SPEECH LANGUAGE PATH ADULT IP CONSULT  CARDIOLOGY IP CONSULT    Time Spent on this Encounter   Fortino BORRERO MD, personally saw the patient today and spent greater than 30 minutes discharging this patient.    Discharge Orders      Primary Care - Care Coordination Referral      Speech Therapy  Referral      Reason for your hospital stay    Necrotizing pneumonia     Activity    Your activity upon discharge: activity as tolerated     Follow Up    Follow up with Dr Jacob of infection disease within 3-4. for hospital follow- up.     Diet    Follow this diet upon discharge: Current Diet:Orders Placed This Encounter      Fluid restriction 1500 ML FLUID      Combination Diet Regular Diet Adult; Regular Diet; Mildly Thick (level 2)     Hospital Follow-up with Existing Primary Care Provider (PCP)          Discharge Medications   Discharge Medication List as of 6/12/2025 12:02 PM        START taking these medications    Details   amoxicillin-clavulanate (AUGMENTIN) 875-125 MG tablet Take 1 tablet by mouth 2 times daily for 28 days., Disp-56 tablet, R-0, E-Prescribe      doxycycline hyclate (VIBRAMYCIN) 100 MG capsule Take 1 capsule (100 mg) by mouth every 12 hours for 2 doses., Disp-2 capsule, R-0, E-Prescribe      guaiFENesin (MUCINEX) 600 MG 12 hr tablet Take 1 tablet (600 mg) by mouth 2 times daily., Disp-30 tablet, R-0, E-Prescribe      metoprolol  tartrate (LOPRESSOR) 25 MG tablet Take 1 tablet (25 mg) by mouth 2 times daily., Disp-60 tablet, R-0, E-Prescribe           CONTINUE these medications which have CHANGED    Details   albuterol (PROVENTIL HFA) 108 (90 Base) MCG/ACT inhaler Inhale 2 puffs into the lungs every 6 hours as needed for shortness of breath or wheezing., No Print OutPharmacy may dispense brand covered by insurance (Proair, or proventil or ventolin or generic albuterol inhaler)           CONTINUE these medications which have NOT CHANGED    Details   acetaminophen (TYLENOL) 325 MG tablet Take 2 tablets (650 mg) by mouth every 4 hours as needed for other (mild pain), Disp-100 tablet, R-0, E-Prescribe      amoxicillin (AMOXIL) 500 MG tablet 4 tablets by mouth one hour prior to dental procedure*, Historical      benzonatate (TESSALON) 200 MG capsule Take 1 capsule (200 mg) by mouth 3 times daily as needed for cough., Disp-42 capsule, R-1, E-Prescribe      calcium carbonate-vitamin D (CALTRATE) 600-10 MG-MCG per tablet Take 1 tablet by mouth 2 times daily, Historical      carboxymethylcellulose (CELLUVISC/REFRESH LIQUIGEL) 1 % ophthalmic solution Place 1 drop into both eyes 2 times daily., Historical      Chlorpheniramine-DM (CORICIDIN HBP COUGH/COLD) 4-30 MG TABS Take 1 tablet by mouth every 6 hours as needed (nasal congestion and nasal drainage)., Disp-30 tablet, R-2, E-Prescribe      ferrous sulfate 45 MG TBCR CR tablet Take 1 tablet (45 mg) by mouth daily., Historical      latanoprost (XALATAN) 0.005 % ophthalmic solution Place 1 drop into both eyes at bedtime., Disp-7.5 mL, R-3, E-Prescribe      levothyroxine (SYNTHROID/LEVOTHROID) 125 MCG tablet Take 1 tablet (125 mcg) by mouth every morning (before breakfast)., Disp-90 tablet, R-0, E-Prescribe      losartan (COZAAR) 100 MG tablet Take 1 tablet (100 mg) by mouth daily., Disp-90 tablet, R-3, E-Prescribe      metFORMIN (GLUCOPHAGE XR) 500 MG 24 hr tablet TAKE 1 TABLET EVERY DAY WITH DINNER,  Disp-90 tablet, R-3, E-Prescribe      Multiple Vitamins-Minerals (ICAPS AREDS 2 PO) Take 1 capsule by mouth 2 times daily, Historical      timolol maleate (TIMOPTIC) 0.5 % ophthalmic solution Place 1 drop into both eyes every morning, Disp-15 mL, R-1, E-Prescribe      vitamin B-12 (CYANOCOBALAMIN) 1000 MCG tablet Take 1,000 mcg by mouth three times a week. Tuesday, Thursday, Saturday, Historical      Vitamin D3 (CHOLECALCIFEROL) 25 mcg (1000 units) tablet Take 1 tablet by mouth three times a week. Monday, Wednesday, Friday, Historical      Vitamin K 100 MCG TABS Take 1 tablet by mouth daily, No Print Out      warfarin ANTICOAGULANT (COUMADIN) 5 MG tablet TAKE 1 AND 1/2 TABS EVERY MON, WED, FRI AND 1 TAB ALL OTHER DAYS OF THE WEEK OR AS DIRECTED BY ACC TEAM BASED ON INR RESULTS, Disp-110 tablet, R-3, E-Prescribe           STOP taking these medications       hydroCHLOROthiazide 12.5 MG tablet Comments:   Reason for Stopping:             Allergies   Allergies   Allergen Reactions    Alphagan [Brimonidine] Other (See Comments)     Itchy eyes    Cosopt [Dorzolamide Hcl-Timolol Mal] Other (See Comments)     Itchy eyes     Data   Most Recent 3 CBC's:  Recent Labs   Lab Test 06/12/25  0646 06/11/25  1300 06/11/25  0659 06/09/25  0645 06/08/25  0858   WBC  --   --  7.0 6.5 7.6   HGB 9.1* 9.0* 9.2* 10.1* 11.1*   MCV 93 94 91 90 91   PLT  --   --  210 173 205      Most Recent 3 BMP's:  Recent Labs   Lab Test 06/12/25  0820 06/12/25  0646 06/11/25  2135 06/11/25  1757 06/11/25  0731 06/11/25  0659 06/10/25  1732 06/10/25  1704 06/10/25  0803 06/10/25  0639 06/09/25  0746 06/09/25  0645   NA  --  131*  --   --   --  131*  --  128*  --  130*   < > 126*  126*   POTASSIUM  --   --   --   --   --  4.2  --   --   --  4.1  --  3.8   CHLORIDE  --   --   --   --   --  96*  --   --   --  94*  --  92*   CO2  --   --   --   --   --  25  --   --   --  23  --  22   BUN  --   --   --   --   --  10.6  --   --   --  10.5  --  11.7   CR  --    --   --   --   --  0.78  --   --   --  0.77  --  0.87   ANIONGAP  --   --   --   --   --  10  --   --   --  13  --  12   TETE  --   --   --   --   --  8.1*  --   --   --  7.8*  --  7.7*   *  --  129* 109*   < > 118*   < >  --    < > 106*   < > 114*    < > = values in this interval not displayed.     Most Recent 2 LFT's:  Recent Labs   Lab Test 06/09/25  0645 05/20/24  1427   AST 21 17   ALT 19 21   ALKPHOS 61 64   BILITOTAL 0.8 0.6     Most Recent INR's and Anticoagulation Dosing History:  Anticoagulation Dose History  More data exists         Latest Ref Rng & Units 5/16/2025 5/30/2025 6/8/2025 6/9/2025 6/10/2025 6/11/2025 6/12/2025   Recent Dosing and Labs   warfarin ANTICOAGULANT (COUMADIN/JANTOVEN) 1.5 mg TABS half-tab - - - - - 1.5 mg, $Given - -   warfarin ANTICOAGULANT (COUMADIN/JANTOVEN) 2 MG tablet - - - - 2 mg, $Given - - -   warfarin ANTICOAGULANT (COUMADIN/JANTOVEN) 2.5 MG tablet - - - - - - 2.5 mg, $Given -   warfarin ANTICOAGULANT (COUMADIN/JANTOVEN) 5 MG tablet - - - 5 mg, $Given       Per pt request - - - -   INR 0.85 - 1.15 2.5  2.7  2.73  2.99  2.97  2.46  2.15      Most Recent 3 Troponin's:  Recent Labs   Lab Test 11/20/20  1252   TROPI <0.015     Most Recent Cholesterol Panel:  Recent Labs   Lab Test 09/04/24  1502   CHOL 188   LDL 72   HDL 93   TRIG 116     Most Recent 6 Bacteria Isolates From Any Culture (See EPIC Reports for Culture Details):  Recent Labs   Lab Test 12/30/19  1546 10/26/18  1544 10/16/18  1547   CULT 10,000 to 50,000 colonies/mL  mixed urogenital thea  Susceptibility testing not routinely done   No growth No growth     Most Recent TSH, T4 and A1c Labs:  Recent Labs   Lab Test 06/08/25  1104 05/21/25  1359   TSH 16.58* 9.37*   T4 1.27 1.45   A1C  --  6.5*     Results for orders placed or performed during the hospital encounter of 06/08/25   CT Chest Pulmonary Embolism w Contrast    Narrative    EXAM: CT CHEST PULMONARY EMBOLISM W CONTRAST  LOCATION: [facility]  [facility]  DATE: 6/8/2025    INDICATION: Pleuritic chest pain, history of PE  COMPARISON: 4/30/2007  TECHNIQUE: CT chest pulmonary angiogram during arterial phase injection of IV contrast. Multiplanar reformats and MIP reconstructions were performed. Dose reduction techniques were used.   CONTRAST: 59mL Isovue 370    FINDINGS:  ANGIOGRAM CHEST: Pulmonary arteries are normal caliber and negative for pulmonary emboli. Thoracic aorta is negative for dissection. No CT evidence of right heart strain.    LUNGS AND PLEURA: Left lower lobe consolidation containing multiple foci of gas and air-fluid levels. Endobronchial opacities in the lingula with volume loss and atelectasis. Small left pneumothorax.    MEDIASTINUM/AXILLAE: Normal.    CORONARY ARTERY CALCIFICATION: Mild.    UPPER ABDOMEN: Normal.    MUSCULOSKELETAL: Degenerative changes in the spine. Partially imaged lumbar spine surgical hardware.      Impression    IMPRESSION:  1.  No pulmonary embolism.  2.  Left lower lobe consolidation containing multiple foci of gas and air-fluid levels, concerning for necrotizing pneumonia. Small left pneumothorax likely secondary to cavitation and rupture into the pleural space. Recommend follow-up CT in 4-6 weeks   to assess for resolution.    Shade Ledezma was contacted by me on 6/8/2025 11:20 AM CDT and verbalized understanding of the result.   XR Chest 2 Views    Narrative    EXAM: XR CHEST 2 VIEWS  LOCATION: [facility]  DATE: 6/9/2025    INDICATION: follow up PTX, PNA  COMPARISON: 6/8/2025      Impression    IMPRESSION: A small left apical pneumothorax measures 8 mm, stable to decreased compared to prior CT. Atelectasis in the left upper lobe along the left major fissure is stable. Minimal focal infiltrate in the left lower lobe posteriorly is also similar.   No new findings.   CT Head w/o Contrast    Narrative    EXAM: CT HEAD W/O CONTRAST  LOCATION: [facility]  HOSPITAL  DATE: 2025    INDICATION: atypical left parietal headache, hx  shunt and anticoagulated   assess for possible hemorrhage  COMPARISON: 2024  TECHNIQUE: Routine CT Head without IV contrast. Multiplanar reformats. Dose reduction techniques were used.    FINDINGS:  INTRACRANIAL CONTENTS: No intracranial hemorrhage, extraaxial collection, or mass effect.  No CT evidence of acute infarct. Mild presumed chronic small vessel ischemic changes. Stable right frontal shunt tube. Stable normal size ventricles.     VISUALIZED ORBITS/SINUSES/MASTOIDS: No intraorbital abnormality. No paranasal sinus mucosal disease. No middle ear or mastoid effusion.    BONES/SOFT TISSUES: No acute abnormality.      Impression    IMPRESSION:  1.  No acute intracranial process.   XR Video Swallow with SLP or OT    Narrative    EXAM: XR VIDEO SWALLOW WITH SLP OR OT  LOCATION: [facility]  DATE: 2025    INDICATION: Difficulty swallowing.  COMPARISON: None.    TECHNIQUE: Routine swallow study with speech pathology using multiple barium thicknesses.    RADIATION DOSE: Dose Area Product 90 microGy-m2      Impression    IMPRESSION:  Thin: Penetration without aspiration.     Slightly thick: Penetration without aspiration.      Mildly thick: Penetration without aspiration.      Puree: No penetration or aspiration. Residue in the vallecula.    Regular: No penetration or aspiration. Residue in the vallecula.     Echocardiogram Complete     Value    LVEF  55-60%    Narrative    [id number]  [id number]  [id number]^ОЛЕГ^MATHEUS^STELLA     [facility]  Echocardiography Laboratory  [address] [place] [address]     Name: NIC SALOMON  MRN: [id number]  : 1945  Study Date: 2025 09:44 AM  Age: 79 yrs  Gender: Female  Patient Location: [facility]  Reason For Study: LBBB  Ordering Physician: MATHEUS DENNEY  Performed By: BHARATI Marte     BSA: 1.7  m2  Height: 64 in  Weight: 148 lb  HR: 68  BP: 162/80 mmHg  ______________________________________________________________________________  Procedure  Echocardiogram with two-dimensional, color and spectral Doppler. Definity (NDC  #22261-037) given intravenously. Contrast Definity. Technically difficult  study.  ______________________________________________________________________________  Interpretation Summary     Left ventricular systolic function is normal.  The visual ejection fraction is 55-60%.  Septal motion is consistent with conduction abnormality.  Right ventricular systolic pressure is elevated, consistent with mild  pulmonary hypertension.  The study was technically difficult. Compared to the prior study dated 5/24,  there have been no changes.  ______________________________________________________________________________  Left Ventricle  The left ventricle is normal in size. There is normal left ventricular wall  thickness. Left ventricular systolic function is normal. The visual ejection  fraction is 55-60%. Left ventricular diastolic function is indeterminate. No  regional wall motion abnormalities noted. Septal motion is consistent with  conduction abnormality.     Right Ventricle  The right ventricle is normal size. The right ventricular systolic function is  normal.     Atria  Normal left atrial size. The right atrium is mildly dilated.     Mitral Valve  There is mild to moderate mitral annular calcification. There is mild (1+)  mitral regurgitation. The mean mitral valve gradient is 4mmHg. (69 bpm).     Tricuspid Valve  There is mild to moderate (1-2+) tricuspid regurgitation. The right  ventricular systolic pressure is approximated at 36.3 mmHg plus the right  atrial pressure. Right ventricular systolic pressure is elevated, consistent  with mild pulmonary hypertension. IVC diameter <2.1 cm collapsing >50% with  sniff suggests a normal RA pressure of 3 mmHg.     Aortic Valve  The aortic valve  is not well visualized. Aortic valve sclerosis. There is mild  (1+) aortic regurgitation. No aortic stenosis is present.     Pulmonic Valve  The pulmonic valve is not well visualized.     Vessels  The aortic root is normal size.     Pericardium  There is no pericardial effusion.     Rhythm  The rhythm was sinus with wide QRS.  ______________________________________________________________________________  MMode/2D Measurements & Calculations     IVSd: 0.70 cm  LVIDd: 3.9 cm  LVIDs: 2.5 cm  LVPWd: 0.82 cm  FS: 35.0 %  LV mass(C)d: 82.7 grams  LV mass(C)dI: 48.0 grams/m2  Ao root diam: 3.3 cm  asc Aorta Diam: 3.1 cm  LVOT diam: 2.1 cm  LVOT area: 3.5 cm2  Ao root diam index Ht(cm/m): 2.0  Ao root diam index BSA (cm/m2): 1.9  Asc Ao diam index BSA (cm/m2): 1.8  Asc Ao diam index Ht(cm/m): 1.9  LA Volume (BP): 51.3 ml     LA Volume Index (BP): 29.8 ml/m2  RWT: 0.43  TAPSE: 2.1 cm     Doppler Measurements & Calculations  MV A max omid: 127.2 cm/sec  MV max P.7 mmHg  MV mean P.2 mmHg  MV V2 VTI: 41.4 cm  MV dec slope: 503.1 cm/sec2  MV dec time: 0.27 sec  AI P1/2t: 514.7 msec  TR max omid: 314.9 cm/sec  TR max P.3 mmHg  RV S Omid: 9.9 cm/sec     ______________________________________________________________________________  Report approved by: Jose Zuluaga MD on 2025 10:56 AM           *Note: Due to a large number of results and/or encounters for the requested time period, some results have not been displayed. A complete set of results can be found in Results Review.     Most Recent 3 CBC's:  Recent Labs   Lab Test 25  0646 25  1300 25  0659 25  0645 25  0858   WBC  --   --  7.0 6.5 7.6   HGB 9.1* 9.0* 9.2* 10.1* 11.1*   MCV 93 94 91 90 91   PLT  --   --  210 173 205     Most Recent 3 BMP's:  Recent Labs   Lab Test 25  0820 25  0646 25  2135 25  1757 25  0731 25  0659 06/10/25  1732 06/10/25  1704 06/10/25  0803 06/10/25  0639 25  0746  06/09/25  0645   NA  --  131*  --   --   --  131*  --  128*  --  130*   < > 126*  126*   POTASSIUM  --   --   --   --   --  4.2  --   --   --  4.1  --  3.8   CHLORIDE  --   --   --   --   --  96*  --   --   --  94*  --  92*   CO2  --   --   --   --   --  25  --   --   --  23  --  22   BUN  --   --   --   --   --  10.6  --   --   --  10.5  --  11.7   CR  --   --   --   --   --  0.78  --   --   --  0.77  --  0.87   ANIONGAP  --   --   --   --   --  10  --   --   --  13  --  12   TETE  --   --   --   --   --  8.1*  --   --   --  7.8*  --  7.7*   *  --  129* 109*   < > 118*   < >  --    < > 106*   < > 114*    < > = values in this interval not displayed.     Most Recent 2 LFT's:  Recent Labs   Lab Test 06/09/25  0645 05/20/24  1427   AST 21 17   ALT 19 21   ALKPHOS 61 64   BILITOTAL 0.8 0.6

## 2025-06-13 ENCOUNTER — RESULTS FOLLOW-UP (OUTPATIENT)
Dept: FAMILY MEDICINE | Facility: CLINIC | Age: 80
End: 2025-06-13

## 2025-06-13 LAB
BACTERIA SPEC CULT: NO GROWTH
BACTERIA SPEC CULT: NO GROWTH

## 2025-06-16 ENCOUNTER — ANTICOAGULATION THERAPY VISIT (OUTPATIENT)
Dept: ANTICOAGULATION | Facility: CLINIC | Age: 80
End: 2025-06-16
Payer: MEDICARE

## 2025-06-16 ENCOUNTER — TELEPHONE (OUTPATIENT)
Dept: FAMILY MEDICINE | Facility: CLINIC | Age: 80
End: 2025-06-16
Payer: MEDICARE

## 2025-06-16 ENCOUNTER — RESULTS FOLLOW-UP (OUTPATIENT)
Dept: ANTICOAGULATION | Facility: CLINIC | Age: 80
End: 2025-06-16
Payer: MEDICARE

## 2025-06-16 DIAGNOSIS — Z86.718 PERSONAL HISTORY OF DVT (DEEP VEIN THROMBOSIS): Primary | ICD-10-CM

## 2025-06-16 LAB — INR HOME MONITORING: 1.5 (ref 2–3)

## 2025-06-16 NOTE — PROGRESS NOTES
ANTICOAGULATION MANAGEMENT     Manasa French 79 year old female is on warfarin with subtherapeutic INR result. (Goal INR 2.0-3.0)    Recent labs: (last 7 days)     06/16/25  0000   INR 1.5*       ASSESSMENT     Source(s): Chart Review and Patient/Caregiver Call     Warfarin doses taken: Warfarin taken as instructed  Diet: low appetite.  Medication/supplement changes: Augmentin stops on 7/10/25  New illness, injury, or hospitalization: Yes: recently admitted on 6/8-6/12.  Signs or symptoms of bleeding or clotting: No  Previous result: Therapeutic last 2(+) visits  Additional findings: She continues to a little better but still has very low energy and tolerance for activity.       PLAN     Recommended plan for temporary change(s) affecting INR     Dosing Instructions: booster dose then continue your current warfarin dose with next INR in 5 days       Summary  As of 6/16/2025      Full warfarin instructions:  6/16: 10 mg; Otherwise 5 mg every day   Next INR check:  6/20/2025               Telephone call with Rhonda who verbalizes understanding and agrees to plan and who agrees to plan and repeated back plan correctly    Patient to recheck with home meter    Education provided: Please call back if any changes to your diet, medications or how you've been taking warfarin    Plan made per [facility] anticoagulation protocol    Barrett Morales RN  6/16/2025  Anticoagulation Clinic  [facility] for routing messages: angela ANTICOAG HOME MONITORING  [facility] patient phone line: [phone]        _______________________________________________________________________     Anticoagulation Episode Summary       Current INR goal:  2.0-3.0   TTR:  90.4% (11.9 mo)   Target end date:  Indefinite   Send INR reminders to:  ANTICOAG HOME MONITORING    Indications    Personal history of DVT (deep vein thrombosis) [Z86.532]             Comments:  [employer] [facility] meter- Managed by exception             Anticoagulation Care Providers       Provider Role  Specialty Phone number    Rema Braxton DO Sonia Referring Family Medicine [phone]    Jessica Anthony MD Referring Internal Medicine [phone]    Jana Giles MD Referring Internal Medicine [phone]    Christopher Smith MD Responsible Hematology [phone]

## 2025-06-16 NOTE — TELEPHONE ENCOUNTER
MTM referral from: Transitions of Care (recent hospital discharge, TCU discharge, or ED visit)    MT referral outreach attempt #1 on June 16, 2025 at 10:34 AM      Outcome: Patient is not interested at this time because has no concerns    Use  use VBC, SHAUN discharged 6/12 for the carrier/Plan on the flowsheet          Kaila Leone MT

## 2025-06-18 ENCOUNTER — RESULTS FOLLOW-UP (OUTPATIENT)
Dept: ANTICOAGULATION | Facility: CLINIC | Age: 80
End: 2025-06-18

## 2025-06-18 ENCOUNTER — ANTICOAGULATION THERAPY VISIT (OUTPATIENT)
Dept: ANTICOAGULATION | Facility: CLINIC | Age: 80
End: 2025-06-18

## 2025-06-18 ENCOUNTER — OFFICE VISIT (OUTPATIENT)
Dept: FAMILY MEDICINE | Facility: CLINIC | Age: 80
End: 2025-06-18
Payer: MEDICARE

## 2025-06-18 VITALS
TEMPERATURE: 96.9 F | RESPIRATION RATE: 18 BRPM | HEIGHT: 64 IN | DIASTOLIC BLOOD PRESSURE: 67 MMHG | WEIGHT: 147 LBS | SYSTOLIC BLOOD PRESSURE: 167 MMHG | BODY MASS INDEX: 25.1 KG/M2 | HEART RATE: 66 BPM | OXYGEN SATURATION: 98 %

## 2025-06-18 DIAGNOSIS — R13.10 DYSPHAGIA, UNSPECIFIED TYPE: ICD-10-CM

## 2025-06-18 DIAGNOSIS — I82.A11 ACUTE DEEP VEIN THROMBOSIS (DVT) OF AXILLARY VEIN OF RIGHT UPPER EXTREMITY (H): ICD-10-CM

## 2025-06-18 DIAGNOSIS — J85.0 NECROTIZING PNEUMONIA (H): ICD-10-CM

## 2025-06-18 DIAGNOSIS — I10 BENIGN ESSENTIAL HYPERTENSION: ICD-10-CM

## 2025-06-18 DIAGNOSIS — E87.1 HYPONATREMIA: Primary | ICD-10-CM

## 2025-06-18 DIAGNOSIS — Z86.718 PERSONAL HISTORY OF DVT (DEEP VEIN THROMBOSIS): Primary | ICD-10-CM

## 2025-06-18 LAB
ANION GAP SERPL CALCULATED.3IONS-SCNC: 8 MMOL/L (ref 3–14)
BUN SERPL-MCNC: 15 MG/DL (ref 7–30)
CALCIUM SERPL-MCNC: 9.7 MG/DL (ref 8.5–10.1)
CHLORIDE BLD-SCNC: 99 MMOL/L (ref 94–109)
CO2 SERPL-SCNC: 28 MMOL/L (ref 20–32)
CREAT SERPL-MCNC: 1.1 MG/DL (ref 0.52–1.04)
EGFRCR SERPLBLD CKD-EPI 2021: 51 ML/MIN/1.73M2
GLUCOSE BLD-MCNC: 113 MG/DL (ref 70–99)
INR BLD: 2.1 (ref 0.9–1.1)
POTASSIUM BLD-SCNC: 4.4 MMOL/L (ref 3.4–5.3)
SODIUM SERPL-SCNC: 135 MMOL/L (ref 135–145)

## 2025-06-18 PROCEDURE — 80048 BASIC METABOLIC PNL TOTAL CA: CPT | Performed by: PHYSICIAN ASSISTANT

## 2025-06-18 PROCEDURE — 85610 PROTHROMBIN TIME: CPT | Performed by: PHYSICIAN ASSISTANT

## 2025-06-18 PROCEDURE — 36415 COLL VENOUS BLD VENIPUNCTURE: CPT | Performed by: PHYSICIAN ASSISTANT

## 2025-06-18 RX ORDER — AMLODIPINE BESYLATE 2.5 MG/1
2.5 TABLET ORAL DAILY
Qty: 90 TABLET | Refills: 0 | Status: SHIPPED | OUTPATIENT
Start: 2025-06-18

## 2025-06-18 ASSESSMENT — PAIN SCALES - GENERAL: PAINLEVEL_OUTOF10: NO PAIN (0)

## 2025-06-18 NOTE — PROGRESS NOTES
ANTICOAGULATION MANAGEMENT     Manasa French 79 year old female is on warfarin with therapeutic INR result. (Goal INR 2.0-3.0)    Recent labs: (last 7 days)     06/18/25  1302   INR 2.1*       ASSESSMENT     Source(s): Chart Review and Patient/Caregiver Call     Warfarin doses taken: Warfarin taken as instructed  Diet: No new diet changes identified  Medication/supplement changes: Augmentin until 7/10   New illness, injury, or hospitalization: recently admitted on 6/8-6/12 - necrotizing pneumonia.   Signs or symptoms of bleeding or clotting: No  Previous result: Subtherapeutic  Additional findings: None    Consult with Jesi, due to rapid rise. However booster was advised on Monday and INR was checked two days sooner than advised.          PLAN     Recommended plan for temporary change(s) affecting INR     Dosing Instructions: Continue your current warfarin dose with next INR in 2 days       Summary  As of 6/18/2025      Full warfarin instructions:  5 mg every day   Next INR check:  6/20/2025               Telephone call with Rhonda who verbalizes understanding and agrees to plan    Patient to recheck with home meter    Education provided: Please call back if any changes to your diet, medications or how you've been taking warfarin  Goal range and lab monitoring: goal range and significance of current result, Importance of therapeutic range, and Importance of following up at instructed interval  Interaction IS anticipated between warfarin and augmentin   Symptom monitoring: monitoring for bleeding signs and symptoms    Plan made with [facility] Pharmacist Jesi Sanabria, RN  6/18/2025  Anticoagulation Clinic  [employer] for routing messages: angela ANTICOAG HOME MONITORING  [facility] patient phone line: [phone]        _______________________________________________________________________     Anticoagulation Episode Summary       Current INR goal:  2.0-3.0   TTR:  89.8% (11.9 mo)   Target end date:   Indefinite   Send INR reminders to:  ANTICOAG HOME MONITORING    Indications    Personal history of DVT (deep vein thrombosis) [Z86.226]             Comments:  [employer] [facility] meter- Managed by exception             Anticoagulation Care Providers       Provider Role Specialty Phone number    Rema Braxton DO Referring Family Medicine [phone]    Jessica Anthony MD Referring Internal Medicine [phone]    Jana Giles MD Referring Internal Medicine [phone]    Christopher Smith MD Responsible Hematology [phone]

## 2025-06-18 NOTE — ASSESSMENT & PLAN NOTE
Above goal with discharge of hydrochlorothiazide due to significant hyponatremia.   Reviewed need to avoid diuretic, start amlodipine as prescribed.   Return one week to see me for recheck, resume home BP checks.   Orders:    amLODIPine (NORVASC) 2.5 MG tablet; Take 1 tablet (2.5 mg) by mouth daily.

## 2025-06-18 NOTE — ASSESSMENT & PLAN NOTE
Post hospitalization, doing well from respiratory stand point, completing oral antibiotics with adequate toleration.   Ongoing incentive spirometry, regular movement   Reviewed need for repeat CT chest 4-6 weeks post hospitalization to assure resolution. States she has follow up with ID, will recheck on this during next visit. Per discharge note, seeing Dr. Burris within 4 weeks.

## 2025-06-18 NOTE — NURSING NOTE
"BP:  BP (!) 163/69   Pulse 67   Temp 96.9  F (36.1  C) (Temporal)   Resp 18   Ht 1.626 m (5' 4\")   Wt 66.7 kg (147 lb)   LMP  (LMP Unknown)   SpO2 98%   BMI 25.23 kg/m       67  Disposition: provider notified while patient in the clinic   "

## 2025-06-18 NOTE — PROGRESS NOTES
Assessment & Plan     Assessment & Plan  Hyponatremia    Marked hyponatremia during recent hospitalization, low of 120 increase to 131 upon discharge, prolonged QTc.   Recommendation for normal diet, does not need to adhere to low sodium intake for BP at this point.   Pending recheck.   Orders:    Basic metabolic panel  (Ca, Cl, CO2, Creat, Gluc, K, Na, BUN); Future    Acute deep vein thrombosis (DVT) of axillary vein of right upper extremity (H)  Well maintained on anticoagulation, repeat INR today   Orders:    INR point of care (finger stick)    Benign essential hypertension  Above goal with discharge of hydrochlorothiazide due to significant hyponatremia.   Reviewed need to avoid diuretic, start amlodipine as prescribed.   Return one week to see me for recheck, resume home BP checks.   Orders:    amLODIPine (NORVASC) 2.5 MG tablet; Take 1 tablet (2.5 mg) by mouth daily.    Necrotizing pneumonia (H)  Post hospitalization, doing well from respiratory stand point, completing oral antibiotics with adequate toleration.   Ongoing incentive spirometry, regular movement   Reviewed need for repeat CT chest 4-6 weeks post hospitalization to assure resolution. States she has follow up with ID, will recheck on this during next visit. Per discharge note, seeing Dr. Burris within 4 weeks.        Dysphagia, unspecified type  Stable from patient's perspective, follow up w/ SLP as scheduled.          MED REC REQUIRED  Post Medication Reconciliation Status:       Follow-up   Return if symptoms worsen or fail to improve, for follow-up per plan.      Genaro Ellis is a 79 year old, presenting for the following health issues:  Hospital F/U        6/18/2025    12:40 PM   Additional Questions   Roomed by Sabrina KELLER    Patient post hospitalization for LLL necrotizing pneumonia, L PTX. Initially presenting with dyspnea, pleuritic chest pain, afebrile. Sputum culture with mixed thea. Completed Unasyn, transitioned to augmentin,  doxycycline upon discharge.  Retrospectively reports progressively worsening breathing over preceding 1 month.  hospital course otherwise significant for marked hyponatremia, hypomagnesemia felt to be related to overall illness, hydrochlorothiazide use and poor oral intake.  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  Completed treatment with Unasyn, discharged on Augmentin x 4 to 6 weeks, doxycycline x 5 days.     Concern for BP now off of hydrochlorothiazide, historically avoids salt.   States breathing is feeling improved, no ongoing pleuritic pain.   She is continuing to walk short to moderate distances 4 times daily per discharge instructions.  Finding she is tolerating this well.  However, notes overall fatigue.  Denies frankly worsening weakness, new fever/chills, palpitations, chest pain.  Cough continues to be mild, frequent and moist in character.     Functioning well at home with help of .        6/13/2025   Post Discharge Outreach   How are you doing now that you are home? Doing ok   How are your symptoms? (Red Flag symptoms escalate to triage hotline per guidelines) Improved   Does the patient have their discharge instructions?  Yes   Does the patient have questions regarding their discharge instructions?  No   Were you started on any new medications or were there changes to any of your previous medications?  Yes   Does the patient have all of their medications? Yes   Do you have questions regarding any of your medications?  No   Do you have all of your needed medical supplies or equipment (DME)?  (i.e. oxygen tank, CPAP, cane, etc.) Yes       Hospital Follow-up Visit:      Hospital/Nursing Home/IP Rehab Facility: [facility]  Most Recent Admission Date: 6/8/2025   Most Recent Admission Diagnosis: Necrotizing pneumonia (H) - J85.0  Pneumothorax on left - J93.9     Most Recent Discharge Date: 6/12/2025   Most Recent Discharge Diagnosis:  "Necrotizing pneumonia (H) - J85.0  Pneumothorax on left - J93.9  Hyponatremia - E87.1  Wheezing - R06.2  Dysphagia, unspecified type - R13.10  Benign essential hypertension - I10   Do you have any other stressors you would like to discuss with your provider? No    Problems taking medications regularly:  None  Medication changes since discharge: None  Problems adhering to non-medication therapy:  None    Summary of hospitalization:  [facility] discharge summary reviewed  Diagnostic Tests/Treatments reviewed.  Follow up needed: follow up with pulm, ID, repeat CT chest per plan   Other Healthcare Providers Involved in Patient s Care:         SLP  Update since discharge: improved.       Plan of care communicated with patient and family           Review of Systems  CONSTITUTIONAL: NEGATIVE for fever, chills, change in weight  ENT/MOUTH: NEGATIVE for ear, mouth and throat problems  RESP: NEGATIVE for significant cough or SOB  CV: NEGATIVE for chest pain, palpitations or peripheral edema  ROS otherwise negative      Objective    BP (!) 163/69   Pulse 67   Temp 96.9  F (36.1  C) (Temporal)   Resp 18   Ht 1.626 m (5' 4\")   Wt 66.7 kg (147 lb)   LMP  (LMP Unknown)   SpO2 98%   BMI 25.23 kg/m    Body mass index is 25.23 kg/m .  Physical Exam   GENERAL: alert and no distress  EYES: Eyes grossly normal to inspection, PERRL and conjunctivae and sclerae normal  HENT: ear canals and TM's normal, nose and mouth without ulcers or lesions  NECK: no adenopathy, no asymmetry, masses, or scars  RESP: lungs clear to auscultation - no rales, rhonchi or wheezes  CV: regular rate and rhythm, normal S1 S2, no S3 or S4, no murmur, click or rub, no peripheral edema  ABDOMEN: soft, nontender, no hepatosplenomegaly, no masses and bowel sounds normal  MS: no gross musculoskeletal defects noted, no edema  SKIN: no suspicious lesions or rashes  NEURO: Normal strength and tone, mentation intact and speech normal  PSYCH: " mentation appears normal, affect normal/bright    Results for orders placed or performed in visit on 06/18/25 (from the past 24 hours)   INR point of care (finger stick)   Result Value Ref Range    INR 2.1 (H) 0.9 - 1.1    Narrative    This test is intended for monitoring Coumadin therapy. Results are not accurate in patients with prolonged INR due to factor deficiency.   Basic metabolic panel  (Ca, Cl, CO2, Creat, Gluc, K, Na, BUN)   Result Value Ref Range    Sodium 135 135 - 145 mmol/L    Potassium 4.4 3.4 - 5.3 mmol/L    Chloride 99 94 - 109 mmol/L    Carbon Dioxide (CO2) 28 20 - 32 mmol/L    Anion Gap 8 3 - 14 mmol/L    Urea Nitrogen 15 7 - 30 mg/dL    Creatinine 1.10 (H) 0.52 - 1.04 mg/dL    GFR Estimate 51 (L) >60 mL/min/1.73m2    Calcium 9.7 8.5 - 10.1 mg/dL    Glucose 113 (H) 70 - 99 mg/dL     *Note: Due to a large number of results and/or encounters for the requested time period, some results have not been displayed. A complete set of results can be found in Results Review.           Signed Electronically by: Niya Min PA-C

## 2025-06-18 NOTE — PATIENT INSTRUCTIONS
Rhonda, wonderful to meet you today.     Please start taking the amlodipine 2.5 mg daily for better BP control. You can take this in the morning.     See me again in follow up 1 week. Please also start rechecking your blood pressures at home.     Check double check on your infectious disease follow up.     Keep up the walking and rest often!    Niya Joel PA-C  Same Day Provider  [facility]

## 2025-06-18 NOTE — ASSESSMENT & PLAN NOTE
Well maintained on anticoagulation, repeat INR today   Orders:    INR point of care (finger stick)

## 2025-06-19 ENCOUNTER — VIRTUAL VISIT (OUTPATIENT)
Dept: ENDOCRINOLOGY | Facility: CLINIC | Age: 80
End: 2025-06-19
Payer: MEDICARE

## 2025-06-19 ENCOUNTER — OFFICE VISIT (OUTPATIENT)
Dept: OPHTHALMOLOGY | Facility: CLINIC | Age: 80
End: 2025-06-19
Payer: MEDICARE

## 2025-06-19 ENCOUNTER — RESULTS FOLLOW-UP (OUTPATIENT)
Dept: FAMILY MEDICINE | Facility: CLINIC | Age: 80
End: 2025-06-19

## 2025-06-19 DIAGNOSIS — M81.0 OSTEOPOROSIS, UNSPECIFIED OSTEOPOROSIS TYPE, UNSPECIFIED PATHOLOGICAL FRACTURE PRESENCE: ICD-10-CM

## 2025-06-19 DIAGNOSIS — Z87.81 HISTORY OF VERTEBRAL FRACTURE: ICD-10-CM

## 2025-06-19 DIAGNOSIS — M80.00XD AGE-RELATED OSTEOPOROSIS WITH CURRENT PATHOLOGICAL FRACTURE WITH ROUTINE HEALING, SUBSEQUENT ENCOUNTER: Primary | ICD-10-CM

## 2025-06-19 DIAGNOSIS — R79.89 ELEVATED SERUM CREATININE: Primary | ICD-10-CM

## 2025-06-19 DIAGNOSIS — H35.3132 INTERMEDIATE STAGE NONEXUDATIVE AGE-RELATED MACULAR DEGENERATION OF BOTH EYES: ICD-10-CM

## 2025-06-19 PROCEDURE — 92134 CPTRZ OPH DX IMG PST SGM RTA: CPT

## 2025-06-19 ASSESSMENT — VISUAL ACUITY
OS_CC: 20/30
METHOD: SNELLEN - LINEAR
OD_CC+: -2
CORRECTION_TYPE: GLASSES
OS_CC+: -2
OD_CC: 20/30

## 2025-06-19 ASSESSMENT — REFRACTION_WEARINGRX
OD_CYLINDER: +1.50
OD_AXIS: 001
OS_CYLINDER: +0.75
OD_SPHERE: -1.25
OS_SPHERE: -2.00
OS_AXIS: 005

## 2025-06-19 ASSESSMENT — SLIT LAMP EXAM - LIDS
COMMENTS: NORMAL
COMMENTS: NORMAL

## 2025-06-19 ASSESSMENT — TONOMETRY
OS_IOP_MMHG: 19
IOP_METHOD: TONOPEN
OD_IOP_MMHG: 21

## 2025-06-19 ASSESSMENT — EXTERNAL EXAM - RIGHT EYE: OD_EXAM: NORMAL

## 2025-06-19 ASSESSMENT — CUP TO DISC RATIO
OD_RATIO: 0.85
OS_RATIO: 0.55

## 2025-06-19 ASSESSMENT — EXTERNAL EXAM - LEFT EYE: OS_EXAM: NORMAL

## 2025-06-19 NOTE — NURSING NOTE
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 degeneration both eyes  DM type II              Comments    Pt states no change in VA since last visit  No flashes or floaters  No eye pain or tearing   Using:   Latanaprost QHS each eye   Timolol QAM each eye   PFAT PRN throughout the day   Nighttime Ointment qPM each eye   LBS: does not check   Last A1C: 6.5  Lab Results       Component                Value               Date                       A1C                      6.5                 05/21/2025                 A1C                      6.3                 09/04/2024                 A1C                      6.6                 04/29/2024                 A1C                      6.5                 11/27/2023                 A1C                      6.6                 05/15/2023                 A1C                      5.9                 07/10/2020                 A1C                      6.0                 10/31/2018                Geraldine Leon COT 2:06 PM June 19, 2025

## 2025-06-19 NOTE — PROGRESS NOTES
CC: Age related macular degeneration     HPI: One year follow up for AMD. Since last visit has seen Dr. Leon for POAG and was found to be stable. Also saw Dr. Ardon for likely chiari malformation related esotropia and hypertropia, found to be stable. Patient feels vision is stable and denies new flashes or floaters.     PMHx:   Arnold Chiari malformation s/p  shunt  DM type II  HTN  Hx DVT (right upper arm) on Coumadin  Post ablative Hypothyroidism   Vit B 12 deficiency     MR BRAIN W/O CONTRAST 10/5/2015 8:32 AM  Impression:  1. Stable position of the right frontal approach ventricle peritoneal  shunt catheter. No ventriculomegaly.  2. Slight progression of nonspecific white matter T2 hyperintensities  likely related to chronic small vessel disease.     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  - Taking AREDS vitamins BID    General Recommendations:  For the present level of age-related macular degeneration, we recommend a good healthy diet, green leafy vegetables, fresh fruit, fish, eggs (occasionally), nuts, and avoidance of processed or high-fat foods.  Avoidance of smoking or exposure to second hand smoke is advised.  Wearing sunglasses or a hat for protection of the eyes from sunlight may be beneficial.  Regular physical activities and/or exercise is helpful (based upon physician advised levels of exercise).      Self-testing:  We recommend testing your vision monocularly (one eye at time) at least one time per week (i.e. When reading the Sunday paper).  Simply  cover each eye, and test the vision one eye at a time, while reading or looking at an [employer] grid.  Please contact the office if a new dark spot,increasing distortion, or sudden changes occur in either eye as further evaluation may be warranted    The AREDS-2 (Age-Related Eye Disease Study #2) supplement contains vitamin C, vitamin E, Zinc, Copper, lutein and zeaxanthin. A multivitamin (i.e. Centrum) may also be used along with AREDS-2.  Extra vitamin E, beyond that present in this amount) is strongly discouraged.  Also, the use of fish oil supplements (tablets) have been shown to be ineffective (not helpful) at slowing progression of age-related macular degeneration.    # Hx Posterior vitreous detachment OU     # POAG both eyes    Followed by Dr Bryan    #  Esotropia, Hypertropia  likely Arnold Chiari malformation associated.   S/p strabismus surgery by Dr Ardon 12/11/23   1. Right lateral rectus resection 5.0 mm   2. Left lateral rectus resection 5.0 mm   3. Left inferior rectus resection 2.0 mm   Patient is very happy, she is doing great, denies any double vision  Follows with Dr Antwan Ardon - seen 5/2024, stable      Follow up yearly OCT macula both eyes     Arley Patino MD  PGY-3 Ophthalmology Resident  [facility]    Fredy Alvarez MD      Ophthalmology Department  [facility]     Attending Physician Attestation:  Complete documentation of historical and exam elements from today's encounter can be found in the full encounter summary report (not reduplicated in this progress note).  I personally obtained the chief complaint(s) and history of present illness.  I confirmed and edited as necessary the review of systems, past medical/surgical history, family history, social history, and examination findings as documented by others; and I examined the patient myself.  I personally reviewed the relevant tests, images, and reports as documented above.  I  formulated and edited as necessary the assessment and plan and discussed the findings and management plan with the patient and family. I personally reviewed the ophthalmic test(s) associated with this encounter, agree with the interpretation(s) as documented by the resident/fellow, and have edited the corresponding report(s) as necessary. - Fredy Alvarez MD

## 2025-06-19 NOTE — PATIENT INSTRUCTIONS
General Recommendations:  For the present level of age-related macular degeneration, we recommend a good healthy diet, green leafy vegetables, fresh fruit, fish, eggs (occasionally), nuts, and avoidance of processed or high-fat foods.  Avoidance of smoking or exposure to second hand smoke is advised.  Wearing sunglasses or a hat for protection of the eyes from sunlight may be beneficial.  Regular physical activities and/or exercise is helpful (based upon physician advised levels of exercise).      Self-testing:  We recommend testing your vision monocularly (one eye at time) at least one time per week (i.e. When reading the Sunday paper).  Simply cover each eye, and test the vision one eye at a time, while reading or looking at an [employer]msler grid.  Please contact the office if a new dark spot,increasing distortion, or sudden changes occur in either eye as further evaluation may be warranted    The AREDS-2 (Age-Related Eye Disease Study #2) supplement contains vitamin C, vitamin E, Zinc, Copper, lutein and zeaxanthin. A multivitamin (i.e. Centrum) may also be used along with AREDS-2.  Extra vitamin E, beyond that present in this amount) is strongly discouraged.  Also, the use of fish oil supplements (tablets) have been shown to be ineffective (not helpful) at slowing progression of age-related macular degeneration.

## 2025-06-19 NOTE — PROGRESS NOTES
Virtual Visit Details    Type of service:  Video Visit   Video Start Time:  8:03 AM  Video End Time:  8:20 AM    Originating Location (pt. Location): Home  Distant Location (provider location):  Off-site  Platform used for Video Visit: Oralia        Recent issues:  Osteoporosis follow-up evaluation  She had right hip replacement 5/30/23, then recent right hip dislocation (then relocation)  Has changed from alendronate to Prolia in 2023...  Recent hospitalization 6/8- 6/12/25 at [facility] with LLL necrotizing pneumonia   Reports not feeling well, cough, fatigue, some unsteadiness but no ALLEN           History of chronic back pain  Medical evaluation, then surgical evaluation  2006. Spine surgery with 2 vertebral disc replacements at [facility] with Dr. Welsh     2016. Back pain while pulling hose backwards, fell and had vertebral fracture lumbar spine, back brace treatment  Medical evaluation  MRI lumbar spine:                Mild compression fracture superior endplate L4 vertebrae     ~2/2018. Another fall 2018 falling from treadmill, back pain and vertebrae fracture  Medical evaluation  2/28/18 MRI lumbar spine:              T12 and L2 superior endplate compression fractures, new since 8/2016              Previous compression fracture along the superior endplate of L3 has mature                       High-grade central canal stenosis persists at both L2-3 and L3-4              Previous combined interbody and instrumented bilateral posterolateral fusion from L4/S1.                            Solid bony bridging across the operated levels, appeared unchanged  Patient reports she wore back brace x 3 mo     7/2/18 Medical evaluation with Dr. MOON Box/ Endocrinology  Review of health history, osteoporotic vertebral compression fractures, lab testing  Previous DEXA scans include 5/17/12, 9/23/14,  7/9/18 DEXA:               Left Femoral Neck            T-score:  -1.8               Right Femoral Neck           T-score:  -1.6               Forearm (radius 33%)       T-score:  -0.4     Subsequent surgical evaluation with Dr. Christensen  9/11/18, Back surgery with ORIF and pin placement L1 to S1  9/2018. Plan to start alendronate, began alendronate 70 mg weekly dose (Sundays)  Health history includes:  Bone fractures:                       Left leg spiral fracture '14                                                  Vertebral fracture s/p fall from horse '16, fall injury '18                                    Vitamin D deficiency:              None                  Kidney stones:                        None  Thyroid disease:                     Graves' disease hyperthyroidism                                                  Radioiodine ablation 2003, then postablative hypothyroidism                                                  Treatment with levothyroxine  Fam Hx Osteoporosis:            No  Osteoporosis med use:           Alendronate  Previous [facility] labs include:   Lab Test 07/26/19  1348   07/02/18  1610   TETE 8.4*   < > 9.5   PTHI  --   --  45   VITDT  --   --  43         10/30/19. Initial osteoporosis evaluation with me at [place]  Reviewed health history and osteoporosis management  Decided to continue alendronate 70 mg weekly dose plan  2/16/21 DEXA:   Left fem neck:  T-score: -1.0   Right fem neck: T-score: -1.3   Left Radius UD  T-score: -1.8  5/30/23 Right SANTHOSH surgery at [facility] with Dr. Vasu Mixon  7/17/23 DEXA:  Left fem neck:  T-score: -0.7.  Left Radius 33%:  T-score: -0.2.   2023. Stopped alendronate, changed to Prolia plan  Recent [facility] labs include:  Lab Results   Component Value Date     06/18/2025    POTASSIUM 4.4 06/18/2025    CHLORIDE 99 06/18/2025    CO2 28 06/18/2025    ANIONGAP 8 06/18/2025     (H) 06/18/2025    BUN 15 06/18/2025    CR 1.10 (H) 06/18/2025    GFRESTIMATED 51 (L) 06/18/2025    GFRESTBLACK 79 11/20/2020    TETE 9.7 06/18/2025    TSH 16.58 (H) 06/08/2025    VITDT 63 (H)  10/21/2024    PTHI 45 07/02/2018   Supplements:   Calcium-D 1-tablet BID   Vit D 1000U 1-capsule M/W/F  Prolia doses:  10/23/23, 4/23/24, 10/23/24, 4/23/25       Lives in [place]  Sees Dr. Jana Giles/[facility] for general medicine evaluations.       PMH/PSH:  Past Medical History:   Diagnosis Date    Benign essential hypertension     Dislocation of right hip (H) 05/2024    History of Graves' disease     s/p BARAHONA    History of hydrocephalus     s/p  shunt placement in 1987    Macular degeneration of both eyes     Osteoporosis     Personal history of DVT (deep vein thrombosis) 2019    RUE; on chronic AC    Postablative hypothyroidism     Primary open angle glaucoma (POAG) of both eyes     right eye moderate, left eye mild    Pseudophakia     Sensorineural hearing loss, bilateral      Past Surgical History:   Procedure Laterality Date    ARTHROPLASTY HIP Right 5/30/2023    Procedure: Right Total Hip Arthroplasty;  Surgeon: Vasu Mixon MD;  Location:  OR    ARTHROSCOPY SHOULDER Left     CATARACT IOL, RT/LT Bilateral     DECOMPRESSION, FUSION LUMBAR POSTERIOR ONE LEVEL, COMBINED      L5-S1    EXCISE GANGLION WRIST Right     HERNIA REPAIR, UMBILICAL      LUMPECTOMY BREAST Left     benign    RECESSION RESECTION (REPAIR STRABISMUS) BILATERAL Bilateral 12/11/2023    Procedure: Bilateral eye muscle correction.;  Surgeon: Antwan Ardon MD;  [place] [facility] [place]       Family Hx:  Family History   Problem Relation Age of Onset    Colon Cancer Mother     Hypertension Mother     Breast Cancer Mother     Glaucoma Mother     Leukemia Father     Diabetes Type 2  Father     Hypertension Sister     Macular Degeneration Sister     Diabetes Type 2  Brother     Breast Cancer Maternal Aunt         multiple aunts    Myocardial Infarction No family hx of     Cerebrovascular Disease No family hx of     Coronary Artery Disease Early Onset No family hx of     Ovarian Cancer No family hx of          Social  Hx:  Social History     Socioeconomic History    Marital status:      Spouse name: Not on file    Number of children: Not on file    Years of education: Not on file    Highest education level: Not on file   Occupational History    Occupation: Retired - wedding    Tobacco Use    Smoking status: Never    Smokeless tobacco: Never   Vaping Use    Vaping status: Never Used   Substance and Sexual Activity    Alcohol use: Yes     Comment: rare    Drug use: No    Sexual activity: Not Currently     Partners: Male   Other Topics Concern    Parent/sibling w/ CABG, MI or angioplasty before 65F 55M? No   Social History Narrative    .    Two sons.    3 grandchildren.    Swims 3x/week x 1 hour; walks 4x/week x 1 hour.      Social Drivers of Health     Financial Resource Strain: Low Risk  (9/4/2024)    Financial Resource Strain     Within the past 12 months, have you or your family members you live with been unable to get utilities (heat, electricity) when it was really needed?: No   Food Insecurity: Low Risk  (9/4/2024)    Food Insecurity     Within the past 12 months, did you worry that your food would run out before you got money to buy more?: No     Within the past 12 months, did the food you bought just not last and you didn t have money to get more?: No   Transportation Needs: Low Risk  (9/4/2024)    Transportation Needs     Within the past 12 months, has lack of transportation kept you from medical appointments, getting your medicines, non-medical meetings or appointments, work, or from getting things that you need?: No   Physical Activity: Not on file   Stress: No Stress Concern Present (9/4/2024)    [employer] [place] [employer] - Occupational Stress Questionnaire     Feeling of Stress : Not at all   Social Connections: Unknown (9/4/2024)    Social Connection and Isolation Panel [NHANES]     Frequency of Communication with Friends and Family: Not on file     Frequency of Social  Gatherings with Friends and Family: Twice a week     Attends Jew Services: Not on file     Active Member of Clubs or Organizations: Not on file     Attends Club or Organization Meetings: Not on file     Marital Status: Not on file   Interpersonal Safety: High Risk (6/9/2025)    Interpersonal Safety     Do you feel physically and emotionally safe where you currently live?: No     Within the past 12 months, have you been hit, slapped, kicked or otherwise physically hurt by someone?: No     Within the past 12 months, have you been humiliated or emotionally abused in other ways by your partner or ex-partner?: No   Housing Stability: Low Risk  (9/4/2024)    Housing Stability     Do you have housing? : Yes     Are you worried about losing your housing?: No          MEDICATIONS:  has a current medication list which includes the following prescription(s): acetaminophen, albuterol, amlodipine, amoxicillin, amoxicillin-clavulanate, benzonatate, calcium carbonate-vitamin d, carboxymethylcellulose, coricidin hbp cough/cold, ferrous sulfate, guaifenesin, latanoprost, levothyroxine, losartan, metformin, metoprolol tartrate, multiple vitamins-minerals, timolol maleate, vitamin b-12, vitamin d3, vitamin k, and warfarin anticoagulant.      ROS: 10 point ROS neg other than the symptoms noted above in the HPI.     GENERAL:  fatigue, wt stable; denies fevers, chills, night sweats.    HEENT: no dysphagia, odonophagia, diplopia, neck pain  THYROID:  no apparent hyper or hypothyroid symptoms  CV: no chest pain, pressure, palpitations  LUNGS: cough; no SOB, ALLEN, wheezing   ABDOMEN: no diarrhea, constipation, abdominal pain  EXTREMITIES: no rashes, ulcers, edema  NEUROLOGY: balance problems; no headaches, denies changes in vision, tingling, extremitiy numbness   MSK: low back stiffness; denies muscle weakness  SKIN: no rashes or lesions  : no menses, denies hot flashes  PSYCH:  stable mood, no significant anxiety or  depression  ENDOCRINE: no heat or cold intolerance    Physical Exam (visual exam)  VS:  no vital signs taken for video visit  CONSTITUTIONAL: healthy, alert and NAD, well dressed, answering questions appropriately  ENT: no nose swelling or nasal discharge, mouth redness or gum changes.  EYES: eyes grossly normal to inspection, conjunctivae and sclerae normal, no exophthalmos or proptosis  THYROID:  no apparent nodules or goiter  LUNGS: no audible wheeze, cough or visible cyanosis, no visible retractions or increased work of breathing  ABDOMEN: abdomen not evaluated  EXTREMITIES: no hand tremors, limited exam  NEUROLOGY: CN grossly intact, mentation intact and speech normal   SKIN:  no apparent skin lesions, rash, or edema with visualized skin appearance  PSYCH: mentation appears normal, affect normal/bright, judgement and insight intact,   normal speech and appearance well groomed      LABS:     All pertinent notes, labs, and images personally reviewed by me.        A/P:  Encounter Diagnoses   Name Primary?    Age-related osteoporosis with current pathological fracture with routine healing, subsequent encounter Yes    History of vertebral fracture     Osteoporosis, unspecified osteoporosis type, unspecified pathological fracture presence        Comments:  Reviewed health history and osteoporosis issues  While osteopenia-range BMD at hips, vertebral compression fractures define osteoporosis.  Discussed highlights of her recent respiratory illness and hospitalization  Reviewed and interpreted tests that I previously ordered.   Ordered appropriate tests for the endocrinology disease management.    Management options discussed and implemented after shared medical decision making with the patient.  Osteoporosis problem is chronic-stable    Plan:  Discussed general issues with the osteoporosis diagnosis and management  Reviewed concept of using the DEXA scan imaging to assess bone mineral density (BMD)  We have reviewed  treatment options with oral bisphosphonate, SERM, IV Boniva vs Reclast, SQ Prolia vs Forteo     Recommend:  Reviewed the Prolia injection medication every 6-months   Next Prolia dose in 10/2025  Continue calcium and vitamin D supplement use  Avoid heavy lifting and fall injuries  Repeat lab tests in 10/2023   Lab orders placed  Repeat spine Xray and/or MRI if new acute back pain  Consider a repeat DEXA scan at [facility] in 2026    Reminded patient to have follow-up thyroid testing after recent levothyroxine dose increase, review with PCP  Addressed patient questions today     The longitudinal plan of care for the endocrine problem(s) were addressed during this visit.  Due to added complexity of care,   we will continue to support the patient and the subsequent management of this condition with ongoing continuity of care.    There are no Patient Instructions on file for this visit.    Future labs ordered today:   Orders Placed This Encounter   Procedures    Basic metabolic panel     Radiology/Consults ordered today: None    Total time spent on day of encounter:  22 min    Follow-up:  6/2026, Return    ROBERT Hernandez MD, MS  Endocrinology  [facility]    CC:  Care Team

## 2025-06-20 ENCOUNTER — RESULTS FOLLOW-UP (OUTPATIENT)
Dept: ANTICOAGULATION | Facility: CLINIC | Age: 80
End: 2025-06-20

## 2025-06-25 ENCOUNTER — ANTICOAGULATION THERAPY VISIT (OUTPATIENT)
Dept: ANTICOAGULATION | Facility: CLINIC | Age: 80
End: 2025-06-25

## 2025-06-25 ENCOUNTER — RESULTS FOLLOW-UP (OUTPATIENT)
Dept: ANTICOAGULATION | Facility: CLINIC | Age: 80
End: 2025-06-25

## 2025-06-25 ENCOUNTER — OFFICE VISIT (OUTPATIENT)
Dept: FAMILY MEDICINE | Facility: CLINIC | Age: 80
End: 2025-06-25
Payer: MEDICARE

## 2025-06-25 VITALS
HEIGHT: 64 IN | OXYGEN SATURATION: 96 % | SYSTOLIC BLOOD PRESSURE: 134 MMHG | HEART RATE: 73 BPM | RESPIRATION RATE: 18 BRPM | DIASTOLIC BLOOD PRESSURE: 68 MMHG | BODY MASS INDEX: 24.41 KG/M2 | TEMPERATURE: 96.9 F | WEIGHT: 143 LBS

## 2025-06-25 DIAGNOSIS — R51.9 INTERMITTENT HEADACHE: ICD-10-CM

## 2025-06-25 DIAGNOSIS — R79.89 ELEVATED SERUM CREATININE: ICD-10-CM

## 2025-06-25 DIAGNOSIS — Z86.718 PERSONAL HISTORY OF DVT (DEEP VEIN THROMBOSIS): Primary | ICD-10-CM

## 2025-06-25 DIAGNOSIS — E89.0 POSTABLATIVE HYPOTHYROIDISM: ICD-10-CM

## 2025-06-25 DIAGNOSIS — J85.0 NECROTIZING PNEUMONIA (H): ICD-10-CM

## 2025-06-25 DIAGNOSIS — I10 BENIGN ESSENTIAL HYPERTENSION: Primary | ICD-10-CM

## 2025-06-25 DIAGNOSIS — R19.8 RECTAL FULLNESS: ICD-10-CM

## 2025-06-25 DIAGNOSIS — S46.819A STRAIN OF TRAPEZIUS MUSCLE, UNSPECIFIED LATERALITY, INITIAL ENCOUNTER: ICD-10-CM

## 2025-06-25 LAB — INR HOME MONITORING: 2.3 (ref 2–3)

## 2025-06-25 PROCEDURE — 3078F DIAST BP <80 MM HG: CPT | Performed by: PHYSICIAN ASSISTANT

## 2025-06-25 PROCEDURE — 99495 TRANSJ CARE MGMT MOD F2F 14D: CPT | Performed by: PHYSICIAN ASSISTANT

## 2025-06-25 PROCEDURE — 3075F SYST BP GE 130 - 139MM HG: CPT | Performed by: PHYSICIAN ASSISTANT

## 2025-06-25 PROCEDURE — 80048 BASIC METABOLIC PNL TOTAL CA: CPT | Performed by: PHYSICIAN ASSISTANT

## 2025-06-25 PROCEDURE — 36415 COLL VENOUS BLD VENIPUNCTURE: CPT | Performed by: PHYSICIAN ASSISTANT

## 2025-06-25 PROCEDURE — 84443 ASSAY THYROID STIM HORMONE: CPT | Performed by: PHYSICIAN ASSISTANT

## 2025-06-25 PROCEDURE — 1125F AMNT PAIN NOTED PAIN PRSNT: CPT | Performed by: PHYSICIAN ASSISTANT

## 2025-06-25 PROCEDURE — 84439 ASSAY OF FREE THYROXINE: CPT | Performed by: PHYSICIAN ASSISTANT

## 2025-06-25 RX ORDER — CYCLOBENZAPRINE HCL 5 MG
5 TABLET ORAL 2 TIMES DAILY PRN
Qty: 45 TABLET | Refills: 0 | Status: SHIPPED | OUTPATIENT
Start: 2025-06-25

## 2025-06-25 ASSESSMENT — PAIN SCALES - GENERAL: PAINLEVEL_OUTOF10: MODERATE PAIN (4)

## 2025-06-25 NOTE — PROGRESS NOTES
ANTICOAGULATION MANAGEMENT     Manasa French 79 year old female is on warfarin with therapeutic INR result. (Goal INR 2.0-3.0)    Recent labs: (last 7 days)     06/25/25  0000   INR 2.3       ASSESSMENT     Source(s): Chart Review and Patient/Caregiver Call     Warfarin doses taken: Warfarin taken as instructed  Diet: No new diet changes identified  Medication/supplement changes: None noted  New illness, injury, or hospitalization: No  Signs or symptoms of bleeding or clotting: No  Previous result: Subtherapeutic  Additional findings: None       PLAN     Recommended plan for no diet, medication or health factor changes affecting INR     Dosing Instructions: Continue your current warfarin dose with next INR in 1 week       Summary  As of 6/25/2025      Full warfarin instructions:  7.5 mg every Fri; 5 mg all other days   Next INR check:  7/2/2025               Telephone call with Rhonda who verbalizes understanding and agrees to plan and who agrees to plan and repeated back plan correctly    Patient to recheck with home meter    Education provided: Please call back if any changes to your diet, medications or how you've been taking warfarin    Plan made per [facility] anticoagulation protocol    Barrett Morales RN  6/25/2025  Anticoagulation Clinic  Photoblog for routing messages: p ANTICOAG HOME MONITORING  [facility] patient phone line: [phone]        _______________________________________________________________________     Anticoagulation Episode Summary       Current INR goal:  2.0-3.0   TTR:  89.2% (11.9 mo)   Target end date:  Indefinite   Send INR reminders to:  ANTICOAG HOME MONITORING    Indications    Personal history of DVT (deep vein thrombosis) [Z19.317]             Comments:  [employer] [facility] meter- Managed by exception             Anticoagulation Care Providers       Provider Role Specialty Phone number    Rema Braxton DO Referring Family Medicine [phone]    Jessica Anthony MD Referring  Internal Medicine [phone]    Jana Giles MD Referring Internal Medicine [phone]    Christopher Smith MD Responsible Hematology [phone]

## 2025-06-25 NOTE — PROGRESS NOTES
Assessment & Plan   Assessment & Plan  Benign essential hypertension  At goal on amlodipine 5 mg  Continue home monitoring  Orders:    Basic metabolic panel  (Ca, Cl, CO2, Creat, Gluc, K, Na, BUN); Future    Necrotizing pneumonia (H)  Completing Augmentin   Continues to improve from a respiratory standpoint.       Elevated serum creatinine  Recheck labs today with mild elevation seen last week       Intermittent headache  Unclear etiology of left parietal head pain.  Per patient there seems to be a clear connection to upper neck and back strain.  Trial muscle relaxer as prescribed if chronic muscle contraction headache present.   Recently evaluated in hospital, negative head CT.  Orders:    cyclobenzaprine (FLEXERIL) 5 MG tablet; Take 1 tablet (5 mg) by mouth 2 times daily as needed for muscle spasms (neck pain, headache).    Basic metabolic panel  (Ca, Cl, CO2, Creat, Gluc, K, Na, BUN); Future    Strain of trapezius muscle, unspecified laterality, initial encounter  Restart physical therapy  Orders:    cyclobenzaprine (FLEXERIL) 5 MG tablet; Take 1 tablet (5 mg) by mouth 2 times daily as needed for muscle spasms (neck pain, headache).    Physical Therapy  Referral; Future    Rectal fullness  Consistent with suspected external hemorrhoid  Reviewed over-the-counter care, asked that we address this and follow-up if symptoms worsening       Postablative hypothyroidism  Recent adjustment in hospital, recheck  Orders:    TSH with free T4 reflex; Future          Follow-up   Return for follow-up per plan, follow-up with specialist.        Genaro Ellis is a 79 year old, presenting for the following health issues:  Hypertension (Follow up/) and Headache        6/25/2025     1:16 PM   Additional Questions   Roomed by Sabrina   This is a pleasant patient seen in follow up of hospital discharge. Please see encounter 6/19/25 for initial hospital follow up, in interim sodium has normalized with marked hyponatremia  "in hospital for necrotizing pneumonia. Started on amlodipine 5 mg with diuretics held, feels she is tolerating this well, home Bps below. No perceived adverse effects.   Feeling well from a respiratory standpoint. She is walking 3-4 times daily with walker in her building; activity tolerance increasing.     Concern for intermittent left parietal headache. We reviewed this was also evaluated during recent hospitalization for above.  CT head without acute finding, furthermore not thought to be related to  shunt.  She states there is related bilateral upper neck and occipital tightness.  Explain she has had a chronically reduced range of motion of her neck, also present prior to hospitalization.  Tylenol ineffective for pain relief and needing to lie down for relief.  Previously completed PT for neck pain, but this was somewhat helpful and is open to repeating.     Continues on Augmentin as prescribed.  Describes loose stools, palpable external hemorrhoid without pain or bleeding.  Attempting to eat more probiotic rich foods denies thomas diarrhea, abdominal pain.,  Nausea, vomiting.    Recently reevaluated by endocrinology.  Recommendation to recheck TSH.    Home blood pressure readings:  6/22 158/61  6/23 135/65  6/24 126/54    History of Present Illness       Hypertension: She presents for follow up of hypertension.  She does check blood pressure  regularly outside of the clinic. Outside blood pressures have been over 140/90. She does not follow a low salt diet.     Headaches:   Since the patient's last clinic visit, headaches are: worsened  The patient is getting headaches:  4  She is not able to do normal daily activities when she has a migraine.  The patient is taking the following rescue/relief medications:  Tylenol   Patient states \"I get no relief\" from the rescue/relief medications.   The patient is taking the following medications to prevent migraines:  No medications to prevent migraines  In the past 4 " "weeks, the patient has gone to an Urgent Care or Emergency Room 1 time times due to headaches.    She eats 2-3 servings of fruits and vegetables daily.She consumes 1 sweetened beverage(s) daily.She exercises with enough effort to increase her heart rate 30 to 60 minutes per day.  She exercises with enough effort to increase her heart rate 7 days per week.   She is taking medications regularly.            6/13/2025   Post Discharge Outreach   How are you doing now that you are home? Doing ok   How are your symptoms? (Red Flag symptoms escalate to triage hotline per guidelines) Improved   Does the patient have their discharge instructions?  Yes   Does the patient have questions regarding their discharge instructions?  No   Were you started on any new medications or were there changes to any of your previous medications?  Yes   Does the patient have all of their medications? Yes   Do you have questions regarding any of your medications?  No   Do you have all of your needed medical supplies or equipment (DME)?  (i.e. oxygen tank, CPAP, cane, etc.) Yes       Review of Systems  CONSTITUTIONAL: NEGATIVE for fever, chills, change in weight  ENT/MOUTH: NEGATIVE for ear, mouth and throat problems  RESP: NEGATIVE for significant cough or SOB  CV: NEGATIVE for chest pain, palpitations or peripheral edema  ROS otherwise negative      Objective    /68 (BP Location: Right arm, Patient Position: Sitting, Cuff Size: Adult Regular)   Pulse 73   Temp 96.9  F (36.1  C) (Temporal)   Resp 18   Ht 1.626 m (5' 4\")   Wt 64.9 kg (143 lb)   LMP  (LMP Unknown)   SpO2 96%   BMI 24.55 kg/m    Body mass index is 24.55 kg/m .  Physical Exam   GENERAL: alert and no distress  EYES: Eyes grossly normal to inspection, PERRL and conjunctivae and sclerae normal  HENT: ear canals and TM's normal, nose and mouth without ulcers or lesions  NECK: no adenopathy, no asymmetry, masses, or scars  RESP: lungs clear to auscultation - no rales, " rhonchi or wheezes  CV: regular rate and rhythm, normal S1 S2, no S3 or S4, no murmur, click or rub, no peripheral edema  MS: no gross musculoskeletal defects noted, no edema  SKIN: no suspicious lesions or rashes  NEURO: Normal strength and tone, mentation intact and speech normal  PSYCH: mentation appears normal, affect normal/bright           Signed Electronically by: Niya Min PA-C

## 2025-06-25 NOTE — PATIENT INSTRUCTIONS
Please continue the amlodipine 5 mg daily, BP looks quite good.   Let's trial the muscle relaxer to see if this improves your neck pain and the headache.   Please schedule with PT to address the limited neck range of motion   We will recheck your electrolytes, kidney function and your thyroid.     If you're not feeling improved, please return to see me or your PCP.     Niya Joel PA-C  Same Day Provider  [facility]

## 2025-06-25 NOTE — ASSESSMENT & PLAN NOTE
At goal on amlodipine 5 mg  Continue home monitoring  Orders:    Basic metabolic panel  (Ca, Cl, CO2, Creat, Gluc, K, Na, BUN); Future

## 2025-06-26 ENCOUNTER — THERAPY VISIT (OUTPATIENT)
Dept: SPEECH THERAPY | Facility: CLINIC | Age: 80
End: 2025-06-26
Attending: INTERNAL MEDICINE
Payer: MEDICARE

## 2025-06-26 DIAGNOSIS — R13.10 DYSPHAGIA, UNSPECIFIED TYPE: Primary | ICD-10-CM

## 2025-06-26 LAB
ANION GAP SERPL CALCULATED.3IONS-SCNC: 13 MMOL/L (ref 7–15)
BUN SERPL-MCNC: 24.5 MG/DL (ref 8–23)
CALCIUM SERPL-MCNC: 10.2 MG/DL (ref 8.8–10.4)
CHLORIDE SERPL-SCNC: 97 MMOL/L (ref 98–107)
CREAT SERPL-MCNC: 1 MG/DL (ref 0.51–0.95)
EGFRCR SERPLBLD CKD-EPI 2021: 57 ML/MIN/1.73M2
GLUCOSE SERPL-MCNC: 134 MG/DL (ref 70–99)
HCO3 SERPL-SCNC: 24 MMOL/L (ref 22–29)
POTASSIUM SERPL-SCNC: 4.3 MMOL/L (ref 3.4–5.3)
SODIUM SERPL-SCNC: 134 MMOL/L (ref 135–145)
T4 FREE SERPL-MCNC: 1.68 NG/DL (ref 0.9–1.7)
TSH SERPL DL<=0.005 MIU/L-ACNC: 16.2 UIU/ML (ref 0.3–4.2)

## 2025-06-26 NOTE — PROGRESS NOTES
"SPEECH LANGUAGE PATHOLOGY EVALUATION       Fall Risk Screen:  Have you fallen 2 or more times in the past year?: Yes  Have you fallen and had an injury in the past year?: Yes      Subjective        Presenting condition or subjective complaint: sarah  Date of onset: 06/08/25 (hospital admission date)    Relevant medical history: Asthma; Bladder or bowel problems; Chest pain; Concussions; Diabetes; DVT (blood clot); Hearing problems; High blood pressure; History of fractures; Implanted device; Menopause; Migraines or headaches   Dates & types of surgery: back 2006 2018  shunt 1994 hip replacement 2023    Prior diagnostic imaging/testing results: X-ray    IP VFSS results from 6/12/25: \"Pt currently presents with minimal-mild oral and mild/mild-moderate pharyngeal dysphagia characterized by reduced bolus formation/propulsion and lingual coordination/control with frequent premature spillage, impaired hyolaryngeal elevation/epiglottic inversion with reduced supraglottic closure, and decreased pharyngeal clearance. + moderately deep penetration of thin and slightly thick liquids via tsp before/during the swallow with incomplete ejection. Penetrant remained visible and portion eventually progressed to the level of the vocal folds. Chin tuck did not eliminate penetration (?may have reduced depth for slightly thick liquid, but visualization somewhat limited secondary to presence of shunt). + inconsistent shallow penetration of mildly thick liquid via cup and one instance of ~mildly deep penetration via tsp, as well as mild-moderately deep penetration of mildly thick liquid via straw; all with complete ejection. No other penetration/aspiration visualized during this controlled study. Observed inconsistent vallecular and occasional pyriform sinus residue; was most notable with puree (mild vallecular) and solid (mild/mild-moderate vallecular) and required liquid wash and additional swallows to clear.\"   Prior therapy " "history for the same diagnosis, illness or injury: No      PAST MEDICAL HISTORY:   Past Medical History:   Diagnosis Date    Benign essential hypertension     Dislocation of right hip (H) 05/2024    History of Graves' disease     s/p BARAHONA    History of hydrocephalus     s/p  shunt placement in 1987    Macular degeneration of both eyes     Osteoporosis     Personal history of DVT (deep vein thrombosis) 2019    RUE; on chronic AC    Postablative hypothyroidism     Primary open angle glaucoma (POAG) of both eyes     right eye moderate, left eye mild    Pseudophakia     Sensorineural hearing loss, bilateral        PAST SURGICAL HISTORY:   Past Surgical History:   Procedure Laterality Date    ARTHROPLASTY HIP Right 5/30/2023    Procedure: Right Total Hip Arthroplasty;  Surgeon: Vasu Mixon MD;  Location: [facility] OR    ARTHROSCOPY SHOULDER Left     CATARACT IOL, RT/LT Bilateral     DECOMPRESSION, FUSION LUMBAR POSTERIOR ONE LEVEL, COMBINED      L5-S1    EXCISE GANGLION WRIST Right     HERNIA REPAIR, UMBILICAL      LUMPECTOMY BREAST Left     benign    RECESSION RESECTION (REPAIR STRABISMUS) BILATERAL Bilateral 12/11/2023    Procedure: Bilateral eye muscle correction.;  Surgeon: Antwan Ardon MD;  Location: [facility] [place] OR       Living Environment  Social support: With a significant other or spouse   Help at home: None  Equipment owned: Straight Cane; Walker with wheels     Employment: No    Hobbies/Interests: readingknitting cooking    Patient goals for therapy: swallow without asperating into lungs    Pain assessment: Pain present, pt reporting bad headache during the evaluation. Dimmed room lights to reduce any light sensitivity.       Objective     SWALLOW EVALUTION  Dysphagia history: Per MD note: \"Manasa French is a 79 year old female admitted on 6/8/2025. Past medical history significant for hypertension, uncontrolled, Graves' disease with post ablative hypothyroidism, history of DVT on warfarin, " "hydrocephalus status post  shunt in 1987, osteoporosis, among others who was admitted on 06/08/25 with concern for nectrotizing pneumonia with small pneumothorax. Also found to have hyponatremia 120, prolonged QTc, and possibly new LBBB.\" Chest xray 6/9/25: \"IMPRESSION: A small left apical pneumothorax measures 8 mm, stable to decreased compared to prior CT. Atelectasis in the left upper lobe along the left major fissure is stable. Minimal focal infiltrate in the left lower lobe posteriorly is also similar. No new findings.\" Please see above for IP VFSS findings. Pt was discharged home on 6/12/25.    Since returning home, pt reports ongoing use of mildly thick liquids, taking small bites, and chewing food well. She notes less coughing overall, but she still experiences some coughing fits during meals. She also reports practicing effortful/hard swallow exercise. She denies foods sticking in her throat, but small pills will sometimes get stuck if she takes them with thin liquid water. She reports frequent headaches and neck stiffness, noting that it feels like her \"brain has gone away.\"  Current Diet/Method of Nutritional Intake: mildly thick liquids (level 2), regular diet with self-selection of softer foods as needed        CLINICAL SWALLOW EVALUATION  Oral Motor Function: generally intact  Dentition: natural dentition, adequate dentition  Secretion management: WFL  Mucosal quality: adequate  Mandibular function: intact  Oral labial function: WFL  Lingual function: impaired anterior elevation  Velar function: intact   Buccal function: intact  Laryngeal function: cough, volitional swallow with some difficulty initiating and reduced hyolaryngeal excursion, voicing WFL     Level of assist required for feeding: no assistance needed   Textures Trialed:   Clinical Swallow Eval: Thin Liquids  Mode of presentation: cup, self-fed   Volume presented: Small sips water x2  Preparatory Phase: WFL  Oral Phase: WFL  Pharyngeal " phase of swallow: intact   Strategies trialed during procedure: ALAN SLP CLINICAL EVAL STRATEGIES: Small sips   Diagnostic statement: No overt s/sx of aspiration/penetration observed today although risk is still present given results of recent VFSS.    Clinical Swallow Eval: Mildly Thick Liquids  Mode of presentation: cup, self-fed   Volume presented: Small sips water with thickener x5  Preparatory Phase: WFL  Oral Phase: WFL  Pharyngeal phase of swallow: intact   Strategies trialed during procedure: ALAN SLP CLINICAL EVAL STRATEGIES: Small sips   Diagnostic statement: No overt s/sx of aspiration/penetration observed.    Clinical Swallow Eval: Purees  Mode of presentation: spoon, self-fed   Volume presented: 1 tsp pudding x2  Preparatory Phase: WFL  Oral Phase: WFL  Pharyngeal phase of swallow: intact   Strategies trialed during procedure: ALAN SLP CLINICAL EVAL STRATEGIES: Small bites   Diagnostic statement: No overt s/sx of aspiration/penetration observed.    Clinical Swallow Eval: Soft & Bite Sized  Mode of presentation: spoon, self-fed   Volume presented: 1 tsp mandarin oranges in juice x2  Preparatory Phase: WFL  Oral Phase: WFL  Pharyngeal phase of swallow: intact   Strategies trialed during procedure: ALAN SLP CLINICAL EVAL STRATEGIES: Small bites   Diagnostic statement: No overt s/sx of aspiration/penetration observed.    Clinical Swallow Eval: Solids  Mode of presentation: self-fed   Volume presented: Small bites constanza cracker x2  Preparatory Phase: WFL  Oral Phase: residue in oral cavity  Pharyngeal phase of swallow: impaired, feeling of something stuck in throat   Strategies trialed during procedure: ALAN SLP CLINICAL EVAL STRATEGIES: Small bites, chew well, mildly thick liquid wash   Diagnostic statement: Pt demonstrating minimal mid lingual residue and reported residue in the back of the throat in 2/2 trials, able to clear with multiple sips of mildly thick liquid. No overt s/sx of aspiration/penetration  observed.     ESOPHAGEAL PHASE OF SWALLOW  no observed or reported concerns related to esophageal function     SWALLOW ASSESSMENT CLINICAL IMPRESSIONS AND RATIONALE  Diet Consistency Recommendations: mildly thick liquids (level 2), regular diet, free water protocol    Recommended Feeding/Eating Techniques: small bolus size, no straws, slow rate of intake, alternate food and liquid intake, check mouth for oral residue/pocketing, monitor for cough or change in vocal quality with intake, maintain upright posture during/after eating for 30 minutes, minimize distractions during oral intake   Medication Administration Recommendations: Whole with mildly thick liquid or puree  Instrumental Assessment Recommendations: VFSS (videofluoroscopic swallowing study) recommended in the future to assess for appropriateness of diet upgrade     Assessment & Plan   CLINICAL IMPRESSIONS   Medical Diagnosis: Dysphagia    Treatment Diagnosis: Oropharyngeal dysphagia   Impression/Assessment: Pt is a 79 year old female with swallowing complaints and recent hospitalization for PNA. The following significant findings have been identified: impaired swallowing, characterized by minimal oral and likely vallecular residue with solid consistency. Recent IP VFSS was significant for deep penetration of thin liquid, inconsistent penetration with mildly thick liquid, vallecular and occasional pyriform residue with puree and solid, premature spillage, impaired hyolaryngeal elevation/epiglottic inversion, and decreased pharyngeal clearance. Identified deficits interfere with their ability to consume an oral diet as compared to previous level of function.    PLAN OF CARE  Treatment Interventions: Swallowing dysfunction and/or oral function for feeding    Prognosis to achieve stated therapy goals is good   Rehab potential is impacted by: current level of function, patient awareness of deficits, patient motivation    Long Term Goals:   SLP Goal 1  Goal  Identifier: Strategies  Goal Description: Patient will independently implement safe swallowing techniques to tolerate PO trials of thin liquids and solid consistencies in session without c/o coughing/choking or oropharyngeal residue across 3 consecutive sessions to improve safety and efficiency of PO intake.  Rationale: To maximize safety, ease and/or independence of oral intake  Target Date: 09/24/25  SLP Goal 2  Goal Identifier: Exercises  Goal Description: Patient will complete oropharyngeal exercises  2-3x daily given min assist, to increase strength and improve safety with PO intake.  Rationale: To maximize safety, ease and/or independence of oral intake  Target Date: 09/24/25      Frequency of Treatment: 1x/week  Duration of Treatment: up to 12 weeks     Recommended Referrals to Other Professionals: Physical Therapy given results of fall screen  Education Assessment:   Learner/Method: Patient;Significant Other  Education Comments: SLP provided education regarding evaluation findings, diet recommendations, safe swallow strategies, and proposed POC. Pt and spouse verbalized understanding.    Risks and benefits of evaluation/treatment have been explained.   Patient/Family/caregiver agrees with Plan of Care.     Evaluation Time:    SLP Eval: oral/pharyngeal swallow function, clinical minutes (04019): 35      Signing Clinician: Caryn Uribe, SLP    EDGAR Tristan ([employer]), M.ADavid, CCC-SLP  Speech-Language Pathologist  [place] Certificate of Vocology  [facility]  [phone]          [facility]                                                                                   OUTPATIENT SPEECH LANGUAGE PATHOLOGY      PLAN OF TREATMENT FOR OUTPATIENT REHABILITATION   Patient's Last Name, First Name, Manasa Hernandez YOB: 1945   Provider's Name   [facility]   Medical Record No.  [id number]      Onset Date: 06/08/25 (hospital admission date) Start of Care Date: 06/26/25     Medical Diagnosis:  Dysphagia      SLP Treatment Diagnosis: Oropharyngeal dysphagia  Plan of Treatment  Frequency/Duration: 1x/week  / up to 12 weeks     Certification date from 06/26/25   To 09/24/25          See note for plan of treatment details and functional goals     Caryn Uribe, SLP                         I CERTIFY THE NEED FOR THESE SERVICES FURNISHED UNDER        THIS PLAN OF TREATMENT AND WHILE UNDER MY CARE     (Physician attestation of this document indicates review and certification of the therapy plan).              Referring Provider:  Fortino Mcclendon    Initial Assessment  See Epic Evaluation- 06/26/25

## 2025-07-03 ENCOUNTER — RESULTS FOLLOW-UP (OUTPATIENT)
Dept: NURSING | Facility: CLINIC | Age: 80
End: 2025-07-03

## 2025-07-03 ENCOUNTER — DOCUMENTATION ONLY (OUTPATIENT)
Dept: ANTICOAGULATION | Facility: CLINIC | Age: 80
End: 2025-07-03
Payer: MEDICARE

## 2025-07-03 DIAGNOSIS — Z86.718 PERSONAL HISTORY OF DVT (DEEP VEIN THROMBOSIS): Primary | ICD-10-CM

## 2025-07-03 LAB — INR HOME MONITORING: 2.2 (ref 2–3)

## 2025-07-03 NOTE — PROGRESS NOTES
ANTICOAGULATION  MANAGEMENT-Home Monitor Managed by Exception    Manasa French 79 year old female is on warfarin with therapeutic INR result. (Goal INR 2.0-3.0)    Recent labs: (last 7 days)     07/03/25  0000   INR 2.2       Previous INR was Therapeutic  Medication, diet, health changes since last INR:chart reviewed; none identified  Contacted within the last 12 weeks by phone on 6/27/25  Due for next call on 9/25/25  Last [facility] referral date: 11/15/2024      AUBREY     Manasa was NOT contacted regarding therapeutic result today per home monitoring policy manage by exception agreement.   Current warfarin dose is to be continued:     Summary  As of 7/3/2025      Full warfarin instructions:  7.5 mg every Fri; 5 mg all other days   Next INR check:  7/10/2025             ?   Ember Amezcua RN  Anticoagulation Clinic  7/3/2025    _______________________________________________________________________     Anticoagulation Episode Summary       Current INR goal:  2.0-3.0   TTR:  89.2% (11.9 mo)   Target end date:  Indefinite   Send INR reminders to:  [facility]    Indications    Personal history of DVT (deep vein thrombosis) [Z86.718]             Comments:  [employer] [facility] meter- Managed by exception             Anticoagulation Care Providers       Provider Role Specialty Phone number    Rema Braxton DO Referring Family Medicine [phone]    Jessica Anthony MD Referring Internal Medicine [phone]    Jana Giles MD Referring Internal Medicine [phone]    Christopher Smith MD Responsible Hematology [phone]

## 2025-07-07 ENCOUNTER — THERAPY VISIT (OUTPATIENT)
Dept: PHYSICAL THERAPY | Facility: CLINIC | Age: 80
End: 2025-07-07
Attending: PHYSICIAN ASSISTANT
Payer: MEDICARE

## 2025-07-07 DIAGNOSIS — S46.819A STRAIN OF TRAPEZIUS MUSCLE, UNSPECIFIED LATERALITY, INITIAL ENCOUNTER: ICD-10-CM

## 2025-07-07 ASSESSMENT — ACTIVITIES OF DAILY LIVING (ADL)
PUTTING_ON_AN_UNDERSHIRT_OR_A_PULLOVER_SWEATER: 0
AT_ITS_WORST?: 6
REACHING_FOR_SOMETHING_ON_A_HIGH_SHELF: 6
TOUCHING_THE_BACK_OF_YOUR_NECK: 6
CARRYING_A_HEAVY_OBJECT_OF_10_POUNDS: 0
PLEASE_INDICATE_YOR_PRIMARY_REASON_FOR_REFERRAL_TO_THERAPY:: SHOULDER
WASHING_YOUR_HAIR?: 0
PUTTING_ON_YOUR_PANTS: 0

## 2025-07-07 NOTE — PROGRESS NOTES
"PHYSICAL THERAPY EVALUATION  Type of Visit: Evaluation     Fall Risk Screen:  Have you fallen 2 or more times in the past year?: Yes  Have you fallen and had an injury in the past year?: Yes  Is patient receiving Physical Therapy Services?: No  Fall screen comments: Pt reports she falls all the time - Drs know about it. Notes she received a shunt which caused her to lose her balance. Notes she has been to PT several times for her balance. Here for neck pain and will ask Dr for another referral if needed    Subjective  Pt reports she feels like she has a very stiff neck and has less ROM. Pt reports her neck pain will go up into the back of her head. Reports she was recently in the hospital for a week for pneumonia - started when she was in the hospital. Pt reports she had a headache when she went into the hospital and that has continued except it's not happening all the time. Pt reports she will feel her pain bilaterally. Taking Tylenol for headaches - \"isn't working\" per pt. Pt reports she was given a muscle relaxer and felt no different. Alleviate: laying on L side > 30 minutes = no pain. Pt denies issues sleeping. Denies N/T        Presenting condition or subjective complaint: swalowiing  Date of onset: 06/25/25 (order date)    Relevant medical history: Asthma; Bladder or bowel problems; Chest pain; Concussions; Diabetes; DVT (blood clot); Hearing problems; High blood pressure; History of fractures; Implanted device; Menopause; Migraines or headaches   Past Medical History:   Diagnosis Date    Benign essential hypertension     Dislocation of right hip (H) 05/2024    History of Graves' disease     s/p BARAHONA    History of hydrocephalus     s/p  shunt placement in 1987    Macular degeneration of both eyes     Osteoporosis     Personal history of DVT (deep vein thrombosis) 2019    RUE; on chronic AC    Postablative hypothyroidism     Primary open angle glaucoma (POAG) of both eyes     right eye moderate, left eye mild "    Pseudophakia     Sensorineural hearing loss, bilateral        Dates & types of surgery: back 2006 2018  shunt 1994 hip replacement 2023 .ps    Prior diagnostic imaging/testing results: X-ray     Prior therapy history for the same diagnosis, illness or injury: No      Prior Level of Function  Transfers: Assistive equipment  Ambulation: Assistive equipment      Living Environment  Social support: With a significant other or spouse   Type of home: Apartment/condo   Stairs to enter the home: No       Ramp: No   Stairs inside the home: No       Help at home: None  Equipment owned: Straight Cane; Walker with wheels     Employment: No    Hobbies/Interests: reading[employer]    Patient goals for therapy: swallow without asperating into lungs - Answered for swallowing assessment. Pt reported goals to reduce neck pain all the time and return ROM.     Pain assessment: Pain present     Objective   CERVICAL SPINE EVALUATION  PAIN: Pain Level at Rest: 6/10  Pain Level with Use: 9/10  INTEGUMENTARY (edema, incisions): WNL  POSTURE: Sitting Posture: Forward head, Thoracic kyphosis decreased  GAIT:   Weightbearing Status: WBAT  Assistive Device(s): Walker (four wheeled)  Gait Deviations: Antalgic  Base of support decreased  Stride length decreased  Cyndy decreased  BALANCE/PROPRIOCEPTION: use of 4WW, poor balance without walker. Requires assistance from  and assistive device for transfers and walking.   ROM:   (Degrees) Left AROM Right AROM    Cervical Flexion 40 deg    Cervical Extension 20 deg    Cervical Side bend 15 deg 0 deg     Cervical Rotation 35 deg 15 deg    Shoulder ROM: WNL    Strength: cervical 5/5 with no pain, shoulder 4+/5 global with no pain     NEURAL TENSION: Cervical WNL  PALPATION: Mod TTP and tightness over B upper trap, LS, and suboccipitals        Assessment & Plan   CLINICAL IMPRESSIONS  Medical Diagnosis: Strain of trapezius muscle, unspecified laterality    Treatment Diagnosis: Recurrent  neck pain, headaches   Impression/Assessment: Patient is a 79 year old female with neck pain, headache complaints.  The following significant findings have been identified: Pain, Decreased ROM/flexibility, Decreased joint mobility, Decreased strength, Impaired balance, Impaired muscle performance, Decreased activity tolerance, and Impaired posture. These impairments interfere with their ability to perform self care tasks, work tasks, household mobility, and community mobility as compared to previous level of function.     Clinical Decision Making (Complexity):  Clinical Presentation: Evolving/Changing  Clinical Presentation Rationale: based on medical and personal factors listed in PT evaluation  Clinical Decision Making (Complexity): Moderate complexity    PLAN OF CARE  Treatment Interventions:  Modalities: Cryotherapy, Cupping, Dry Needling, E-stim, Hot Pack, Mechanical Traction  Interventions: Manual Therapy, Neuromuscular Re-education, Therapeutic Activity, Therapeutic Exercise, Self-Care/Home Management    Long Term Goals     PT Goal 1  Goal Identifier: Neck ROM  Goal Description: Pt will demonstrate improved ROM in all planes ot WNL  Rationale: to maximize safety and independence with performance of ADLs and functional tasks  Target Date: 10/04/25      Frequency of Treatment: 1-2x/week for 4 weeks  Duration of Treatment: 90 days    Recommended Referrals to Other Professionals:   Education Assessment:   Learner/Method: Patient  Education Comments: Pt educated on anatomy, diagnosis, prognosis, and plan of care.    Risks and benefits of evaluation/treatment have been explained.   Patient/Family/caregiver agrees with Plan of Care.     Evaluation Time:     PT Eval, Low Complexity Minutes (20292): 30     Signing Clinician: Carlene Kim PT        [facility]                                                                                   OUTPATIENT PHYSICAL THERAPY      PLAN OF  TREATMENT FOR OUTPATIENT REHABILITATION   Patient's Last Name, First Name, BRIANDavidManasa Valenzuela YOB: 1945   Provider's Name   [facility]   Medical Record No.  [id number]     Onset Date: 06/25/25 (order date)  Start of Care Date: 07/07/25     Medical Diagnosis:  Strain of trapezius muscle, unspecified laterality      PT Treatment Diagnosis:  Recurrent neck pain, headaches Plan of Treatment  Frequency/Duration: 1-2x/week for 4 weeks/ 90 days    Certification date from 07/07/25 to 10/04/25         See note for plan of treatment details and functional goals     Carlene Kim, PT                         I CERTIFY THE NEED FOR THESE SERVICES FURNISHED UNDER        THIS PLAN OF TREATMENT AND WHILE UNDER MY CARE     (Physician attestation of this document indicates review and certification of the therapy plan).              Referring Provider:  Niya Min    Initial Assessment  See Epic Evaluation- Start of Care Date: 07/07/25

## 2025-07-09 ENCOUNTER — THERAPY VISIT (OUTPATIENT)
Dept: SPEECH THERAPY | Facility: CLINIC | Age: 80
End: 2025-07-09
Payer: MEDICARE

## 2025-07-09 DIAGNOSIS — R13.10 DYSPHAGIA, UNSPECIFIED TYPE: Primary | ICD-10-CM

## 2025-07-09 PROCEDURE — 92526 ORAL FUNCTION THERAPY: CPT | Mod: GN | Performed by: STUDENT IN AN ORGANIZED HEALTH CARE EDUCATION/TRAINING PROGRAM

## 2025-07-10 DIAGNOSIS — I10 BENIGN ESSENTIAL HYPERTENSION: ICD-10-CM

## 2025-07-10 NOTE — TELEPHONE ENCOUNTER
Hi Dr. Giles,    Patient inquiring if she should continue lopressor 25mg, BID    -Infectious Disease provider suggested that patient ask her PCP if she should continue Rx.    -prescribed  when she was hospitalized for pneumonia    Patient's home BPs averagin-[phone]

## 2025-07-14 ENCOUNTER — THERAPY VISIT (OUTPATIENT)
Dept: PHYSICAL THERAPY | Facility: CLINIC | Age: 80
End: 2025-07-14
Payer: MEDICARE

## 2025-07-14 DIAGNOSIS — S46.819A STRAIN OF TRAPEZIUS MUSCLE, UNSPECIFIED LATERALITY, INITIAL ENCOUNTER: Primary | ICD-10-CM

## 2025-07-14 PROCEDURE — 97112 NEUROMUSCULAR REEDUCATION: CPT | Mod: GP

## 2025-07-14 PROCEDURE — 97140 MANUAL THERAPY 1/> REGIONS: CPT | Mod: GP

## 2025-07-14 RX ORDER — METOPROLOL TARTRATE 25 MG/1
25 TABLET, FILM COATED ORAL 2 TIMES DAILY
Qty: 180 TABLET | Refills: 0 | Status: SHIPPED | OUTPATIENT
Start: 2025-07-14

## 2025-07-14 NOTE — TELEPHONE ENCOUNTER
Medication passed protocol, however, refill RN could not approve because last prescription was signed by hospital discharging provider. Provider, please approve or deny the prescription.    Gely Palacio RN on 7/14/2025 at 2:54 PM

## 2025-07-14 NOTE — TELEPHONE ENCOUNTER
Patient Contact    Attempt # 1    Was call answered?  No.  Left message on voicemail with information to call clinic triage RN back.    Upon callback please inform pt to continue taking Metoprolol 25 mg BID.     Faye Abraham RN on 7/14/2025 at 8:52 AM

## 2025-07-14 NOTE — TELEPHONE ENCOUNTER
Pt returned call to the clinic. Relayed provider note below to pt.   She verbalized understanding and stated she will need a refill.   Med pended, routing to refill team for review.     Thank you,  Gina Garcia RN

## 2025-07-22 ENCOUNTER — LAB (OUTPATIENT)
Dept: LAB | Facility: CLINIC | Age: 80
End: 2025-07-22
Payer: MEDICARE

## 2025-07-22 ENCOUNTER — THERAPY VISIT (OUTPATIENT)
Dept: PHYSICAL THERAPY | Facility: CLINIC | Age: 80
End: 2025-07-22
Payer: MEDICARE

## 2025-07-22 DIAGNOSIS — E89.0 POSTABLATIVE HYPOTHYROIDISM: ICD-10-CM

## 2025-07-22 DIAGNOSIS — R79.89 ELEVATED SERUM CREATININE: ICD-10-CM

## 2025-07-22 DIAGNOSIS — S46.819A STRAIN OF TRAPEZIUS MUSCLE, UNSPECIFIED LATERALITY, INITIAL ENCOUNTER: Primary | ICD-10-CM

## 2025-07-22 LAB
ANION GAP SERPL CALCULATED.3IONS-SCNC: 13 MMOL/L (ref 7–15)
BUN SERPL-MCNC: 18.8 MG/DL (ref 8–23)
CALCIUM SERPL-MCNC: 9.5 MG/DL (ref 8.8–10.4)
CHLORIDE SERPL-SCNC: 108 MMOL/L (ref 98–107)
CREAT SERPL-MCNC: 0.95 MG/DL (ref 0.51–0.95)
EGFRCR SERPLBLD CKD-EPI 2021: 60 ML/MIN/1.73M2
GLUCOSE SERPL-MCNC: 90 MG/DL (ref 70–99)
HCO3 SERPL-SCNC: 23 MMOL/L (ref 22–29)
POTASSIUM SERPL-SCNC: 4.3 MMOL/L (ref 3.4–5.3)
SODIUM SERPL-SCNC: 144 MMOL/L (ref 135–145)
TSH SERPL DL<=0.005 MIU/L-ACNC: 0.54 UIU/ML (ref 0.3–4.2)

## 2025-07-22 PROCEDURE — 84443 ASSAY THYROID STIM HORMONE: CPT

## 2025-07-22 PROCEDURE — 80048 BASIC METABOLIC PNL TOTAL CA: CPT

## 2025-07-22 PROCEDURE — 36415 COLL VENOUS BLD VENIPUNCTURE: CPT

## 2025-07-22 PROCEDURE — 97140 MANUAL THERAPY 1/> REGIONS: CPT | Mod: GP

## 2025-07-23 ENCOUNTER — OFFICE VISIT (OUTPATIENT)
Dept: OPHTHALMOLOGY | Facility: CLINIC | Age: 80
End: 2025-07-23
Attending: OPHTHALMOLOGY
Payer: MEDICARE

## 2025-07-23 DIAGNOSIS — H35.3132 INTERMEDIATE STAGE NONEXUDATIVE AGE-RELATED MACULAR DEGENERATION OF BOTH EYES: ICD-10-CM

## 2025-07-23 DIAGNOSIS — H40.1132 PRIMARY OPEN ANGLE GLAUCOMA OF BOTH EYES, MODERATE STAGE: Primary | ICD-10-CM

## 2025-07-23 DIAGNOSIS — H04.123 BILATERAL DRY EYES: ICD-10-CM

## 2025-07-23 DIAGNOSIS — H53.2 DOUBLE VISION: ICD-10-CM

## 2025-07-23 DIAGNOSIS — Z96.1 PSEUDOPHAKIA OF BOTH EYES: ICD-10-CM

## 2025-07-23 PROCEDURE — G0463 HOSPITAL OUTPT CLINIC VISIT: HCPCS | Performed by: OPHTHALMOLOGY

## 2025-07-23 PROCEDURE — 92083 EXTENDED VISUAL FIELD XM: CPT | Performed by: OPHTHALMOLOGY

## 2025-07-23 PROCEDURE — 99214 OFFICE O/P EST MOD 30 MIN: CPT | Performed by: OPHTHALMOLOGY

## 2025-07-23 PROCEDURE — 92133 CPTRZD OPH DX IMG PST SGM ON: CPT | Performed by: OPHTHALMOLOGY

## 2025-07-23 RX ORDER — TIMOLOL MALEATE 5 MG/ML
1 SOLUTION/ DROPS OPHTHALMIC EVERY MORNING
Qty: 15 ML | Refills: 1 | Status: SHIPPED | OUTPATIENT
Start: 2025-07-23

## 2025-07-23 ASSESSMENT — REFRACTION_WEARINGRX
OD_AXIS: 001
OS_CYLINDER: +0.75
OS_AXIS: 005
OD_SPHERE: -1.25
OD_CYLINDER: +1.50
OS_SPHERE: -2.00

## 2025-07-23 ASSESSMENT — SLIT LAMP EXAM - LIDS
COMMENTS: NORMAL
COMMENTS: NORMAL

## 2025-07-23 ASSESSMENT — CUP TO DISC RATIO
OD_RATIO: 0.85
OS_RATIO: 0.55

## 2025-07-23 ASSESSMENT — VISUAL ACUITY
CORRECTION_TYPE: GLASSES
METHOD: SNELLEN - LINEAR
OD_CC: 20/30-2
OS_PH_CC: 20/50-
OS_CC: 20/60-/+2

## 2025-07-23 ASSESSMENT — TONOMETRY
OS_IOP_MMHG: 10
IOP_METHOD: TONOPEN
OD_IOP_MMHG: 14

## 2025-07-23 ASSESSMENT — EXTERNAL EXAM - RIGHT EYE: OD_EXAM: NORMAL

## 2025-07-23 ASSESSMENT — EXTERNAL EXAM - LEFT EYE: OS_EXAM: NORMAL

## 2025-07-23 NOTE — PROGRESS NOTES
HPI       Glaucoma Follow-Up    Side effects of treatment include none.  Treatment compliance is always.  Pain was noted as 0/10.             Comments    Today her eyes feel very tired and it's a struggle to read/focus.  She also reports she has occasional slight diplopia (2-3x/week), she usually notices it in the morning when it happens, clears after less than an hour.   Denies eye pain    Oc meds:  Latanoprost at bedtime each eye - LD@ 9:30  Timolol QAM each eye - LD@ 8:00am  AT's BID each eye -  uses in the evenings  AT Juan (B&L) at bedtime each eye     BILL Mascorro 12:48 PM 07/23/2025              Last edited by Manju Piedra on 7/23/2025 12:51 PM.          Review of systems for the eyes was negative other than the pertinent positives/negatives listed in the HPI.      Assessment & Plan      Manasa French is a 79 year old female with the following diagnoses:   1. Primary open angle glaucoma of both eyes, moderate stage    2. Bilateral dry eyes    3. Pseudophakia of both eyes    4. Intermediate stage nonexudative age-related macular degeneration of both eyes    5. Double vision         Here for glaucoma recheck   s/p Selected laser trabeculoplasty (SLT) repeat right eye 7/23/24 (forth treatment)  Intraocular pressure remains accepatble  Goal is mid-teens both eyes      OCT Nerve fiber layer of fair reliability, overall stable both eyes   Stable visual field with LVC today in both eyes         Plan:              - Continue latanoprost qHS both eyes - refills sent              - Continue timolol daily both eyes - refills sent              - Return precautions reviewed     Patient disposition:   Return in about 6 months (around 1/23/2026) for VT only, OCT NFL. (Plan to move to annual exams, alternating Q6m with [place] if stable)           Attending Physician Attestation:  Complete documentation of historical and exam elements from today's encounter can be found in the full encounter summary  report (not reduplicated in this progress note).  I personally obtained the chief complaint(s) and history of present illness.  I confirmed and edited as necessary the review of systems, past medical/surgical history, family history, social history, and examination findings as documented by others; and I examined the patient myself.  I personally reviewed the relevant tests, images, and reports as documented above.  I formulated and edited as necessary the assessment and plan and discussed the findings and management plan with the patient and family. . - Jarred Leon MD

## 2025-07-28 ENCOUNTER — THERAPY VISIT (OUTPATIENT)
Dept: SPEECH THERAPY | Facility: CLINIC | Age: 80
End: 2025-07-28
Payer: MEDICARE

## 2025-07-28 DIAGNOSIS — R13.10 DYSPHAGIA, UNSPECIFIED TYPE: Primary | ICD-10-CM

## 2025-07-28 PROCEDURE — 92526 ORAL FUNCTION THERAPY: CPT | Mod: GN | Performed by: STUDENT IN AN ORGANIZED HEALTH CARE EDUCATION/TRAINING PROGRAM

## 2025-07-29 ENCOUNTER — THERAPY VISIT (OUTPATIENT)
Dept: PHYSICAL THERAPY | Facility: CLINIC | Age: 80
End: 2025-07-29
Payer: MEDICARE

## 2025-07-29 DIAGNOSIS — S46.819A STRAIN OF TRAPEZIUS MUSCLE, UNSPECIFIED LATERALITY, INITIAL ENCOUNTER: Primary | ICD-10-CM

## 2025-07-29 PROCEDURE — 97140 MANUAL THERAPY 1/> REGIONS: CPT | Mod: GP

## 2025-08-25 ENCOUNTER — THERAPY VISIT (OUTPATIENT)
Dept: SPEECH THERAPY | Facility: CLINIC | Age: 80
End: 2025-08-25
Payer: MEDICARE

## 2025-08-25 DIAGNOSIS — R13.10 DYSPHAGIA, UNSPECIFIED TYPE: Primary | ICD-10-CM

## 2025-08-25 PROCEDURE — 92526 ORAL FUNCTION THERAPY: CPT | Mod: GN | Performed by: STUDENT IN AN ORGANIZED HEALTH CARE EDUCATION/TRAINING PROGRAM

## 2025-08-26 DIAGNOSIS — E11.69 TYPE 2 DIABETES MELLITUS WITH OTHER SPECIFIED COMPLICATION, WITHOUT LONG-TERM CURRENT USE OF INSULIN (H): ICD-10-CM

## 2025-08-27 RX ORDER — METFORMIN HYDROCHLORIDE 500 MG/1
TABLET, EXTENDED RELEASE ORAL
Qty: 90 TABLET | Refills: 3 | Status: SHIPPED | OUTPATIENT
Start: 2025-08-27

## 2025-09-04 ENCOUNTER — HOSPITAL ENCOUNTER (OUTPATIENT)
Dept: MAMMOGRAPHY | Facility: CLINIC | Age: 80
Discharge: HOME | End: 2025-09-04
Attending: INTERNAL MEDICINE
Payer: MEDICARE

## 2025-09-04 DIAGNOSIS — Z12.31 SCREENING MAMMOGRAM, ENCOUNTER FOR: ICD-10-CM

## 2025-09-04 PROCEDURE — 77063 BREAST TOMOSYNTHESIS BI: CPT

## (undated) DEVICE — SOL WATER IRRIG 1000ML BOTTLE 2F7114

## (undated) DEVICE — SU VICRYL 2-0 CP-1 27" UND J266H

## (undated) DEVICE — SU VICRYL 0 CP-1 27" J467H

## (undated) DEVICE — DRSG XEROFORM 5X9" 8884431605

## (undated) DEVICE — DRAPE STERI U 1015

## (undated) DEVICE — LINEN TOWEL PACK X5 5464

## (undated) DEVICE — PACK MINOR EYE CUSTOM ASC

## (undated) DEVICE — PACK TOTAL HIP W/U DRAPE SOP15HUFSC

## (undated) DEVICE — EYE MARKING PAD 581057

## (undated) DEVICE — GLOVE BIOGEL PI SZ 7.5 40875

## (undated) DEVICE — SOLUTION WOUND CLEANSING 3/4OZ 10% PVP EA-L3011FB-50

## (undated) DEVICE — SU VICRYL 6-0 S-14DA 18" UND J670G

## (undated) DEVICE — SUCTION IRR SYSTEM W/O TIP INTERPULSE HANDPIECE 0210-100-000

## (undated) DEVICE — GLOVE PROTEXIS MICRO 7.5  2D73PM75

## (undated) DEVICE — MANIFOLD NEPTUNE 4 PORT 700-20

## (undated) DEVICE — NDL SPINAL 18GA 3.5" 405184

## (undated) DEVICE — SOL WATER IRRIG 500ML BOTTLE 2F7113

## (undated) DEVICE — PREP SKIN SCRUB TRAY 4461A

## (undated) DEVICE — SYR 50ML LL W/O NDL 309653

## (undated) DEVICE — IMM PILLOW ABDUCT HIP MED 31143061

## (undated) DEVICE — EYE PREP BETADINE 5% SOLUTION 30ML 0065-0411-30

## (undated) DEVICE — BLADE SAW SAGITTAL STRK 25X79.5X1.24MM 4/2000 2108-318-000

## (undated) DEVICE — GLOVE BIOGEL PI MICRO INDICATOR UNDERGLOVE SZ 8.0 48980

## (undated) DEVICE — BONE CLEANING TIP INTERPULSE  0210-010-000

## (undated) DEVICE — DRAPE IOBAN INCISE 23X17" 6650EZ

## (undated) DEVICE — HOOD SURG T7PLUS PEEL AWAY FACE SHIELD STRL LF 0416-801-100

## (undated) DEVICE — DRAPE STERI TOWEL LG 1010

## (undated) DEVICE — DRSG ABDOMINAL 07 1/2X8" 7197D

## (undated) DEVICE — SOL NACL 0.9% INJ 1000ML BAG 2B1324X

## (undated) DEVICE — Device

## (undated) DEVICE — SU ETHIBOND 0 CTX CR  8X18" CX31D

## (undated) DEVICE — DRSG KERLIX 4 1/2"X4YDS ROLL 6715

## (undated) DEVICE — SPONGE LAP 18X18" X8435

## (undated) DEVICE — ESU ELEC BLADE 6" COATED E1450-6

## (undated) DEVICE — ESU GROUND PAD UNIVERSAL W/O CORD

## (undated) RX ORDER — FENTANYL CITRATE 50 UG/ML
INJECTION, SOLUTION INTRAMUSCULAR; INTRAVENOUS
Status: DISPENSED
Start: 2023-12-11

## (undated) RX ORDER — DEXAMETHASONE SODIUM PHOSPHATE 4 MG/ML
INJECTION, SOLUTION INTRA-ARTICULAR; INTRALESIONAL; INTRAMUSCULAR; INTRAVENOUS; SOFT TISSUE
Status: DISPENSED
Start: 2023-05-30

## (undated) RX ORDER — EPHEDRINE SULFATE 50 MG/ML
INJECTION, SOLUTION INTRAMUSCULAR; INTRAVENOUS; SUBCUTANEOUS
Status: DISPENSED
Start: 2023-05-30

## (undated) RX ORDER — GLYCOPYRROLATE 0.2 MG/ML
INJECTION INTRAMUSCULAR; INTRAVENOUS
Status: DISPENSED
Start: 2023-12-11

## (undated) RX ORDER — PROPOFOL 10 MG/ML
INJECTION, EMULSION INTRAVENOUS
Status: DISPENSED
Start: 2023-05-30

## (undated) RX ORDER — ONDANSETRON 2 MG/ML
INJECTION INTRAMUSCULAR; INTRAVENOUS
Status: DISPENSED
Start: 2023-05-30

## (undated) RX ORDER — FENTANYL CITRATE 50 UG/ML
INJECTION, SOLUTION INTRAMUSCULAR; INTRAVENOUS
Status: DISPENSED
Start: 2023-05-30

## (undated) RX ORDER — ACETAMINOPHEN 325 MG/1
TABLET ORAL
Status: DISPENSED
Start: 2023-12-11

## (undated) RX ORDER — HYDROMORPHONE HYDROCHLORIDE 1 MG/ML
INJECTION, SOLUTION INTRAMUSCULAR; INTRAVENOUS; SUBCUTANEOUS
Status: DISPENSED
Start: 2023-05-30

## (undated) RX ORDER — ONDANSETRON 2 MG/ML
INJECTION INTRAMUSCULAR; INTRAVENOUS
Status: DISPENSED
Start: 2023-12-11

## (undated) RX ORDER — KETOROLAC TROMETHAMINE 30 MG/ML
INJECTION, SOLUTION INTRAMUSCULAR; INTRAVENOUS
Status: DISPENSED
Start: 2023-12-11

## (undated) RX ORDER — LABETALOL HYDROCHLORIDE 5 MG/ML
INJECTION, SOLUTION INTRAVENOUS
Status: DISPENSED
Start: 2023-05-30

## (undated) RX ORDER — VANCOMYCIN HYDROCHLORIDE 1 G/20ML
INJECTION, POWDER, LYOPHILIZED, FOR SOLUTION INTRAVENOUS
Status: DISPENSED
Start: 2023-05-30

## (undated) RX ORDER — DEXAMETHASONE SODIUM PHOSPHATE 4 MG/ML
INJECTION, SOLUTION INTRA-ARTICULAR; INTRALESIONAL; INTRAMUSCULAR; INTRAVENOUS; SOFT TISSUE
Status: DISPENSED
Start: 2023-12-11

## (undated) RX ORDER — PROPOFOL 10 MG/ML
INJECTION, EMULSION INTRAVENOUS
Status: DISPENSED
Start: 2023-12-11

## (undated) RX ORDER — CEFAZOLIN SODIUM/WATER 2 G/20 ML
SYRINGE (ML) INTRAVENOUS
Status: DISPENSED
Start: 2023-05-30

## (undated) RX ORDER — TRANEXAMIC ACID 650 MG/1
TABLET ORAL
Status: DISPENSED
Start: 2023-05-30